# Patient Record
Sex: FEMALE | Race: OTHER | HISPANIC OR LATINO | URBAN - METROPOLITAN AREA
[De-identification: names, ages, dates, MRNs, and addresses within clinical notes are randomized per-mention and may not be internally consistent; named-entity substitution may affect disease eponyms.]

---

## 2017-02-19 ENCOUNTER — INPATIENT (INPATIENT)
Facility: HOSPITAL | Age: 57
LOS: 0 days | Discharge: HOME | DRG: 948 | End: 2017-02-20
Attending: HOSPITALIST | Admitting: HOSPITALIST
Payer: MEDICAID

## 2017-02-19 VITALS
TEMPERATURE: 99 F | RESPIRATION RATE: 20 BRPM | SYSTOLIC BLOOD PRESSURE: 194 MMHG | DIASTOLIC BLOOD PRESSURE: 125 MMHG | OXYGEN SATURATION: 100 % | HEART RATE: 105 BPM

## 2017-02-19 PROCEDURE — 99285 EMERGENCY DEPT VISIT HI MDM: CPT | Mod: 25

## 2017-02-19 PROCEDURE — 93010 ELECTROCARDIOGRAM REPORT: CPT

## 2017-02-19 NOTE — ED ADULT TRIAGE NOTE - CHIEF COMPLAINT QUOTE
L eye swelling x 1 week when waking up in the morning woke up this morning with L eye and facial swelling which improved throughout the day no diff breathing no swelling to lips tongue BP elevated deneis HA or visual changes/disturbances

## 2017-02-19 NOTE — ED ADULT NURSE NOTE - CHIEF COMPLAINT QUOTE
"L eye swelling every morning for 1 week today worsening w L side of face swelling but has since gone down" no diff breathing or swallowing no head pain but describes pressure in L eye

## 2017-02-19 NOTE — ED ADULT NURSE NOTE - OBJECTIVE STATEMENT
55 yo F presents to ED 55 yo F presents to ED c/o left side facial swelling and bilateral leg swelling. Reports trip and fall on Tuesday, bruising noted to bilateral knees. Ambulates with steady gait. 55 yo F presents to ED c/o left side facial swelling and bilateral leg swelling that began after having trip and fall on Tuesday, bruising noted to bilateral knees. Ambulates with steady gait. Denies headache, blurry vision, double vision, fever, chills, chest pain, SOB, abdominal pain, fever, chills, numbness, tingling, weakness, N/V. Pupils equal round and reactive to light. Breathing unlabored on RA. Skin warm pink and dry. Abdomen nondistended. Moves all extremities. As per son at bedside, "the swelling looks better." Pitting edema noted to lower extremities. Comfort and safety measures in place. Family at bedside.

## 2017-02-20 VITALS
DIASTOLIC BLOOD PRESSURE: 97 MMHG | SYSTOLIC BLOOD PRESSURE: 160 MMHG | OXYGEN SATURATION: 99 % | RESPIRATION RATE: 18 BRPM | HEART RATE: 85 BPM

## 2017-02-20 DIAGNOSIS — R63.8 OTHER SYMPTOMS AND SIGNS CONCERNING FOOD AND FLUID INTAKE: ICD-10-CM

## 2017-02-20 DIAGNOSIS — R22.0 LOCALIZED SWELLING, MASS AND LUMP, HEAD: ICD-10-CM

## 2017-02-20 DIAGNOSIS — I10 ESSENTIAL (PRIMARY) HYPERTENSION: ICD-10-CM

## 2017-02-20 DIAGNOSIS — Z29.9 ENCOUNTER FOR PROPHYLACTIC MEASURES, UNSPECIFIED: ICD-10-CM

## 2017-02-20 DIAGNOSIS — I50.9 HEART FAILURE, UNSPECIFIED: ICD-10-CM

## 2017-02-20 DIAGNOSIS — E11.9 TYPE 2 DIABETES MELLITUS WITHOUT COMPLICATIONS: ICD-10-CM

## 2017-02-20 DIAGNOSIS — N28.9 DISORDER OF KIDNEY AND URETER, UNSPECIFIED: ICD-10-CM

## 2017-02-20 DIAGNOSIS — R60.0 LOCALIZED EDEMA: ICD-10-CM

## 2017-02-20 LAB
ALBUMIN SERPL ELPH-MCNC: 2 G/DL — LOW (ref 3.3–5)
ALP SERPL-CCNC: 114 U/L — SIGNIFICANT CHANGE UP (ref 40–120)
ALT FLD-CCNC: 24 U/L RC — SIGNIFICANT CHANGE UP (ref 10–45)
ANION GAP SERPL CALC-SCNC: 12 MMOL/L — SIGNIFICANT CHANGE UP (ref 5–17)
APPEARANCE UR: CLEAR — SIGNIFICANT CHANGE UP
AST SERPL-CCNC: 46 U/L — HIGH (ref 10–40)
BASOPHILS # BLD AUTO: 0.1 K/UL — SIGNIFICANT CHANGE UP (ref 0–0.2)
BASOPHILS NFR BLD AUTO: 1.5 % — SIGNIFICANT CHANGE UP (ref 0–2)
BILIRUB SERPL-MCNC: 0.2 MG/DL — SIGNIFICANT CHANGE UP (ref 0.2–1.2)
BILIRUB UR-MCNC: NEGATIVE — SIGNIFICANT CHANGE UP
BUN SERPL-MCNC: 20 MG/DL — SIGNIFICANT CHANGE UP (ref 7–23)
CALCIUM SERPL-MCNC: 8.3 MG/DL — LOW (ref 8.4–10.5)
CHLORIDE SERPL-SCNC: 112 MMOL/L — HIGH (ref 96–108)
CO2 SERPL-SCNC: 21 MMOL/L — LOW (ref 22–31)
COLOR SPEC: SIGNIFICANT CHANGE UP
CREAT SERPL-MCNC: 1.17 MG/DL — SIGNIFICANT CHANGE UP (ref 0.5–1.3)
DIFF PNL FLD: ABNORMAL
EOSINOPHIL # BLD AUTO: 0.1 K/UL — SIGNIFICANT CHANGE UP (ref 0–0.5)
EOSINOPHIL NFR BLD AUTO: 1.6 % — SIGNIFICANT CHANGE UP (ref 0–6)
GLUCOSE SERPL-MCNC: 193 MG/DL — HIGH (ref 70–99)
GLUCOSE UR QL: 300
HCT VFR BLD CALC: 38.1 % — SIGNIFICANT CHANGE UP (ref 34.5–45)
HGB BLD-MCNC: 13.6 G/DL — SIGNIFICANT CHANGE UP (ref 11.5–15.5)
KETONES UR-MCNC: NEGATIVE — SIGNIFICANT CHANGE UP
LEUKOCYTE ESTERASE UR-ACNC: NEGATIVE — SIGNIFICANT CHANGE UP
LYMPHOCYTES # BLD AUTO: 2.1 K/UL — SIGNIFICANT CHANGE UP (ref 1–3.3)
LYMPHOCYTES # BLD AUTO: 33.1 % — SIGNIFICANT CHANGE UP (ref 13–44)
MCHC RBC-ENTMCNC: 32.2 PG — SIGNIFICANT CHANGE UP (ref 27–34)
MCHC RBC-ENTMCNC: 35.7 GM/DL — SIGNIFICANT CHANGE UP (ref 32–36)
MCV RBC AUTO: 90.3 FL — SIGNIFICANT CHANGE UP (ref 80–100)
MONOCYTES # BLD AUTO: 0.4 K/UL — SIGNIFICANT CHANGE UP (ref 0–0.9)
MONOCYTES NFR BLD AUTO: 7.1 % — SIGNIFICANT CHANGE UP (ref 2–14)
NEUTROPHILS # BLD AUTO: 3.6 K/UL — SIGNIFICANT CHANGE UP (ref 1.8–7.4)
NEUTROPHILS NFR BLD AUTO: 56.7 % — SIGNIFICANT CHANGE UP (ref 43–77)
NITRITE UR-MCNC: NEGATIVE — SIGNIFICANT CHANGE UP
NT-PROBNP SERPL-SCNC: 434 PG/ML — HIGH (ref 0–300)
PH UR: 7 — SIGNIFICANT CHANGE UP (ref 4.8–8)
PLATELET # BLD AUTO: 185 K/UL — SIGNIFICANT CHANGE UP (ref 150–400)
POTASSIUM SERPL-MCNC: 3.9 MMOL/L — SIGNIFICANT CHANGE UP (ref 3.5–5.3)
POTASSIUM SERPL-SCNC: 3.9 MMOL/L — SIGNIFICANT CHANGE UP (ref 3.5–5.3)
PROT SERPL-MCNC: 5.6 G/DL — LOW (ref 6–8.3)
PROT UR-MCNC: 500 MG/DL
RBC # BLD: 4.22 M/UL — SIGNIFICANT CHANGE UP (ref 3.8–5.2)
RBC # FLD: 12.3 % — SIGNIFICANT CHANGE UP (ref 10.3–14.5)
SODIUM SERPL-SCNC: 145 MMOL/L — SIGNIFICANT CHANGE UP (ref 135–145)
SP GR SPEC: 1.01 — SIGNIFICANT CHANGE UP (ref 1.01–1.02)
UROBILINOGEN FLD QL: NEGATIVE — SIGNIFICANT CHANGE UP
WBC # BLD: 6.3 K/UL — SIGNIFICANT CHANGE UP (ref 3.8–10.5)
WBC # FLD AUTO: 6.3 K/UL — SIGNIFICANT CHANGE UP (ref 3.8–10.5)

## 2017-02-20 PROCEDURE — 71020: CPT | Mod: 26

## 2017-02-20 PROCEDURE — 99223 1ST HOSP IP/OBS HIGH 75: CPT | Mod: GC

## 2017-02-20 RX ORDER — ACETAMINOPHEN 500 MG
650 TABLET ORAL EVERY 6 HOURS
Qty: 0 | Refills: 0 | Status: DISCONTINUED | OUTPATIENT
Start: 2017-02-20 | End: 2017-02-20

## 2017-02-20 RX ORDER — DEXTROSE 50 % IN WATER 50 %
25 SYRINGE (ML) INTRAVENOUS ONCE
Qty: 0 | Refills: 0 | Status: DISCONTINUED | OUTPATIENT
Start: 2017-02-20 | End: 2017-02-20

## 2017-02-20 RX ORDER — HEPARIN SODIUM 5000 [USP'U]/ML
5000 INJECTION INTRAVENOUS; SUBCUTANEOUS EVERY 8 HOURS
Qty: 0 | Refills: 0 | Status: DISCONTINUED | OUTPATIENT
Start: 2017-02-20 | End: 2017-02-20

## 2017-02-20 RX ORDER — AMLODIPINE BESYLATE 2.5 MG/1
10 TABLET ORAL ONCE
Qty: 0 | Refills: 0 | Status: COMPLETED | OUTPATIENT
Start: 2017-02-20 | End: 2017-02-20

## 2017-02-20 RX ORDER — DEXTROSE 50 % IN WATER 50 %
1 SYRINGE (ML) INTRAVENOUS ONCE
Qty: 0 | Refills: 0 | Status: DISCONTINUED | OUTPATIENT
Start: 2017-02-20 | End: 2017-02-20

## 2017-02-20 RX ORDER — DILTIAZEM HCL 120 MG
120 CAPSULE, EXT RELEASE 24 HR ORAL DAILY
Qty: 0 | Refills: 0 | Status: DISCONTINUED | OUTPATIENT
Start: 2017-02-20 | End: 2017-02-20

## 2017-02-20 RX ORDER — METFORMIN HYDROCHLORIDE 850 MG/1
1 TABLET ORAL
Qty: 30 | Refills: 0 | OUTPATIENT
Start: 2017-02-20 | End: 2017-03-22

## 2017-02-20 RX ORDER — GLUCAGON INJECTION, SOLUTION 0.5 MG/.1ML
1 INJECTION, SOLUTION SUBCUTANEOUS ONCE
Qty: 0 | Refills: 0 | Status: DISCONTINUED | OUTPATIENT
Start: 2017-02-20 | End: 2017-02-20

## 2017-02-20 RX ORDER — SODIUM CHLORIDE 9 MG/ML
1000 INJECTION, SOLUTION INTRAVENOUS
Qty: 0 | Refills: 0 | Status: DISCONTINUED | OUTPATIENT
Start: 2017-02-20 | End: 2017-02-20

## 2017-02-20 RX ORDER — LOSARTAN POTASSIUM 100 MG/1
100 TABLET, FILM COATED ORAL DAILY
Qty: 0 | Refills: 0 | Status: DISCONTINUED | OUTPATIENT
Start: 2017-02-20 | End: 2017-02-20

## 2017-02-20 RX ORDER — DEXTROSE 50 % IN WATER 50 %
12.5 SYRINGE (ML) INTRAVENOUS ONCE
Qty: 0 | Refills: 0 | Status: DISCONTINUED | OUTPATIENT
Start: 2017-02-20 | End: 2017-02-20

## 2017-02-20 RX ORDER — INSULIN LISPRO 100/ML
VIAL (ML) SUBCUTANEOUS
Qty: 0 | Refills: 0 | Status: DISCONTINUED | OUTPATIENT
Start: 2017-02-20 | End: 2017-02-20

## 2017-02-20 RX ORDER — INSULIN LISPRO 100/ML
VIAL (ML) SUBCUTANEOUS AT BEDTIME
Qty: 0 | Refills: 0 | Status: DISCONTINUED | OUTPATIENT
Start: 2017-02-20 | End: 2017-02-20

## 2017-02-20 RX ADMIN — HEPARIN SODIUM 5000 UNIT(S): 5000 INJECTION INTRAVENOUS; SUBCUTANEOUS at 13:06

## 2017-02-20 RX ADMIN — Medication 120 MILLIGRAM(S): at 13:03

## 2017-02-20 RX ADMIN — Medication 1: at 13:03

## 2017-02-20 RX ADMIN — AMLODIPINE BESYLATE 10 MILLIGRAM(S): 2.5 TABLET ORAL at 02:38

## 2017-02-20 RX ADMIN — LOSARTAN POTASSIUM 100 MILLIGRAM(S): 100 TABLET, FILM COATED ORAL at 14:22

## 2017-02-20 NOTE — H&P ADULT. - PROBLEM SELECTOR PROBLEM 3
Type 2 diabetes mellitus without complication, without long-term current use of insulin Essential hypertension

## 2017-02-20 NOTE — DISCHARGE NOTE ADULT - MEDICATION SUMMARY - MEDICATIONS TO STOP TAKING
I will STOP taking the medications listed below when I get home from the hospital:    Invokamet 50 mg-1000 mg oral tablet  -- 1 tab(s) by mouth 2 times a day (with meals)  -- Verified by [facility] [phone]    Tanzeum 30 mg subcutaneous injection  -- 30 milligram(s) subcutaneous every 7 days

## 2017-02-20 NOTE — DISCHARGE NOTE ADULT - PLAN OF CARE
To not have any more edema Your swelling may have been caused by your diabetes medications. Please discontinue your medications and only take metformin until you see your PMD. Please see your PMD within one week of discharge. It is important to go over your medications with your doctor Please continue to take your medications as prescribed. Please follow up with your PMD within a week of discharge. Please continue to take your eye drops Please only take metformin as prescribed. Your other medications were discontinued as you may have had an allergic reaction. Please follow up with your PMD for further diabetes mgmt. Medication compliance Ophthalmology Medication compliance and outpatient follow up Your swelling may have been caused by your diabetes medications. Please discontinue your medications and only take metformin until you see your PMD. A prescription for metformin was sent to your pharmacy [email or web address]. Please see your PMD within one week of discharge. It is important to go over your medications with your doctor. Please return to the ER if you have difficulty breathing, chest pain, rash, or any other progressive symptoms. Please continue to take your eye drops and follow up with your eye doctor who manages your glaucoma within one week of discharge Please only take metformin as prescribed. Your other medications were discontinued as you may have had an allergic reaction. Please follow up with your PMD for further diabetes management. Please return to the ER if you have hives, rashes, tongue swelling, difficulty breathing, or any other progressive symptoms.

## 2017-02-20 NOTE — H&P ADULT. - PMH
Essential hypertension    Glaucoma    Other hyperlipidemia    Type 2 diabetes mellitus without complication, without long-term current use of insulin

## 2017-02-20 NOTE — ED PROVIDER NOTE - OBJECTIVE STATEMENT
56 year old female DM, HTN, presenting with le edema, started suddenly after a trip and fall on tuesday when she fell onto bl knees. she walked immediately thereafter, ambulatory now. No injury elsewhere. No cardiac or renal hx. BP is "always high at doctor office" but has not been able to control with anti-hypertensives.

## 2017-02-20 NOTE — DISCHARGE NOTE ADULT - CARE PLAN
Principal Discharge DX:	Localized edema  Goal:	To not have any more edema  Instructions for follow-up, activity and diet:	Your swelling may have been caused by your diabetes medications. Please discontinue your medications and only take metformin until you see your PMD. Please see your PMD within one week of discharge. It is important to go over your medications with your doctor  Secondary Diagnosis:	Essential hypertension  Instructions for follow-up, activity and diet:	Please continue to take your medications as prescribed. Please follow up with your PMD within a week of discharge.  Secondary Diagnosis:	Glaucoma  Instructions for follow-up, activity and diet:	Please continue to take your eye drops  Secondary Diagnosis:	Type 2 diabetes mellitus without complication, without long-term current use of insulin  Instructions for follow-up, activity and diet:	Please only take metformin as prescribed. Your other medications were discontinued as you may have had an allergic reaction. Please follow up with your PMD for further diabetes mgmt. Principal Discharge DX:	Localized edema  Goal:	To not have any more edema  Instructions for follow-up, activity and diet:	Your swelling may have been caused by your diabetes medications. Please discontinue your medications and only take metformin until you see your PMD. A prescription for metformin was sent to your pharmacy [facility]. Please see your PMD within one week of discharge. It is important to go over your medications with your doctor. Please return to the ER if you have difficulty breathing, chest pain, rash, or any other progressive symptoms.  Secondary Diagnosis:	Essential hypertension  Goal:	Medication compliance  Instructions for follow-up, activity and diet:	Please continue to take your medications as prescribed. Please follow up with your PMD within a week of discharge.  Secondary Diagnosis:	Glaucoma  Goal:	Ophthalmology  Instructions for follow-up, activity and diet:	Please continue to take your eye drops and follow up with your eye doctor who manages your glaucoma within one week of discharge  Secondary Diagnosis:	Type 2 diabetes mellitus without complication, without long-term current use of insulin  Goal:	Medication compliance and outpatient follow up  Instructions for follow-up, activity and diet:	Please only take metformin as prescribed. Your other medications were discontinued as you may have had an allergic reaction. Please follow up with your PMD for further diabetes management. Please return to the ER if you have hives, rashes, tongue swelling, difficulty breathing, or any other progressive symptoms. Principal Discharge DX:	Localized edema  Goal:	To not have any more edema  Instructions for follow-up, activity and diet:	Your swelling may have been caused by your diabetes medications. Please discontinue your medications and only take metformin until you see your PMD. A prescription for metformin was sent to your pharmacy [facility]. Please see your PMD within one week of discharge. It is important to go over your medications with your doctor. Please return to the ER if you have difficulty breathing, chest pain, rash, or any other progressive symptoms.  Secondary Diagnosis:	Essential hypertension  Goal:	Medication compliance  Instructions for follow-up, activity and diet:	Please continue to take your medications as prescribed. Please follow up with your PMD within a week of discharge.  Secondary Diagnosis:	Glaucoma  Goal:	Ophthalmology  Instructions for follow-up, activity and diet:	Please continue to take your eye drops and follow up with your eye doctor who manages your glaucoma within one week of discharge  Secondary Diagnosis:	Type 2 diabetes mellitus without complication, without long-term current use of insulin  Goal:	Medication compliance and outpatient follow up  Instructions for follow-up, activity and diet:	Please only take metformin as prescribed. Your other medications were discontinued as you may have had an allergic reaction. Please follow up with your PMD for further diabetes management. Please return to the ER if you have hives, rashes, tongue swelling, difficulty breathing, or any other progressive symptoms. Principal Discharge DX:	Localized edema  Goal:	To not have any more edema  Instructions for follow-up, activity and diet:	Your swelling may have been caused by your diabetes medications. Please discontinue your medications and only take metformin until you see your PMD. A prescription for metformin was sent to your pharmacy [facility]. Please see your PMD within one week of discharge. It is important to go over your medications with your doctor. Please return to the ER if you have difficulty breathing, chest pain, rash, or any other progressive symptoms.  Secondary Diagnosis:	Essential hypertension  Goal:	Medication compliance  Instructions for follow-up, activity and diet:	Please continue to take your medications as prescribed. Please follow up with your PMD within a week of discharge.  Secondary Diagnosis:	Glaucoma  Goal:	Ophthalmology  Instructions for follow-up, activity and diet:	Please continue to take your eye drops and follow up with your eye doctor who manages your glaucoma within one week of discharge  Secondary Diagnosis:	Type 2 diabetes mellitus without complication, without long-term current use of insulin  Goal:	Medication compliance and outpatient follow up  Instructions for follow-up, activity and diet:	Please only take metformin as prescribed. Your other medications were discontinued as you may have had an allergic reaction. Please follow up with your PMD for further diabetes management. Please return to the ER if you have hives, rashes, tongue swelling, difficulty breathing, or any other progressive symptoms.

## 2017-02-20 NOTE — ED ADULT NURSE REASSESSMENT NOTE - NS ED NURSE REASSESS COMMENT FT1
Pt. denies pain/discomfort. Breathing unlabored on RA. NSR on CM. Family at bedside. Comfort and safety measures in place.

## 2017-02-20 NOTE — H&P ADULT. - PROBLEM SELECTOR PLAN 2
Patient with hx of Htn p/w /91 s/p amlodopine 10 mg x1  - restart patient's home antihypertensives see above

## 2017-02-20 NOTE — H&P ADULT. - PROBLEM SELECTOR PLAN 6
Patient with hx of DM2 and Htn both of which could cause nephropathy with UA showing protein 500   - order spot urine protein and creatine Patient with hx of DM2 and Htn both of which could cause nephropathy with UA showing protein 500   - order spot urine protein and creatine  -can consider outpatient neprhology eval  -advancing ckd may be contributing to mild LE swelling  -continue pts ARB on discharge for proteinuria. - DVT ppx: Heparin SC 5000 units q8

## 2017-02-20 NOTE — H&P ADULT. - HISTORY OF PRESENT ILLNESS
56 y.o. woman PMHx Htn, DM2, and glaucoma p/w b/l LE and left sided facial swelling x5 days. Six days ago, patient had a mechanical fall and fell on her knees. Patient reports that she tripped on the sidewalk and denies loss of consciousness and head trauma. Patient reports that she started taking a new DM2 medication, invokamet the day after the fall, and then her LE swelling began the day after starting the new medication. She denies rash, tongue swelling, difficulty breathing, and difficulty ambulating. Patient also states that she has glaucoma and uses eye drops for her conditions. She noticed two weeks ago that her left eye started irritating her, so she self stopped the eye drops. One day before presentation, patient reports that her left side of the face was swollen but has now resolved. Patient denies recent travel and son had viral syndrome symptoms.     In the ER, vitals were T 98.6, , /125, RR 20, O2 sat 100% on RA. Labs were significant for UA protein 500, small blood, RBC 5-10, and glucose 300. 56 y.o. woman PMHx Htn, DM2, and glaucoma p/w b/l LE and left sided facial swelling x5 days. Six days ago, patient had a mechanical fall and fell on her knees. Patient reports that she tripped on the sidewalk and denies loss of consciousness and head trauma. Patient reports that she started taking a new DM2 medication, invokamet the day after the fall, and then her LE swelling began the day after starting the new medication. She denies rash, tongue swelling, difficulty breathing, and difficulty ambulating. Patient also states that she has glaucoma and uses eye drops for her conditions. She noticed two weeks ago that her left eye started irritating her, so she self stopped the eye drops. One day before presentation, patient reports that her left side of the face was swollen but has now resolved. Patient denies recent travel and son had viral syndrome symptoms. Denies tooth infection and pain and pain with extraocular movement.     In the ER, vitals were T 98.6, , /125, RR 20, O2 sat 100% on RA. Labs were significant for  and UA protein 500, small blood, RBC 5-10, and glucose 300. CXR appears to show clear lungs. Patient received amlodipine 10 mg x1.     Currently, patient denies headache, slurred speech, visual changes, facial swelling, rashes, LE weakness, numbness, and tingling. 56 y.o. woman PMHx Htn, DM2, and glaucoma p/w b/l LE and left sided facial swelling x5 days. Six days ago, patient had a mechanical fall and fell on her knees. Patient reports that she tripped on the sidewalk and denies loss of consciousness and head trauma, prodrome symptoms, seizure activity, etc. Was a purely mechanical fall. Patient reports that she started taking a new DM2 medication, Tanzeum the day after the fall, and then her LE swelling began the day after starting the new medication. She denies rash, tongue swelling, difficulty breathing, and difficulty ambulating. No known allergies or prior allergic reactions. Patient also states that she has glaucoma and uses eye drops for her conditions. She noticed two weeks ago that her left eye started irritating her, so she self stopped the eye drops. One day before presentation, patient reports that her left side of the face, primarily her eyes, was swollen but has now resolved. Patient denies recent travel and son had viral syndrome symptoms. Denies tooth infection and pain and pain with extraocular movement. Denies visual changes.    In the ER, vitals were T 98.6, , /125, RR 20, O2 sat 100% on RA. Labs were significant for  and UA protein 500, small blood, RBC 5-10, and glucose 300. CXR appears to show clear lungs. Patient received amlodipine 10 mg x1.     Currently, patient denies headache, slurred speech, visual changes, facial swelling, rashes, LE weakness, numbness, and tingling.

## 2017-02-20 NOTE — H&P ADULT. - PROBLEM SELECTOR PLAN 4
- Diet: DASH/TLC, consistent carb without snacks Patient with hx of DM2  - order HbA1C  - ISS while admitted and adjust according to fingersticks

## 2017-02-20 NOTE — ED PROVIDER NOTE - ATTENDING CONTRIBUTION TO CARE
I was physically present for the E/M service provided. I agree with above history, physical, and plan which I have reviewed and edited where appropriate. I was physically present for the key portions of the service provided.    55 y/o female p/w 5 day h/o b/l LE swelling and facial swelling. Pt started on Invokamet for DM 4 days ago. No SOB or CP.  -Lungs CTA  -+2 Pitting edema to shins  -Check renal fxn and cardiac fxn with BNP  -Possible side effect of Invokamet is renal failure and angioedema (no clinically apparent mouth, tongue or face swelling)    BNP elevated will need admission for new-onset CHF for echo and cardiovascular management.

## 2017-02-20 NOTE — DISCHARGE NOTE ADULT - MEDICATION SUMMARY - MEDICATIONS TO TAKE
I will START or STAY ON the medications listed below when I get home from the hospital:    Aspirin Enteric Coated 81 mg oral delayed release tablet  -- 1 tab(s) by mouth once a day  -- Verified by [facility] [phone]  -- Indication: For CAD    Diltiazem Hydrochloride  mg/24 hours oral capsule, extended release  -- 1 cap(s) by mouth once a day  -- Verified by [facility] [phone]  -- Indication: For Htn    metFORMIN 1000 mg oral tablet  -- 1 tab(s) by mouth once a day  -- Check with your doctor before becoming pregnant.  Do not drink alcoholic beverages when taking this medication.  It is very important that you take or use this exactly as directed.  Do not skip doses or discontinue unless directed by your doctor.  Obtain medical advice before taking any non-prescription drugs as some may affect the action of this medication.  Take with food or milk.    -- Indication: For DM2    meclizine 25 mg oral tablet  -- 1 tab(s) by mouth every 4 hours, As Needed  -- Verified by [facility] [phone]  -- Indication: For dizziness    losartan-hydroCHLOROthiazide 100mg-25mg oral tablet  -- 1 tab(s) by mouth once a day  -- Verified by [facility] [phone]  -- Indication: For Htn    Betimol 0.5% ophthalmic solution  -- 1 drop(s) to each affected eye 2 times a day  -- Verified by [facility] [phone]  -- Indication: For Glaucoma    Travatan Z 0.004% ophthalmic solution  -- 1 drop(s) to each affected eye once a day (at bedtime)  -- Verified by [facility] [phone]  -- Indication: For Glaucoma

## 2017-02-20 NOTE — ED PROVIDER NOTE - CARE PLAN
Principal Discharge DX:	Acute congestive heart failure, unspecified congestive heart failure type  Secondary Diagnosis:	Hypoalbuminemia

## 2017-02-20 NOTE — H&P ADULT. - ENMT COMMENTS
moist oropharynx. extracted teeth but gums non erythematous and no abscesses visualized L. eye swelling now improved, denies eye redness/itching, pain with EOM

## 2017-02-20 NOTE — H&P ADULT. - PROBLEM SELECTOR PLAN 1
Patient p/w b/l LE edema after mechanical fall and left sided facial swelling after starting Invokamet now resolved with mildly elevated BNP. LE edema most likely in 2/2 mechanical fall.   - will discuss obtaining TTE.   - restart home HCTZ  - keep LE elevated when resting.   - investigate side effects of Invokamet Patient p/w b/l LE edema after mechanical fall and left sided facial swelling after starting Invokamet now resolved with mildly elevated BNP. LE edema most likely in 2/2 mechanical fall.   - no TTE necessary at this time. low suspicion for CHF- pt can discuss obtaining TTE with PCP   - restart home HCTZ  - keep LE elevated when resting, recommend compression stockings  - investigate side effects of Invokamet, Tanzeum. -- will likely stop with discharge and recommend follow up with pcp prior to resuming

## 2017-02-20 NOTE — H&P ADULT. - PROBLEM SELECTOR PLAN 3
Patient with hx of DM2  - order HbA1C  - ISS while admitted and adjust according to fingersticks Patient with hx of Htn p/w /91 s/p amlodopine 10 mg x1  - restart patient's home antihypertensives

## 2017-02-20 NOTE — H&P ADULT. - ASSESSMENT
56 y.o. woman PMHx Htn, DM2, and glaucoma p/w b/l LE swelling and facial swelling in the setting of mechanical fall and starting new medication Invokamet. 56 y.o. woman PMHx Htn, DM2, and glaucoma p/w b/l LE swelling and facial swelling in the setting of mechanical fall and starting new medication Invokamet found to have possible nephropathy. 56 y.o. woman PMHx Htn, DM2, and glaucoma p/w b/l LE swelling and L. eye swelling in the setting of mechanical fall and starting new medication. found to have possible DM2 nephropathy (protenuria, reduced GFR). Patients lower extremity swelling likely 2/2 injury/mechanical fall, venous stasis, and possible advancing ckd. Per patient and son has been improving.

## 2017-02-20 NOTE — H&P ADULT. - PROBLEM SELECTOR PROBLEM 4
Nutrition, metabolism, and development symptoms Type 2 diabetes mellitus without complication, without long-term current use of insulin

## 2017-02-20 NOTE — DISCHARGE NOTE ADULT - PATIENT PORTAL LINK FT
“You can access the [facility] Patient Portal, offered by [facility], by registering with the following website: [email or web address]”

## 2017-02-20 NOTE — H&P ADULT. - PROBLEM SELECTOR PLAN 7
Patient with hx of DM2 and Htn both of which could cause nephropathy with UA showing protein 500   - order spot urine protein and creatine  -can consider outpatient neprhology eval  -advancing ckd may be contributing to mild LE swelling  -continue pts ARB on discharge for proteinuria.

## 2017-02-20 NOTE — ED PROVIDER NOTE - PHYSICAL EXAMINATION
A primary and secondary survey was performed. Airway: oropharynx clear, Breathing: normal breath sounds bilaterally, pt phonating well, Circulation: Mentation normal, pulses palpated in all 4 limbs, no obvious active external hemorrhage, lungs/abd/pelvis/legs wo signs of blood accumulation. Disability: Neuro intact / pupils equal round reactive. Exposure: No external signs of trauma EXCEPT bruising to bl knees. Spine including c-spine non-tender. No neck pain and ROM wnl. C/spine cleared by nexus criteria.    Gen: Well appearing not in distress. Head: NC,AT. No sno sign/raccoon eyes. minimal edema L>R. ENT: No hemoTM, oropharynx as above, no nasal hemorrhage. Neck: as above. Chest: Lung exam- CTAB, no ttp in chest wall. Cardiac: RRR S1S2, No JVD. minimal non-pitting edema bl le. Abd: soft, non-tender. Normal in color. Pelvis: Stable. Ext: no ttp in all 4 limbs, rom at shoulder, elbow, hip and knee wnl, Skin: No Abrasions or lacerations. Neuro: GCS 15 , Moving 4 limbs, Speech fluid and clear, able to ambulate. Psych: Normal affect, judgment.

## 2017-02-20 NOTE — DISCHARGE NOTE ADULT - SECONDARY DIAGNOSIS.
Essential hypertension Glaucoma Type 2 diabetes mellitus without complication, without long-term current use of insulin

## 2017-02-20 NOTE — H&P ADULT. - ATTENDING COMMENTS
56 y.o. woman PMHx Htn, DM2, and glaucoma p/w b/l LE and left sided facial swelling (primarily localized to left eye) x5 days in the setting of starting new DM2 medication, Tanzeum, as well as mechanical fall. Per patient symptoms are now improving, and near baseline. VS in ED stable other than hypertension. Exam significant only for 1+ non pitting edema of LEs, with normal HEENT and lung exams. Labwork significant for evidence of CKD III, proteinuria, and mildly elevated pro-BNP to 400s. EKG and CXR both non-significant. Initial suspicion from ER was possible new diagnosis of CHF, however patient is asymptomatic with no clinical signs of CHF and no significant cardiac history. Suspect her LE swelling may be 2/2 venous stasis and/or trauma from mechanical fall prior to admission. Another possibility is 2/2 advancing CKD. Eye swelling now resolved, may have possibly been 2/2 allergic reaction from new medication? vs other general/seasonal allergies. Will restart antihypertensives including losartan, HCTZ. Will hold invokomet and tanzeum upon discharge and discharge pt only with metformin for time being until she sees her PCP. Recommend compression stockings and leg elevation upon discharge. Pt can discuss TTE with PCP outpatient, it is not necessary at this time given low suspicion for CHF. Can discontinue telemetry, no further cardiac workup at this time.

## 2017-02-21 RX ORDER — ALBIGLUTIDE 30 MG/.5ML
30 INJECTION, POWDER, LYOPHILIZED, FOR SOLUTION SUBCUTANEOUS
Qty: 0 | Refills: 0 | COMMUNITY

## 2017-05-01 ENCOUNTER — OUTPATIENT (OUTPATIENT)
Dept: OUTPATIENT SERVICES | Facility: HOSPITAL | Age: 57
LOS: 1 days | End: 2017-05-01
Payer: MEDICAID

## 2017-05-01 PROCEDURE — G9001: CPT

## 2017-05-28 ENCOUNTER — INPATIENT (INPATIENT)
Facility: HOSPITAL | Age: 57
LOS: 7 days | Discharge: HOME | DRG: 683 | End: 2017-06-05
Attending: HOSPITALIST | Admitting: HOSPITALIST
Payer: MEDICAID

## 2017-05-28 VITALS
HEART RATE: 93 BPM | HEIGHT: 61 IN | SYSTOLIC BLOOD PRESSURE: 154 MMHG | DIASTOLIC BLOOD PRESSURE: 89 MMHG | TEMPERATURE: 98 F | RESPIRATION RATE: 20 BRPM | OXYGEN SATURATION: 100 %

## 2017-05-28 DIAGNOSIS — R55 SYNCOPE AND COLLAPSE: ICD-10-CM

## 2017-05-28 LAB
ALBUMIN SERPL ELPH-MCNC: 1.7 G/DL — LOW (ref 3.3–5)
ALP SERPL-CCNC: 88 U/L — SIGNIFICANT CHANGE UP (ref 40–120)
ALT FLD-CCNC: 13 U/L RC — SIGNIFICANT CHANGE UP (ref 10–45)
ANION GAP SERPL CALC-SCNC: 12 MMOL/L — SIGNIFICANT CHANGE UP (ref 5–17)
AST SERPL-CCNC: 26 U/L — SIGNIFICANT CHANGE UP (ref 10–40)
BASOPHILS # BLD AUTO: 0 K/UL — SIGNIFICANT CHANGE UP (ref 0–0.2)
BASOPHILS NFR BLD AUTO: 0.5 % — SIGNIFICANT CHANGE UP (ref 0–2)
BILIRUB SERPL-MCNC: <0.1 MG/DL — LOW (ref 0.2–1.2)
BUN SERPL-MCNC: 48 MG/DL — HIGH (ref 7–23)
CALCIUM SERPL-MCNC: 8 MG/DL — LOW (ref 8.4–10.5)
CHLORIDE SERPL-SCNC: 114 MMOL/L — HIGH (ref 96–108)
CK MB CFR SERPL CALC: 2.2 NG/ML — SIGNIFICANT CHANGE UP (ref 0–3.8)
CK SERPL-CCNC: 54 U/L — SIGNIFICANT CHANGE UP (ref 25–170)
CO2 SERPL-SCNC: 15 MMOL/L — LOW (ref 22–31)
CREAT SERPL-MCNC: 3.11 MG/DL — HIGH (ref 0.5–1.3)
EOSINOPHIL # BLD AUTO: 0 K/UL — SIGNIFICANT CHANGE UP (ref 0–0.5)
EOSINOPHIL NFR BLD AUTO: 0.7 % — SIGNIFICANT CHANGE UP (ref 0–6)
GAS PNL BLDV: SIGNIFICANT CHANGE UP
GLUCOSE SERPL-MCNC: 151 MG/DL — HIGH (ref 70–99)
HCT VFR BLD CALC: 26.9 % — LOW (ref 34.5–45)
HGB BLD-MCNC: 9.1 G/DL — LOW (ref 11.5–15.5)
LYMPHOCYTES # BLD AUTO: 1.8 K/UL — SIGNIFICANT CHANGE UP (ref 1–3.3)
LYMPHOCYTES # BLD AUTO: 24.1 % — SIGNIFICANT CHANGE UP (ref 13–44)
MCHC RBC-ENTMCNC: 31.4 PG — SIGNIFICANT CHANGE UP (ref 27–34)
MCHC RBC-ENTMCNC: 34 GM/DL — SIGNIFICANT CHANGE UP (ref 32–36)
MCV RBC AUTO: 92.4 FL — SIGNIFICANT CHANGE UP (ref 80–100)
MONOCYTES # BLD AUTO: 0.3 K/UL — SIGNIFICANT CHANGE UP (ref 0–0.9)
MONOCYTES NFR BLD AUTO: 3.8 % — SIGNIFICANT CHANGE UP (ref 2–14)
NEUTROPHILS # BLD AUTO: 5.2 K/UL — SIGNIFICANT CHANGE UP (ref 1.8–7.4)
NEUTROPHILS NFR BLD AUTO: 71 % — SIGNIFICANT CHANGE UP (ref 43–77)
NT-PROBNP SERPL-SCNC: 1875 PG/ML — HIGH (ref 0–300)
PLATELET # BLD AUTO: 216 K/UL — SIGNIFICANT CHANGE UP (ref 150–400)
POTASSIUM SERPL-MCNC: 4.6 MMOL/L — SIGNIFICANT CHANGE UP (ref 3.5–5.3)
POTASSIUM SERPL-SCNC: 4.6 MMOL/L — SIGNIFICANT CHANGE UP (ref 3.5–5.3)
PROT SERPL-MCNC: 5.1 G/DL — LOW (ref 6–8.3)
RBC # BLD: 2.91 M/UL — LOW (ref 3.8–5.2)
RBC # FLD: 12.6 % — SIGNIFICANT CHANGE UP (ref 10.3–14.5)
SODIUM SERPL-SCNC: 141 MMOL/L — SIGNIFICANT CHANGE UP (ref 135–145)
TROPONIN T SERPL-MCNC: <0.01 NG/ML — SIGNIFICANT CHANGE UP (ref 0–0.06)
TROPONIN T SERPL-MCNC: <0.01 NG/ML — SIGNIFICANT CHANGE UP (ref 0–0.06)
WBC # BLD: 6.8 K/UL — SIGNIFICANT CHANGE UP (ref 3.8–10.5)
WBC # FLD AUTO: 6.8 K/UL — SIGNIFICANT CHANGE UP (ref 3.8–10.5)

## 2017-05-28 PROCEDURE — 93010 ELECTROCARDIOGRAM REPORT: CPT

## 2017-05-28 PROCEDURE — 99285 EMERGENCY DEPT VISIT HI MDM: CPT | Mod: 25

## 2017-05-28 PROCEDURE — 99223 1ST HOSP IP/OBS HIGH 75: CPT | Mod: GC

## 2017-05-28 PROCEDURE — 74176 CT ABD & PELVIS W/O CONTRAST: CPT | Mod: 26

## 2017-05-28 PROCEDURE — 71020: CPT | Mod: 26

## 2017-05-28 RX ORDER — MECLIZINE HCL 12.5 MG
1 TABLET ORAL
Qty: 0 | Refills: 0 | COMMUNITY

## 2017-05-28 RX ORDER — ASPIRIN/CALCIUM CARB/MAGNESIUM 324 MG
1 TABLET ORAL
Qty: 0 | Refills: 0 | COMMUNITY

## 2017-05-28 RX ORDER — SODIUM CHLORIDE 9 MG/ML
250 INJECTION INTRAMUSCULAR; INTRAVENOUS; SUBCUTANEOUS ONCE
Qty: 0 | Refills: 0 | Status: COMPLETED | OUTPATIENT
Start: 2017-05-28 | End: 2017-05-28

## 2017-05-28 RX ADMIN — SODIUM CHLORIDE 500 MILLILITER(S): 9 INJECTION INTRAMUSCULAR; INTRAVENOUS; SUBCUTANEOUS at 18:25

## 2017-05-28 RX ADMIN — SODIUM CHLORIDE 500 MILLILITER(S): 9 INJECTION INTRAMUSCULAR; INTRAVENOUS; SUBCUTANEOUS at 22:02

## 2017-05-28 NOTE — H&P ADULT. - HISTORY OF PRESENT ILLNESS
56 yr female w/ hx of nephrotic syndrome, lupus membranosis, HTN and DM presents with 1 month diarrhea and 2 syncopal episodes over past 3 weeks.      In the ED, vital signs: T:98.4, HR:93, BP:154/89, RR:20 and O2 sat: 100% on RA. Patient received 500cc NS. 56 yr female w/ hx of nephrotic syndrome, membranous LN, HTN and DM p/w syncopal episode and diarrhea x 1 month.     Patient and son at bedside explain that patient has had diarrhea for approximately 1 month and that for approximately the last week, she has been experiencing nausea/vomiting and cramping abdominal pain. On Saturday morning, patient reports having two episodes of non-bloody, non-bilious emesis shortly after having breakfast and states that when she went to the bathroom after that time, she felt lightheaded and felt as if everything was "blacked out" so she sat at the edge of her bathtub. Pt did not experience any headaches, chest pain, shortness of breath or palpitations at that time. She also has not experienced any  fevers/chills, bloody stool or dysuria. She does report decreased frequency of urination in the last several days as well as chronic b/l LE edema that is unchanged over the last several months.     Of note, pt was diagnosed with nephrotic syndrome/membranous lupus approximately one month ago and was recently prescribed Myfortic, of which she took the first dose today.     In the ED, vital signs: T:98.4, HR:93, BP:154/89, RR:20 and O2 sat: 100% on RA. Patient received 500cc NS.

## 2017-05-28 NOTE — ED PROVIDER NOTE - MEDICAL DECISION MAKING DETAILS
MD Kiran,Attending: pt seen and examined, agree with above HPI/ROS/PE. repeat syncope in pt with multimedical dxes and nephrotic syndrome. No CP/no trauma. Lungs/car/neuro all WNL. 3+ chronic LE pitiing edema. Discuss goals/numbers with her nephrologist and treat accordingly. cardiac workup now.

## 2017-05-28 NOTE — H&P ADULT. - PROBLEM SELECTOR PROBLEM 5
Essential hypertension Type 2 diabetes mellitus without complication, without long-term current use of insulin Anemia

## 2017-05-28 NOTE — H&P ADULT. - PROBLEM SELECTOR PLAN 5
continue nifedipine and clonidine, hold losartan and diuretics for now hold oral medications and start ISS   - monitor FS   - carb consistent diet  - hbgA1C in AM pt w/ hgb of 9.1 on admission (baseline 13.6), no overt signs of bleeding, may be 2/2 worsening renal function   - check iron studies   - monitor CBC

## 2017-05-28 NOTE — ED PROVIDER NOTE - OBJECTIVE STATEMENT
55 yo F PMHx nephrotic syndrome, lupus membranosis, HTN, DM presents with 1 month diarrhea and 2 syncopal episodes over past 3 weeks. Last syncope 2 hours ago while lying down. Follows with nephrology (unattached) who states baseline creatinine 2.5, albumin less than 1.5. States diarrhea began when pt started med for nephrotic syndrome. Pt denies CP, SOB, fever, chills, bloody stool or urine, mental status changes, focal neurologic deficit.

## 2017-05-28 NOTE — H&P ADULT. - RADIOLOGY RESULTS AND INTERPRETATION
CT a/p reviewed: Marked bowel wall thickening of the distal ileum and mild submucosal fat deposition noted in the terminal ileum, suggestive of with ileitis.   CXR reviewed: clear lungs

## 2017-05-28 NOTE — H&P ADULT. - PROBLEM SELECTOR PLAN 6
continue atorvastatin continue nifedipine and clonidine, hold losartan and diuretics for now hold oral medications and start ISS   - monitor FS   - carb consistent diet  - hbgA1C in AM

## 2017-05-28 NOTE — ED ADULT NURSE NOTE - CHIEF COMPLAINT QUOTE
unwitnessed syncopal episode after vomiting - in sitting position, +dizzy x few days, +persistent diarrhea, similar episode x 3 wks ago due to dehydration from diarrhea - seen at [facility]

## 2017-05-28 NOTE — H&P ADULT. - PROBLEM SELECTOR PLAN 1
CT a/p showing maked bowel wall thickening of the distal ileum w/ submucosal fat   deposition suggestive of ileitis   - pt s/p 500cc in ED   - hold diuretics for now   - start ciprofloxacin/flagyl   - check stool cultures symptoms likely 2/2 dehydration the setting of diarrhea  - cardiac etiology unlikely given EKG is wnl and troponins negative x 2  - continue IVF   - check orthostatics symptoms likely 2/2 dehydration the setting of diarrhea  - cardiac etiology unlikely given EKG is wnl and troponins negative x 2 (will trend x 3)  - continue IVF   - check orthostatics

## 2017-05-28 NOTE — ED ADULT NURSE NOTE - OBJECTIVE STATEMENT
56 yr old female coming in c/o diarrhea x1 month- was seen at another hospital dx with "dehydration". Pt has history of diabetes and nephrotic syndrome. Lower extremity swelling noted. Family states the patient was vomiting today and had a syncopal episode that lasted "2 minutes"; denies hitting head. Patient awake and alert, body tremors noted. Pt has pain in epigastric area when palpating. IV 20G left AC. Pt denies chest pain, sob, fevers, chills, dizziness. Family at bedside. 56 yr old female coming in c/o diarrhea x1 month- was seen at another hospital dx with "dehydration". Pt has history of diabetes and nephrotic syndrome. Lower extremity swelling noted. Family states the patient was vomiting today and had a syncopal episode that lasted "2 minutes"; denies hitting head. Patient awake and alert, body tremors noted. Pt has pain in epigastric area when palpating.  IV 20G left AC. Pt denies chest pain, sob, fevers, chills, dizziness. Family at bedside.

## 2017-05-28 NOTE — ED ADULT NURSE NOTE - PMH
Diabetes    Essential hypertension    Glaucoma    Hypercholesteremia    Hypertension    Other hyperlipidemia    Type 2 diabetes mellitus without complication, without long-term current use of insulin

## 2017-05-28 NOTE — ED PROVIDER NOTE - PHYSICAL EXAMINATION
A&Ox3, mild distress, tremulous. Skin pale. CN 2-12 grossly intact. EOMI, PERRLA. A&Ox3, mild distress, tremulous. Skin pale. CN 2-12 grossly intact. No focal neurologic deficit motor or sensory. No vascular compromise noted. EOMI, PERRLA. Heart RRR no murmur, lungs CTA b/l no wheezes, rhonchi rales. 4+ pitting edema b/l LEs. Peripheral pulses present and equal UE and LE b/l. Abdomen mildly tender to palpation. Guaiac negative.

## 2017-05-28 NOTE — H&P ADULT. - PROBLEM SELECTOR PLAN 2
pt w/ BUN/Cr of 48/3.11 on admission, baseline creatinine 2.5. Likely pre-renal in the setting of dehydration 2/2 diarrhea   - hold diuretics   - monitor BMP   - Avoid NSAIDs/nephrotoxins  - renal dosing of meds  - hold Losartan CT a/p showing maked bowel wall thickening of the distal ileum w/ submucosal fat   deposition suggestive of ileitis   - pt s/p 500cc in ED   - hold diuretics for now   - start ciprofloxacin/flagyl   - check stool cultures CT a/p showing marked bowel wall thickening of the distal ileum w/ submucosal fat   deposition suggestive of ileitis   - pt s/p 500cc in ED   - hold diuretics for now   - start ciprofloxacin/flagyl   - check stool cultures  - GI consult in AM

## 2017-05-28 NOTE — H&P ADULT. - PROBLEM SELECTOR PLAN 4
hold oral medications and start ISS   - monitor FS   - carb consistent diet  - hbgA1C in AM pt w/ baseline creatinine of 2.5, now with likely pre-renal OVI   - monitor BMP   - contact nephrologist (Dr. Huong Jacobs) in AM   - holding diuretics for now pt w/ baseline creatinine of 2.5, now with likely pre-renal OVI   - monitor BMP   - contact nephrologist (Dr. Huong Jacobs) in AM and house renal    - holding diuretics for now  - rheum consult in AM

## 2017-05-28 NOTE — H&P ADULT. - ASSESSMENT
56 yr female w/ hx of nephrotic syndrome, membranous LN, HTN and DM p/w syncopal episode and diarrhea x 1 month found to have ileitis

## 2017-05-28 NOTE — H&P ADULT. - PROBLEM SELECTOR PROBLEM 6
Hypercholesteremia Essential hypertension Type 2 diabetes mellitus without complication, without long-term current use of insulin

## 2017-05-28 NOTE — ED ADULT TRIAGE NOTE - CHIEF COMPLAINT QUOTE
unwitnessed syncopal episode after vomiting - in sitting position, +dizzy x few days, +persistent diarrhea, similar episode x 3 wks ago due to dehydration from diarrhea - seen at [facility] unwitnessed syncopal episode after vomiting - in sitting position, +dizzy x few days, +persistent diarrhea (due to taking myfortic - as per nephrologist), similar episode x 3 wks ago due to dehydration from diarrhea - seen at [facility]

## 2017-05-28 NOTE — H&P ADULT. - PROBLEM SELECTOR PLAN 3
pt w/ baseline creatinine of 2.5, now with likely pre-renal OVI   - monitor BMP   - contact nephrologist (Dr. Huong Jacobs) in AM   - holding diuretics for now pt w/ BUN/Cr of 48/3.11 on admission, baseline creatinine 2.5. Likely pre-renal in the setting of dehydration 2/2 diarrhea   - hold diuretics   - monitor BMP   - Avoid NSAIDs/nephrotoxins  - renal dosing of meds  - hold Losartan

## 2017-05-29 DIAGNOSIS — I10 ESSENTIAL (PRIMARY) HYPERTENSION: ICD-10-CM

## 2017-05-29 DIAGNOSIS — E11.9 TYPE 2 DIABETES MELLITUS WITHOUT COMPLICATIONS: ICD-10-CM

## 2017-05-29 DIAGNOSIS — R55 SYNCOPE AND COLLAPSE: ICD-10-CM

## 2017-05-29 DIAGNOSIS — D64.9 ANEMIA, UNSPECIFIED: ICD-10-CM

## 2017-05-29 DIAGNOSIS — K52.9 NONINFECTIVE GASTROENTERITIS AND COLITIS, UNSPECIFIED: ICD-10-CM

## 2017-05-29 DIAGNOSIS — E78.00 PURE HYPERCHOLESTEROLEMIA, UNSPECIFIED: ICD-10-CM

## 2017-05-29 DIAGNOSIS — Z29.9 ENCOUNTER FOR PROPHYLACTIC MEASURES, UNSPECIFIED: ICD-10-CM

## 2017-05-29 DIAGNOSIS — N04.9 NEPHROTIC SYNDROME WITH UNSPECIFIED MORPHOLOGIC CHANGES: ICD-10-CM

## 2017-05-29 DIAGNOSIS — N17.9 ACUTE KIDNEY FAILURE, UNSPECIFIED: ICD-10-CM

## 2017-05-29 LAB
BLD GP AB SCN SERPL QL: NEGATIVE — SIGNIFICANT CHANGE UP
CK MB CFR SERPL CALC: 2 NG/ML — SIGNIFICANT CHANGE UP (ref 0–3.8)
CK SERPL-CCNC: 33 U/L — SIGNIFICANT CHANGE UP (ref 25–170)
FERRITIN SERPL-MCNC: 781 NG/ML — HIGH (ref 15–150)
HBA1C BLD-MCNC: 5.7 % — HIGH (ref 4–5.6)
HCT VFR BLD CALC: 20.2 % — CRITICAL LOW (ref 34.5–45)
HCT VFR BLD CALC: 24.8 % — LOW (ref 34.5–45)
HGB BLD-MCNC: 7.3 G/DL — LOW (ref 11.5–15.5)
HGB BLD-MCNC: 8.5 G/DL — LOW (ref 11.5–15.5)
IRON SATN MFR SERPL: 77 UG/DL — SIGNIFICANT CHANGE UP (ref 30–160)
IRON SATN MFR SERPL: 86 % — HIGH (ref 14–50)
MCHC RBC-ENTMCNC: 31.8 PG — SIGNIFICANT CHANGE UP (ref 27–34)
MCHC RBC-ENTMCNC: 33.5 PG — SIGNIFICANT CHANGE UP (ref 27–34)
MCHC RBC-ENTMCNC: 34.4 GM/DL — SIGNIFICANT CHANGE UP (ref 32–36)
MCHC RBC-ENTMCNC: 36.3 GM/DL — HIGH (ref 32–36)
MCV RBC AUTO: 92.3 FL — SIGNIFICANT CHANGE UP (ref 80–100)
MCV RBC AUTO: 92.5 FL — SIGNIFICANT CHANGE UP (ref 80–100)
PLATELET # BLD AUTO: 159 K/UL — SIGNIFICANT CHANGE UP (ref 150–400)
PLATELET # BLD AUTO: 173 K/UL — SIGNIFICANT CHANGE UP (ref 150–400)
RBC # BLD: 2.19 M/UL — LOW (ref 3.8–5.2)
RBC # BLD: 2.68 M/UL — LOW (ref 3.8–5.2)
RBC # FLD: 12.5 % — SIGNIFICANT CHANGE UP (ref 10.3–14.5)
RBC # FLD: 12.8 % — SIGNIFICANT CHANGE UP (ref 10.3–14.5)
RH IG SCN BLD-IMP: POSITIVE — SIGNIFICANT CHANGE UP
TIBC SERPL-MCNC: 90 UG/DL — LOW (ref 220–430)
TROPONIN T SERPL-MCNC: <0.01 NG/ML — SIGNIFICANT CHANGE UP (ref 0–0.06)
UIBC SERPL-MCNC: 13 UG/DL — LOW (ref 110–370)
WBC # BLD: 5 K/UL — SIGNIFICANT CHANGE UP (ref 3.8–10.5)
WBC # BLD: 6 K/UL — SIGNIFICANT CHANGE UP (ref 3.8–10.5)
WBC # FLD AUTO: 5 K/UL — SIGNIFICANT CHANGE UP (ref 3.8–10.5)
WBC # FLD AUTO: 6 K/UL — SIGNIFICANT CHANGE UP (ref 3.8–10.5)

## 2017-05-29 PROCEDURE — 99233 SBSQ HOSP IP/OBS HIGH 50: CPT

## 2017-05-29 PROCEDURE — 99233 SBSQ HOSP IP/OBS HIGH 50: CPT | Mod: GC

## 2017-05-29 RX ORDER — DEXTROSE 50 % IN WATER 50 %
12.5 SYRINGE (ML) INTRAVENOUS ONCE
Qty: 0 | Refills: 0 | Status: DISCONTINUED | OUTPATIENT
Start: 2017-05-29 | End: 2017-06-05

## 2017-05-29 RX ORDER — NIFEDIPINE 30 MG
0 TABLET, EXTENDED RELEASE 24 HR ORAL
Qty: 0 | Refills: 0 | COMMUNITY

## 2017-05-29 RX ORDER — SODIUM CHLORIDE 9 MG/ML
1000 INJECTION INTRAMUSCULAR; INTRAVENOUS; SUBCUTANEOUS
Qty: 0 | Refills: 0 | Status: DISCONTINUED | OUTPATIENT
Start: 2017-05-29 | End: 2017-05-30

## 2017-05-29 RX ORDER — SODIUM CHLORIDE 9 MG/ML
1000 INJECTION, SOLUTION INTRAVENOUS
Qty: 0 | Refills: 0 | Status: DISCONTINUED | OUTPATIENT
Start: 2017-05-29 | End: 2017-06-05

## 2017-05-29 RX ORDER — DEXTROSE 50 % IN WATER 50 %
25 SYRINGE (ML) INTRAVENOUS ONCE
Qty: 0 | Refills: 0 | Status: DISCONTINUED | OUTPATIENT
Start: 2017-05-29 | End: 2017-06-05

## 2017-05-29 RX ORDER — GLUCAGON INJECTION, SOLUTION 0.5 MG/.1ML
1 INJECTION, SOLUTION SUBCUTANEOUS ONCE
Qty: 0 | Refills: 0 | Status: DISCONTINUED | OUTPATIENT
Start: 2017-05-29 | End: 2017-06-05

## 2017-05-29 RX ORDER — INSULIN LISPRO 100/ML
VIAL (ML) SUBCUTANEOUS AT BEDTIME
Qty: 0 | Refills: 0 | Status: DISCONTINUED | OUTPATIENT
Start: 2017-05-29 | End: 2017-06-05

## 2017-05-29 RX ORDER — FUROSEMIDE 40 MG
0 TABLET ORAL
Qty: 0 | Refills: 0 | COMMUNITY

## 2017-05-29 RX ORDER — ATORVASTATIN CALCIUM 80 MG/1
20 TABLET, FILM COATED ORAL AT BEDTIME
Qty: 0 | Refills: 0 | Status: DISCONTINUED | OUTPATIENT
Start: 2017-05-29 | End: 2017-06-05

## 2017-05-29 RX ORDER — CIPROFLOXACIN LACTATE 400MG/40ML
400 VIAL (ML) INTRAVENOUS EVERY 24 HOURS
Qty: 0 | Refills: 0 | Status: DISCONTINUED | OUTPATIENT
Start: 2017-05-29 | End: 2017-06-01

## 2017-05-29 RX ORDER — NIFEDIPINE 30 MG
30 TABLET, EXTENDED RELEASE 24 HR ORAL DAILY
Qty: 0 | Refills: 0 | Status: DISCONTINUED | OUTPATIENT
Start: 2017-05-29 | End: 2017-06-05

## 2017-05-29 RX ORDER — METRONIDAZOLE 500 MG
500 TABLET ORAL EVERY 8 HOURS
Qty: 0 | Refills: 0 | Status: DISCONTINUED | OUTPATIENT
Start: 2017-05-29 | End: 2017-06-01

## 2017-05-29 RX ORDER — LOSARTAN POTASSIUM 100 MG/1
0 TABLET, FILM COATED ORAL
Qty: 0 | Refills: 0 | COMMUNITY

## 2017-05-29 RX ORDER — SODIUM CHLORIDE 9 MG/ML
500 INJECTION INTRAMUSCULAR; INTRAVENOUS; SUBCUTANEOUS
Qty: 0 | Refills: 0 | Status: DISCONTINUED | OUTPATIENT
Start: 2017-05-29 | End: 2017-05-29

## 2017-05-29 RX ORDER — INSULIN LISPRO 100/ML
VIAL (ML) SUBCUTANEOUS
Qty: 0 | Refills: 0 | Status: DISCONTINUED | OUTPATIENT
Start: 2017-05-29 | End: 2017-06-05

## 2017-05-29 RX ORDER — DEXTROSE 50 % IN WATER 50 %
1 SYRINGE (ML) INTRAVENOUS ONCE
Qty: 0 | Refills: 0 | Status: DISCONTINUED | OUTPATIENT
Start: 2017-05-29 | End: 2017-06-05

## 2017-05-29 RX ORDER — HEPARIN SODIUM 5000 [USP'U]/ML
5000 INJECTION INTRAVENOUS; SUBCUTANEOUS EVERY 8 HOURS
Qty: 0 | Refills: 0 | Status: DISCONTINUED | OUTPATIENT
Start: 2017-05-29 | End: 2017-06-05

## 2017-05-29 RX ADMIN — Medication 100 MILLIGRAM(S): at 17:19

## 2017-05-29 RX ADMIN — SODIUM CHLORIDE 75 MILLILITER(S): 9 INJECTION INTRAMUSCULAR; INTRAVENOUS; SUBCUTANEOUS at 10:06

## 2017-05-29 RX ADMIN — HEPARIN SODIUM 5000 UNIT(S): 5000 INJECTION INTRAVENOUS; SUBCUTANEOUS at 21:38

## 2017-05-29 RX ADMIN — Medication 100 MILLIGRAM(S): at 10:05

## 2017-05-29 RX ADMIN — SODIUM CHLORIDE 75 MILLILITER(S): 9 INJECTION INTRAMUSCULAR; INTRAVENOUS; SUBCUTANEOUS at 04:06

## 2017-05-29 RX ADMIN — Medication 30 MILLIGRAM(S): at 05:20

## 2017-05-29 RX ADMIN — HEPARIN SODIUM 5000 UNIT(S): 5000 INJECTION INTRAVENOUS; SUBCUTANEOUS at 05:16

## 2017-05-29 RX ADMIN — SODIUM CHLORIDE 50 MILLILITER(S): 9 INJECTION INTRAMUSCULAR; INTRAVENOUS; SUBCUTANEOUS at 14:29

## 2017-05-29 RX ADMIN — Medication 100 MILLIGRAM(S): at 02:17

## 2017-05-29 RX ADMIN — HEPARIN SODIUM 5000 UNIT(S): 5000 INJECTION INTRAVENOUS; SUBCUTANEOUS at 14:31

## 2017-05-29 RX ADMIN — Medication 200 MILLIGRAM(S): at 04:06

## 2017-05-29 RX ADMIN — Medication 2: at 17:19

## 2017-05-29 RX ADMIN — ATORVASTATIN CALCIUM 20 MILLIGRAM(S): 80 TABLET, FILM COATED ORAL at 21:38

## 2017-05-29 NOTE — PROVIDER CONTACT NOTE (CRITICAL VALUE NOTIFICATION) - BACKGROUND
Patient admitted for syncope and collapse.  PMH of essential HTN, Type 2 diabetes mellitus, other hyperlipidemia, glaucoma, hypercholesteremia.

## 2017-05-29 NOTE — PROVIDER CONTACT NOTE (CRITICAL VALUE NOTIFICATION) - ASSESSMENT
Upon assessment, patient is resting in bed, NAD, with no complaints at this time.  Patient denies hemoptysis, melena, BRBPR, hematemesis at this time.  No active signs of bleeding noted.

## 2017-05-30 DIAGNOSIS — R69 ILLNESS, UNSPECIFIED: ICD-10-CM

## 2017-05-30 LAB
ANION GAP SERPL CALC-SCNC: 11 MMOL/L — SIGNIFICANT CHANGE UP (ref 5–17)
ANTI-RIBONUCLEAR PROTEIN: 0.4 AI — SIGNIFICANT CHANGE UP
BUN SERPL-MCNC: 51 MG/DL — HIGH (ref 7–23)
C3 SERPL-MCNC: 62 MG/DL — LOW (ref 80–180)
C4 SERPL-MCNC: 20 MG/DL — SIGNIFICANT CHANGE UP (ref 10–45)
CALCIUM SERPL-MCNC: 7.7 MG/DL — LOW (ref 8.4–10.5)
CHLORIDE SERPL-SCNC: 116 MMOL/L — HIGH (ref 96–108)
CHLORIDE UR-SCNC: <20 MMOL/L — SIGNIFICANT CHANGE UP
CHOLEST SERPL-MCNC: 227 MG/DL — HIGH (ref 10–199)
CO2 SERPL-SCNC: 13 MMOL/L — LOW (ref 22–31)
CREAT ?TM UR-MCNC: 168 MG/DL — SIGNIFICANT CHANGE UP
CREAT SERPL-MCNC: 2.84 MG/DL — HIGH (ref 0.5–1.3)
ENA SM AB FLD QL: 1.5 AI — HIGH
FOLATE SERPL-MCNC: 5 NG/ML — SIGNIFICANT CHANGE UP (ref 4.8–24.2)
GLUCOSE SERPL-MCNC: 105 MG/DL — HIGH (ref 70–99)
HDLC SERPL-MCNC: 47 MG/DL — SIGNIFICANT CHANGE UP (ref 40–125)
LIPID PNL WITH DIRECT LDL SERPL: 149 MG/DL — HIGH
MAGNESIUM SERPL-MCNC: 2.3 MG/DL — SIGNIFICANT CHANGE UP (ref 1.6–2.6)
MAGNESIUM UR-MCNC: 4.4 MG/DL — SIGNIFICANT CHANGE UP
PHOSPHATE 24H UR-MCNC: 72 MG/DL — SIGNIFICANT CHANGE UP
PHOSPHATE SERPL-MCNC: 5.6 MG/DL — HIGH (ref 2.5–4.5)
POTASSIUM SERPL-MCNC: 4.4 MMOL/L — SIGNIFICANT CHANGE UP (ref 3.5–5.3)
POTASSIUM SERPL-SCNC: 4.4 MMOL/L — SIGNIFICANT CHANGE UP (ref 3.5–5.3)
POTASSIUM UR-SCNC: 41 MMOL/L — SIGNIFICANT CHANGE UP
PROT ?TM UR-MCNC: <4 MG/DL — SIGNIFICANT CHANGE UP (ref 0–12)
PROT ?TM UR-MCNC: <4 MG/DL — SIGNIFICANT CHANGE UP (ref 0–12)
RHEUMATOID FACT SERPL-ACNC: <7 IU/ML — SIGNIFICANT CHANGE UP (ref 0–13.9)
SODIUM SERPL-SCNC: 140 MMOL/L — SIGNIFICANT CHANGE UP (ref 135–145)
SODIUM UR-SCNC: <20 MMOL/L — SIGNIFICANT CHANGE UP
TOTAL CHOLESTEROL/HDL RATIO MEASUREMENT: 4.8 RATIO — SIGNIFICANT CHANGE UP (ref 3.3–7.1)
TRIGL SERPL-MCNC: 153 MG/DL — HIGH (ref 10–149)
UUN UR-MCNC: 501 MG/DL — SIGNIFICANT CHANGE UP
VIT B12 SERPL-MCNC: 566 PG/ML — SIGNIFICANT CHANGE UP (ref 243–894)

## 2017-05-30 PROCEDURE — 99233 SBSQ HOSP IP/OBS HIGH 50: CPT | Mod: GC

## 2017-05-30 PROCEDURE — 99222 1ST HOSP IP/OBS MODERATE 55: CPT | Mod: GC

## 2017-05-30 RX ORDER — SODIUM CHLORIDE 9 MG/ML
1000 INJECTION, SOLUTION INTRAVENOUS
Qty: 0 | Refills: 0 | Status: DISCONTINUED | OUTPATIENT
Start: 2017-05-30 | End: 2017-05-31

## 2017-05-30 RX ORDER — ONDANSETRON 8 MG/1
4 TABLET, FILM COATED ORAL ONCE
Qty: 0 | Refills: 0 | Status: COMPLETED | OUTPATIENT
Start: 2017-05-30 | End: 2017-05-30

## 2017-05-30 RX ADMIN — HEPARIN SODIUM 5000 UNIT(S): 5000 INJECTION INTRAVENOUS; SUBCUTANEOUS at 14:28

## 2017-05-30 RX ADMIN — Medication 100 MILLIGRAM(S): at 01:19

## 2017-05-30 RX ADMIN — ATORVASTATIN CALCIUM 20 MILLIGRAM(S): 80 TABLET, FILM COATED ORAL at 23:35

## 2017-05-30 RX ADMIN — Medication 100 MILLIGRAM(S): at 17:37

## 2017-05-30 RX ADMIN — Medication 100 MILLIGRAM(S): at 12:02

## 2017-05-30 RX ADMIN — Medication 200 MILLIGRAM(S): at 05:56

## 2017-05-30 RX ADMIN — Medication 30 MILLIGRAM(S): at 05:59

## 2017-05-30 RX ADMIN — HEPARIN SODIUM 5000 UNIT(S): 5000 INJECTION INTRAVENOUS; SUBCUTANEOUS at 05:59

## 2017-05-30 RX ADMIN — ONDANSETRON 4 MILLIGRAM(S): 8 TABLET, FILM COATED ORAL at 22:29

## 2017-05-30 RX ADMIN — HEPARIN SODIUM 5000 UNIT(S): 5000 INJECTION INTRAVENOUS; SUBCUTANEOUS at 21:59

## 2017-05-30 RX ADMIN — SODIUM CHLORIDE 75 MILLILITER(S): 9 INJECTION, SOLUTION INTRAVENOUS at 10:09

## 2017-05-31 LAB
24R-OH-CALCIDIOL SERPL-MCNC: <4 NG/ML — LOW (ref 30–100)
ANION GAP SERPL CALC-SCNC: 13 MMOL/L — SIGNIFICANT CHANGE UP (ref 5–17)
APPEARANCE UR: ABNORMAL
BACTERIA # UR AUTO: ABNORMAL /HPF
BILIRUB UR-MCNC: NEGATIVE — SIGNIFICANT CHANGE UP
BUN SERPL-MCNC: 47 MG/DL — HIGH (ref 7–23)
CALCIUM SERPL-MCNC: 7.6 MG/DL — LOW (ref 8.4–10.5)
CHLORIDE SERPL-SCNC: 115 MMOL/L — HIGH (ref 96–108)
CO2 SERPL-SCNC: 11 MMOL/L — LOW (ref 22–31)
COLOR SPEC: YELLOW — SIGNIFICANT CHANGE UP
CREAT ?TM UR-MCNC: 163 MG/DL — SIGNIFICANT CHANGE UP
CREAT SERPL-MCNC: 2.9 MG/DL — HIGH (ref 0.5–1.3)
DIFF PNL FLD: ABNORMAL
EPI CELLS # UR: SIGNIFICANT CHANGE UP /HPF
GLUCOSE SERPL-MCNC: 102 MG/DL — HIGH (ref 70–99)
GLUCOSE UR QL: 500
HCT VFR BLD CALC: 23.1 % — LOW (ref 34.5–45)
HGB BLD-MCNC: 7.9 G/DL — LOW (ref 11.5–15.5)
HYALINE CASTS # UR AUTO: ABNORMAL
KETONES UR-MCNC: NEGATIVE — SIGNIFICANT CHANGE UP
LEUKOCYTE ESTERASE UR-ACNC: NEGATIVE — SIGNIFICANT CHANGE UP
MAGNESIUM SERPL-MCNC: 2.3 MG/DL — SIGNIFICANT CHANGE UP (ref 1.6–2.6)
MCHC RBC-ENTMCNC: 28.9 PG — SIGNIFICANT CHANGE UP (ref 27–34)
MCHC RBC-ENTMCNC: 34.2 GM/DL — SIGNIFICANT CHANGE UP (ref 32–36)
MCV RBC AUTO: 84.6 FL — SIGNIFICANT CHANGE UP (ref 80–100)
NITRITE UR-MCNC: NEGATIVE — SIGNIFICANT CHANGE UP
PH UR: 6.5 — SIGNIFICANT CHANGE UP (ref 5–8)
PHOSPHATE SERPL-MCNC: 5.8 MG/DL — HIGH (ref 2.5–4.5)
PLATELET # BLD AUTO: 200 K/UL — SIGNIFICANT CHANGE UP (ref 150–400)
POTASSIUM SERPL-MCNC: 4.7 MMOL/L — SIGNIFICANT CHANGE UP (ref 3.5–5.3)
POTASSIUM SERPL-SCNC: 4.7 MMOL/L — SIGNIFICANT CHANGE UP (ref 3.5–5.3)
PROT UR-MCNC: >600 MG/DL
PROT/CREAT UR-RTO: SIGNIFICANT CHANGE UP (ref 0–0.2)
RBC # BLD: 2.73 M/UL — LOW (ref 3.8–5.2)
RBC # FLD: 15 % — HIGH (ref 10.3–14.5)
RBC CASTS # UR COMP ASSIST: ABNORMAL /HPF (ref 0–2)
SODIUM SERPL-SCNC: 139 MMOL/L — SIGNIFICANT CHANGE UP (ref 135–145)
SP GR SPEC: >1.03 — HIGH (ref 1.01–1.02)
UROBILINOGEN FLD QL: NEGATIVE — SIGNIFICANT CHANGE UP
VIT D25+D1,25 OH+D1,25 PNL SERPL-MCNC: 15.7 PG/ML — LOW (ref 19.9–79.3)
WBC # BLD: 5.12 K/UL — SIGNIFICANT CHANGE UP (ref 3.8–10.5)
WBC # FLD AUTO: 5.12 K/UL — SIGNIFICANT CHANGE UP (ref 3.8–10.5)
WBC UR QL: SIGNIFICANT CHANGE UP /HPF (ref 0–5)

## 2017-05-31 PROCEDURE — 99233 SBSQ HOSP IP/OBS HIGH 50: CPT | Mod: GC

## 2017-05-31 PROCEDURE — 93306 TTE W/DOPPLER COMPLETE: CPT | Mod: 26

## 2017-05-31 PROCEDURE — 99232 SBSQ HOSP IP/OBS MODERATE 35: CPT | Mod: GC

## 2017-05-31 PROCEDURE — 76770 US EXAM ABDO BACK WALL COMP: CPT | Mod: 26

## 2017-05-31 RX ORDER — SODIUM BICARBONATE 1 MEQ/ML
650 SYRINGE (ML) INTRAVENOUS THREE TIMES A DAY
Qty: 0 | Refills: 0 | Status: DISCONTINUED | OUTPATIENT
Start: 2017-05-31 | End: 2017-06-01

## 2017-05-31 RX ORDER — ONDANSETRON 8 MG/1
4 TABLET, FILM COATED ORAL ONCE
Qty: 0 | Refills: 0 | Status: COMPLETED | OUTPATIENT
Start: 2017-05-31 | End: 2017-05-31

## 2017-05-31 RX ORDER — SODIUM CHLORIDE 9 MG/ML
1000 INJECTION, SOLUTION INTRAVENOUS
Qty: 0 | Refills: 0 | Status: DISCONTINUED | OUTPATIENT
Start: 2017-05-31 | End: 2017-05-31

## 2017-05-31 RX ORDER — SOD SULF/SODIUM/NAHCO3/KCL/PEG
1000 SOLUTION, RECONSTITUTED, ORAL ORAL EVERY 4 HOURS
Qty: 0 | Refills: 0 | Status: COMPLETED | OUTPATIENT
Start: 2017-05-31 | End: 2017-05-31

## 2017-05-31 RX ADMIN — HEPARIN SODIUM 5000 UNIT(S): 5000 INJECTION INTRAVENOUS; SUBCUTANEOUS at 11:55

## 2017-05-31 RX ADMIN — ONDANSETRON 4 MILLIGRAM(S): 8 TABLET, FILM COATED ORAL at 23:03

## 2017-05-31 RX ADMIN — Medication 100 MILLIGRAM(S): at 18:11

## 2017-05-31 RX ADMIN — HEPARIN SODIUM 5000 UNIT(S): 5000 INJECTION INTRAVENOUS; SUBCUTANEOUS at 23:34

## 2017-05-31 RX ADMIN — SODIUM CHLORIDE 100 MILLILITER(S): 9 INJECTION, SOLUTION INTRAVENOUS at 09:17

## 2017-05-31 RX ADMIN — Medication 100 MILLIGRAM(S): at 09:17

## 2017-05-31 RX ADMIN — Medication 10 MILLIGRAM(S): at 18:24

## 2017-05-31 RX ADMIN — Medication 650 MILLIGRAM(S): at 23:35

## 2017-05-31 RX ADMIN — Medication 1000 MILLILITER(S): at 22:42

## 2017-05-31 RX ADMIN — SODIUM CHLORIDE 100 MILLILITER(S): 9 INJECTION, SOLUTION INTRAVENOUS at 05:28

## 2017-05-31 RX ADMIN — HEPARIN SODIUM 5000 UNIT(S): 5000 INJECTION INTRAVENOUS; SUBCUTANEOUS at 05:28

## 2017-05-31 RX ADMIN — ATORVASTATIN CALCIUM 20 MILLIGRAM(S): 80 TABLET, FILM COATED ORAL at 23:35

## 2017-05-31 RX ADMIN — Medication 30 MILLIGRAM(S): at 09:17

## 2017-05-31 RX ADMIN — Medication 100 MILLIGRAM(S): at 01:15

## 2017-05-31 RX ADMIN — Medication 200 MILLIGRAM(S): at 05:29

## 2017-05-31 RX ADMIN — SODIUM CHLORIDE 75 MILLILITER(S): 9 INJECTION, SOLUTION INTRAVENOUS at 04:56

## 2017-05-31 RX ADMIN — Medication 1 PATCH: at 11:55

## 2017-05-31 RX ADMIN — Medication 1000 MILLILITER(S): at 18:19

## 2017-05-31 NOTE — PROVIDER CONTACT NOTE (OTHER) - ASSESSMENT
Pt A&Ox4. VSS. + orthos- MD was already made aware. Pt gets nausea when medications are given at home.

## 2017-05-31 NOTE — PROVIDER CONTACT NOTE (OTHER) - ASSESSMENT
Pt A&Ox4. Pt was not having any dizziness when the orthostatics were done. Pt is asymptomatic. Pt is normal sinus rhythm on tele- tachy when the patient sits and stands. Was told from day nurse- no more fluids b/c of kidneys.

## 2017-06-01 LAB
ANA PAT FLD IF-IMP: ABNORMAL
ANA TITR SER: ABNORMAL
ANION GAP SERPL CALC-SCNC: 13 MMOL/L — SIGNIFICANT CHANGE UP (ref 5–17)
ANION GAP SERPL CALC-SCNC: 15 MMOL/L — SIGNIFICANT CHANGE UP (ref 5–17)
BUN SERPL-MCNC: 46 MG/DL — HIGH (ref 7–23)
BUN SERPL-MCNC: 47 MG/DL — HIGH (ref 7–23)
CALCIUM SERPL-MCNC: 7.5 MG/DL — LOW (ref 8.4–10.5)
CALCIUM SERPL-MCNC: 7.5 MG/DL — LOW (ref 8.4–10.5)
CALCIUM SERPL-MCNC: 8.1 MG/DL — LOW (ref 8.4–10.5)
CHLORIDE SERPL-SCNC: 113 MMOL/L — HIGH (ref 96–108)
CHLORIDE SERPL-SCNC: 115 MMOL/L — HIGH (ref 96–108)
CO2 SERPL-SCNC: 10 MMOL/L — CRITICAL LOW (ref 22–31)
CO2 SERPL-SCNC: 14 MMOL/L — LOW (ref 22–31)
CREAT SERPL-MCNC: 3.63 MG/DL — HIGH (ref 0.5–1.3)
CREAT SERPL-MCNC: 3.67 MG/DL — HIGH (ref 0.5–1.3)
CRP SERPL-MCNC: 0.2 MG/DL — SIGNIFICANT CHANGE UP (ref 0–0.4)
CULTURE RESULTS: NO GROWTH — SIGNIFICANT CHANGE UP
CULTURE RESULTS: SIGNIFICANT CHANGE UP
DSDNA AB SER-ACNC: 39 IU/ML — HIGH
ERYTHROCYTE [SEDIMENTATION RATE] IN BLOOD: 64 MM/HR — HIGH (ref 0–20)
GLUCOSE SERPL-MCNC: 102 MG/DL — HIGH (ref 70–99)
GLUCOSE SERPL-MCNC: 115 MG/DL — HIGH (ref 70–99)
HCT VFR BLD CALC: 21.5 % — LOW (ref 34.5–45)
HGB BLD-MCNC: 7.6 G/DL — LOW (ref 11.5–15.5)
M TB TUBERC IFN-G BLD QL: 0.01 IU/ML — SIGNIFICANT CHANGE UP
M TB TUBERC IFN-G BLD QL: 0.06 IU/ML — SIGNIFICANT CHANGE UP
M TB TUBERC IFN-G BLD QL: ABNORMAL
MAGNESIUM SERPL-MCNC: 2.2 MG/DL — SIGNIFICANT CHANGE UP (ref 1.6–2.6)
MCHC RBC-ENTMCNC: 30 PG — SIGNIFICANT CHANGE UP (ref 27–34)
MCHC RBC-ENTMCNC: 35.3 GM/DL — SIGNIFICANT CHANGE UP (ref 32–36)
MCV RBC AUTO: 85 FL — SIGNIFICANT CHANGE UP (ref 80–100)
MITOGEN IGNF BCKGRD COR BLD-ACNC: 0.01 IU/ML — SIGNIFICANT CHANGE UP
PHOSPHATE SERPL-MCNC: 5.7 MG/DL — HIGH (ref 2.5–4.5)
PLATELET # BLD AUTO: 190 K/UL — SIGNIFICANT CHANGE UP (ref 150–400)
POTASSIUM SERPL-MCNC: 4.1 MMOL/L — SIGNIFICANT CHANGE UP (ref 3.5–5.3)
POTASSIUM SERPL-MCNC: 4.2 MMOL/L — SIGNIFICANT CHANGE UP (ref 3.5–5.3)
POTASSIUM SERPL-SCNC: 4.1 MMOL/L — SIGNIFICANT CHANGE UP (ref 3.5–5.3)
POTASSIUM SERPL-SCNC: 4.2 MMOL/L — SIGNIFICANT CHANGE UP (ref 3.5–5.3)
PTH-INTACT FLD-MCNC: 253 PG/ML — HIGH (ref 15–65)
RBC # BLD: 2.53 M/UL — LOW (ref 3.8–5.2)
RBC # FLD: 14.6 % — HIGH (ref 10.3–14.5)
SODIUM SERPL-SCNC: 140 MMOL/L — SIGNIFICANT CHANGE UP (ref 135–145)
SODIUM SERPL-SCNC: 140 MMOL/L — SIGNIFICANT CHANGE UP (ref 135–145)
SPECIMEN SOURCE: SIGNIFICANT CHANGE UP
WBC # BLD: 4.9 K/UL — SIGNIFICANT CHANGE UP (ref 3.8–10.5)
WBC # FLD AUTO: 4.9 K/UL — SIGNIFICANT CHANGE UP (ref 3.8–10.5)

## 2017-06-01 PROCEDURE — 99233 SBSQ HOSP IP/OBS HIGH 50: CPT | Mod: GC

## 2017-06-01 PROCEDURE — 88305 TISSUE EXAM BY PATHOLOGIST: CPT | Mod: 26

## 2017-06-01 PROCEDURE — 99233 SBSQ HOSP IP/OBS HIGH 50: CPT

## 2017-06-01 PROCEDURE — 45380 COLONOSCOPY AND BIOPSY: CPT | Mod: GC

## 2017-06-01 RX ORDER — SODIUM BICARBONATE 1 MEQ/ML
1300 SYRINGE (ML) INTRAVENOUS THREE TIMES A DAY
Qty: 0 | Refills: 0 | Status: DISCONTINUED | OUTPATIENT
Start: 2017-06-01 | End: 2017-06-01

## 2017-06-01 RX ORDER — SODIUM CHLORIDE 9 MG/ML
1000 INJECTION, SOLUTION INTRAVENOUS
Qty: 0 | Refills: 0 | Status: DISCONTINUED | OUTPATIENT
Start: 2017-06-01 | End: 2017-06-01

## 2017-06-01 RX ORDER — SODIUM CHLORIDE 9 MG/ML
1000 INJECTION, SOLUTION INTRAVENOUS
Qty: 0 | Refills: 0 | Status: DISCONTINUED | OUTPATIENT
Start: 2017-06-01 | End: 2017-06-02

## 2017-06-01 RX ORDER — SODIUM BICARBONATE 1 MEQ/ML
15 SYRINGE (ML) INTRAVENOUS
Qty: 150 | Refills: 0 | Status: DISCONTINUED | OUTPATIENT
Start: 2017-06-01 | End: 2017-06-01

## 2017-06-01 RX ORDER — CALCIUM CARBONATE 500(1250)
1 TABLET ORAL DAILY
Qty: 0 | Refills: 0 | Status: DISCONTINUED | OUTPATIENT
Start: 2017-06-01 | End: 2017-06-05

## 2017-06-01 RX ORDER — ERGOCALCIFEROL 1.25 MG/1
50000 CAPSULE ORAL
Qty: 0 | Refills: 0 | Status: DISCONTINUED | OUTPATIENT
Start: 2017-06-01 | End: 2017-06-05

## 2017-06-01 RX ADMIN — SODIUM CHLORIDE 100 MILLILITER(S): 9 INJECTION, SOLUTION INTRAVENOUS at 16:24

## 2017-06-01 RX ADMIN — Medication 100 MILLIGRAM(S): at 02:28

## 2017-06-01 RX ADMIN — Medication 650 MILLIGRAM(S): at 05:56

## 2017-06-01 RX ADMIN — Medication 100 MILLIGRAM(S): at 13:08

## 2017-06-01 RX ADMIN — SODIUM CHLORIDE 100 MILLILITER(S): 9 INJECTION, SOLUTION INTRAVENOUS at 10:40

## 2017-06-01 RX ADMIN — ERGOCALCIFEROL 50000 UNIT(S): 1.25 CAPSULE ORAL at 11:26

## 2017-06-01 RX ADMIN — Medication 1 TABLET(S): at 11:26

## 2017-06-01 RX ADMIN — Medication 30 MILLIGRAM(S): at 05:56

## 2017-06-01 RX ADMIN — Medication 100 MILLIGRAM(S): at 19:10

## 2017-06-01 RX ADMIN — Medication 1300 MILLIGRAM(S): at 13:08

## 2017-06-01 RX ADMIN — HEPARIN SODIUM 5000 UNIT(S): 5000 INJECTION INTRAVENOUS; SUBCUTANEOUS at 16:25

## 2017-06-01 RX ADMIN — Medication 200 MILLIGRAM(S): at 05:55

## 2017-06-01 RX ADMIN — HEPARIN SODIUM 5000 UNIT(S): 5000 INJECTION INTRAVENOUS; SUBCUTANEOUS at 21:54

## 2017-06-01 RX ADMIN — ATORVASTATIN CALCIUM 20 MILLIGRAM(S): 80 TABLET, FILM COATED ORAL at 21:55

## 2017-06-02 LAB
ANION GAP SERPL CALC-SCNC: 14 MMOL/L — SIGNIFICANT CHANGE UP (ref 5–17)
ANION GAP SERPL CALC-SCNC: 16 MMOL/L — SIGNIFICANT CHANGE UP (ref 5–17)
BUN SERPL-MCNC: 46 MG/DL — HIGH (ref 7–23)
BUN SERPL-MCNC: 47 MG/DL — HIGH (ref 7–23)
CALCIUM SERPL-MCNC: 7.8 MG/DL — LOW (ref 8.4–10.5)
CALCIUM SERPL-MCNC: 7.9 MG/DL — LOW (ref 8.4–10.5)
CHLORIDE SERPL-SCNC: 113 MMOL/L — HIGH (ref 96–108)
CHLORIDE SERPL-SCNC: 115 MMOL/L — HIGH (ref 96–108)
CO2 SERPL-SCNC: 12 MMOL/L — LOW (ref 22–31)
CO2 SERPL-SCNC: 13 MMOL/L — LOW (ref 22–31)
CORTIS AM PEAK SERPL-MCNC: 16.2 UG/DL — SIGNIFICANT CHANGE UP (ref 6–18.4)
CREAT ?TM UR-MCNC: 173 MG/DL — SIGNIFICANT CHANGE UP
CREAT SERPL-MCNC: 3.6 MG/DL — HIGH (ref 0.5–1.3)
CREAT SERPL-MCNC: 3.64 MG/DL — HIGH (ref 0.5–1.3)
CULTURE RESULTS: SIGNIFICANT CHANGE UP
GAS PNL BLDA: SIGNIFICANT CHANGE UP
GLUCOSE SERPL-MCNC: 108 MG/DL — HIGH (ref 70–99)
GLUCOSE SERPL-MCNC: 165 MG/DL — HIGH (ref 70–99)
HCT VFR BLD CALC: 23 % — LOW (ref 34.5–45)
HGB BLD-MCNC: 8 G/DL — LOW (ref 11.5–15.5)
MAGNESIUM SERPL-MCNC: 2.1 MG/DL — SIGNIFICANT CHANGE UP (ref 1.6–2.6)
MCHC RBC-ENTMCNC: 32 PG — SIGNIFICANT CHANGE UP (ref 27–34)
MCHC RBC-ENTMCNC: 34.7 GM/DL — SIGNIFICANT CHANGE UP (ref 32–36)
MCV RBC AUTO: 92.2 FL — SIGNIFICANT CHANGE UP (ref 80–100)
PHOSPHATE SERPL-MCNC: 5.7 MG/DL — HIGH (ref 2.5–4.5)
PLATELET # BLD AUTO: 214 K/UL — SIGNIFICANT CHANGE UP (ref 150–400)
POTASSIUM SERPL-MCNC: 3.9 MMOL/L — SIGNIFICANT CHANGE UP (ref 3.5–5.3)
POTASSIUM SERPL-MCNC: 4 MMOL/L — SIGNIFICANT CHANGE UP (ref 3.5–5.3)
POTASSIUM SERPL-SCNC: 3.9 MMOL/L — SIGNIFICANT CHANGE UP (ref 3.5–5.3)
POTASSIUM SERPL-SCNC: 4 MMOL/L — SIGNIFICANT CHANGE UP (ref 3.5–5.3)
PROT ?TM UR-MCNC: <4 MG/DL — SIGNIFICANT CHANGE UP (ref 0–12)
PROT/CREAT UR-RTO: SIGNIFICANT CHANGE UP (ref 0–0.2)
RBC # BLD: 2.49 M/UL — LOW (ref 3.8–5.2)
RBC # FLD: 13.3 % — SIGNIFICANT CHANGE UP (ref 10.3–14.5)
SODIUM SERPL-SCNC: 141 MMOL/L — SIGNIFICANT CHANGE UP (ref 135–145)
SODIUM SERPL-SCNC: 142 MMOL/L — SIGNIFICANT CHANGE UP (ref 135–145)
SPECIMEN SOURCE: SIGNIFICANT CHANGE UP
WBC # BLD: 6.4 K/UL — SIGNIFICANT CHANGE UP (ref 3.8–10.5)
WBC # FLD AUTO: 6.4 K/UL — SIGNIFICANT CHANGE UP (ref 3.8–10.5)

## 2017-06-02 PROCEDURE — 99233 SBSQ HOSP IP/OBS HIGH 50: CPT | Mod: GC

## 2017-06-02 PROCEDURE — 99223 1ST HOSP IP/OBS HIGH 75: CPT | Mod: GC

## 2017-06-02 PROCEDURE — 93975 VASCULAR STUDY: CPT | Mod: 26

## 2017-06-02 RX ORDER — ALBUMIN HUMAN 25 %
250 VIAL (ML) INTRAVENOUS EVERY 6 HOURS
Qty: 0 | Refills: 0 | Status: COMPLETED | OUTPATIENT
Start: 2017-06-02 | End: 2017-06-03

## 2017-06-02 RX ORDER — SODIUM CHLORIDE 9 MG/ML
1000 INJECTION, SOLUTION INTRAVENOUS
Qty: 0 | Refills: 0 | Status: COMPLETED | OUTPATIENT
Start: 2017-06-02 | End: 2017-06-02

## 2017-06-02 RX ADMIN — Medication 2: at 13:10

## 2017-06-02 RX ADMIN — ATORVASTATIN CALCIUM 20 MILLIGRAM(S): 80 TABLET, FILM COATED ORAL at 23:06

## 2017-06-02 RX ADMIN — HEPARIN SODIUM 5000 UNIT(S): 5000 INJECTION INTRAVENOUS; SUBCUTANEOUS at 14:18

## 2017-06-02 RX ADMIN — Medication 125 MILLILITER(S): at 23:14

## 2017-06-02 RX ADMIN — Medication 30 MILLIGRAM(S): at 05:46

## 2017-06-02 RX ADMIN — SODIUM CHLORIDE 100 MILLILITER(S): 9 INJECTION, SOLUTION INTRAVENOUS at 00:45

## 2017-06-02 RX ADMIN — Medication 1 TABLET(S): at 13:12

## 2017-06-02 RX ADMIN — HEPARIN SODIUM 5000 UNIT(S): 5000 INJECTION INTRAVENOUS; SUBCUTANEOUS at 05:46

## 2017-06-02 RX ADMIN — SODIUM CHLORIDE 100 MILLILITER(S): 9 INJECTION, SOLUTION INTRAVENOUS at 17:30

## 2017-06-02 RX ADMIN — Medication 125 MILLILITER(S): at 15:19

## 2017-06-02 RX ADMIN — HEPARIN SODIUM 5000 UNIT(S): 5000 INJECTION INTRAVENOUS; SUBCUTANEOUS at 23:06

## 2017-06-02 NOTE — PROVIDER CONTACT NOTE (OTHER) - REASON
Orthostatics positive- pt is asymptomatic. Pt is supposed to get procardia 30mg this morning.
PT is feeling nausea- does not want to take bedtime medications unless antinausea medicine
Pt was having emesis after lipitor was given, according to son he states, "My mother has had this at home before when she takes medication."
Pt's orthostatics are positive- fluids have been d/c
Should heparin subQ be held for colonoscopy and should nifedipine 20mg be given despite positive orthos last night?
Should orthostatics be done this morning
albumin IVPB given late due to no iv access; IVF 0.9 NS and sodium acetate resumed at 17:30 for the remaining 7hrs

## 2017-06-02 NOTE — PROVIDER CONTACT NOTE (OTHER) - BACKGROUND
Pt came in with sycope and collapse. Pt has a hx of DM type II, glaucoma, HLD, HTN, DM.
Pt came in with syncope and collapse. Pt has a hx of DM type II, HTN, HLD, DM, and glaucoma.
Pt came in with syncope and collapse. Pt has a hx of DM type II, HTN, glaucoma, HLD.
Pt came in with syncope and collapse. Pt has a hx of DM, hld, htn.
Pt came in with syncope and collapse. Pt has a hx of HTN, DM type II, glaucoma, HLD, HTN, and DM.
Pt came in with syncope and collapse. Pt has a hx of glaucoma, DM, HLD, HTN.
dx: syncope/collapse

## 2017-06-02 NOTE — PROVIDER CONTACT NOTE (OTHER) - ACTION/TREATMENT ORDERED:
IVF 0.9 NS and sodium acetate resumed at 17:30 for the remaining 7hrs (00:00)
MD changed the rate of the fluids to 100ml/hr. MD ordered to push the procardia to 8AM and do another orthostatics later
MD ordered to have the orthostatics not done
MD ordered to not give heparin and to give the nifedipine 30mg
MD ordered zofran for the patient.
MD ordered zofran iv push
No orders were made at this present time.

## 2017-06-02 NOTE — PROVIDER CONTACT NOTE (OTHER) - NAME OF MD/NP/PA/DO NOTIFIED:
Juhi Roman MD
Dr Kilpatrick #[phone]

## 2017-06-02 NOTE — PROVIDER CONTACT NOTE (OTHER) - SITUATION
Orthostatics positive- pt is asymptomatic. Pt is supposed to get procardia 30mg this morning.
PT is feeling nausea- does not want to take bedtime medications unless antinausea medicine
Pt was having emesis after lipitor was given, according to son he states, "My mother has had this at home before when she takes medication."
Pt's orthostatics are positive- fluids have been d/c
Should heparin subQ be held for colonoscopy and should nifedipine 20mg be given despite positive orthos last night?
Should orthostatics be done this morning
1st dose albumin IVPB given late due to no iv access and pt off the unit for test; IVF 0.9 NS and sodium acetate resumed at 17:30 for the remaining 7hrs

## 2017-06-02 NOTE — PROVIDER CONTACT NOTE (OTHER) - DATE AND TIME:
02-Jun-2017 18:53
02-Jun-2017 01:25
30-May-2017 22:10
31-May-2017 05:25
31-May-2017 05:35
31-May-2017 21:20
31-May-2017 22:50

## 2017-06-02 NOTE — PROVIDER CONTACT NOTE (OTHER) - ASSESSMENT
Pt a&Ox4. VSS except patient is having sinus tachycardia. Pt is asymptomatic. Pt is not having any c/o of dizziness.

## 2017-06-03 LAB
ANION GAP SERPL CALC-SCNC: 15 MMOL/L — SIGNIFICANT CHANGE UP (ref 5–17)
ANION GAP SERPL CALC-SCNC: 15 MMOL/L — SIGNIFICANT CHANGE UP (ref 5–17)
APPEARANCE UR: ABNORMAL
BACTERIA # UR AUTO: NEGATIVE — SIGNIFICANT CHANGE UP
BILIRUB UR-MCNC: NEGATIVE — SIGNIFICANT CHANGE UP
BUN SERPL-MCNC: 44 MG/DL — HIGH (ref 7–23)
BUN SERPL-MCNC: 46 MG/DL — HIGH (ref 7–23)
CALCIUM SERPL-MCNC: 7.8 MG/DL — LOW (ref 8.4–10.5)
CALCIUM SERPL-MCNC: 7.9 MG/DL — LOW (ref 8.4–10.5)
CHLORIDE SERPL-SCNC: 115 MMOL/L — HIGH (ref 96–108)
CHLORIDE SERPL-SCNC: 115 MMOL/L — HIGH (ref 96–108)
CO2 SERPL-SCNC: 13 MMOL/L — LOW (ref 22–31)
CO2 SERPL-SCNC: 14 MMOL/L — LOW (ref 22–31)
COLOR SPEC: ABNORMAL
CREAT SERPL-MCNC: 3.53 MG/DL — HIGH (ref 0.5–1.3)
CREAT SERPL-MCNC: 3.64 MG/DL — HIGH (ref 0.5–1.3)
DIFF PNL FLD: ABNORMAL
DSDNA AB FLD-ACNC: <0.2 AI — SIGNIFICANT CHANGE UP
ENA SS-A AB FLD IA-ACNC: 7.9 AI — HIGH
EPI CELLS # UR: 7 /HPF — HIGH (ref 0–5)
GLUCOSE SERPL-MCNC: 109 MG/DL — HIGH (ref 70–99)
GLUCOSE SERPL-MCNC: 113 MG/DL — HIGH (ref 70–99)
GLUCOSE UR QL: 250 MG/DL
HYALINE CASTS # UR AUTO: 9 /LPF — HIGH (ref 0–7)
KETONES UR-MCNC: ABNORMAL
LEUKOCYTE ESTERASE UR-ACNC: NEGATIVE — SIGNIFICANT CHANGE UP
NITRITE UR-MCNC: NEGATIVE — SIGNIFICANT CHANGE UP
PH UR: 6 — SIGNIFICANT CHANGE UP (ref 5–8)
POTASSIUM SERPL-MCNC: 3.6 MMOL/L — SIGNIFICANT CHANGE UP (ref 3.5–5.3)
POTASSIUM SERPL-MCNC: 3.9 MMOL/L — SIGNIFICANT CHANGE UP (ref 3.5–5.3)
POTASSIUM SERPL-SCNC: 3.6 MMOL/L — SIGNIFICANT CHANGE UP (ref 3.5–5.3)
POTASSIUM SERPL-SCNC: 3.9 MMOL/L — SIGNIFICANT CHANGE UP (ref 3.5–5.3)
PROT UR-MCNC: >300 MG/DL
RBC CASTS # UR COMP ASSIST: 3 /HPF — SIGNIFICANT CHANGE UP (ref 0–4)
SODIUM SERPL-SCNC: 143 MMOL/L — SIGNIFICANT CHANGE UP (ref 135–145)
SODIUM SERPL-SCNC: 144 MMOL/L — SIGNIFICANT CHANGE UP (ref 135–145)
SP GR SPEC: 1.03 — HIGH (ref 1.01–1.02)
UROBILINOGEN FLD QL: NEGATIVE MG/DL — SIGNIFICANT CHANGE UP
WBC UR QL: 35 /HPF — HIGH (ref 0–5)

## 2017-06-03 PROCEDURE — 99233 SBSQ HOSP IP/OBS HIGH 50: CPT | Mod: GC

## 2017-06-03 RX ORDER — SODIUM CHLORIDE 9 MG/ML
1000 INJECTION, SOLUTION INTRAVENOUS
Qty: 0 | Refills: 0 | Status: DISCONTINUED | OUTPATIENT
Start: 2017-06-03 | End: 2017-06-03

## 2017-06-03 RX ORDER — SODIUM BICARBONATE 1 MEQ/ML
650 SYRINGE (ML) INTRAVENOUS THREE TIMES A DAY
Qty: 0 | Refills: 0 | Status: DISCONTINUED | OUTPATIENT
Start: 2017-06-03 | End: 2017-06-04

## 2017-06-03 RX ADMIN — HEPARIN SODIUM 5000 UNIT(S): 5000 INJECTION INTRAVENOUS; SUBCUTANEOUS at 05:05

## 2017-06-03 RX ADMIN — SODIUM CHLORIDE 100 MILLILITER(S): 9 INJECTION, SOLUTION INTRAVENOUS at 03:26

## 2017-06-03 RX ADMIN — Medication 30 MILLIGRAM(S): at 05:05

## 2017-06-03 RX ADMIN — HEPARIN SODIUM 5000 UNIT(S): 5000 INJECTION INTRAVENOUS; SUBCUTANEOUS at 21:53

## 2017-06-03 RX ADMIN — Medication 125 MILLILITER(S): at 03:12

## 2017-06-03 RX ADMIN — ATORVASTATIN CALCIUM 20 MILLIGRAM(S): 80 TABLET, FILM COATED ORAL at 21:52

## 2017-06-03 RX ADMIN — Medication 650 MILLIGRAM(S): at 12:48

## 2017-06-03 RX ADMIN — HEPARIN SODIUM 5000 UNIT(S): 5000 INJECTION INTRAVENOUS; SUBCUTANEOUS at 12:48

## 2017-06-03 RX ADMIN — Medication 650 MILLIGRAM(S): at 21:52

## 2017-06-03 RX ADMIN — Medication 1 TABLET(S): at 12:48

## 2017-06-03 RX ADMIN — Medication 125 MILLILITER(S): at 08:55

## 2017-06-04 LAB
ANION GAP SERPL CALC-SCNC: 15 MMOL/L — SIGNIFICANT CHANGE UP (ref 5–17)
BUN SERPL-MCNC: 39 MG/DL — HIGH (ref 7–23)
CALCIUM SERPL-MCNC: 8 MG/DL — LOW (ref 8.4–10.5)
CHLORIDE SERPL-SCNC: 115 MMOL/L — HIGH (ref 96–108)
CO2 SERPL-SCNC: 14 MMOL/L — LOW (ref 22–31)
COLLECT DURATION TIME UR: 24 HR — SIGNIFICANT CHANGE UP
CREAT SERPL-MCNC: 3.47 MG/DL — HIGH (ref 0.5–1.3)
GLUCOSE SERPL-MCNC: 96 MG/DL — SIGNIFICANT CHANGE UP (ref 70–99)
HCT VFR BLD CALC: 21.5 % — LOW (ref 34.5–45)
HGB BLD-MCNC: 7.5 G/DL — LOW (ref 11.5–15.5)
MAGNESIUM SERPL-MCNC: 2 MG/DL — SIGNIFICANT CHANGE UP (ref 1.6–2.6)
MCHC RBC-ENTMCNC: 31.8 PG — SIGNIFICANT CHANGE UP (ref 27–34)
MCHC RBC-ENTMCNC: 35 GM/DL — SIGNIFICANT CHANGE UP (ref 32–36)
MCV RBC AUTO: 90.8 FL — SIGNIFICANT CHANGE UP (ref 80–100)
PHOSPHATE SERPL-MCNC: 4.7 MG/DL — HIGH (ref 2.5–4.5)
PLATELET # BLD AUTO: 190 K/UL — SIGNIFICANT CHANGE UP (ref 150–400)
POTASSIUM SERPL-MCNC: 3.7 MMOL/L — SIGNIFICANT CHANGE UP (ref 3.5–5.3)
POTASSIUM SERPL-SCNC: 3.7 MMOL/L — SIGNIFICANT CHANGE UP (ref 3.5–5.3)
PROT 24H UR-MRATE: HIGH MG/24 H (ref 50–100)
RBC # BLD: 2.37 M/UL — LOW (ref 3.8–5.2)
RBC # FLD: 13.4 % — SIGNIFICANT CHANGE UP (ref 10.3–14.5)
SODIUM SERPL-SCNC: 144 MMOL/L — SIGNIFICANT CHANGE UP (ref 135–145)
TOTAL VOLUME - 24 HOUR: 600 ML — SIGNIFICANT CHANGE UP
URINE CREATININE CALCULATION: 0.5 G/24 H — LOW (ref 0.8–1.8)
WBC # BLD: 5.6 K/UL — SIGNIFICANT CHANGE UP (ref 3.8–10.5)
WBC # FLD AUTO: 5.6 K/UL — SIGNIFICANT CHANGE UP (ref 3.8–10.5)

## 2017-06-04 PROCEDURE — 99233 SBSQ HOSP IP/OBS HIGH 50: CPT

## 2017-06-04 RX ORDER — SODIUM BICARBONATE 1 MEQ/ML
1300 SYRINGE (ML) INTRAVENOUS THREE TIMES A DAY
Qty: 0 | Refills: 0 | Status: DISCONTINUED | OUTPATIENT
Start: 2017-06-04 | End: 2017-06-05

## 2017-06-04 RX ADMIN — Medication 1300 MILLIGRAM(S): at 21:47

## 2017-06-04 RX ADMIN — ATORVASTATIN CALCIUM 20 MILLIGRAM(S): 80 TABLET, FILM COATED ORAL at 21:47

## 2017-06-04 RX ADMIN — Medication 1300 MILLIGRAM(S): at 16:44

## 2017-06-04 RX ADMIN — HEPARIN SODIUM 5000 UNIT(S): 5000 INJECTION INTRAVENOUS; SUBCUTANEOUS at 05:57

## 2017-06-04 RX ADMIN — Medication 1 TABLET(S): at 12:10

## 2017-06-04 RX ADMIN — Medication 30 MILLIGRAM(S): at 05:57

## 2017-06-04 RX ADMIN — Medication 650 MILLIGRAM(S): at 05:57

## 2017-06-04 RX ADMIN — HEPARIN SODIUM 5000 UNIT(S): 5000 INJECTION INTRAVENOUS; SUBCUTANEOUS at 21:47

## 2017-06-04 RX ADMIN — HEPARIN SODIUM 5000 UNIT(S): 5000 INJECTION INTRAVENOUS; SUBCUTANEOUS at 15:07

## 2017-06-04 NOTE — DISCHARGE NOTE ADULT - CARE PLAN
Principal Discharge DX:	Ileitis  Goal:	Resolution of acute event  Instructions for follow-up, activity and diet:	Your symptoms of abdomen pain and diarrhea was found to be secondary to use of Myfortic. This medication was stopped and your symptoms resolved. Please follow up with the gastroenterology team for the biopsy results of your colonoscopy  Secondary Diagnosis:	Syncope  Instructions for follow-up, activity and diet:	Your syncopal episode was likely due to dehydration caused by your diarreha as well as orthostatic hypotension. Your diaherra has resolved. Please remember to avoid moving from a laying to standing position too quickly. Always change from laying then sitting and finally standing slowly with about 1-2 minutes in-between to prevent any changes in blood pressure during these movements. Follow up your your primary care physician within 1 week of discharge.  Secondary Diagnosis:	Nephrotic syndrome  Instructions for follow-up, activity and diet:	Please follow up with your Nephrologist for further management of your kidney disease. We encourage a high protein diet.  Secondary Diagnosis:	Diabetes  Instructions for follow-up, activity and diet:	Please adhere to your diabetes medications. Follow up your primary care physician within 1 week of discharge. Principal Discharge DX:	Ileitis  Goal:	Resolution of acute event  Instructions for follow-up, activity and diet:	Your symptoms of abdomen pain and diarrhea was found to be secondary to use of Myfortic. This medication was stopped and your symptoms resolved. Please follow up with the gastroenterology team for the biopsy results of your colonoscopy  Secondary Diagnosis:	Syncope  Instructions for follow-up, activity and diet:	Your syncopal episode was likely due to dehydration caused by your diarrhea as well as orthostatic hypotension. Your diarrhea has resolved. Please remember to avoid moving from a laying to standing position too quickly. Always change from laying then sitting and finally standing slowly with about 1-2 minutes in-between to prevent any changes in blood pressure during these movements. Follow up your primary care physician within 1 week of discharge.  Secondary Diagnosis:	Nephrotic syndrome  Instructions for follow-up, activity and diet:	Please follow up with your Nephrologist for further management of your kidney disease. We encourage a high protein diet.  Secondary Diagnosis:	Diabetes  Instructions for follow-up, activity and diet:	Please adhere to your diabetes medications. Follow up your primary care physician within 1 week of discharge.  Secondary Diagnosis:	Vitamin D deficiency  Instructions for follow-up, activity and diet:	You were found to be deficient in vitamin D. This is likely secondary to your renal function. Please continue with the prescribed supplementation. Principal Discharge DX:	Ileitis  Goal:	Resolution of acute event  Instructions for follow-up, activity and diet:	Your symptoms of abdomen pain and diarrhea was due to use of Myfortic. This medication was stopped and your symptoms resolved. Please follow up with the gastroenterology team for the biopsy results of your colonoscopy  Secondary Diagnosis:	Syncope  Instructions for follow-up, activity and diet:	Your syncopal episode was likely due to dehydration caused by your diarrhea as well as orthostatic hypotension. Your diarrhea has resolved. Please remember to avoid moving from a laying to standing position too quickly. Always change from laying then sitting and finally standing slowly with about 1-2 minutes in-between to prevent any changes in blood pressure during these movements. Follow up your primary care physician within 1 week of discharge.  Secondary Diagnosis:	Nephrotic syndrome  Instructions for follow-up, activity and diet:	Please follow up with your Nephrologist for further management of your kidney disease. We encourage a high protein diet. You have an appointment for wed 6/14/2017 at 7pm  Secondary Diagnosis:	Diabetes  Instructions for follow-up, activity and diet:	Please adhere to your diabetes medications. Follow up your primary care physician within 1 week of discharge. We stopped your glipizide because your kidney function got worse. Please follow up with PCP upon dishcarge  Secondary Diagnosis:	Vitamin D deficiency  Instructions for follow-up, activity and diet:	You were found to be deficient in vitamin D. This is likely secondary to your renal function. Please continue with the prescribed supplementation.

## 2017-06-04 NOTE — DISCHARGE NOTE ADULT - CARE PROVIDER_API CALL
Huong Jacobs), Internal Medicine; Nephrology  [address]  [place] [address]  Phone: [phone]  Fax: [phone] Huong Jacobs), Internal Medicine; Nephrology  [address]  [place] [address]  Phone: [phone]  Fax: [phone]    Gastroenterology,   Phone: [phone]  Fax: (       - Huong Jacobs), Internal Medicine; Nephrology  [address]  [place] [address]  Phone: [phone]  Fax: [phone]    Gastroenterology,   Phone: [phone]  Fax: (   )    -    Rheumatology,   Phone: [phone]  Fax: (   )    - Huong Jacobs), Internal Medicine; Nephrology  [address]  [place] [address]  Phone: [phone]  Fax: [phone]    Gastroenterology,   Phone: [phone]  Fax: (   )    -    Rheumatology,   Phone: [phone]  Fax: (   )    -    Nephrology,   Nephrology  Appointment 6/14/2017 7pm  Phone: (   )    -  Fax: (   )    -

## 2017-06-04 NOTE — DISCHARGE NOTE ADULT - MEDICATION SUMMARY - MEDICATIONS TO STOP TAKING
I will STOP taking the medications listed below when I get home from the hospital:    Myfortic  --  by mouth    furosemide 20 mg oral tablet  -- 1 tab(s) by mouth 2 times a day    glipiZIDE 5 mg oral tablet  -- 1 tab(s) by mouth once a day    losartan 100 mg oral tablet  -- 1 tab(s) by mouth once a day    Zaroxolyn  -- 5 milligram(s) by mouth 2 times a day

## 2017-06-04 NOTE — DISCHARGE NOTE ADULT - PLAN OF CARE
Resolution of acute event Your symptoms of abdomen pain and diarrhea was found to be secondary to use of Myfortic. This medication was stopped and your symptoms resolved. Please follow up with the gastroenterology team for the biopsy results of your colonoscopy Your syncopal episode was likely due to dehydration caused by your diarreha as well as orthostatic hypotension. Your diaherra has resolved. Please remember to avoid moving from a laying to standing position too quickly. Always change from laying then sitting and finally standing slowly with about 1-2 minutes in-between to prevent any changes in blood pressure during these movements. Follow up your your primary care physician within 1 week of discharge. Please follow up with your Nephrologist for further management of your kidney disease. We encourage a high protein diet. Please adhere to your diabetes medications. Follow up your primary care physician within 1 week of discharge. You were found to be deficient in vitamin D. This is likely secondary to your renal function. Please continue with the prescribed supplementation. Your syncopal episode was likely due to dehydration caused by your diarrhea as well as orthostatic hypotension. Your diarrhea has resolved. Please remember to avoid moving from a laying to standing position too quickly. Always change from laying then sitting and finally standing slowly with about 1-2 minutes in-between to prevent any changes in blood pressure during these movements. Follow up your primary care physician within 1 week of discharge. Your symptoms of abdomen pain and diarrhea was due to use of Myfortic. This medication was stopped and your symptoms resolved. Please follow up with the gastroenterology team for the biopsy results of your colonoscopy Please follow up with your Nephrologist for further management of your kidney disease. We encourage a high protein diet. You have an appointment for wed 6/14/2017 at 7pm Please adhere to your diabetes medications. Follow up your primary care physician within 1 week of discharge. We stopped your glipizide because your kidney function got worse. Please follow up with PCP upon dishcarge

## 2017-06-04 NOTE — DISCHARGE NOTE ADULT - ADDITIONAL INSTRUCTIONS
Please call the gastroenterology clinic at the number provided to set-up an appointment to follow-up on your biopsy results of your colonoscopy. Please call the gastroenterology clinic at the number provided to set-up an appointment to follow-up on your biopsy results of your colonoscopy.  Also please call the Rheumatology clinic at the number provided to set-up an appointment for further recommendations on the management of your Lupus nephritis. Please call the gastroenterology clinic at the number provided to set-up an appointment to follow-up on your biopsy results of your colonoscopy.  Also please call the Rheumatology clinic at the number provided to set-up an appointment for further recommendations on the management of your Lupus nephritis.  Please follow up with renal (kidney) doctor upon discharge

## 2017-06-04 NOTE — DISCHARGE NOTE ADULT - CARE PROVIDERS DIRECT ADDRESSES
,DirectAddress_Unknown ,DirectAddress_Unknown,DirectAddress_Unknown ,DirectAddress_Unknown,DirectAddress_Unknown,DirectAddress_Unknown ,DirectAddress_Unknown,DirectAddress_Unknown,DirectAddress_Unknown,DirectAddress_Unknown

## 2017-06-04 NOTE — DISCHARGE NOTE ADULT - PATIENT PORTAL LINK FT
“You can access the [facility] Patient Portal, offered by [facility], by registering with the following website: [email or web address]”

## 2017-06-04 NOTE — DISCHARGE NOTE ADULT - HOSPITAL COURSE
56 yr female w/ hx of nephrotic syndrome, membranous LN, HTN and DM p/w syncopal episode and diarrhea x 1 month. Ct of the abdomen suggested ileitis Antibiotics were obtained and pt was started on antibiotics. Cultures were sent and returned negative. Antibiotics were stopped. Colonoscopy was performed and biopsy was taken. The ileitis was likely a side-effect of Myfortic use. This medication was discontinued and patient has resolution of symptoms within 2 days of admission. Her syncopal episode was worked up w/ cardiac enzymes, EKG, telemetry monitoring and TTE. All of which did not indicate a cardiogenic cause. Nephrology was consulted for the patient's nephrotic syndrome, work-up for lupus nephritis was done and pt was found to be weakly positive for anti-smith antibodies as well as SULLY positive. Rheumatology was consulted and did not recommend restarting any further immuno-suppressive therapy as outpatient records of kidney biopsy results yielded a poor prognosis for recovery of renal function. 56 yr female w/ hx of nephrotic syndrome, membranous LN, HTN and DM p/w syncopal episode and diarrhea x 1 month. Ct of the abdomen suggested ileitis Antibiotics were obtained and pt was started on antibiotics. Cultures were sent and returned negative. Antibiotics were stopped. Colonoscopy was performed and biopsy was taken. The ileitis was likely a side-effect of Myfortic use. This medication was discontinued and patient has resolution of symptoms within 2 days of admission. Her syncopal episode was worked up w/ cardiac enzymes, EKG, telemetry monitoring and TTE. All of which did not indicate a cardiogenic cause. Nephrology was consulted for the patient's nephrotic syndrome, work-up for lupus nephritis was done and pt was found to be weakly positive for anti-smith antibodies as well as SULLY positive. Rheumatology was consulted and did not recommend restarting any further immuno-suppressive therapy as outpatient records of kidney biopsy results yielded a poor prognosis for recovery of renal function.  Patient was also found to be vitamin D deficient and started on appropriate supplementation. 56 yr female w/ hx of nephrotic syndrome, membranous LN, HTN and DM p/w syncopal episode and diarrhea x 1 month. Ct of the abdomen suggested ileitis. Antibiotics were obtained and pt was started on antibiotics. Cultures were sent and returned negative. Antibiotics were stopped. Colonoscopy was performed and biopsy was taken. The ileitis was likely a side-effect of Myfortic use. This medication was discontinued and patient had resolution of symptoms within 2 days of admission. Her syncopal episode was worked up w/ cardiac enzymes, EKG, telemetry monitoring and TTE. All of which did not indicate a cardiogenic cause. Nephrology was consulted for the patient's nephrotic syndrome, work-up for lupus nephritis was done and pt was found to be weakly positive for anti-smith antibodies as well as SULLY positive. Rheumatology was consulted and did not recommend restarting any further immuno-suppressive therapy as outpatient records of kidney biopsy results yielded a poor prognosis for recovery of renal function.  Patient was also found to be vitamin D deficient and started on appropriate supplementation. Patient stable for discharge, and is to follow up with PCP, Renal, Rheum outpatient.

## 2017-06-04 NOTE — DISCHARGE NOTE ADULT - PROVIDER TOKENS
TOKEN:'[id number]' TOKEN:'[id number]',FREE:[LAST:[Gastroenterology],PHONE:[phone]],FAX:[(   )    -]] TOKEN:'[id number]',FREE:[LAST:[Gastroenterology],PHONE:[phone]],FAX:[(   )    -]],FREE:[LAST:[Rheumatology],PHONE:[phone]],FAX:[(   )    -]] TOKEN:'[id number]',FREE:[LAST:[Gastroenterology],PHONE:[phone]],FAX:[(   )    -]],FREE:[LAST:[Rheumatology],PHONE:[phone]],FAX:[(   )    -]],FREE:[LAST:[Nephrology],PHONE:[(   )    -],FAX:[(   )    -],ADDRESS:[Nephrology  Appointment 6/14/2017 7pm]]

## 2017-06-05 VITALS
DIASTOLIC BLOOD PRESSURE: 79 MMHG | SYSTOLIC BLOOD PRESSURE: 117 MMHG | OXYGEN SATURATION: 99 % | TEMPERATURE: 98 F | HEART RATE: 99 BPM | RESPIRATION RATE: 18 BRPM

## 2017-06-05 LAB
DRVVT SCREEN TO CONFIRM RATIO: SIGNIFICANT CHANGE UP
LA NT DPL PPP QL: 31.5 SEC — SIGNIFICANT CHANGE UP
NORMALIZED SCT PPP-RTO: 0.99 RATIO — SIGNIFICANT CHANGE UP (ref 0–1.16)
NORMALIZED SCT PPP-RTO: SIGNIFICANT CHANGE UP
SURGICAL PATHOLOGY STUDY: SIGNIFICANT CHANGE UP

## 2017-06-05 PROCEDURE — 82550 ASSAY OF CK (CPK): CPT

## 2017-06-05 PROCEDURE — 84132 ASSAY OF SERUM POTASSIUM: CPT

## 2017-06-05 PROCEDURE — 84105 ASSAY OF URINE PHOSPHORUS: CPT

## 2017-06-05 PROCEDURE — 74176 CT ABD & PELVIS W/O CONTRAST: CPT

## 2017-06-05 PROCEDURE — 83735 ASSAY OF MAGNESIUM: CPT

## 2017-06-05 PROCEDURE — 86038 ANTINUCLEAR ANTIBODIES: CPT

## 2017-06-05 PROCEDURE — 86146 BETA-2 GLYCOPROTEIN ANTIBODY: CPT

## 2017-06-05 PROCEDURE — 82533 TOTAL CORTISOL: CPT

## 2017-06-05 PROCEDURE — 84100 ASSAY OF PHOSPHORUS: CPT

## 2017-06-05 PROCEDURE — 80061 LIPID PANEL: CPT

## 2017-06-05 PROCEDURE — 87177 OVA AND PARASITES SMEARS: CPT

## 2017-06-05 PROCEDURE — 84484 ASSAY OF TROPONIN QUANT: CPT

## 2017-06-05 PROCEDURE — 86225 DNA ANTIBODY NATIVE: CPT

## 2017-06-05 PROCEDURE — 84133 ASSAY OF URINE POTASSIUM: CPT

## 2017-06-05 PROCEDURE — 82652 VIT D 1 25-DIHYDROXY: CPT

## 2017-06-05 PROCEDURE — 99239 HOSP IP/OBS DSCHRG MGMT >30: CPT

## 2017-06-05 PROCEDURE — 87046 STOOL CULTR AEROBIC BACT EA: CPT

## 2017-06-05 PROCEDURE — 84295 ASSAY OF SERUM SODIUM: CPT

## 2017-06-05 PROCEDURE — 82435 ASSAY OF BLOOD CHLORIDE: CPT

## 2017-06-05 PROCEDURE — 83036 HEMOGLOBIN GLYCOSYLATED A1C: CPT

## 2017-06-05 PROCEDURE — 80048 BASIC METABOLIC PNL TOTAL CA: CPT

## 2017-06-05 PROCEDURE — 99285 EMERGENCY DEPT VISIT HI MDM: CPT | Mod: 25

## 2017-06-05 PROCEDURE — 83970 ASSAY OF PARATHORMONE: CPT

## 2017-06-05 PROCEDURE — 84540 ASSAY OF URINE/UREA-N: CPT

## 2017-06-05 PROCEDURE — 93306 TTE W/DOPPLER COMPLETE: CPT

## 2017-06-05 PROCEDURE — 86147 CARDIOLIPIN ANTIBODY EA IG: CPT

## 2017-06-05 PROCEDURE — 82962 GLUCOSE BLOOD TEST: CPT

## 2017-06-05 PROCEDURE — 86480 TB TEST CELL IMMUN MEASURE: CPT

## 2017-06-05 PROCEDURE — 83880 ASSAY OF NATRIURETIC PEPTIDE: CPT

## 2017-06-05 PROCEDURE — 82330 ASSAY OF CALCIUM: CPT

## 2017-06-05 PROCEDURE — 86900 BLOOD TYPING SEROLOGIC ABO: CPT

## 2017-06-05 PROCEDURE — 87086 URINE CULTURE/COLONY COUNT: CPT

## 2017-06-05 PROCEDURE — 82947 ASSAY GLUCOSE BLOOD QUANT: CPT

## 2017-06-05 PROCEDURE — 82310 ASSAY OF CALCIUM: CPT

## 2017-06-05 PROCEDURE — P9045: CPT

## 2017-06-05 PROCEDURE — 84156 ASSAY OF PROTEIN URINE: CPT

## 2017-06-05 PROCEDURE — 82728 ASSAY OF FERRITIN: CPT

## 2017-06-05 PROCEDURE — 85652 RBC SED RATE AUTOMATED: CPT

## 2017-06-05 PROCEDURE — 82553 CREATINE MB FRACTION: CPT

## 2017-06-05 PROCEDURE — 85014 HEMATOCRIT: CPT

## 2017-06-05 PROCEDURE — 86140 C-REACTIVE PROTEIN: CPT

## 2017-06-05 PROCEDURE — 86850 RBC ANTIBODY SCREEN: CPT

## 2017-06-05 PROCEDURE — 84300 ASSAY OF URINE SODIUM: CPT

## 2017-06-05 PROCEDURE — 86160 COMPLEMENT ANTIGEN: CPT

## 2017-06-05 PROCEDURE — 82803 BLOOD GASES ANY COMBINATION: CPT

## 2017-06-05 PROCEDURE — 99233 SBSQ HOSP IP/OBS HIGH 50: CPT | Mod: GC

## 2017-06-05 PROCEDURE — 86431 RHEUMATOID FACTOR QUANT: CPT

## 2017-06-05 PROCEDURE — 83605 ASSAY OF LACTIC ACID: CPT

## 2017-06-05 PROCEDURE — 82746 ASSAY OF FOLIC ACID SERUM: CPT

## 2017-06-05 PROCEDURE — 87045 FECES CULTURE AEROBIC BACT: CPT

## 2017-06-05 PROCEDURE — 93975 VASCULAR STUDY: CPT

## 2017-06-05 PROCEDURE — 81001 URINALYSIS AUTO W/SCOPE: CPT

## 2017-06-05 PROCEDURE — 86235 NUCLEAR ANTIGEN ANTIBODY: CPT

## 2017-06-05 PROCEDURE — 88305 TISSUE EXAM BY PATHOLOGIST: CPT

## 2017-06-05 PROCEDURE — 71046 X-RAY EXAM CHEST 2 VIEWS: CPT

## 2017-06-05 PROCEDURE — 85027 COMPLETE CBC AUTOMATED: CPT

## 2017-06-05 PROCEDURE — 83550 IRON BINDING TEST: CPT

## 2017-06-05 PROCEDURE — 82607 VITAMIN B-12: CPT

## 2017-06-05 PROCEDURE — 82955 ASSAY OF G6PD ENZYME: CPT

## 2017-06-05 PROCEDURE — 82436 ASSAY OF URINE CHLORIDE: CPT

## 2017-06-05 PROCEDURE — 86901 BLOOD TYPING SEROLOGIC RH(D): CPT

## 2017-06-05 PROCEDURE — 80053 COMPREHEN METABOLIC PANEL: CPT

## 2017-06-05 PROCEDURE — 82570 ASSAY OF URINE CREATININE: CPT

## 2017-06-05 PROCEDURE — 76770 US EXAM ABDO BACK WALL COMP: CPT

## 2017-06-05 PROCEDURE — 93005 ELECTROCARDIOGRAM TRACING: CPT

## 2017-06-05 PROCEDURE — 82306 VITAMIN D 25 HYDROXY: CPT

## 2017-06-05 RX ORDER — SODIUM BICARBONATE 1 MEQ/ML
2 SYRINGE (ML) INTRAVENOUS
Qty: 180 | Refills: 0 | OUTPATIENT
Start: 2017-06-05 | End: 2017-07-05

## 2017-06-05 RX ORDER — FUROSEMIDE 40 MG
1 TABLET ORAL
Qty: 0 | Refills: 0 | COMMUNITY

## 2017-06-05 RX ORDER — SODIUM BICARBONATE 1 MEQ/ML
2 SYRINGE (ML) INTRAVENOUS
Qty: 0 | Refills: 0 | COMMUNITY
Start: 2017-06-05

## 2017-06-05 RX ORDER — ATORVASTATIN CALCIUM 80 MG/1
1 TABLET, FILM COATED ORAL
Qty: 0 | Refills: 0 | COMMUNITY
Start: 2017-06-05

## 2017-06-05 RX ORDER — LOSARTAN POTASSIUM 100 MG/1
1 TABLET, FILM COATED ORAL
Qty: 0 | Refills: 0 | COMMUNITY

## 2017-06-05 RX ORDER — ERGOCALCIFEROL 1.25 MG/1
1 CAPSULE ORAL
Qty: 6 | Refills: 0 | OUTPATIENT
Start: 2017-06-05 | End: 2017-07-05

## 2017-06-05 RX ORDER — CALCIUM CARBONATE 500(1250)
1 TABLET ORAL
Qty: 0 | Refills: 0 | COMMUNITY
Start: 2017-06-05

## 2017-06-05 RX ORDER — CALCIUM CARBONATE 500(1250)
1 TABLET ORAL
Qty: 30 | Refills: 0
Start: 2017-06-05 | End: 2017-07-05

## 2017-06-05 RX ORDER — MYCOPHENOLIC ACID 180 MG/1
0 TABLET, DELAYED RELEASE ORAL
Qty: 0 | Refills: 0 | COMMUNITY

## 2017-06-05 RX ORDER — NIFEDIPINE 30 MG
1 TABLET, EXTENDED RELEASE 24 HR ORAL
Qty: 0 | Refills: 0 | COMMUNITY
Start: 2017-06-05

## 2017-06-05 RX ORDER — NIFEDIPINE 30 MG
1 TABLET, EXTENDED RELEASE 24 HR ORAL
Qty: 0 | Refills: 0 | COMMUNITY

## 2017-06-05 RX ORDER — ATORVASTATIN CALCIUM 80 MG/1
1 TABLET, FILM COATED ORAL
Qty: 0 | Refills: 0 | COMMUNITY

## 2017-06-05 RX ADMIN — Medication 1 PATCH: at 12:05

## 2017-06-05 RX ADMIN — Medication 1 TABLET(S): at 12:04

## 2017-06-05 RX ADMIN — Medication 1300 MILLIGRAM(S): at 05:27

## 2017-06-05 RX ADMIN — Medication 2: at 16:39

## 2017-06-05 RX ADMIN — Medication 30 MILLIGRAM(S): at 05:23

## 2017-06-05 RX ADMIN — Medication 1300 MILLIGRAM(S): at 13:36

## 2017-06-05 RX ADMIN — HEPARIN SODIUM 5000 UNIT(S): 5000 INJECTION INTRAVENOUS; SUBCUTANEOUS at 13:32

## 2017-06-05 RX ADMIN — HEPARIN SODIUM 5000 UNIT(S): 5000 INJECTION INTRAVENOUS; SUBCUTANEOUS at 05:23

## 2017-06-06 LAB
B2 GLYCOPROT1 AB SER QL: NEGATIVE — SIGNIFICANT CHANGE UP
CARDIOLIPIN AB SER-ACNC: POSITIVE
M TB TUBERC IFN-G BLD QL: 0.01 IU/ML — SIGNIFICANT CHANGE UP
M TB TUBERC IFN-G BLD QL: 0.03 IU/ML — SIGNIFICANT CHANGE UP
M TB TUBERC IFN-G BLD QL: ABNORMAL
MITOGEN IGNF BCKGRD COR BLD-ACNC: 0.17 IU/ML — SIGNIFICANT CHANGE UP

## 2017-06-07 LAB — G6PD RBC-CCNC: 10.3 U/G HB — SIGNIFICANT CHANGE UP (ref 4.6–13.5)

## 2017-08-25 ENCOUNTER — TRANSCRIPTION ENCOUNTER (OUTPATIENT)
Age: 57
End: 2017-08-25

## 2017-08-25 PROBLEM — Z00.00 ENCOUNTER FOR PREVENTIVE HEALTH EXAMINATION: Status: ACTIVE | Noted: 2017-08-25

## 2017-09-08 ENCOUNTER — APPOINTMENT (OUTPATIENT)
Dept: VASCULAR SURGERY | Facility: CLINIC | Age: 57
End: 2017-09-08

## 2017-09-25 ENCOUNTER — APPOINTMENT (OUTPATIENT)
Dept: VASCULAR SURGERY | Facility: CLINIC | Age: 57
End: 2017-09-25
Payer: MEDICAID

## 2017-09-25 VITALS — BODY MASS INDEX: 24.55 KG/M2 | TEMPERATURE: 98.6 F | WEIGHT: 130 LBS | RESPIRATION RATE: 16 BRPM | HEIGHT: 61 IN

## 2017-09-25 VITALS — DIASTOLIC BLOOD PRESSURE: 89 MMHG | HEART RATE: 87 BPM | SYSTOLIC BLOOD PRESSURE: 142 MMHG

## 2017-09-25 DIAGNOSIS — E07.9 DISORDER OF THYROID, UNSPECIFIED: ICD-10-CM

## 2017-09-25 DIAGNOSIS — Z78.9 OTHER SPECIFIED HEALTH STATUS: ICD-10-CM

## 2017-09-25 PROCEDURE — G0365: CPT

## 2017-09-25 PROCEDURE — 99204 OFFICE O/P NEW MOD 45 MIN: CPT | Mod: 25

## 2017-09-28 ENCOUNTER — OUTPATIENT (OUTPATIENT)
Dept: OUTPATIENT SERVICES | Facility: HOSPITAL | Age: 57
LOS: 1 days | End: 2017-09-28
Payer: MEDICAID

## 2017-09-28 VITALS
WEIGHT: 132.06 LBS | HEIGHT: 61 IN | SYSTOLIC BLOOD PRESSURE: 130 MMHG | HEART RATE: 88 BPM | TEMPERATURE: 99 F | DIASTOLIC BLOOD PRESSURE: 80 MMHG | RESPIRATION RATE: 17 BRPM

## 2017-09-28 DIAGNOSIS — Z01.818 ENCOUNTER FOR OTHER PREPROCEDURAL EXAMINATION: ICD-10-CM

## 2017-09-28 DIAGNOSIS — Z98.890 OTHER SPECIFIED POSTPROCEDURAL STATES: Chronic | ICD-10-CM

## 2017-09-28 DIAGNOSIS — M32.14 GLOMERULAR DISEASE IN SYSTEMIC LUPUS ERYTHEMATOSUS: ICD-10-CM

## 2017-09-28 DIAGNOSIS — N18.6 END STAGE RENAL DISEASE: ICD-10-CM

## 2017-09-28 DIAGNOSIS — Z98.891 HISTORY OF UTERINE SCAR FROM PREVIOUS SURGERY: Chronic | ICD-10-CM

## 2017-09-28 DIAGNOSIS — Z90.49 ACQUIRED ABSENCE OF OTHER SPECIFIED PARTS OF DIGESTIVE TRACT: Chronic | ICD-10-CM

## 2017-09-28 DIAGNOSIS — I10 ESSENTIAL (PRIMARY) HYPERTENSION: ICD-10-CM

## 2017-09-28 PROCEDURE — G0463: CPT

## 2017-09-28 RX ORDER — SODIUM CHLORIDE 9 MG/ML
3 INJECTION INTRAMUSCULAR; INTRAVENOUS; SUBCUTANEOUS EVERY 8 HOURS
Qty: 0 | Refills: 0 | Status: DISCONTINUED | OUTPATIENT
Start: 2017-10-02 | End: 2017-10-02

## 2017-09-28 RX ORDER — LINAGLIPTIN 5 MG/1
0 TABLET, FILM COATED ORAL
Qty: 0 | Refills: 0 | COMMUNITY

## 2017-09-28 NOTE — H&P PST ADULT - FAMILY HISTORY
History of hypertension, father and mother     Mother  Still living? No  DM (diabetes mellitus), Age at diagnosis: Age Unknown     Sibling  Still living? Yes, Estimated age: Age Unknown  DM (diabetes mellitus), Age at diagnosis: Age Unknown  History of hypertension, Age at diagnosis: Age Unknown

## 2017-09-28 NOTE — H&P PST ADULT - RS GEN PE MLT RESP DETAILS PC
clear to auscultation bilaterally/normal/breath sounds equal/good air movement/respirations non-labored/airway patent

## 2017-09-28 NOTE — H&P PST ADULT - PMH
Diabetes    ESRD (end-stage renal disease) due to SLE    Essential hypertension    Glaucoma    Hypercholesteremia    Hypertension    Lupus (systemic lupus erythematosus)    Other hyperlipidemia    Type 2 diabetes mellitus without complication, without long-term current use of insulin

## 2017-09-28 NOTE — H&P PST ADULT - NSANTHOSAYNRD_GEN_A_CORE
No. JOSETTE screening performed.  STOP BANG Legend: 0-2 = LOW Risk; 3-4 = INTERMEDIATE Risk; 5-8 = HIGH Risk

## 2017-09-28 NOTE — H&P PST ADULT - HISTORY OF PRESENT ILLNESS
56 yr  female with history of glaucoma, lupus which resulted in ESRD.  Pt started on dialysis in  June 26 2017 via a chest wall catheter. Pt was accompanied by her son who was knowledgeable of her history. Pt also had a laparoscopic gastric bypass which resolved her DM and HTN issue which is maintained by diet. Pt is scheduled for left brachiocephalic arteriovenous creation. Pt has hemodialysis Tuesday, Thursday and Saturday.

## 2017-10-02 ENCOUNTER — OUTPATIENT (OUTPATIENT)
Dept: OUTPATIENT SERVICES | Facility: HOSPITAL | Age: 57
LOS: 1 days | Discharge: HOME | End: 2017-10-02
Payer: MEDICAID

## 2017-10-02 ENCOUNTER — TRANSCRIPTION ENCOUNTER (OUTPATIENT)
Age: 57
End: 2017-10-02

## 2017-10-02 ENCOUNTER — APPOINTMENT (OUTPATIENT)
Dept: VASCULAR SURGERY | Facility: HOSPITAL | Age: 57
End: 2017-10-02

## 2017-10-02 VITALS
DIASTOLIC BLOOD PRESSURE: 76 MMHG | TEMPERATURE: 98 F | SYSTOLIC BLOOD PRESSURE: 154 MMHG | RESPIRATION RATE: 14 BRPM | OXYGEN SATURATION: 100 % | HEART RATE: 76 BPM

## 2017-10-02 VITALS
TEMPERATURE: 98 F | WEIGHT: 132.06 LBS | HEIGHT: 61 IN | DIASTOLIC BLOOD PRESSURE: 90 MMHG | RESPIRATION RATE: 16 BRPM | SYSTOLIC BLOOD PRESSURE: 155 MMHG | OXYGEN SATURATION: 100 % | HEART RATE: 92 BPM

## 2017-10-02 DIAGNOSIS — Z98.891 HISTORY OF UTERINE SCAR FROM PREVIOUS SURGERY: Chronic | ICD-10-CM

## 2017-10-02 DIAGNOSIS — Z98.890 OTHER SPECIFIED POSTPROCEDURAL STATES: Chronic | ICD-10-CM

## 2017-10-02 DIAGNOSIS — Z90.49 ACQUIRED ABSENCE OF OTHER SPECIFIED PARTS OF DIGESTIVE TRACT: Chronic | ICD-10-CM

## 2017-10-02 DIAGNOSIS — N18.6 END STAGE RENAL DISEASE: ICD-10-CM

## 2017-10-02 LAB
ANION GAP SERPL CALC-SCNC: 11 MMOL/L — SIGNIFICANT CHANGE UP (ref 5–17)
BUN SERPL-MCNC: 48 MG/DL — HIGH (ref 7–18)
CALCIUM SERPL-MCNC: 8.5 MG/DL — SIGNIFICANT CHANGE UP (ref 8.4–10.5)
CHLORIDE SERPL-SCNC: 109 MMOL/L — HIGH (ref 96–108)
CO2 SERPL-SCNC: 27 MMOL/L — SIGNIFICANT CHANGE UP (ref 22–31)
CREAT SERPL-MCNC: 5.87 MG/DL — HIGH (ref 0.5–1.3)
GLUCOSE SERPL-MCNC: 104 MG/DL — HIGH (ref 70–99)
POTASSIUM SERPL-MCNC: 4.9 MMOL/L — SIGNIFICANT CHANGE UP (ref 3.5–5.3)
POTASSIUM SERPL-SCNC: 4.9 MMOL/L — SIGNIFICANT CHANGE UP (ref 3.5–5.3)
SODIUM SERPL-SCNC: 147 MMOL/L — HIGH (ref 135–145)

## 2017-10-02 PROCEDURE — 36818 AV FUSE UPPR ARM CEPHALIC: CPT | Mod: LT

## 2017-10-02 PROCEDURE — 80048 BASIC METABOLIC PNL TOTAL CA: CPT

## 2017-10-02 RX ORDER — HEPARIN SODIUM 5000 [USP'U]/ML
2500 INJECTION INTRAVENOUS; SUBCUTANEOUS ONCE
Qty: 0 | Refills: 0 | Status: COMPLETED | OUTPATIENT
Start: 2017-10-02 | End: 2017-10-02

## 2017-10-02 RX ORDER — OXYCODONE AND ACETAMINOPHEN 5; 325 MG/1; MG/1
1 TABLET ORAL EVERY 6 HOURS
Qty: 0 | Refills: 0 | Status: DISCONTINUED | OUTPATIENT
Start: 2017-10-02 | End: 2017-10-02

## 2017-10-02 RX ORDER — ONDANSETRON 8 MG/1
4 TABLET, FILM COATED ORAL EVERY 6 HOURS
Qty: 0 | Refills: 0 | Status: DISCONTINUED | OUTPATIENT
Start: 2017-10-02 | End: 2017-10-10

## 2017-10-02 RX ADMIN — HEPARIN SODIUM 2500 UNIT(S): 5000 INJECTION INTRAVENOUS; SUBCUTANEOUS at 10:29

## 2017-10-02 RX ADMIN — SODIUM CHLORIDE 3 MILLILITER(S): 9 INJECTION INTRAMUSCULAR; INTRAVENOUS; SUBCUTANEOUS at 06:56

## 2017-10-02 NOTE — ASU DISCHARGE PLAN (ADULT/PEDIATRIC). - MEDICATION SUMMARY - MEDICATIONS TO TAKE
I will START or STAY ON the medications listed below when I get home from the hospital:    oxyCODONE-acetaminophen 5 mg-325 mg oral tablet  -- 1 tab(s) by mouth every 6 hours, As Needed -MODERATE Pain (1 - 3) MDD:4 TABLETS  -- Indication: For End-stage renal disease    calcium carbonate 1250 mg (500 mg elemental calcium) oral tablet  -- 1 tab(s) by mouth once a day  -- Indication: For as prescribed    metOLazone 2.5 mg oral tablet  -- 1 tab(s) by mouth once a day  -- Indication: For as prescribed    furosemide 80 mg oral tablet  -- 1 tab(s) by mouth once a day  -- Indication: For as prescribed    Betimol 0.5% ophthalmic solution  -- 1 drop(s) to each affected eye 2 times a day  -- [facility] [phone]  -- Indication: For as prescribed    Travatan Z 0.004% ophthalmic solution  -- 1 drop(s) to each affected eye once a day (at bedtime)  -- Verified by [facility] [phone]  -- Indication: For as prescribed    levothyroxine 25 mcg (0.025 mg) oral tablet  -- 1 tab(s) by mouth once a day  -- Indication: For as prescribed

## 2017-10-02 NOTE — ASU DISCHARGE PLAN (ADULT/PEDIATRIC). - KEEP DRESSING DRY
Bedside and Verbal shift change report given to Chepe (oncoming nurse) by [address] [place] [address] (offgoing nurse). Report included the following information SBAR, Kardex, MAR and Recent Results. Statement Selected

## 2017-10-02 NOTE — ASU DISCHARGE PLAN (ADULT/PEDIATRIC). - NOTIFY
Fever greater than 101 Swelling that continues/Numbness, color, or temperature change to extremity/Pain not relieved by Medications/Fever greater than 101/Bleeding that does not stop

## 2017-10-02 NOTE — BRIEF OPERATIVE NOTE - PROCEDURE
<<-----Click on this checkbox to enter Procedure Creation of arteriovenous fistula  10/02/2017    Active  LAURAMACIE

## 2017-10-04 DIAGNOSIS — E03.9 HYPOTHYROIDISM, UNSPECIFIED: ICD-10-CM

## 2017-10-04 DIAGNOSIS — E11.22 TYPE 2 DIABETES MELLITUS WITH DIABETIC CHRONIC KIDNEY DISEASE: ICD-10-CM

## 2017-10-04 DIAGNOSIS — I12.0 HYPERTENSIVE CHRONIC KIDNEY DISEASE WITH STAGE 5 CHRONIC KIDNEY DISEASE OR END STAGE RENAL DISEASE: ICD-10-CM

## 2017-10-04 DIAGNOSIS — Z88.0 ALLERGY STATUS TO PENICILLIN: ICD-10-CM

## 2017-10-04 DIAGNOSIS — N18.6 END STAGE RENAL DISEASE: ICD-10-CM

## 2017-10-04 DIAGNOSIS — Z99.2 DEPENDENCE ON RENAL DIALYSIS: ICD-10-CM

## 2017-10-04 DIAGNOSIS — M32.9 SYSTEMIC LUPUS ERYTHEMATOSUS, UNSPECIFIED: ICD-10-CM

## 2017-10-17 ENCOUNTER — APPOINTMENT (OUTPATIENT)
Dept: VASCULAR SURGERY | Facility: CLINIC | Age: 57
End: 2017-10-17
Payer: MEDICAID

## 2017-10-17 VITALS
SYSTOLIC BLOOD PRESSURE: 134 MMHG | WEIGHT: 130 LBS | RESPIRATION RATE: 17 BRPM | HEIGHT: 61 IN | TEMPERATURE: 98.6 F | HEART RATE: 89 BPM | BODY MASS INDEX: 24.55 KG/M2 | DIASTOLIC BLOOD PRESSURE: 81 MMHG

## 2017-10-17 PROCEDURE — 93990 DOPPLER FLOW TESTING: CPT

## 2017-10-17 PROCEDURE — 99024 POSTOP FOLLOW-UP VISIT: CPT

## 2017-10-23 ENCOUNTER — EMERGENCY (EMERGENCY)
Facility: HOSPITAL | Age: 57
LOS: 1 days | Discharge: HOME | End: 2017-10-23
Attending: EMERGENCY MEDICINE | Admitting: EMERGENCY MEDICINE
Payer: MEDICAID

## 2017-10-23 VITALS
HEART RATE: 80 BPM | SYSTOLIC BLOOD PRESSURE: 176 MMHG | RESPIRATION RATE: 17 BRPM | OXYGEN SATURATION: 100 % | DIASTOLIC BLOOD PRESSURE: 95 MMHG

## 2017-10-23 VITALS
OXYGEN SATURATION: 99 % | HEART RATE: 91 BPM | SYSTOLIC BLOOD PRESSURE: 135 MMHG | DIASTOLIC BLOOD PRESSURE: 84 MMHG | TEMPERATURE: 99 F | RESPIRATION RATE: 16 BRPM

## 2017-10-23 DIAGNOSIS — Z98.891 HISTORY OF UTERINE SCAR FROM PREVIOUS SURGERY: Chronic | ICD-10-CM

## 2017-10-23 DIAGNOSIS — Z98.890 OTHER SPECIFIED POSTPROCEDURAL STATES: Chronic | ICD-10-CM

## 2017-10-23 DIAGNOSIS — Z90.49 ACQUIRED ABSENCE OF OTHER SPECIFIED PARTS OF DIGESTIVE TRACT: Chronic | ICD-10-CM

## 2017-10-23 PROCEDURE — 82962 GLUCOSE BLOOD TEST: CPT

## 2017-10-23 PROCEDURE — 99284 EMERGENCY DEPT VISIT MOD MDM: CPT | Mod: 25

## 2017-10-23 PROCEDURE — 93970 EXTREMITY STUDY: CPT | Mod: 26

## 2017-10-23 PROCEDURE — 93970 EXTREMITY STUDY: CPT

## 2017-10-23 PROCEDURE — 99284 EMERGENCY DEPT VISIT MOD MDM: CPT

## 2017-10-23 RX ORDER — OXYCODONE HYDROCHLORIDE 5 MG/1
1 TABLET ORAL
Qty: 12 | Refills: 0
Start: 2017-10-23 | End: 2017-10-26

## 2017-10-23 RX ORDER — OXYCODONE HYDROCHLORIDE 5 MG/1
5 TABLET ORAL ONCE
Qty: 0 | Refills: 0 | Status: DISCONTINUED | OUTPATIENT
Start: 2017-10-23 | End: 2017-10-23

## 2017-10-23 RX ADMIN — OXYCODONE HYDROCHLORIDE 5 MILLIGRAM(S): 5 TABLET ORAL at 19:17

## 2017-10-23 NOTE — ED PROVIDER NOTE - MUSCULOSKELETAL MINIMAL EXAM
right upper chest wall port-a cath clean and dry, mild tenderness to palpation posterior calf bilaterally, no distal or vascular compromise bilaterally

## 2017-10-23 NOTE — ED ADULT NURSE NOTE - OBJECTIVE STATEMENT
Female 56 years old with history of Kidney Failure came in for bilateral leg pain mostly on the calf for 2 weeks. Pt report she's been taking Gabapentin for 5 days but with little relief. Had recent car travel to [place] a week ago. Denies chest pain, sob, nausea and vomiting. Denies fever and chills. Made comfortable. Family at bedside.

## 2017-10-23 NOTE — ED ADULT NURSE NOTE - CHPI ED SYMPTOMS NEG
no nausea/no tingling/no weakness/no pain/no fever/no dizziness/no vomiting/no numbness/no decreased eating/drinking/no chills

## 2017-10-23 NOTE — ED PROVIDER NOTE - PLAN OF CARE
Your ultrasound did not show any blood clots. It is not clear what is causing your pain at this time.     You may use Tylenol 650mg every 8 hours as needed for pain. For severe pain not controlled with these medications, you may use the prescription pain medication Oxycodone, please do not drink alcohol or drive on this medication as it can make you very sleepy.        Please see your regular doctor and rheumatologist for further investigation of your pain.

## 2017-10-23 NOTE — ED ADULT NURSE REASSESSMENT NOTE - NS ED NURSE REASSESS COMMENT FT1
patient back from US. Resting in bed, denies pain or any other complaints. Pending US results and dispo. Hypertensive, patient reports pressure is usually high but does not take any prescribed medication. States pressure goes down after dialysis. MD aware. Safety and comfort maintained.

## 2017-10-23 NOTE — ED PROVIDER NOTE - OBJECTIVE STATEMENT
56 year old female on dialysis with pmhx of Hypertension, type 2 diabetes, ESRD, hyperlipidemia, glaucoma, and lupus presents with pain to bilateral LE from the knees down since 2 weeks. Pain feels like cramping.  No swelling or hx of blood clots. AV fistula placed on left arm recently. No chest pain or SOB. No fever or chills.   PMD- Khanh Todd   Nephrologist- Dr. Huong Jacobs

## 2017-10-23 NOTE — ED PROVIDER NOTE - CHPI ED SYMPTOMS NEG
no vomiting/no dizziness/no nausea/no fever/no chills/no decreased eating/drinking/no weakness/no tingling

## 2017-10-23 NOTE — ED PROVIDER NOTE - CARE PLAN
Principal Discharge DX:	Pain in both lower extremities  Instructions for follow-up, activity and diet:	Your ultrasound did not show any blood clots. It is not clear what is causing your pain at this time.     You may use Tylenol 650mg every 8 hours as needed for pain. For severe pain not controlled with these medications, you may use the prescription pain medication Oxycodone, please do not drink alcohol or drive on this medication as it can make you very sleepy.        Please see your regular doctor and rheumatologist for further investigation of your pain.

## 2017-10-23 NOTE — ED PROVIDER NOTE - MEDICAL DECISION MAKING DETAILS
Attending MD Chiang: 56F with SLE, ERSD on HD MWF, DM presenting with 2 weeks of b/l posterior calf pain. Exam with mild posterior calf ttp b/l but no ttp, ddx includes DVT, diabetic neuropathy, SLE related pain. Will obtain US to r/o DVT. No NV compromise b/l LEs

## 2017-11-16 ENCOUNTER — APPOINTMENT (OUTPATIENT)
Dept: VASCULAR SURGERY | Facility: CLINIC | Age: 57
End: 2017-11-16
Payer: MEDICAID

## 2017-11-16 PROCEDURE — 93990 DOPPLER FLOW TESTING: CPT

## 2017-11-16 PROCEDURE — 99024 POSTOP FOLLOW-UP VISIT: CPT

## 2017-11-17 ENCOUNTER — APPOINTMENT (OUTPATIENT)
Age: 57
End: 2017-11-17
Payer: MEDICAID

## 2017-11-17 PROCEDURE — 36905Z: CUSTOM | Mod: 58

## 2017-11-17 PROCEDURE — 36215Z: CUSTOM | Mod: 59

## 2017-12-08 ENCOUNTER — APPOINTMENT (OUTPATIENT)
Age: 57
End: 2017-12-08
Payer: MEDICAID

## 2017-12-08 PROCEDURE — 36902Z: CUSTOM | Mod: 58

## 2017-12-15 PROCEDURE — 83735 ASSAY OF MAGNESIUM: CPT

## 2017-12-15 PROCEDURE — 99285 EMERGENCY DEPT VISIT HI MDM: CPT | Mod: 25

## 2017-12-15 PROCEDURE — 80053 COMPREHEN METABOLIC PANEL: CPT

## 2017-12-15 PROCEDURE — 81001 URINALYSIS AUTO W/SCOPE: CPT

## 2017-12-15 PROCEDURE — 82550 ASSAY OF CK (CPK): CPT

## 2017-12-15 PROCEDURE — 83880 ASSAY OF NATRIURETIC PEPTIDE: CPT

## 2017-12-15 PROCEDURE — 71046 X-RAY EXAM CHEST 2 VIEWS: CPT

## 2017-12-15 PROCEDURE — G0378: CPT

## 2017-12-15 PROCEDURE — 82553 CREATINE MB FRACTION: CPT

## 2017-12-15 PROCEDURE — 85027 COMPLETE CBC AUTOMATED: CPT

## 2017-12-15 PROCEDURE — 93005 ELECTROCARDIOGRAM TRACING: CPT

## 2017-12-15 PROCEDURE — 84100 ASSAY OF PHOSPHORUS: CPT

## 2017-12-15 PROCEDURE — 84484 ASSAY OF TROPONIN QUANT: CPT

## 2017-12-19 ENCOUNTER — INPATIENT (INPATIENT)
Facility: HOSPITAL | Age: 57
LOS: 27 days | Discharge: HOME | DRG: 515 | End: 2018-01-16
Attending: STUDENT IN AN ORGANIZED HEALTH CARE EDUCATION/TRAINING PROGRAM | Admitting: HOSPITALIST
Payer: MEDICAID

## 2017-12-19 VITALS
SYSTOLIC BLOOD PRESSURE: 104 MMHG | RESPIRATION RATE: 16 BRPM | TEMPERATURE: 99 F | DIASTOLIC BLOOD PRESSURE: 66 MMHG | HEART RATE: 95 BPM | OXYGEN SATURATION: 100 %

## 2017-12-19 DIAGNOSIS — E03.9 HYPOTHYROIDISM, UNSPECIFIED: ICD-10-CM

## 2017-12-19 DIAGNOSIS — I10 ESSENTIAL (PRIMARY) HYPERTENSION: ICD-10-CM

## 2017-12-19 DIAGNOSIS — N18.9 CHRONIC KIDNEY DISEASE, UNSPECIFIED: ICD-10-CM

## 2017-12-19 DIAGNOSIS — E11.22 TYPE 2 DIABETES MELLITUS WITH DIABETIC CHRONIC KIDNEY DISEASE: ICD-10-CM

## 2017-12-19 DIAGNOSIS — Z90.49 ACQUIRED ABSENCE OF OTHER SPECIFIED PARTS OF DIGESTIVE TRACT: Chronic | ICD-10-CM

## 2017-12-19 DIAGNOSIS — M32.14 GLOMERULAR DISEASE IN SYSTEMIC LUPUS ERYTHEMATOSUS: ICD-10-CM

## 2017-12-19 DIAGNOSIS — Z98.891 HISTORY OF UTERINE SCAR FROM PREVIOUS SURGERY: Chronic | ICD-10-CM

## 2017-12-19 DIAGNOSIS — R29.898 OTHER SYMPTOMS AND SIGNS INVOLVING THE MUSCULOSKELETAL SYSTEM: ICD-10-CM

## 2017-12-19 DIAGNOSIS — N18.6 END STAGE RENAL DISEASE: ICD-10-CM

## 2017-12-19 DIAGNOSIS — Z98.890 OTHER SPECIFIED POSTPROCEDURAL STATES: Chronic | ICD-10-CM

## 2017-12-19 LAB
ALBUMIN SERPL ELPH-MCNC: 3 G/DL — LOW (ref 3.3–5)
ALP SERPL-CCNC: 70 U/L — SIGNIFICANT CHANGE UP (ref 40–120)
ALT FLD-CCNC: 12 U/L RC — SIGNIFICANT CHANGE UP (ref 10–45)
ANION GAP SERPL CALC-SCNC: 9 MMOL/L — SIGNIFICANT CHANGE UP (ref 5–17)
ANISOCYTOSIS BLD QL: SLIGHT — SIGNIFICANT CHANGE UP
AST SERPL-CCNC: 35 U/L — SIGNIFICANT CHANGE UP (ref 10–40)
BASOPHILS # BLD AUTO: 0 K/UL — SIGNIFICANT CHANGE UP (ref 0–0.2)
BASOPHILS NFR BLD AUTO: 0.4 % — SIGNIFICANT CHANGE UP (ref 0–2)
BILIRUB SERPL-MCNC: 0.5 MG/DL — SIGNIFICANT CHANGE UP (ref 0.2–1.2)
BUN SERPL-MCNC: 23 MG/DL — SIGNIFICANT CHANGE UP (ref 7–23)
CALCIUM SERPL-MCNC: 8.7 MG/DL — SIGNIFICANT CHANGE UP (ref 8.4–10.5)
CHLORIDE SERPL-SCNC: 100 MMOL/L — SIGNIFICANT CHANGE UP (ref 96–108)
CO2 SERPL-SCNC: 32 MMOL/L — HIGH (ref 22–31)
CREAT SERPL-MCNC: 3.78 MG/DL — HIGH (ref 0.5–1.3)
CRP SERPL-MCNC: 0.2 MG/DL — SIGNIFICANT CHANGE UP (ref 0–0.4)
DACRYOCYTES BLD QL SMEAR: SLIGHT — SIGNIFICANT CHANGE UP
EOSINOPHIL # BLD AUTO: 0 K/UL — SIGNIFICANT CHANGE UP (ref 0–0.5)
EOSINOPHIL NFR BLD AUTO: 2 % — SIGNIFICANT CHANGE UP (ref 0–6)
ERYTHROCYTE [SEDIMENTATION RATE] IN BLOOD: 50 MM/HR — HIGH (ref 0–20)
GLUCOSE BLDC GLUCOMTR-MCNC: 127 MG/DL — HIGH (ref 70–99)
GLUCOSE BLDC GLUCOMTR-MCNC: 80 MG/DL — SIGNIFICANT CHANGE UP (ref 70–99)
GLUCOSE BLDC GLUCOMTR-MCNC: 84 MG/DL — SIGNIFICANT CHANGE UP (ref 70–99)
GLUCOSE BLDC GLUCOMTR-MCNC: 85 MG/DL — SIGNIFICANT CHANGE UP (ref 70–99)
GLUCOSE SERPL-MCNC: 90 MG/DL — SIGNIFICANT CHANGE UP (ref 70–99)
HCT VFR BLD CALC: 33.6 % — LOW (ref 34.5–45)
HGB BLD-MCNC: 10.9 G/DL — LOW (ref 11.5–15.5)
LYMPHOCYTES # BLD AUTO: 1.5 K/UL — SIGNIFICANT CHANGE UP (ref 1–3.3)
LYMPHOCYTES # BLD AUTO: 48 % — HIGH (ref 13–44)
MACROCYTES BLD QL: SLIGHT — SIGNIFICANT CHANGE UP
MCHC RBC-ENTMCNC: 31.1 PG — SIGNIFICANT CHANGE UP (ref 27–34)
MCHC RBC-ENTMCNC: 32.6 GM/DL — SIGNIFICANT CHANGE UP (ref 32–36)
MCV RBC AUTO: 95.3 FL — SIGNIFICANT CHANGE UP (ref 80–100)
MONOCYTES # BLD AUTO: 0.3 K/UL — SIGNIFICANT CHANGE UP (ref 0–0.9)
MONOCYTES NFR BLD AUTO: 7 % — SIGNIFICANT CHANGE UP (ref 2–14)
NEUTROPHILS # BLD AUTO: 1.4 K/UL — LOW (ref 1.8–7.4)
NEUTROPHILS NFR BLD AUTO: 43 % — SIGNIFICANT CHANGE UP (ref 43–77)
OVALOCYTES BLD QL SMEAR: SIGNIFICANT CHANGE UP
PLAT MORPH BLD: NORMAL — SIGNIFICANT CHANGE UP
PLATELET # BLD AUTO: 68 K/UL — LOW (ref 150–400)
POIKILOCYTOSIS BLD QL AUTO: SLIGHT — SIGNIFICANT CHANGE UP
POTASSIUM SERPL-MCNC: 4.4 MMOL/L — SIGNIFICANT CHANGE UP (ref 3.5–5.3)
POTASSIUM SERPL-SCNC: 4.4 MMOL/L — SIGNIFICANT CHANGE UP (ref 3.5–5.3)
PROT SERPL-MCNC: 7.1 G/DL — SIGNIFICANT CHANGE UP (ref 6–8.3)
RBC # BLD: 3.53 M/UL — LOW (ref 3.8–5.2)
RBC # FLD: 13.8 % — SIGNIFICANT CHANGE UP (ref 10.3–14.5)
RBC BLD AUTO: ABNORMAL
SODIUM SERPL-SCNC: 141 MMOL/L — SIGNIFICANT CHANGE UP (ref 135–145)
WBC # BLD: 3.2 K/UL — LOW (ref 3.8–10.5)
WBC # FLD AUTO: 3.2 K/UL — LOW (ref 3.8–10.5)

## 2017-12-19 PROCEDURE — 71010: CPT | Mod: 26

## 2017-12-19 PROCEDURE — 93010 ELECTROCARDIOGRAM REPORT: CPT

## 2017-12-19 PROCEDURE — 99285 EMERGENCY DEPT VISIT HI MDM: CPT | Mod: 25

## 2017-12-19 PROCEDURE — 99223 1ST HOSP IP/OBS HIGH 75: CPT

## 2017-12-19 PROCEDURE — 99222 1ST HOSP IP/OBS MODERATE 55: CPT | Mod: GC

## 2017-12-19 RX ORDER — FUROSEMIDE 40 MG
80 TABLET ORAL DAILY
Qty: 0 | Refills: 0 | Status: DISCONTINUED | OUTPATIENT
Start: 2017-12-19 | End: 2017-12-20

## 2017-12-19 RX ORDER — ACETAMINOPHEN 500 MG
650 TABLET ORAL EVERY 6 HOURS
Qty: 0 | Refills: 0 | Status: DISCONTINUED | OUTPATIENT
Start: 2017-12-19 | End: 2017-12-28

## 2017-12-19 RX ORDER — HEPARIN SODIUM 5000 [USP'U]/ML
5000 INJECTION INTRAVENOUS; SUBCUTANEOUS EVERY 12 HOURS
Qty: 0 | Refills: 0 | Status: DISCONTINUED | OUTPATIENT
Start: 2017-12-19 | End: 2017-12-19

## 2017-12-19 RX ORDER — TIMOLOL 0.5 %
1 DROPS OPHTHALMIC (EYE) DAILY
Qty: 0 | Refills: 0 | Status: DISCONTINUED | OUTPATIENT
Start: 2017-12-19 | End: 2017-12-28

## 2017-12-19 RX ORDER — GLUCAGON INJECTION, SOLUTION 0.5 MG/.1ML
1 INJECTION, SOLUTION SUBCUTANEOUS ONCE
Qty: 0 | Refills: 0 | Status: DISCONTINUED | OUTPATIENT
Start: 2017-12-19 | End: 2017-12-28

## 2017-12-19 RX ORDER — DEXTROSE 50 % IN WATER 50 %
25 SYRINGE (ML) INTRAVENOUS ONCE
Qty: 0 | Refills: 0 | Status: DISCONTINUED | OUTPATIENT
Start: 2017-12-19 | End: 2017-12-28

## 2017-12-19 RX ORDER — DEXTROSE 50 % IN WATER 50 %
12.5 SYRINGE (ML) INTRAVENOUS ONCE
Qty: 0 | Refills: 0 | Status: DISCONTINUED | OUTPATIENT
Start: 2017-12-19 | End: 2017-12-28

## 2017-12-19 RX ORDER — SENNA PLUS 8.6 MG/1
2 TABLET ORAL AT BEDTIME
Qty: 0 | Refills: 0 | Status: DISCONTINUED | OUTPATIENT
Start: 2017-12-19 | End: 2017-12-28

## 2017-12-19 RX ORDER — CALCIUM ACETATE 667 MG
667 TABLET ORAL
Qty: 0 | Refills: 0 | Status: DISCONTINUED | OUTPATIENT
Start: 2017-12-19 | End: 2017-12-28

## 2017-12-19 RX ORDER — GABAPENTIN 400 MG/1
200 CAPSULE ORAL DAILY
Qty: 0 | Refills: 0 | Status: DISCONTINUED | OUTPATIENT
Start: 2017-12-19 | End: 2017-12-20

## 2017-12-19 RX ORDER — LEVOTHYROXINE SODIUM 125 MCG
25 TABLET ORAL DAILY
Qty: 0 | Refills: 0 | Status: DISCONTINUED | OUTPATIENT
Start: 2017-12-19 | End: 2017-12-28

## 2017-12-19 RX ORDER — SODIUM CHLORIDE 9 MG/ML
1000 INJECTION, SOLUTION INTRAVENOUS
Qty: 0 | Refills: 0 | Status: DISCONTINUED | OUTPATIENT
Start: 2017-12-19 | End: 2017-12-28

## 2017-12-19 RX ORDER — INSULIN LISPRO 100/ML
VIAL (ML) SUBCUTANEOUS AT BEDTIME
Qty: 0 | Refills: 0 | Status: DISCONTINUED | OUTPATIENT
Start: 2017-12-19 | End: 2017-12-28

## 2017-12-19 RX ORDER — OXYCODONE HYDROCHLORIDE 5 MG/1
5 TABLET ORAL EVERY 6 HOURS
Qty: 0 | Refills: 0 | Status: DISCONTINUED | OUTPATIENT
Start: 2017-12-19 | End: 2017-12-26

## 2017-12-19 RX ORDER — DEXTROSE 50 % IN WATER 50 %
1 SYRINGE (ML) INTRAVENOUS ONCE
Qty: 0 | Refills: 0 | Status: DISCONTINUED | OUTPATIENT
Start: 2017-12-19 | End: 2017-12-28

## 2017-12-19 RX ORDER — LATANOPROST 0.05 MG/ML
1 SOLUTION/ DROPS OPHTHALMIC; TOPICAL AT BEDTIME
Qty: 0 | Refills: 0 | Status: DISCONTINUED | OUTPATIENT
Start: 2017-12-19 | End: 2017-12-28

## 2017-12-19 RX ORDER — INSULIN LISPRO 100/ML
VIAL (ML) SUBCUTANEOUS
Qty: 0 | Refills: 0 | Status: DISCONTINUED | OUTPATIENT
Start: 2017-12-19 | End: 2017-12-28

## 2017-12-19 RX ORDER — ACETAMINOPHEN 500 MG
1000 TABLET ORAL ONCE
Qty: 0 | Refills: 0 | Status: COMPLETED | OUTPATIENT
Start: 2017-12-19 | End: 2017-12-19

## 2017-12-19 RX ORDER — DOCUSATE SODIUM 100 MG
100 CAPSULE ORAL
Qty: 0 | Refills: 0 | Status: DISCONTINUED | OUTPATIENT
Start: 2017-12-19 | End: 2017-12-28

## 2017-12-19 RX ORDER — CALCIUM CARBONATE 500(1250)
1 TABLET ORAL DAILY
Qty: 0 | Refills: 0 | Status: DISCONTINUED | OUTPATIENT
Start: 2017-12-19 | End: 2017-12-28

## 2017-12-19 RX ORDER — GABAPENTIN 400 MG/1
100 CAPSULE ORAL
Qty: 0 | Refills: 0 | Status: DISCONTINUED | OUTPATIENT
Start: 2017-12-19 | End: 2017-12-19

## 2017-12-19 RX ADMIN — Medication 1 DROP(S): at 15:30

## 2017-12-19 RX ADMIN — LATANOPROST 1 DROP(S): 0.05 SOLUTION/ DROPS OPHTHALMIC; TOPICAL at 22:00

## 2017-12-19 RX ADMIN — Medication 20 MILLIGRAM(S): at 15:31

## 2017-12-19 RX ADMIN — Medication 80 MILLIGRAM(S): at 17:29

## 2017-12-19 RX ADMIN — GABAPENTIN 200 MILLIGRAM(S): 400 CAPSULE ORAL at 15:32

## 2017-12-19 RX ADMIN — OXYCODONE HYDROCHLORIDE 5 MILLIGRAM(S): 5 TABLET ORAL at 17:06

## 2017-12-19 RX ADMIN — Medication 667 MILLIGRAM(S): at 17:30

## 2017-12-19 RX ADMIN — Medication 667 MILLIGRAM(S): at 14:50

## 2017-12-19 RX ADMIN — Medication 1 TABLET(S): at 14:51

## 2017-12-19 RX ADMIN — OXYCODONE HYDROCHLORIDE 5 MILLIGRAM(S): 5 TABLET ORAL at 17:36

## 2017-12-19 RX ADMIN — SENNA PLUS 2 TABLET(S): 8.6 TABLET ORAL at 21:30

## 2017-12-19 RX ADMIN — Medication 25 MICROGRAM(S): at 14:51

## 2017-12-19 RX ADMIN — Medication 400 MILLIGRAM(S): at 03:36

## 2017-12-19 RX ADMIN — Medication 1000 MILLIGRAM(S): at 04:44

## 2017-12-19 RX ADMIN — Medication 5 MILLIGRAM(S): at 21:30

## 2017-12-19 RX ADMIN — Medication 100 MILLIGRAM(S): at 17:29

## 2017-12-19 NOTE — ED ADULT NURSE NOTE - OBJECTIVE STATEMENT
Patient a + o x 4, [place]i speaking, with english speaking family member and prefers that person to translate.  Patient states that she has bilateral lower leg pain 8/10.  Patient's family states that she had been unable to ambulate for the "past couple of weeks" and uses a wheelchair at home.  There is no swelling noted to the bilateral legs.  Patient has sensation in the bilateral legs.  Patient oriented to area, made comfortable, safety maintained.

## 2017-12-19 NOTE — H&P ADULT - PROBLEM SELECTOR PLAN 3
Plaquenil on hold in setting of LE weakness as outpt.   will monitor. Med recently started 3 weeks ago as outpt.  Prior no meds

## 2017-12-19 NOTE — ED PROVIDER NOTE - PHYSICAL EXAMINATION
Gen: No acute distress, alert, cooperative  Head: Normocephalic, Atraumatic  HEENT: PERRL, oral mucosa moist, normal conjunctiva  Lung: CTAB, no respiratory distress, no crackles or wheezes  CV: rrr, no murmur  Abd: soft, NTND  MSK: No LE edema, normal ROM all extremities. No pain with ROM.   Neuro: Patient unable to stand on her own. Cranial nerves II-XII intact. Sensation gone to pain and touch from ankles to toes both dorsal and ventral side RICKI.  Skin: No rash  Psych: normal affect, follows commands Gen: No acute distress, alert, cooperative  Head: Normocephalic, Atraumatic  HEENT: PERRL, oral mucosa moist, normal conjunctiva  Lung: CTAB, no respiratory distress, no crackles or wheezes  CV: rrr, no murmur  Abd: soft, NTND  MSK: No LE edema, normal ROM all extremities. No pain with ROM.   Neuro: Patient unable to stand on her own. Cranial nerves II-XII intact. Sensation gone to pain and touch from ankles to toes both dorsal and ventral side RICKI. Normal strength upper extremity. Slight tremor when holding hands outwards. 5/5 Rt and Lt hip flexion, 5/5 Left leg flexion and extension. 4/5 right leg extension, 3/5 right leg flexion, 3/5 RICKI doral and plantar flexion.   Skin: No rash  Psych: normal affect, follows commands

## 2017-12-19 NOTE — H&P ADULT - HISTORY OF PRESENT ILLNESS
56 yo f with h/o lupus diagnosed in April, lupus nephritis, ESRD on HD via permacath (M, W, F) s/p AVF (not matured), DM2, HTN, HLD, hypothyroid presented with 3 weeks h/o bilateral LE pain and numbness. Patient was seen in ED (OCt) with pain diagnosed with neuropathy (neg DVT) and d/c'ed home on oxycodone. Subsequently developed b/l LE numbness and inability to ambulate for past 3 weeks. Now wheelchair dependent. Pain is tingling intermittent . Hydroxychlorquine stopped few days ago by nephrology/rheum for possible medication side effect and told to go to hospital for further eval. Of note, hydroxychlorquine recently start around same time as numbness. Patient awaiting renal transplant. Afebrile. Denies any fever or chills, loss of bowel or urine.

## 2017-12-19 NOTE — H&P ADULT - ASSESSMENT
56 yo f with h/o lupus diagnosed in April, lupus nephritis, ESRD on HD via permacath (M, W, F) s/p AVF (not matured), DM2, HTN, HLD, hypothyroid presented with 3 weeks h/o bilateral LE pain and numbness

## 2017-12-19 NOTE — ED ADULT NURSE REASSESSMENT NOTE - NS ED NURSE REASSESS COMMENT FT1
FS 80. Breakfast tray provided to pt, tolerating PO intake well, no dysphagia noted. Son at bedside. Plan of care discussed w/ pt and son. NAD noted. Bedside report given to Juana LOCKE in ED Holding.

## 2017-12-19 NOTE — CONSULT NOTE ADULT - ASSESSMENT
58YO F w/ Lupus, ESRD, DM, HTN p/w with progressive lower extremity weakness and sensory loss. Symptoms have progressed over 1-2 months. Includes pain, weakness, inability to ambulate, and numbness in LE in distal LE B/L. Neurological exam reveals profound weakness distal>proximal, flexors>extensors, with absent reflexes in LE. Ddx includes peripheral neuropathy, 58YO F w/ Lupus, ESRD, DM, HTN p/w with progressive lower extremity weakness and sensory loss. Symptoms have progressed over 1-2 months. Includes pain, weakness, inability to ambulate, and numbness in LE in distal LE B/L. Neurological exam reveals profound weakness distal>proximal, flexors>extensors, with absent reflexes in LE. Atrophy B/L LE. Etiology likely peripheral neuropathy 2/2 diabetes.       [] MRI L Spine r/o lumbosacral plexopathy   [] consider starting neuropathic pain meds - Gabapentin 300 BID to start   [] neuro team will continue to follow

## 2017-12-19 NOTE — H&P ADULT - FAMILY HISTORY
DM (diabetes mellitus), mother and father     History of hypertension, father and mother     Sibling  Still living? Yes, Estimated age: Age Unknown  DM (diabetes mellitus), Age at diagnosis: Age Unknown  History of hypertension, Age at diagnosis: Age Unknown

## 2017-12-19 NOTE — ED PROVIDER NOTE - ATTENDING CONTRIBUTION TO CARE
Attending MD Chiang:  I personally have seen and examined this patient.  Resident note reviewed and agree on plan of care and except where noted.  See HPI, PE, and MDM for details.

## 2017-12-19 NOTE — CONSULT NOTE ADULT - SUBJECTIVE AND OBJECTIVE BOX
NEUROLOGY CONSULT     Patient is a 57y old  Female who presents with a chief complaint of LE weakness.    HPI:  58yo  F w/ PMH of lupus, esrd ( 2/2 lupus nephritis), DM, HTN, HLD presenting with at least 1 month LE weakness, pain, and inability to walk. Pain is shock like, improved with tylenol and oxycodone. Has been unable to walk due to weakness for 1-2 weeks and is now wheelchair bound due to this. Was in the ED previously and DVT was r/o and pt was d/c. Sent to PT by her PMD and has been going for 1 week now. Also endorses numbness to both top and bottom of feet for a month. Of note, she was started on hydroxychlorquine in early Nov.  Denies any bowel/bladder incontinence, no saddle anesthesia. Makes urine 2x/d, but very little 2/2 renal disease.      PAST MEDICAL & SURGICAL HISTORY:  Lupus (systemic lupus erythematosus)  ESRD (end-stage renal disease) due to SLE  Glaucoma  Other hyperlipidemia  Type 2 diabetes mellitus without complication, without long-term current use of insulin  Essential hypertension  Hypercholesteremia  Hypertension  Diabetes  S/P appendectomy  H/O gastric bypass: 2016  S/P  section: times two      Allergies    penicillin (Rash)    Intolerances        MEDICATIONS  (STANDING):    MEDICATIONS  (PRN):      SOCIAL HISTORY: Denies tobacco/EtOH/drug use     FAMILY HISTORY:  History of hypertension (Sibling): sibling  DM (diabetes mellitus) (Sibling): siblings  History of hypertension: father and mother  DM (diabetes mellitus): mother and father      REVIEW OF SYSTEMS:  CONSTITUTIONAL:  No weight loss, fever, chills, weakness or fatigue.  HEENT:  Eyes:  No visual loss, blurred vision, double vision or yellow sclerae. Ears, Nose, Throat:  No hearing loss, sneezing, congestion, runny nose or sore throat.  SKIN:  No rash or itching.  CARDIOVASCULAR:  No chest pain, chest pressure or chest discomfort. No palpitations or edema.  RESPIRATORY:  No shortness of breath, cough or sputum.  GASTROINTESTINAL:  No anorexia, nausea, vomiting or diarrhea. No abdominal pain or blood.  GENITOURINARY:  denies incontinence/retention   NEUROLOGICAL: see hpi  MUSCULOSKELETAL:  No muscle, back pain, joint pain or stiffness.  HEMATOLOGIC:  No anemia, bleeding or bruising.  LYMPHATICS:  No enlarged nodes. No history of splenectomy.  PSYCHIATRIC:  No history of depression or anxiety.  ENDOCRINOLOGIC:  No reports of sweating, cold or heat intolerance. No polyuria or polydipsia.      Vital Signs Last 24 Hrs  T(C): 36.7 (19 Dec 2017 03:56), Max: 37.2 (19 Dec 2017 01:13)  T(F): 98.1 (19 Dec 2017 03:56), Max: 98.9 (19 Dec 2017 01:13)  HR: 83 (19 Dec 2017 03:56) (83 - 95)  BP: 161/82 (19 Dec 2017 03:56) (104/66 - 161/82)  BP(mean): --  RR: 18 (19 Dec 2017 03:56) (16 - 18)  SpO2: 99% (19 Dec 2017 03:56) (99% - 100%)      PHYSICAL EXAM:   General appearance: No acute distress                 Mental Status: AAOx3, fluent speech, follows simple commands, able to name/repeats  Cranial Nerves: EOMI, PERRL, VFF, V1-V3 intact, face symmetric,  tongue midline  Motor: Normal tone. Tremor B/L hands. Strength 5/5 UE throughout B/L.   RLE: HF 3/5, KF 1/5, KE 4+/5, PF 0/5, DF 1/5   LLE: HF 3/5, KF 1/5, KE 4+/5, PF 1/5, DF 2/5  Sensation: Decreased sensation to PP +/-vibratory B/L LE distal to mid shin. Feels increased sensation to Temp in LE B/L.  Coordination: FTN intact b/l, no dysmetria.  Reflexes: 2+ bilateral biceps, brachioradialis, triceps. Areflexic in LE including patellar, ankle, mute toes B/L.            LABS:                          10.9   3.2   )-----------( 68       ( 19 Dec 2017 03:23 )             33.6     12-    141  |  100  |  23  ----------------------------<  90  4.4   |  32<H>  |  3.78<H>    Ca    8.7      19 Dec 2017 03:23    TPro  7.1  /  Alb  3.0<L>  /  TBili  0.5  /  DBili  x   /  AST  35  /  ALT  12  /  AlkPhos  70  12-19      IMAGING: NEUROLOGY CONSULT     Patient is a 57y old  Female who presents with a chief complaint of LE weakness.      HPI:  56yo  F w/ PMH of lupus, esrd ( 2/2 lupus nephritis), DM, HTN, HLD presenting with at least 1 month LE weakness, pain, and inability to walk. Pain is shock like, improved with tylenol and oxycodone. Has been unable to walk due to weakness for 1-2 weeks and is now wheelchair bound due to this. Was in the ED previously and DVT was r/o and pt was d/c. Sent to PT by her PMD and has been going for 1 week now. Also endorses numbness to both top and bottom of feet for a month. Of note, she was started on hydroxychlorquine in early Nov.  Denies any bowel/bladder incontinence, no saddle anesthesia. Makes urine 2x/d, but very little 2/2 renal disease.      PAST MEDICAL & SURGICAL HISTORY:  Lupus (systemic lupus erythematosus)  ESRD (end-stage renal disease) due to SLE  Glaucoma  Other hyperlipidemia  Type 2 diabetes mellitus without complication, without long-term current use of insulin  Essential hypertension  Hypercholesteremia  Hypertension  Diabetes  S/P appendectomy  H/O gastric bypass: 2016  S/P  section: times two      Allergies    penicillin (Rash)    Intolerances        MEDICATIONS  (STANDING):    MEDICATIONS  (PRN):      SOCIAL HISTORY: Denies tobacco/EtOH/drug use     FAMILY HISTORY:  History of hypertension (Sibling): sibling  DM (diabetes mellitus) (Sibling): siblings  History of hypertension: father and mother  DM (diabetes mellitus): mother and father      REVIEW OF SYSTEMS:  CONSTITUTIONAL:  No weight loss, fever, chills, weakness or fatigue.  HEENT:  Eyes:  No visual loss, blurred vision, double vision or yellow sclerae. Ears, Nose, Throat:  No hearing loss, sneezing, congestion, runny nose or sore throat.  SKIN:  No rash or itching.  CARDIOVASCULAR:  No chest pain, chest pressure or chest discomfort. No palpitations or edema.  RESPIRATORY:  No shortness of breath, cough or sputum.  GASTROINTESTINAL:  No anorexia, nausea, vomiting or diarrhea. No abdominal pain or blood.  GENITOURINARY:  denies incontinence/retention   NEUROLOGICAL: see hpi  MUSCULOSKELETAL:  No muscle, back pain, joint pain or stiffness.  HEMATOLOGIC:  No anemia, bleeding or bruising.  LYMPHATICS:  No enlarged nodes. No history of splenectomy.  PSYCHIATRIC:  No history of depression or anxiety.  ENDOCRINOLOGIC:  No reports of sweating, cold or heat intolerance. No polyuria or polydipsia.      Vital Signs Last 24 Hrs  T(C): 36.7 (19 Dec 2017 03:56), Max: 37.2 (19 Dec 2017 01:13)  T(F): 98.1 (19 Dec 2017 03:56), Max: 98.9 (19 Dec 2017 01:13)  HR: 83 (19 Dec 2017 03:56) (83 - 95)  BP: 161/82 (19 Dec 2017 03:56) (104/66 - 161/82)  BP(mean): --  RR: 18 (19 Dec 2017 03:56) (16 - 18)  SpO2: 99% (19 Dec 2017 03:56) (99% - 100%)      PHYSICAL EXAM:   General appearance: No acute distress                 Mental Status: AAOx3, fluent speech, follows simple commands, able to name/repeats  Cranial Nerves: EOMI, PERRL, VFF, V1-V3 intact, face symmetric,  tongue midline  Motor: Normal tone. Tremor B/L hands. Strength 5/5 UE throughout B/L.   RLE: HF 3/5, KF 1/5, KE 4+/5, PF 0/5, DF 1/5   LLE: HF 3/5, KF 1/5, KE 4+/5, PF 1/5, DF 2/5  Sensation: Decreased sensation to PP +/-vibratory B/L LE distal to mid shin. Feels increased sensation to Temp in LE B/L.  Coordination: FTN intact b/l, no dysmetria.  Reflexes: 2+ bilateral biceps, brachioradialis, triceps. Areflexic in LE including patellar, ankle, mute toes B/L.            LABS:                          10.9   3.2   )-----------( 68       ( 19 Dec 2017 03:23 )             33.6     12-    141  |  100  |  23  ----------------------------<  90  4.4   |  32<H>  |  3.78<H>    Ca    8.7      19 Dec 2017 03:23    TPro  7.1  /  Alb  3.0<L>  /  TBili  0.5  /  DBili  x   /  AST  35  /  ALT  12  /  AlkPhos  70  12-19      IMAGING:

## 2017-12-19 NOTE — CONSULT NOTE ADULT - PROBLEM SELECTOR RECOMMENDATION 2
Hb at goal. No need for MARILYNN. Hb at goal. Will obtain outpatient records and give MARILYNN with HD if patient was on MARILYNN.

## 2017-12-19 NOTE — H&P ADULT - PROBLEM SELECTOR PLAN 1
Seen by Neuro suspected polyneuropathy due to DM.   MRI lumbar spine order as per neuro rec. will follow up attending final rec.   Dec home dose gabapentin to 200mg daily (CrCl:14) on HD  PT eval, pain control.  Plaquenil held as outpt due to possible etiology for LE weakness.

## 2017-12-19 NOTE — H&P ADULT - NSHPLABSRESULTS_GEN_ALL_CORE
10.9   3.2   )-----------( 68       ( 19 Dec 2017 03:23 )             33.6   12-19    141  |  100  |  23  ----------------------------<  90  4.4   |  32<H>  |  3.78<H>    Ca    8.7      19 Dec 2017 03:23    TPro  7.1  /  Alb  3.0<L>  /  TBili  0.5  /  DBili  x   /  AST  35  /  ALT  12  /  AlkPhos  70  12-19

## 2017-12-19 NOTE — ED PROVIDER NOTE - NS ED ROS FT
ROS: no eye pain, no vision changes. no CP. no SOB/cough. no n/v/d/c, no abd pain. no rash. no bleeding. no urinary complaints. no extremity swelling. no HA. no neck/back pain.

## 2017-12-19 NOTE — CONSULT NOTE ADULT - PROBLEM SELECTOR RECOMMENDATION 3
pt is on ca acetate and ca carbonate both.   Recommend to gets phos levels.  discontinue calcium carbonate and continue phos lo with meals. Continue ca acetate and ca carbonate.  monitor phos and ca++ levels.

## 2017-12-19 NOTE — ED PROVIDER NOTE - OBJECTIVE STATEMENT
58yo pmh of lupus, esrd(lupus caused), DM, HTN, HLD presenting with 3 weeks of LE weakness, pain, and inability to walk. On dialysis, getting setup with fistula. She says she has had this pain for 1 month, feels like a shocking pain, improved with tylenol and oxycodone. Has been unable to walk due to weakness for 3 weeks and is now wheelchair bound due to this. Was in the ED previously and diagnosed with polyneuropathy with negative DVT scan. Has been sent to PT by her PMD and has been going for 1 week now. Also endorses numbness to both top and bottom of feet for a month. Of note, she was started on hydroxychlorquine in early Nov and she was told this might be causing her leg pain. She was told to stop taking the medication by her nephrologist yesterday. Denies bowel or bladder incontinence.

## 2017-12-19 NOTE — CONSULT NOTE ADULT - PROBLEM SELECTOR RECOMMENDATION 9
on MWF schedule. Gets HD via Rt IJ permcath, last HD yesterday. Resume HD schedule and plan to dialyze tomorrow. Consent obtained and placed in charts. on MWF schedule. Gets HD via Rt IJ permcath, last HD yesterday. Resume HD schedule and plan to dialyze tomorrow. Consent obtained and placed in charts.  Pt. with recently placed LUE AVF and was supposed to follow up with vasculsr surgeon Dr. Deluca this week, recommend follow up while inpatient.

## 2017-12-19 NOTE — CONSULT NOTE ADULT - SUBJECTIVE AND OBJECTIVE BOX
[facility] DIVISION OF KIDNEY DISEASES AND HYPERTENSION -- INITIAL CONSULT NOTE  --------------------------------------------------------------------------------  HPI:    57 year old woman with PMH of lupus nephritis, ESRD on HD via permacath (M, W, F) s/p AVF (not matured), DM type 2, HTN, HLD, hypothyroid presented with 3 weeks h/o bilateral LE pain and numbness. Patient was seen in ED (OCt) with pain diagnosed with neuropathy (neg DVT) and d/c'ed home on oxycodone. Subsequently developed b/l LE numbness and inability to ambulate for past 3 weeks. Pt is now wheel chair bound. Her symptoms were attributed to Plaquenil and medication was  stopped few days ago by nephrology/rheum  and she was directed to go to hospital for further eval.    Pt was started on HD in July and gets HD in [facility]. Her nephrologist is Dr. Jason. She had an LUE AVF creation by Dr. Deluca one month ago but fistula has not been used awaiting maturation. She denies any acute complaint upon my evaluation.    PAST HISTORY  --------------------------------------------------------------------------------  PAST MEDICAL & SURGICAL HISTORY:  Lupus (systemic lupus erythematosus)  ESRD (end-stage renal disease) due to SLE  Glaucoma  Other hyperlipidemia  Type 2 diabetes mellitus without complication, without long-term current use of insulin  Essential hypertension  Hypercholesteremia  Hypertension  Diabetes  S/P appendectomy  H/O gastric bypass: 2016  S/P  section: times two    FAMILY HISTORY:  History of hypertension (Sibling): sibling  DM (diabetes mellitus) (Sibling): siblings  History of hypertension: father and mother  DM (diabetes mellitus): mother and father    PAST SOCIAL HISTORY:    ALLERGIES & MEDICATIONS  --------------------------------------------------------------------------------  Allergies    penicillin (Rash)    Intolerances      Standing Inpatient Medications  bisacodyl 5 milliGRAM(s) Oral at bedtime  calcium acetate 667 milliGRAM(s) Oral three times a day with meals  calcium carbonate 1250 mG (OsCal) 1 Tablet(s) Oral daily  dextrose 5%. 1000 milliLiter(s) IV Continuous <Continuous>  dextrose 50% Injectable 12.5 Gram(s) IV Push once  dextrose 50% Injectable 25 Gram(s) IV Push once  dextrose 50% Injectable 25 Gram(s) IV Push once  docusate sodium 100 milliGRAM(s) Oral two times a day  furosemide    Tablet 80 milliGRAM(s) Oral daily  gabapentin 200 milliGRAM(s) Oral daily  insulin lispro (HumaLOG) corrective regimen sliding scale   SubCutaneous three times a day before meals  insulin lispro (HumaLOG) corrective regimen sliding scale   SubCutaneous at bedtime  latanoprost 0.005% Ophthalmic Solution 1 Drop(s) Both EYES at bedtime  levothyroxine 25 MICROGram(s) Oral daily  metolazone 2.5 milliGRAM(s) Oral daily  PARoxetine 20 milliGRAM(s) Oral daily  senna 2 Tablet(s) Oral at bedtime  timolol 0.5% Solution 1 Drop(s) Both EYES daily    PRN Inpatient Medications  acetaminophen   Tablet 650 milliGRAM(s) Oral every 6 hours PRN  acetaminophen   Tablet. 650 milliGRAM(s) Oral every 6 hours PRN  dextrose Gel 1 Dose(s) Oral once PRN  glucagon  Injectable 1 milliGRAM(s) IntraMuscular once PRN  oxyCODONE    IR 5 milliGRAM(s) Oral every 6 hours PRN      REVIEW OF SYSTEMS  --------------------------------------------------------------------------------  Gen: No weight changes, fatigue, fevers/chills, weakness  Skin: No rashes  Head/Eyes/Ears/Mouth: No headache; Normal hearing; Normal vision w/o blurriness; No sinus pain/discomfort, sore throat  Respiratory: No dyspnea, cough, wheezing, hemoptysis  CV: No chest pain, PND, orthopnea  GI: No abdominal pain, diarrhea, constipation, nausea, vomiting, melena, hematochezia  : No increased frequency, dysuria, hematuria, nocturia  MSK: No joint pain/swelling; no back pain; no edema  Neuro:, weakness, seizures, numbness, tingling+   Heme No easy bruising or bleeding  Endo: No heat/cold intolerance  Psych: No significant nervousness, anxiety, stress, depression    All other systems were reviewed and are negative, except as noted.    VITALS/PHYSICAL EXAM  --------------------------------------------------------------------------------  T(C): 36.7 (17 @ 12:39), Max: 37.2 (17 @ 01:13)  HR: 84 (17 @ 12:39) (83 - 95)  BP: 153/91 (17 @ 12:39) (104/66 - 161/82)  RR: 18 (17 @ 12:39) (16 - 18)  SpO2: 99% (17 @ 12:39) (99% - 100%)  Wt(kg): --        Physical Exam:  	Gen: NAD  	Pulm: CTA B/L  	CV: RRR, S1S2; no rub  	Abd: +BS, soft, nontender/nondistended  	: No suprapubic tenderness  	UE: Warm; no edema; b/l hand tremors.                LE_ no edema  	Skin: Warm, without rashes  	Vascular access: RIJ tunneled cather. site clean, dry, intact. No warmth, erythema or tenderness.     LABS/STUDIES  --------------------------------------------------------------------------------              10.9   3.2   >-----------<  68       [17 03:23]              33.6     141  |  100  |  23  ----------------------------<  90      [17 03:23]  4.4   |  32  |  3.78        Ca     8.7     [17 03:23]    TPro  7.1  /  Alb  3.0  /  TBili  0.5  /  DBili  x   /  AST  35  /  ALT  12  /  AlkPhos  70  [17 03:23]          Creatinine Trend:  SCr 3.78 [:23]    Urinalysis - [17 04:15]      Color Bridgette / Appearance Slightly Turbid / SG 1.033 / pH 6.0      Gluc 250 / Ketone Trace  / Bili Negative / Urobili Negative       Blood Moderate / Protein >300 / Leuk Est Negative / Nitrite Negative      RBC 3 / WBC 35 / Hyaline 9 / Gran  / Sq Epi  / Non Sq Epi 7 / Bacteria Negative      Iron 77, TIBC 90, %sat 86      [17 09:39]  Ferritin 781.0      [17 09:39]  PTH -- (Ca 7.5)      [17 08:12]   253  Vitamin D (25OH) <4.0      [17 08:52]  HbA1c 5.7      [17 12:15]  Lipid: chol 227, , HDL 47,       [17 07:28]      SULLY: titer 1:1280, pattern Homogeneous      [17 08:37]  dsDNA 39      [17 08:37]  C3 Complement 62      [17 07:28]  C4 Complement 20      [17 07:28]  Rheumatoid Factor <7.0      [05-30-17 @ 07:28]  Syphilis Screen (Treponema Pallidum Ab) Negative      [03-05-15 @ 09:57] [facility] DIVISION OF KIDNEY DISEASES AND HYPERTENSION -- INITIAL CONSULT NOTE  --------------------------------------------------------------------------------  HPI:    57 year old woman with PMH of lupus nephritis, ESRD on HD via permacath (M, W, F) s/p AVF (not matured), DM type 2, HTN, HLD, hypothyroid presented with 3 weeks h/o bilateral LE pain and numbness. Patient was seen in ED (OCt) with pain diagnosed with neuropathy (neg DVT) and d/c'ed home on oxycodone. Subsequently developed b/l LE numbness and inability to ambulate for past 3 weeks. Pt is now wheel chair bound. Her symptoms were attributed to Plaquenil and medication was  stopped few days ago by nephrology/rheum  and she was directed to go to hospital for further eval.    Pt was started on HD in July and gets HD in [facility]. Her nephrologist is Dr. Jason. She had an LUE AVF creation by Dr. Deluca one month ago but fistula has not been used awaiting maturation. She denies any acute complaint upon my evaluation.    PAST HISTORY  --------------------------------------------------------------------------------  PAST MEDICAL & SURGICAL HISTORY:  Lupus (systemic lupus erythematosus)  ESRD (end-stage renal disease) due to SLE  Glaucoma  Other hyperlipidemia  Type 2 diabetes mellitus without complication, without long-term current use of insulin  Essential hypertension  Hypercholesteremia  Hypertension  Diabetes  S/P appendectomy  H/O gastric bypass: 2016  S/P  section: times two    FAMILY HISTORY:  History of hypertension (Sibling): sibling  DM (diabetes mellitus) (Sibling): siblings  History of hypertension: father and mother  DM (diabetes mellitus): mother and father    PAST SOCIAL HISTORY:    ALLERGIES & MEDICATIONS  --------------------------------------------------------------------------------  Allergies    penicillin (Rash)    Intolerances      Standing Inpatient Medications  bisacodyl 5 milliGRAM(s) Oral at bedtime  calcium acetate 667 milliGRAM(s) Oral three times a day with meals  calcium carbonate 1250 mG (OsCal) 1 Tablet(s) Oral daily  dextrose 5%. 1000 milliLiter(s) IV Continuous <Continuous>  dextrose 50% Injectable 12.5 Gram(s) IV Push once  dextrose 50% Injectable 25 Gram(s) IV Push once  dextrose 50% Injectable 25 Gram(s) IV Push once  docusate sodium 100 milliGRAM(s) Oral two times a day  furosemide    Tablet 80 milliGRAM(s) Oral daily  gabapentin 200 milliGRAM(s) Oral daily  insulin lispro (HumaLOG) corrective regimen sliding scale   SubCutaneous three times a day before meals  insulin lispro (HumaLOG) corrective regimen sliding scale   SubCutaneous at bedtime  latanoprost 0.005% Ophthalmic Solution 1 Drop(s) Both EYES at bedtime  levothyroxine 25 MICROGram(s) Oral daily  metolazone 2.5 milliGRAM(s) Oral daily  PARoxetine 20 milliGRAM(s) Oral daily  senna 2 Tablet(s) Oral at bedtime  timolol 0.5% Solution 1 Drop(s) Both EYES daily    PRN Inpatient Medications  acetaminophen   Tablet 650 milliGRAM(s) Oral every 6 hours PRN  acetaminophen   Tablet. 650 milliGRAM(s) Oral every 6 hours PRN  dextrose Gel 1 Dose(s) Oral once PRN  glucagon  Injectable 1 milliGRAM(s) IntraMuscular once PRN  oxyCODONE    IR 5 milliGRAM(s) Oral every 6 hours PRN      REVIEW OF SYSTEMS  --------------------------------------------------------------------------------  Gen: No weight changes, fatigue, fevers/chills, weakness  Skin: No rashes  Head/Eyes/Ears/Mouth: No headache; Normal hearing; Normal vision w/o blurriness; No sinus pain/discomfort, sore throat  Respiratory: No dyspnea, cough, wheezing, hemoptysis  CV: No chest pain, PND, orthopnea  GI: No abdominal pain, diarrhea, constipation, nausea, vomiting, melena, hematochezia  : No increased frequency, dysuria, hematuria, nocturia  MSK: No joint pain/swelling; no back pain; no edema  Neuro:, weakness, seizures, numbness, tingling+   Heme No easy bruising or bleeding  Endo: No heat/cold intolerance  Psych: No significant nervousness, anxiety, stress, depression    All other systems were reviewed and are negative, except as noted.    VITALS/PHYSICAL EXAM  --------------------------------------------------------------------------------  T(C): 36.7 (17 @ 12:39), Max: 37.2 (17 @ 01:13)  HR: 84 (17 @ 12:39) (83 - 95)  BP: 153/91 (17 @ 12:39) (104/66 - 161/82)  RR: 18 (17 @ 12:39) (16 - 18)  SpO2: 99% (17 @ 12:39) (99% - 100%)  Wt(kg): --        Physical Exam:  	Gen: NAD  	Pulm: CTA B/L  	CV: RRR, S1S2; no rub  	Abd: +BS, soft, nontender/nondistended  	: No suprapubic tenderness  	UE: Warm; no edema; b/l hand tremors.                LE_ no edema  	Skin: Warm, without rashes  	Vascular access: RIJ tunneled cather. site clean, dry, intact. No warmth, erythema or tenderness.                                        LUE AVF also present, with palpable thrill.     LABS/STUDIES  --------------------------------------------------------------------------------              10.9   3.2   >-----------<  68       [17 03:23]              33.6     141  |  100  |  23  ----------------------------<  90      [17 03:23]  4.4   |  32  |  3.78        Ca     8.7     [17 03:23]    TPro  7.1  /  Alb  3.0  /  TBili  0.5  /  DBili  x   /  AST  35  /  ALT  12  /  AlkPhos  70  [17 03:23]          Creatinine Trend:  SCr 3.78 [:23]    Urinalysis - [17 04:15]      Color Bridgette / Appearance Slightly Turbid / SG 1.033 / pH 6.0      Gluc 250 / Ketone Trace  / Bili Negative / Urobili Negative       Blood Moderate / Protein >300 / Leuk Est Negative / Nitrite Negative      RBC 3 / WBC 35 / Hyaline 9 / Gran  / Sq Epi  / Non Sq Epi 7 / Bacteria Negative      Iron 77, TIBC 90, %sat 86      [17 09:39]  Ferritin 781.0      [17 09:39]  PTH -- (Ca 7.5)      [17 08:12]   253  Vitamin D (25OH) <4.0      [17 08:52]  HbA1c 5.7      [17 12:15]  Lipid: chol 227, , HDL 47,       [17 07:28]      SULLY: titer 1:1280, pattern Homogeneous      [17 08:37]  dsDNA 39      [17 08:37]  C3 Complement 62      [17 07:28]  C4 Complement 20      [05-30-17 @ 07:28]  Rheumatoid Factor <7.0      [17 @ 07:28]  Syphilis Screen (Treponema Pallidum Ab) Negative      [03-05-15 @ 09:57]

## 2017-12-19 NOTE — ED PROVIDER NOTE - PROGRESS NOTE DETAILS
Leonard: Patient's pain improved. vitals stable. Discussed with hospitalist. Admitting for weakness and inability to ambulate along with her RICKI LE pain.

## 2017-12-19 NOTE — CONSULT NOTE ADULT - PROBLEM SELECTOR RECOMMENDATION 4
Pt's home meds are lasix and metolazone and reports she only urinates twice a day. Monitor UOP. Deo  monitor BP trend and discontinue one of the diuretics. continue home meds. BP stable. continue home meds. Monitor BP.

## 2017-12-19 NOTE — ED PROVIDER NOTE - MEDICAL DECISION MAKING DETAILS
Attending MD Chiang: 57F with lupus, ESRD on HD presenting with several months of acute on chronic b/l LE pain and weakness. Exam with multilevel weakness R>L, ddx includes polyneuropathy, transverse myelitis, less likely guillain barre given chronicity of symptoms. Overall doubt cord compression or cauda equina given chronicity of symptoms. Will admit for neuro and rheum consultation given that patient cannot ambulate at this time. 8yo pmh of lupus, esrd(lupus caused), DM, HTN, HLD presenting with 3 weeks of acute on chronic LE weakness, pain, and inability to walk. Exam with weakness and right side more than left of LE. Differential includes polyneuropathy, transverse myelitis. Unlikely MS, guillain barre, cauda equina, cord compression based on timeline and presentation. Consult neuro, basic labs, admit for inability to walk and acute on chronic weakness and pain control.     Attending MD Chiang: 57F with lupus, ESRD on HD presenting with several months of acute on chronic b/l LE pain and weakness. Exam with multilevel weakness R>L, ddx includes polyneuropathy, transverse myelitis, less likely guillain barre given chronicity of symptoms. Overall doubt cord compression or cauda equina given chronicity of symptoms. Will admit for neuro and rheum consultation given that patient cannot ambulate at this time.

## 2017-12-19 NOTE — ED ADULT NURSE REASSESSMENT NOTE - NS ED NURSE REASSESS COMMENT FT1
Report received from CORNELIA Taylor. Patient resting comfortably in bed, in no apparent distress. VSS. Patient reports 9/10 pain in bilateral lower extremities. Son at bedside.

## 2017-12-20 DIAGNOSIS — N25.0 RENAL OSTEODYSTROPHY: ICD-10-CM

## 2017-12-20 LAB
ANION GAP SERPL CALC-SCNC: 13 MMOL/L — SIGNIFICANT CHANGE UP (ref 5–17)
BASOPHILS # BLD AUTO: 0 K/UL — SIGNIFICANT CHANGE UP (ref 0–0.2)
BASOPHILS NFR BLD AUTO: 0 % — SIGNIFICANT CHANGE UP (ref 0–2)
BUN SERPL-MCNC: 34 MG/DL — HIGH (ref 7–23)
CALCIUM SERPL-MCNC: 8.9 MG/DL — SIGNIFICANT CHANGE UP (ref 8.4–10.5)
CHLORIDE SERPL-SCNC: 100 MMOL/L — SIGNIFICANT CHANGE UP (ref 96–108)
CK SERPL-CCNC: 42 U/L — SIGNIFICANT CHANGE UP (ref 25–170)
CO2 SERPL-SCNC: 27 MMOL/L — SIGNIFICANT CHANGE UP (ref 22–31)
CREAT SERPL-MCNC: 5.35 MG/DL — HIGH (ref 0.5–1.3)
EOSINOPHIL # BLD AUTO: 0.08 K/UL — SIGNIFICANT CHANGE UP (ref 0–0.5)
EOSINOPHIL NFR BLD AUTO: 2 % — SIGNIFICANT CHANGE UP (ref 0–6)
GLUCOSE BLDC GLUCOMTR-MCNC: 113 MG/DL — HIGH (ref 70–99)
GLUCOSE BLDC GLUCOMTR-MCNC: 224 MG/DL — HIGH (ref 70–99)
GLUCOSE BLDC GLUCOMTR-MCNC: 72 MG/DL — SIGNIFICANT CHANGE UP (ref 70–99)
GLUCOSE BLDC GLUCOMTR-MCNC: 81 MG/DL — SIGNIFICANT CHANGE UP (ref 70–99)
GLUCOSE SERPL-MCNC: 85 MG/DL — SIGNIFICANT CHANGE UP (ref 70–99)
HBA1C BLD-MCNC: 4.7 % — SIGNIFICANT CHANGE UP (ref 4–5.6)
HCT VFR BLD CALC: 31.9 % — LOW (ref 34.5–45)
HGB BLD-MCNC: 10.3 G/DL — LOW (ref 11.5–15.5)
LYMPHOCYTES # BLD AUTO: 1.2 K/UL — SIGNIFICANT CHANGE UP (ref 1–3.3)
LYMPHOCYTES # BLD AUTO: 31 % — SIGNIFICANT CHANGE UP (ref 13–44)
MANUAL SMEAR VERIFICATION: SIGNIFICANT CHANGE UP
MCHC RBC-ENTMCNC: 29.3 PG — SIGNIFICANT CHANGE UP (ref 27–34)
MCHC RBC-ENTMCNC: 32.3 GM/DL — SIGNIFICANT CHANGE UP (ref 32–36)
MCV RBC AUTO: 90.9 FL — SIGNIFICANT CHANGE UP (ref 80–100)
MONOCYTES # BLD AUTO: 0.54 K/UL — SIGNIFICANT CHANGE UP (ref 0–0.9)
MONOCYTES NFR BLD AUTO: 14 % — SIGNIFICANT CHANGE UP (ref 2–14)
NEUTROPHILS # BLD AUTO: 2.01 K/UL — SIGNIFICANT CHANGE UP (ref 1.8–7.4)
NEUTROPHILS NFR BLD AUTO: 52 % — SIGNIFICANT CHANGE UP (ref 43–77)
PLAT MORPH BLD: NORMAL — SIGNIFICANT CHANGE UP
PLATELET # BLD AUTO: 67 K/UL — LOW (ref 150–400)
POTASSIUM SERPL-MCNC: 4.4 MMOL/L — SIGNIFICANT CHANGE UP (ref 3.5–5.3)
POTASSIUM SERPL-SCNC: 4.4 MMOL/L — SIGNIFICANT CHANGE UP (ref 3.5–5.3)
RBC # BLD: 3.51 M/UL — LOW (ref 3.8–5.2)
RBC # FLD: 15.2 % — HIGH (ref 10.3–14.5)
RBC BLD AUTO: SIGNIFICANT CHANGE UP
SODIUM SERPL-SCNC: 140 MMOL/L — SIGNIFICANT CHANGE UP (ref 135–145)
VARIANT LYMPHS # BLD: 1 % — SIGNIFICANT CHANGE UP (ref 0–6)
WBC # BLD: 3.86 K/UL — SIGNIFICANT CHANGE UP (ref 3.8–10.5)
WBC # FLD AUTO: 3.86 K/UL — SIGNIFICANT CHANGE UP (ref 3.8–10.5)

## 2017-12-20 PROCEDURE — 72141 MRI NECK SPINE W/O DYE: CPT | Mod: 26

## 2017-12-20 PROCEDURE — 72148 MRI LUMBAR SPINE W/O DYE: CPT | Mod: 26

## 2017-12-20 PROCEDURE — 99233 SBSQ HOSP IP/OBS HIGH 50: CPT

## 2017-12-20 PROCEDURE — 99233 SBSQ HOSP IP/OBS HIGH 50: CPT | Mod: GC

## 2017-12-20 RX ORDER — FUROSEMIDE 40 MG
80 TABLET ORAL
Qty: 0 | Refills: 0 | Status: DISCONTINUED | OUTPATIENT
Start: 2017-12-20 | End: 2017-12-28

## 2017-12-20 RX ORDER — ONDANSETRON 8 MG/1
4 TABLET, FILM COATED ORAL ONCE
Qty: 0 | Refills: 0 | Status: COMPLETED | OUTPATIENT
Start: 2017-12-20 | End: 2017-12-20

## 2017-12-20 RX ORDER — DOXERCALCIFEROL 2.5 UG/1
2 CAPSULE ORAL
Qty: 0 | Refills: 0 | Status: DISCONTINUED | OUTPATIENT
Start: 2017-12-20 | End: 2017-12-28

## 2017-12-20 RX ORDER — GABAPENTIN 400 MG/1
100 CAPSULE ORAL THREE TIMES A DAY
Qty: 0 | Refills: 0 | Status: DISCONTINUED | OUTPATIENT
Start: 2017-12-20 | End: 2017-12-20

## 2017-12-20 RX ORDER — GABAPENTIN 400 MG/1
100 CAPSULE ORAL DAILY
Qty: 0 | Refills: 0 | Status: DISCONTINUED | OUTPATIENT
Start: 2017-12-20 | End: 2017-12-20

## 2017-12-20 RX ADMIN — OXYCODONE HYDROCHLORIDE 5 MILLIGRAM(S): 5 TABLET ORAL at 14:21

## 2017-12-20 RX ADMIN — Medication 1 TABLET(S): at 12:15

## 2017-12-20 RX ADMIN — DOXERCALCIFEROL 2 MICROGRAM(S): 2.5 CAPSULE ORAL at 17:07

## 2017-12-20 RX ADMIN — Medication 20 MILLIGRAM(S): at 12:16

## 2017-12-20 RX ADMIN — OXYCODONE HYDROCHLORIDE 5 MILLIGRAM(S): 5 TABLET ORAL at 14:51

## 2017-12-20 RX ADMIN — Medication 1 DROP(S): at 17:35

## 2017-12-20 RX ADMIN — OXYCODONE HYDROCHLORIDE 5 MILLIGRAM(S): 5 TABLET ORAL at 01:46

## 2017-12-20 RX ADMIN — OXYCODONE HYDROCHLORIDE 5 MILLIGRAM(S): 5 TABLET ORAL at 23:16

## 2017-12-20 RX ADMIN — Medication 25 MICROGRAM(S): at 06:13

## 2017-12-20 RX ADMIN — Medication 100 MILLIGRAM(S): at 06:13

## 2017-12-20 RX ADMIN — Medication 5 MILLIGRAM(S): at 21:36

## 2017-12-20 RX ADMIN — GABAPENTIN 200 MILLIGRAM(S): 400 CAPSULE ORAL at 12:16

## 2017-12-20 RX ADMIN — SENNA PLUS 2 TABLET(S): 8.6 TABLET ORAL at 21:36

## 2017-12-20 RX ADMIN — Medication 667 MILLIGRAM(S): at 12:18

## 2017-12-20 RX ADMIN — Medication 667 MILLIGRAM(S): at 08:25

## 2017-12-20 RX ADMIN — LATANOPROST 1 DROP(S): 0.05 SOLUTION/ DROPS OPHTHALMIC; TOPICAL at 22:07

## 2017-12-20 RX ADMIN — Medication 667 MILLIGRAM(S): at 17:36

## 2017-12-20 RX ADMIN — Medication 100 MILLIGRAM(S): at 17:35

## 2017-12-20 RX ADMIN — ONDANSETRON 4 MILLIGRAM(S): 8 TABLET, FILM COATED ORAL at 06:52

## 2017-12-20 RX ADMIN — OXYCODONE HYDROCHLORIDE 5 MILLIGRAM(S): 5 TABLET ORAL at 02:45

## 2017-12-20 RX ADMIN — Medication 80 MILLIGRAM(S): at 06:13

## 2017-12-20 NOTE — PHYSICAL THERAPY INITIAL EVALUATION ADULT - PERTINENT HX OF CURRENT PROBLEM, REHAB EVAL
56 yo f with h/o lupus diagnosed in April, lupus nephritis, ESRD on HD via permacath (M, W, F) s/p AVF (not matured), DM2, HTN, HLD, hypothyroid presented with 3 weeks h/o bilateral LE pain and numbness.  MRI neg  for cord compression

## 2017-12-20 NOTE — PROGRESS NOTE ADULT - PROBLEM SELECTOR PLAN 2
Hb at goal. Will obtain outpatient records and give MARILYNN with HD if patient was on MARILYNN. Hb at goal. Not on any MARILYNN as outpatient. Monitor CBC.

## 2017-12-20 NOTE — PROGRESS NOTE ADULT - ASSESSMENT
56YO F w/ Lupus, ESRD, DM, HTN p/w with progressive lower extremity weakness and sensory loss. Symptoms have progressed over 1-2 months. Includes pain, weakness, inability to ambulate, and numbness in LE in distal LE B/L. Neurological exam reveals LE weakness with areflexia    -MRi L/S spine pending  -can not r/o hydroxychloroquine contributing given that symptoms began after initiation  -will consider EMG  -CK, amonia level,  -Gabapentin 100 TID

## 2017-12-20 NOTE — PROGRESS NOTE ADULT - PROBLEM SELECTOR PLAN 2
HD MWF. Will arrange with house renal for HD  Cont with lasix 80mg daily. HD MWF. Will arrange with house renal for HD  Cont with lasix 80mg daily.  Renal following.

## 2017-12-20 NOTE — PROGRESS NOTE ADULT - PROBLEM SELECTOR PLAN 1
n MWF schedule. Gets HD via Rt IJ permcath, last HD yesterday. Resume HD schedule and plan to dialyze today. Consent obtained and placed in charts.  Pt. with recently placed LUE AVF and was supposed to follow up with vascular surgeon Dr. Deluca this week, recommend follow up while inpatient. On MWF schedule. Gets HD via Rt IJ permcath, last HD was on Monday as outpatient.   Resume maintenance HD schedule and plan to dialyze today.   Pt. with recently placed LUE AVF and was supposed to follow up with vascular surgeon Dr. Deluca this week, recommend follow up while inpatient.

## 2017-12-20 NOTE — PROGRESS NOTE ADULT - SUBJECTIVE AND OBJECTIVE BOX
Patient is a 57y old  Female who presents with a chief complaint of LE pain and inability to walk (19 Dec 2017 10:27)      SUBJECTIVE / OVERNIGHT EVENTS: Patient complaining of lower extremity weakness, inability to walk. No chest pain/ shortness of breath.  ROS otherwise negative.     T(C): 37.2 (12-20-17 @ 13:28), Max: 37.2 (12-20-17 @ 13:28)  HR: 87 (12-20-17 @ 13:28) (86 - 87)  BP: 165/76 (12-20-17 @ 13:28) (144/79 - 165/76)  RR: 18 (12-20-17 @ 13:28) (18 - 18)  SpO2: 100% (12-20-17 @ 13:28) (98% - 100%)    MEDICATIONS  (STANDING):  bisacodyl 5 milliGRAM(s) Oral at bedtime  calcium acetate 667 milliGRAM(s) Oral three times a day with meals  calcium carbonate 1250 mG (OsCal) 1 Tablet(s) Oral daily  dextrose 5%. 1000 milliLiter(s) (50 mL/Hr) IV Continuous <Continuous>  dextrose 50% Injectable 12.5 Gram(s) IV Push once  dextrose 50% Injectable 25 Gram(s) IV Push once  dextrose 50% Injectable 25 Gram(s) IV Push once  docusate sodium 100 milliGRAM(s) Oral two times a day  doxercalciferol Injectable 2 MICROGram(s) IV Push <User Schedule>  furosemide    Tablet 80 milliGRAM(s) Oral <User Schedule>  gabapentin 200 milliGRAM(s) Oral daily  insulin lispro (HumaLOG) corrective regimen sliding scale   SubCutaneous three times a day before meals  insulin lispro (HumaLOG) corrective regimen sliding scale   SubCutaneous at bedtime  latanoprost 0.005% Ophthalmic Solution 1 Drop(s) Both EYES at bedtime  levothyroxine 25 MICROGram(s) Oral daily  metolazone 2.5 milliGRAM(s) Oral daily  PARoxetine 20 milliGRAM(s) Oral daily  senna 2 Tablet(s) Oral at bedtime  timolol 0.5% Solution 1 Drop(s) Both EYES daily    MEDICATIONS  (PRN):  acetaminophen   Tablet 650 milliGRAM(s) Oral every 6 hours PRN For Temp greater than 38 C (100.4 F)  acetaminophen   Tablet. 650 milliGRAM(s) Oral every 6 hours PRN Mild Pain (1 - 3)  dextrose Gel 1 Dose(s) Oral once PRN Blood Glucose LESS THAN 70 milliGRAM(s)/deciliter  glucagon  Injectable 1 milliGRAM(s) IntraMuscular once PRN Glucose LESS THAN 70 milligrams/deciliter  oxyCODONE    IR 5 milliGRAM(s) Oral every 6 hours PRN Moderate Pain (4 - 6)        PHYSICAL EXAM:  GENERAL: NAD, well-developed  HEAD:  Atraumatic, Normocephalic  EYES: EOMI, conjunctiva and sclera clear  NECK: Supple, No JVD  CHEST/LUNG: Clear to auscultation bilaterally; No wheeze  HEART: Regular rate and rhythm; No murmurs, rubs, or gallops  ABDOMEN: Soft, Nontender, Nondistended; Bowel sounds present  EXTREMITIES:  2+ Peripheral Pulses, No clubbing, cyanosis, or edema  PSYCH: AAOx3  NEUROLOGY: non-focal  SKIN: No rashes or lesions    LABS:                        10.3   3.86  )-----------( 67       ( 20 Dec 2017 07:23 )             31.9     12-20    140  |  100  |  34<H>  ----------------------------<  85  4.4   |  27  |  5.35<H>    Ca    8.9      20 Dec 2017 07:34    TPro  7.1  /  Alb  3.0<L>  /  TBili  0.5  /  DBili  x   /  AST  35  /  ALT  12  /  AlkPhos  70  12-19              RADIOLOGY & ADDITIONAL TESTS:    Imaging Personally Reviewed:    Consultant(s) Notes Reviewed:      Care Discussed with Consultants/Other Providers: Patient is a 57y old  Female who presents with a chief complaint of LE pain and inability to walk (19 Dec 2017 10:27)      SUBJECTIVE / OVERNIGHT EVENTS: Patient complaining of lower extremity weakness, inability to walk. No chest pain/ shortness of breath.  ROS otherwise negative.     T(C): 37.2 (12-20-17 @ 13:28), Max: 37.2 (12-20-17 @ 13:28)  HR: 87 (12-20-17 @ 13:28) (86 - 87)  BP: 165/76 (12-20-17 @ 13:28) (144/79 - 165/76)  RR: 18 (12-20-17 @ 13:28) (18 - 18)  SpO2: 100% (12-20-17 @ 13:28) (98% - 100%)    MEDICATIONS  (STANDING):  bisacodyl 5 milliGRAM(s) Oral at bedtime  calcium acetate 667 milliGRAM(s) Oral three times a day with meals  calcium carbonate 1250 mG (OsCal) 1 Tablet(s) Oral daily  dextrose 5%. 1000 milliLiter(s) (50 mL/Hr) IV Continuous <Continuous>  dextrose 50% Injectable 12.5 Gram(s) IV Push once  dextrose 50% Injectable 25 Gram(s) IV Push once  dextrose 50% Injectable 25 Gram(s) IV Push once  docusate sodium 100 milliGRAM(s) Oral two times a day  doxercalciferol Injectable 2 MICROGram(s) IV Push <User Schedule>  furosemide    Tablet 80 milliGRAM(s) Oral <User Schedule>  gabapentin 200 milliGRAM(s) Oral daily  insulin lispro (HumaLOG) corrective regimen sliding scale   SubCutaneous three times a day before meals  insulin lispro (HumaLOG) corrective regimen sliding scale   SubCutaneous at bedtime  latanoprost 0.005% Ophthalmic Solution 1 Drop(s) Both EYES at bedtime  levothyroxine 25 MICROGram(s) Oral daily  metolazone 2.5 milliGRAM(s) Oral daily  PARoxetine 20 milliGRAM(s) Oral daily  senna 2 Tablet(s) Oral at bedtime  timolol 0.5% Solution 1 Drop(s) Both EYES daily    MEDICATIONS  (PRN):  acetaminophen   Tablet 650 milliGRAM(s) Oral every 6 hours PRN For Temp greater than 38 C (100.4 F)  acetaminophen   Tablet. 650 milliGRAM(s) Oral every 6 hours PRN Mild Pain (1 - 3)  dextrose Gel 1 Dose(s) Oral once PRN Blood Glucose LESS THAN 70 milliGRAM(s)/deciliter  glucagon  Injectable 1 milliGRAM(s) IntraMuscular once PRN Glucose LESS THAN 70 milligrams/deciliter  oxyCODONE    IR 5 milliGRAM(s) Oral every 6 hours PRN Moderate Pain (4 - 6)        PHYSICAL EXAM:  GENERAL: NAD, well-developed  HEAD:  Atraumatic, Normocephalic  EYES: EOMI, conjunctiva and sclera clear  NECK: Supple, No JVD  CHEST/LUNG: Clear to auscultation bilaterally; No wheeze  HEART: Regular rate and rhythm; No murmurs, rubs, or gallops  ABDOMEN: Soft, Nontender, Nondistended; Bowel sounds present  EXTREMITIES:  2+ Peripheral Pulses, No clubbing, cyanosis, or edema  PSYCH: AAOx3  NEUROLOGY: non-focal  SKIN: No rashes or lesions    LABS:                        10.3   3.86  )-----------( 67       ( 20 Dec 2017 07:23 )             31.9     12-20    140  |  100  |  34<H>  ----------------------------<  85  4.4   |  27  |  5.35<H>    Ca    8.9      20 Dec 2017 07:34    TPro  7.1  /  Alb  3.0<L>  /  TBili  0.5  /  DBili  x   /  AST  35  /  ALT  12  /  AlkPhos  70  12-19              RADIOLOGY & ADDITIONAL TESTS:    Imaging Personally Reviewed: MRI LS spine no cord compression    MRI C spine no cord compression     Consultant(s) Notes Reviewed:      Care Discussed with Consultants/Other Providers:

## 2017-12-20 NOTE — PROGRESS NOTE ADULT - PROBLEM SELECTOR PLAN 4
BP stable. continue home meds. Monitor BP. Continue ca acetate and ca carbonate.  monitor phos and ca++ levels.

## 2017-12-20 NOTE — PROGRESS NOTE ADULT - SUBJECTIVE AND OBJECTIVE BOX
HPI:  56yo  F w/ PMH of lupus, esrd ( 2/2 lupus nephritis), DM, HTN, HLD presenting with at least 1 month LE weakness, pain, and inability to walk. Pain is shock like, improved with tylenol and oxycodone. Has been unable to walk due to weakness for 1-2 weeks and is now wheelchair bound due to this. Was in the ED previously and DVT was r/o and pt was d/c. Sent to PT by her PMD and has been going for 1 week now. Also endorses numbness to both top and bottom of feet for a month. Of note, she was started on hydroxychlorquine in early Nov.  Denies any bowel/bladder incontinence, no saddle anesthesia. Makes urine 2x/d, but very little 2/2 renal disease.    S: Patient seen and examined, states she is about the same    MEDICATIONS  (STANDING):  bisacodyl 5 milliGRAM(s) Oral at bedtime  calcium acetate 667 milliGRAM(s) Oral three times a day with meals  calcium carbonate 1250 mG (OsCal) 1 Tablet(s) Oral daily  dextrose 5%. 1000 milliLiter(s) (50 mL/Hr) IV Continuous <Continuous>  dextrose 50% Injectable 12.5 Gram(s) IV Push once  dextrose 50% Injectable 25 Gram(s) IV Push once  dextrose 50% Injectable 25 Gram(s) IV Push once  docusate sodium 100 milliGRAM(s) Oral two times a day  furosemide    Tablet 80 milliGRAM(s) Oral daily  gabapentin 200 milliGRAM(s) Oral daily  insulin lispro (HumaLOG) corrective regimen sliding scale   SubCutaneous three times a day before meals  insulin lispro (HumaLOG) corrective regimen sliding scale   SubCutaneous at bedtime  latanoprost 0.005% Ophthalmic Solution 1 Drop(s) Both EYES at bedtime  levothyroxine 25 MICROGram(s) Oral daily  metolazone 2.5 milliGRAM(s) Oral daily  PARoxetine 20 milliGRAM(s) Oral daily  senna 2 Tablet(s) Oral at bedtime  timolol 0.5% Solution 1 Drop(s) Both EYES daily    MEDICATIONS  (PRN):  acetaminophen   Tablet 650 milliGRAM(s) Oral every 6 hours PRN For Temp greater than 38 C (100.4 F)  acetaminophen   Tablet. 650 milliGRAM(s) Oral every 6 hours PRN Mild Pain (1 - 3)  dextrose Gel 1 Dose(s) Oral once PRN Blood Glucose LESS THAN 70 milliGRAM(s)/deciliter  glucagon  Injectable 1 milliGRAM(s) IntraMuscular once PRN Glucose LESS THAN 70 milligrams/deciliter  oxyCODONE    IR 5 milliGRAM(s) Oral every 6 hours PRN Moderate Pain (4 - 6)                          10.3   3.86  )-----------( 67       ( 20 Dec 2017 07:23 )             31.9     12-20    140  |  100  |  34<H>  ----------------------------<  85  4.4   |  27  |  5.35<H>    Ca    8.9      20 Dec 2017 07:34    TPro  7.1  /  Alb  3.0<L>  /  TBili  0.5  /  DBili  x   /  AST  35  /  ALT  12  /  AlkPhos  70  12-19    CAPILLARY BLOOD GLUCOSE      POCT Blood Glucose.: 72 mg/dL (20 Dec 2017 08:10)  POCT Blood Glucose.: 127 mg/dL (19 Dec 2017 21:18)  POCT Blood Glucose.: 84 mg/dL (19 Dec 2017 17:51)  POCT Blood Glucose.: 85 mg/dL (19 Dec 2017 12:37)        I&O's Summary    19 Dec 2017 07:01  -  20 Dec 2017 07:00  --------------------------------------------------------  IN: 480 mL / OUT: 0 mL / NET: 480 mL      Vital Signs Last 24 Hrs  T(C): 36.6 (20 Dec 2017 06:07), Max: 37.2 (19 Dec 2017 10:34)  T(F): 97.9 (20 Dec 2017 06:07), Max: 99 (19 Dec 2017 10:34)  HR: 86 (20 Dec 2017 06:07) (84 - 91)  BP: 144/79 (20 Dec 2017 06:07) (117/71 - 167/92)  BP(mean): 106 (19 Dec 2017 10:27) (106 - 106)  RR: 18 (20 Dec 2017 06:07) (18 - 18)  SpO2: 98% (20 Dec 2017 06:07) (98% - 99%)    PHYSICAL EXAM:   General appearance: No acute distress                 Mental Status: AAOx3, fluent speech, follows simple commands, able to name/repeats  Cranial Nerves: EOMI, PERRL, VFF, V1-V3 intact, face symmetric,  tongue midline  Motor: Normal tone. Tremor B/L hands. Strength 5/5 UE throughout B/L. tremor upon holding LEs  RLE: HF 3/5, KF 1/5, KE 4+/5, PF 0/5, DF 1/5   LLE: HF 3/5, KF 1/5, KE 4+/5, PF 1/5, DF 2/5  Sensation: Decreased sensation to PP +/-vibratory B/L LE distal to mid shin. Feels increased sensation to Temp in LE B/L.  Coordination: FTN intact b/l, no dysmetria.  Reflexes: 2+ bilateral biceps, brachioradialis, triceps. Areflexic in LE including patellar, ankle, mute toes B/L.

## 2017-12-20 NOTE — PROGRESS NOTE ADULT - SUBJECTIVE AND OBJECTIVE BOX
[facility] DIVISION OF KIDNEY DISEASES AND HYPERTENSION -- HEMODIALYSIS NOTE  --------------------------------------------------------------------------------  Chief Complaint: ESRD/Ongoing hemodialysis requirement    24 hour events/subjective:   no overnight events.  scheduled for HD today.        PAST HISTORY  --------------------------------------------------------------------------------  No significant changes to PMH, PSH, FHx, SHx, unless otherwise noted    ALLERGIES & MEDICATIONS  --------------------------------------------------------------------------------  Allergies    penicillin (Rash)    Intolerances      Standing Inpatient Medications  bisacodyl 5 milliGRAM(s) Oral at bedtime  calcium acetate 667 milliGRAM(s) Oral three times a day with meals  calcium carbonate 1250 mG (OsCal) 1 Tablet(s) Oral daily  dextrose 5%. 1000 milliLiter(s) IV Continuous <Continuous>  dextrose 50% Injectable 12.5 Gram(s) IV Push once  dextrose 50% Injectable 25 Gram(s) IV Push once  dextrose 50% Injectable 25 Gram(s) IV Push once  docusate sodium 100 milliGRAM(s) Oral two times a day  furosemide    Tablet 80 milliGRAM(s) Oral daily  gabapentin 200 milliGRAM(s) Oral daily  insulin lispro (HumaLOG) corrective regimen sliding scale   SubCutaneous three times a day before meals  insulin lispro (HumaLOG) corrective regimen sliding scale   SubCutaneous at bedtime  latanoprost 0.005% Ophthalmic Solution 1 Drop(s) Both EYES at bedtime  levothyroxine 25 MICROGram(s) Oral daily  metolazone 2.5 milliGRAM(s) Oral daily  PARoxetine 20 milliGRAM(s) Oral daily  senna 2 Tablet(s) Oral at bedtime  timolol 0.5% Solution 1 Drop(s) Both EYES daily    PRN Inpatient Medications  acetaminophen   Tablet 650 milliGRAM(s) Oral every 6 hours PRN  acetaminophen   Tablet. 650 milliGRAM(s) Oral every 6 hours PRN  dextrose Gel 1 Dose(s) Oral once PRN  glucagon  Injectable 1 milliGRAM(s) IntraMuscular once PRN  oxyCODONE    IR 5 milliGRAM(s) Oral every 6 hours PRN      REVIEW OF SYSTEMS  --------------------------------------------------------------------------------  Gen: , weakness  Skin: No rashes  Head/Eyes/Ears/Mouth: No headache; Normal hearing; Normal vision w/o blurriness; No sinus pain/discomfort, sore throat  Respiratory: No dyspnea, cough, wheezing, hemoptysis  CV: No chest pain, PND, orthopnea  GI: No abdominal pain, diarrhea, constipation, nausea, vomiting, melena, hematochezia  : No increased frequency, dysuria, hematuria, nocturia  MSK: No joint pain/swelling; no back pain; no edema  Neuro; numbnes in b/l LE+  Heme: No easy bruising or bleeding  Endo: No heat/cold intolerance  Psych: No significant nervousness, anxiety, stress, depression    All other systems were reviewed and are negative, except as noted.    VITALS/PHYSICAL EXAM  --------------------------------------------------------------------------------  T(C): 36.6 (12-20-17 @ 06:07), Max: 37.2 (12-19-17 @ 10:34)  HR: 86 (12-20-17 @ 06:07) (84 - 91)  BP: 144/79 (12-20-17 @ 06:07) (117/71 - 167/92)  RR: 18 (12-20-17 @ 06:07) (18 - 18)  SpO2: 98% (12-20-17 @ 06:07) (98% - 99%)  Wt(kg): --  Height (cm): 154.94 (12-19-17 @ 22:24)  Weight (kg): 53.3 (12-19-17 @ 22:24)  BMI (kg/m2): 22.2 (12-19-17 @ 22:24)  BSA (m2): 1.51 (12-19-17 @ 22:24)      12-19-17 @ 07:01  -  12-20-17 @ 07:00  --------------------------------------------------------  IN: 480 mL / OUT: 0 mL / NET: 480 mL      Physical Exam:  	Gen: NAD, well-appearing  	Pulm: CTA B/L  	CV: RRR, S1S2; no rub  	Back: No spinal or CVA tenderness; no sacral edema  	Abd: +BS, soft, nontender/nondistended  	: No suprapubic tenderness  	UE: Warm,  no edema; b/l hand tremors. LUE AVF thrill+  	LE: Warm,  no edema  	Neuro: No focal deficits, intact gait  	Psych: Normal affect and mood  	Skin: Warm, without rashes  	Vascular access: RIJ tunneled catheter, site clean/dry/intact.    LABS/STUDIES  --------------------------------------------------------------------------------              10.3   3.86  >-----------<  67       [12-20-17 @ 07:23]              31.9     140  |  100  |  34  ----------------------------<  85      [12-20-17 @ 07:34]  4.4   |  27  |  5.35        Ca     8.9     [12-20-17 @ 07:34]    TPro  7.1  /  Alb  3.0  /  TBili  0.5  /  DBili  x   /  AST  35  /  ALT  12  /  AlkPhos  70  [12-19-17 @ 03:23]          Iron 77, TIBC 90, %sat 86      [05-29-17 @ 09:39]  Ferritin 781.0      [05-29-17 @ 09:39]  PTH -- (Ca 7.5)      [06-01-17 @ 08:12]   253  Vitamin D (25OH) <4.0      [05-31-17 @ 08:52]  HbA1c 5.7      [05-29-17 @ 12:15]  Lipid: chol 227, , HDL 47,       [05-30-17 @ 07:28] [facility] DIVISION OF KIDNEY DISEASES AND HYPERTENSION -- HEMODIALYSIS NOTE  --------------------------------------------------------------------------------  Chief Complaint: ESRD/Ongoing hemodialysis requirement    24 hour events/subjective:   no overnight events.  scheduled for HD today.        PAST HISTORY  --------------------------------------------------------------------------------  No significant changes to PMH, PSH, FHx, SHx, unless otherwise noted    ALLERGIES & MEDICATIONS  --------------------------------------------------------------------------------  Allergies    penicillin (Rash)    Intolerances      Standing Inpatient Medications  bisacodyl 5 milliGRAM(s) Oral at bedtime  calcium acetate 667 milliGRAM(s) Oral three times a day with meals  calcium carbonate 1250 mG (OsCal) 1 Tablet(s) Oral daily  dextrose 5%. 1000 milliLiter(s) IV Continuous <Continuous>  dextrose 50% Injectable 12.5 Gram(s) IV Push once  dextrose 50% Injectable 25 Gram(s) IV Push once  dextrose 50% Injectable 25 Gram(s) IV Push once  docusate sodium 100 milliGRAM(s) Oral two times a day  furosemide    Tablet 80 milliGRAM(s) Oral daily  gabapentin 200 milliGRAM(s) Oral daily  insulin lispro (HumaLOG) corrective regimen sliding scale   SubCutaneous three times a day before meals  insulin lispro (HumaLOG) corrective regimen sliding scale   SubCutaneous at bedtime  latanoprost 0.005% Ophthalmic Solution 1 Drop(s) Both EYES at bedtime  levothyroxine 25 MICROGram(s) Oral daily  metolazone 2.5 milliGRAM(s) Oral daily  PARoxetine 20 milliGRAM(s) Oral daily  senna 2 Tablet(s) Oral at bedtime  timolol 0.5% Solution 1 Drop(s) Both EYES daily    PRN Inpatient Medications  acetaminophen   Tablet 650 milliGRAM(s) Oral every 6 hours PRN  acetaminophen   Tablet. 650 milliGRAM(s) Oral every 6 hours PRN  dextrose Gel 1 Dose(s) Oral once PRN  glucagon  Injectable 1 milliGRAM(s) IntraMuscular once PRN  oxyCODONE    IR 5 milliGRAM(s) Oral every 6 hours PRN      REVIEW OF SYSTEMS  --------------------------------------------------------------------------------  Gen: , weakness  Skin: No rashes  Head/Eyes/Ears/Mouth: No headache; Normal hearing; Normal vision w/o blurriness; No sinus pain/discomfort, sore throat  Respiratory: No dyspnea, cough, wheezing, hemoptysis  CV: No chest pain, PND, orthopnea  GI: No abdominal pain, diarrhea, constipation, nausea, vomiting, melena, hematochezia  : No increased frequency, dysuria, hematuria, nocturia  MSK: No joint pain/swelling; no back pain; no edema  Neuro; numbnes in b/l LE+  Heme: No easy bruising or bleeding  Endo: No heat/cold intolerance  Psych: No significant nervousness, anxiety, stress, depression    All other systems were reviewed and are negative, except as noted.    VITALS/PHYSICAL EXAM  --------------------------------------------------------------------------------  T(C): 36.6 (12-20-17 @ 06:07), Max: 37.2 (12-19-17 @ 10:34)  HR: 86 (12-20-17 @ 06:07) (84 - 91)  BP: 144/79 (12-20-17 @ 06:07) (117/71 - 167/92)  RR: 18 (12-20-17 @ 06:07) (18 - 18)  SpO2: 98% (12-20-17 @ 06:07) (98% - 99%)  Wt(kg): --  Height (cm): 154.94 (12-19-17 @ 22:24)  Weight (kg): 53.3 (12-19-17 @ 22:24)  BMI (kg/m2): 22.2 (12-19-17 @ 22:24)  BSA (m2): 1.51 (12-19-17 @ 22:24)      12-19-17 @ 07:01  -  12-20-17 @ 07:00  --------------------------------------------------------  IN: 480 mL / OUT: 0 mL / NET: 480 mL      Physical Exam:  	Gen: NAD, well-appearing  	Pulm: CTA B/L  	CV: RRR, S1S2; no rub  	Back: No spinal or CVA tenderness; no sacral edema  	Abd: +BS, soft, nontender/nondistended  	: No suprapubic tenderness  	UE: Warm,  no edema; b/l hand tremors. LUE AVF thrill+  	LE: Warm,  no edema  	Neuro: No focal deficits, intact gait  	Psych: Normal affect and mood  	Skin: Warm, without rashes  	Vascular access: RIJ tunneled dialysis catheter, site clean/dry/intact    LABS/STUDIES  --------------------------------------------------------------------------------              10.3   3.86  >-----------<  67       [12-20-17 @ 07:23]              31.9     140  |  100  |  34  ----------------------------<  85      [12-20-17 @ 07:34]  4.4   |  27  |  5.35        Ca     8.9     [12-20-17 @ 07:34]    TPro  7.1  /  Alb  3.0  /  TBili  0.5  /  DBili  x   /  AST  35  /  ALT  12  /  AlkPhos  70  [12-19-17 @ 03:23]          Iron 77, TIBC 90, %sat 86      [05-29-17 @ 09:39]  Ferritin 781.0      [05-29-17 @ 09:39]  PTH -- (Ca 7.5)      [06-01-17 @ 08:12]   253  Vitamin D (25OH) <4.0      [05-31-17 @ 08:52]  HbA1c 5.7      [05-29-17 @ 12:15]  Lipid: chol 227, , HDL 47,       [05-30-17 @ 07:28]

## 2017-12-20 NOTE — PROGRESS NOTE ADULT - PROBLEM SELECTOR PLAN 1
Seen by Neuro suspected polyneuropathy due to DM.   MRI lumbar spine order as per neuro rec. will follow up attending final rec.   Dec home dose gabapentin to 200mg daily (CrCl:14) on HD  PT eval, pain control.  Plaquenil held as outpt due to possible etiology for LE weakness. Seen by Neuro suspected polyneuropathy due to DM.   MRI lumbar, C spine show no cord compression.    Continue with Gabapentin.   PT eval, pain control.  Plaquenil held as outpt due to possible etiology for LE weakness.  Will try to contact Rheum- patients Rheumatologist.

## 2017-12-20 NOTE — PHYSICAL THERAPY INITIAL EVALUATION ADULT - ADDITIONAL COMMENTS
Pt resides in an apartment with children.  Elevator access.  Pt owns RW and W/C.  Pt's son states she was able to ambulated short distances with RW PTA.

## 2017-12-20 NOTE — PROVIDER CONTACT NOTE (OTHER) - ASSESSMENT
VSS, no c/o pain, pt feeling nauseous after swallowing medications, pt vomited, no pills noted in vomit

## 2017-12-20 NOTE — PROGRESS NOTE ADULT - PROBLEM SELECTOR PLAN 3
Continue ca acetate and ca carbonate.  monitor phos and ca++ levels. BP stable. continue home meds. Monitor BP.  Switch lasix to non-dialysis days only.

## 2017-12-21 LAB
AMMONIA BLD-MCNC: 81 UMOL/L — HIGH (ref 11–55)
ANION GAP SERPL CALC-SCNC: 13 MMOL/L — SIGNIFICANT CHANGE UP (ref 5–17)
BUN SERPL-MCNC: 21 MG/DL — SIGNIFICANT CHANGE UP (ref 7–23)
CALCIUM SERPL-MCNC: 8.8 MG/DL — SIGNIFICANT CHANGE UP (ref 8.4–10.5)
CHLORIDE SERPL-SCNC: 102 MMOL/L — SIGNIFICANT CHANGE UP (ref 96–108)
CK SERPL-CCNC: 47 U/L — SIGNIFICANT CHANGE UP (ref 25–170)
CO2 SERPL-SCNC: 27 MMOL/L — SIGNIFICANT CHANGE UP (ref 22–31)
CREAT SERPL-MCNC: 3.56 MG/DL — HIGH (ref 0.5–1.3)
GLUCOSE BLDC GLUCOMTR-MCNC: 151 MG/DL — HIGH (ref 70–99)
GLUCOSE BLDC GLUCOMTR-MCNC: 74 MG/DL — SIGNIFICANT CHANGE UP (ref 70–99)
GLUCOSE BLDC GLUCOMTR-MCNC: 74 MG/DL — SIGNIFICANT CHANGE UP (ref 70–99)
GLUCOSE BLDC GLUCOMTR-MCNC: 99 MG/DL — SIGNIFICANT CHANGE UP (ref 70–99)
GLUCOSE SERPL-MCNC: 107 MG/DL — HIGH (ref 70–99)
HCT VFR BLD CALC: 31.2 % — LOW (ref 34.5–45)
HGB BLD-MCNC: 10.1 G/DL — LOW (ref 11.5–15.5)
MCHC RBC-ENTMCNC: 29.4 PG — SIGNIFICANT CHANGE UP (ref 27–34)
MCHC RBC-ENTMCNC: 32.4 GM/DL — SIGNIFICANT CHANGE UP (ref 32–36)
MCV RBC AUTO: 90.7 FL — SIGNIFICANT CHANGE UP (ref 80–100)
PLATELET # BLD AUTO: 70 K/UL — LOW (ref 150–400)
POTASSIUM SERPL-MCNC: 3.7 MMOL/L — SIGNIFICANT CHANGE UP (ref 3.5–5.3)
POTASSIUM SERPL-SCNC: 3.7 MMOL/L — SIGNIFICANT CHANGE UP (ref 3.5–5.3)
RBC # BLD: 3.44 M/UL — LOW (ref 3.8–5.2)
RBC # FLD: 15.2 % — HIGH (ref 10.3–14.5)
SODIUM SERPL-SCNC: 142 MMOL/L — SIGNIFICANT CHANGE UP (ref 135–145)
WBC # BLD: 3.6 K/UL — LOW (ref 3.8–10.5)
WBC # FLD AUTO: 3.6 K/UL — LOW (ref 3.8–10.5)

## 2017-12-21 PROCEDURE — 99233 SBSQ HOSP IP/OBS HIGH 50: CPT

## 2017-12-21 RX ADMIN — Medication: at 11:59

## 2017-12-21 RX ADMIN — Medication 667 MILLIGRAM(S): at 17:33

## 2017-12-21 RX ADMIN — LATANOPROST 1 DROP(S): 0.05 SOLUTION/ DROPS OPHTHALMIC; TOPICAL at 21:46

## 2017-12-21 RX ADMIN — Medication 20 MILLIGRAM(S): at 12:45

## 2017-12-21 RX ADMIN — Medication 1 TABLET(S): at 12:44

## 2017-12-21 RX ADMIN — Medication 667 MILLIGRAM(S): at 08:27

## 2017-12-21 RX ADMIN — OXYCODONE HYDROCHLORIDE 5 MILLIGRAM(S): 5 TABLET ORAL at 23:11

## 2017-12-21 RX ADMIN — Medication 1 DROP(S): at 12:46

## 2017-12-21 RX ADMIN — Medication 75 MILLIGRAM(S): at 17:33

## 2017-12-21 RX ADMIN — Medication 80 MILLIGRAM(S): at 05:39

## 2017-12-21 RX ADMIN — Medication 100 MILLIGRAM(S): at 17:33

## 2017-12-21 RX ADMIN — Medication 100 MILLIGRAM(S): at 05:39

## 2017-12-21 RX ADMIN — Medication 667 MILLIGRAM(S): at 12:44

## 2017-12-21 RX ADMIN — OXYCODONE HYDROCHLORIDE 5 MILLIGRAM(S): 5 TABLET ORAL at 00:15

## 2017-12-21 RX ADMIN — OXYCODONE HYDROCHLORIDE 5 MILLIGRAM(S): 5 TABLET ORAL at 23:41

## 2017-12-21 RX ADMIN — Medication 25 MICROGRAM(S): at 05:39

## 2017-12-21 NOTE — PROGRESS NOTE ADULT - SUBJECTIVE AND OBJECTIVE BOX
Patient is a 57y old  Female who presents with a chief complaint of LE pain and inability to walk (19 Dec 2017 10:27)      SUBJECTIVE / OVERNIGHT EVENTS: Patient complaining of lower extremity weakness, inability to walk. No chest pain/ shortness of breath.  ROS otherwise negative. No events over night.     T(C): 36.9 (12-21-17 @ 05:26), Max: 36.9 (12-21-17 @ 05:26)  HR: 89 (12-21-17 @ 05:26) (89 - 89)  BP: 110/67 (12-21-17 @ 05:26) (110/67 - 110/67)  RR: 18 (12-21-17 @ 05:26) (18 - 18)  SpO2: 99% (12-21-17 @ 05:26) (99% - 99%)    MEDICATIONS  (STANDING):  bisacodyl 5 milliGRAM(s) Oral at bedtime  calcium acetate 667 milliGRAM(s) Oral three times a day with meals  calcium carbonate 1250 mG (OsCal) 1 Tablet(s) Oral daily  dextrose 5%. 1000 milliLiter(s) (50 mL/Hr) IV Continuous <Continuous>  dextrose 50% Injectable 12.5 Gram(s) IV Push once  dextrose 50% Injectable 25 Gram(s) IV Push once  dextrose 50% Injectable 25 Gram(s) IV Push once  docusate sodium 100 milliGRAM(s) Oral two times a day  doxercalciferol Injectable 2 MICROGram(s) IV Push <User Schedule>  furosemide    Tablet 80 milliGRAM(s) Oral <User Schedule>  insulin lispro (HumaLOG) corrective regimen sliding scale   SubCutaneous three times a day before meals  insulin lispro (HumaLOG) corrective regimen sliding scale   SubCutaneous at bedtime  latanoprost 0.005% Ophthalmic Solution 1 Drop(s) Both EYES at bedtime  levothyroxine 25 MICROGram(s) Oral daily  metolazone 2.5 milliGRAM(s) Oral daily  PARoxetine 20 milliGRAM(s) Oral daily  pregabalin 75 milliGRAM(s) Oral daily  senna 2 Tablet(s) Oral at bedtime  timolol 0.5% Solution 1 Drop(s) Both EYES daily    MEDICATIONS  (PRN):  acetaminophen   Tablet 650 milliGRAM(s) Oral every 6 hours PRN For Temp greater than 38 C (100.4 F)  acetaminophen   Tablet. 650 milliGRAM(s) Oral every 6 hours PRN Mild Pain (1 - 3)  dextrose Gel 1 Dose(s) Oral once PRN Blood Glucose LESS THAN 70 milliGRAM(s)/deciliter  glucagon  Injectable 1 milliGRAM(s) IntraMuscular once PRN Glucose LESS THAN 70 milligrams/deciliter  oxyCODONE    IR 5 milliGRAM(s) Oral every 6 hours PRN Moderate Pain (4 - 6)    PHYSICAL EXAM:  GENERAL: NAD, well-developed  HEAD:  Atraumatic, Normocephalic  EYES: EOMI, conjunctiva and sclera clear  NECK: Supple, No JVD  CHEST/LUNG: Clear to auscultation bilaterally; No wheeze  HEART: Regular rate and rhythm; No murmurs, rubs, or gallops  ABDOMEN: Soft, Nontender, Nondistended; Bowel sounds present  EXTREMITIES:  2+ Peripheral Pulses, No clubbing, cyanosis, or edema  PSYCH: AAOx3  NEUROLOGY: non-focal  SKIN: No rashes or lesions                          10.1   3.60  )-----------( 70       ( 21 Dec 2017 07:41 )             31.2       CARDIAC MARKERS ( 21 Dec 2017 07:22 )  x     / x     / 47 U/L / x     / x      CARDIAC MARKERS ( 20 Dec 2017 12:53 )  x     / x     / 42 U/L / x     / x              142|102|21<107  3.7|27|3.56  8.8,--,--  12-21 @ 07:22          RADIOLOGY & ADDITIONAL TESTS:    Imaging Personally Reviewed: MRI LS spine no cord compression    MRI C spine no cord compression     Consultant(s) Notes Reviewed:      Care Discussed with Consultants/Other Providers:

## 2017-12-21 NOTE — PROGRESS NOTE ADULT - PROBLEM SELECTOR PLAN 1
Seen by Neuro suspected polyneuropathy vs Amyotrophy due to DM/ from Plaquenil.    MRI lumbar, C spine show no cord compression.    Start patient on Lyrica renal dosed.   PT eval, pain control.  Plaquenil held as outpt due to possible etiology for LE weakness.  Will try to contact Rheum- patients Rheumatologist.

## 2017-12-21 NOTE — PROGRESS NOTE ADULT - SUBJECTIVE AND OBJECTIVE BOX
S: The patient is visited on the bedside, no acute change, no acute complains.    O:   Vital Signs Last 24 Hrs  T(C): 36.9 (21 Dec 2017 05:26), Max: 37.1 (20 Dec 2017 16:45)  T(F): 98.5 (21 Dec 2017 05:26), Max: 98.8 (20 Dec 2017 20:36)  HR: 89 (21 Dec 2017 05:26) (0 - 98)  BP: 110/67 (21 Dec 2017 05:26) (110/67 - 165/89)  BP(mean): --  RR: 18 (21 Dec 2017 05:26) (18 - 18)  SpO2: 99% (21 Dec 2017 05:26) (98% - 100%)                        10.3   3.86  )-----------( 67       ( 20 Dec 2017 07:23 )             31.9     12-20    140  |  100  |  34<H>  ----------------------------<  85  4.4   |  27  |  5.35<H>    Ca    8.9      20 Dec 2017 07:34    TPro  7.1  /  Alb  3.0<L>  /  TBili  0.5  /  DBili  x   /  AST  35  /  ALT  12  /  AlkPhos  70  12-19      PHYSICAL EXAM:   General appearance: No acute distress                 Mental Status: AAOx3, fluent speech, follows simple commands, able to name/repeats  Cranial Nerves: EOMI, PERRL, VFF, V1-V3 intact, face symmetric,  tongue midline  Motor: Normal tone. Tremor B/L hands. Strength 5/5 UE throughout B/L. tremor upon holding LEs  RLE: HF 3/5, KF 1/5, KE 4+/5, PF 0/5, DF 1/5   LLE: HF 3/5, KF 1/5, KE 4+/5, PF 1/5, DF 2/5  Sensation: Decreased sensation to PP +/-vibratory B/L LE distal to mid shin. Feels increased sensation to Temp in LE B/L.  Coordination: FTN intact b/l, no dysmetria.  Reflexes: 2+ bilateral biceps, brachioradialis, triceps. Areflexic in LE including patellar, ankle, mute toes B/L.

## 2017-12-21 NOTE — PROGRESS NOTE ADULT - ASSESSMENT
58YO F w/ Lupus, ESRD, DM, HTN p/w with progressive lower extremity weakness and sensory loss. Symptoms have progressed over 1-2 months. Includes pain, weakness, inability to ambulate, and numbness in LE in distal LE B/L. Neurological exam reveals LE weakness with areflexia. MRI L-C spine did not show any canal narrowing, no deformity, no signal change. EMG to be done this afternoon     -can not r/o hydroxychloroquine contributing given that symptoms began after initiation  - EMG  -CK, amonia level,  -Gabapentin 100 TID 56YO F w/ Lupus, ESRD, DM, HTN p/w with progressive lower extremity weakness and sensory loss. Symptoms have progressed over 1-2 months. Includes pain, weakness, inability to ambulate, and numbness in LE in distal LE B/L. Neurological exam reveals LE weakness with areflexia. MRI L-C spine did not show any canal narrowing, no deformity, no signal change. 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  - CK, amonia level,  - Gabapentin 100 TID

## 2017-12-22 ENCOUNTER — APPOINTMENT (OUTPATIENT)
Age: 57
End: 2017-12-22

## 2017-12-22 LAB
ANION GAP SERPL CALC-SCNC: 14 MMOL/L — SIGNIFICANT CHANGE UP (ref 5–17)
BUN SERPL-MCNC: 35 MG/DL — HIGH (ref 7–23)
CALCIUM SERPL-MCNC: 8.8 MG/DL — SIGNIFICANT CHANGE UP (ref 8.4–10.5)
CHLORIDE SERPL-SCNC: 102 MMOL/L — SIGNIFICANT CHANGE UP (ref 96–108)
CO2 SERPL-SCNC: 25 MMOL/L — SIGNIFICANT CHANGE UP (ref 22–31)
CREAT SERPL-MCNC: 5.51 MG/DL — HIGH (ref 0.5–1.3)
GLUCOSE BLDC GLUCOMTR-MCNC: 138 MG/DL — HIGH (ref 70–99)
GLUCOSE BLDC GLUCOMTR-MCNC: 75 MG/DL — SIGNIFICANT CHANGE UP (ref 70–99)
GLUCOSE BLDC GLUCOMTR-MCNC: 80 MG/DL — SIGNIFICANT CHANGE UP (ref 70–99)
GLUCOSE BLDC GLUCOMTR-MCNC: 80 MG/DL — SIGNIFICANT CHANGE UP (ref 70–99)
GLUCOSE SERPL-MCNC: 81 MG/DL — SIGNIFICANT CHANGE UP (ref 70–99)
HCT VFR BLD CALC: 30.7 % — LOW (ref 34.5–45)
HGB BLD-MCNC: 10 G/DL — LOW (ref 11.5–15.5)
MCHC RBC-ENTMCNC: 29.1 PG — SIGNIFICANT CHANGE UP (ref 27–34)
MCHC RBC-ENTMCNC: 32.6 GM/DL — SIGNIFICANT CHANGE UP (ref 32–36)
MCV RBC AUTO: 89.2 FL — SIGNIFICANT CHANGE UP (ref 80–100)
PLATELET # BLD AUTO: 71 K/UL — LOW (ref 150–400)
POTASSIUM SERPL-MCNC: 4.2 MMOL/L — SIGNIFICANT CHANGE UP (ref 3.5–5.3)
POTASSIUM SERPL-SCNC: 4.2 MMOL/L — SIGNIFICANT CHANGE UP (ref 3.5–5.3)
RBC # BLD: 3.44 M/UL — LOW (ref 3.8–5.2)
RBC # FLD: 14.9 % — HIGH (ref 10.3–14.5)
SODIUM SERPL-SCNC: 141 MMOL/L — SIGNIFICANT CHANGE UP (ref 135–145)
WBC # BLD: 3.73 K/UL — LOW (ref 3.8–10.5)
WBC # FLD AUTO: 3.73 K/UL — LOW (ref 3.8–10.5)

## 2017-12-22 PROCEDURE — 90935 HEMODIALYSIS ONE EVALUATION: CPT | Mod: GC

## 2017-12-22 PROCEDURE — 99223 1ST HOSP IP/OBS HIGH 75: CPT | Mod: GC

## 2017-12-22 PROCEDURE — 99233 SBSQ HOSP IP/OBS HIGH 50: CPT | Mod: GC

## 2017-12-22 RX ADMIN — Medication 75 MILLIGRAM(S): at 12:26

## 2017-12-22 RX ADMIN — OXYCODONE HYDROCHLORIDE 5 MILLIGRAM(S): 5 TABLET ORAL at 18:04

## 2017-12-22 RX ADMIN — Medication 667 MILLIGRAM(S): at 17:58

## 2017-12-22 RX ADMIN — Medication 100 MILLIGRAM(S): at 17:58

## 2017-12-22 RX ADMIN — Medication 1 TABLET(S): at 12:27

## 2017-12-22 RX ADMIN — Medication 667 MILLIGRAM(S): at 12:27

## 2017-12-22 RX ADMIN — DOXERCALCIFEROL 2 MICROGRAM(S): 2.5 CAPSULE ORAL at 15:49

## 2017-12-22 RX ADMIN — OXYCODONE HYDROCHLORIDE 5 MILLIGRAM(S): 5 TABLET ORAL at 18:35

## 2017-12-22 RX ADMIN — Medication 667 MILLIGRAM(S): at 08:33

## 2017-12-22 RX ADMIN — Medication 1 DROP(S): at 12:26

## 2017-12-22 RX ADMIN — Medication 20 MILLIGRAM(S): at 12:26

## 2017-12-22 RX ADMIN — LATANOPROST 1 DROP(S): 0.05 SOLUTION/ DROPS OPHTHALMIC; TOPICAL at 22:58

## 2017-12-22 RX ADMIN — Medication 25 MICROGRAM(S): at 05:28

## 2017-12-22 NOTE — PROGRESS NOTE ADULT - SUBJECTIVE AND OBJECTIVE BOX
Patient is a 57y old  Female who presents with a chief complaint of LE pain and inability to walk (19 Dec 2017 10:27)      SUBJECTIVE / OVERNIGHT EVENTS: Patient complaining of lower extremity weakness, inability to walk. No chest pain/ shortness of breath.  ROS otherwise negative. No events over night.     T(C): 37.1 (12-22-17 @ 13:05), Max: 37.2 (12-22-17 @ 05:03)  HR: 78 (12-22-17 @ 13:05) (78 - 81)  BP: 162/90 (12-22-17 @ 13:05) (133/77 - 162/90)  RR: 18 (12-22-17 @ 13:05) (18 - 18)  SpO2: 99% (12-22-17 @ 13:05) (99% - 99%)    MEDICATIONS  (STANDING):  bisacodyl 5 milliGRAM(s) Oral at bedtime  calcium acetate 667 milliGRAM(s) Oral three times a day with meals  calcium carbonate 1250 mG (OsCal) 1 Tablet(s) Oral daily  dextrose 5%. 1000 milliLiter(s) (50 mL/Hr) IV Continuous <Continuous>  dextrose 50% Injectable 12.5 Gram(s) IV Push once  dextrose 50% Injectable 25 Gram(s) IV Push once  dextrose 50% Injectable 25 Gram(s) IV Push once  docusate sodium 100 milliGRAM(s) Oral two times a day  doxercalciferol Injectable 2 MICROGram(s) IV Push <User Schedule>  furosemide    Tablet 80 milliGRAM(s) Oral <User Schedule>  insulin lispro (HumaLOG) corrective regimen sliding scale   SubCutaneous three times a day before meals  insulin lispro (HumaLOG) corrective regimen sliding scale   SubCutaneous at bedtime  latanoprost 0.005% Ophthalmic Solution 1 Drop(s) Both EYES at bedtime  levothyroxine 25 MICROGram(s) Oral daily  metolazone 2.5 milliGRAM(s) Oral daily  PARoxetine 20 milliGRAM(s) Oral daily  pregabalin 75 milliGRAM(s) Oral daily  senna 2 Tablet(s) Oral at bedtime  timolol 0.5% Solution 1 Drop(s) Both EYES daily    MEDICATIONS  (PRN):  acetaminophen   Tablet 650 milliGRAM(s) Oral every 6 hours PRN For Temp greater than 38 C (100.4 F)  acetaminophen   Tablet. 650 milliGRAM(s) Oral every 6 hours PRN Mild Pain (1 - 3)  dextrose Gel 1 Dose(s) Oral once PRN Blood Glucose LESS THAN 70 milliGRAM(s)/deciliter  glucagon  Injectable 1 milliGRAM(s) IntraMuscular once PRN Glucose LESS THAN 70 milligrams/deciliter  oxyCODONE    IR 5 milliGRAM(s) Oral every 6 hours PRN Moderate Pain (4 - 6)    PHYSICAL EXAM:  GENERAL: NAD, well-developed  HEAD:  Atraumatic, Normocephalic  EYES: EOMI, conjunctiva and sclera clear  NECK: Supple, No JVD  CHEST/LUNG: Clear to auscultation bilaterally; No wheeze  HEART: Regular rate and rhythm; No murmurs, rubs, or gallops  ABDOMEN: Soft, Nontender, Nondistended; Bowel sounds present  EXTREMITIES:  2+ Peripheral Pulses, No clubbing, cyanosis, or edema  PSYCH: AAOx3  NEUROLOGY: AA0x 3  Power 3/5 lower extremities   UE 5/5, hypoactive reflexes   SKIN: No rashes or lesions                                                10.0   3.73  )-----------( 71       ( 22 Dec 2017 07:13 )             30.7       CARDIAC MARKERS ( 21 Dec 2017 07:22 )  x     / x     / 47 U/L / x     / x              141|102|35<81  4.2|25|5.51  8.8,--,--  12-22 @ 07:13      RADIOLOGY & ADDITIONAL TESTS:    Imaging Personally Reviewed: MRI LS spine no cord compression    MRI C spine no cord compression     Consultant(s) Notes Reviewed:  Neuro     Care Discussed with Consultants/Other Providers:

## 2017-12-22 NOTE — PROGRESS NOTE ADULT - ASSESSMENT
58 yo f with h/o lupus diagnosed in April, lupus nephritis, ESRD on HD via permacath (M, W, F) s/p AVF (not matured), DM2, HTN, HLD, hypothyroid presented with 3 weeks h/o bilateral LE pain and numbness

## 2017-12-22 NOTE — PROGRESS NOTE ADULT - ASSESSMENT
58YO F w/ Lupus, ESRD, DM, HTN p/w with progressive lower extremity weakness and sensory loss. Symptoms have progressed over 1-2 months. Includes pain, weakness, inability to ambulate, and numbness in LE in distal LE B/L. Neurological exam reveals LE weakness with areflexia. MRI L-C spine did not show any canal narrowing, no deformity, no signal change. 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  - CK, ammonia level,  - C/w Gabapentin 100 TID  - Would get anti-mag, anti-fady 56YO F w/ Lupus, ESRD, DM, HTN p/w with progressive lower extremity weakness and sensory loss. Symptoms have progressed over 1-2 months. Includes pain, weakness, inability to ambulate, and numbness in LE in distal LE B/L. Neurological exam reveals LE weakness with areflexia. MRI L-C spine did not show any canal narrowing, no deformity, no signal change. 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  - CK, ammonia level,  - C/w Gabapentin 100 TID  - Would get anti-mag, anti-fady, gm1,spep and heavy metals.

## 2017-12-22 NOTE — PROGRESS NOTE ADULT - PROBLEM SELECTOR PLAN 1
On MWF schedule. Gets HD via Rt IJ permcath, last HD was on Monday as outpatient.   Resume maintenance HD schedule and plan to dialyze today.   Pt. with recently placed LUE AVF and was supposed to follow up with vascular surgeon Dr. Deluca this week, recommend follow up while inpatient. On MWF schedule. Gets HD via Rt IJ permcath, last HD was on Monday as outpatient.Plan for maintenance HD today.   Pt. with recently placed LUE AVF and was supposed to follow up with vascular surgeon Dr. Deluca this week, recommend follow up while inpatient.

## 2017-12-22 NOTE — PROGRESS NOTE ADULT - SUBJECTIVE AND OBJECTIVE BOX
Neurology Progress Note     S: The patient is visited on the bedside, no acute change, no acute complains.    O:   Vital Signs Last 24 Hrs  T(C): 37.2 (22 Dec 2017 05:03), Max: 37.2 (21 Dec 2017 21:14)  T(F): 99 (22 Dec 2017 05:03), Max: 99 (21 Dec 2017 21:14)  HR: 81 (22 Dec 2017 05:03) (81 - 83)  BP: 133/77 (22 Dec 2017 05:03) (133/77 - 138/79)  BP(mean): --  RR: 18 (22 Dec 2017 05:03) (18 - 18)  SpO2: 99% (22 Dec 2017 05:03) (99% - 99%)                          10.0   3.73  )-----------( 71       ( 22 Dec 2017 07:13 )             30.7   12-22    141  |  102  |  35<H>  ----------------------------<  81  4.2   |  25  |  5.51<H>    Ca    8.8      22 Dec 2017 07:13    CAPILLARY BLOOD GLUCOSE  POCT Blood Glucose.: 80 mg/dL (22 Dec 2017 11:51)  POCT Blood Glucose.: 80 mg/dL (22 Dec 2017 08:18)  POCT Blood Glucose.: 99 mg/dL (21 Dec 2017 21:55)  POCT Blood Glucose.: 74 mg/dL (21 Dec 2017 16:35)    PHYSICAL EXAM:   General appearance: No acute distress                 Mental Status: AAOx3, fluent speech, follows simple commands, able to name/repeats  Cranial Nerves: EOMI, PERRL, VFF, V1-V3 intact, face symmetric,  tongue midline  Motor: Normal tone. Tremor B/L hands. Strength 5/5 UE throughout B/L. tremor upon holding LEs  RLE: HF 3/5, KF 1/5, KE 4+/5, PF 0/5, DF 1/5   LLE: HF 3/5, KF 1/5, KE 4+/5, PF 1/5, DF 2/5  Sensation: Decreased sensation to PP +/-vibratory B/L LE distal to mid shin. Feels increased sensation to Temp in LE B/L.  Coordination: FTN intact b/l, no dysmetria.  Reflexes: 2+ bilateral biceps, brachioradialis, triceps. Areflexic in LE including patellar, ankle, mute toes B/L. Neurology Progress Note     S: The patient is visited on the bedside, no acute change, no acute complains.    O:   Vital Signs Last 24 Hrs  T(C): 37.1 (22 Dec 2017 13:05), Max: 37.2 (21 Dec 2017 21:14)  T(F): 98.7 (22 Dec 2017 13:05), Max: 99 (21 Dec 2017 21:14)  HR: 78 (22 Dec 2017 13:05) (78 - 83)  BP: 162/90 (22 Dec 2017 13:05) (133/77 - 162/90)  BP(mean): --  RR: 18 (22 Dec 2017 13:05) (18 - 18)  SpO2: 99% (22 Dec 2017 13:05) (99% - 99%)                        10.0   3.73  )-----------( 71       ( 22 Dec 2017 07:13 )             30.7   12-22    141  |  102  |  35<H>  ----------------------------<  81  4.2   |  25  |  5.51<H>    Ca    8.8      22 Dec 2017 07:13    CAPILLARY BLOOD GLUCOSE  POCT Blood Glucose.: 80 mg/dL (22 Dec 2017 11:51)  POCT Blood Glucose.: 80 mg/dL (22 Dec 2017 08:18)  POCT Blood Glucose.: 99 mg/dL (21 Dec 2017 21:55)  POCT Blood Glucose.: 74 mg/dL (21 Dec 2017 16:35)    PHYSICAL EXAM:   General appearance: No acute distress                 Mental Status: AAOx3, fluent speech, follows simple commands, able to name/repeats  Cranial Nerves: EOMI, PERRL, VFF, V1-V3 intact, face symmetric,  tongue midline  Motor: Normal tone. Tremor B/L hands. Strength 5/5 UE throughout B/L. tremor upon holding LEs  RLE: HF 3/5, KF 1/5, KE 4+/5, PF 0/5, DF 1/5   LLE: HF 3/5, KF 1/5, KE 4+/5, PF 1/5, DF 2/5  Sensation: Decreased sensation to PP +/-vibratory B/L LE distal to mid shin. Feels increased sensation to Temp in LE B/L.  Coordination: FTN intact b/l, no dysmetria.  Reflexes: 2+ bilateral biceps, brachioradialis, triceps. Areflexic in LE including patellar, ankle, mute toes B/L.

## 2017-12-22 NOTE — PROGRESS NOTE ADULT - SUBJECTIVE AND OBJECTIVE BOX
[facility] DIVISION OF KIDNEY DISEASES AND HYPERTENSION -- HEMODIALYSIS NOTE  --------------------------------------------------------------------------------  Chief Complaint: ESRD/Ongoing hemodialysis requirement    24 hour events/subjective:        PAST HISTORY  --------------------------------------------------------------------------------  No significant changes to PMH, PSH, FHx, SHx, unless otherwise noted    ALLERGIES & MEDICATIONS  --------------------------------------------------------------------------------  Allergies    penicillin (Rash)    Intolerances      Standing Inpatient Medications  bisacodyl 5 milliGRAM(s) Oral at bedtime  calcium acetate 667 milliGRAM(s) Oral three times a day with meals  calcium carbonate 1250 mG (OsCal) 1 Tablet(s) Oral daily  dextrose 5%. 1000 milliLiter(s) IV Continuous <Continuous>  dextrose 50% Injectable 12.5 Gram(s) IV Push once  dextrose 50% Injectable 25 Gram(s) IV Push once  dextrose 50% Injectable 25 Gram(s) IV Push once  docusate sodium 100 milliGRAM(s) Oral two times a day  doxercalciferol Injectable 2 MICROGram(s) IV Push <User Schedule>  furosemide    Tablet 80 milliGRAM(s) Oral <User Schedule>  insulin lispro (HumaLOG) corrective regimen sliding scale   SubCutaneous three times a day before meals  insulin lispro (HumaLOG) corrective regimen sliding scale   SubCutaneous at bedtime  latanoprost 0.005% Ophthalmic Solution 1 Drop(s) Both EYES at bedtime  levothyroxine 25 MICROGram(s) Oral daily  metolazone 2.5 milliGRAM(s) Oral daily  PARoxetine 20 milliGRAM(s) Oral daily  pregabalin 75 milliGRAM(s) Oral daily  senna 2 Tablet(s) Oral at bedtime  timolol 0.5% Solution 1 Drop(s) Both EYES daily    PRN Inpatient Medications  acetaminophen   Tablet 650 milliGRAM(s) Oral every 6 hours PRN  acetaminophen   Tablet. 650 milliGRAM(s) Oral every 6 hours PRN  dextrose Gel 1 Dose(s) Oral once PRN  glucagon  Injectable 1 milliGRAM(s) IntraMuscular once PRN  oxyCODONE    IR 5 milliGRAM(s) Oral every 6 hours PRN      REVIEW OF SYSTEMS  --------------------------------------------------------------------------------  Gen: No weight changes, fatigue, fevers/chills, weakness  Skin: No rashes  Head/Eyes/Ears/Mouth: No headache; Normal hearing; Normal vision w/o blurriness; No sinus pain/discomfort, sore throat  Respiratory: No dyspnea, cough, wheezing, hemoptysis  CV: No chest pain, PND, orthopnea  GI: No abdominal pain, diarrhea, constipation, nausea, vomiting, melena, hematochezia  : No increased frequency, dysuria, hematuria, nocturia  MSK: No joint pain/swelling; no back pain; no edema  Neuro: No dizziness/lightheadedness, weakness, seizures, numbness, tingling  Heme: No easy bruising or bleeding  Endo: No heat/cold intolerance  Psych: No significant nervousness, anxiety, stress, depression    All other systems were reviewed and are negative, except as noted.    VITALS/PHYSICAL EXAM  --------------------------------------------------------------------------------  T(C): 37.2 (12-22-17 @ 05:03), Max: 37.2 (12-21-17 @ 21:14)  HR: 81 (12-22-17 @ 05:03) (81 - 83)  BP: 133/77 (12-22-17 @ 05:03) (133/77 - 138/79)  RR: 18 (12-22-17 @ 05:03) (18 - 18)  SpO2: 99% (12-22-17 @ 05:03) (99% - 99%)  Wt(kg): --        12-21-17 @ 07:01  -  12-22-17 @ 07:00  --------------------------------------------------------  IN: 360 mL / OUT: 450 mL / NET: -90 mL      Physical Exam:  	Gen: NAD, well-appearing  	HEENT: PERRL, supple neck, clear oropharynx  	Pulm: CTA B/L  	CV: RRR, S1S2; no rub  	Back: No spinal or CVA tenderness; no sacral edema  	Abd: +BS, soft, nontender/nondistended  	: No suprapubic tenderness  	UE: Warm, FROM, no clubbing, intact strength; no edema; no asterixis  	LE: Warm, FROM, no clubbing, intact strength; no edema  	Neuro: No focal deficits, intact gait  	Psych: Normal affect and mood  	Skin: Warm, without rashes  	Vascular access:    LABS/STUDIES  --------------------------------------------------------------------------------              10.0   3.73  >-----------<  71       [12-22-17 @ 07:13]              30.7     141  |  102  |  35  ----------------------------<  81      [12-22-17 @ 07:13]  4.2   |  25  |  5.51        Ca     8.8     [12-22-17 @ 07:13]          CK 47      [12-21-17 @ 07:22]    Iron 77, TIBC 90, %sat 86      [05-29-17 @ 09:39]  Ferritin 781.0      [05-29-17 @ 09:39]  PTH -- (Ca 7.5)      [06-01-17 @ 08:12]   253  Vitamin D (25OH) <4.0      [05-31-17 @ 08:52]  HbA1c 4.7      [12-20-17 @ 07:23]  Lipid: chol 227, , HDL 47,       [05-30-17 @ 07:28] [facility] DIVISION OF KIDNEY DISEASES AND HYPERTENSION -- HEMODIALYSIS NOTE  --------------------------------------------------------------------------------  Chief Complaint: ESRD/Ongoing hemodialysis requirement    24 hour events/subjective:  No significant events.  no acute complaint.    PAST HISTORY  --------------------------------------------------------------------------------  No significant changes to PMH, PSH, FHx, SHx, unless otherwise noted    ALLERGIES & MEDICATIONS  --------------------------------------------------------------------------------  Allergies    penicillin (Rash)    Intolerances      Standing Inpatient Medications  bisacodyl 5 milliGRAM(s) Oral at bedtime  calcium acetate 667 milliGRAM(s) Oral three times a day with meals  calcium carbonate 1250 mG (OsCal) 1 Tablet(s) Oral daily  dextrose 5%. 1000 milliLiter(s) IV Continuous <Continuous>  dextrose 50% Injectable 12.5 Gram(s) IV Push once  dextrose 50% Injectable 25 Gram(s) IV Push once  dextrose 50% Injectable 25 Gram(s) IV Push once  docusate sodium 100 milliGRAM(s) Oral two times a day  doxercalciferol Injectable 2 MICROGram(s) IV Push <User Schedule>  furosemide    Tablet 80 milliGRAM(s) Oral <User Schedule>  insulin lispro (HumaLOG) corrective regimen sliding scale   SubCutaneous three times a day before meals  insulin lispro (HumaLOG) corrective regimen sliding scale   SubCutaneous at bedtime  latanoprost 0.005% Ophthalmic Solution 1 Drop(s) Both EYES at bedtime  levothyroxine 25 MICROGram(s) Oral daily  metolazone 2.5 milliGRAM(s) Oral daily  PARoxetine 20 milliGRAM(s) Oral daily  pregabalin 75 milliGRAM(s) Oral daily  senna 2 Tablet(s) Oral at bedtime  timolol 0.5% Solution 1 Drop(s) Both EYES daily    PRN Inpatient Medications  acetaminophen   Tablet 650 milliGRAM(s) Oral every 6 hours PRN  acetaminophen   Tablet. 650 milliGRAM(s) Oral every 6 hours PRN  dextrose Gel 1 Dose(s) Oral once PRN  glucagon  Injectable 1 milliGRAM(s) IntraMuscular once PRN  oxyCODONE    IR 5 milliGRAM(s) Oral every 6 hours PRN      REVIEW OF SYSTEMS  --------------------------------------------------------------------------------  Gen: No weight changes, fatigue, fevers/chills, weakness  Skin: No rashes  Head/Eyes/Ears/Mouth: No headache; Normal hearing; Normal vision w/o blurriness; No sinus pain/discomfort, sore throat  Respiratory: No dyspnea, cough, wheezing, hemoptysis  CV: No chest pain, PND, orthopnea  GI: No abdominal pain, diarrhea, constipation, nausea, vomiting, melena, hematochezia  : No increased frequency, dysuria, hematuria, nocturia  MSK: No joint pain/swelling; no back pain; no edema  Neuro: , weakness, numbness of b/l LE.  Heme: No easy bruising or bleeding  Endo: No heat/cold intolerance  Psych: No significant nervousness, anxiety, stress, depression    All other systems were reviewed and are negative, except as noted.    VITALS/PHYSICAL EXAM  --------------------------------------------------------------------------------  T(C): 37.2 (12-22-17 @ 05:03), Max: 37.2 (12-21-17 @ 21:14)  HR: 81 (12-22-17 @ 05:03) (81 - 83)  BP: 133/77 (12-22-17 @ 05:03) (133/77 - 138/79)  RR: 18 (12-22-17 @ 05:03) (18 - 18)  SpO2: 99% (12-22-17 @ 05:03) (99% - 99%)  Wt(kg): --        12-21-17 @ 07:01  -  12-22-17 @ 07:00  --------------------------------------------------------  IN: 360 mL / OUT: 450 mL / NET: -90 mL      Physical Exam:  	Gen: NAD, well-appearing  	Pulm: CTA B/L  	CV: RRR, S1S2; no rub  	Back: No spinal or CVA tenderness; no sacral edema  	Abd: +BS, soft, nontender/nondistended  	: No suprapubic tenderness  	UE: Warm,  no edema; b/l hand tremors. LUE AVF thrill+  	LE: Warm,  no edema  	Neuro: No focal deficits, intact gait  	Psych: Normal affect and mood  	Skin: Warm, without rashes  	Vascular access: RIJ tunneled dialysis catheter, site clean/dry/intact    LABS/STUDIES  --------------------------------------------------------------------------------              10.0   3.73  >-----------<  71       [12-22-17 @ 07:13]              30.7     141  |  102  |  35  ----------------------------<  81      [12-22-17 @ 07:13]  4.2   |  25  |  5.51        Ca     8.8     [12-22-17 @ 07:13]          CK 47      [12-21-17 @ 07:22]    Iron 77, TIBC 90, %sat 86      [05-29-17 @ 09:39]  Ferritin 781.0      [05-29-17 @ 09:39]  PTH -- (Ca 7.5)      [06-01-17 @ 08:12]   253  Vitamin D (25OH) <4.0      [05-31-17 @ 08:52]  HbA1c 4.7      [12-20-17 @ 07:23]  Lipid: chol 227, , HDL 47,       [05-30-17 @ 07:28]

## 2017-12-22 NOTE — PROGRESS NOTE ADULT - PROBLEM SELECTOR PLAN 1
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 Spoke to neuro at length today, patient needs nerve biopsy.   MRI lumbar, C spine show no cord compression.    Continue with Lyrica- renally dosed.   PT eval- recommend JESSICA placement.   Left message with Rheum- [phone].   Will get Rheum consult here.   Continue to hold Plaquenil. 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 Spoke to neuro at length today, patient needs nerve biopsy.   MRI lumbar, C spine show no cord compression.    Continue with Lyrica- renally dosed.   PT eval- recommend JESSICA placement.   Left message with Rheum- [phone].   Will get Rheum consult here.   Continue to hold Plaquenil. Called Neurosurgery to get nerve biopsy, follow up.   Follow up anti YESICA, anti MAG antibodies.

## 2017-12-22 NOTE — CONSULT NOTE ADULT - ASSESSMENT
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 SLE and other autoimmune conditions can cause similar picture. Consider autoimmune neuropathic pathology vs alternate diagnoses such as GBS although pt does not have ascending neuropathy. Can also consider infectious causes vs endocrinological (such as T2DM or thyroid abnormalities)    - Will repeat SLE serologies along with ANCA, Sjogrens, Urinalysis  - c/w holding Plaquenil for now  - Consider LP  - Consider sural nerve biopsy. Once performed, will consider empiric steroids    Will follow 57yoF hx of SLE (dx 4/2017), Class V lupus nephritis/ESRD now on HD, renal osteodystrophy, HTN, T2DM presenting with lower extremity weakness x 3 weeks.     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 SLE and other autoimmune conditions can cause similar picture. Consider autoimmune neuropathic pathology vs alternate diagnoses such as GBS although pt does not have ascending neuropathy. Can also consider infectious causes vs endocrinological (such as T2DM or thyroid abnormalities)    - Will repeat SLE serologies along with ANCA, Sjogrens, Urinalysis  - c/w holding Plaquenil for now  - Consider LP  - Consider sural nerve biopsy. Once performed, will consider empiric steroids    2) Thrombocytopenia/leukopenia/anemia- while pancytopenia can be seen in SLE, would also keep differential broad.  In 6/2017, pt with normal platelets in ~200s and normal WBC count in around 5 range. New cytopenias may be concerning for SLE flare  - f/u SLE serologies  - f/u SPEP UPEP      Will follow 57yoF hx of SLE (dx 4/2017), Class V lupus nephritis/ESRD now on HD, renal osteodystrophy, HTN, T2DM presenting with lower extremity weakness x 3 weeks.     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 SLE and other autoimmune conditions can cause similar picture. Consider autoimmune neuropathic pathology vs alternate diagnoses such as GBS although pt does not have ascending neuropathy. Can also consider infectious causes vs endocrinological (such as T2DM or thyroid abnormalities)  - Will repeat SLE serologies along with ANCA, Sjogrens, Urinalysis  - c/w holding Plaquenil for now  - Consider LP  - Consider sural nerve biopsy. Once performed, will consider empiric steroids  - f/u neuro/neurosx recommendations    2) SLE- pt with hx of +SULLY, +dsDNA +SSA +Smith.  Also w/ Class V lupus nephritis progressed to ESRD on HD  Current manifestations of possible SLE flare include thrombocytopenia, leukopenia, anemia (although improved from before) and possible neuropathy  - Will check SLE serologies  - Will hold on on steroids for now but will reeval when serologies return    3) Thrombocytopenia/leukopenia/anemia- while pancytopenia can be seen in SLE, would also keep differential broad.  In 6/2017, pt with normal platelets in ~200s and normal WBC count in around 5 range. New cytopenias may be concerning for SLE flare  - f/u SPEP UPEP  - peripheral smear  - iron studies in AM    Will follow

## 2017-12-22 NOTE — PROGRESS NOTE ADULT - PROBLEM SELECTOR PLAN 2
HD MWF. s/p HD yesterday.   Cont with lasix 80mg daily, Zaroxolyn.    Renal following. HD MWF. s/p HD yesterday.   Cont with lasix 80mg daily, Zaroxolyn.    Renal following, patient getting dialysis via Rt IJ perma cath, recently placed Lt AVF.   Vascular surgery consult called. Follow up recs.

## 2017-12-22 NOTE — CONSULT NOTE ADULT - SUBJECTIVE AND OBJECTIVE BOX
WILLARD BOB  [id number]    HISTORY OF PRESENT ILLNESS:  Pt is a 57yoF hx of SLE (dx 2017), Class V lupus nephritis/ESRD now on HD, renal osteodystrophy, HTN, T2DM presenting with lower extremity weakness x 3 weeks.     Pt follows with rheumatologist Dr. Eve Gaming at [place]. States she was started on Plaquenil 3 weeks ago. When weakness started, she called her nephrologist Dr. Jacobs who spoke with pt's rheum and advised to stop Plaquenil.  Reports that weakness became progressively worse, starting initially in her knee region and moving down.  Reports a vague rash on her L arm and chest about 3 weeks ago that improved w/ topical creams prescribed by PCP.     Pt has been evaluated by neurology and had EMG performed  showing severe sensory motor axonal peripheral neuropathy. Rheum called for further eval.     Spoke to pt's son Good [phone] who reports that pt has not been complaining of any other symptoms at home. No fevers/chills/recent travel. Pt's niece was sick within last couple weeks.     PAST MEDICAL & SURGICAL HISTORY:  Lupus (systemic lupus erythematosus)  ESRD (end-stage renal disease) due to SLE  Glaucoma  Other hyperlipidemia  Type 2 diabetes mellitus without complication, without long-term current use of insulin  Essential hypertension  Hypercholesteremia  Hypertension  Diabetes  S/P appendectomy  H/O gastric bypass: 2016  S/P  section: times two      Review of Systems:  Gen:  No fevers/chills, weight loss  HEENT: No recent blurry vision  CVS: No chest pain/palpitations  Resp: No SOB/wheezing  GI: No N/V/C/D/abdominal pain  MSK: No joint pains  Skin: +resolving rash from 3 weeks ago  Neuro: lower extremity weakness    MEDICATIONS  (STANDING):  bisacodyl 5 milliGRAM(s) Oral at bedtime  calcium acetate 667 milliGRAM(s) Oral three times a day with meals  calcium carbonate 1250 mG (OsCal) 1 Tablet(s) Oral daily  dextrose 5%. 1000 milliLiter(s) (50 mL/Hr) IV Continuous <Continuous>  dextrose 50% Injectable 12.5 Gram(s) IV Push once  dextrose 50% Injectable 25 Gram(s) IV Push once  dextrose 50% Injectable 25 Gram(s) IV Push once  docusate sodium 100 milliGRAM(s) Oral two times a day  doxercalciferol Injectable 2 MICROGram(s) IV Push <User Schedule>  furosemide    Tablet 80 milliGRAM(s) Oral <User Schedule>  insulin lispro (HumaLOG) corrective regimen sliding scale   SubCutaneous three times a day before meals  insulin lispro (HumaLOG) corrective regimen sliding scale   SubCutaneous at bedtime  latanoprost 0.005% Ophthalmic Solution 1 Drop(s) Both EYES at bedtime  levothyroxine 25 MICROGram(s) Oral daily  metolazone 2.5 milliGRAM(s) Oral daily  PARoxetine 20 milliGRAM(s) Oral daily  pregabalin 75 milliGRAM(s) Oral daily  senna 2 Tablet(s) Oral at bedtime  timolol 0.5% Solution 1 Drop(s) Both EYES daily    MEDICATIONS  (PRN):  acetaminophen   Tablet 650 milliGRAM(s) Oral every 6 hours PRN For Temp greater than 38 C (100.4 F)  acetaminophen   Tablet. 650 milliGRAM(s) Oral every 6 hours PRN Mild Pain (1 - 3)  dextrose Gel 1 Dose(s) Oral once PRN Blood Glucose LESS THAN 70 milliGRAM(s)/deciliter  glucagon  Injectable 1 milliGRAM(s) IntraMuscular once PRN Glucose LESS THAN 70 milligrams/deciliter  oxyCODONE    IR 5 milliGRAM(s) Oral every 6 hours PRN Moderate Pain (4 - 6)      Allergies    penicillin (Rash)    Intolerances        PERTINENT MEDICATION HISTORY:    SOCIAL HISTORY: pt lives with her son, no toxic habits  OCCUPATION: unemployed  TRAVEL HISTORY: cruise to [facility] in 2017, did not become sick afterwards    FAMILY HISTORY:  History of hypertension (Sibling): sibling  DM (diabetes mellitus) (Sibling): siblings  History of hypertension: father and mother  DM (diabetes mellitus): mother and father      Vital Signs Last 24 Hrs  T(C): 36.6 (22 Dec 2017 16:06), Max: 37.2 (21 Dec 2017 21:14)  T(F): 97.8 (22 Dec 2017 16:06), Max: 99 (21 Dec 2017 21:14)  HR: 75 (22 Dec 2017 16:06) (75 - 83)  BP: 146/75 (22 Dec 2017 16:06) (133/77 - 162/90)  BP(mean): --  RR: 18 (22 Dec 2017 16:06) (18 - 18)  SpO2: 97% (22 Dec 2017 16:06) (97% - 99%)    Physical Exam:  General: No apparent distress  HEENT: EOMI, MMM  CVS: +S1/S2, RRR, no murmurs/rubs/gallops  Resp: CTA b/l. No crackles/wheezing. Pt's HD port in R chest  GI: Soft, NT/ND +BS  MSK:  Shoulders: wnl  Elbows: wnl  Wrists: wnl  MCPs: wnl  PIPs: wnl  DIPs: wnl   Hips: wnl  Knees: wnl   Ankle: wnl  Neuro: AAOx3. UE exam 5/5 strength. Pt is 3-4/5 strength in hip flexors/extensors. Pt with 5/5 strength on flexion/extension of lower leg with knees bent. Unable to dorsiflex her ankles.   Skin: pt with PIH on upper chest and LUE, currently no tender or erythematous    LABS:                        10.0   3.73  )-----------( 71       ( 22 Dec 2017 07:13 )             30.7     12-    141  |  102  |  35<H>  ----------------------------<  81  4.2   |  25  |  5.51<H>    Ca    8.8      22 Dec 2017 07:13      RADIOLOGY & ADDITIONAL STUDIES:  < from: MR Cervical Spine No Cont (17 @ 11:23) >  IMPRESSION: Abnormal marrow signal may be related to renal osteodystrophy   or anemia. No cord compression.    < end of copied text >  < from: MR Lumbar Spine No Cont (17 @ 11:23) >      < end of copied text >

## 2017-12-23 LAB
ANION GAP SERPL CALC-SCNC: 12 MMOL/L — SIGNIFICANT CHANGE UP (ref 5–17)
ANTI-RIBONUCLEAR PROTEIN: 0.8 AI — SIGNIFICANT CHANGE UP
B BURGDOR C6 AB SER-ACNC: NEGATIVE — SIGNIFICANT CHANGE UP
B BURGDOR IGG+IGM SER-ACNC: 0.05 INDEX — SIGNIFICANT CHANGE UP (ref 0.01–0.89)
BUN SERPL-MCNC: 22 MG/DL — SIGNIFICANT CHANGE UP (ref 7–23)
C3 SERPL-MCNC: 36 MG/DL — LOW (ref 80–180)
C4 SERPL-MCNC: 11 MG/DL — SIGNIFICANT CHANGE UP (ref 10–45)
CALCIUM SERPL-MCNC: 9.4 MG/DL — SIGNIFICANT CHANGE UP (ref 8.4–10.5)
CHLORIDE SERPL-SCNC: 102 MMOL/L — SIGNIFICANT CHANGE UP (ref 96–108)
CO2 SERPL-SCNC: 27 MMOL/L — SIGNIFICANT CHANGE UP (ref 22–31)
CREAT SERPL-MCNC: 3.99 MG/DL — HIGH (ref 0.5–1.3)
DSDNA AB FLD-ACNC: <0.2 AI — SIGNIFICANT CHANGE UP
ENA SM AB FLD QL: 6.4 AI — HIGH
ENA SS-A AB FLD IA-ACNC: >8 AI — HIGH
GLUCOSE BLDC GLUCOMTR-MCNC: 105 MG/DL — HIGH (ref 70–99)
GLUCOSE BLDC GLUCOMTR-MCNC: 109 MG/DL — HIGH (ref 70–99)
GLUCOSE BLDC GLUCOMTR-MCNC: 130 MG/DL — HIGH (ref 70–99)
GLUCOSE BLDC GLUCOMTR-MCNC: 98 MG/DL — SIGNIFICANT CHANGE UP (ref 70–99)
GLUCOSE SERPL-MCNC: 80 MG/DL — SIGNIFICANT CHANGE UP (ref 70–99)
HAPTOGLOB SERPL-MCNC: 126 MG/DL — SIGNIFICANT CHANGE UP (ref 34–200)
HBV CORE AB SER-ACNC: SIGNIFICANT CHANGE UP
HBV SURFACE AB SER-ACNC: REACTIVE
HBV SURFACE AG SER-ACNC: SIGNIFICANT CHANGE UP
HCT VFR BLD CALC: 32 % — LOW (ref 34.5–45)
HCV AB S/CO SERPL IA: 0.18 S/CO — SIGNIFICANT CHANGE UP
HCV AB SERPL-IMP: SIGNIFICANT CHANGE UP
HGB BLD-MCNC: 10.3 G/DL — LOW (ref 11.5–15.5)
LDH SERPL L TO P-CCNC: 273 U/L — HIGH (ref 50–242)
MCHC RBC-ENTMCNC: 28.7 PG — SIGNIFICANT CHANGE UP (ref 27–34)
MCHC RBC-ENTMCNC: 32.2 GM/DL — SIGNIFICANT CHANGE UP (ref 32–36)
MCV RBC AUTO: 89.1 FL — SIGNIFICANT CHANGE UP (ref 80–100)
PLATELET # BLD AUTO: 83 K/UL — LOW (ref 150–400)
POTASSIUM SERPL-MCNC: 3.8 MMOL/L — SIGNIFICANT CHANGE UP (ref 3.5–5.3)
POTASSIUM SERPL-SCNC: 3.8 MMOL/L — SIGNIFICANT CHANGE UP (ref 3.5–5.3)
PROT SERPL-MCNC: 6.9 G/DL — SIGNIFICANT CHANGE UP (ref 6–8.3)
PROT SERPL-MCNC: 6.9 G/DL — SIGNIFICANT CHANGE UP (ref 6–8.3)
RBC # BLD: 3.59 M/UL — LOW (ref 3.8–5.2)
RBC # BLD: 3.59 M/UL — LOW (ref 3.8–5.2)
RBC # FLD: 15 % — HIGH (ref 10.3–14.5)
RETICS #: 7.1 K/UL — LOW (ref 25–125)
RETICS/RBC NFR: 0.2 % — LOW (ref 0.5–2.5)
SODIUM SERPL-SCNC: 141 MMOL/L — SIGNIFICANT CHANGE UP (ref 135–145)
TSH SERPL-MCNC: 8.03 UIU/ML — HIGH (ref 0.27–4.2)
VIT B12 SERPL-MCNC: 526 PG/ML — SIGNIFICANT CHANGE UP (ref 232–1245)
WBC # BLD: 3.29 K/UL — LOW (ref 3.8–10.5)
WBC # FLD AUTO: 3.29 K/UL — LOW (ref 3.8–10.5)

## 2017-12-23 PROCEDURE — 99233 SBSQ HOSP IP/OBS HIGH 50: CPT

## 2017-12-23 RX ADMIN — OXYCODONE HYDROCHLORIDE 5 MILLIGRAM(S): 5 TABLET ORAL at 02:51

## 2017-12-23 RX ADMIN — Medication 667 MILLIGRAM(S): at 08:30

## 2017-12-23 RX ADMIN — Medication 75 MILLIGRAM(S): at 12:27

## 2017-12-23 RX ADMIN — SENNA PLUS 2 TABLET(S): 8.6 TABLET ORAL at 22:17

## 2017-12-23 RX ADMIN — Medication 1 TABLET(S): at 12:19

## 2017-12-23 RX ADMIN — OXYCODONE HYDROCHLORIDE 5 MILLIGRAM(S): 5 TABLET ORAL at 22:49

## 2017-12-23 RX ADMIN — OXYCODONE HYDROCHLORIDE 5 MILLIGRAM(S): 5 TABLET ORAL at 10:24

## 2017-12-23 RX ADMIN — Medication 25 MICROGRAM(S): at 05:50

## 2017-12-23 RX ADMIN — OXYCODONE HYDROCHLORIDE 5 MILLIGRAM(S): 5 TABLET ORAL at 03:30

## 2017-12-23 RX ADMIN — Medication 80 MILLIGRAM(S): at 05:51

## 2017-12-23 RX ADMIN — OXYCODONE HYDROCHLORIDE 5 MILLIGRAM(S): 5 TABLET ORAL at 22:17

## 2017-12-23 RX ADMIN — LATANOPROST 1 DROP(S): 0.05 SOLUTION/ DROPS OPHTHALMIC; TOPICAL at 22:19

## 2017-12-23 RX ADMIN — Medication 1 DROP(S): at 12:20

## 2017-12-23 RX ADMIN — Medication 100 MILLIGRAM(S): at 05:51

## 2017-12-23 RX ADMIN — Medication 667 MILLIGRAM(S): at 17:06

## 2017-12-23 RX ADMIN — Medication 667 MILLIGRAM(S): at 12:19

## 2017-12-23 RX ADMIN — Medication 20 MILLIGRAM(S): at 12:20

## 2017-12-23 RX ADMIN — Medication 5 MILLIGRAM(S): at 22:17

## 2017-12-23 RX ADMIN — OXYCODONE HYDROCHLORIDE 5 MILLIGRAM(S): 5 TABLET ORAL at 11:24

## 2017-12-23 RX ADMIN — Medication 100 MILLIGRAM(S): at 17:06

## 2017-12-23 NOTE — PROGRESS NOTE ADULT - PROBLEM SELECTOR PLAN 1
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 Spoke to neuro, patient needs nerve biopsy.   MRI lumbar, C spine show no cord compression.    Continue with Lyrica- renally dosed.   PT eval- recommend JESSICA placement.   Left message with Rheum- [phone].   Continue to hold Plaquenil.   Follow up anti YESICA, anti MAG antibodies. 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 Spoke to neuro, patient needs nerve biopsy.   MRI lumbar, C spine show no cord compression.    Continue with Lyrica- renally dosed.   PT eval- recommend JESSICA placement.   Left message with Rheum- [phone].   Continue to hold Plaquenil.   Follow up anti YESICA, anti MAG antibodies.

## 2017-12-23 NOTE — PROGRESS NOTE ADULT - PROBLEM SELECTOR PLAN 4
Not on any home meds at this time.   Well controlled as per family s/p gastric bypass  On insulin sliding scale

## 2017-12-23 NOTE — PROGRESS NOTE ADULT - SUBJECTIVE AND OBJECTIVE BOX
Patient is a 57y old  Female who presents with a chief complaint of LE pain and inability to walk (19 Dec 2017 10:27)      SUBJECTIVE / OVERNIGHT EVENTS: Pt c/o pain in her legs, no other new complaints.     MEDICATIONS  (STANDING):  bisacodyl 5 milliGRAM(s) Oral at bedtime  calcium acetate 667 milliGRAM(s) Oral three times a day with meals  calcium carbonate 1250 mG (OsCal) 1 Tablet(s) Oral daily  dextrose 5%. 1000 milliLiter(s) (50 mL/Hr) IV Continuous <Continuous>  dextrose 50% Injectable 12.5 Gram(s) IV Push once  dextrose 50% Injectable 25 Gram(s) IV Push once  dextrose 50% Injectable 25 Gram(s) IV Push once  docusate sodium 100 milliGRAM(s) Oral two times a day  doxercalciferol Injectable 2 MICROGram(s) IV Push <User Schedule>  furosemide    Tablet 80 milliGRAM(s) Oral <User Schedule>  insulin lispro (HumaLOG) corrective regimen sliding scale   SubCutaneous three times a day before meals  insulin lispro (HumaLOG) corrective regimen sliding scale   SubCutaneous at bedtime  latanoprost 0.005% Ophthalmic Solution 1 Drop(s) Both EYES at bedtime  levothyroxine 25 MICROGram(s) Oral daily  metolazone 2.5 milliGRAM(s) Oral daily  PARoxetine 20 milliGRAM(s) Oral daily  pregabalin 75 milliGRAM(s) Oral daily  senna 2 Tablet(s) Oral at bedtime  timolol 0.5% Solution 1 Drop(s) Both EYES daily    MEDICATIONS  (PRN):  acetaminophen   Tablet 650 milliGRAM(s) Oral every 6 hours PRN For Temp greater than 38 C (100.4 F)  acetaminophen   Tablet. 650 milliGRAM(s) Oral every 6 hours PRN Mild Pain (1 - 3)  dextrose Gel 1 Dose(s) Oral once PRN Blood Glucose LESS THAN 70 milliGRAM(s)/deciliter  glucagon  Injectable 1 milliGRAM(s) IntraMuscular once PRN Glucose LESS THAN 70 milligrams/deciliter  oxyCODONE    IR 5 milliGRAM(s) Oral every 6 hours PRN Moderate Pain (4 - 6)      Vital Signs Last 24 Hrs  T(C): 36.9 (23 Dec 2017 05:47), Max: 37.4 (22 Dec 2017 21:22)  T(F): 98.4 (23 Dec 2017 05:47), Max: 99.3 (22 Dec 2017 21:22)  HR: 77 (23 Dec 2017 05:47) (75 - 82)  BP: 117/71 (23 Dec 2017 05:47) (117/71 - 162/90)  BP(mean): --  RR: 18 (23 Dec 2017 05:47) (18 - 18)  SpO2: 100% (23 Dec 2017 05:47) (97% - 100%)  CAPILLARY BLOOD GLUCOSE  138 (22 Dec 2017 21:22)      POCT Blood Glucose.: 105 mg/dL (23 Dec 2017 12:09)  POCT Blood Glucose.: 130 mg/dL (23 Dec 2017 08:00)  POCT Blood Glucose.: 138 mg/dL (22 Dec 2017 22:17)  POCT Blood Glucose.: 75 mg/dL (22 Dec 2017 16:34)    I&O's Summary    22 Dec 2017 07:01  -  23 Dec 2017 07:00  --------------------------------------------------------  IN: 0 mL / OUT: 1500 mL / NET: -1500 mL        PHYSICAL EXAM:  GENERAL: NAD, well-developed  HEAD:  Atraumatic, Normocephalic  EYES: EOMI, PERRLA, conjunctiva and sclera clear  NECK: Supple, No JVD  CHEST/LUNG: Clear to auscultation bilaterally; No wheeze  HEART: Regular rate and rhythm; No murmurs, rubs, or gallops  ABDOMEN: Soft, Nontender, Nondistended; Bowel sounds present  EXTREMITIES:  2+ Peripheral Pulses, No clubbing, cyanosis, or edema  PSYCH: AAOx3  NEUROLOGY: CN's intact, strength 5/5 in UE's and 4/5 in LE's, decreased sensation in LE's.   SKIN: No rashes or lesions    LABS:                        10.3   3.29  )-----------( 83       ( 23 Dec 2017 08:00 )             32.0     12-23    141  |  102  |  22  ----------------------------<  80  3.8   |  27  |  3.99<H>    Ca    9.4      23 Dec 2017 08:00    TPro  6.9  /  Alb  x   /  TBili  x   /  DBili  x   /  AST  x   /  ALT  x   /  AlkPhos  x   12-23              RADIOLOGY & ADDITIONAL TESTS:    Imaging Personally Reviewed:    Consultant(s) Notes Reviewed:      Care Discussed with Consultants/Other Providers: Spoke with Dr Reagan from neuro, f/up anti YESICA, anti MAG antibodies

## 2017-12-23 NOTE — PROGRESS NOTE ADULT - PROBLEM SELECTOR PLAN 2
HD MWF  Cont with lasix 80mg daily, Zaroxolyn. Monitoring lytes.   Renal following, patient getting dialysis via Rt IJ perma cath, recently placed Lt AVF.   Vascular surgery consult called. Follow up recs.

## 2017-12-24 LAB
ANA PAT FLD IF-IMP: ABNORMAL
ANA TITR SER: ABNORMAL
ANION GAP SERPL CALC-SCNC: 14 MMOL/L — SIGNIFICANT CHANGE UP (ref 5–17)
AUTO DIFF PNL BLD: ABNORMAL
BUN SERPL-MCNC: 37 MG/DL — HIGH (ref 7–23)
C-ANCA SER-ACNC: NEGATIVE — SIGNIFICANT CHANGE UP
CALCIUM SERPL-MCNC: 9.3 MG/DL — SIGNIFICANT CHANGE UP (ref 8.4–10.5)
CHLORIDE SERPL-SCNC: 102 MMOL/L — SIGNIFICANT CHANGE UP (ref 96–108)
CO2 SERPL-SCNC: 25 MMOL/L — SIGNIFICANT CHANGE UP (ref 22–31)
CREAT SERPL-MCNC: 5.45 MG/DL — HIGH (ref 0.5–1.3)
GLUCOSE BLDC GLUCOMTR-MCNC: 108 MG/DL — HIGH (ref 70–99)
GLUCOSE BLDC GLUCOMTR-MCNC: 157 MG/DL — HIGH (ref 70–99)
GLUCOSE BLDC GLUCOMTR-MCNC: 73 MG/DL — SIGNIFICANT CHANGE UP (ref 70–99)
GLUCOSE BLDC GLUCOMTR-MCNC: 93 MG/DL — SIGNIFICANT CHANGE UP (ref 70–99)
GLUCOSE BLDC GLUCOMTR-MCNC: 95 MG/DL — SIGNIFICANT CHANGE UP (ref 70–99)
GLUCOSE SERPL-MCNC: 81 MG/DL — SIGNIFICANT CHANGE UP (ref 70–99)
HCT VFR BLD CALC: 31.1 % — LOW (ref 34.5–45)
HGB BLD-MCNC: 10.2 G/DL — LOW (ref 11.5–15.5)
MCHC RBC-ENTMCNC: 29.5 PG — SIGNIFICANT CHANGE UP (ref 27–34)
MCHC RBC-ENTMCNC: 32.8 GM/DL — SIGNIFICANT CHANGE UP (ref 32–36)
MCV RBC AUTO: 89.9 FL — SIGNIFICANT CHANGE UP (ref 80–100)
P-ANCA SER-ACNC: NEGATIVE — SIGNIFICANT CHANGE UP
PLATELET # BLD AUTO: 73 K/UL — LOW (ref 150–400)
POTASSIUM SERPL-MCNC: 4.3 MMOL/L — SIGNIFICANT CHANGE UP (ref 3.5–5.3)
POTASSIUM SERPL-SCNC: 4.3 MMOL/L — SIGNIFICANT CHANGE UP (ref 3.5–5.3)
RBC # BLD: 3.46 M/UL — LOW (ref 3.8–5.2)
RBC # FLD: 15 % — HIGH (ref 10.3–14.5)
SODIUM SERPL-SCNC: 141 MMOL/L — SIGNIFICANT CHANGE UP (ref 135–145)
WBC # BLD: 3.73 K/UL — LOW (ref 3.8–10.5)
WBC # FLD AUTO: 3.73 K/UL — LOW (ref 3.8–10.5)

## 2017-12-24 PROCEDURE — 90935 HEMODIALYSIS ONE EVALUATION: CPT | Mod: GC

## 2017-12-24 RX ORDER — ONDANSETRON 8 MG/1
4 TABLET, FILM COATED ORAL ONCE
Qty: 0 | Refills: 0 | Status: DISCONTINUED | OUTPATIENT
Start: 2017-12-24 | End: 2017-12-28

## 2017-12-24 RX ADMIN — Medication 100 MILLIGRAM(S): at 05:38

## 2017-12-24 RX ADMIN — Medication 20 MILLIGRAM(S): at 12:28

## 2017-12-24 RX ADMIN — OXYCODONE HYDROCHLORIDE 5 MILLIGRAM(S): 5 TABLET ORAL at 12:32

## 2017-12-24 RX ADMIN — OXYCODONE HYDROCHLORIDE 5 MILLIGRAM(S): 5 TABLET ORAL at 21:31

## 2017-12-24 RX ADMIN — Medication 80 MILLIGRAM(S): at 05:38

## 2017-12-24 RX ADMIN — Medication 1: at 17:14

## 2017-12-24 RX ADMIN — Medication 1 DROP(S): at 12:28

## 2017-12-24 RX ADMIN — Medication 25 MICROGRAM(S): at 05:38

## 2017-12-24 RX ADMIN — Medication 1 TABLET(S): at 12:28

## 2017-12-24 RX ADMIN — OXYCODONE HYDROCHLORIDE 5 MILLIGRAM(S): 5 TABLET ORAL at 13:02

## 2017-12-24 RX ADMIN — Medication 667 MILLIGRAM(S): at 17:14

## 2017-12-24 RX ADMIN — Medication 667 MILLIGRAM(S): at 12:28

## 2017-12-24 RX ADMIN — LATANOPROST 1 DROP(S): 0.05 SOLUTION/ DROPS OPHTHALMIC; TOPICAL at 21:32

## 2017-12-24 RX ADMIN — Medication 75 MILLIGRAM(S): at 12:28

## 2017-12-24 RX ADMIN — Medication 667 MILLIGRAM(S): at 08:34

## 2017-12-24 RX ADMIN — OXYCODONE HYDROCHLORIDE 5 MILLIGRAM(S): 5 TABLET ORAL at 22:00

## 2017-12-24 NOTE — PROGRESS NOTE ADULT - PROBLEM SELECTOR PLAN 1
On MWF schedule. Gets HD via Rt IJ permcath .Plan for maintenance HD today.   Pt. with recently placed LUE AVF and was supposed to follow up with vascular surgeon Dr. Deluca this week, recommend follow up while inpatient.  Seen during HD today, tolerating well. Access working well.

## 2017-12-24 NOTE — CONSULT NOTE ADULT - ASSESSMENT
57F h/o SLE p/w weakness x 4 since plaquenil was started in early November 2017. 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

## 2017-12-24 NOTE — CHART NOTE - NSCHARTNOTEFT_GEN_A_CORE
Neurosurgery note    please provide medical clearance for possible OR on 12/26/2017 for sural nerve biopsy.    thank you

## 2017-12-24 NOTE — PROGRESS NOTE ADULT - ATTENDING COMMENTS
I have seen the patient and reviewed dialysis prescription and flow sheet. Dialysis access is functioning well. Patient is tolerating dialysis well with no acute symptoms or distress. Dialysis prescription has been adjusted for optimized control of volume status, uremia and electrolytes. Management of additional metabolic abnormalities/anemia will continue to be addressed on follow up.

## 2017-12-24 NOTE — CONSULT NOTE ADULT - SUBJECTIVE AND OBJECTIVE BOX
HPI: 57yof w/ SLE, DM, and ESRD on dialysis MWF 2/2 SLE p/w worsening weakness since early November when plaquenil was started. She was admitted to the medicine service on 12/19 at which time the plaquenil was discontinued. The Patient's weakness has been stable since admission.     PMHx: as above  PSHx: as above  Medications: acetaminophen   Tablet PRN  acetaminophen   Tablet. PRN  bisacodyl  calcium acetate  calcium carbonate 1250 mG (OsCal)  dextrose 5%.  dextrose 50% Injectable  dextrose 50% Injectable  dextrose 50% Injectable  dextrose Gel PRN  docusate sodium  doxercalciferol Injectable  furosemide    Tablet  glucagon  Injectable PRN  insulin lispro (HumaLOG) corrective regimen sliding scale  insulin lispro (HumaLOG) corrective regimen sliding scale  latanoprost 0.005% Ophthalmic Solution  levothyroxine  metolazone  ondansetron Injectable  oxyCODONE    IR PRN  PARoxetine  pregabalin  senna  timolol 0.5% Solution    Allergies: penicillin (Rash)    Social history: lives with family (, son, and daughter), used to be a   Family History: none    VS: T(C): 37 (12-24-17 @ 14:30)  HR: 78 (12-24-17 @ 14:30)  BP: 156/84 (12-24-17 @ 14:30)  RR: 18 (12-24-17 @ 14:30)  SpO2: 99% (12-24-17 @ 14:30)  Wt(kg): --                          10.2   3.73  )-----------( 73       ( 24 Dec 2017 08:24 )             31.1     12-24    141  |  102  |  37<H>  ----------------------------<  81  4.3   |  25  |  5.45<H>    Ca    9.3      24 Dec 2017 08:32    TPro  6.9  /  Alb  x   /  TBili  x   /  DBili  x   /  AST  x   /  ALT  x   /  AlkPhos  x   12-23        Exam:  AAOx3  PERRL, EOMI, face symmetric, no tongue deviation  In all extremities, 4+/5 in proximal, and 4-/5 in distals  mild sensory decrease in all extremities  Reflexes 2+ throughout  No dysmetria

## 2017-12-24 NOTE — PROGRESS NOTE ADULT - PROBLEM SELECTOR PLAN 1
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 Spoke to neuro, patient needs nerve biopsy. Spoke to Neurosurgery to schedule patient for Sural nerve biopsy. Follow up Neurosurgery recs.   Rheum consult appreciated, ?? needs LP. Will try to schedule with Neuroradiology.   MRI lumbar, C spine show no cord compression.    Continue with Lyrica- renally dosed.   PT eval- recommend JESSICA placement.     Continue to hold Plaquenil.   Follow up anti EYSICA, anti MAG antibodies.

## 2017-12-24 NOTE — PROGRESS NOTE ADULT - SUBJECTIVE AND OBJECTIVE BOX
[facility] DIVISION OF KIDNEY DISEASES AND HYPERTENSION -- HEMODIALYSIS NOTE  --------------------------------------------------------------------------------  Chief Complaint: ESRD/Ongoing hemodialysis requirement    24 hour events/subjective:        PAST HISTORY  --------------------------------------------------------------------------------  No significant changes to PMH, PSH, FHx, SHx, unless otherwise noted    ALLERGIES & MEDICATIONS  --------------------------------------------------------------------------------  Allergies    penicillin (Rash)    Intolerances      Standing Inpatient Medications  bisacodyl 5 milliGRAM(s) Oral at bedtime  calcium acetate 667 milliGRAM(s) Oral three times a day with meals  calcium carbonate 1250 mG (OsCal) 1 Tablet(s) Oral daily  dextrose 5%. 1000 milliLiter(s) IV Continuous <Continuous>  dextrose 50% Injectable 12.5 Gram(s) IV Push once  dextrose 50% Injectable 25 Gram(s) IV Push once  dextrose 50% Injectable 25 Gram(s) IV Push once  docusate sodium 100 milliGRAM(s) Oral two times a day  doxercalciferol Injectable 2 MICROGram(s) IV Push <User Schedule>  furosemide    Tablet 80 milliGRAM(s) Oral <User Schedule>  insulin lispro (HumaLOG) corrective regimen sliding scale   SubCutaneous three times a day before meals  insulin lispro (HumaLOG) corrective regimen sliding scale   SubCutaneous at bedtime  latanoprost 0.005% Ophthalmic Solution 1 Drop(s) Both EYES at bedtime  levothyroxine 25 MICROGram(s) Oral daily  metolazone 2.5 milliGRAM(s) Oral daily  ondansetron Injectable 4 milliGRAM(s) IV Push once  PARoxetine 20 milliGRAM(s) Oral daily  pregabalin 75 milliGRAM(s) Oral daily  senna 2 Tablet(s) Oral at bedtime  timolol 0.5% Solution 1 Drop(s) Both EYES daily    PRN Inpatient Medications  acetaminophen   Tablet 650 milliGRAM(s) Oral every 6 hours PRN  acetaminophen   Tablet. 650 milliGRAM(s) Oral every 6 hours PRN  dextrose Gel 1 Dose(s) Oral once PRN  glucagon  Injectable 1 milliGRAM(s) IntraMuscular once PRN  oxyCODONE    IR 5 milliGRAM(s) Oral every 6 hours PRN      REVIEW OF SYSTEMS  --------------------------------------------------------------------------------  Gen: No weight changes, fatigue, fevers/chills, weakness  Skin: No rashes  Head/Eyes/Ears/Mouth: No headache; Normal hearing; Normal vision w/o blurriness; No sinus pain/discomfort, sore throat  Respiratory: No dyspnea, cough, wheezing, hemoptysis  CV: No chest pain, PND, orthopnea  GI: No abdominal pain, diarrhea, constipation, nausea, vomiting, melena, hematochezia  : No increased frequency, dysuria, hematuria, nocturia  MSK: No joint pain/swelling; no back pain; no edema  Neuro: No dizziness/lightheadedness, weakness, seizures, numbness, tingling  Heme: No easy bruising or bleeding  Endo: No heat/cold intolerance  Psych: No significant nervousness, anxiety, stress, depression    All other systems were reviewed and are negative, except as noted.    VITALS/PHYSICAL EXAM  --------------------------------------------------------------------------------  T(C): 37.3 (12-24-17 @ 06:28), Max: 37.3 (12-24-17 @ 06:28)  HR: 74 (12-24-17 @ 06:28) (73 - 75)  BP: 162/91 (12-24-17 @ 06:28) (108/67 - 162/91)  RR: 18 (12-24-17 @ 06:28) (18 - 18)  SpO2: 100% (12-24-17 @ 06:28) (97% - 100%)  Wt(kg): --        12-23-17 @ 07:01  -  12-24-17 @ 07:00  --------------------------------------------------------  IN: 1200 mL / OUT: 0 mL / NET: 1200 mL      Physical Exam:  	Gen: NAD,   	Pulm: CTA B/L  	CV: RRR, S1S2; no rub  	Abd: +BS, soft, nontender/nondistended  	: No suprapubic tenderness  	UE: Warm, ; no edema; no asterixis. LUCALVIN AVF, thrill+  	LE: Warm,; no edema  	Skin: Warm, without rashes  	Vascular access: RIJ tunneled catheter, site clean, dry, intact.    LABS/STUDIES  --------------------------------------------------------------------------------              10.3   3.29  >-----------<  83       [12-23-17 @ 08:00]              32.0     141  |  102  |  22  ----------------------------<  80      [12-23-17 @ 08:00]  3.8   |  27  |  3.99        Ca     9.4     [12-23-17 @ 08:00]    TPro  6.9  /  Alb  x   /  TBili  x   /  DBili  x   /  AST  x   /  ALT  x   /  AlkPhos  x   [12-23-17 @ 08:00]              [12-23-17 @ 08:00]    Iron 77, TIBC 90, %sat 86      [05-29-17 @ 09:39]  Ferritin 781.0      [05-29-17 @ 09:39]  PTH -- (Ca 7.5)      [06-01-17 @ 08:12]   253  Vitamin D (25OH) <4.0      [05-31-17 @ 08:52]  HbA1c 4.7      [12-20-17 @ 07:23]  TSH 8.03      [12-23-17 @ 08:00]  Lipid: chol 227, , HDL 47,       [05-30-17 @ 07:28]    HBsAb Reactive      [12-23-17 @ 08:00]  HBsAg Nonreact      [12-23-17 @ 08:00]  HBcAb Nonreact      [12-23-17 @ 08:00]  HCV 0.18, Nonreact      [12-23-17 @ 08:00]

## 2017-12-24 NOTE — PROGRESS NOTE ADULT - SUBJECTIVE AND OBJECTIVE BOX
Patient is a 57y old  Female who presents with a chief complaint of LE pain and inability to walk (19 Dec 2017 10:27)      SUBJECTIVE / OVERNIGHT EVENTS: Pt c/o weakness, pain in her legs, getting better.   No other new complaints. No overnight events.     T(C): 37.3 (12-24-17 @ 06:28), Max: 37.3 (12-24-17 @ 06:28)  HR: 74 (12-24-17 @ 06:28) (74 - 75)  BP: 162/91 (12-24-17 @ 06:28) (145/77 - 162/91)  RR: 18 (12-24-17 @ 06:28) (18 - 18)  SpO2: 100% (12-24-17 @ 06:28) (97% - 100%)    MEDICATIONS  (STANDING):  bisacodyl 5 milliGRAM(s) Oral at bedtime  calcium acetate 667 milliGRAM(s) Oral three times a day with meals  calcium carbonate 1250 mG (OsCal) 1 Tablet(s) Oral daily  dextrose 5%. 1000 milliLiter(s) (50 mL/Hr) IV Continuous <Continuous>  dextrose 50% Injectable 12.5 Gram(s) IV Push once  dextrose 50% Injectable 25 Gram(s) IV Push once  dextrose 50% Injectable 25 Gram(s) IV Push once  docusate sodium 100 milliGRAM(s) Oral two times a day  doxercalciferol Injectable 2 MICROGram(s) IV Push <User Schedule>  furosemide    Tablet 80 milliGRAM(s) Oral <User Schedule>  insulin lispro (HumaLOG) corrective regimen sliding scale   SubCutaneous three times a day before meals  insulin lispro (HumaLOG) corrective regimen sliding scale   SubCutaneous at bedtime  latanoprost 0.005% Ophthalmic Solution 1 Drop(s) Both EYES at bedtime  levothyroxine 25 MICROGram(s) Oral daily  metolazone 2.5 milliGRAM(s) Oral daily  ondansetron Injectable 4 milliGRAM(s) IV Push once  PARoxetine 20 milliGRAM(s) Oral daily  pregabalin 75 milliGRAM(s) Oral daily  senna 2 Tablet(s) Oral at bedtime  timolol 0.5% Solution 1 Drop(s) Both EYES daily    MEDICATIONS  (PRN):  acetaminophen   Tablet 650 milliGRAM(s) Oral every 6 hours PRN For Temp greater than 38 C (100.4 F)  acetaminophen   Tablet. 650 milliGRAM(s) Oral every 6 hours PRN Mild Pain (1 - 3)  dextrose Gel 1 Dose(s) Oral once PRN Blood Glucose LESS THAN 70 milliGRAM(s)/deciliter  glucagon  Injectable 1 milliGRAM(s) IntraMuscular once PRN Glucose LESS THAN 70 milligrams/deciliter  oxyCODONE    IR 5 milliGRAM(s) Oral every 6 hours PRN Moderate Pain (4 - 6)      PHYSICAL EXAM:  GENERAL: NAD, well-developed  HEAD:  Atraumatic, Normocephalic  EYES: EOMI, conjunctiva and sclera clear  NECK: Supple, No JVD  CHEST/LUNG: Clear to auscultation bilaterally; No wheeze  HEART: Regular rate and rhythm; No murmurs, rubs, or gallops  ABDOMEN: Soft, Nontender, Nondistended; Bowel sounds present  EXTREMITIES:  2+ Peripheral Pulses, No clubbing, cyanosis, or edema  PSYCH: AAOx3  NEUROLOGY: CN's intact, strength 5/5 in UE's and 4/5 in LE's, decreased sensation in LE's.   SKIN: No rashes or lesions                          10.2   3.73  )-----------( 73       ( 24 Dec 2017 08:24 )             31.1           LIVER FUNCTIONS - ( 23 Dec 2017 08:00 )  Alb: x     / Pro: 6.9 g/dL / ALK PHOS: x     / ALT: x     / AST: x     / GGT: x             141|102|37<81  4.3|25|5.45  9.3,--,--  12-24 @ 08:32              RADIOLOGY & ADDITIONAL TESTS:    Imaging Personally Reviewed:    Consultant(s) Notes Reviewed:      Care Discussed with Consultants/Other Providers: Spoke with Dr Reagan from neuro, f/up anti YESICA, anti MAG antibodies

## 2017-12-24 NOTE — CONSULT NOTE ADULT - PROBLEM SELECTOR RECOMMENDATION 9
possible biopsy on Tuesday 12/26 vs Thursday 12/28. Will need to coordinate with neuropathology. Will follow

## 2017-12-25 LAB
ANION GAP SERPL CALC-SCNC: 11 MMOL/L — SIGNIFICANT CHANGE UP (ref 5–17)
APTT BLD: 32 SEC — SIGNIFICANT CHANGE UP (ref 27.5–37.4)
BLD GP AB SCN SERPL QL: NEGATIVE — SIGNIFICANT CHANGE UP
BUN SERPL-MCNC: 34 MG/DL — HIGH (ref 7–23)
CALCIUM SERPL-MCNC: 9 MG/DL — SIGNIFICANT CHANGE UP (ref 8.4–10.5)
CHLORIDE SERPL-SCNC: 100 MMOL/L — SIGNIFICANT CHANGE UP (ref 96–108)
CO2 SERPL-SCNC: 29 MMOL/L — SIGNIFICANT CHANGE UP (ref 22–31)
CREAT SERPL-MCNC: 4.27 MG/DL — HIGH (ref 0.5–1.3)
GLUCOSE BLDC GLUCOMTR-MCNC: 118 MG/DL — HIGH (ref 70–99)
GLUCOSE BLDC GLUCOMTR-MCNC: 192 MG/DL — HIGH (ref 70–99)
GLUCOSE BLDC GLUCOMTR-MCNC: 218 MG/DL — HIGH (ref 70–99)
GLUCOSE BLDC GLUCOMTR-MCNC: 85 MG/DL — SIGNIFICANT CHANGE UP (ref 70–99)
GLUCOSE BLDC GLUCOMTR-MCNC: 99 MG/DL — SIGNIFICANT CHANGE UP (ref 70–99)
GLUCOSE SERPL-MCNC: 157 MG/DL — HIGH (ref 70–99)
HCT VFR BLD CALC: 31.2 % — LOW (ref 34.5–45)
HGB BLD-MCNC: 10.6 G/DL — LOW (ref 11.5–15.5)
INR BLD: 0.99 RATIO — SIGNIFICANT CHANGE UP (ref 0.88–1.16)
MCHC RBC-ENTMCNC: 31.9 PG — SIGNIFICANT CHANGE UP (ref 27–34)
MCHC RBC-ENTMCNC: 33.9 GM/DL — SIGNIFICANT CHANGE UP (ref 32–36)
MCV RBC AUTO: 94 FL — SIGNIFICANT CHANGE UP (ref 80–100)
PLATELET # BLD AUTO: 69 K/UL — LOW (ref 150–400)
POTASSIUM SERPL-MCNC: 3.9 MMOL/L — SIGNIFICANT CHANGE UP (ref 3.5–5.3)
POTASSIUM SERPL-SCNC: 3.9 MMOL/L — SIGNIFICANT CHANGE UP (ref 3.5–5.3)
PROTHROM AB SERPL-ACNC: 10.7 SEC — SIGNIFICANT CHANGE UP (ref 9.8–12.7)
RBC # BLD: 3.32 M/UL — LOW (ref 3.8–5.2)
RBC # FLD: 13.4 % — SIGNIFICANT CHANGE UP (ref 10.3–14.5)
RH IG SCN BLD-IMP: POSITIVE — SIGNIFICANT CHANGE UP
SODIUM SERPL-SCNC: 140 MMOL/L — SIGNIFICANT CHANGE UP (ref 135–145)
WBC # BLD: 4.2 K/UL — SIGNIFICANT CHANGE UP (ref 3.8–10.5)
WBC # FLD AUTO: 4.2 K/UL — SIGNIFICANT CHANGE UP (ref 3.8–10.5)

## 2017-12-25 PROCEDURE — 99233 SBSQ HOSP IP/OBS HIGH 50: CPT

## 2017-12-25 RX ADMIN — Medication 1 TABLET(S): at 12:11

## 2017-12-25 RX ADMIN — OXYCODONE HYDROCHLORIDE 5 MILLIGRAM(S): 5 TABLET ORAL at 05:45

## 2017-12-25 RX ADMIN — OXYCODONE HYDROCHLORIDE 5 MILLIGRAM(S): 5 TABLET ORAL at 05:14

## 2017-12-25 RX ADMIN — Medication 75 MILLIGRAM(S): at 12:10

## 2017-12-25 RX ADMIN — SENNA PLUS 2 TABLET(S): 8.6 TABLET ORAL at 21:52

## 2017-12-25 RX ADMIN — Medication 5 MILLIGRAM(S): at 21:52

## 2017-12-25 RX ADMIN — Medication 667 MILLIGRAM(S): at 17:07

## 2017-12-25 RX ADMIN — LATANOPROST 1 DROP(S): 0.05 SOLUTION/ DROPS OPHTHALMIC; TOPICAL at 21:52

## 2017-12-25 RX ADMIN — Medication 20 MILLIGRAM(S): at 12:11

## 2017-12-25 RX ADMIN — Medication 1 DROP(S): at 12:11

## 2017-12-25 RX ADMIN — Medication 667 MILLIGRAM(S): at 12:10

## 2017-12-25 RX ADMIN — Medication 667 MILLIGRAM(S): at 08:22

## 2017-12-25 RX ADMIN — OXYCODONE HYDROCHLORIDE 5 MILLIGRAM(S): 5 TABLET ORAL at 21:52

## 2017-12-25 RX ADMIN — Medication 25 MICROGRAM(S): at 05:14

## 2017-12-25 RX ADMIN — OXYCODONE HYDROCHLORIDE 5 MILLIGRAM(S): 5 TABLET ORAL at 22:37

## 2017-12-25 NOTE — PROGRESS NOTE ADULT - SUBJECTIVE AND OBJECTIVE BOX
Patient is a 57y old  Female who presents with a chief complaint of LE pain and inability to walk (19 Dec 2017 10:27)      SUBJECTIVE / OVERNIGHT EVENTS:    MEDICATIONS  (STANDING):  bisacodyl 5 milliGRAM(s) Oral at bedtime  calcium acetate 667 milliGRAM(s) Oral three times a day with meals  calcium carbonate 1250 mG (OsCal) 1 Tablet(s) Oral daily  dextrose 5%. 1000 milliLiter(s) (50 mL/Hr) IV Continuous <Continuous>  dextrose 50% Injectable 12.5 Gram(s) IV Push once  dextrose 50% Injectable 25 Gram(s) IV Push once  dextrose 50% Injectable 25 Gram(s) IV Push once  docusate sodium 100 milliGRAM(s) Oral two times a day  doxercalciferol Injectable 2 MICROGram(s) IV Push <User Schedule>  furosemide    Tablet 80 milliGRAM(s) Oral <User Schedule>  insulin lispro (HumaLOG) corrective regimen sliding scale   SubCutaneous three times a day before meals  insulin lispro (HumaLOG) corrective regimen sliding scale   SubCutaneous at bedtime  latanoprost 0.005% Ophthalmic Solution 1 Drop(s) Both EYES at bedtime  levothyroxine 25 MICROGram(s) Oral daily  metolazone 2.5 milliGRAM(s) Oral daily  ondansetron Injectable 4 milliGRAM(s) IV Push once  PARoxetine 20 milliGRAM(s) Oral daily  pregabalin 75 milliGRAM(s) Oral daily  senna 2 Tablet(s) Oral at bedtime  timolol 0.5% Solution 1 Drop(s) Both EYES daily    MEDICATIONS  (PRN):  acetaminophen   Tablet 650 milliGRAM(s) Oral every 6 hours PRN For Temp greater than 38 C (100.4 F)  acetaminophen   Tablet. 650 milliGRAM(s) Oral every 6 hours PRN Mild Pain (1 - 3)  dextrose Gel 1 Dose(s) Oral once PRN Blood Glucose LESS THAN 70 milliGRAM(s)/deciliter  glucagon  Injectable 1 milliGRAM(s) IntraMuscular once PRN Glucose LESS THAN 70 milligrams/deciliter  oxyCODONE    IR 5 milliGRAM(s) Oral every 6 hours PRN Moderate Pain (4 - 6)      Vital Signs Last 24 Hrs  T(C): 36.9 (25 Dec 2017 05:12), Max: 37.6 (24 Dec 2017 20:50)  T(F): 98.5 (25 Dec 2017 05:12), Max: 99.7 (24 Dec 2017 20:50)  HR: 80 (25 Dec 2017 05:12) (78 - 84)  BP: 134/78 (25 Dec 2017 05:12) (134/78 - 156/84)  BP(mean): --  RR: 18 (25 Dec 2017 05:12) (18 - 18)  SpO2: 100% (25 Dec 2017 05:12) (99% - 100%)  CAPILLARY BLOOD GLUCOSE      POCT Blood Glucose.: 118 mg/dL (25 Dec 2017 12:35)  POCT Blood Glucose.: 85 mg/dL (25 Dec 2017 08:09)  POCT Blood Glucose.: 93 mg/dL (24 Dec 2017 21:39)  POCT Blood Glucose.: 157 mg/dL (24 Dec 2017 16:47)    I&O's Summary    24 Dec 2017 07:01  -  25 Dec 2017 07:00  --------------------------------------------------------  IN: 1180 mL / OUT: 600 mL / NET: 580 mL        PHYSICAL EXAM:  GENERAL: NAD, well-developed  HEAD:  Atraumatic, Normocephalic  EYES: EOMI, PERRLA, conjunctiva and sclera clear  NECK: Supple, No JVD  CHEST/LUNG: Clear to auscultation bilaterally; No wheeze  HEART: Regular rate and rhythm; No murmurs, rubs, or gallops  ABDOMEN: Soft, Nontender, Nondistended; Bowel sounds present  EXTREMITIES:  2+ Peripheral Pulses, No clubbing, cyanosis, or edema  PSYCH: AAOx3  NEUROLOGY: CN's intact, strength 5/5 in UE's and 4/5 in LE's, decreased sensation in LE's.   SKIN: No rashes or lesions    LABS:                        10.6   4.2   )-----------( 69       ( 25 Dec 2017 11:20 )             31.2     12-25    140  |  100  |  34<H>  ----------------------------<  157<H>  3.9   |  29  |  4.27<H>    Ca    9.0      25 Dec 2017 11:20      PT/INR - ( 25 Dec 2017 11:20 )   PT: 10.7 sec;   INR: 0.99 ratio         PTT - ( 25 Dec 2017 11:20 )  PTT:32.0 sec          RADIOLOGY & ADDITIONAL TESTS:    Imaging Personally Reviewed:    Consultant(s) Notes Reviewed:      Care Discussed with Consultants/Other Providers:

## 2017-12-26 LAB
% ALBUMIN: 45.9 % — SIGNIFICANT CHANGE UP
% ALPHA 1: 4.1 % — SIGNIFICANT CHANGE UP
% ALPHA 2: 11.1 % — SIGNIFICANT CHANGE UP
% BETA: 11.9 % — SIGNIFICANT CHANGE UP
% GAMMA: 27 % — SIGNIFICANT CHANGE UP
% M SPIKE: SIGNIFICANT CHANGE UP %
ACE SERPL-CCNC: 63 U/L — SIGNIFICANT CHANGE UP (ref 14–82)
ALBUMIN SERPL ELPH-MCNC: 3.2 G/DL — LOW (ref 3.6–5.5)
ALBUMIN/GLOB SERPL ELPH: 0.9 RATIO — SIGNIFICANT CHANGE UP
ALPHA1 GLOB SERPL ELPH-MCNC: 0.3 G/DL — SIGNIFICANT CHANGE UP (ref 0.1–0.4)
ALPHA2 GLOB SERPL ELPH-MCNC: 0.8 G/DL — SIGNIFICANT CHANGE UP (ref 0.5–1)
ANION GAP SERPL CALC-SCNC: 13 MMOL/L — SIGNIFICANT CHANGE UP (ref 5–17)
B-GLOBULIN SERPL ELPH-MCNC: 0.8 G/DL — SIGNIFICANT CHANGE UP (ref 0.5–1)
BASOPHILS # BLD AUTO: 0.02 K/UL — SIGNIFICANT CHANGE UP (ref 0–0.2)
BASOPHILS NFR BLD AUTO: 0.5 % — SIGNIFICANT CHANGE UP (ref 0–2)
BUN SERPL-MCNC: 47 MG/DL — HIGH (ref 7–23)
CALCIUM SERPL-MCNC: 9.5 MG/DL — SIGNIFICANT CHANGE UP (ref 8.4–10.5)
CHLORIDE SERPL-SCNC: 98 MMOL/L — SIGNIFICANT CHANGE UP (ref 96–108)
CO2 SERPL-SCNC: 26 MMOL/L — SIGNIFICANT CHANGE UP (ref 22–31)
CREAT SERPL-MCNC: 5.66 MG/DL — HIGH (ref 0.5–1.3)
DRVVT SCREEN TO CONFIRM RATIO: SIGNIFICANT CHANGE UP
DSDNA AB SER-ACNC: 70 IU/ML — HIGH
EOSINOPHIL # BLD AUTO: 0.06 K/UL — SIGNIFICANT CHANGE UP (ref 0–0.5)
EOSINOPHIL NFR BLD AUTO: 1.4 — SIGNIFICANT CHANGE UP
GAMMA GLOBULIN: 1.9 G/DL — HIGH (ref 0.6–1.6)
GLUCOSE BLDC GLUCOMTR-MCNC: 105 MG/DL — HIGH (ref 70–99)
GLUCOSE BLDC GLUCOMTR-MCNC: 129 MG/DL — HIGH (ref 70–99)
GLUCOSE BLDC GLUCOMTR-MCNC: 151 MG/DL — HIGH (ref 70–99)
GLUCOSE BLDC GLUCOMTR-MCNC: 91 MG/DL — SIGNIFICANT CHANGE UP (ref 70–99)
GLUCOSE SERPL-MCNC: 105 MG/DL — HIGH (ref 70–99)
HCT VFR BLD CALC: 31.1 % — LOW (ref 34.5–45)
HGB BLD-MCNC: 10.1 G/DL — LOW (ref 11.5–15.5)
IMM GRANULOCYTES NFR BLD AUTO: 0.2 % — SIGNIFICANT CHANGE UP (ref 0–1.5)
INTERPRETATION SERPL IFE-IMP: SIGNIFICANT CHANGE UP
LA NT DPL PPP QL: 34.6 SEC — SIGNIFICANT CHANGE UP
LYMPHOCYTES # BLD AUTO: 1.5 K/UL — SIGNIFICANT CHANGE UP (ref 1–3.3)
LYMPHOCYTES # BLD AUTO: 35.8 % — SIGNIFICANT CHANGE UP (ref 13–44)
M-SPIKE: SIGNIFICANT CHANGE UP G/DL (ref 0–0)
MCHC RBC-ENTMCNC: 28.9 PG — SIGNIFICANT CHANGE UP (ref 27–34)
MCHC RBC-ENTMCNC: 32.5 GM/DL — SIGNIFICANT CHANGE UP (ref 32–36)
MCV RBC AUTO: 89.1 FL — SIGNIFICANT CHANGE UP (ref 80–100)
MONOCYTES # BLD AUTO: 0.45 K/UL — SIGNIFICANT CHANGE UP (ref 0–0.9)
MONOCYTES NFR BLD AUTO: 10.7 % — SIGNIFICANT CHANGE UP (ref 2–14)
NEUTROPHILS # BLD AUTO: 2.15 K/UL — SIGNIFICANT CHANGE UP (ref 1.8–7.4)
NEUTROPHILS NFR BLD AUTO: 51.4 % — SIGNIFICANT CHANGE UP (ref 43–77)
NORMALIZED SCT PPP-RTO: 1.2 RATIO — HIGH (ref 0–1.16)
NORMALIZED SCT PPP-RTO: ABNORMAL
PLATELET # BLD AUTO: 82 K/UL — LOW (ref 150–400)
POTASSIUM SERPL-MCNC: 4.2 MMOL/L — SIGNIFICANT CHANGE UP (ref 3.5–5.3)
POTASSIUM SERPL-SCNC: 4.2 MMOL/L — SIGNIFICANT CHANGE UP (ref 3.5–5.3)
PROT PATTERN SERPL ELPH-IMP: SIGNIFICANT CHANGE UP
RBC # BLD: 3.49 M/UL — LOW (ref 3.8–5.2)
RBC # FLD: 14.8 % — HIGH (ref 10.3–14.5)
SODIUM SERPL-SCNC: 137 MMOL/L — SIGNIFICANT CHANGE UP (ref 135–145)
WBC # BLD: 4.19 K/UL — SIGNIFICANT CHANGE UP (ref 3.8–10.5)
WBC # FLD AUTO: 4.19 K/UL — SIGNIFICANT CHANGE UP (ref 3.8–10.5)

## 2017-12-26 PROCEDURE — 99233 SBSQ HOSP IP/OBS HIGH 50: CPT

## 2017-12-26 PROCEDURE — 99231 SBSQ HOSP IP/OBS SF/LOW 25: CPT

## 2017-12-26 PROCEDURE — 99233 SBSQ HOSP IP/OBS HIGH 50: CPT | Mod: GC

## 2017-12-26 RX ADMIN — OXYCODONE HYDROCHLORIDE 5 MILLIGRAM(S): 5 TABLET ORAL at 22:35

## 2017-12-26 RX ADMIN — Medication 100 MILLIGRAM(S): at 05:28

## 2017-12-26 RX ADMIN — LATANOPROST 1 DROP(S): 0.05 SOLUTION/ DROPS OPHTHALMIC; TOPICAL at 22:36

## 2017-12-26 RX ADMIN — SENNA PLUS 2 TABLET(S): 8.6 TABLET ORAL at 22:36

## 2017-12-26 RX ADMIN — Medication 25 MICROGRAM(S): at 05:28

## 2017-12-26 RX ADMIN — OXYCODONE HYDROCHLORIDE 5 MILLIGRAM(S): 5 TABLET ORAL at 23:05

## 2017-12-26 RX ADMIN — Medication 1 DROP(S): at 13:26

## 2017-12-26 RX ADMIN — Medication 667 MILLIGRAM(S): at 13:25

## 2017-12-26 RX ADMIN — Medication 80 MILLIGRAM(S): at 05:28

## 2017-12-26 RX ADMIN — Medication 1 TABLET(S): at 13:25

## 2017-12-26 RX ADMIN — Medication 20 MILLIGRAM(S): at 13:25

## 2017-12-26 RX ADMIN — Medication 5 MILLIGRAM(S): at 22:36

## 2017-12-26 NOTE — CHART NOTE - NSCHARTNOTEFT_GEN_A_CORE
Medicine NP : brief episode of unresponsiveness.     Notified by the RN , while placing the patient on the commode from the bed , patient had a brief episode of unresponsiveness for <1min . patient was right back to her baseline mental status .   vitals checked :   T(C): 37 (26 Dec 2017 17:05), Max: 37.4 (25 Dec 2017 20:59)  T(F): 98.6 (26 Dec 2017 17:05), Max: 99.4 (26 Dec 2017 13:17)  HR: 78 (26 Dec 2017 17:05) (78 - 84)  BP: 185/95 (26 Dec 2017 17:05) (100/63 - 185/95)  RR: 18 (26 Dec 2017 17:05) (18 - 18)  SpO2: 98% (26 Dec 2017 17:05) (98% - 100%)  Notified house neuro ,who is following the patient : No further investigation recommended.  Least likely seizure.   No need for further investigation .  Monitor Orthostatic BP ,most likely vaso vegal event as patient is deconditioned from  prolonged stay in bed .  c/w current medication regimen.   Notified the same to Dr. Villalobos.

## 2017-12-26 NOTE — PROGRESS NOTE ADULT - SUBJECTIVE AND OBJECTIVE BOX
WILLARD BOB  [id number]    INTERVAL HPI/OVERNIGHT EVENTS:  No acute events overnight. Pt's javy Funk at bedside    MEDICATIONS  (STANDING):  bisacodyl 5 milliGRAM(s) Oral at bedtime  calcium acetate 667 milliGRAM(s) Oral three times a day with meals  calcium carbonate 1250 mG (OsCal) 1 Tablet(s) Oral daily  dextrose 5%. 1000 milliLiter(s) (50 mL/Hr) IV Continuous <Continuous>  dextrose 50% Injectable 12.5 Gram(s) IV Push once  dextrose 50% Injectable 25 Gram(s) IV Push once  dextrose 50% Injectable 25 Gram(s) IV Push once  docusate sodium 100 milliGRAM(s) Oral two times a day  doxercalciferol Injectable 2 MICROGram(s) IV Push <User Schedule>  furosemide    Tablet 80 milliGRAM(s) Oral <User Schedule>  insulin lispro (HumaLOG) corrective regimen sliding scale   SubCutaneous three times a day before meals  insulin lispro (HumaLOG) corrective regimen sliding scale   SubCutaneous at bedtime  latanoprost 0.005% Ophthalmic Solution 1 Drop(s) Both EYES at bedtime  levothyroxine 25 MICROGram(s) Oral daily  metolazone 2.5 milliGRAM(s) Oral daily  ondansetron Injectable 4 milliGRAM(s) IV Push once  PARoxetine 20 milliGRAM(s) Oral daily  pregabalin 75 milliGRAM(s) Oral daily  senna 2 Tablet(s) Oral at bedtime  timolol 0.5% Solution 1 Drop(s) Both EYES daily    MEDICATIONS  (PRN):  acetaminophen   Tablet 650 milliGRAM(s) Oral every 6 hours PRN For Temp greater than 38 C (100.4 F)  acetaminophen   Tablet. 650 milliGRAM(s) Oral every 6 hours PRN Mild Pain (1 - 3)  dextrose Gel 1 Dose(s) Oral once PRN Blood Glucose LESS THAN 70 milliGRAM(s)/deciliter  glucagon  Injectable 1 milliGRAM(s) IntraMuscular once PRN Glucose LESS THAN 70 milligrams/deciliter  oxyCODONE    IR 5 milliGRAM(s) Oral every 6 hours PRN Moderate Pain (4 - 6)      Allergies    penicillin (Rash)    Intolerances        Review of Systems:   General: No fevers/chills, no fatigue  HEENT: No blurry vision  CVS: No CP/palpitations  Resp: No SOB/wheezing  GI: No N/V/C/D/abdominal pain  MSK: +difficulty walking, lower extremity weakness and some pain  Skin: No new rashes  Neuro: No headaches      Vital Signs Last 24 Hrs  T(C): 37.4 (26 Dec 2017 13:17), Max: 37.4 (25 Dec 2017 20:59)  T(F): 99.4 (26 Dec 2017 13:17), Max: 99.4 (26 Dec 2017 13:17)  HR: 80 (26 Dec 2017 13:17) (79 - 84)  BP: 136/76 (26 Dec 2017 13:17) (100/63 - 159/80)  BP(mean): --  RR: 18 (26 Dec 2017 13:17) (18 - 18)  SpO2: 99% (26 Dec 2017 13:17) (99% - 100%)    Physical Exam:  General: NAD  HEENT: EOMI, MMM  Cardio: +S1/S2, RRR  Resp: CTA b/l  GI: +BS, soft, NT/ND  MSK: No notable joint abnormalities  Neuro: AAOx3. Pt has absent patellar and ankle reflexes. Unable to dorsiflex b/l. 3/5 strength in LE  Psych: wnl    LABS:                        10.1   4.19  )-----------( 82       ( 26 Dec 2017 07:31 )             31.1     12-26    137  |  98  |  47<H>  ----------------------------<  105<H>  4.2   |  26  |  5.66<H>    Ca    9.5      26 Dec 2017 09:15      PT/INR - ( 25 Dec 2017 11:20 )   PT: 10.7 sec;   INR: 0.99 ratio         PTT - ( 25 Dec 2017 11:20 )  PTT:32.0 sec        RADIOLOGY & ADDITIONAL TESTS:

## 2017-12-26 NOTE — PROGRESS NOTE ADULT - ASSESSMENT
57F h/o SLE p/w diffuse weakness since plaquenil was started in early November 2017. 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    Patient Made NPO for possible OR for nerve biopsy today  Will need to coordinate with neuropathology for sample processing

## 2017-12-26 NOTE — PROGRESS NOTE ADULT - SUBJECTIVE AND OBJECTIVE BOX
Patient is a 57y old  Female who presents with a chief complaint of LE pain and inability to walk (19 Dec 2017 10:27)      SUBJECTIVE / OVERNIGHT EVENTS: no events over night. ROS otherwise negative.     T(C): 36.8 (12-26-17 @ 05:05), Max: 36.8 (12-26-17 @ 05:05)  HR: 79 (12-26-17 @ 05:05) (79 - 79)  BP: 159/80 (12-26-17 @ 05:05) (159/80 - 159/80)  RR: 18 (12-26-17 @ 05:05) (18 - 18)  SpO2: 99% (12-26-17 @ 05:05) (99% - 99%)    MEDICATIONS  (STANDING):  bisacodyl 5 milliGRAM(s) Oral at bedtime  calcium acetate 667 milliGRAM(s) Oral three times a day with meals  calcium carbonate 1250 mG (OsCal) 1 Tablet(s) Oral daily  dextrose 5%. 1000 milliLiter(s) (50 mL/Hr) IV Continuous <Continuous>  dextrose 50% Injectable 12.5 Gram(s) IV Push once  dextrose 50% Injectable 25 Gram(s) IV Push once  dextrose 50% Injectable 25 Gram(s) IV Push once  docusate sodium 100 milliGRAM(s) Oral two times a day  doxercalciferol Injectable 2 MICROGram(s) IV Push <User Schedule>  furosemide    Tablet 80 milliGRAM(s) Oral <User Schedule>  insulin lispro (HumaLOG) corrective regimen sliding scale   SubCutaneous three times a day before meals  insulin lispro (HumaLOG) corrective regimen sliding scale   SubCutaneous at bedtime  latanoprost 0.005% Ophthalmic Solution 1 Drop(s) Both EYES at bedtime  levothyroxine 25 MICROGram(s) Oral daily  metolazone 2.5 milliGRAM(s) Oral daily  ondansetron Injectable 4 milliGRAM(s) IV Push once  PARoxetine 20 milliGRAM(s) Oral daily  pregabalin 75 milliGRAM(s) Oral daily  senna 2 Tablet(s) Oral at bedtime  timolol 0.5% Solution 1 Drop(s) Both EYES daily    MEDICATIONS  (PRN):  acetaminophen   Tablet 650 milliGRAM(s) Oral every 6 hours PRN For Temp greater than 38 C (100.4 F)  acetaminophen   Tablet. 650 milliGRAM(s) Oral every 6 hours PRN Mild Pain (1 - 3)  dextrose Gel 1 Dose(s) Oral once PRN Blood Glucose LESS THAN 70 milliGRAM(s)/deciliter  glucagon  Injectable 1 milliGRAM(s) IntraMuscular once PRN Glucose LESS THAN 70 milligrams/deciliter  oxyCODONE    IR 5 milliGRAM(s) Oral every 6 hours PRN Moderate Pain (4 - 6)      PHYSICAL EXAM:  GENERAL: NAD, well-developed  HEAD:  Atraumatic, Normocephalic  EYES: EOMI, conjunctiva and sclera clear  NECK: Supple, No JVD  CHEST/LUNG: Clear to auscultation bilaterally; No wheeze  HEART: Regular rate and rhythm; No murmurs, rubs, or gallops  ABDOMEN: Soft, Nontender, Nondistended; Bowel sounds present  EXTREMITIES:  2+ Peripheral Pulses, No clubbing, cyanosis, or edema  PSYCH: AAOx3  NEUROLOGY: CN's intact, strength 5/5 in UE's and 4/5 in LE's, decreased sensation in LE's.   SKIN: No rashes or lesions                          10.1   4.19  )-----------( 82       ( 26 Dec 2017 07:31 )             31.1             PT/INR - ( 25 Dec 2017 11:20 )   PT: 10.7 sec;   INR: 0.99 ratio         PTT - ( 25 Dec 2017 11:20 )  PTT:32.0 sec  137|98|47<105  4.2|26|5.66  9.5,--,--  12-26 @ 09:15        RADIOLOGY & ADDITIONAL TESTS:    Imaging Personally Reviewed:    Consultant(s) Notes Reviewed:      Care Discussed with Consultants/Other Providers:

## 2017-12-26 NOTE — PROGRESS NOTE ADULT - PROBLEM SELECTOR PLAN 1
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   MRI lumbar, C spine show no cord compression.    Continue with Lyrica- renally dosed.   PT eval- recommend JESSICA placement.   Continue to hold Plaquenil.   Follow up anti YESICA, anti MAG antibodies.  Plan for nerve biopsy, patient is intermediate risk for intermediate risk procedure, may proceed with biopsy as planned

## 2017-12-26 NOTE — PROGRESS NOTE ADULT - SUBJECTIVE AND OBJECTIVE BOX
Patient seen and examined at bedside.    T(C): 36.8 (12-26-17 @ 05:05), Max: 37.4 (12-25-17 @ 20:59)  HR: 79 (12-26-17 @ 05:05) (79 - 84)  BP: 159/80 (12-26-17 @ 05:05) (100/63 - 159/80)  RR: 18 (12-26-17 @ 05:05) (18 - 18)  SpO2: 99% (12-26-17 @ 05:05) (99% - 100%)  Wt(kg): --    Exam:    AOx3, Following Commands    CN: PERRL, EOMI, no facial droop    Motor: 4/5 throughout    Sensation intact to light touch    Reflexes: no clonus                           10.6   4.2   )-----------( 69       ( 25 Dec 2017 11:20 )               31.2   12-25    140  |  100  |  34<H>  ----------------------------<  157<H>  3.9   |  29  |  4.27<H>    Ca    9.0      25 Dec 2017 11:20

## 2017-12-26 NOTE — PROGRESS NOTE ADULT - ASSESSMENT
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  Unsure if Plaquenil induced as this would be highly rare. SLE and other autoimmune conditions can cause similar picture. Consider autoimmune neuropathic pathology vs alternate diagnoses such as GBS although pt does not have ascending neuropathy. Can also consider infectious causes vs endocrinological (such as T2DM or thyroid abnormalities)  - Plan for nerve bx by neurosx tomorrow.  - f/u neuro/neurosx recommendations    2) SLE- pt with hx of +SULLY, +dsDNA +SSA +Smith.  Also w/ Class V lupus nephritis progressed to ESRD on HD  Current manifestations of possible SLE flare include thrombocytopenia, leukopenia, anemia (although improved from before) and possible neuropathy  Pt with elevated dsDNA 70, +SULLY 1:1280, +SSA >8 , +Smith 8.4    3) Thrombocytopenia/leukopenia/anemia- while pancytopenia can be seen in SLE, would also keep differential broad.  In 6/2017, pt with normal platelets in ~200s and normal WBC count in around 5 range. New cytopenias may be concerning for SLE flare    Will follow

## 2017-12-27 DIAGNOSIS — I95.1 ORTHOSTATIC HYPOTENSION: ICD-10-CM

## 2017-12-27 LAB
ANION GAP SERPL CALC-SCNC: 15 MMOL/L — SIGNIFICANT CHANGE UP (ref 5–17)
BASOPHILS # BLD AUTO: 0.02 K/UL — SIGNIFICANT CHANGE UP (ref 0–0.2)
BASOPHILS NFR BLD AUTO: 0.4 % — SIGNIFICANT CHANGE UP (ref 0–2)
BUN SERPL-MCNC: 63 MG/DL — HIGH (ref 7–23)
CALCIUM SERPL-MCNC: 9 MG/DL — SIGNIFICANT CHANGE UP (ref 8.4–10.5)
CHLORIDE SERPL-SCNC: 101 MMOL/L — SIGNIFICANT CHANGE UP (ref 96–108)
CO2 SERPL-SCNC: 26 MMOL/L — SIGNIFICANT CHANGE UP (ref 22–31)
CREAT SERPL-MCNC: 6.85 MG/DL — HIGH (ref 0.5–1.3)
EOSINOPHIL # BLD AUTO: 0.06 K/UL — SIGNIFICANT CHANGE UP (ref 0–0.5)
EOSINOPHIL NFR BLD AUTO: 1.3 % — SIGNIFICANT CHANGE UP (ref 0–6)
GLUCOSE BLDC GLUCOMTR-MCNC: 103 MG/DL — HIGH (ref 70–99)
GLUCOSE BLDC GLUCOMTR-MCNC: 86 MG/DL — SIGNIFICANT CHANGE UP (ref 70–99)
GLUCOSE BLDC GLUCOMTR-MCNC: 90 MG/DL — SIGNIFICANT CHANGE UP (ref 70–99)
GLUCOSE BLDC GLUCOMTR-MCNC: 95 MG/DL — SIGNIFICANT CHANGE UP (ref 70–99)
GLUCOSE SERPL-MCNC: 85 MG/DL — SIGNIFICANT CHANGE UP (ref 70–99)
HCT VFR BLD CALC: 31 % — LOW (ref 34.5–45)
HGB BLD-MCNC: 10.2 G/DL — LOW (ref 11.5–15.5)
IMM GRANULOCYTES NFR BLD AUTO: 0.2 % — SIGNIFICANT CHANGE UP (ref 0–1.5)
LYMPHOCYTES # BLD AUTO: 1.62 K/UL — SIGNIFICANT CHANGE UP (ref 1–3.3)
LYMPHOCYTES # BLD AUTO: 36.4 % — SIGNIFICANT CHANGE UP (ref 13–44)
MCHC RBC-ENTMCNC: 29.2 PG — SIGNIFICANT CHANGE UP (ref 27–34)
MCHC RBC-ENTMCNC: 32.9 GM/DL — SIGNIFICANT CHANGE UP (ref 32–36)
MCV RBC AUTO: 88.8 FL — SIGNIFICANT CHANGE UP (ref 80–100)
MONOCYTES # BLD AUTO: 0.39 K/UL — SIGNIFICANT CHANGE UP (ref 0–0.9)
MONOCYTES NFR BLD AUTO: 8.8 % — SIGNIFICANT CHANGE UP (ref 2–14)
NEUTROPHILS # BLD AUTO: 2.35 K/UL — SIGNIFICANT CHANGE UP (ref 1.8–7.4)
NEUTROPHILS NFR BLD AUTO: 52.9 % — SIGNIFICANT CHANGE UP (ref 43–77)
PLATELET # BLD AUTO: 73 K/UL — LOW (ref 150–400)
POTASSIUM SERPL-MCNC: 4.3 MMOL/L — SIGNIFICANT CHANGE UP (ref 3.5–5.3)
POTASSIUM SERPL-SCNC: 4.3 MMOL/L — SIGNIFICANT CHANGE UP (ref 3.5–5.3)
RBC # BLD: 3.49 M/UL — LOW (ref 3.8–5.2)
RBC # FLD: 14.7 % — HIGH (ref 10.3–14.5)
SODIUM SERPL-SCNC: 142 MMOL/L — SIGNIFICANT CHANGE UP (ref 135–145)
WBC # BLD: 4.45 K/UL — SIGNIFICANT CHANGE UP (ref 3.8–10.5)
WBC # FLD AUTO: 4.45 K/UL — SIGNIFICANT CHANGE UP (ref 3.8–10.5)

## 2017-12-27 PROCEDURE — 99233 SBSQ HOSP IP/OBS HIGH 50: CPT | Mod: GC

## 2017-12-27 PROCEDURE — 93010 ELECTROCARDIOGRAM REPORT: CPT

## 2017-12-27 RX ORDER — OXYCODONE HYDROCHLORIDE 5 MG/1
5 TABLET ORAL EVERY 6 HOURS
Qty: 0 | Refills: 0 | Status: DISCONTINUED | OUTPATIENT
Start: 2017-12-27 | End: 2017-12-28

## 2017-12-27 RX ADMIN — DOXERCALCIFEROL 2 MICROGRAM(S): 2.5 CAPSULE ORAL at 15:55

## 2017-12-27 RX ADMIN — Medication 667 MILLIGRAM(S): at 09:12

## 2017-12-27 RX ADMIN — Medication 20 MILLIGRAM(S): at 17:37

## 2017-12-27 RX ADMIN — SENNA PLUS 2 TABLET(S): 8.6 TABLET ORAL at 21:33

## 2017-12-27 RX ADMIN — OXYCODONE HYDROCHLORIDE 5 MILLIGRAM(S): 5 TABLET ORAL at 21:33

## 2017-12-27 RX ADMIN — Medication 100 MILLIGRAM(S): at 05:58

## 2017-12-27 RX ADMIN — Medication 5 MILLIGRAM(S): at 21:33

## 2017-12-27 RX ADMIN — Medication 1 DROP(S): at 17:38

## 2017-12-27 RX ADMIN — LATANOPROST 1 DROP(S): 0.05 SOLUTION/ DROPS OPHTHALMIC; TOPICAL at 21:34

## 2017-12-27 RX ADMIN — Medication 75 MILLIGRAM(S): at 17:37

## 2017-12-27 RX ADMIN — Medication 667 MILLIGRAM(S): at 17:37

## 2017-12-27 RX ADMIN — Medication 25 MICROGRAM(S): at 05:59

## 2017-12-27 RX ADMIN — Medication 667 MILLIGRAM(S): at 12:39

## 2017-12-27 RX ADMIN — Medication 1 TABLET(S): at 22:45

## 2017-12-27 RX ADMIN — Medication 1 TABLET(S): at 12:39

## 2017-12-27 NOTE — DIETITIAN INITIAL EVALUATION ADULT. - FACTORS AFF FOOD INTAKE
Pt s/p gastric bypass, therefore consumes much smaller quantities of food per day due to early satiety. Pt s/p gastric bypass, therefore consumes much smaller quantities of food per day due to early satiety. Pt reported to be weak last few months, effecting po intake. Pt S/P gastric bypass, therefore, consumes smaller quantities of food per day due to early satiety. Pt reported to be weak last few weeks, affecting po intake.

## 2017-12-27 NOTE — PROGRESS NOTE ADULT - PROBLEM SELECTOR PROBLEM 5
Hypothyroidism, unspecified type Type 2 diabetes mellitus with chronic kidney disease on chronic dialysis, unspecified long term insulin use status

## 2017-12-27 NOTE — PROGRESS NOTE ADULT - PROBLEM SELECTOR PLAN 3
Plaquenil on hold in setting of LE weakness as outpt.   will monitor. Med recently started 3 weeks ago as outpt.  Prior no meds in the setting of Lupus nephritis on dialysis, autonomic dysfunction cannot be ruled out.  Check Cortisol levels. Minimize fluid removal in Dialysis.

## 2017-12-27 NOTE — DIETITIAN INITIAL EVALUATION ADULT. - ENERGY NEEDS
ht: 61 inches wt: 117.5 (dosing- 12/19) IBW: 105 pounds BMI: 22.1 kg/m2 111%IBW  other pertinent information: 56 Y/O female with PMH lupus nephritis, ESRD on HD s/p AVF, DM2, HTN, HLD, and hypothyroid, admitted with 3 week h/o bilateral LE pain and numbness. Pt previously diagnosed with neuropathy in October 2017. Plan for sural nerve biopsy 12/28. ht: 61 inches wt: 117.5 (dosing- 12/19) IBW: 105 pounds BMI: 22.1 kg/m2 111%IBW  other pertinent information: 58 Y/O female with PMH lupus nephritis, ESRD on HD S/P AVF, DM type 2, HTN, HLD, and hypothyroid, admitted with 3 week h/o bilateral LE pain and numbness. Pt previously diagnosed with neuropathy in October 2017. Plan for sural nerve biopsy 12/28.

## 2017-12-27 NOTE — DIETITIAN INITIAL EVALUATION ADULT. - ADHERENCE
fair/Pt's son reports no food restrictions at home. Per chart, pt controls type 2 DM through diet.  No reported renal diet restriction at home. Pt's son reports pt checks finger sticks each morning with usual blood glucose numbers in the 80's or 90's. NKFA reported. fair/Pt's son reports pt has been restricting potassium and phosphorus at home. Pt's son reports pt does not restrict sugar and other concentrated carbohydrate food sources for DM purposes, but restricts s/p gastric bypass 1 year ago. Pt's son reports pt checks finger sticks each morning with usual blood glucose numbers in the 80's or 90's. NKFA reported. fair/Pt's son reports pt has been restricting potassium and phosphorus at home. Pt's son reports pt does not restrict sugar and other concentrated carbohydrate food sources for DM purposes, but restricts CHO secondary to gastric bypass 1 year ago. Pt's son reports pt checks finger sticks each morning with usual blood glucose results of 80's or 90's. NKFA reported.

## 2017-12-27 NOTE — DIETITIAN INITIAL EVALUATION ADULT. - NUTRITION INTERVENTION
Nutrition Education/Collaboration and Referral of Nutrition Care/Meals and Snack Vitamin/Nutrition Education/Collaboration and Referral of Nutrition Care/Meals and Snack

## 2017-12-27 NOTE — DIETITIAN INITIAL EVALUATION ADULT. - NS AS NUTRI INTERV MEALS SNACK
Change diet to renal diet no protein restriction, no concentrated potassium and phosphorus, carbohydrate consistent with no snack, DASH/TLC. Fat - modified diet/Mineral - modified diet/Change diet to renal diet no protein restriction, no concentrated potassium and phosphorus with fluid restriction, carbohydrate consistent with no snack, DASH/TLC./General/healthful diet/Carbohydrate - modified diet Fat - modified diet/Mineral - modified diet/Carbohydrate - modified diet/General/healthful diet/Recommend renal, consistent CHO (snack) as medically feasible. Discussed with NP.

## 2017-12-27 NOTE — DIETITIAN INITIAL EVALUATION ADULT. - ORAL INTAKE PTA
Son reports pt consumes 3 small meals a day s/p gastric bypass 1 year ago. Usual intake: breakfast: eggs and toast, lunch: turkey sandwich, dinner: chicken- all meals are prepared by pt's sister. Pt's son reported that pt's family tries to give pt at least 1 Ensure a day (as pt refuses Glucerna), but is often "difficult" to do./fair Son reports pt consumes 3 small meals a day s/p gastric bypass 1 year ago. Usual intake: breakfast: eggs and toast, lunch: turkey sandwich, dinner: chicken- all meals are prepared by pt's sister. Pt's son reported that pt's family tries to give pt at least 1 Ensure a day (as pt refuses Glucerna), but is often "difficult" to do. Pt's son reports pt has been weak and unable to eat much within last few months./poor poor/Son reports pt consumes 3 small meals a day S/P gastric bypass 1 year ago. Usual intake: breakfast: eggs and toast, lunch: turkey sandwich, dinner: chicken- all meals are prepared by pt's sister. Pt's son reported that pt's family tries to give pt at least 1 Ensure a day (as pt refuses Glucerna), but is often "difficult" to do. Pt's son reports pt has been weak and unable to eat much within last few weeks.

## 2017-12-27 NOTE — DIETITIAN INITIAL EVALUATION ADULT. - DIET TYPE
consistent carbohydrate (no snacks)/NPO after midnight/DASH/TLC (sodium and cholesterol restricted diet) DASH/TLC (sodium and cholesterol restricted diet)/consistent carbohydrate (no snacks)

## 2017-12-27 NOTE — PROGRESS NOTE ADULT - SUBJECTIVE AND OBJECTIVE BOX
Patient seen and examined at bedside.    T(C): 36.8 (12-27-17 @ 05:18), Max: 37.4 (12-26-17 @ 13:17)  HR: 77 (12-27-17 @ 05:18) (77 - 80)  BP: 135/76 (12-27-17 @ 05:18) (135/76 - 185/95)  RR: 18 (12-27-17 @ 05:18) (18 - 18)  SpO2: 98% (12-27-17 @ 05:18) (98% - 99%)  Wt(kg): --    PHYSICAL EXAM:    Constitutional: No Acute Distress     Neurological: AOx3, Following Commands    Motor exam:          Upper extremity                         Delt     Bicep     Tricep    HG                                                 R         5/5        5/5        5/5       5/5                                               L          5/5        5/5        5/5       5/5          Lower extremity                        HF         KF        KE       DF         PF                                                  R        5/5        5/5        5/5       4+/5         4+/5                                               L         4+/5      4+/5     4/5       4-/5          4-/5                                                 Sensation: [x] intact to light touch  [] decreased:     No clonus                          10.1   4.19  )-----------( 82       ( 26 Dec 2017 07:31 )             31.1     12-26    137  |  98  |  47<H>  ----------------------------<  105<H>  4.2   |  26  |  5.66<H>    Ca    9.5      26 Dec 2017 09:15

## 2017-12-27 NOTE — DIETITIAN INITIAL EVALUATION ADULT. - PERTINENT MEDS FT
dextrose 5% at 50 ml/hour, zofran, lyrica, hectorol, lasix, dulcolax, calcium acetate, calcium carbonate, colace, humalog sliding scale, synthroid, metolazone, senna, timolol 0.5% solution. zofran, hectorol, lasix, dulcolax, calcium acetate, calcium carbonate, colace, humalog sliding scale, synthroid, senna

## 2017-12-27 NOTE — DIETITIAN INITIAL EVALUATION ADULT. - OTHER INFO
Pt seen for LOS on 6MON. Pt's son reports good po intake in house, consuming >50% of meals. Pt currently NPO after midnight for possible procedure. Pt's son reports pt has been consuming more in house than PTA. Pt's son reports UBW of 180 pounds, and has knowledge of significant wt. loss since initiation of HD in July 2017. Pt's son reports recent stable wt. of 115 pounds more recently. Current daily wt. (12/24) noted 108.9 pounds, dosing wt. (12/19) noted 117.5 ?wt. accuracy given fluctuations during admission. Severe malnutrition observed. Pt's son reports vomiting after breakfast this morning, with no nausea prior to or after episode. No other GI intolerances reported +BM 12/26, per chart. No chewing/swallowing issues reported. Home vitamins/supplements: Ensure Enlive 1x daily (if pt feeling up to it), vitamin D and vitamin E. Pt refusing oral nutrition supplement at this time. Pt seen for LOS on 6MON. Pt's son reports good po intake in house, consuming >50% of meals. Pt's son reports pt has been consuming more in house than PTA, as pt reported to have been weak for the last few months, effecting intake. Pt's son reports UBW of 180 pounds, and has knowledge of significant wt. loss since initiation of HD in July 2017. Pt's son reports recent stable wt. of 115 pounds more recently. Current daily wt. (12/24) noted 108.9 pounds, dosing wt. (12/19) noted 117.5 ?wt. accuracy given fluctuations during admission. Severe malnutrition observed. Nutrition Focused Physical Exam performed with pt consent with RD present with observations of severe muscle loss in anterior thigh region, and moderate fat loss in the orbital and triceps region. Pt's son reports vomiting after breakfast this morning, with no nausea prior to or after episode. No other GI intolerances reported +BM 12/26, per chart. No chewing/swallowing issues reported. Home vitamins/supplements: Ensure Enlive 1x daily (if pt feeling up to it), vitamin D and vitamin E. Pt refusing oral nutrition supplement at this time. Pt seen for LOS on 6MON. Pt's son reports good po intake in house, consuming >50% of meals. Pt's son reports pt has been consuming more in house than PTA, as pt reported to have been weak for the last few weeks, effecting intake. Pt's son reports UBW of 180 pounds- note wt loss above. Pt's son reports stable wt. of 115 pounds more recently. Dosing wt. (12/19) noted 117.5 pounds. Current daily wt. (12/24) noted 108.9 pounds- wt loss likely due to fluid shifts S/P HD. No edema noted. Severe malnutrition observed. Nutrition Focused Physical Exam performed with pt consent with RD present. Findings include severe muscle loss in anterior thigh region, and moderate fat loss in the orbital and triceps region. Pt's son reports vomiting after breakfast this morning, with no nausea prior to or after episode. No other GI intolerances reported +BM 12/26, per chart. No chewing/swallowing issues reported. Home vitamins/supplements: Ensure Enlive 1x daily (if pt feeling up to it), vitamin D and vitamin E. Pt refusing oral nutrition supplement at this time.

## 2017-12-27 NOTE — DIETITIAN INITIAL EVALUATION ADULT. - SIGNS/SYMPTOMS
33% wt. loss in 5 months 33% wt. loss in 5 months, severe muscle loss in anterior thigh region 33% wt. loss in 5 months, severe muscle loss and moderate fat loss

## 2017-12-27 NOTE — PROGRESS NOTE ADULT - ASSESSMENT
57F h/o SLE p/w diffuse weakness since plaquenil was started in early November 2017. 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    Plan for sural nerve biopsy tomorrow in order to coordinate with dialysis and neuropathology  Dialysis to be done today (preferably in afternoon)  Platelet goal >80K for operative procedure   Discussed with patient and family at bedside

## 2017-12-27 NOTE — DIETITIAN INITIAL EVALUATION ADULT. - NS AS NUTRI INTERV ED CONTENT
Purpose of the nutrition education/Recommended modifications/Renal diet education provided. RD to remain available as needed and per follow-up protocol. Renal and carbohydrate consistent diet education provided. RD to remain available as needed and per follow-up protocol./Purpose of the nutrition education/Recommended modifications Recommended modifications/Renal and consistent carbohydrate diet education provided. RD to remain available as needed and per follow-up protocol./Purpose of the nutrition education

## 2017-12-27 NOTE — DIETITIAN INITIAL EVALUATION ADULT. - NS FNS WEIGHT USED FOR CALC
dosing/117.5 pounds; defer fluid to team due to pt with ESRD on HD. dosing/Calories Actual body weight of 109 pounds; Protein IBW of 105 pounds; defer fluid to team due to pt with ESRD on HD. Calories: Actual body wt. 109 pounds. Protein: IBW: 105 pounds. Defer fluids to team per HD.

## 2017-12-27 NOTE — CHART NOTE - NSCHARTNOTEFT_GEN_A_CORE
Upon Nutritional Assessment by the Registered Dietitian your patient was determined to meet criteria / has evidence of the following diagnosis/diagnoses:          [ ]  Mild Protein Calorie Malnutrition        [ ]  Moderate Protein Calorie Malnutrition        [x ] Severe Protein Calorie Malnutrition        [ ] Unspecified Protein Calorie Malnutrition        [ ] Underweight / BMI <19        [ ] Morbid Obesity / BMI > 40      Findings as based on:  [ x] Comprehensive nutrition assessment   [ x] Nutrition Focused Physical Exam: severe muscle loss and moderate fat loss   [x ] Other: 33% wt. loss       Nutrition Plan/Recommendations:    1) Renal, consistent CHO (with snack) diet   2) Provided renal and diabetes nutrition education   3) Recommending Nephrovite         PROVIDER Section:     By signing this assessment you are acknowledging and agree with the diagnosis/diagnoses assigned by the Registered Dietitian    Comments:

## 2017-12-27 NOTE — DIETITIAN INITIAL EVALUATION ADULT. - SOURCE
family/significant other/other (specify)/Pt's son at bedside (pt refused  phone and preferred son to translate); Medical chart review; No previous RD note.

## 2017-12-27 NOTE — PROGRESS NOTE ADULT - PROBLEM SELECTOR PLAN 1
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   MRI lumbar, C spine show no cord compression.    Continue with Lyrica- renally dosed.   PT eval- recommend JESSICA placement.   Continue to hold Plaquenil.   Follow up anti YESICA, anti MAG antibodies.  Plan for nerve biopsy, patient is intermediate risk for intermediate risk procedure, may proceed with biopsy as planned Polyneuropathy r/o autoimmune etiology in the setting of SLE. Positive serology for Lupus, needs LP, Neuro to comment. Role of steroids- will discuss with Rheum.   MRI lumbar, C spine show no cord compression.    Continue with Lyrica- renally dosed.   PT eval- recommend JESSICA placement.   Continue to hold Plaquenil.   Follow up anti YESICA, anti MAG antibodies.  Plan for nerve biopsy, patient is intermediate risk for intermediate risk procedure, may proceed with biopsy as planned  Awaiting sural nerve biopsy. Keep patient NPO after MN.

## 2017-12-27 NOTE — DIETITIAN INITIAL EVALUATION ADULT. - ETIOLOGY
initiation of HD, decreased appetite inadequate protein-energy intake in the setting of increased nutrient needs (HD)

## 2017-12-27 NOTE — PROVIDER CONTACT NOTE (OTHER) - ASSESSMENT
148/81 decreased to 116/73 sitting. heart rate stable 79-84. afebrile. patient denies and dizziness or blurred vision in bed.

## 2017-12-27 NOTE — DIETITIAN INITIAL EVALUATION ADULT. - NS AS NUTRI INTERV COLLABORAT
Collaboration with other providers/Malnutrition alert sent to medical team Collaboration with other providers/Malnutrition coded. Discussed with NP.

## 2017-12-27 NOTE — PROGRESS NOTE ADULT - SUBJECTIVE AND OBJECTIVE BOX
Patient is a 57y old  Female who presents with a chief complaint of LE pain and inability to walk (19 Dec 2017 10:27)      SUBJECTIVE / OVERNIGHT EVENTS: no events over night. ROS otherwise negative.     T(C): 36.5 (12-27-17 @ 13:13), Max: 36.8 (12-27-17 @ 05:18)  HR: 81 (12-27-17 @ 13:13) (77 - 81)  BP: 145/84 (12-27-17 @ 13:13) (135/76 - 145/84)  RR: 17 (12-27-17 @ 13:13) (17 - 18)  SpO2: 99% (12-27-17 @ 13:13) (98% - 99%)    MEDICATIONS  (STANDING):  bisacodyl 5 milliGRAM(s) Oral at bedtime  calcium acetate 667 milliGRAM(s) Oral three times a day with meals  calcium carbonate 1250 mG (OsCal) 1 Tablet(s) Oral daily  dextrose 5%. 1000 milliLiter(s) (50 mL/Hr) IV Continuous <Continuous>  dextrose 50% Injectable 12.5 Gram(s) IV Push once  dextrose 50% Injectable 25 Gram(s) IV Push once  dextrose 50% Injectable 25 Gram(s) IV Push once  docusate sodium 100 milliGRAM(s) Oral two times a day  doxercalciferol Injectable 2 MICROGram(s) IV Push <User Schedule>  furosemide    Tablet 80 milliGRAM(s) Oral <User Schedule>  insulin lispro (HumaLOG) corrective regimen sliding scale   SubCutaneous three times a day before meals  insulin lispro (HumaLOG) corrective regimen sliding scale   SubCutaneous at bedtime  latanoprost 0.005% Ophthalmic Solution 1 Drop(s) Both EYES at bedtime  levothyroxine 25 MICROGram(s) Oral daily  metolazone 2.5 milliGRAM(s) Oral daily  Nephro-madiha 1 Tablet(s) Oral daily  ondansetron Injectable 4 milliGRAM(s) IV Push once  PARoxetine 20 milliGRAM(s) Oral daily  pregabalin 75 milliGRAM(s) Oral daily  senna 2 Tablet(s) Oral at bedtime  timolol 0.5% Solution 1 Drop(s) Both EYES daily    MEDICATIONS  (PRN):  acetaminophen   Tablet 650 milliGRAM(s) Oral every 6 hours PRN For Temp greater than 38 C (100.4 F)  acetaminophen   Tablet. 650 milliGRAM(s) Oral every 6 hours PRN Mild Pain (1 - 3)  dextrose Gel 1 Dose(s) Oral once PRN Blood Glucose LESS THAN 70 milliGRAM(s)/deciliter  glucagon  Injectable 1 milliGRAM(s) IntraMuscular once PRN Glucose LESS THAN 70 milligrams/deciliter      PHYSICAL EXAM:  GENERAL: NAD, well-developed  HEAD:  Atraumatic, Normocephalic  EYES: EOMI, conjunctiva and sclera clear  NECK: Supple, No JVD  CHEST/LUNG: Clear to auscultation bilaterally; No wheeze  HEART: Regular rate and rhythm; No murmurs, rubs, or gallops  ABDOMEN: Soft, Nontender, Nondistended; Bowel sounds present  EXTREMITIES:  2+ Peripheral Pulses, No clubbing, cyanosis, or edema  PSYCH: AAOx3  NEUROLOGY: CN's intact, strength 3/5 in UE's and 4/5 in LE's, decreased sensation in LE's.   SKIN: No rashes or lesions                          10.1   4.19  )-----------( 82       ( 26 Dec 2017 07:31 )             31.1             PT/INR - ( 25 Dec 2017 11:20 )   PT: 10.7 sec;   INR: 0.99 ratio         PTT - ( 25 Dec 2017 11:20 )  PTT:32.0 sec  137|98|47<105  4.2|26|5.66  9.5,--,--  12-26 @ 09:15        RADIOLOGY & ADDITIONAL TESTS:    Imaging Personally Reviewed:    Consultant(s) Notes Reviewed:      Care Discussed with Consultants/Other Providers: Patient is a 57y old  Female who presents with a chief complaint of LE pain and inability to walk (19 Dec 2017 10:27)      SUBJECTIVE / OVERNIGHT EVENTS: Patient feels much weaker, had an episode of vasovagal episode while having bowel movement yesterday.   No events over night. Patient denies chest pain/ dizziness/ palpitations.   ROS otherwise negative.     T(C): 36.5 (12-27-17 @ 13:13), Max: 36.8 (12-27-17 @ 05:18)  HR: 81 (12-27-17 @ 13:13) (77 - 81)  BP: 145/84 (12-27-17 @ 13:13) (135/76 - 145/84)  RR: 17 (12-27-17 @ 13:13) (17 - 18)  SpO2: 99% (12-27-17 @ 13:13) (98% - 99%)    MEDICATIONS  (STANDING):  bisacodyl 5 milliGRAM(s) Oral at bedtime  calcium acetate 667 milliGRAM(s) Oral three times a day with meals  calcium carbonate 1250 mG (OsCal) 1 Tablet(s) Oral daily  dextrose 5%. 1000 milliLiter(s) (50 mL/Hr) IV Continuous <Continuous>  dextrose 50% Injectable 12.5 Gram(s) IV Push once  dextrose 50% Injectable 25 Gram(s) IV Push once  dextrose 50% Injectable 25 Gram(s) IV Push once  docusate sodium 100 milliGRAM(s) Oral two times a day  doxercalciferol Injectable 2 MICROGram(s) IV Push <User Schedule>  furosemide    Tablet 80 milliGRAM(s) Oral <User Schedule>  insulin lispro (HumaLOG) corrective regimen sliding scale   SubCutaneous three times a day before meals  insulin lispro (HumaLOG) corrective regimen sliding scale   SubCutaneous at bedtime  latanoprost 0.005% Ophthalmic Solution 1 Drop(s) Both EYES at bedtime  levothyroxine 25 MICROGram(s) Oral daily  metolazone 2.5 milliGRAM(s) Oral daily  Nephro-madiha 1 Tablet(s) Oral daily  ondansetron Injectable 4 milliGRAM(s) IV Push once  PARoxetine 20 milliGRAM(s) Oral daily  pregabalin 75 milliGRAM(s) Oral daily  senna 2 Tablet(s) Oral at bedtime  timolol 0.5% Solution 1 Drop(s) Both EYES daily    MEDICATIONS  (PRN):  acetaminophen   Tablet 650 milliGRAM(s) Oral every 6 hours PRN For Temp greater than 38 C (100.4 F)  acetaminophen   Tablet. 650 milliGRAM(s) Oral every 6 hours PRN Mild Pain (1 - 3)  dextrose Gel 1 Dose(s) Oral once PRN Blood Glucose LESS THAN 70 milliGRAM(s)/deciliter  glucagon  Injectable 1 milliGRAM(s) IntraMuscular once PRN Glucose LESS THAN 70 milligrams/deciliter    PHYSICAL EXAM:  GENERAL: NAD, well-developed  HEAD:  Atraumatic, Normocephalic  EYES: EOMI, conjunctiva and sclera clear  NECK: Supple, No JVD  CHEST/LUNG: Clear to auscultation bilaterally; No wheeze  HEART: Regular rate and rhythm; No murmurs, rubs, or gallops  ABDOMEN: Soft, Nontender, Nondistended; Bowel sounds present  EXTREMITIES:  2+ Peripheral Pulses, No clubbing, cyanosis, or edema  PSYCH: AAOx3  NEUROLOGY: CN's intact, strength 3/5 in UE's and 4/5 in LE's, decreased sensation in LE's.   SKIN: No rashes or lesions                             10.2   4.45  )-----------( 73       ( 27 Dec 2017 08:56 )             31.0               142|101|63<85  4.3|26|6.85  9.0,--,--  12-27 @ 08:59      RADIOLOGY & ADDITIONAL TESTS:    Imaging Personally Reviewed:    Consultant(s) Notes Reviewed:  Rheum, Neuro     Care Discussed with Consultants/Other Providers:

## 2017-12-27 NOTE — PROGRESS NOTE ADULT - SUBJECTIVE AND OBJECTIVE BOX
[facility] DIVISION OF KIDNEY DISEASES AND HYPERTENSION -- HEMODIALYSIS NOTE  --------------------------------------------------------------------------------  Chief Complaint: ESRD/Ongoing hemodialysis requirement    24 hour events/subjective:  scheduled for nerve biopsy.  no acute complaint.      PAST HISTORY  --------------------------------------------------------------------------------  No significant changes to PMH, PSH, FHx, SHx, unless otherwise noted    ALLERGIES & MEDICATIONS  --------------------------------------------------------------------------------  Allergies    penicillin (Rash)    Intolerances      Standing Inpatient Medications  bisacodyl 5 milliGRAM(s) Oral at bedtime  calcium acetate 667 milliGRAM(s) Oral three times a day with meals  calcium carbonate 1250 mG (OsCal) 1 Tablet(s) Oral daily  dextrose 5%. 1000 milliLiter(s) IV Continuous <Continuous>  dextrose 50% Injectable 12.5 Gram(s) IV Push once  dextrose 50% Injectable 25 Gram(s) IV Push once  dextrose 50% Injectable 25 Gram(s) IV Push once  docusate sodium 100 milliGRAM(s) Oral two times a day  doxercalciferol Injectable 2 MICROGram(s) IV Push <User Schedule>  furosemide    Tablet 80 milliGRAM(s) Oral <User Schedule>  insulin lispro (HumaLOG) corrective regimen sliding scale   SubCutaneous three times a day before meals  insulin lispro (HumaLOG) corrective regimen sliding scale   SubCutaneous at bedtime  latanoprost 0.005% Ophthalmic Solution 1 Drop(s) Both EYES at bedtime  levothyroxine 25 MICROGram(s) Oral daily  metolazone 2.5 milliGRAM(s) Oral daily  ondansetron Injectable 4 milliGRAM(s) IV Push once  PARoxetine 20 milliGRAM(s) Oral daily  pregabalin 75 milliGRAM(s) Oral daily  senna 2 Tablet(s) Oral at bedtime  timolol 0.5% Solution 1 Drop(s) Both EYES daily    PRN Inpatient Medications  acetaminophen   Tablet 650 milliGRAM(s) Oral every 6 hours PRN  acetaminophen   Tablet. 650 milliGRAM(s) Oral every 6 hours PRN  dextrose Gel 1 Dose(s) Oral once PRN  glucagon  Injectable 1 milliGRAM(s) IntraMuscular once PRN      REVIEW OF SYSTEMS  --------------------------------------------------------------------------------  Gen: No weight changes, fatigue, fevers/chills, weakness  Skin: No rashes  Head/Eyes/Ears/Mouth: No headache; Normal hearing; Normal vision w/o blurriness; No sinus pain/discomfort, sore throat  Respiratory: No dyspnea, cough, wheezing, hemoptysis  CV: No chest pain, PND, orthopnea  GI: No abdominal pain, diarrhea, constipation, nausea, vomiting, melena, hematochezia  : No increased frequency, dysuria, hematuria, nocturia  MSK: No joint pain/swelling; no back pain; no edema  Neuro: LE weakness,numbness  Heme: No easy bruising or bleeding  Endo: No heat/cold intolerance  Psych: No significant nervousness, anxiety, stress, depression    All other systems were reviewed and are negative, except as noted.    VITALS/PHYSICAL EXAM  --------------------------------------------------------------------------------  T(C): 36.8 (12-27-17 @ 05:18), Max: 37.4 (12-26-17 @ 13:17)  HR: 77 (12-27-17 @ 05:18) (77 - 80)  BP: 135/76 (12-27-17 @ 05:18) (135/76 - 185/95)  RR: 18 (12-27-17 @ 05:18) (18 - 18)  SpO2: 98% (12-27-17 @ 05:18) (98% - 99%)  Wt(kg): --        12-26-17 @ 07:01  -  12-27-17 @ 07:00  --------------------------------------------------------  IN: 480 mL / OUT: 0 mL / NET: 480 mL      Physical Exam:  	Gen: NAD  	Pulm: CTA B/L  	CV: RRR, S1S2; no rub  	Abd: +BS, soft, nontender/nondistended  	: No suprapubic tenderness  	UE: Warm,  no edema; LUE AVF+  	LE: Warm,  no edema  	Skin: Warm, without rashes  	Vascular access: RIJ tunneled dialysis catheter, site clean/dry/intact      LABS/STUDIES  --------------------------------------------------------------------------------              10.2   4.45  >-----------<  73       [12-27-17 @ 08:56]              31.0     142  |  101  |  63  ----------------------------<  85      [12-27-17 @ 08:59]  4.3   |  26  |  6.85        Ca     9.0     [12-27-17 @ 08:59]            Iron 77, TIBC 90, %sat 86      [05-29-17 @ 09:39]  Ferritin 781.0      [05-29-17 @ 09:39]  PTH -- (Ca 7.5)      [06-01-17 @ 08:12]   253  Vitamin D (25OH) <4.0      [05-31-17 @ 08:52]  HbA1c 4.7      [12-20-17 @ 07:23]  TSH 8.03      [12-23-17 @ 08:00]  Lipid: chol 227, , HDL 47,       [05-30-17 @ 07:28]    HBsAb Reactive      [12-23-17 @ 08:00]  HBsAg Nonreact      [12-23-17 @ 08:00]  HBcAb Nonreact      [12-23-17 @ 08:00]  HCV 0.18, Nonreact      [12-23-17 @ 08:00]

## 2017-12-27 NOTE — PROGRESS NOTE ADULT - PROBLEM SELECTOR PLAN 2
HD MWF  Cont with lasix 80mg daily, Zaroxolyn. Monitoring lytes.   Renal following, patient getting dialysis via Rt IJ perma cath, recently placed Lt AVF.   Vascular surgery consult called. Follow up recs. HD MWF  Cont with lasix 80mg on dialysis days, Zaroxolyn. Monitoring lytes.   Renal following, patient getting dialysis via Rt IJ perma cath, recently placed Lt AVF.   Vascular surgery consulted. No intervention at this point.   Spoke to renal will minimize fluid removal given patient orthostatic.

## 2017-12-27 NOTE — DIETITIAN INITIAL EVALUATION ADULT. - PERTINENT LABORATORY DATA
(12/27): BUN 63, creatinine: 6.85, glucose: 85, finger sticks: 86 (12/26):  (12/27): Fingerstick 86, BUN 63, creatinine: 6.85, glucose: 85; (12/26) Fingersticks ; (12/25) Fingersticks

## 2017-12-27 NOTE — PROGRESS NOTE ADULT - PROBLEM SELECTOR PLAN 5
Cont with synthroid Not on any home meds at this time.   Well controlled as per family s/p gastric bypass  On insulin sliding scale

## 2017-12-27 NOTE — PROGRESS NOTE ADULT - PROBLEM SELECTOR PROBLEM 4
Type 2 diabetes mellitus with chronic kidney disease on chronic dialysis, unspecified long term insulin use status Systemic lupus erythematosus with glomerular disease, unspecified SLE type

## 2017-12-27 NOTE — PROGRESS NOTE ADULT - PROBLEM SELECTOR PLAN 4
Not on any home meds at this time.   Well controlled as per family s/p gastric bypass  On insulin sliding scale Plaquenil on hold in setting of LE weakness as outpt.   will monitor. Med recently started 3 weeks ago as outpt.  Prior no meds  Rheum following.

## 2017-12-28 ENCOUNTER — TRANSCRIPTION ENCOUNTER (OUTPATIENT)
Age: 57
End: 2017-12-28

## 2017-12-28 ENCOUNTER — RESULT REVIEW (OUTPATIENT)
Age: 57
End: 2017-12-28

## 2017-12-28 ENCOUNTER — APPOINTMENT (OUTPATIENT)
Dept: NEUROSURGERY | Facility: HOSPITAL | Age: 57
End: 2017-12-28

## 2017-12-28 DIAGNOSIS — I95.2 HYPOTENSION DUE TO DRUGS: ICD-10-CM

## 2017-12-28 DIAGNOSIS — N93.9 ABNORMAL UTERINE AND VAGINAL BLEEDING, UNSPECIFIED: ICD-10-CM

## 2017-12-28 LAB
ANION GAP SERPL CALC-SCNC: 14 MMOL/L — SIGNIFICANT CHANGE UP (ref 5–17)
BLD GP AB SCN SERPL QL: NEGATIVE — SIGNIFICANT CHANGE UP
BUN SERPL-MCNC: 28 MG/DL — HIGH (ref 7–23)
CALCIUM SERPL-MCNC: 9.2 MG/DL — SIGNIFICANT CHANGE UP (ref 8.4–10.5)
CHLORIDE SERPL-SCNC: 95 MMOL/L — LOW (ref 96–108)
CK MB CFR SERPL CALC: 4.3 NG/ML — HIGH (ref 0–3.8)
CK MB CFR SERPL CALC: 4.7 NG/ML — HIGH (ref 0–3.8)
CK SERPL-CCNC: 56 U/L — SIGNIFICANT CHANGE UP (ref 25–170)
CK SERPL-CCNC: 75 U/L — SIGNIFICANT CHANGE UP (ref 25–170)
CO2 SERPL-SCNC: 26 MMOL/L — SIGNIFICANT CHANGE UP (ref 22–31)
CREAT SERPL-MCNC: 4.11 MG/DL — HIGH (ref 0.5–1.3)
GAD65 AB SER-MCNC: 0 NMOL/L — SIGNIFICANT CHANGE UP
GLUCOSE BLDC GLUCOMTR-MCNC: 103 MG/DL — HIGH (ref 70–99)
GLUCOSE BLDC GLUCOMTR-MCNC: 152 MG/DL — HIGH (ref 70–99)
GLUCOSE BLDC GLUCOMTR-MCNC: 77 MG/DL — SIGNIFICANT CHANGE UP (ref 70–99)
GLUCOSE BLDC GLUCOMTR-MCNC: 80 MG/DL — SIGNIFICANT CHANGE UP (ref 70–99)
GLUCOSE SERPL-MCNC: 84 MG/DL — SIGNIFICANT CHANGE UP (ref 70–99)
HCT VFR BLD CALC: 29.3 % — LOW (ref 34.5–45)
HCT VFR BLD CALC: 33.5 % — LOW (ref 34.5–45)
HGB BLD-MCNC: 11.2 G/DL — LOW (ref 11.5–15.5)
HGB BLD-MCNC: 9.8 G/DL — LOW (ref 11.5–15.5)
MAG AB SER-ACNC: NEGATIVE — SIGNIFICANT CHANGE UP
MCHC RBC-ENTMCNC: 30.9 PG — SIGNIFICANT CHANGE UP (ref 27–34)
MCHC RBC-ENTMCNC: 31.2 PG — SIGNIFICANT CHANGE UP (ref 27–34)
MCHC RBC-ENTMCNC: 33.5 GM/DL — SIGNIFICANT CHANGE UP (ref 32–36)
MCHC RBC-ENTMCNC: 33.6 GM/DL — SIGNIFICANT CHANGE UP (ref 32–36)
MCV RBC AUTO: 92.3 FL — SIGNIFICANT CHANGE UP (ref 80–100)
MCV RBC AUTO: 92.9 FL — SIGNIFICANT CHANGE UP (ref 80–100)
PLATELET # BLD AUTO: 103 K/UL — LOW (ref 150–400)
PLATELET # BLD AUTO: 122 K/UL — LOW (ref 150–400)
POTASSIUM SERPL-MCNC: 3.8 MMOL/L — SIGNIFICANT CHANGE UP (ref 3.5–5.3)
POTASSIUM SERPL-SCNC: 3.8 MMOL/L — SIGNIFICANT CHANGE UP (ref 3.5–5.3)
RBC # BLD: 3.18 M/UL — LOW (ref 3.8–5.2)
RBC # BLD: 3.6 M/UL — LOW (ref 3.8–5.2)
RBC # FLD: 13.2 % — SIGNIFICANT CHANGE UP (ref 10.3–14.5)
RBC # FLD: 13.4 % — SIGNIFICANT CHANGE UP (ref 10.3–14.5)
RH IG SCN BLD-IMP: POSITIVE — SIGNIFICANT CHANGE UP
SODIUM SERPL-SCNC: 135 MMOL/L — SIGNIFICANT CHANGE UP (ref 135–145)
TROPONIN T SERPL-MCNC: 0.14 NG/ML — HIGH (ref 0–0.06)
TROPONIN T SERPL-MCNC: 0.14 NG/ML — HIGH (ref 0–0.06)
WBC # BLD: 4.4 K/UL — SIGNIFICANT CHANGE UP (ref 3.8–10.5)
WBC # BLD: 4.8 K/UL — SIGNIFICANT CHANGE UP (ref 3.8–10.5)
WBC # FLD AUTO: 4.4 K/UL — SIGNIFICANT CHANGE UP (ref 3.8–10.5)
WBC # FLD AUTO: 4.8 K/UL — SIGNIFICANT CHANGE UP (ref 3.8–10.5)

## 2017-12-28 PROCEDURE — 88341 IMHCHEM/IMCYTCHM EA ADD ANTB: CPT | Mod: 26

## 2017-12-28 PROCEDURE — 88305 TISSUE EXAM BY PATHOLOGIST: CPT | Mod: 26

## 2017-12-28 PROCEDURE — 93010 ELECTROCARDIOGRAM REPORT: CPT

## 2017-12-28 PROCEDURE — 88342 IMHCHEM/IMCYTCHM 1ST ANTB: CPT | Mod: 26

## 2017-12-28 PROCEDURE — 99233 SBSQ HOSP IP/OBS HIGH 50: CPT

## 2017-12-28 PROCEDURE — 70450 CT HEAD/BRAIN W/O DYE: CPT | Mod: 26

## 2017-12-28 PROCEDURE — 88313 SPECIAL STAINS GROUP 2: CPT | Mod: 26

## 2017-12-28 PROCEDURE — 64795 BIOPSY OF NERVE: CPT | Mod: LT

## 2017-12-28 RX ORDER — DOXERCALCIFEROL 2.5 UG/1
2 CAPSULE ORAL
Qty: 0 | Refills: 0 | Status: DISCONTINUED | OUTPATIENT
Start: 2017-12-28 | End: 2018-01-05

## 2017-12-28 RX ORDER — TIMOLOL 0.5 %
1 DROPS OPHTHALMIC (EYE) DAILY
Qty: 0 | Refills: 0 | Status: DISCONTINUED | OUTPATIENT
Start: 2017-12-28 | End: 2018-01-16

## 2017-12-28 RX ORDER — DEXTROSE 50 % IN WATER 50 %
12.5 SYRINGE (ML) INTRAVENOUS ONCE
Qty: 0 | Refills: 0 | Status: DISCONTINUED | OUTPATIENT
Start: 2017-12-28 | End: 2018-01-16

## 2017-12-28 RX ORDER — CALCIUM CARBONATE 500(1250)
1 TABLET ORAL DAILY
Qty: 0 | Refills: 0 | Status: DISCONTINUED | OUTPATIENT
Start: 2017-12-28 | End: 2018-01-16

## 2017-12-28 RX ORDER — FUROSEMIDE 40 MG
80 TABLET ORAL
Qty: 0 | Refills: 0 | Status: DISCONTINUED | OUTPATIENT
Start: 2017-12-28 | End: 2017-12-28

## 2017-12-28 RX ORDER — DEXTROSE 50 % IN WATER 50 %
1 SYRINGE (ML) INTRAVENOUS ONCE
Qty: 0 | Refills: 0 | Status: DISCONTINUED | OUTPATIENT
Start: 2017-12-28 | End: 2018-01-16

## 2017-12-28 RX ORDER — OXYCODONE HYDROCHLORIDE 5 MG/1
5 TABLET ORAL EVERY 6 HOURS
Qty: 0 | Refills: 0 | Status: DISCONTINUED | OUTPATIENT
Start: 2017-12-28 | End: 2017-12-30

## 2017-12-28 RX ORDER — DOCUSATE SODIUM 100 MG
100 CAPSULE ORAL
Qty: 0 | Refills: 0 | Status: DISCONTINUED | OUTPATIENT
Start: 2017-12-28 | End: 2018-01-16

## 2017-12-28 RX ORDER — CEFAZOLIN SODIUM 1 G
1000 VIAL (EA) INJECTION ONCE
Qty: 0 | Refills: 0 | Status: COMPLETED | OUTPATIENT
Start: 2017-12-28 | End: 2017-12-28

## 2017-12-28 RX ORDER — IMMUNE GLOBULIN,GAMMA(IGG) 5 %
21 VIAL (ML) INTRAVENOUS DAILY
Qty: 0 | Refills: 0 | Status: DISCONTINUED | OUTPATIENT
Start: 2017-12-28 | End: 2018-01-02

## 2017-12-28 RX ORDER — LATANOPROST 0.05 MG/ML
1 SOLUTION/ DROPS OPHTHALMIC; TOPICAL AT BEDTIME
Qty: 0 | Refills: 0 | Status: DISCONTINUED | OUTPATIENT
Start: 2017-12-28 | End: 2018-01-16

## 2017-12-28 RX ORDER — CALCIUM ACETATE 667 MG
667 TABLET ORAL
Qty: 0 | Refills: 0 | Status: DISCONTINUED | OUTPATIENT
Start: 2017-12-28 | End: 2018-01-05

## 2017-12-28 RX ORDER — DEXTROSE 50 % IN WATER 50 %
25 SYRINGE (ML) INTRAVENOUS ONCE
Qty: 0 | Refills: 0 | Status: DISCONTINUED | OUTPATIENT
Start: 2017-12-28 | End: 2018-01-16

## 2017-12-28 RX ORDER — LEVOTHYROXINE SODIUM 125 MCG
25 TABLET ORAL DAILY
Qty: 0 | Refills: 0 | Status: DISCONTINUED | OUTPATIENT
Start: 2017-12-28 | End: 2018-01-16

## 2017-12-28 RX ORDER — GLUCAGON INJECTION, SOLUTION 0.5 MG/.1ML
1 INJECTION, SOLUTION SUBCUTANEOUS ONCE
Qty: 0 | Refills: 0 | Status: DISCONTINUED | OUTPATIENT
Start: 2017-12-28 | End: 2018-01-16

## 2017-12-28 RX ORDER — ONDANSETRON 8 MG/1
4 TABLET, FILM COATED ORAL ONCE
Qty: 0 | Refills: 0 | Status: DISCONTINUED | OUTPATIENT
Start: 2017-12-28 | End: 2018-01-16

## 2017-12-28 RX ORDER — INSULIN LISPRO 100/ML
VIAL (ML) SUBCUTANEOUS AT BEDTIME
Qty: 0 | Refills: 0 | Status: DISCONTINUED | OUTPATIENT
Start: 2017-12-28 | End: 2018-01-16

## 2017-12-28 RX ORDER — INSULIN LISPRO 100/ML
VIAL (ML) SUBCUTANEOUS
Qty: 0 | Refills: 0 | Status: DISCONTINUED | OUTPATIENT
Start: 2017-12-28 | End: 2018-01-16

## 2017-12-28 RX ORDER — SENNA PLUS 8.6 MG/1
2 TABLET ORAL AT BEDTIME
Qty: 0 | Refills: 0 | Status: DISCONTINUED | OUTPATIENT
Start: 2017-12-28 | End: 2018-01-16

## 2017-12-28 RX ORDER — SODIUM CHLORIDE 9 MG/ML
250 INJECTION INTRAMUSCULAR; INTRAVENOUS; SUBCUTANEOUS ONCE
Qty: 0 | Refills: 0 | Status: COMPLETED | OUTPATIENT
Start: 2017-12-28 | End: 2017-12-28

## 2017-12-28 RX ORDER — SODIUM CHLORIDE 9 MG/ML
1000 INJECTION, SOLUTION INTRAVENOUS
Qty: 0 | Refills: 0 | Status: DISCONTINUED | OUTPATIENT
Start: 2017-12-28 | End: 2018-01-16

## 2017-12-28 RX ORDER — ACETAMINOPHEN 500 MG
650 TABLET ORAL EVERY 6 HOURS
Qty: 0 | Refills: 0 | Status: DISCONTINUED | OUTPATIENT
Start: 2017-12-28 | End: 2018-01-16

## 2017-12-28 RX ADMIN — SODIUM CHLORIDE 500 MILLILITER(S): 9 INJECTION INTRAMUSCULAR; INTRAVENOUS; SUBCUTANEOUS at 15:26

## 2017-12-28 RX ADMIN — Medication 667 MILLIGRAM(S): at 13:14

## 2017-12-28 RX ADMIN — Medication 1 TABLET(S): at 13:14

## 2017-12-28 RX ADMIN — OXYCODONE HYDROCHLORIDE 5 MILLIGRAM(S): 5 TABLET ORAL at 22:36

## 2017-12-28 RX ADMIN — Medication 5 MILLIGRAM(S): at 21:59

## 2017-12-28 RX ADMIN — Medication 75 MILLIGRAM(S): at 13:22

## 2017-12-28 RX ADMIN — SENNA PLUS 2 TABLET(S): 8.6 TABLET ORAL at 22:00

## 2017-12-28 RX ADMIN — LATANOPROST 1 DROP(S): 0.05 SOLUTION/ DROPS OPHTHALMIC; TOPICAL at 22:00

## 2017-12-28 RX ADMIN — Medication 100 MILLIGRAM(S): at 17:26

## 2017-12-28 RX ADMIN — OXYCODONE HYDROCHLORIDE 5 MILLIGRAM(S): 5 TABLET ORAL at 22:06

## 2017-12-28 RX ADMIN — Medication 25 MICROGRAM(S): at 05:42

## 2017-12-28 RX ADMIN — Medication 20 MILLIGRAM(S): at 13:15

## 2017-12-28 RX ADMIN — Medication 100 MILLIGRAM(S): at 20:38

## 2017-12-28 RX ADMIN — Medication 667 MILLIGRAM(S): at 17:26

## 2017-12-28 RX ADMIN — Medication 1 DROP(S): at 13:16

## 2017-12-28 RX ADMIN — Medication 35 GRAM(S): at 17:15

## 2017-12-28 NOTE — CONSULT NOTE ADULT - ASSESSMENT
56yo postmenopausal x7yrs with SLE, ESRD, multiple medical comorbidities, with vaginal bleeding.    Etiology includes structural vs hormonal vs malignancy.   Pt is hemodynamically stable with hypotension, no tachycardia, normal O2 sats on room air. As vaginal bleeding appears stable over 3 wks per report, urgent intervention unlikely indicated at this time.   Pt may require endometrial biopsy to assess for possible malignancy

## 2017-12-28 NOTE — CONSULT NOTE ADULT - SUBJECTIVE AND OBJECTIVE BOX
R2 GYN Consult Note    (from admission H&P)  56yo postmenopausal x7yrs with SLE diagnosed 2017, ESRD on HD via permacath (M, W, F) s/p AVF (not matured), DM2, HTN, HLD, hypothyroidism admitted for 3 wks of h/o bilateral LE pain and numbness.  Patient was seen in ED (Oct) with pain diagnosed with neuropathy (neg DVT) and d/c'ed home on oxycodone. Subsequently developed b/l LE numbness and inability to ambulate for past 3 weeks. Now wheelchair dependent. Pain is tingling intermittent . Hydroxychlorquine stopped few days ago by nephrology/rheum for possible medication side effect and told to go to hospital for further eval. Of note, hydroxychlorquine recently start around same time as numbness. Patient awaiting renal transplant. Afebrile. Denies any fever or chills, loss of bowel or urine.  Patient is POD#0 s/p left sural nerve biopsy. GYN consulted for vaginal bleeding.  Pt reports vaginal bleeding x 3wks soaking 5 pads per day. Prior to this, she denies any vaginal bleeding since menopause. She has not been sexually active for 8 years. She denies chest pain, SOB, lightheadedness or dizziness. No vaginal pain, itching, or burning.   Denies family history of breast, ovarian, endometrial, colon, or pancreatic cancer.    GYN = Suzanne Howell  Last annual visit 2017, pt reports normal Pap smear  Mammo up to date per pt  Colonoscopy up to date per pt        OBHx:  x2  GynHx: no known fibroids, cysts, abnl Paps or STIs    PAST MEDICAL & SURGICAL HISTORY:  Lupus (systemic lupus erythematosus)  ESRD (end-stage renal disease) due to SLE  Glaucoma  Other hyperlipidemia  Type 2 diabetes mellitus without complication, without long-term current use of insulin  Essential hypertension  Hypercholesteremia  Hypertension  Diabetes  S/P appendectomy  H/O gastric bypass: 2016  S/P  section: times two    Allergies    penicillin (Rash)    Intolerances       Vital Signs Last 24 Hrs  T(C): 36.8 (28 Dec 2017 18:30), Max: 37 (28 Dec 2017 18:04)  T(F): 98.3 (28 Dec 2017 18:30), Max: 98.6 (28 Dec 2017 18:04)  HR: 84 (28 Dec 2017 18:30) (73 - 90)  BP: 98/60 (28 Dec 2017 18:30) (63/41 - 170/93)  BP(mean): 94 (28 Dec 2017 12:00) (47 - 97)  RR: 18 (28 Dec 2017 18:30) (14 - 18)  SpO2: 98% (28 Dec 2017 18:30) (95% - 100%)    PE:  Gen: Comfortable, NAD  CV: RRR  Pulm: CTAB  Abd: normoactive bowel sounds, soft, nontender  Ext: No edema or calf tenderness bilaterally. LLE with dressing in place  Spec Exam: small anterior cervix, ~10cc dark blood removed from vault, no active bleeding visualized  Bimanual: limited due to pt discomfort. small uterus nontender, no adnexal tenderness bilaterally    LABS:                        9.8    4.8   )-----------( 103      ( 28 Dec 2017 16:21 )             29.3                         11.2   4.4   )-----------( 122      ( 28 Dec 2017 03:33 )             33.5                         10.2   4.45  )-----------( 73       ( 27 Dec 2017 08:56 )             31.0     12-28    135  |  95<L>  |  28<H>  ----------------------------<  84  3.8   |  26  |  4.11<H>    Ca    9.2      28 Dec 2017 03:33

## 2017-12-28 NOTE — BRIEF OPERATIVE NOTE - PROCEDURE
<<-----Click on this checkbox to enter Procedure Sural nerve biopsy  12/28/2017  Left  Active  [id number]

## 2017-12-28 NOTE — PROGRESS NOTE ADULT - SUBJECTIVE AND OBJECTIVE BOX
Patient is a 57y old  Female who presents with a chief complaint of LE pain and inability to walk (19 Dec 2017 10:27)      SUBJECTIVE / OVERNIGHT EVENTS: Patient feels much weaker, had an episode of vasovagal episode while having bowel movement last night, had CT head which was negative.    Patient denies chest pain/ dizziness/ palpitations.   ROS otherwise negative.     T(C): 36.6 (12-28-17 @ 14:55), Max: 36.8 (12-28-17 @ 12:00)  HR: 77 (12-28-17 @ 14:55) (73 - 82)  BP: 86/52 (12-28-17 @ 15:04) (63/41 - 129/78)  RR: 18 (12-28-17 @ 15:04) (14 - 18)  SpO2: 100% (12-28-17 @ 15:04) (98% - 100%)    MEDICATIONS  (STANDING):  bisacodyl 5 milliGRAM(s) Oral at bedtime  calcium acetate 667 milliGRAM(s) Oral three times a day with meals  calcium carbonate 1250 mG (OsCal) 1 Tablet(s) Oral daily  ceFAZolin   IVPB 1000 milliGRAM(s) IV Intermittent once  dextrose 5%. 1000 milliLiter(s) (50 mL/Hr) IV Continuous <Continuous>  dextrose 50% Injectable 12.5 Gram(s) IV Push once  dextrose 50% Injectable 25 Gram(s) IV Push once  dextrose 50% Injectable 25 Gram(s) IV Push once  docusate sodium 100 milliGRAM(s) Oral two times a day  doxercalciferol Injectable 2 MICROGram(s) IV Push <User Schedule>  immune globulin gamma IVPB 21 Gram(s) IV Intermittent daily  insulin lispro (HumaLOG) corrective regimen sliding scale   SubCutaneous three times a day before meals  insulin lispro (HumaLOG) corrective regimen sliding scale   SubCutaneous at bedtime  latanoprost 0.005% Ophthalmic Solution 1 Drop(s) Both EYES at bedtime  levothyroxine 25 MICROGram(s) Oral daily  metolazone 2.5 milliGRAM(s) Oral daily  Nephro-madiha 1 Tablet(s) Oral daily  ondansetron Injectable 4 milliGRAM(s) IV Push once  PARoxetine 20 milliGRAM(s) Oral daily  pregabalin 75 milliGRAM(s) Oral daily  senna 2 Tablet(s) Oral at bedtime  timolol 0.5% Solution 1 Drop(s) Both EYES daily    MEDICATIONS  (PRN):  acetaminophen   Tablet 650 milliGRAM(s) Oral every 6 hours PRN For Temp greater than 38 C (100.4 F)  acetaminophen   Tablet. 650 milliGRAM(s) Oral every 6 hours PRN Mild Pain (1 - 3)  dextrose Gel 1 Dose(s) Oral once PRN Blood Glucose LESS THAN 70 milliGRAM(s)/deciliter  glucagon  Injectable 1 milliGRAM(s) IntraMuscular once PRN Glucose LESS THAN 70 milligrams/deciliter  oxyCODONE    IR 5 milliGRAM(s) Oral every 6 hours PRN Moderate Pain (4 - 6)    PHYSICAL EXAM:  GENERAL: NAD, well-developed  HEAD:  Atraumatic, Normocephalic  EYES: EOMI, conjunctiva and sclera clear  NECK: Supple, No JVD  CHEST/LUNG: Clear to auscultation bilaterally; No wheeze  HEART: Regular rate and rhythm; No murmurs, rubs, or gallops  ABDOMEN: Soft, Nontender, Nondistended; Bowel sounds present  EXTREMITIES:  2+ Peripheral Pulses, No clubbing, cyanosis, or edema  PSYCH: AAOx3  NEUROLOGY: CN's intact, strength 3/5 in UE's and 4/5 in LE's, decreased sensation in LE's.   SKIN: No rashes or lesions                          11.2   4.4   )-----------( 122      ( 28 Dec 2017 03:33 )             33.5       CARDIAC MARKERS ( 28 Dec 2017 03:33 )  x     / 0.14 ng/mL / 56 U/L / x     / 4.7 ng/mL          135|95|28<84  3.8|26|4.11  9.2,--,--  12-28 @ 03:33      RADIOLOGY & ADDITIONAL TESTS:    Imaging Personally Reviewed:    Consultant(s) Notes Reviewed:  Rheum, Neuro     Care Discussed with Consultants/Other Providers: Patient is a 57y old  Female who presents with a chief complaint of LE pain and inability to walk (19 Dec 2017 10:27)      SUBJECTIVE / OVERNIGHT EVENTS: Patient feels much weaker, had an episode of vasovagal episode while having bowel movement last night, had CT head which was negative.    Patient complaining of vaginal bleed on and off x 3 weeks, no abdominal pain/ nausea/vomiting.   Patient denies chest pain/ dizziness/ palpitations.   ROS otherwise negative.     T(C): 36.6 (12-28-17 @ 14:55), Max: 36.8 (12-28-17 @ 12:00)  HR: 77 (12-28-17 @ 14:55) (73 - 82)  BP: 86/52 (12-28-17 @ 15:04) (63/41 - 129/78)  RR: 18 (12-28-17 @ 15:04) (14 - 18)  SpO2: 100% (12-28-17 @ 15:04) (98% - 100%)    MEDICATIONS  (STANDING):  bisacodyl 5 milliGRAM(s) Oral at bedtime  calcium acetate 667 milliGRAM(s) Oral three times a day with meals  calcium carbonate 1250 mG (OsCal) 1 Tablet(s) Oral daily  ceFAZolin   IVPB 1000 milliGRAM(s) IV Intermittent once  dextrose 5%. 1000 milliLiter(s) (50 mL/Hr) IV Continuous <Continuous>  dextrose 50% Injectable 12.5 Gram(s) IV Push once  dextrose 50% Injectable 25 Gram(s) IV Push once  dextrose 50% Injectable 25 Gram(s) IV Push once  docusate sodium 100 milliGRAM(s) Oral two times a day  doxercalciferol Injectable 2 MICROGram(s) IV Push <User Schedule>  immune globulin gamma IVPB 21 Gram(s) IV Intermittent daily  insulin lispro (HumaLOG) corrective regimen sliding scale   SubCutaneous three times a day before meals  insulin lispro (HumaLOG) corrective regimen sliding scale   SubCutaneous at bedtime  latanoprost 0.005% Ophthalmic Solution 1 Drop(s) Both EYES at bedtime  levothyroxine 25 MICROGram(s) Oral daily  metolazone 2.5 milliGRAM(s) Oral daily  Nephro-madiha 1 Tablet(s) Oral daily  ondansetron Injectable 4 milliGRAM(s) IV Push once  PARoxetine 20 milliGRAM(s) Oral daily  pregabalin 75 milliGRAM(s) Oral daily  senna 2 Tablet(s) Oral at bedtime  timolol 0.5% Solution 1 Drop(s) Both EYES daily    MEDICATIONS  (PRN):  acetaminophen   Tablet 650 milliGRAM(s) Oral every 6 hours PRN For Temp greater than 38 C (100.4 F)  acetaminophen   Tablet. 650 milliGRAM(s) Oral every 6 hours PRN Mild Pain (1 - 3)  dextrose Gel 1 Dose(s) Oral once PRN Blood Glucose LESS THAN 70 milliGRAM(s)/deciliter  glucagon  Injectable 1 milliGRAM(s) IntraMuscular once PRN Glucose LESS THAN 70 milligrams/deciliter  oxyCODONE    IR 5 milliGRAM(s) Oral every 6 hours PRN Moderate Pain (4 - 6)    PHYSICAL EXAM:  GENERAL: NAD, well-developed  HEAD:  Atraumatic, Normocephalic  EYES: EOMI, conjunctiva and sclera clear  NECK: Supple, No JVD  CHEST/LUNG: Clear to auscultation bilaterally; No wheeze  HEART: Regular rate and rhythm; No murmurs, rubs, or gallops  ABDOMEN: Soft, Nontender, Nondistended; Bowel sounds present  EXTREMITIES:  2+ Peripheral Pulses, No clubbing, cyanosis, or edema  PSYCH: AAOx3  NEUROLOGY: CN's intact, strength 3/5 in UE's and 4/5 in LE's, decreased sensation in LE's.   SKIN: No rashes or lesions                          11.2   4.4   )-----------( 122      ( 28 Dec 2017 03:33 )             33.5       CARDIAC MARKERS ( 28 Dec 2017 03:33 )  x     / 0.14 ng/mL / 56 U/L / x     / 4.7 ng/mL          135|95|28<84  3.8|26|4.11  9.2,--,--  12-28 @ 03:33      RADIOLOGY & ADDITIONAL TESTS:    Imaging Personally Reviewed:    Consultant(s) Notes Reviewed:  Rheum, Neuro     Care Discussed with Consultants/Other Providers:

## 2017-12-28 NOTE — CHART NOTE - NSCHARTNOTEFT_GEN_A_CORE
Reported by RN on pt with BP 86/52. pt seen at bedside. A & O X3, follows command, NAD. Denies dizziness, SOB, double or blurred vision, chest pain, abdominal pain. Reports diarrhea X1 yesterday. Noticed pt having vaginal bleeding. As per family, pt had pap smear on Nov, 30( GYN Dr. Suzanne Huynh ), having vaginal bleed since Dec 10th after no menstruation for 7 years. She was changing pads 4-5 times daily.    Vital Signs Last 24 Hrs  T(C): 36.8 (28 Dec 2017 18:30), Max: 37 (28 Dec 2017 18:04)  T(F): 98.3 (28 Dec 2017 18:30), Max: 98.6 (28 Dec 2017 18:04)  HR: 84 (28 Dec 2017 18:30) (73 - 90)  BP: 98/60 (28 Dec 2017 18:30) (63/41 - 170/93)  BP(mean): 94 (28 Dec 2017 12:00) (47 - 97)  RR: 18 (28 Dec 2017 18:30) (14 - 18)  SpO2: 98% (28 Dec 2017 18:30) (95% - 100%)                          9.8    4.8   )-----------( 103      ( 28 Dec 2017 16:21 )             29.3   12-28    135  |  95<L>  |  28<H>  ----------------------------<  84  3.8   |  26  |  4.11<H>    Ca    9.2      28 Dec 2017 03:33    CARDIAC MARKERS ( 28 Dec 2017 17:02 )  x     / 0.14 ng/mL / 75 U/L / x     / 4.3 ng/mL  CARDIAC MARKERS ( 28 Dec 2017 03:33 )  x     / 0.14 ng/mL / 56 U/L / x     / 4.7 ng/mL        PHYSICAL EXAM:  GENERAL: NAD  HEAD:  Atraumatic, Normocephalic  EYES: EOMI, PERRLA  CHEST/LUNG: Clear to auscultation bilaterally  HEART: Regular rate and rhythm  ABDOMEN: Soft, Nontender, Bowel sounds present  EXTREMITIES:  2+ Peripheral Pulses  NEUROLOGY: non-focal    A/P  58 yo f with h/o lupus diagnosed in April, lupus nephritis, ESRD on HD via permacath (M, W, F) s/p AVF (not matured), DM2, HTN, HLD, hypothyroid presented with 3 weeks h/o bilateral LE pain and numbness s/p nerve biopsy today  Now seen for hypotension and vaginal bleed  # Hypotension-  ml bolus- BP improved to 109/67                         Off Lasix/ Tele/ CE                         Repeat CBC ( H & H9.8 & 29.3 )                         Monitor vital signs q 4 hours   #Vaginal bleed- GYN called                          Repeat CBC at [facility]                          Transvaginal US    Perla Cramer NP-C  # [id number]

## 2017-12-28 NOTE — PROGRESS NOTE ADULT - PROBLEM SELECTOR PLAN 5
in the setting of Lupus nephritis on dialysis, autonomic dysfunction cannot be ruled out.  Minimize fluid removal in Dialysis. Keep patient on tele monitor for now.   Check MRI brain.   Positive Troponin in the setting of ESRD.

## 2017-12-28 NOTE — PROGRESS NOTE ADULT - PROBLEM SELECTOR PLAN 4
HD MWF  Hold off Lasix.   Renal following, patient getting dialysis via Rt IJ perma cath, recently placed Lt AVF.   Vascular surgery consulted. No intervention at this point.   Spoke to renal will minimize fluid removal given patient orthostatic.

## 2017-12-28 NOTE — CONSULT NOTE ADULT - PROBLEM SELECTOR RECOMMENDATION 9
#Vaginal bleeding  - f/u TVUS  - f/u CBC trend  - monitor VB with strict pad counts    D/w Dr. Jamil Tang, PGY2

## 2017-12-28 NOTE — PROGRESS NOTE ADULT - ASSESSMENT
58 yo female with SLE and BLE weakness now sp left sural nerve biopsy.    -dressing removal POD 2  -avoid getting wound wet for at least 1 week  -fu final pathology (sent to [place])

## 2017-12-28 NOTE — PROGRESS NOTE ADULT - SUBJECTIVE AND OBJECTIVE BOX
History of Present Illness:   56 yo f with h/o lupus diagnosed in April, lupus nephritis, ESRD on HD via permacath (M, W, F) s/p AVF (not matured), DM2, HTN, HLD, hypothyroid presented with 3 weeks h/o bilateral LE pain and numbness. Patient was seen in ED with BLE pain diagnosed with neuropathy (neg DVT) and d/c'ed home on oxycodone. Subsequently developed b/l LE numbness and inability to ambulate for past 3 weeks. Now wheelchair dependent. Pain is tingling intermittent . Hydroxychlorquine stopped few days ago by nephrology/rheum for possible medication side effect and told to go to hospital for further eval. Of note, hydroxychlorquine recently start around same time as numbness. Patient awaiting renal transplant. Afebrile. Denies any fever or chills, loss of bowel or urine.    Neurosurgery was consulted for sural nerve biopsy SLE sensory motor neuropathy vs plaquenil. Patient is now sp left sural nerve biopsy.    Patient seen and examined at bedside.     T(C): 36.8 (12-28-17 @ 17:11), Max: 36.9 (12-27-17 @ 20:36)  HR: 78 (12-28-17 @ 17:11) (73 - 90)  BP: 102/67 (12-28-17 @ 17:11) (63/41 - 170/93)  RR: 18 (12-28-17 @ 17:11) (14 - 18)  SpO2: 100% (12-28-17 @ 17:11) (95% - 100%)  Wt(kg): --    Exam:          Upper extremity                         Delt     Bicep     Tricep    HG                                                 R         5/5        5/5        5/5       5/5                                               L          5/5        5/5        5/5       5/5          Lower extremity                        HF         KF        KE       DF         PF                                                  R        5/5        5/5        5/5       4+/5         4+/5                                               L         4+/5      4+/5     4/5       4-/5          4-/5                                                 Sensation: [x] intact to light touch  [] decreased:     No clonus

## 2017-12-28 NOTE — PROGRESS NOTE ADULT - PROBLEM SELECTOR PLAN 3
Patient hypotensive multifactorial - Volume depletion from Lasix, fluid shift during dialysis vs bleeding from vagina. CBC stable. Check repeat CBC.   Fluid challenge - 250 cc now, recheck blood pressure.

## 2017-12-28 NOTE — PROGRESS NOTE ADULT - PROBLEM SELECTOR PLAN 2
Patient having bleeding per vagina on and off, had PAP smear last month.   Check Transvaginal ultrasound.   Gyn consult called. Follow up recs.

## 2017-12-28 NOTE — PROGRESS NOTE ADULT - PROBLEM SELECTOR PLAN 6
Plaquenil on hold in setting of LE weakness as outpt.   will monitor. Med recently started 3 weeks ago as outpt.  Prior no meds  Rheum following.

## 2017-12-28 NOTE — PROGRESS NOTE ADULT - PROBLEM SELECTOR PLAN 1
Polyneuropathy r/o autoimmune etiology in the setting of SLE. Positive serology for Lupus, needs LP, neuro follow up appreciated. Spoke to Rheum ok to start Immunoglobulin.   MRI lumbar, C spine show no cord compression.    Continue with Lyrica- renally dosed.   PT eval- recommend JESSICA placement.   Continue to hold Plaquenil.   s/p Sural nerve biopsy today. Follow up results.

## 2017-12-28 NOTE — CHART NOTE - NSCHARTNOTEFT_GEN_A_CORE
Patient is a 57y old  Female who presents with a chief complaint of LE pain and inability to walk (19 Dec 2017 10:27). Notified by RN that patient had an episode of loss of consciousness < 1 minute on the commode and patient became alert after being transferred back to the bed. Patient seen and evaluated at the bedside. Patient awake and alert in the bed upon arrival to the room.  service utilized with  [id number]. Patient states that she does not recall the episode. Does remember asking to be transferred to the commode and then being back in the bed, but not while on the commode. As per the bedside RN, patient's legs and body became limp while sitting on the commode to have a bowel movement. Of note, patient with similar episode of syncope while on the commode 2 days ago. Patient denies weakness, lightheadedness, headache, change in hearing or vision, dizziness, nausea, vomiting, shortness of breath, chest pain, palpitations, and incontinence. While utilizing the  phone, noted that the patient's bilateral hands had trembling. Patient states that this trembling has been going on for greater than 30 days and will come and go.       Vital Signs Last 24 Hrs  T(C): 36.9 (28 Dec 2017 03:35), Max: 36.9 (27 Dec 2017 20:36)  T(F): 98.5 (28 Dec 2017 03:35), Max: 98.5 (27 Dec 2017 22:10)  HR: 79 (28 Dec 2017 03:35) (77 - 90)  BP: 170/93 (28 Dec 2017 03:35) (121/79 - 170/93)  BP(mean): --  RR: 18 (28 Dec 2017 03:35) (17 - 18)  SpO2: 95% (28 Dec 2017 03:35) (95% - 100%)                        11.2   4.4   )-----------( 122      ( 28 Dec 2017 03:33 )             33.5     12-28    135  |  95<L>  |  28<H>  ----------------------------<  84  3.8   |  26  |  4.11<H>    Ca    9.2      28 Dec 2017 03:33    CARDIAC MARKERS ( 28 Dec 2017 03:33 )  x     / 0.14 ng/mL / 56 U/L / x     / 4.7 ng/mL    < from: MR Cervical Spine No Cont (12.20.17 @ 11:23) >    Abnormal marrow signal may be related to renal osteodystrophy   or anemia. No cord compression.      < end of copied text >    < from: MR Lumbar Spine No Cont (12.20.17 @ 11:23) >    Abnormal marrow signal may related to renal osteodystrophy.   No acute fractures or dislocations. No cord or cauda equina compression.      < end of copied text >      Physical Exam:   General: WN/WD NAD  Neurology: A&Ox3, PERRLA, EOM intact yet sluggish, facial symmetry, trembling of bilateral hands, no sensation bilateral ankles to feet, strength upper extremity 5/5, strength lower extremity L > R, ANGEL x 4   Head:  Normocephalic, atraumatic  Respiratory: CTAB, no crackles or wheezing   CV: RRR, S1S2, no murmur  Abdominal: Soft, NT, ND no palpable mass  MSK: No edema, + peripheral pulses, FROM all 4 extremity    A/P  HPI:  58 yo f with h/o lupus diagnosed in April, lupus nephritis, ESRD on HD via permacath (M, W, F) s/p AVF (not matured), DM2, HTN, HLD, hypothyroid presented with 3 weeks h/o bilateral LE pain and numbness. Patient was seen in ED (OCt) with pain diagnosed with neuropathy (neg DVT) and d/c'ed home on oxycodone. Subsequently developed b/l LE numbness and inability to ambulate for past 3 weeks. Now wheelchair dependent. Pain is tingling intermittent . Hydroxychlorquine stopped few days ago by nephrology/rheum for possible medication side effect and told to go to hospital for further eval. Of note, hydroxychlorquine recently start around same time as numbness. Patient awaiting renal transplant. Afebrile. Denies any fever or chills, loss of bowel or urine. (19 Dec 2017 10:27). Now with a syncopal episode while on the commode, likely vasovagal.     Syncope- ?vasovagal  - Continue to monitor vital signs  - Orthostatics Q 12 laying 129/78-- seated 88/53: orthostatic positive   - CBC, BMP, cardiac enzymes: results are above. Cardiac enzymes with elevation in setting of renal failure and no complaint of chest pain   - STAT EKG- sinus rhythm, no acute changes when compared to EKG from 12/19/17   - Urgent CT Head non contrast R/O lesions or neurological pathology   - Discussed case with [facility], Dr. Street, whom is agreeable with the above plan  - Call placed to neurosurgery for alert to physical exam finding. Patient slotted for OR with neurosurgery at 7:30 AM for sural nerve biopsy.   - Will endorse to primary daytime NP.   - Attending to follow up in the morning.       Erinn Bowen PA-C   [phone] Patient is a 57y old  Female who presents with a chief complaint of LE pain and inability to walk (19 Dec 2017 10:27). Notified by RN that patient had an episode of loss of consciousness < 1 minute on the commode and had spontaneous resolution of consciousness after being transferred back to the bed. Patient seen and evaluated at the bedside. Patient awake and alert in the bed upon arrival to the room.  service utilized with  [id number]. Patient states that she does not recall the episode. Does remember asking to be transferred to the commode and then being back in the bed, but not while on the commode. As per the bedside RN, patient's legs and body became limp while sitting on the commode to have a bowel movement. Of note, patient with similar episode of syncope while on the commode 2 days ago. Patient denies weakness, lightheadedness, headache, change in hearing or vision, dizziness, nausea, vomiting, shortness of breath, chest pain, palpitations, and incontinence. While utilizing the  phone, noted that the patient's bilateral hands had trembling. Patient states that this trembling has been going on for greater than 30 days and will come and go.       Vital Signs Last 24 Hrs  T(C): 36.9 (28 Dec 2017 03:35), Max: 36.9 (27 Dec 2017 20:36)  T(F): 98.5 (28 Dec 2017 03:35), Max: 98.5 (27 Dec 2017 22:10)  HR: 79 (28 Dec 2017 03:35) (77 - 90)  BP: 170/93 (28 Dec 2017 03:35) (121/79 - 170/93)  BP(mean): --  RR: 18 (28 Dec 2017 03:35) (17 - 18)  SpO2: 95% (28 Dec 2017 03:35) (95% - 100%)                        11.2   4.4   )-----------( 122      ( 28 Dec 2017 03:33 )             33.5     12-28    135  |  95<L>  |  28<H>  ----------------------------<  84  3.8   |  26  |  4.11<H>    Ca    9.2      28 Dec 2017 03:33    CARDIAC MARKERS ( 28 Dec 2017 03:33 )  x     / 0.14 ng/mL / 56 U/L / x     / 4.7 ng/mL    < from: MR Cervical Spine No Cont (12.20.17 @ 11:23) >    Abnormal marrow signal may be related to renal osteodystrophy   or anemia. No cord compression.      < end of copied text >    < from: MR Lumbar Spine No Cont (12.20.17 @ 11:23) >    Abnormal marrow signal may related to renal osteodystrophy.   No acute fractures or dislocations. No cord or cauda equina compression.      < end of copied text >      Physical Exam:   General: WN/WD NAD  Neurology: A&Ox3, PERRLA, EOM intact yet sluggish, facial symmetry, trembling of bilateral hands, no sensation bilateral ankles to feet, strength upper extremity 5/5, strength lower extremity L > R, ANGEL x 4   Head:  Normocephalic, atraumatic  Respiratory: CTAB, no crackles or wheezing   CV: RRR, S1S2, no murmur  Abdominal: Soft, NT, ND no palpable mass  MSK: No edema, + peripheral pulses, FROM all 4 extremity    A/P  HPI:  58 yo f with h/o lupus diagnosed in April, lupus nephritis, ESRD on HD via permacath (M, W, F) s/p AVF (not matured), DM2, HTN, HLD, hypothyroid presented with 3 weeks h/o bilateral LE pain and numbness. Patient was seen in ED (OCt) with pain diagnosed with neuropathy (neg DVT) and d/c'ed home on oxycodone. Subsequently developed b/l LE numbness and inability to ambulate for past 3 weeks. Now wheelchair dependent. Pain is tingling intermittent . Hydroxychlorquine stopped few days ago by nephrology/rheum for possible medication side effect and told to go to hospital for further eval. Of note, hydroxychlorquine recently start around same time as numbness. Patient awaiting renal transplant. Afebrile. Denies any fever or chills, loss of bowel or urine. (19 Dec 2017 10:27). Now with a syncopal episode while on the commode, likely vasovagal.     Syncope- ?vasovagal  - Continue to monitor vital signs  - Orthostatics Q 12 laying 129/78-- seated 88/53: orthostatic positive   - CBC, BMP, cardiac enzymes: results are above. Cardiac enzymes with elevation in setting of renal failure and no complaint of chest pain   - STAT EKG- sinus rhythm, no acute changes when compared to EKG from 12/19/17   - Urgent CT Head non contrast R/O lesions or neurological pathology   - Discussed case with [facility], Dr. Street, whom is agreeable with the above plan  - Call placed to neurosurgery for alert to physical exam finding. Patient slotted for OR with neurosurgery at 7:30 AM for sural nerve biopsy.   - Will endorse to primary daytime NP.   - Attending to follow up in the morning.       Erinn Bowen PA-C   [phone] Patient is a 57y old  Female who presents with a chief complaint of LE pain and inability to walk (19 Dec 2017 10:27). Notified by RN that patient had an episode of loss of consciousness < 1 minute on the commode and had spontaneous resolution of consciousness after being transferred back to the bed. Patient seen and evaluated at the bedside. Patient awake and alert in the bed upon arrival to the room.  service utilized with  [id number]. Patient states that she does not recall the episode. Does remember asking to be transferred to the commode and then being back in the bed, but not while on the commode. As per the bedside RN, patient's legs and body became limp while sitting on the commode to have a bowel movement. Of note, patient with similar episode of syncope while on the commode 2 days ago. Patient denies weakness, lightheadedness, headache, change in hearing or vision, dizziness, nausea, vomiting, shortness of breath, chest pain, palpitations, and incontinence. While utilizing the  phone, noted that the patient's bilateral hands had trembling. Patient states that this trembling has been going on for greater than 30 days and will come and go.       Vital Signs Last 24 Hrs  T(C): 36.9 (28 Dec 2017 03:35), Max: 36.9 (27 Dec 2017 20:36)  T(F): 98.5 (28 Dec 2017 03:35), Max: 98.5 (27 Dec 2017 22:10)  HR: 79 (28 Dec 2017 03:35) (77 - 90)  BP: 170/93 (28 Dec 2017 03:35) (121/79 - 170/93)  BP(mean): --  RR: 18 (28 Dec 2017 03:35) (17 - 18)  SpO2: 95% (28 Dec 2017 03:35) (95% - 100%)                        11.2   4.4   )-----------( 122      ( 28 Dec 2017 03:33 )             33.5     12-28    135  |  95<L>  |  28<H>  ----------------------------<  84  3.8   |  26  |  4.11<H>    Ca    9.2      28 Dec 2017 03:33    CARDIAC MARKERS ( 28 Dec 2017 03:33 )  x     / 0.14 ng/mL / 56 U/L / x     / 4.7 ng/mL    < from: MR Cervical Spine No Cont (12.20.17 @ 11:23) >    Abnormal marrow signal may be related to renal osteodystrophy   or anemia. No cord compression.      < end of copied text >    < from: MR Lumbar Spine No Cont (12.20.17 @ 11:23) >    Abnormal marrow signal may related to renal osteodystrophy.   No acute fractures or dislocations. No cord or cauda equina compression.      < end of copied text >      Physical Exam:   General: WN/WD NAD  Neurology: A&Ox3, PERRLA, EOM intact yet sluggish, facial symmetry, trembling of bilateral hands, no sensation bilateral ankles to feet, strength upper extremity 5/5, strength lower extremity L > R, ANGEL x 4   Head:  Normocephalic, atraumatic  Respiratory: CTAB, no crackles or wheezing   CV: RRR, S1S2, no murmur  Abdominal: Soft, NT, ND no palpable mass  MSK: No edema, + peripheral pulses, FROM all 4 extremity    A/P  HPI:  58 yo f with h/o lupus diagnosed in April, lupus nephritis, ESRD on HD via permacath (M, W, F) s/p AVF (not matured), DM2, HTN, HLD, hypothyroid presented with 3 weeks h/o bilateral LE pain and numbness. Patient was seen in ED (OCt) with pain diagnosed with neuropathy (neg DVT) and d/c'ed home on oxycodone. Subsequently developed b/l LE numbness and inability to ambulate for past 3 weeks. Now wheelchair dependent. Pain is tingling intermittent . Hydroxychlorquine stopped few days ago by nephrology/rheum for possible medication side effect and told to go to hospital for further eval. Of note, hydroxychlorquine recently start around same time as numbness. Patient awaiting renal transplant. Afebrile. Denies any fever or chills, loss of bowel or urine. (19 Dec 2017 10:27). Now with a syncopal episode while on the commode, likely vasovagal.     Syncope- ?vasovagal  - Continue to monitor vital signs  - Bedrest and bed alarm for safety   - Orthostatics Q 12 laying 129/78-- seated 88/53: orthostatic positive   - CBC, BMP, cardiac enzymes: results are above. Cardiac enzymes with elevation in setting of renal failure and no complaint of chest pain   - STAT EKG- sinus rhythm, no acute changes when compared to EKG from 12/19/17   - Urgent CT Head non contrast R/O lesions or neurological pathology   - Discussed case with [facility], Dr. Street, whom is agreeable with the above plan  - Call placed to neurosurgery for alert to physical exam finding. Patient slotted for OR with neurosurgery at 7:30 AM for sural nerve biopsy.   - Will endorse to primary daytime NP.   - Attending to follow up in the morning.       Erinn Bowen PA-C   [phone] Patient is a 57y old  Female who presents with a chief complaint of LE pain and inability to walk (19 Dec 2017 10:27). Notified by RN that patient had an episode of loss of consciousness < 1 minute on the commode and had spontaneous resolution of consciousness after being transferred back to the bed. Patient seen and evaluated at the bedside. Patient awake and alert in the bed upon arrival to the room.  service utilized with  [id number]. Patient states that she does not recall the episode. Does remember asking to be transferred to the commode and then being back in the bed, but not while on the commode. As per the bedside RN, patient's legs and body became limp while sitting on the commode to have a bowel movement. Of note, patient with similar episode of syncope while on the commode 2 days ago. Patient denies weakness, lightheadedness, headache, change in hearing or vision, dizziness, nausea, vomiting, shortness of breath, chest pain, palpitations, and incontinence. While utilizing the  phone, noted that the patient's bilateral hands had trembling. Patient states that this trembling has been going on for greater than 30 days and will come and go.       Vital Signs Last 24 Hrs  T(C): 36.9 (28 Dec 2017 03:35), Max: 36.9 (27 Dec 2017 20:36)  T(F): 98.5 (28 Dec 2017 03:35), Max: 98.5 (27 Dec 2017 22:10)  HR: 79 (28 Dec 2017 03:35) (77 - 90)  BP: 170/93 (28 Dec 2017 03:35) (121/79 - 170/93)  BP(mean): --  RR: 18 (28 Dec 2017 03:35) (17 - 18)  SpO2: 95% (28 Dec 2017 03:35) (95% - 100%)                        11.2   4.4   )-----------( 122      ( 28 Dec 2017 03:33 )             33.5     12-28    135  |  95<L>  |  28<H>  ----------------------------<  84  3.8   |  26  |  4.11<H>    Ca    9.2      28 Dec 2017 03:33    CARDIAC MARKERS ( 28 Dec 2017 03:33 )  x     / 0.14 ng/mL / 56 U/L / x     / 4.7 ng/mL    < from: MR Cervical Spine No Cont (12.20.17 @ 11:23) >    Abnormal marrow signal may be related to renal osteodystrophy   or anemia. No cord compression.      < end of copied text >    < from: MR Lumbar Spine No Cont (12.20.17 @ 11:23) >    Abnormal marrow signal may related to renal osteodystrophy.   No acute fractures or dislocations. No cord or cauda equina compression.      < end of copied text >      Physical Exam:   General: WN/WD NAD  Neurology: A&Ox3, PERRLA, EOM intact yet sluggish, facial symmetry, trembling of bilateral hands, no sensation bilateral ankles to feet, strength upper extremity 5/5, strength lower extremity L > R, ANGEL x 4   Head:  Normocephalic, atraumatic  Respiratory: CTAB, no crackles or wheezing   CV: RRR, S1S2, no murmur  Abdominal: Soft, NT, ND no palpable mass  MSK: No edema, + peripheral pulses, FROM all 4 extremity    A/P  HPI:  58 yo f with h/o lupus diagnosed in April, lupus nephritis, ESRD on HD via permacath (M, W, F) s/p AVF (not matured), DM2, HTN, HLD, hypothyroid presented with 3 weeks h/o bilateral LE pain and numbness. Patient was seen in ED (OCt) with pain diagnosed with neuropathy (neg DVT) and d/c'ed home on oxycodone. Subsequently developed b/l LE numbness and inability to ambulate for past 3 weeks. Now wheelchair dependent. Pain is tingling intermittent . Hydroxychlorquine stopped few days ago by nephrology/rheum for possible medication side effect and told to go to hospital for further eval. Of note, hydroxychlorquine recently start around same time as numbness. Patient awaiting renal transplant. Afebrile. Denies any fever or chills, loss of bowel or urine. (19 Dec 2017 10:27). Now with a syncopal episode while on the commode, likely vasovagal.     Syncope- ?vasovagal  - Continue to monitor vital signs  - Bedrest and bed alarm for safety   - Orthostatics Q 12 laying 129/78-- seated 88/53: orthostatic positive   - CBC, BMP, cardiac enzymes: results are above. Cardiac enzymes with elevation in setting of renal failure and no complaint of chest pain   - STAT EKG- sinus rhythm, no acute changes when compared to EKG from 12/19/17   - Urgent CT Head non contrast R/O lesions or neurological pathology   - Discussed case with [facility], Dr. Street, whom is agreeable with the above plan.  - Facilitated for patient to have CT head prior to OR. As per Dr. Street, patient is cleared to go to the OR with neurosurgery.  - Call placed to neurosurgery for alert to physical exam finding. Patient slotted for OR with neurosurgery at 7:30 AM for sural nerve biopsy.   - Will endorse to primary daytime NP.   - Attending to follow up in the morning.       Erinn Bowen PA-C   [phone] Patient is a 57y old  Female who presents with a chief complaint of LE pain and inability to walk (19 Dec 2017 10:27). Notified by RN that patient had an episode of loss of consciousness < 1 minute on the commode and had spontaneous resolution of consciousness after being transferred back to the bed. Patient seen and evaluated at the bedside. Patient awake and alert in the bed upon arrival to the room.  service utilized with  [id number]. Patient states that she does not recall the episode. Does remember asking to be transferred to the commode and then being back in the bed, but not while on the commode. As per the bedside RN, patient's legs and body became limp while sitting on the commode to have a bowel movement. Of note, patient with similar episode of syncope while on the commode 2 days ago. Patient denies weakness, lightheadedness, headache, change in hearing or vision, dizziness, nausea, vomiting, shortness of breath, chest pain, palpitations, and incontinence. While utilizing the  phone, noted that the patient's bilateral hands had trembling. Patient states that this trembling has been going on for greater than 30 days and will come and go.       Vital Signs Last 24 Hrs  T(C): 36.9 (28 Dec 2017 03:35), Max: 36.9 (27 Dec 2017 20:36)  T(F): 98.5 (28 Dec 2017 03:35), Max: 98.5 (27 Dec 2017 22:10)  HR: 79 (28 Dec 2017 03:35) (77 - 90)  BP: 170/93 (28 Dec 2017 03:35) (121/79 - 170/93)  BP(mean): --  RR: 18 (28 Dec 2017 03:35) (17 - 18)  SpO2: 95% (28 Dec 2017 03:35) (95% - 100%)  Finger Stick: 80                         11.2   4.4   )-----------( 122      ( 28 Dec 2017 03:33 )             33.5     12-28    135  |  95<L>  |  28<H>  ----------------------------<  84  3.8   |  26  |  4.11<H>    Ca    9.2      28 Dec 2017 03:33    CARDIAC MARKERS ( 28 Dec 2017 03:33 )  x     / 0.14 ng/mL / 56 U/L / x     / 4.7 ng/mL    < from: MR Cervical Spine No Cont (12.20.17 @ 11:23) >    Abnormal marrow signal may be related to renal osteodystrophy   or anemia. No cord compression.      < end of copied text >    < from: MR Lumbar Spine No Cont (12.20.17 @ 11:23) >    Abnormal marrow signal may related to renal osteodystrophy.   No acute fractures or dislocations. No cord or cauda equina compression.      < end of copied text >      Physical Exam:   General: WN/WD NAD  Neurology: A&Ox3, PERRLA, EOM intact yet sluggish, facial symmetry, trembling of bilateral hands, no sensation bilateral ankles to feet, strength upper extremity 5/5, strength lower extremity L > R, ANGEL x 4   Head:  Normocephalic, atraumatic  Respiratory: CTAB, no crackles or wheezing   CV: RRR, S1S2, no murmur  Abdominal: Soft, NT, ND no palpable mass  MSK: No edema, + peripheral pulses, FROM all 4 extremity    A/P  HPI:  58 yo f with h/o lupus diagnosed in April, lupus nephritis, ESRD on HD via permacath (M, W, F) s/p AVF (not matured), DM2, HTN, HLD, hypothyroid presented with 3 weeks h/o bilateral LE pain and numbness. Patient was seen in ED (OCt) with pain diagnosed with neuropathy (neg DVT) and d/c'ed home on oxycodone. Subsequently developed b/l LE numbness and inability to ambulate for past 3 weeks. Now wheelchair dependent. Pain is tingling intermittent . Hydroxychlorquine stopped few days ago by nephrology/rheum for possible medication side effect and told to go to hospital for further eval. Of note, hydroxychlorquine recently start around same time as numbness. Patient awaiting renal transplant. Afebrile. Denies any fever or chills, loss of bowel or urine. (19 Dec 2017 10:27). Now with a syncopal episode while on the commode, likely vasovagal.     Syncope- ?vasovagal  - Continue to monitor vital signs  - Bedrest and bed alarm for safety   - Orthostatics Q 12 laying 129/78-- seated 88/53: orthostatic positive   - CBC, BMP, cardiac enzymes: results are above. Cardiac enzymes with elevation in setting of renal failure and no complaint of chest pain   - STAT EKG- sinus rhythm, no acute changes when compared to EKG from 12/19/17   - Urgent CT Head non contrast R/O lesions or neurological pathology   - Discussed case with [facility], Dr. Street, whom is agreeable with the above plan.  - Facilitated for patient to have CT head prior to OR. As per Dr. Street, patient is cleared to go to the OR with neurosurgery.  - Call placed to neurosurgery for alert to physical exam finding. Patient slotted for OR with neurosurgery at 7:30 AM for sural nerve biopsy.   - Will endorse to primary daytime NP.   - Attending to follow up in the morning.       Erinn Bowen PA-C   [phone]

## 2017-12-28 NOTE — CHART NOTE - NSCHARTNOTEFT_GEN_A_CORE
Attempted to see patient however she was in procedure.  I have been informed by the medical team that the patient has developed upper extremity weakness.  While we continue to work the patient up it is imperative that we attempt to slow the process from progression.  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    IVIg Administered in 5 separate doses Q24 hours:  0.4 gm/kg/day total 5 days of treatment.

## 2017-12-29 LAB
ANION GAP SERPL CALC-SCNC: 12 MMOL/L — SIGNIFICANT CHANGE UP (ref 5–17)
APPEARANCE CSF: CLEAR — SIGNIFICANT CHANGE UP
APPEARANCE CSF: CLEAR — SIGNIFICANT CHANGE UP
APPEARANCE SPUN FLD: COLORLESS — SIGNIFICANT CHANGE UP
APPEARANCE SPUN FLD: COLORLESS — SIGNIFICANT CHANGE UP
APTT BLD: 32.2 SEC — SIGNIFICANT CHANGE UP (ref 27.5–37.4)
ARSENIC BLD-MCNC: 6 UG/L — SIGNIFICANT CHANGE UP (ref 2–23)
BUN SERPL-MCNC: 46 MG/DL — HIGH (ref 7–23)
CADMIUM SERPL-MCNC: 10.2 UG/L — HIGH (ref 0–1.2)
CALCIUM SERPL-MCNC: 9 MG/DL — SIGNIFICANT CHANGE UP (ref 8.4–10.5)
CHLORIDE SERPL-SCNC: 95 MMOL/L — LOW (ref 96–108)
CO2 SERPL-SCNC: 26 MMOL/L — SIGNIFICANT CHANGE UP (ref 22–31)
COLOR CSF: SIGNIFICANT CHANGE UP
COLOR CSF: SIGNIFICANT CHANGE UP
CREAT SERPL-MCNC: 5.84 MG/DL — HIGH (ref 0.5–1.3)
CRYPTOC AG CSF-ACNC: NEGATIVE — SIGNIFICANT CHANGE UP
FIBRINOGEN PPP-MCNC: 549 MG/DL — HIGH (ref 310–510)
GLUCOSE BLDC GLUCOMTR-MCNC: 153 MG/DL — HIGH (ref 70–99)
GLUCOSE BLDC GLUCOMTR-MCNC: 174 MG/DL — HIGH (ref 70–99)
GLUCOSE BLDC GLUCOMTR-MCNC: 94 MG/DL — SIGNIFICANT CHANGE UP (ref 70–99)
GLUCOSE BLDC GLUCOMTR-MCNC: 95 MG/DL — SIGNIFICANT CHANGE UP (ref 70–99)
GLUCOSE CSF-MCNC: 48 MG/DL — SIGNIFICANT CHANGE UP (ref 40–70)
GLUCOSE SERPL-MCNC: 89 MG/DL — SIGNIFICANT CHANGE UP (ref 70–99)
GRAM STN FLD: SIGNIFICANT CHANGE UP
HCT VFR BLD CALC: 27.4 % — LOW (ref 34.5–45)
HCT VFR BLD CALC: 29.9 % — LOW (ref 34.5–45)
HGB BLD-MCNC: 10.3 G/DL — LOW (ref 11.5–15.5)
HGB BLD-MCNC: 9.3 G/DL — LOW (ref 11.5–15.5)
INR BLD: 0.99 RATIO — SIGNIFICANT CHANGE UP (ref 0.88–1.16)
LDH CSF L TO P-CCNC: 22 U/L — SIGNIFICANT CHANGE UP
LDH FLD-CCNC: 22 U/L — SIGNIFICANT CHANGE UP
LEAD BLD-MCNC: 24 UG/DL — HIGH (ref 0–19)
MCHC RBC-ENTMCNC: 31.4 PG — SIGNIFICANT CHANGE UP (ref 27–34)
MCHC RBC-ENTMCNC: 31.7 PG — SIGNIFICANT CHANGE UP (ref 27–34)
MCHC RBC-ENTMCNC: 34.1 GM/DL — SIGNIFICANT CHANGE UP (ref 32–36)
MCHC RBC-ENTMCNC: 34.3 GM/DL — SIGNIFICANT CHANGE UP (ref 32–36)
MCV RBC AUTO: 92.1 FL — SIGNIFICANT CHANGE UP (ref 80–100)
MCV RBC AUTO: 92.4 FL — SIGNIFICANT CHANGE UP (ref 80–100)
MERCURY SERPL-MCNC: SIGNIFICANT CHANGE UP UG/L (ref 0–14.9)
NEUTROPHILS # CSF: SIGNIFICANT CHANGE UP (ref 0–6)
NEUTROPHILS # CSF: SIGNIFICANT CHANGE UP (ref 0–6)
NIGHT BLUE STAIN TISS: SIGNIFICANT CHANGE UP
NRBC NFR CSF: <1 — SIGNIFICANT CHANGE UP (ref 0–5)
NRBC NFR CSF: <1 — SIGNIFICANT CHANGE UP (ref 0–5)
PLATELET # BLD AUTO: 92 K/UL — LOW (ref 150–400)
PLATELET # BLD AUTO: 92 K/UL — LOW (ref 150–400)
POTASSIUM SERPL-MCNC: 4 MMOL/L — SIGNIFICANT CHANGE UP (ref 3.5–5.3)
POTASSIUM SERPL-SCNC: 4 MMOL/L — SIGNIFICANT CHANGE UP (ref 3.5–5.3)
PROT CSF-MCNC: 118 MG/DL — HIGH (ref 15–45)
PROTHROM AB SERPL-ACNC: 10.8 SEC — SIGNIFICANT CHANGE UP (ref 9.8–12.7)
RBC # BLD: 2.97 M/UL — LOW (ref 3.8–5.2)
RBC # BLD: 3.24 M/UL — LOW (ref 3.8–5.2)
RBC # CSF: 0 /UL — SIGNIFICANT CHANGE UP (ref 0–0)
RBC # CSF: 11 /UL — HIGH (ref 0–0)
RBC # FLD: 13.2 % — SIGNIFICANT CHANGE UP (ref 10.3–14.5)
RBC # FLD: 13.4 % — SIGNIFICANT CHANGE UP (ref 10.3–14.5)
SODIUM SERPL-SCNC: 133 MMOL/L — LOW (ref 135–145)
SPECIMEN SOURCE: SIGNIFICANT CHANGE UP
SPECIMEN SOURCE: SIGNIFICANT CHANGE UP
TUBE TYPE: SIGNIFICANT CHANGE UP
TUBE TYPE: SIGNIFICANT CHANGE UP
WBC # BLD: 3.7 K/UL — LOW (ref 3.8–10.5)
WBC # BLD: 3.9 K/UL — SIGNIFICANT CHANGE UP (ref 3.8–10.5)
WBC # FLD AUTO: 3.7 K/UL — LOW (ref 3.8–10.5)
WBC # FLD AUTO: 3.9 K/UL — SIGNIFICANT CHANGE UP (ref 3.8–10.5)

## 2017-12-29 PROCEDURE — 76830 TRANSVAGINAL US NON-OB: CPT | Mod: 26

## 2017-12-29 PROCEDURE — 99233 SBSQ HOSP IP/OBS HIGH 50: CPT

## 2017-12-29 PROCEDURE — 99232 SBSQ HOSP IP/OBS MODERATE 35: CPT | Mod: GC

## 2017-12-29 PROCEDURE — 76856 US EXAM PELVIC COMPLETE: CPT | Mod: 26

## 2017-12-29 RX ADMIN — Medication 667 MILLIGRAM(S): at 13:36

## 2017-12-29 RX ADMIN — OXYCODONE HYDROCHLORIDE 5 MILLIGRAM(S): 5 TABLET ORAL at 22:30

## 2017-12-29 RX ADMIN — OXYCODONE HYDROCHLORIDE 5 MILLIGRAM(S): 5 TABLET ORAL at 09:00

## 2017-12-29 RX ADMIN — OXYCODONE HYDROCHLORIDE 5 MILLIGRAM(S): 5 TABLET ORAL at 15:54

## 2017-12-29 RX ADMIN — Medication 100 MILLIGRAM(S): at 17:10

## 2017-12-29 RX ADMIN — Medication 25 MICROGRAM(S): at 05:08

## 2017-12-29 RX ADMIN — OXYCODONE HYDROCHLORIDE 5 MILLIGRAM(S): 5 TABLET ORAL at 16:30

## 2017-12-29 RX ADMIN — Medication 75 MILLIGRAM(S): at 13:34

## 2017-12-29 RX ADMIN — Medication 100 MILLIGRAM(S): at 05:08

## 2017-12-29 RX ADMIN — SENNA PLUS 2 TABLET(S): 8.6 TABLET ORAL at 21:59

## 2017-12-29 RX ADMIN — Medication 5 MILLIGRAM(S): at 21:59

## 2017-12-29 RX ADMIN — Medication 1: at 17:10

## 2017-12-29 RX ADMIN — Medication 667 MILLIGRAM(S): at 17:10

## 2017-12-29 RX ADMIN — Medication 1 DROP(S): at 13:36

## 2017-12-29 RX ADMIN — Medication 1 TABLET(S): at 13:35

## 2017-12-29 RX ADMIN — DOXERCALCIFEROL 2 MICROGRAM(S): 2.5 CAPSULE ORAL at 11:19

## 2017-12-29 RX ADMIN — Medication 35 GRAM(S): at 15:07

## 2017-12-29 RX ADMIN — OXYCODONE HYDROCHLORIDE 5 MILLIGRAM(S): 5 TABLET ORAL at 08:20

## 2017-12-29 RX ADMIN — Medication 20 MILLIGRAM(S): at 13:36

## 2017-12-29 RX ADMIN — LATANOPROST 1 DROP(S): 0.05 SOLUTION/ DROPS OPHTHALMIC; TOPICAL at 22:00

## 2017-12-29 RX ADMIN — Medication 667 MILLIGRAM(S): at 08:20

## 2017-12-29 RX ADMIN — OXYCODONE HYDROCHLORIDE 5 MILLIGRAM(S): 5 TABLET ORAL at 21:59

## 2017-12-29 NOTE — PROGRESS NOTE ADULT - PROBLEM SELECTOR PLAN 2
Patient having bleeding per vagina on and off, had PAP smear last month.   Reviewed Transvaginal ultrasound, show cervical polyp.    Gyn consult appreciated, recommend out patient follow up.

## 2017-12-29 NOTE — PROGRESS NOTE ADULT - SUBJECTIVE AND OBJECTIVE BOX
Patient seen and examined at bedside.    T(C): 36.8 (12-27-17 @ 05:18), Max: 37.4 (12-26-17 @ 13:17)  HR: 77 (12-27-17 @ 05:18) (77 - 80)  BP: 135/76 (12-27-17 @ 05:18) (135/76 - 185/95)  RR: 18 (12-27-17 @ 05:18) (18 - 18)  SpO2: 98% (12-27-17 @ 05:18) (98% - 99%)  Wt(kg): --    PHYSICAL EXAM:    Constitutional: No Acute Distress     Neurological: AOx3, Following Commands    Motor exam:            Lower extremity                        HF         KF        KE       DF         PF                                                  R        5/5        5/5        5/5       4+/5       4+/5                                               L         4/5        4/5       4/5       not tested due to pain                                                 Sensation: [] intact to light touch  [x] decreased: lateral aspect of left foot    Incision: Ankle wrapped, dressing clean and dry                          10.3   3.7   )-----------( 92       ( 29 Dec 2017 05:02 )             29.9

## 2017-12-29 NOTE — PROGRESS NOTE ADULT - PROBLEM SELECTOR PLAN 4
HD MWF  Hold off Lasix.   Renal following, patient getting dialysis via Rt IJ perma cath, recently placed Lt AVF.   Vascular surgery consulted. No intervention at this point.

## 2017-12-29 NOTE — PROGRESS NOTE ADULT - ATTENDING COMMENTS
Pt. received HD today, tolerated well without any intra-dialytic complications or access issues, catheter working well with HD. Monitor BMP. Plan for next maintenance HD on Sunday

## 2017-12-29 NOTE — PROGRESS NOTE ADULT - ASSESSMENT
58 yo female with SLE and BLE weakness now s/p left sural nerve biopsy    -Informed patient of expected loss of sensation  -Pain control  -Removed dressing on POD2  -F/u pathology

## 2017-12-29 NOTE — CHART NOTE - NSCHARTNOTEFT_GEN_A_CORE
Follow Up Consultation Note  GYN Attending    Pelvic ultrasound showing thin endometrial echo at 4mm with small endometrial polyp. Pt with two pads since last night. Overall bleeding is minimal and h/h has remained stable. Discussed findings with her private GYN Dr. Suzanne Blackwell in [place]. Agrees that no need for acute intervention at this time. Patient to follow up with him outpatient for further endometrial evaluation.     Discussed findings with patient and need for endometrial evaluation. Discussed postmenopausal bleeding etiologies and differential diagnosis and the need to rule out malignancy. Patient voiced understanding.     JHONATAN Lake

## 2017-12-29 NOTE — PROGRESS NOTE ADULT - PROBLEM SELECTOR PLAN 1
On MWF schedule. Gets HD via Rt IJ permcath .seen during HD today, tolerating well. Access working well. Pt. with recently placed LUE AVF and was supposed to follow up with vascular surgeon Dr. Deluca, vascular follow up while inpatient.

## 2017-12-29 NOTE — PROGRESS NOTE ADULT - PROBLEM SELECTOR PLAN 1
Polyneuropathy r/o autoimmune etiology in the setting of SLE. Positive serology for Lupus, needs LP, neuro follow up appreciated. Continue with Immunoglobulin infusion for now.   MRI lumbar, C spine show no cord compression.    Continue with Lyrica- renally dosed.   PT eval- recommend JESSICA placement.   Continue to hold Plaquenil.   s/p Sural nerve biopsy today. Follow up results.

## 2017-12-29 NOTE — PROGRESS NOTE ADULT - SUBJECTIVE AND OBJECTIVE BOX
Patient is a 57y old  Female who presents with a chief complaint of LE pain and inability to walk (19 Dec 2017 10:27)      SUBJECTIVE / OVERNIGHT EVENTS: Patient feels much weaker, had an episode of vasovagal episode while having bowel movement last night, had CT head which was negative.    Patient complaining of vaginal bleed on and off x 3 weeks, no abdominal pain/ nausea/vomiting.   Patient denies chest pain/ dizziness/ palpitations.   ROS otherwise negative.     T(C): 36.7 (12-29-17 @ 14:59), Max: 36.9 (12-29-17 @ 04:48)  HR: 76 (12-29-17 @ 14:59) (73 - 88)  BP: 136/68 (12-29-17 @ 14:59) (133/76 - 161/87)  RR: 18 (12-29-17 @ 14:59) (18 - 20)  SpO2: 100% (12-29-17 @ 14:59) (97% - 100%)    MEDICATIONS  (STANDING):  bisacodyl 5 milliGRAM(s) Oral at bedtime  calcium acetate 667 milliGRAM(s) Oral three times a day with meals  calcium carbonate 1250 mG (OsCal) 1 Tablet(s) Oral daily  dextrose 5%. 1000 milliLiter(s) (50 mL/Hr) IV Continuous <Continuous>  dextrose 50% Injectable 12.5 Gram(s) IV Push once  dextrose 50% Injectable 25 Gram(s) IV Push once  dextrose 50% Injectable 25 Gram(s) IV Push once  docusate sodium 100 milliGRAM(s) Oral two times a day  doxercalciferol Injectable 2 MICROGram(s) IV Push <User Schedule>  immune globulin gamma IVPB 21 Gram(s) IV Intermittent daily  insulin lispro (HumaLOG) corrective regimen sliding scale   SubCutaneous three times a day before meals  insulin lispro (HumaLOG) corrective regimen sliding scale   SubCutaneous at bedtime  latanoprost 0.005% Ophthalmic Solution 1 Drop(s) Both EYES at bedtime  levothyroxine 25 MICROGram(s) Oral daily  metolazone 2.5 milliGRAM(s) Oral daily  Nephro-madiha 1 Tablet(s) Oral daily  ondansetron Injectable 4 milliGRAM(s) IV Push once  PARoxetine 20 milliGRAM(s) Oral daily  pregabalin 75 milliGRAM(s) Oral daily  senna 2 Tablet(s) Oral at bedtime  timolol 0.5% Solution 1 Drop(s) Both EYES daily    MEDICATIONS  (PRN):  acetaminophen   Tablet 650 milliGRAM(s) Oral every 6 hours PRN For Temp greater than 38 C (100.4 F)  acetaminophen   Tablet. 650 milliGRAM(s) Oral every 6 hours PRN Mild Pain (1 - 3)  dextrose Gel 1 Dose(s) Oral once PRN Blood Glucose LESS THAN 70 milliGRAM(s)/deciliter  glucagon  Injectable 1 milliGRAM(s) IntraMuscular once PRN Glucose LESS THAN 70 milligrams/deciliter  oxyCODONE    IR 5 milliGRAM(s) Oral every 6 hours PRN Moderate Pain (4 - 6)      PHYSICAL EXAM:  GENERAL: NAD, well-developed  HEAD:  Atraumatic, Normocephalic  EYES: EOMI, conjunctiva and sclera clear  NECK: Supple, No JVD  CHEST/LUNG: Clear to auscultation bilaterally; No wheeze  HEART: Regular rate and rhythm; No murmurs, rubs, or gallops  ABDOMEN: Soft, Nontender, Nondistended; Bowel sounds present  EXTREMITIES:  2+ Peripheral Pulses, No clubbing, cyanosis, or edema  PSYCH: AAOx3  NEUROLOGY: CN's intact, strength 3/5 in UE's and 4/5 in LE's, decreased sensation in LE's.   SKIN: No rashes or lesions                            10.3   3.7   )-----------( 92       ( 29 Dec 2017 05:02 )             29.9       CARDIAC MARKERS ( 28 Dec 2017 17:02 )  x     / 0.14 ng/mL / 75 U/L / x     / 4.3 ng/mL  CARDIAC MARKERS ( 28 Dec 2017 03:33 )  x     / 0.14 ng/mL / 56 U/L / x     / 4.7 ng/mL        PT/INR - ( 29 Dec 2017 00:12 )   PT: 10.8 sec;   INR: 0.99 ratio         PTT - ( 29 Dec 2017 00:12 )  PTT:32.2 sec  133|95|46<89  4.0|26|5.84  9.0,--,--  12-29 @ 05:02      RADIOLOGY & ADDITIONAL TESTS:    Imaging Personally Reviewed:    Consultant(s) Notes Reviewed:  Rheum, Neuro     Care Discussed with Consultants/Other Providers:

## 2017-12-29 NOTE — PROGRESS NOTE ADULT - SUBJECTIVE AND OBJECTIVE BOX
[facility] DIVISION OF KIDNEY DISEASES AND HYPERTENSION -- HEMODIALYSIS NOTE  --------------------------------------------------------------------------------  Chief Complaint: ESRD/Ongoing hemodialysis requirement    24 hour events/subjective:  had vaginal bleeding. evaluated by Gyn.        PAST HISTORY  --------------------------------------------------------------------------------  No significant changes to PMH, PSH, FHx, SHx, unless otherwise noted    ALLERGIES & MEDICATIONS  --------------------------------------------------------------------------------  Allergies    penicillin (Rash)    Intolerances      Standing Inpatient Medications  bisacodyl 5 milliGRAM(s) Oral at bedtime  calcium acetate 667 milliGRAM(s) Oral three times a day with meals  calcium carbonate 1250 mG (OsCal) 1 Tablet(s) Oral daily  dextrose 5%. 1000 milliLiter(s) IV Continuous <Continuous>  dextrose 50% Injectable 12.5 Gram(s) IV Push once  dextrose 50% Injectable 25 Gram(s) IV Push once  dextrose 50% Injectable 25 Gram(s) IV Push once  docusate sodium 100 milliGRAM(s) Oral two times a day  doxercalciferol Injectable 2 MICROGram(s) IV Push <User Schedule>  immune globulin gamma IVPB 21 Gram(s) IV Intermittent daily  insulin lispro (HumaLOG) corrective regimen sliding scale   SubCutaneous three times a day before meals  insulin lispro (HumaLOG) corrective regimen sliding scale   SubCutaneous at bedtime  latanoprost 0.005% Ophthalmic Solution 1 Drop(s) Both EYES at bedtime  levothyroxine 25 MICROGram(s) Oral daily  metolazone 2.5 milliGRAM(s) Oral daily  Nephro-madiha 1 Tablet(s) Oral daily  ondansetron Injectable 4 milliGRAM(s) IV Push once  PARoxetine 20 milliGRAM(s) Oral daily  pregabalin 75 milliGRAM(s) Oral daily  senna 2 Tablet(s) Oral at bedtime  timolol 0.5% Solution 1 Drop(s) Both EYES daily    PRN Inpatient Medications  acetaminophen   Tablet 650 milliGRAM(s) Oral every 6 hours PRN  acetaminophen   Tablet. 650 milliGRAM(s) Oral every 6 hours PRN  dextrose Gel 1 Dose(s) Oral once PRN  glucagon  Injectable 1 milliGRAM(s) IntraMuscular once PRN  oxyCODONE    IR 5 milliGRAM(s) Oral every 6 hours PRN      REVIEW OF SYSTEMS  --------------------------------------------------------------------------------  Gen: No weight changes, fatigue, fevers/chills, weakness  Skin: No rashes  Head/Eyes/Ears/Mouth: No headache; Normal hearing; Normal vision w/o blurriness; No sinus pain/discomfort, sore throat  Respiratory: No dyspnea, cough, wheezing, hemoptysis  CV: No chest pain, PND, orthopnea  GI: No abdominal pain, diarrhea, constipation, nausea, vomiting, melena, hematochezia  : No increased frequency, dysuria, hematuria, nocturia  MSK: No joint pain/swelling; no back pain; no edema  Neuro: No dizziness/lightheadedness, weakness, seizures, numbness, tingling  Heme: No easy bruising or bleeding  Endo: No heat/cold intolerance  Psych: No significant nervousness, anxiety, stress, depression    All other systems were reviewed and are negative, except as noted.    VITALS/PHYSICAL EXAM  --------------------------------------------------------------------------------  T(C): 36.9 (12-29-17 @ 08:15), Max: 37.3 (12-28-17 @ 21:30)  HR: 80 (12-29-17 @ 08:15) (73 - 90)  BP: 133/77 (12-29-17 @ 08:15) (82/54 - 156/83)  RR: 18 (12-29-17 @ 08:15) (14 - 18)  SpO2: 100% (12-29-17 @ 08:15) (98% - 100%)  Wt(kg): --  Height (cm): 154.94 (12-28-17 @ 05:43)  Weight (kg): 53.3 (12-28-17 @ 05:43)  BMI (kg/m2): 22.2 (12-28-17 @ 05:43)  BSA (m2): 1.51 (12-28-17 @ 05:43)      12-28-17 @ 07:01  -  12-29-17 @ 07:00  --------------------------------------------------------  IN: 770 mL / OUT: 0 mL / NET: 770 mL      Physical Exam:  	Gen: NAD  	Pulm: CTA B/L  	CV: RRR, S1S2; no rub  	Abd: +BS, soft, nontender/nondistended  	: No suprapubic tenderness  	UE: Warm,  no edema; LUE AVF+  	LE: Warm,  no edema  	Skin: Warm, without rashes  	Vascular access: RIJ tunneled dialysis catheter, site clean/dry/intact    LABS/STUDIES  --------------------------------------------------------------------------------              10.3   3.7   >-----------<  92       [12-29-17 @ 05:02]              29.9     133  |  95  |  46  ----------------------------<  89      [12-29-17 @ 05:02]  4.0   |  26  |  5.84        Ca     9.0     [12-29-17 @ 05:02]      PT/INR: PT 10.8 , INR 0.99       [12-29-17 @ 00:12]  PTT: 32.2       [12-29-17 @ 00:12]    Troponin 0.14      [12-28-17 @ 17:02]  CK 75      [12-28-17 @ 17:02]    Iron 77, TIBC 90, %sat 86      [05-29-17 @ 09:39]  Ferritin 781.0      [05-29-17 @ 09:39]  PTH -- (Ca 7.5)      [06-01-17 @ 08:12]   253  Vitamin D (25OH) <4.0      [05-31-17 @ 08:52]  HbA1c 4.7      [12-20-17 @ 07:23]  TSH 8.03      [12-23-17 @ 08:00]  Lipid: chol 227, , HDL 47,       [05-30-17 @ 07:28]    HBsAb Reactive      [12-23-17 @ 08:00]  HBsAg Nonreact      [12-23-17 @ 08:00]  HBcAb Nonreact      [12-23-17 @ 08:00]  HCV 0.18, Nonreact      [12-23-17 @ 08:00]

## 2017-12-29 NOTE — PROGRESS NOTE ADULT - PROBLEM SELECTOR PLAN 3
Patient hypotensive multifactorial - Volume depletion from Lasix, fluid shift during dialysis vs bleeding from vagina. CBC stable.   Blood Pressure stable.

## 2017-12-30 LAB
ALBUMIN CSF-MCNC: 71.1 MG/DL — HIGH (ref 14–25)
ALBUMIN SERPL ELPH-MCNC: 3080 MG/DL — LOW (ref 3500–5200)
ANION GAP SERPL CALC-SCNC: 14 MMOL/L — SIGNIFICANT CHANGE UP (ref 5–17)
BUN SERPL-MCNC: 33 MG/DL — HIGH (ref 7–23)
CALCIUM SERPL-MCNC: 8.7 MG/DL — SIGNIFICANT CHANGE UP (ref 8.4–10.5)
CHLORIDE SERPL-SCNC: 94 MMOL/L — LOW (ref 96–108)
CO2 SERPL-SCNC: 24 MMOL/L — SIGNIFICANT CHANGE UP (ref 22–31)
CORTIS AM PEAK SERPL-MCNC: 18.5 UG/DL — HIGH (ref 6–18.4)
CREAT SERPL-MCNC: 4.64 MG/DL — HIGH (ref 0.5–1.3)
GLUCOSE BLDC GLUCOMTR-MCNC: 102 MG/DL — HIGH (ref 70–99)
GLUCOSE BLDC GLUCOMTR-MCNC: 108 MG/DL — HIGH (ref 70–99)
GLUCOSE BLDC GLUCOMTR-MCNC: 154 MG/DL — HIGH (ref 70–99)
GLUCOSE SERPL-MCNC: 132 MG/DL — HIGH (ref 70–99)
HCT VFR BLD CALC: 26.9 % — LOW (ref 34.5–45)
HGB BLD-MCNC: 9.1 G/DL — LOW (ref 11.5–15.5)
IGG CSF-MCNC: 31.6 MG/DL — HIGH
IGG FLD-MCNC: 2710 MG/DL — HIGH (ref 694–1618)
IGG SYNTH RATE SER+CSF CALC-MRATE: 12.1 MG/DAY — HIGH
IGG/ALB CLEAR SER+CSF-RTO: 0.5 — SIGNIFICANT CHANGE UP
IGG/ALB CSF: 0.44 RATIO — HIGH
IGG/ALB SER: 0.88 RATIO — SIGNIFICANT CHANGE UP
MCHC RBC-ENTMCNC: 29.3 PG — SIGNIFICANT CHANGE UP (ref 27–34)
MCHC RBC-ENTMCNC: 33.8 GM/DL — SIGNIFICANT CHANGE UP (ref 32–36)
MCV RBC AUTO: 86.5 FL — SIGNIFICANT CHANGE UP (ref 80–100)
PLATELET # BLD AUTO: 86 K/UL — LOW (ref 150–400)
POTASSIUM SERPL-MCNC: 3.8 MMOL/L — SIGNIFICANT CHANGE UP (ref 3.5–5.3)
POTASSIUM SERPL-SCNC: 3.8 MMOL/L — SIGNIFICANT CHANGE UP (ref 3.5–5.3)
RBC # BLD: 3.11 M/UL — LOW (ref 3.8–5.2)
RBC # FLD: 14.4 % — SIGNIFICANT CHANGE UP (ref 10.3–14.5)
SODIUM SERPL-SCNC: 132 MMOL/L — LOW (ref 135–145)
WBC # BLD: 3.65 K/UL — LOW (ref 3.8–10.5)
WBC # FLD AUTO: 3.65 K/UL — LOW (ref 3.8–10.5)

## 2017-12-30 PROCEDURE — 99233 SBSQ HOSP IP/OBS HIGH 50: CPT

## 2017-12-30 RX ORDER — OXYCODONE HYDROCHLORIDE 5 MG/1
5 TABLET ORAL EVERY 6 HOURS
Qty: 0 | Refills: 0 | Status: DISCONTINUED | OUTPATIENT
Start: 2017-12-30 | End: 2018-01-06

## 2017-12-30 RX ADMIN — Medication 100 MILLIGRAM(S): at 18:24

## 2017-12-30 RX ADMIN — OXYCODONE HYDROCHLORIDE 5 MILLIGRAM(S): 5 TABLET ORAL at 23:40

## 2017-12-30 RX ADMIN — OXYCODONE HYDROCHLORIDE 5 MILLIGRAM(S): 5 TABLET ORAL at 13:20

## 2017-12-30 RX ADMIN — Medication 1 TABLET(S): at 12:52

## 2017-12-30 RX ADMIN — LATANOPROST 1 DROP(S): 0.05 SOLUTION/ DROPS OPHTHALMIC; TOPICAL at 21:53

## 2017-12-30 RX ADMIN — OXYCODONE HYDROCHLORIDE 5 MILLIGRAM(S): 5 TABLET ORAL at 22:43

## 2017-12-30 RX ADMIN — Medication 667 MILLIGRAM(S): at 12:51

## 2017-12-30 RX ADMIN — Medication 25 MICROGRAM(S): at 06:01

## 2017-12-30 RX ADMIN — Medication 1 DROP(S): at 12:57

## 2017-12-30 RX ADMIN — Medication 1 TABLET(S): at 12:51

## 2017-12-30 RX ADMIN — Medication 75 MILLIGRAM(S): at 12:57

## 2017-12-30 RX ADMIN — OXYCODONE HYDROCHLORIDE 5 MILLIGRAM(S): 5 TABLET ORAL at 12:50

## 2017-12-30 RX ADMIN — Medication 667 MILLIGRAM(S): at 18:26

## 2017-12-30 RX ADMIN — Medication 20 MILLIGRAM(S): at 12:52

## 2017-12-30 RX ADMIN — Medication 100 MILLIGRAM(S): at 06:01

## 2017-12-30 RX ADMIN — Medication 35 GRAM(S): at 14:13

## 2017-12-30 RX ADMIN — Medication 667 MILLIGRAM(S): at 12:41

## 2017-12-30 NOTE — PROGRESS NOTE ADULT - SUBJECTIVE AND OBJECTIVE BOX
Patient is a 57y old  Female who presents with a chief complaint of LE pain and inability to walk (19 Dec 2017 10:27)      SUBJECTIVE / OVERNIGHT EVENTS: Patient feels better, wants to ambulate, complaining of Lt foot pain- 6/10. No other complaints.  ROS otherwise negative.     T(C): 37.3 (12-30-17 @ 18:49), Max: 37.4 (12-30-17 @ 13:42)  HR: 82 (12-30-17 @ 18:49) (76 - 82)  BP: 153/78 (12-30-17 @ 18:49) (102/60 - 153/78)  RR: 18 (12-30-17 @ 18:49) (18 - 18)  SpO2: 98% (12-30-17 @ 18:49) (96% - 99%)    MEDICATIONS  (STANDING):  bisacodyl 5 milliGRAM(s) Oral at bedtime  calcium acetate 667 milliGRAM(s) Oral three times a day with meals  calcium carbonate 1250 mG (OsCal) 1 Tablet(s) Oral daily  dextrose 5%. 1000 milliLiter(s) (50 mL/Hr) IV Continuous <Continuous>  dextrose 50% Injectable 12.5 Gram(s) IV Push once  dextrose 50% Injectable 25 Gram(s) IV Push once  dextrose 50% Injectable 25 Gram(s) IV Push once  docusate sodium 100 milliGRAM(s) Oral two times a day  doxercalciferol Injectable 2 MICROGram(s) IV Push <User Schedule>  immune globulin gamma IVPB 21 Gram(s) IV Intermittent daily  insulin lispro (HumaLOG) corrective regimen sliding scale   SubCutaneous three times a day before meals  insulin lispro (HumaLOG) corrective regimen sliding scale   SubCutaneous at bedtime  latanoprost 0.005% Ophthalmic Solution 1 Drop(s) Both EYES at bedtime  levothyroxine 25 MICROGram(s) Oral daily  Nephro-madiha 1 Tablet(s) Oral daily  ondansetron Injectable 4 milliGRAM(s) IV Push once  PARoxetine 20 milliGRAM(s) Oral daily  pregabalin 75 milliGRAM(s) Oral daily  senna 2 Tablet(s) Oral at bedtime  timolol 0.5% Solution 1 Drop(s) Both EYES daily    MEDICATIONS  (PRN):  acetaminophen   Tablet 650 milliGRAM(s) Oral every 6 hours PRN For Temp greater than 38 C (100.4 F)  acetaminophen   Tablet. 650 milliGRAM(s) Oral every 6 hours PRN Mild Pain (1 - 3)  dextrose Gel 1 Dose(s) Oral once PRN Blood Glucose LESS THAN 70 milliGRAM(s)/deciliter  glucagon  Injectable 1 milliGRAM(s) IntraMuscular once PRN Glucose LESS THAN 70 milligrams/deciliter  oxyCODONE    IR 5 milliGRAM(s) Oral every 6 hours PRN Moderate Pain (4 - 6)        PHYSICAL EXAM:  GENERAL: NAD, well-developed  HEAD:  Atraumatic, Normocephalic  EYES: EOMI, conjunctiva and sclera clear  NECK: Supple, No JVD  CHEST/LUNG: Clear to auscultation bilaterally; No wheeze  HEART: Regular rate and rhythm; No murmurs, rubs, or gallops  ABDOMEN: Soft, Nontender, Nondistended; Bowel sounds present  EXTREMITIES:  2+ Peripheral Pulses, No clubbing, cyanosis, or edema  PSYCH: AAOx3  NEUROLOGY: CN's intact, strength 3/5 in UE's and 4/5 in LE's, decreased sensation in LE's.   SKIN: No rashes or lesions                             9.1    3.65  )-----------( 86       ( 30 Dec 2017 12:09 )             26.9           LIVER FUNCTIONS - ( 29 Dec 2017 16:31 )  Alb: 3080 mg/dL / Pro: x     / ALK PHOS: x     / ALT: x     / AST: x     / GGT: x           PT/INR - ( 29 Dec 2017 00:12 )   PT: 10.8 sec;   INR: 0.99 ratio         PTT - ( 29 Dec 2017 00:12 )  PTT:32.2 sec  132|94|33<132  3.8|24|4.64  8.7,--,--  12-30 @ 12:10      RADIOLOGY & ADDITIONAL TESTS:    Imaging Personally Reviewed:    Consultant(s) Notes Reviewed:  Rheum, Neuro     Care Discussed with Consultants/Other Providers:

## 2017-12-30 NOTE — PROGRESS NOTE ADULT - PROBLEM SELECTOR PLAN 1
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 Positive serology for Lupus, reviewed LP, neuro follow up appreciated. Continue with Immunoglobulin infusion for now.   MRI lumbar, C spine show no cord compression.    Continue with Lyrica- renally dosed.   PT eval- recommend JESSICA placement.   Continue to hold Plaquenil.   s/p Sural nerve biopsy today. Follow up results.

## 2017-12-30 NOTE — PROGRESS NOTE ADULT - PROBLEM SELECTOR PLAN 3
Patient hypotensive multifactorial - Volume depletion from Lasix, fluid shift during dialysis vs bleeding from vagina. CBC stable.   Blood Pressure stable.  Hold off Zaraoxylyn.

## 2017-12-31 DIAGNOSIS — M79.604 PAIN IN RIGHT LEG: ICD-10-CM

## 2017-12-31 LAB
ANION GAP SERPL CALC-SCNC: 18 MMOL/L — HIGH (ref 5–17)
BUN SERPL-MCNC: 48 MG/DL — HIGH (ref 7–23)
CALCIUM SERPL-MCNC: 9.1 MG/DL — SIGNIFICANT CHANGE UP (ref 8.4–10.5)
CHLORIDE SERPL-SCNC: 95 MMOL/L — LOW (ref 96–108)
CO2 SERPL-SCNC: 22 MMOL/L — SIGNIFICANT CHANGE UP (ref 22–31)
CREAT SERPL-MCNC: 6.06 MG/DL — HIGH (ref 0.5–1.3)
GLUCOSE BLDC GLUCOMTR-MCNC: 110 MG/DL — HIGH (ref 70–99)
GLUCOSE BLDC GLUCOMTR-MCNC: 117 MG/DL — HIGH (ref 70–99)
GLUCOSE BLDC GLUCOMTR-MCNC: 81 MG/DL — SIGNIFICANT CHANGE UP (ref 70–99)
GLUCOSE BLDC GLUCOMTR-MCNC: 90 MG/DL — SIGNIFICANT CHANGE UP (ref 70–99)
GLUCOSE SERPL-MCNC: 104 MG/DL — HIGH (ref 70–99)
HCT VFR BLD CALC: 27.9 % — LOW (ref 34.5–45)
HGB BLD-MCNC: 9.4 G/DL — LOW (ref 11.5–15.5)
MCHC RBC-ENTMCNC: 28.8 PG — SIGNIFICANT CHANGE UP (ref 27–34)
MCHC RBC-ENTMCNC: 33.7 GM/DL — SIGNIFICANT CHANGE UP (ref 32–36)
MCV RBC AUTO: 85.6 FL — SIGNIFICANT CHANGE UP (ref 80–100)
PLATELET # BLD AUTO: 74 K/UL — LOW (ref 150–400)
POTASSIUM SERPL-MCNC: 4.3 MMOL/L — SIGNIFICANT CHANGE UP (ref 3.5–5.3)
POTASSIUM SERPL-SCNC: 4.3 MMOL/L — SIGNIFICANT CHANGE UP (ref 3.5–5.3)
RBC # BLD: 3.26 M/UL — LOW (ref 3.8–5.2)
RBC # FLD: 14.1 % — SIGNIFICANT CHANGE UP (ref 10.3–14.5)
SODIUM SERPL-SCNC: 135 MMOL/L — SIGNIFICANT CHANGE UP (ref 135–145)
WBC # BLD: 3.36 K/UL — LOW (ref 3.8–10.5)
WBC # FLD AUTO: 3.36 K/UL — LOW (ref 3.8–10.5)

## 2017-12-31 PROCEDURE — 99232 SBSQ HOSP IP/OBS MODERATE 35: CPT

## 2017-12-31 PROCEDURE — 99233 SBSQ HOSP IP/OBS HIGH 50: CPT

## 2017-12-31 PROCEDURE — 71010: CPT | Mod: 26

## 2017-12-31 PROCEDURE — 70551 MRI BRAIN STEM W/O DYE: CPT | Mod: 26

## 2017-12-31 RX ORDER — FUROSEMIDE 40 MG
80 TABLET ORAL
Qty: 0 | Refills: 0 | Status: DISCONTINUED | OUTPATIENT
Start: 2017-12-31 | End: 2018-01-16

## 2017-12-31 RX ADMIN — OXYCODONE HYDROCHLORIDE 5 MILLIGRAM(S): 5 TABLET ORAL at 14:04

## 2017-12-31 RX ADMIN — OXYCODONE HYDROCHLORIDE 5 MILLIGRAM(S): 5 TABLET ORAL at 05:10

## 2017-12-31 RX ADMIN — Medication 5 MILLIGRAM(S): at 22:49

## 2017-12-31 RX ADMIN — OXYCODONE HYDROCHLORIDE 5 MILLIGRAM(S): 5 TABLET ORAL at 22:47

## 2017-12-31 RX ADMIN — Medication 650 MILLIGRAM(S): at 10:35

## 2017-12-31 RX ADMIN — OXYCODONE HYDROCHLORIDE 5 MILLIGRAM(S): 5 TABLET ORAL at 23:00

## 2017-12-31 RX ADMIN — Medication 100 MILLIGRAM(S): at 17:47

## 2017-12-31 RX ADMIN — Medication 75 MILLIGRAM(S): at 11:27

## 2017-12-31 RX ADMIN — Medication 667 MILLIGRAM(S): at 12:35

## 2017-12-31 RX ADMIN — Medication 25 MICROGRAM(S): at 06:04

## 2017-12-31 RX ADMIN — LATANOPROST 1 DROP(S): 0.05 SOLUTION/ DROPS OPHTHALMIC; TOPICAL at 22:51

## 2017-12-31 RX ADMIN — Medication 35 GRAM(S): at 12:45

## 2017-12-31 RX ADMIN — Medication 1 TABLET(S): at 11:26

## 2017-12-31 RX ADMIN — Medication 1 TABLET(S): at 11:27

## 2017-12-31 RX ADMIN — OXYCODONE HYDROCHLORIDE 5 MILLIGRAM(S): 5 TABLET ORAL at 04:11

## 2017-12-31 RX ADMIN — Medication 667 MILLIGRAM(S): at 17:47

## 2017-12-31 RX ADMIN — Medication 650 MILLIGRAM(S): at 09:54

## 2017-12-31 RX ADMIN — SENNA PLUS 2 TABLET(S): 8.6 TABLET ORAL at 22:49

## 2017-12-31 RX ADMIN — Medication 20 MILLIGRAM(S): at 11:27

## 2017-12-31 RX ADMIN — Medication 1 DROP(S): at 11:27

## 2017-12-31 RX ADMIN — Medication 667 MILLIGRAM(S): at 08:35

## 2017-12-31 RX ADMIN — Medication 80 MILLIGRAM(S): at 17:47

## 2017-12-31 NOTE — CONSULT NOTE ADULT - ASSESSMENT
Patient is a 57 F who is s/p LUE fistula thrombolysis, now with the loss of her permacath and a palpable pulse at the fistula site.  - f/u LUE deplex  -continued management per Nephrology and Medicine    ESTRELLITA Augustine MD PGYII

## 2017-12-31 NOTE — PROGRESS NOTE ADULT - PROBLEM SELECTOR PLAN 4
Patient hypotensive multifactorial - Volume depletion from Lasix, fluid shift during dialysis vs bleeding from vagina. CBC stable.   Blood Pressure stable.  Consider Midodrine.

## 2017-12-31 NOTE — CONSULT NOTE ADULT - SUBJECTIVE AND OBJECTIVE BOX
VASCULAR SURGERY CONSULT NOTE     CC: 57y old Female admitted with a chief complaint of LE pain and inability to walk (19 Dec 2017 10:27)  , now    HPI: This patient is a 57y old Female presenting with     PMHx:                       PSHx:           Medications (home): Medications (inpatient): bisacodyl 5 milliGRAM(s) Oral at bedtime  calcium acetate 667 milliGRAM(s) Oral three times a day with meals  calcium carbonate 1250 mG (OsCal) 1 Tablet(s) Oral daily  dextrose 5%. 1000 milliLiter(s) IV Continuous <Continuous>  dextrose 50% Injectable 12.5 Gram(s) IV Push once  dextrose 50% Injectable 25 Gram(s) IV Push once  dextrose 50% Injectable 25 Gram(s) IV Push once  docusate sodium 100 milliGRAM(s) Oral two times a day  doxercalciferol Injectable 2 MICROGram(s) IV Push <User Schedule>  immune globulin gamma IVPB 21 Gram(s) IV Intermittent daily  insulin lispro (HumaLOG) corrective regimen sliding scale   SubCutaneous three times a day before meals  insulin lispro (HumaLOG) corrective regimen sliding scale   SubCutaneous at bedtime  latanoprost 0.005% Ophthalmic Solution 1 Drop(s) Both EYES at bedtime  levothyroxine 25 MICROGram(s) Oral daily  Nephro-madiha 1 Tablet(s) Oral daily  ondansetron Injectable 4 milliGRAM(s) IV Push once  PARoxetine 20 milliGRAM(s) Oral daily  pregabalin 75 milliGRAM(s) Oral daily  senna 2 Tablet(s) Oral at bedtime  timolol 0.5% Solution 1 Drop(s) Both EYES daily    Medications (PRN):acetaminophen   Tablet 650 milliGRAM(s) Oral every 6 hours PRN  acetaminophen   Tablet. 650 milliGRAM(s) Oral every 6 hours PRN  dextrose Gel 1 Dose(s) Oral once PRN  glucagon  Injectable 1 milliGRAM(s) IntraMuscular once PRN  oxyCODONE    IR 5 milliGRAM(s) Oral every 6 hours PRN    Allergies    penicillin (Rash)    Intolerances      Social Hx: Other symptom or sign involving musculoskeletal system  No h/o HF  H/o or current diagnosis of HF- ACEI/ARB contraindication unknown  History of hypertension (Sibling)  DM (diabetes mellitus) (Sibling)  History of hypertension  DM (diabetes mellitus)  No pertinent family history in first degree relatives  Handoff  MEWS Score  Lupus (systemic lupus erythematosus)  ESRD (end-stage renal disease) due to SLE  Glaucoma  Other hyperlipidemia  Type 2 diabetes mellitus without complication, without long-term current use of insulin  Essential hypertension  No pertinent past medical history  Hypercholesteremia  Hypertension  Diabetes  Neuropathy  Neuropathy  Lower extremity weakness  Hypotension due to drugs  Vaginal bleeding  Orthostatic hypotension  Renal osteodystrophy  HTN (hypertension)  Chronic kidney disease-mineral and bone disorder  Anemia due to chronic kidney disease  ESRD (end stage renal disease) on dialysis  Essential hypertension  Hypothyroidism, unspecified type  Type 2 diabetes mellitus with chronic kidney disease on chronic dialysis, unspecified long term insulin use status  Systemic lupus erythematosus with glomerular disease, unspecified SLE type  ESRD (end-stage renal disease) due to SLE  Weakness of both lower extremities  Lumbar puncture  Sural nerve biopsy  S/P appendectomy  H/O gastric bypass  S/P  section  No significant past surgical history  LLE PAIN SP DIALYSIS  56      Physical Exam  T(C): 36.8 (17 @ 05:50), Max: 37.4 (17 @ 13:42)  HR: 74 (17 @ 05:50) (74 - 82)  BP: 105/66 (17 @ 05:50) (102/60 - 170/84)  RR: 18 (17 @ 05:50) (18 - 18)  SpO2: 97% (17 @ 05:50) (96% - 100%)  Wt(kg): --  Tmax: T(C): , Max: 37.4 (17 @ 13:42)  Wt(kg): --    17  -  17  --------------------------------------------------------  IN:    Oral Fluid: 360 mL  Total IN: 360 mL    OUT:  Total OUT: 0 mL    Total NET: 360 mL        General: well developed, well nourished, NAD  Neuro: alert and oriented, no focal deficits, moves all extremities spontaneously  HEENT: NCAT, EOMI, anicteric, mucosa moist  Respiratory: airway patent, respirations unlabored  CVS: regular rate and rhythm  Abdomen: soft, nontender, nondistended  Extremities: no edema, sensation and movement grossly intact  Skin: warm, dry, appropriate color  Pulse exam:    Labs:                        9.1    3.65  )-----------( 86       ( 30 Dec 2017 12:09 )             26.9       12-30    132<L>  |  94<L>  |  33<H>  ----------------------------<  132<H>  3.8   |  24  |  4.64<H>    Ca    8.7      30 Dec 2017 12:10    TPro  x   /  Alb  3080<L>  /  TBili  x   /  DBili  x   /  AST  x   /  ALT  x   /  AlkPhos  x   12            Imaging and other studies: VASCULAR SURGERY CONSULT NOTE     CC: 57y old Female admitted with a chief complaint of LE pain and inability to walk (19 Dec 2017 10:27)    HPI:  56 yo f with h/o lupus diagnosed in April, lupus nephritis, ESRD on HD via permacath (M, W, F) s/p AVF (not matured), DM2, HTN, HLD, hypothyroid presented with 3 weeks h/o bilateral LE pain and numbness. Patient was seen in ED (OCt) with pain diagnosed with neuropathy (neg DVT) and d/c'ed home on oxycodone. Subsequently developed b/l LE numbness and inability to ambulate for past 3 weeks. Now wheelchair dependent. Pain is tingling intermittent . Hydroxychlorquine stopped few days ago by nephrology/rheum for possible medication side effect and told to go to hospital for further eval. Of note, hydroxychlorquine recently start around same time as numbness. Patient awaiting renal transplant. Afebrile. Denies any fever or chills, loss of bowel or urine (19 Dec 2017 10:27).    Patient had a L brachial a to cephalic v fistula creation on 10/2 by Dr. 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  The patient has no current complaints of pain in the LUE or decrease in motor function.        Medications (home): Medications (inpatient): bisacodyl 5 milliGRAM(s) Oral at bedtime  calcium acetate 667 milliGRAM(s) Oral three times a day with meals  calcium carbonate 1250 mG (OsCal) 1 Tablet(s) Oral daily  dextrose 5%. 1000 milliLiter(s) IV Continuous <Continuous>  dextrose 50% Injectable 12.5 Gram(s) IV Push once  dextrose 50% Injectable 25 Gram(s) IV Push once  dextrose 50% Injectable 25 Gram(s) IV Push once  docusate sodium 100 milliGRAM(s) Oral two times a day  doxercalciferol Injectable 2 MICROGram(s) IV Push <User Schedule>  immune globulin gamma IVPB 21 Gram(s) IV Intermittent daily  insulin lispro (HumaLOG) corrective regimen sliding scale   SubCutaneous three times a day before meals  insulin lispro (HumaLOG) corrective regimen sliding scale   SubCutaneous at bedtime  latanoprost 0.005% Ophthalmic Solution 1 Drop(s) Both EYES at bedtime  levothyroxine 25 MICROGram(s) Oral daily  Nephro-madiha 1 Tablet(s) Oral daily  ondansetron Injectable 4 milliGRAM(s) IV Push once  PARoxetine 20 milliGRAM(s) Oral daily  pregabalin 75 milliGRAM(s) Oral daily  senna 2 Tablet(s) Oral at bedtime  timolol 0.5% Solution 1 Drop(s) Both EYES daily    Medications (PRN):acetaminophen   Tablet 650 milliGRAM(s) Oral every 6 hours PRN  acetaminophen   Tablet. 650 milliGRAM(s) Oral every 6 hours PRN  dextrose Gel 1 Dose(s) Oral once PRN  glucagon  Injectable 1 milliGRAM(s) IntraMuscular once PRN  oxyCODONE    IR 5 milliGRAM(s) Oral every 6 hours PRN    Allergies    penicillin (Rash)    Intolerances      Social Hx: Other symptom or sign involving musculoskeletal system  No h/o HF  H/o or current diagnosis of HF- ACEI/ARB contraindication unknown  History of hypertension (Sibling)  DM (diabetes mellitus) (Sibling)  History of hypertension  DM (diabetes mellitus)  No pertinent family history in first degree relatives  Handoff  MEWS Score  Lupus (systemic lupus erythematosus)  ESRD (end-stage renal disease) due to SLE  Glaucoma  Other hyperlipidemia  Type 2 diabetes mellitus without complication, without long-term current use of insulin  Essential hypertension  No pertinent past medical history  Hypercholesteremia  Hypertension  Diabetes  Neuropathy  Neuropathy  Lower extremity weakness  Hypotension due to drugs  Vaginal bleeding  Orthostatic hypotension  Renal osteodystrophy  HTN (hypertension)  Chronic kidney disease-mineral and bone disorder  Anemia due to chronic kidney disease  ESRD (end stage renal disease) on dialysis  Essential hypertension  Hypothyroidism, unspecified type  Type 2 diabetes mellitus with chronic kidney disease on chronic dialysis, unspecified long term insulin use status  Systemic lupus erythematosus with glomerular disease, unspecified SLE type  ESRD (end-stage renal disease) due to SLE  Weakness of both lower extremities  Lumbar puncture  Sural nerve biopsy  S/P appendectomy  H/O gastric bypass  S/P  section  No significant past surgical history  LLE PAIN SP DIALYSIS  56      Physical Exam  T(C): 36.8 (17 @ 05:50), Max: 37.4 (17 @ 13:42)  HR: 74 (17 @ 05:50) (74 - 82)  BP: 105/66 (17 @ 05:50) (102/60 - 170/84)  RR: 18 (17 @ 05:50) (18 - 18)  SpO2: 97% (17 @ 05:50) (96% - 100%)  Tmax: T(C): , Max: 37.4 (17 @ 13:42)      17  -  17  --------------------------------------------------------  IN:    Oral Fluid: 360 mL  Total IN: 360 mL    OUT:  Total OUT: 0 mL    Total NET: 360 mL        General: well developed, well nourished, NAD  Neuro: alert and oriented, no focal deficits, moves all extremities spontaneously  HEENT: NCAT, EOMI, anicteric, mucosa moist  Respiratory: airway patent, respirations unlabored  CVS: regular rate and rhythm  Abdomen: soft, nontender, nondistended  Extremities: no edema, sensation and movement grossly intact, fistula with palpable pulse  Skin: warm, dry, appropriate color  Pulse exam:    Labs:                        9.1    3.65  )-----------( 86       ( 30 Dec 2017 12:09 )             26.9       12-30    132<L>  |  94<L>  |  33<H>  ----------------------------<  132<H>  3.8   |  24  |  4.64<H>    Ca    8.7      30 Dec 2017 12:10    TPro  x   /  Alb  3080<L>  /  TBili  x   /  DBili  x   /  AST  x   /  ALT  x   /  AlkPhos  x         Imaging and other studies:

## 2017-12-31 NOTE — PROGRESS NOTE ADULT - PROBLEM SELECTOR PLAN 5
Patient cannot dialysis today due to dislodgement of perma cath, resume Lasix for now.    Monitor renal function, fluid status.

## 2017-12-31 NOTE — PROGRESS NOTE ADULT - ASSESSMENT
57 year old woman with ESRD on HD presenting with limb weakness, now s/p sural nerve biopsy receiving IVIg for possible GBS.

## 2017-12-31 NOTE — PROGRESS NOTE ADULT - SUBJECTIVE AND OBJECTIVE BOX
Patient is a 57y old  Female who presents with a chief complaint of LE pain and inability to walk (19 Dec 2017 10:27)      SUBJECTIVE / OVERNIGHT EVENTS: Patient woke up today noted to have her perma cath out. no new complaints.   No hx fall/ injury/ fevers. Patient noted to have dislodgement of permacath today.   No other complaints.  ROS otherwise negative.     T(C): 36.6 (12-31-17 @ 14:00), Max: 37 (12-31-17 @ 13:15)  HR: 73 (12-31-17 @ 14:00) (73 - 81)  BP: 119/70 (12-31-17 @ 14:00) (105/66 - 127/74)  RR: 18 (12-31-17 @ 14:00) (18 - 18)  SpO2: 99% (12-31-17 @ 14:00) (97% - 100%)    MEDICATIONS  (STANDING):  bisacodyl 5 milliGRAM(s) Oral at bedtime  calcium acetate 667 milliGRAM(s) Oral three times a day with meals  calcium carbonate 1250 mG (OsCal) 1 Tablet(s) Oral daily  dextrose 5%. 1000 milliLiter(s) (50 mL/Hr) IV Continuous <Continuous>  dextrose 50% Injectable 12.5 Gram(s) IV Push once  dextrose 50% Injectable 25 Gram(s) IV Push once  dextrose 50% Injectable 25 Gram(s) IV Push once  docusate sodium 100 milliGRAM(s) Oral two times a day  doxercalciferol Injectable 2 MICROGram(s) IV Push <User Schedule>  furosemide    Tablet 80 milliGRAM(s) Oral <User Schedule>  immune globulin gamma IVPB 21 Gram(s) IV Intermittent daily  insulin lispro (HumaLOG) corrective regimen sliding scale   SubCutaneous three times a day before meals  insulin lispro (HumaLOG) corrective regimen sliding scale   SubCutaneous at bedtime  latanoprost 0.005% Ophthalmic Solution 1 Drop(s) Both EYES at bedtime  levothyroxine 25 MICROGram(s) Oral daily  metolazone 2.5 milliGRAM(s) Oral daily  Nephro-madiha 1 Tablet(s) Oral daily  ondansetron Injectable 4 milliGRAM(s) IV Push once  PARoxetine 20 milliGRAM(s) Oral daily  pregabalin 75 milliGRAM(s) Oral daily  senna 2 Tablet(s) Oral at bedtime  timolol 0.5% Solution 1 Drop(s) Both EYES daily    MEDICATIONS  (PRN):  acetaminophen   Tablet 650 milliGRAM(s) Oral every 6 hours PRN For Temp greater than 38 C (100.4 F)  acetaminophen   Tablet. 650 milliGRAM(s) Oral every 6 hours PRN Mild Pain (1 - 3)  dextrose Gel 1 Dose(s) Oral once PRN Blood Glucose LESS THAN 70 milliGRAM(s)/deciliter  glucagon  Injectable 1 milliGRAM(s) IntraMuscular once PRN Glucose LESS THAN 70 milligrams/deciliter  oxyCODONE    IR 5 milliGRAM(s) Oral every 6 hours PRN Moderate Pain (4 - 6)            PHYSICAL EXAM:  GENERAL: NAD, well-developed  HEAD:  Atraumatic, Normocephalic  EYES: EOMI, conjunctiva and sclera clear  NECK: Supple, No JVD  CHEST/LUNG: Clear to auscultation bilaterally; No wheeze  HEART: Regular rate and rhythm; No murmurs, rubs, or gallops  ABDOMEN: Soft, Nontender, Nondistended; Bowel sounds present  EXTREMITIES:  2+ Peripheral Pulses, No clubbing, cyanosis, or edema  PSYCH: AAOx3  NEUROLOGY: CN's intact, strength 3/5 in UE's and 4/5 in LE's, decreased sensation in LE's.   Rt Lt lower extremities tender to palpate   SKIN: No rashes or lesions                             9.1    3.65  )-----------( 86       ( 30 Dec 2017 12:09 )             26.9           LIVER FUNCTIONS - ( 29 Dec 2017 16:31 )  Alb: 3080 mg/dL / Pro: x     / ALK PHOS: x     / ALT: x     / AST: x     / GGT: x           PT/INR - ( 29 Dec 2017 00:12 )   PT: 10.8 sec;   INR: 0.99 ratio         PTT - ( 29 Dec 2017 00:12 )  PTT:32.2 sec  132|94|33<132  3.8|24|4.64  8.7,--,--  12-30 @ 12:10      RADIOLOGY & ADDITIONAL TESTS:    Imaging Personally Reviewed:    Consultant(s) Notes Reviewed:  Rheum, Neuro     Care Discussed with Consultants/Other Providers:

## 2017-12-31 NOTE — PROGRESS NOTE ADULT - PROBLEM SELECTOR PLAN 1
Pt with planned dialysis today but permacath fell out.  Pt. with recently placed LUE AVF and was supposed to follow up with vascular surgeon Dr. Deluca, - will have vascular assess fistula for use.  If cannot be used then hold dialysis until permacath can be placed Tuesday.  Pt electrolytes stable except mild hyponatremia and euvolemic.  Will fluid restrict.

## 2017-12-31 NOTE — PROGRESS NOTE ADULT - SUBJECTIVE AND OBJECTIVE BOX
[facility] DIVISION OF KIDNEY DISEASES AND HYPERTENSION -- FOLLOW UP NOTE  --------------------------------------------------------------------------------  Chief Complaint:  Weakness, ESRD    24 hour events/subjective:  Pt woke up today with permacath out.  Still with weak painful legs.  Not eating or drinking much.       PAST HISTORY  --------------------------------------------------------------------------------  PAST MEDICAL & SURGICAL HISTORY:  Lupus (systemic lupus erythematosus)  ESRD (end-stage renal disease) due to SLE  Glaucoma  Other hyperlipidemia  Type 2 diabetes mellitus without complication, without long-term current use of insulin  Essential hypertension  Hypercholesteremia  Hypertension  Diabetes  S/P appendectomy  H/O gastric bypass: 2016  S/P  section: times two    FAMILY HISTORY:  History of hypertension (Sibling): sibling  DM (diabetes mellitus) (Sibling): siblings  History of hypertension: father and mother  DM (diabetes mellitus): mother and father    No significant changes to PMH, PSH, FHx, SHx, unless otherwise noted    ALLERGIES & MEDICATIONS  --------------------------------------------------------------------------------  Allergies    penicillin (Rash)    Intolerances      Standing Inpatient Medications  bisacodyl 5 milliGRAM(s) Oral at bedtime  calcium acetate 667 milliGRAM(s) Oral three times a day with meals  calcium carbonate 1250 mG (OsCal) 1 Tablet(s) Oral daily  dextrose 5%. 1000 milliLiter(s) IV Continuous <Continuous>  dextrose 50% Injectable 12.5 Gram(s) IV Push once  dextrose 50% Injectable 25 Gram(s) IV Push once  dextrose 50% Injectable 25 Gram(s) IV Push once  docusate sodium 100 milliGRAM(s) Oral two times a day  doxercalciferol Injectable 2 MICROGram(s) IV Push <User Schedule>  immune globulin gamma IVPB 21 Gram(s) IV Intermittent daily  insulin lispro (HumaLOG) corrective regimen sliding scale   SubCutaneous three times a day before meals  insulin lispro (HumaLOG) corrective regimen sliding scale   SubCutaneous at bedtime  latanoprost 0.005% Ophthalmic Solution 1 Drop(s) Both EYES at bedtime  levothyroxine 25 MICROGram(s) Oral daily  Nephro-madiha 1 Tablet(s) Oral daily  ondansetron Injectable 4 milliGRAM(s) IV Push once  PARoxetine 20 milliGRAM(s) Oral daily  pregabalin 75 milliGRAM(s) Oral daily  senna 2 Tablet(s) Oral at bedtime  timolol 0.5% Solution 1 Drop(s) Both EYES daily    PRN Inpatient Medications  acetaminophen   Tablet 650 milliGRAM(s) Oral every 6 hours PRN  acetaminophen   Tablet. 650 milliGRAM(s) Oral every 6 hours PRN  dextrose Gel 1 Dose(s) Oral once PRN  glucagon  Injectable 1 milliGRAM(s) IntraMuscular once PRN  oxyCODONE    IR 5 milliGRAM(s) Oral every 6 hours PRN      REVIEW OF SYSTEMS  --------------------------------------------------------------------------------  Gen: No weight changes, fatigue, fevers/chills, weakness  Skin: No rashes  Head/Eyes/Ears/Mouth: No headache; Normal hearing; Normal vision w/o blurriness; No sinus pain/discomfort, sore throat  Respiratory: No dyspnea, cough, wheezing, hemoptysis  CV: No chest pain, PND, orthopnea  GI: No abdominal pain, diarrhea, constipation, nausea, vomiting, melena, hematochezia  : No increased frequency, dysuria, hematuria, nocturia  MSK: No joint pain/swelling; no back pain; no edema  Neuro: leg weakness and pain  Heme: No easy bruising or bleeding  Endo: No heat/cold intolerance  Psych: No significant nervousness, anxiety, stress, depression    All other systems were reviewed and are negative, except as noted.    VITALS/PHYSICAL EXAM  --------------------------------------------------------------------------------  T(C): 36.8 (17 @ 05:50), Max: 37.4 (17 @ 13:42)  HR: 74 (17 @ 05:50) (74 - 82)  BP: 105/66 (17 @ 05:50) (102/60 - 170/84)  RR: 18 (17 @ 05:50) (18 - 18)  SpO2: 97% (17 @ 05:50) (96% - 100%)  Wt(kg): --        17 @ 07:01  -  17 @ 07:00  --------------------------------------------------------  IN: 360 mL / OUT: 0 mL / NET: 360 mL      Physical Exam:  	Gen: NAD, well-appearing  	HEENT: PERRL, supple neck, clear oropharynx  	Pulm: CTA B/L  	CV: RRR, S1S2; no rub  	Back: No spinal or CVA tenderness; no sacral edema  	Abd: +BS, soft, nontender/nondistended  	: No suprapubic tenderness  	LE:  no edema, + weakness  	Psych: Normal affect and mood  	Skin: Warm, without rashes  	Vascular access: Left brachiocephalic AVF with thrill and bruit    LABS/STUDIES  --------------------------------------------------------------------------------              9.1    3.65  >-----------<  86       [17 12:09]              26.9     132  |  94  |  33  ----------------------------<  132      [17 12:10]  3.8   |  24  |  4.64        Ca     8.7     [17 12:10]    TPro  x   /  Alb  3080  /  TBili  x   /  DBili  x   /  AST  x   /  ALT  x   /  AlkPhos  x   [17 @ 16:31]          Creatinine Trend:  SCr 4.64 [ 12:10]  SCr 5.84 [ 05:02]  SCr 4.11 [ 03:33]  SCr 6.85 [ 08:59]  SCr 5.66 [ 09:15]        Iron 77, TIBC 90, %sat 86      [17 09:39]  Ferritin 781.0      [17 09:39]  PTH -- (Ca 7.5)      [17 08:12]   253  Vitamin D (25OH) <4.0      [17 08:52]  HbA1c 4.7      [17 07:23]  TSH 8.03      [17 08:00]  Lipid: chol 227, , HDL 47,       [17 07:28]    HBsAb Reactive      [17 08:00]  HBsAg Nonreact      [17 08:00]  HBcAb Nonreact      [17 08:00]  HCV 0.18, Nonreact      [17 08:00]    SULLY: titer 1:1280, pattern Homogeneous      [17 08:00]  dsDNA 70      [17 08:00]  C3 Complement 36      [17 08:00]  C4 Complement 11      [17 08:00]  ANCA: cANCA Negative, pANCA Negative, atypical ANCA Indeterminate Method interference due to SULLY fluorescence      [17 08:00]  Immunofixation Serum:   Weak  IgG Kappa Band Identified    Reference Range: None Detected      [17 08:00]  SPEP Interpretation: Weak Gamma-Migrating Paraprotein Identified      [17 08:00]

## 2017-12-31 NOTE — CHART NOTE - NSCHARTNOTEFT_GEN_A_CORE
12/31/17 :- 06:10:- Medicine NP notes:- Called by RN informing that she found Permacath out of pts chest. Pt seen at bedside. Pts nephew at bedside. Found patient eating and not in any acute distress. Pts nephew informs that they are not aware how the catheter came out. Patient asymptomatic. No pain, redness or edema noted at the site. Denies any chest pain ,SOB, palpatations, dizziness, diaphoresis, nausea or vomiting.   Vital Signs Last 24 Hrs  T(C): 36.8 (31 Dec 2017 05:50), Max: 37.4 (30 Dec 2017 13:42)  T(F): 98.2 (31 Dec 2017 05:50), Max: 99.4 (30 Dec 2017 13:42)  HR: 74 (31 Dec 2017 05:50) (74 - 82)  BP: 105/66 (31 Dec 2017 05:50) (102/60 - 170/84)  BP(mean): --  RR: 18 (31 Dec 2017 05:50) (18 - 18)  SpO2: 97% (31 Dec 2017 05:50) (96% - 100%)      Labs:                          9.1    3.65  )-----------( 86       ( 30 Dec 2017 12:09 )             26.9     12-30    132<L>  |  94<L>  |  33<H>  ----------------------------<  132<H>  3.8   |  24  |  4.64<H>    Ca    8.7      30 Dec 2017 12:10    TPro  x   /  Alb  3080<L>  /  TBili  x   /  DBili  x   /  AST  x   /  ALT  x   /  AlkPhos  x   12-29      Physical Exam:  General :- Alert, Oriented x3  Respiratory: Lung sounds equal bilaterally on ascultation  CV: RRR, S1S2,   Abdominal: Soft, NT, +ve BS  MSK: , + peripheral pulses,   Skin:- No tenderness, redness, edema or pain noted around the site. Site dry, without any bleeding    Assessment & Plan:  58 yo f with h/o lupus diagnosed in April, lupus nephritis, ESRD on HD via permacath (M, W, F) s/p AVF (not matured), DM2, HTN, HLD, hypothyroid presented with 3 weeks h/o bilateral LE pain and numbness and now called by RN informing Permacath found out of patients chestwall  >Monitor vital signs q1hr x 4 hrs  >Monitor for s/s of respiratory distress, hypoxia or chest pain  >Chest Xray portable stat ordered  >Notified Hospitalist, Dr Reich    Will f/u with Primary Team in [facility]  [phone]

## 2018-01-01 DIAGNOSIS — E78.00 PURE HYPERCHOLESTEROLEMIA, UNSPECIFIED: ICD-10-CM

## 2018-01-01 DIAGNOSIS — I10 ESSENTIAL (PRIMARY) HYPERTENSION: ICD-10-CM

## 2018-01-01 DIAGNOSIS — Z29.9 ENCOUNTER FOR PROPHYLACTIC MEASURES, UNSPECIFIED: ICD-10-CM

## 2018-01-01 DIAGNOSIS — E11.9 TYPE 2 DIABETES MELLITUS WITHOUT COMPLICATIONS: ICD-10-CM

## 2018-01-01 DIAGNOSIS — I47.2 VENTRICULAR TACHYCARDIA: ICD-10-CM

## 2018-01-01 DIAGNOSIS — G62.9 POLYNEUROPATHY, UNSPECIFIED: ICD-10-CM

## 2018-01-01 LAB
ANION GAP SERPL CALC-SCNC: 11 MMOL/L — SIGNIFICANT CHANGE UP (ref 5–17)
ANION GAP SERPL CALC-SCNC: 14 MMOL/L — SIGNIFICANT CHANGE UP (ref 5–17)
BUN SERPL-MCNC: 57 MG/DL — HIGH (ref 7–23)
BUN SERPL-MCNC: 65 MG/DL — HIGH (ref 7–23)
CALCIUM SERPL-MCNC: 8.6 MG/DL — SIGNIFICANT CHANGE UP (ref 8.4–10.5)
CALCIUM SERPL-MCNC: 9.3 MG/DL — SIGNIFICANT CHANGE UP (ref 8.4–10.5)
CHLORIDE SERPL-SCNC: 92 MMOL/L — LOW (ref 96–108)
CHLORIDE SERPL-SCNC: 92 MMOL/L — LOW (ref 96–108)
CK MB CFR SERPL CALC: 1.4 NG/ML — SIGNIFICANT CHANGE UP (ref 0–3.8)
CK SERPL-CCNC: 32 U/L — SIGNIFICANT CHANGE UP (ref 25–170)
CO2 SERPL-SCNC: 23 MMOL/L — SIGNIFICANT CHANGE UP (ref 22–31)
CO2 SERPL-SCNC: 24 MMOL/L — SIGNIFICANT CHANGE UP (ref 22–31)
CREAT SERPL-MCNC: 7.07 MG/DL — HIGH (ref 0.5–1.3)
CREAT SERPL-MCNC: 7.54 MG/DL — HIGH (ref 0.5–1.3)
CULTURE RESULTS: NO GROWTH — SIGNIFICANT CHANGE UP
GLUCOSE BLDC GLUCOMTR-MCNC: 103 MG/DL — HIGH (ref 70–99)
GLUCOSE BLDC GLUCOMTR-MCNC: 122 MG/DL — HIGH (ref 70–99)
GLUCOSE BLDC GLUCOMTR-MCNC: 81 MG/DL — SIGNIFICANT CHANGE UP (ref 70–99)
GLUCOSE BLDC GLUCOMTR-MCNC: 93 MG/DL — SIGNIFICANT CHANGE UP (ref 70–99)
GLUCOSE SERPL-MCNC: 110 MG/DL — HIGH (ref 70–99)
GLUCOSE SERPL-MCNC: 72 MG/DL — SIGNIFICANT CHANGE UP (ref 70–99)
HCT VFR BLD CALC: 27.2 % — LOW (ref 34.5–45)
HGB BLD-MCNC: 9.2 G/DL — LOW (ref 11.5–15.5)
MAGNESIUM SERPL-MCNC: 2.3 MG/DL — SIGNIFICANT CHANGE UP (ref 1.6–2.6)
MAGNESIUM SERPL-MCNC: 2.4 MG/DL — SIGNIFICANT CHANGE UP (ref 1.6–2.6)
MCHC RBC-ENTMCNC: 28.8 PG — SIGNIFICANT CHANGE UP (ref 27–34)
MCHC RBC-ENTMCNC: 33.8 GM/DL — SIGNIFICANT CHANGE UP (ref 32–36)
MCV RBC AUTO: 85 FL — SIGNIFICANT CHANGE UP (ref 80–100)
PHOSPHATE SERPL-MCNC: 2.3 MG/DL — LOW (ref 2.5–4.5)
PHOSPHATE SERPL-MCNC: 2.4 MG/DL — LOW (ref 2.5–4.5)
PLATELET # BLD AUTO: 84 K/UL — LOW (ref 150–400)
POTASSIUM SERPL-MCNC: 4.6 MMOL/L — SIGNIFICANT CHANGE UP (ref 3.5–5.3)
POTASSIUM SERPL-MCNC: 4.9 MMOL/L — SIGNIFICANT CHANGE UP (ref 3.5–5.3)
POTASSIUM SERPL-SCNC: 4.6 MMOL/L — SIGNIFICANT CHANGE UP (ref 3.5–5.3)
POTASSIUM SERPL-SCNC: 4.9 MMOL/L — SIGNIFICANT CHANGE UP (ref 3.5–5.3)
RBC # BLD: 3.2 M/UL — LOW (ref 3.8–5.2)
RBC # FLD: 14.1 % — SIGNIFICANT CHANGE UP (ref 10.3–14.5)
SODIUM SERPL-SCNC: 127 MMOL/L — LOW (ref 135–145)
SODIUM SERPL-SCNC: 129 MMOL/L — LOW (ref 135–145)
SPECIMEN SOURCE: SIGNIFICANT CHANGE UP
TROPONIN T SERPL-MCNC: 0.09 NG/ML — HIGH (ref 0–0.06)
WBC # BLD: 3.51 K/UL — LOW (ref 3.8–10.5)
WBC # FLD AUTO: 3.51 K/UL — LOW (ref 3.8–10.5)

## 2018-01-01 PROCEDURE — 93010 ELECTROCARDIOGRAM REPORT: CPT

## 2018-01-01 PROCEDURE — 99223 1ST HOSP IP/OBS HIGH 75: CPT

## 2018-01-01 PROCEDURE — 99233 SBSQ HOSP IP/OBS HIGH 50: CPT

## 2018-01-01 RX ORDER — ASPIRIN/CALCIUM CARB/MAGNESIUM 324 MG
81 TABLET ORAL DAILY
Qty: 0 | Refills: 0 | Status: DISCONTINUED | OUTPATIENT
Start: 2018-01-01 | End: 2018-01-05

## 2018-01-01 RX ORDER — METOPROLOL TARTRATE 50 MG
25 TABLET ORAL DAILY
Qty: 0 | Refills: 0 | Status: DISCONTINUED | OUTPATIENT
Start: 2018-01-01 | End: 2018-01-16

## 2018-01-01 RX ORDER — ATORVASTATIN CALCIUM 80 MG/1
40 TABLET, FILM COATED ORAL AT BEDTIME
Qty: 0 | Refills: 0 | Status: DISCONTINUED | OUTPATIENT
Start: 2018-01-01 | End: 2018-01-16

## 2018-01-01 RX ADMIN — Medication 25 MICROGRAM(S): at 06:51

## 2018-01-01 RX ADMIN — DOXERCALCIFEROL 2 MICROGRAM(S): 2.5 CAPSULE ORAL at 09:07

## 2018-01-01 RX ADMIN — Medication 667 MILLIGRAM(S): at 09:07

## 2018-01-01 RX ADMIN — Medication 35 GRAM(S): at 12:50

## 2018-01-01 RX ADMIN — Medication 1 TABLET(S): at 11:48

## 2018-01-01 RX ADMIN — LATANOPROST 1 DROP(S): 0.05 SOLUTION/ DROPS OPHTHALMIC; TOPICAL at 22:22

## 2018-01-01 RX ADMIN — Medication 667 MILLIGRAM(S): at 12:50

## 2018-01-01 RX ADMIN — Medication 75 MILLIGRAM(S): at 11:48

## 2018-01-01 RX ADMIN — SENNA PLUS 2 TABLET(S): 8.6 TABLET ORAL at 22:21

## 2018-01-01 RX ADMIN — Medication 1 DROP(S): at 11:48

## 2018-01-01 RX ADMIN — Medication 100 MILLIGRAM(S): at 17:10

## 2018-01-01 RX ADMIN — Medication 667 MILLIGRAM(S): at 17:10

## 2018-01-01 RX ADMIN — OXYCODONE HYDROCHLORIDE 5 MILLIGRAM(S): 5 TABLET ORAL at 12:17

## 2018-01-01 RX ADMIN — Medication 20 MILLIGRAM(S): at 11:48

## 2018-01-01 RX ADMIN — OXYCODONE HYDROCHLORIDE 5 MILLIGRAM(S): 5 TABLET ORAL at 11:47

## 2018-01-01 RX ADMIN — Medication 5 MILLIGRAM(S): at 22:22

## 2018-01-01 RX ADMIN — Medication 100 MILLIGRAM(S): at 06:51

## 2018-01-01 NOTE — CONSULT NOTE ADULT - ASSESSMENT
58 yo f with h/o lupus diagnosed in April, lupus nephritis, ESRD on HD via permacath (M, W, F) s/p AVF (not matured), DM2, HTN, HLD, hypothyroid presented with 3 weeks h/o bilateral LE pain and numbness found to have 13 seconds of nsvt on telemetry.

## 2018-01-01 NOTE — PROGRESS NOTE ADULT - PROBLEM SELECTOR PLAN 6
in the setting of Lupus nephritis on dialysis, autonomic dysfunction cannot be ruled out.  Consider Midodrine if patient persistently orthostatic. .

## 2018-01-01 NOTE — CONSULT NOTE ADULT - PROBLEM SELECTOR RECOMMENDATION 9
13 seconds of NSVT on telemetry. Patient is asymptomatic and was asymptomatic throughout the episode. TTE done in May 2017 shows normal LV function. Given her risk factors and elevated troponin level, patient should have CAD ruled out and schedule cardiac cath for Wednesday. Tomorrow patient is scheduled for HD access.   Start ASA 81mg po daily, metoprolol xl 25mg po daily, lipitor 40mg po daily.  Maintain K>4.0, Mg>2.0  EPS consult with Dr. Chad Wesley.  Transfer to telemetry.

## 2018-01-01 NOTE — PROGRESS NOTE ADULT - PROBLEM SELECTOR PLAN 5
Patient needs access for dialysis, follow up with IR for possible Shiley placement.   Renal following.    Monitor renal function, fluid status.

## 2018-01-01 NOTE — PROGRESS NOTE ADULT - SUBJECTIVE AND OBJECTIVE BOX
58 yo female with SLE and BLE weakness now s/p left sural nerve biopsy    T(C): 36.8 (12-27-17 @ 05:18), Max: 37.4 (12-26-17 @ 13:17)  HR: 77 (12-27-17 @ 05:18) (77 - 80)  BP: 135/76 (12-27-17 @ 05:18) (135/76 - 185/95)  RR: 18 (12-27-17 @ 05:18) (18 - 18)  SpO2: 98% (12-27-17 @ 05:18) (98% - 99%)  Wt(kg): --    PHYSICAL EXAM:    Constitutional: No Acute Distress     Neurological: AOx3, Following Commands    Motor exam:            Lower extremity                        HF         KF        KE       DF         PF                                                  R        5/5        5/5        5/5       4+/5       4+/5                                               L         4/5        4/5       4/5       not tested due to pain                                                 Sensation: [] intact to light touch  [x] decreased: lateral aspect of left foot    Incision: Ankle wrapped, dressing clean and dry

## 2018-01-01 NOTE — PROGRESS NOTE ADULT - PROBLEM SELECTOR PLAN 1
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 Positive serology for Lupus,  neuro following. s/p Immunoglobulin infusion.    MRI lumbar, C spine show no cord compression.    Continue with Lyrica- renally dosed.   PT eval- recommend JESSICA placement.   Continue to hold Plaquenil.   s/p Sural nerve biopsy. Follow up results.

## 2018-01-01 NOTE — CHART NOTE - NSCHARTNOTEFT_GEN_A_CORE
At 1947, called by RN patient had 35 beats of WCT on tele monitor at 1903. At the time of episode patient was sleeping and asymptomatic.  Patient was seen and examined at bedside. Patient is primarily Croatian speaking and requested her son to translate. Patient denied lightheadedness, headache, diaphoresis, visual changes, SOB, CP, palpitations, n/v/abdominal pain.      Vital Signs Last 24 Hrs  T(C): 37.1 (01 Jan 2018 21:32), Max: 37.1 (01 Jan 2018 21:32)  T(F): 98.8 (01 Jan 2018 21:32), Max: 98.8 (01 Jan 2018 21:32)  HR: 74 (01 Jan 2018 21:32) (72 - 78)  BP: 148/81 (01 Jan 2018 21:32) (123/70 - 150/81)  BP(mean): --  RR: 18 (01 Jan 2018 21:32) (18 - 18)  SpO2: 98% (01 Jan 2018 21:32) (98% - 100%)      Labs:                          9.2    3.51  )-----------( 84       ( 01 Jan 2018 08:38 )             27.2     01-01    127<L>  |  92<L>  |  65<H>  ----------------------------<  110<H>  4.9   |  24  |  7.54<H>    Ca    8.6      01 Jan 2018 20:34  Phos  2.4     01-01  Mg     2.4     01-01      Physical Exam:  General: WN/WD NAD  Neurology: A&Ox3, nonfocal, ANGEL x 4  Head:  Normocephalic, atraumatic  Respiratory: CTA B/L  CV: RRR, S1S2  Abdominal: Soft, NT, ND  MSK: No edema, + peripheral pulses, FROM all 4 extremity    Assessment & Plan:  58 yo f with h/o lupus diagnosed in April, lupus nephritis, ESRD on HD via permacath (M, W, F) s/p AVF (not matured), DM2, HTN, HLD, hypothyroid presented with 3 weeks h/o bilateral LE pain and numbness found to have 35 beats- 13 seconds of NSVT on telemetry.  1. NSVT, asymptomatic  - EKG unchanged from prior study  - troponin 0.09 (from 0.14 prior), potassium 4.9  - Discussed with Dr. Reich, [facility], covering for Sharmila Villalobos. House Cardiology consult called, Dr. Colon. Per Cardiologist, plan for cardiac cath on Wednesday once has HD access planned for tuesday. EP consult called  - transferred to telemetry floor, [facility]  - start asa, BB, statin  - trend electrolytes  - monitor VS closely  - F/u with Attending  - Signed out to [facility] VI Sahu, NP  Medicine  #[id number]

## 2018-01-01 NOTE — CONSULT NOTE ADULT - SUBJECTIVE AND OBJECTIVE BOX
CARDIOLOGY CONSULTATION NOTE                                                                                             Consult requested by:  Dr. Villalobos    Reason for Consultation: NSVT    History obtained by: Patient and medical record     obtained: No    Chief complaint:    Patient is a 57y old  Female who presents with a chief complaint of LE pain and inability to walk (19 Dec 2017 10:27)      HPI:  58 yo f with h/o lupus diagnosed in April, lupus nephritis, ESRD on HD via permacath (M, W, F) s/p AVF (not matured), DM2, HTN, HLD, hypothyroid presented with 3 weeks h/o bilateral LE pain and numbness. Patient was seen in ED (OCt) with pain diagnosed with neuropathy (neg DVT) and d/c'ed home on oxycodone. Subsequently developed b/l LE numbness and inability to ambulate for past 3 weeks. Now wheelchair dependent. Pain is tingling intermittent . Patient awaiting renal transplant. Afebrile. Denies any fever or chills, loss of bowel or urine.   Cardiology called for consult after NSVT seen on telemetry monitor. Patient is resting comfortably. No c/o chest pain, shortness of breath, palpitation, dizziness, LOC. No PND, poor exercise tolerance secondary to lower extremity neuropathy. History obtained from son and patient.         REVIEW OF SYMPTOMS: Cardiovascular:  See HPI. No chest pain,  No dyspnea,  No syncope,  No palpitations, No dizziness, No Orthopnea, No Paroxsymal nocturnal dyspnea;  Respiratory:  No Dyspnea, No cough,     Genitourinary:  No dysuria, no hematuria  Gastrointestinal:  No nausea, no vomiting. No diarrhea.  No abdominal pain. No dark color stool, no melena   Neurological: No headache, no dizziness, no slurred speech  Psychiatric: No agitation, no anxiety.   ALL OTHER REVIEW OF SYSTEMS ARE NEGATIVE.    ALLERGIES: Allergies  penicillin (Rash)              CURRENT MEDICATIONS:  furosemide    Tablet 80 milliGRAM(s) Oral <User Schedule>  metolazone 2.5 milliGRAM(s) Oral daily     ondansetron Injectable  PARoxetine  pregabalin  bisacodyl  docusate sodium  senna  calcium acetate  calcium carbonate 1250 mG (OsCal)  dextrose 5%.  dextrose 50% Injectable  dextrose 50% Injectable  dextrose 50% Injectable  doxercalciferol Injectable  immune globulin gamma IVPB  insulin lispro (HumaLOG) corrective regimen sliding scale  insulin lispro (HumaLOG) corrective regimen sliding scale  latanoprost 0.005% Ophthalmic Solution  levothyroxine  Nephro-madiha  timolol 0.5% Solution      HOME MEDICATIONS:    PAST MEDICAL HISTORY  Lupus (systemic lupus erythematosus)  ESRD (end-stage renal disease) due to SLE  Glaucoma  Other hyperlipidemia  Type 2 diabetes mellitus without complication, without long-term current use of insulin  Essential hypertension  No pertinent past medical history  Hypercholesteremia  Hypertension  Diabetes      PAST SURGICAL HISTORY  S/P appendectomy  H/O gastric bypass  S/P  section  No significant past surgical history      FAMILY HISTORY:  History of hypertension (Sibling): sibling  DM (diabetes mellitus) (Sibling): siblings  History of hypertension: father and mother  DM (diabetes mellitus): mother and father      SOCIAL HISTORY:  Denies smoking/alcohol/drugs      Vital Signs Last 24 Hrs  T(C): 37.1 (2018 21:32), Max: 37.1 (2018 21:32)  T(F): 98.8 (2018 21:32), Max: 98.8 (2018 21:32)  HR: 74 (2018 21:32) (72 - 78)  BP: 148/81 (2018 21:32) (114/69 - 150/81)  BP(mean): --  RR: 18 (2018 21:32) (18 - 18)  SpO2: 98% (2018 21:32) (97% - 100%)      PHYSICAL EXAM:  Constitutional: Comfortable . No acute distress.   HEENT: Atraumatic and normcephalic , neck is supple . no JVD. No carotid bruit. PEERL   CNS: A&Ox3. No focal deficits. EOMI.   Lymph Nodes: Cervical : Not palpable.  Respiratory: CTAB  Cardiovascular: S1S2 RRR. No murmur/rubs or gallop.  Gastrointestinal: Soft non-tender and non distended . +Bowel sounds.   Extremities: No edema.   Psychiatric: Calm . no agitation.  Skin: No skin rash/ulcers visualized to face, hands or feet.    Intake and output:    @ 07:01  -  - @ 07:00  --------------------------------------------------------  IN: 550 mL / OUT: 0 mL / NET: 550 mL     @ 07:01  -  - @ 21:49  --------------------------------------------------------  IN: 120 mL / OUT: 0 mL / NET: 120 mL        LABS:                        9.2    3.51  )-----------( 84       ( 2018 08:38 )             27.2         127<L>  |  92<L>  |  65<H>  ----------------------------<  110<H>  4.9   |  24  |  7.54<H>    Ca    8.6      2018 20:34  Phos  2.4       Mg     2.4           CARDIAC MARKERS ( 2018 20:34 )  x     / 0.09 ng/mL / 32 U/L / x     / 1.4 ng/mL    ;p-BNP=        INTERPRETATION OF TELEMETRY: Reviewed by me. NSVT 13 seconds, fusion beat noted  ECG: Reviewed by me. sinus rhythm, no st-t wave changes suggestive of ischemia    RADIOLOGY & ADDITIONAL STUDIES:    X-ray:  reviewed by me.   no vascular congestion    ECHO:  < from: Transthoracic Echocardiogram (17 @ 16:49) >  Conclusions:  1. Normal left ventricular internal dimensions and wall  thicknesses. There is assymetrical basalhypertrophy up to  1.5cm.  2. Hyperdynamic left ventricular systolic function. No  segmental wall motion abnormalities.  3. Mild diastolic dysfunction (Stage I).  4. Normal right ventricular size and function.     < end of copied text >

## 2018-01-01 NOTE — PROGRESS NOTE ADULT - SUBJECTIVE AND OBJECTIVE BOX
Patient is a 57y old  Female who presents with a chief complaint of LE pain and inability to walk (19 Dec 2017 10:27)      SUBJECTIVE / OVERNIGHT EVENTS: Patient with no new complaints.   No hx fall/ injury/ fevers. Patient noted to have dislodgement of permacath, to have dialysis tomorrow through [facility].     No other complaints.  ROS otherwise negative.     T(C): 36.8 (01-01-18 @ 17:00), Max: 37 (01-01-18 @ 14:00)  HR: 75 (01-01-18 @ 17:00) (72 - 77)  BP: 138/80 (01-01-18 @ 17:00) (123/70 - 150/81)  RR: 18 (01-01-18 @ 17:00) (18 - 18)  SpO2: 100% (01-01-18 @ 17:00) (98% - 100%)    MEDICATIONS  (STANDING):  bisacodyl 5 milliGRAM(s) Oral at bedtime  calcium acetate 667 milliGRAM(s) Oral three times a day with meals  calcium carbonate 1250 mG (OsCal) 1 Tablet(s) Oral daily  dextrose 5%. 1000 milliLiter(s) (50 mL/Hr) IV Continuous <Continuous>  dextrose 50% Injectable 12.5 Gram(s) IV Push once  dextrose 50% Injectable 25 Gram(s) IV Push once  dextrose 50% Injectable 25 Gram(s) IV Push once  docusate sodium 100 milliGRAM(s) Oral two times a day  doxercalciferol Injectable 2 MICROGram(s) IV Push <User Schedule>  furosemide    Tablet 80 milliGRAM(s) Oral <User Schedule>  immune globulin gamma IVPB 21 Gram(s) IV Intermittent daily  insulin lispro (HumaLOG) corrective regimen sliding scale   SubCutaneous three times a day before meals  insulin lispro (HumaLOG) corrective regimen sliding scale   SubCutaneous at bedtime  latanoprost 0.005% Ophthalmic Solution 1 Drop(s) Both EYES at bedtime  levothyroxine 25 MICROGram(s) Oral daily  metolazone 2.5 milliGRAM(s) Oral daily  Nephro-madiha 1 Tablet(s) Oral daily  ondansetron Injectable 4 milliGRAM(s) IV Push once  PARoxetine 20 milliGRAM(s) Oral daily  pregabalin 75 milliGRAM(s) Oral daily  senna 2 Tablet(s) Oral at bedtime  timolol 0.5% Solution 1 Drop(s) Both EYES daily    MEDICATIONS  (PRN):  acetaminophen   Tablet 650 milliGRAM(s) Oral every 6 hours PRN For Temp greater than 38 C (100.4 F)  acetaminophen   Tablet. 650 milliGRAM(s) Oral every 6 hours PRN Mild Pain (1 - 3)  dextrose Gel 1 Dose(s) Oral once PRN Blood Glucose LESS THAN 70 milliGRAM(s)/deciliter  glucagon  Injectable 1 milliGRAM(s) IntraMuscular once PRN Glucose LESS THAN 70 milligrams/deciliter  oxyCODONE    IR 5 milliGRAM(s) Oral every 6 hours PRN Moderate Pain (4 - 6)          PHYSICAL EXAM:  GENERAL: NAD, well-developed  HEAD:  Atraumatic, Normocephalic  EYES: EOMI, conjunctiva and sclera clear  NECK: Supple, No JVD  CHEST/LUNG: Clear to auscultation bilaterally; No wheeze  HEART: Regular rate and rhythm; No murmurs, rubs, or gallops  ABDOMEN: Soft, Nontender, Nondistended; Bowel sounds present  EXTREMITIES:  2+ Peripheral Pulses, No clubbing, cyanosis, or edema  PSYCH: AAOx3  NEUROLOGY: CN's intact, strength 3/5 in UE's and 4/5 in LE's, decreased sensation in LE's.   Rt Lt lower extremities tender to palpate   SKIN: No rashes or lesions                           9.2    3.51  )-----------( 84       ( 01 Jan 2018 08:38 )             27.2               129|92|57<72  4.6|23|7.07  9.3,2.3,2.3  01-01 @ 08:27    RADIOLOGY & ADDITIONAL TESTS:    Imaging Personally Reviewed:    Consultant(s) Notes Reviewed:  Rheum, Neuro     Care Discussed with Consultants/Other Providers:

## 2018-01-01 NOTE — PROGRESS NOTE ADULT - ASSESSMENT
56 yo female with SLE and BLE weakness now s/p left sural nerve biopsy    -Informed patient of expected loss of sensation  -Pain control  -F/u pathology

## 2018-01-02 DIAGNOSIS — E87.1 HYPO-OSMOLALITY AND HYPONATREMIA: ICD-10-CM

## 2018-01-02 LAB
ALBUMIN SERPL ELPH-MCNC: 2.3 G/DL — LOW (ref 3.3–5)
ALP SERPL-CCNC: 70 U/L — SIGNIFICANT CHANGE UP (ref 40–120)
ALT FLD-CCNC: <5 U/L RC — LOW (ref 10–45)
AMMONIA BLD-MCNC: 45 UMOL/L — SIGNIFICANT CHANGE UP (ref 11–55)
ANION GAP SERPL CALC-SCNC: 10 MMOL/L — SIGNIFICANT CHANGE UP (ref 5–17)
ANION GAP SERPL CALC-SCNC: 12 MMOL/L — SIGNIFICANT CHANGE UP (ref 5–17)
ANION GAP SERPL CALC-SCNC: 15 MMOL/L — SIGNIFICANT CHANGE UP (ref 5–17)
AST SERPL-CCNC: 19 U/L — SIGNIFICANT CHANGE UP (ref 10–40)
BASOPHILS # BLD AUTO: 0 K/UL — SIGNIFICANT CHANGE UP (ref 0–0.2)
BASOPHILS NFR BLD AUTO: 0.7 % — SIGNIFICANT CHANGE UP (ref 0–2)
BILIRUB SERPL-MCNC: 0.5 MG/DL — SIGNIFICANT CHANGE UP (ref 0.2–1.2)
BUN SERPL-MCNC: 70 MG/DL — HIGH (ref 7–23)
BUN SERPL-MCNC: 73 MG/DL — HIGH (ref 7–23)
BUN SERPL-MCNC: 74 MG/DL — HIGH (ref 7–23)
CALCIUM SERPL-MCNC: 8.5 MG/DL — SIGNIFICANT CHANGE UP (ref 8.4–10.5)
CALCIUM SERPL-MCNC: 8.8 MG/DL — SIGNIFICANT CHANGE UP (ref 8.4–10.5)
CALCIUM SERPL-MCNC: 8.8 MG/DL — SIGNIFICANT CHANGE UP (ref 8.4–10.5)
CHLORIDE SERPL-SCNC: 92 MMOL/L — LOW (ref 96–108)
CHLORIDE SERPL-SCNC: 92 MMOL/L — LOW (ref 96–108)
CHLORIDE SERPL-SCNC: 93 MMOL/L — LOW (ref 96–108)
CO2 SERPL-SCNC: 19 MMOL/L — LOW (ref 22–31)
CO2 SERPL-SCNC: 23 MMOL/L — SIGNIFICANT CHANGE UP (ref 22–31)
CO2 SERPL-SCNC: 24 MMOL/L — SIGNIFICANT CHANGE UP (ref 22–31)
CREAT SERPL-MCNC: 7.95 MG/DL — HIGH (ref 0.5–1.3)
CREAT SERPL-MCNC: 8.03 MG/DL — HIGH (ref 0.5–1.3)
CREAT SERPL-MCNC: 8.21 MG/DL — HIGH (ref 0.5–1.3)
EOSINOPHIL # BLD AUTO: 0 K/UL — SIGNIFICANT CHANGE UP (ref 0–0.5)
EOSINOPHIL NFR BLD AUTO: 0.4 % — SIGNIFICANT CHANGE UP (ref 0–6)
GLUCOSE BLDC GLUCOMTR-MCNC: 105 MG/DL — HIGH (ref 70–99)
GLUCOSE BLDC GLUCOMTR-MCNC: 82 MG/DL — SIGNIFICANT CHANGE UP (ref 70–99)
GLUCOSE BLDC GLUCOMTR-MCNC: 99 MG/DL — SIGNIFICANT CHANGE UP (ref 70–99)
GLUCOSE SERPL-MCNC: 68 MG/DL — LOW (ref 70–99)
GLUCOSE SERPL-MCNC: 84 MG/DL — SIGNIFICANT CHANGE UP (ref 70–99)
GLUCOSE SERPL-MCNC: 94 MG/DL — SIGNIFICANT CHANGE UP (ref 70–99)
HCT VFR BLD CALC: 28.4 % — LOW (ref 34.5–45)
HGB BLD-MCNC: 9.7 G/DL — LOW (ref 11.5–15.5)
LYMPHOCYTES # BLD AUTO: 1.2 K/UL — SIGNIFICANT CHANGE UP (ref 1–3.3)
LYMPHOCYTES # BLD AUTO: 34.6 % — SIGNIFICANT CHANGE UP (ref 13–44)
MAGNESIUM SERPL-MCNC: 2.4 MG/DL — SIGNIFICANT CHANGE UP (ref 1.6–2.6)
MCHC RBC-ENTMCNC: 31.6 PG — SIGNIFICANT CHANGE UP (ref 27–34)
MCHC RBC-ENTMCNC: 34.2 GM/DL — SIGNIFICANT CHANGE UP (ref 32–36)
MCV RBC AUTO: 92.6 FL — SIGNIFICANT CHANGE UP (ref 80–100)
MONOCYTES # BLD AUTO: 0.5 K/UL — SIGNIFICANT CHANGE UP (ref 0–0.9)
MONOCYTES NFR BLD AUTO: 13.8 % — SIGNIFICANT CHANGE UP (ref 2–14)
NEUTROPHILS # BLD AUTO: 1.7 K/UL — LOW (ref 1.8–7.4)
NEUTROPHILS NFR BLD AUTO: 50.5 % — SIGNIFICANT CHANGE UP (ref 43–77)
PHOSPHATE SERPL-MCNC: 2.8 MG/DL — SIGNIFICANT CHANGE UP (ref 2.5–4.5)
PLAT MORPH BLD: NORMAL — SIGNIFICANT CHANGE UP
PLATELET # BLD AUTO: 75 K/UL — LOW (ref 150–400)
POTASSIUM SERPL-MCNC: 4.6 MMOL/L — SIGNIFICANT CHANGE UP (ref 3.5–5.3)
POTASSIUM SERPL-MCNC: 4.9 MMOL/L — SIGNIFICANT CHANGE UP (ref 3.5–5.3)
POTASSIUM SERPL-MCNC: 5.7 MMOL/L — HIGH (ref 3.5–5.3)
POTASSIUM SERPL-MCNC: 5.7 MMOL/L — HIGH (ref 3.5–5.3)
POTASSIUM SERPL-SCNC: 4.6 MMOL/L — SIGNIFICANT CHANGE UP (ref 3.5–5.3)
POTASSIUM SERPL-SCNC: 4.9 MMOL/L — SIGNIFICANT CHANGE UP (ref 3.5–5.3)
POTASSIUM SERPL-SCNC: 5.7 MMOL/L — HIGH (ref 3.5–5.3)
POTASSIUM SERPL-SCNC: 5.7 MMOL/L — HIGH (ref 3.5–5.3)
PROT SERPL-MCNC: 8 G/DL — SIGNIFICANT CHANGE UP (ref 6–8.3)
RBC # BLD: 3.07 M/UL — LOW (ref 3.8–5.2)
RBC # FLD: 13.3 % — SIGNIFICANT CHANGE UP (ref 10.3–14.5)
RBC BLD AUTO: SIGNIFICANT CHANGE UP
SODIUM SERPL-SCNC: 126 MMOL/L — LOW (ref 135–145)
SODIUM SERPL-SCNC: 126 MMOL/L — LOW (ref 135–145)
SODIUM SERPL-SCNC: 128 MMOL/L — LOW (ref 135–145)
VDRL CSF-TITR: NEGATIVE — SIGNIFICANT CHANGE UP
WBC # BLD: 3.4 K/UL — LOW (ref 3.8–10.5)
WBC # FLD AUTO: 3.4 K/UL — LOW (ref 3.8–10.5)
WNV IGG CSF IA-ACNC: POSITIVE
WNV IGM CSF IA-ACNC: NEGATIVE — SIGNIFICANT CHANGE UP

## 2018-01-02 PROCEDURE — 93010 ELECTROCARDIOGRAM REPORT: CPT

## 2018-01-02 PROCEDURE — 99233 SBSQ HOSP IP/OBS HIGH 50: CPT

## 2018-01-02 PROCEDURE — 93990 DOPPLER FLOW TESTING: CPT | Mod: 26

## 2018-01-02 PROCEDURE — 99233 SBSQ HOSP IP/OBS HIGH 50: CPT | Mod: GC

## 2018-01-02 RX ADMIN — Medication 100 MILLIGRAM(S): at 05:44

## 2018-01-02 RX ADMIN — OXYCODONE HYDROCHLORIDE 5 MILLIGRAM(S): 5 TABLET ORAL at 00:33

## 2018-01-02 RX ADMIN — Medication 20 MILLIGRAM(S): at 12:15

## 2018-01-02 RX ADMIN — Medication 81 MILLIGRAM(S): at 00:33

## 2018-01-02 RX ADMIN — Medication 25 MILLIGRAM(S): at 21:33

## 2018-01-02 RX ADMIN — Medication 75 MILLIGRAM(S): at 12:14

## 2018-01-02 RX ADMIN — Medication 1 TABLET(S): at 17:21

## 2018-01-02 RX ADMIN — ATORVASTATIN CALCIUM 40 MILLIGRAM(S): 80 TABLET, FILM COATED ORAL at 21:32

## 2018-01-02 RX ADMIN — Medication 1 DROP(S): at 17:20

## 2018-01-02 RX ADMIN — Medication 1 TABLET(S): at 12:16

## 2018-01-02 RX ADMIN — Medication 667 MILLIGRAM(S): at 17:21

## 2018-01-02 RX ADMIN — SENNA PLUS 2 TABLET(S): 8.6 TABLET ORAL at 21:33

## 2018-01-02 RX ADMIN — Medication 25 MILLIGRAM(S): at 00:33

## 2018-01-02 RX ADMIN — LATANOPROST 1 DROP(S): 0.05 SOLUTION/ DROPS OPHTHALMIC; TOPICAL at 21:33

## 2018-01-02 RX ADMIN — Medication 5 MILLIGRAM(S): at 21:33

## 2018-01-02 RX ADMIN — Medication 80 MILLIGRAM(S): at 05:44

## 2018-01-02 RX ADMIN — Medication 100 MILLIGRAM(S): at 17:21

## 2018-01-02 RX ADMIN — OXYCODONE HYDROCHLORIDE 5 MILLIGRAM(S): 5 TABLET ORAL at 01:00

## 2018-01-02 RX ADMIN — Medication 25 MICROGRAM(S): at 05:44

## 2018-01-02 RX ADMIN — ATORVASTATIN CALCIUM 40 MILLIGRAM(S): 80 TABLET, FILM COATED ORAL at 00:33

## 2018-01-02 RX ADMIN — Medication 81 MILLIGRAM(S): at 12:15

## 2018-01-02 NOTE — PROGRESS NOTE ADULT - ASSESSMENT
Patient is a 57 F who is s/p LUE fistula thrombolysis, now with the loss of her permacath and a palpable pulse at the fistula site.    -I spoke with the vascular lab this AM, patient to go for ultrasound this AM to evaluate fistula  -if fistula appears patent and ready, then it may be attempted to be used for HD today. If not, patient will need a permacath. Please make NPO and alert IR.   -will discuss with primary team.

## 2018-01-02 NOTE — CONSULT NOTE ADULT - PROBLEM SELECTOR RECOMMENDATION 9
Pt is scheduled for Cardiac cath tomorrow Pt with 13 sec of NSVT on 1/1/18, no further episodes noted on telemetry.  Tele: SR heart rate 60-70's.  Continue with telemetry monitoring  Continue with beta blocker, Toprol 25mg QD  May 2017 ECHO revealed EF 75%  Pt needs ischemic work-up and is scheduled for Cardiac cath tomorrow.  Maintain K+ 4.0 and Mag 2.0 and supplement as needed Pt with 13 sec of NSVT on 1/1/18, no further episodes noted on telemetry.  Tele: SR heart rate 60-70's.  Continue with telemetry monitoring  Continue with beta blocker, Toprol 25mg QD  May 2017, ECHO revealed EF 75%  Pt needs ischemic work-up and is scheduled for Cardiac cath tomorrow.  Maintain K+ 4.0 and Mag 2.0 and supplement as needed Pt with 13 sec of NSVT on 1/1/18, no further episodes noted on telemetry.  Tele: SR heart rate 60-70's.  Continue with telemetry monitoring  Continue with beta blocker, Toprol 25mg QD uptitrate as BP can tolerate  May 2017, ECHO revealed EF 75%  Pt needs ischemic work-up and is scheduled for Cardiac cath tomorrow.  Maintain K+ 4.0 and Mag 2.0 and supplement as needed

## 2018-01-02 NOTE — PROGRESS NOTE ADULT - SUBJECTIVE AND OBJECTIVE BOX
Vascular Surgery Progress Note     S: No events overnight     MEDICATIONS  (STANDING):  aspirin enteric coated 81 milliGRAM(s) Oral daily  atorvastatin 40 milliGRAM(s) Oral at bedtime  bisacodyl 5 milliGRAM(s) Oral at bedtime  calcium acetate 667 milliGRAM(s) Oral three times a day with meals  calcium carbonate 1250 mG (OsCal) 1 Tablet(s) Oral daily  dextrose 5%. 1000 milliLiter(s) (50 mL/Hr) IV Continuous <Continuous>  dextrose 50% Injectable 12.5 Gram(s) IV Push once  dextrose 50% Injectable 25 Gram(s) IV Push once  dextrose 50% Injectable 25 Gram(s) IV Push once  docusate sodium 100 milliGRAM(s) Oral two times a day  doxercalciferol Injectable 2 MICROGram(s) IV Push <User Schedule>  furosemide    Tablet 80 milliGRAM(s) Oral <User Schedule>  immune globulin gamma IVPB 21 Gram(s) IV Intermittent daily  insulin lispro (HumaLOG) corrective regimen sliding scale   SubCutaneous three times a day before meals  insulin lispro (HumaLOG) corrective regimen sliding scale   SubCutaneous at bedtime  latanoprost 0.005% Ophthalmic Solution 1 Drop(s) Both EYES at bedtime  levothyroxine 25 MICROGram(s) Oral daily  metolazone 2.5 milliGRAM(s) Oral daily  metoprolol succinate ER 25 milliGRAM(s) Oral daily  Nephro-madiha 1 Tablet(s) Oral daily  ondansetron Injectable 4 milliGRAM(s) IV Push once  PARoxetine 20 milliGRAM(s) Oral daily  pregabalin 75 milliGRAM(s) Oral daily  senna 2 Tablet(s) Oral at bedtime  timolol 0.5% Solution 1 Drop(s) Both EYES daily    MEDICATIONS  (PRN):  acetaminophen   Tablet 650 milliGRAM(s) Oral every 6 hours PRN For Temp greater than 38 C (100.4 F)  acetaminophen   Tablet. 650 milliGRAM(s) Oral every 6 hours PRN Mild Pain (1 - 3)  dextrose Gel 1 Dose(s) Oral once PRN Blood Glucose LESS THAN 70 milliGRAM(s)/deciliter  glucagon  Injectable 1 milliGRAM(s) IntraMuscular once PRN Glucose LESS THAN 70 milligrams/deciliter  oxyCODONE    IR 5 milliGRAM(s) Oral every 6 hours PRN Moderate Pain (4 - 6)      Physical Exam:    Vital Signs Last 24 Hrs  T(C): 37.1 (02 Jan 2018 05:14), Max: 37.1 (01 Jan 2018 21:32)  T(F): 98.8 (02 Jan 2018 05:14), Max: 98.8 (01 Jan 2018 21:32)  HR: 72 (02 Jan 2018 05:14) (72 - 80)  BP: 141/70 (02 Jan 2018 05:14) (123/70 - 150/81)  BP(mean): --  RR: 17 (02 Jan 2018 05:14) (16 - 18)  SpO2: 99% (02 Jan 2018 05:14) (97% - 100%)    01-01-18 @ 07:01  -  01-02-18 @ 07:00  --------------------------------------------------------  IN: 320 mL / OUT: 0 mL / NET: 320 mL        Gen: NAD, alert and oriented  Extremities: no edema, sensation and movement grossly intact, fistula with palpable pulse    LABS:                        9.7    3.4   )-----------( 75       ( 02 Jan 2018 07:16 )             28.4     01-02    128<L>  |  93<L>  |  70<H>  ----------------------------<  68<L>  4.6   |  23  |  7.95<H>    Ca    8.8      02 Jan 2018 07:16  Phos  2.4     01-01  Mg     2.4     01-01

## 2018-01-02 NOTE — CONSULT NOTE ADULT - ASSESSMENT
Patient is a 57 year old female with past medical history of HTN, HLD, DMT2, hypothyroidism, SLE(diagnosed in April), lupus nephritis, ESRD on HD s/p AVF (not matured)  presented to ED for bilateral lower extremity numbness. EP consult was called for episode of 13 seconds of NSVT and noted to have + troponin 0.09 Patient is a 57 year old female with past medical history of HTN, HLD, DMT2, hypothyroidism, SLE(diagnosed in April), lupus nephritis, ESRD on HD s/p AVF (not matured)  presented to ED for bilateral lower extremity numbness. EP consult was called for episode of 13 seconds of NSVT and noted to have + troponin 0.09.

## 2018-01-02 NOTE — PROGRESS NOTE ADULT - PROBLEM SELECTOR PLAN 1
Patient transferred to tele for monitoring, given NSVT and not on dialysis.  Cards consulted, patient needs cardiac cath given risk factors. Currently in sinus.   Will try to get patient perma cath and dialysis, postpone cardiac cath 01/04- as patient asymptomatic.

## 2018-01-02 NOTE — PROGRESS NOTE ADULT - PROBLEM SELECTOR PLAN 5
Patient hypotensive multifactorial - Volume depletion from Lasix, fluid shift during dialysis vs bleeding from vagina. CBC stable.   Blood Pressure stable.  May consider Midodrine after cardiac evaluation.

## 2018-01-02 NOTE — PROGRESS NOTE ADULT - PROBLEM SELECTOR PLAN 1
PT with ESRD on HD. Last HD was on 12/29. Pt needs HD tunneled catheter by IR today. Plan for HD today

## 2018-01-02 NOTE — CHART NOTE - NSCHARTNOTEFT_GEN_A_CORE
Notified by RN in regards to 8 beats of wide complex tachycardia. As per RN patient asleep at the time of the episode. Patient seen and evaluated at the bedside. Son at bedside. Patient Mohawk speaking and wanting son to translate. Patient asymptomatic. Denies chest pain, palpitations, shortness of breath, headache, dizziness, and lightheadedness.     Vital Signs Last 24 Hrs  T(C): 37.3 (02 Jan 2018 20:40), Max: 37.3 (02 Jan 2018 20:40)  T(F): 99.2 (02 Jan 2018 20:40), Max: 99.2 (02 Jan 2018 20:40)  HR: 69 (02 Jan 2018 20:40) (69 - 80)  BP: 156/82 (02 Jan 2018 20:40) (140/74 - 164/89)  BP(mean): --  RR: 18 (02 Jan 2018 20:40) (16 - 18)  SpO2: 100% (02 Jan 2018 20:40) (97% - 100%)                          9.7    3.4   )-----------( 75       ( 02 Jan 2018 07:16 )             28.4     Basic Metabolic Panel - STAT (01.02.18 @ 20:52)    Sodium, Serum: 126 mmol/L    Potassium, Serum: 5.7: Mild hemolysis.  Results may be falsely elevated. mmol/L    Chloride, Serum: 92 mmol/L    Carbon Dioxide, Serum: 19 mmol/L    Anion Gap, Serum: 15 mmol/L    Blood Urea Nitrogen, Serum: 73 mg/dL    Creatinine, Serum: 8.03 mg/dL    Glucose, Serum: 94 mg/dL    Calcium, Total Serum: 8.5 mg/dL    eGFR if Non : 5:     eGFR if African American: 6 mL/min/1.73M2  Magnesium, Serum (01.02.18 @ 20:52)    Magnesium, Serum: 2.4: Mild hemolysis.  Results may be falsely elevated. mg/dL  Phosphorus Level, Serum (01.02.18 @ 20:52)    Phosphorus Level, Serum: 2.8 mg/dL    Physical Exam  General: NAD, laying in bed, A&O x 3  Cardiac: S1S2, RRR  Pulmonary: CTAB, no crackles or wheezing  Abdomen: soft, non-tender, non-distended, bowel sounds present    Assessment/Plan:   HPI:  56 yo f with h/o lupus diagnosed in April, lupus nephritis, ESRD on HD via permacath (M, W, F) s/p AVF (not matured), DM2, HTN, HLD, hypothyroid presented with 3 weeks h/o bilateral LE pain and numbness. Patient was seen in ED (OCt) with pain diagnosed with neuropathy (neg DVT) and d/c'ed home on oxycodone. Subsequently developed b/l LE numbness and inability to ambulate for past 3 weeks. Now wheelchair dependent. Pain is tingling intermittent . Hydroxychlorquine stopped few days ago by nephrology/rheum for possible medication side effect and told to go to hospital for further eval. Of note, hydroxychlorquine recently start around same time as numbness. Patient awaiting renal transplant. Afebrile. Denies any fever or chills, loss of bowel or urine. (19 Dec 2017 10:27). Patient with asymptomatic wide complex tachycardia, 8 beats.     Wide Complex Tachycardia  - continue with telemetry monitoring  - STAT BMP, Mg, Phos-- Results as above : K 5.7, repeat potassium, serum ordered  - EKG- NSR rate 71, no ST wave changes Notified by RN in regards to 8 beats of wide complex tachycardia. As per RN patient asleep at the time of the episode. Patient seen and evaluated at the bedside. Son at bedside. Patient Occitan speaking and wanting son to translate. Patient asymptomatic. Denies chest pain, palpitations, shortness of breath, headache, dizziness, and lightheadedness.     Vital Signs Last 24 Hrs  T(C): 37.3 (02 Jan 2018 20:40), Max: 37.3 (02 Jan 2018 20:40)  T(F): 99.2 (02 Jan 2018 20:40), Max: 99.2 (02 Jan 2018 20:40)  HR: 69 (02 Jan 2018 20:40) (69 - 80)  BP: 156/82 (02 Jan 2018 20:40) (140/74 - 164/89)  BP(mean): --  RR: 18 (02 Jan 2018 20:40) (16 - 18)  SpO2: 100% (02 Jan 2018 20:40) (97% - 100%)                          9.7    3.4   )-----------( 75       ( 02 Jan 2018 07:16 )             28.4     Basic Metabolic Panel - STAT (01.02.18 @ 20:52)    Sodium, Serum: 126 mmol/L    Potassium, Serum: 5.7: Mild hemolysis.  Results may be falsely elevated. mmol/L    Chloride, Serum: 92 mmol/L    Carbon Dioxide, Serum: 19 mmol/L    Anion Gap, Serum: 15 mmol/L    Blood Urea Nitrogen, Serum: 73 mg/dL    Creatinine, Serum: 8.03 mg/dL    Glucose, Serum: 94 mg/dL    Calcium, Total Serum: 8.5 mg/dL    eGFR if Non : 5:     eGFR if African American: 6 mL/min/1.73M2  Magnesium, Serum (01.02.18 @ 20:52)    Magnesium, Serum: 2.4: Mild hemolysis.  Results may be falsely elevated. mg/dL  Phosphorus Level, Serum (01.02.18 @ 20:52)    Phosphorus Level, Serum: 2.8 mg/dL    Physical Exam  General: NAD, laying in bed, A&O x 3  Cardiac: S1S2, RRR  Pulmonary: CTAB, no crackles or wheezing  Abdomen: soft, non-tender, non-distended, bowel sounds present    Assessment/Plan:   HPI:  56 yo f with h/o lupus diagnosed in April, lupus nephritis, ESRD on HD via permacath (M, W, F) s/p AVF (not matured), DM2, HTN, HLD, hypothyroid presented with 3 weeks h/o bilateral LE pain and numbness. Patient was seen in ED (OCt) with pain diagnosed with neuropathy (neg DVT) and d/c'ed home on oxycodone. Subsequently developed b/l LE numbness and inability to ambulate for past 3 weeks. Now wheelchair dependent. Pain is tingling intermittent . Hydroxychlorquine stopped few days ago by nephrology/rheum for possible medication side effect and told to go to hospital for further eval. Of note, hydroxychlorquine recently start around same time as numbness. Patient awaiting renal transplant. Afebrile. Denies any fever or chills, loss of bowel or urine. (19 Dec 2017 10:27). Patient with asymptomatic wide complex tachycardia, 8 beats.     Wide Complex Tachycardia  - continue with telemetry monitoring  - STAT BMP, Mg, Phos-- Results as above : K 5.7, repeat potassium, serum ordered  - EKG- NSR rate 71, no ST wave changes, p and t wave inversions in V1 with no congruent lead changes  - Will continue to monitor overnight  -Attending to follow up in the morning     Erinn Bowen PA-C   [id number] Notified by RN in regards to 8 beats of wide complex tachycardia. As per RN patient asleep at the time of the episode. Patient seen and evaluated at the bedside. Son at bedside. Patient Macedonian speaking and wanting son to translate. Patient asymptomatic. Denies chest pain, palpitations, shortness of breath, headache, dizziness, and lightheadedness.     Vital Signs Last 24 Hrs  T(C): 37.3 (02 Jan 2018 20:40), Max: 37.3 (02 Jan 2018 20:40)  T(F): 99.2 (02 Jan 2018 20:40), Max: 99.2 (02 Jan 2018 20:40)  HR: 69 (02 Jan 2018 20:40) (69 - 80)  BP: 156/82 (02 Jan 2018 20:40) (140/74 - 164/89)  BP(mean): --  RR: 18 (02 Jan 2018 20:40) (16 - 18)  SpO2: 100% (02 Jan 2018 20:40) (97% - 100%)                          9.7    3.4   )-----------( 75       ( 02 Jan 2018 07:16 )             28.4     Basic Metabolic Panel - STAT (01.02.18 @ 20:52)    Sodium, Serum: 126 mmol/L    Potassium, Serum: 5.7: Mild hemolysis.  Results may be falsely elevated. mmol/L    Chloride, Serum: 92 mmol/L    Carbon Dioxide, Serum: 19 mmol/L    Anion Gap, Serum: 15 mmol/L    Blood Urea Nitrogen, Serum: 73 mg/dL    Creatinine, Serum: 8.03 mg/dL    Glucose, Serum: 94 mg/dL    Calcium, Total Serum: 8.5 mg/dL    eGFR if Non : 5:     eGFR if African American: 6 mL/min/1.73M2  Magnesium, Serum (01.02.18 @ 20:52)    Magnesium, Serum: 2.4: Mild hemolysis.  Results may be falsely elevated. mg/dL  Phosphorus Level, Serum (01.02.18 @ 20:52)    Phosphorus Level, Serum: 2.8 mg/dL    Physical Exam  General: NAD, laying in bed, A&O x 3  Cardiac: S1S2, RRR  Pulmonary: CTAB, no crackles or wheezing  Abdomen: soft, non-tender, non-distended, bowel sounds present    Assessment/Plan:   HPI:  58 yo f with h/o lupus diagnosed in April, lupus nephritis, ESRD on HD via permacath (M, W, F) s/p AVF (not matured), DM2, HTN, HLD, hypothyroid presented with 3 weeks h/o bilateral LE pain and numbness. Patient was seen in ED (OCt) with pain diagnosed with neuropathy (neg DVT) and d/c'ed home on oxycodone. Subsequently developed b/l LE numbness and inability to ambulate for past 3 weeks. Now wheelchair dependent. Pain is tingling intermittent . Hydroxychlorquine stopped few days ago by nephrology/rheum for possible medication side effect and told to go to hospital for further eval. Of note, hydroxychlorquine recently start around same time as numbness. Patient awaiting renal transplant. Afebrile. Denies any fever or chills, loss of bowel or urine. (19 Dec 2017 10:27). Patient with asymptomatic wide complex tachycardia, 8 beats.     Wide Complex Tachycardia  - continue with telemetry monitoring  - STAT BMP, Mg, Phos-- Results as above : K 5.7, repeat potassium, serum ordered  - EKG- NSR rate 71, no ST wave changes, p and t wave inversions in V1 with no congruent lead changes  - Continue with beta blocker, Toprol XL  - Will continue to monitor overnight  -Attending to follow up in the morning   - Discussed case with Dr. Erinn Bowen PA-C   [id number] Notified by RN in regards to 8 beats of wide complex tachycardia. As per RN patient asleep at the time of the episode. Patient seen and evaluated at the bedside. Son at bedside. Patient Urdu speaking and wanting son to translate. Patient asymptomatic. Denies chest pain, palpitations, shortness of breath, headache, dizziness, and lightheadedness.     Vital Signs Last 24 Hrs  T(C): 37.3 (02 Jan 2018 20:40), Max: 37.3 (02 Jan 2018 20:40)  T(F): 99.2 (02 Jan 2018 20:40), Max: 99.2 (02 Jan 2018 20:40)  HR: 69 (02 Jan 2018 20:40) (69 - 80)  BP: 156/82 (02 Jan 2018 20:40) (140/74 - 164/89)  BP(mean): --  RR: 18 (02 Jan 2018 20:40) (16 - 18)  SpO2: 100% (02 Jan 2018 20:40) (97% - 100%)                          9.7    3.4   )-----------( 75       ( 02 Jan 2018 07:16 )             28.4     Basic Metabolic Panel - STAT (01.02.18 @ 20:52)    Sodium, Serum: 126 mmol/L    Potassium, Serum: 5.7: Mild hemolysis.  Results may be falsely elevated. mmol/L    Chloride, Serum: 92 mmol/L    Carbon Dioxide, Serum: 19 mmol/L    Anion Gap, Serum: 15 mmol/L    Blood Urea Nitrogen, Serum: 73 mg/dL    Creatinine, Serum: 8.03 mg/dL    Glucose, Serum: 94 mg/dL    Calcium, Total Serum: 8.5 mg/dL    eGFR if Non : 5:     eGFR if African American: 6 mL/min/1.73M2  Magnesium, Serum (01.02.18 @ 20:52)    Magnesium, Serum: 2.4: Mild hemolysis.  Results may be falsely elevated. mg/dL  Phosphorus Level, Serum (01.02.18 @ 20:52)    Phosphorus Level, Serum: 2.8 mg/dL    Physical Exam  General: NAD, laying in bed, A&O x 3  Cardiac: S1S2, RRR  Pulmonary: CTAB, no crackles or wheezing  Abdomen: soft, non-tender, non-distended, bowel sounds present    Assessment/Plan:   HPI:  56 yo f with h/o lupus diagnosed in April, lupus nephritis, ESRD on HD via permacath (M, W, F) s/p AVF (not matured), DM2, HTN, HLD, hypothyroid presented with 3 weeks h/o bilateral LE pain and numbness. Patient was seen in ED (OCt) with pain diagnosed with neuropathy (neg DVT) and d/c'ed home on oxycodone. Subsequently developed b/l LE numbness and inability to ambulate for past 3 weeks. Now wheelchair dependent. Pain is tingling intermittent . Hydroxychlorquine stopped few days ago by nephrology/rheum for possible medication side effect and told to go to hospital for further eval. Of note, hydroxychlorquine recently start around same time as numbness. Patient awaiting renal transplant. Afebrile. Denies any fever or chills, loss of bowel or urine. (19 Dec 2017 10:27). Patient with asymptomatic wide complex tachycardia, 8 beats.     Wide Complex Tachycardia  - continue with telemetry monitoring  - STAT BMP, Mg, Phos-- Results as above : K 5.7, repeat potassium serum ordered  - EKG- NSR rate 71, no ST wave changes, new t wave inversions in V1 with no congruent lead changes  - Continue with beta blocker, Toprol XL  - Will continue to monitor overnight  -Attending to follow up in the morning   - Discussed case with Dr. Talavera- recommended continuation of beta blocker. Recommendation is to hold off on Kayexalate at this time as patient is ESRD, EKG shows no signs of hyperkalemia, patient is asymptomatic and sample is mildly hemolyzed x 2. Continue to monitor BMP    Erinn Bowen PA-C   [id number]

## 2018-01-02 NOTE — CONSULT NOTE ADULT - SUBJECTIVE AND OBJECTIVE BOX
HPI:    Patient is a 57 year old female with past medical history of HTN, HLD, DMT2, hypothyroidism, SLE(diagnosed in April), lupus nephritis, ESRD on HD s/p AVF (not matured)  presented to ED for bilateral lower extremity numbness. EP consult was called for episode of 13 seconds of NSVT while on 6 Jeremiah  18 @ 1903. Pt remained asymptomatic through that episode and transferred to 3D telemetry for further monitoring. Currently denies any dizziness, lightheadedness, blurred vision, chest pain, palpitations SOB, nausea, vomiting or abdominal pain. Patient is Qatari speaking, family at the bedside for interpretation.             PAST MEDICAL & SURGICAL HISTORY:  Lupus (systemic lupus erythematosus)  ESRD (end-stage renal disease) due to SLE  Glaucoma  Other hyperlipidemia  Type 2 diabetes mellitus without complication, without long-term current use of insulin  Essential hypertension  Hypercholesteremia  Hypertension  Diabetes  S/P appendectomy  H/O gastric bypass: 2016  S/P  section: times two      ROS:  Cardiovascular: denies chest pain/palpitations/leg edema  Respiratory and Thorax: denies SOB/cough/wheezing  Gastrointestinal: denies abdominal pain/diarrhea/constipation/bloody stool  Genitourinary: denies urinary frequency/urgency/ dysuria  Musculoskeletal: denies joint pain or swelling, denies restricted motion  Skin: denies rashes/sores  	  	    MEDICATIONS  (STANDING):  aspirin enteric coated 81 milliGRAM(s) Oral daily  atorvastatin 40 milliGRAM(s) Oral at bedtime  bisacodyl 5 milliGRAM(s) Oral at bedtime  calcium acetate 667 milliGRAM(s) Oral three times a day with meals  calcium carbonate 1250 mG (OsCal) 1 Tablet(s) Oral daily  dextrose 5%. 1000 milliLiter(s) (50 mL/Hr) IV Continuous <Continuous>  dextrose 50% Injectable 12.5 Gram(s) IV Push once  dextrose 50% Injectable 25 Gram(s) IV Push once  dextrose 50% Injectable 25 Gram(s) IV Push once  docusate sodium 100 milliGRAM(s) Oral two times a day  doxercalciferol Injectable 2 MICROGram(s) IV Push <User Schedule>  furosemide    Tablet 80 milliGRAM(s) Oral <User Schedule>  insulin lispro (HumaLOG) corrective regimen sliding scale   SubCutaneous three times a day before meals  insulin lispro (HumaLOG) corrective regimen sliding scale   SubCutaneous at bedtime  latanoprost 0.005% Ophthalmic Solution 1 Drop(s) Both EYES at bedtime  levothyroxine 25 MICROGram(s) Oral daily  metolazone 2.5 milliGRAM(s) Oral daily  metoprolol succinate ER 25 milliGRAM(s) Oral daily  Nephro-madiha 1 Tablet(s) Oral daily  ondansetron Injectable 4 milliGRAM(s) IV Push once  PARoxetine 20 milliGRAM(s) Oral daily  pregabalin 75 milliGRAM(s) Oral daily  senna 2 Tablet(s) Oral at bedtime  timolol 0.5% Solution 1 Drop(s) Both EYES daily    MEDICATIONS  (PRN):  acetaminophen   Tablet 650 milliGRAM(s) Oral every 6 hours PRN For Temp greater than 38 C (100.4 F)  acetaminophen   Tablet. 650 milliGRAM(s) Oral every 6 hours PRN Mild Pain (1 - 3)  dextrose Gel 1 Dose(s) Oral once PRN Blood Glucose LESS THAN 70 milliGRAM(s)/deciliter  glucagon  Injectable 1 milliGRAM(s) IntraMuscular once PRN Glucose LESS THAN 70 milligrams/deciliter  oxyCODONE    IR 5 milliGRAM(s) Oral every 6 hours PRN Moderate Pain (4 - 6)      Allergies    penicillin (Rash)    Intolerances        SOCIAL HISTORY:    FAMILY HISTORY:  History of hypertension (Sibling): sibling  DM (diabetes mellitus) (Sibling): siblings  History of hypertension: father and mother  DM (diabetes mellitus): mother and father      Vital Signs Last 24 Hrs  T(C): 37.1 (2018 05:14), Max: 37.1 (2018 21:32)  T(F): 98.8 (2018 05:14), Max: 98.8 (2018 21:32)  HR: 72 (2018 05:14) (72 - 80)  BP: 141/70 (2018 05:14) (123/70 - 150/81)  BP(mean): --  RR: 17 (2018 05:14) (16 - 18)  SpO2: 99% (2018 05:14) (97% - 100%)    Physical Exam:  Neurological: Alert & Oriented x 3  Respiratory: CTA B/L, No wheezing/crackles/rhonchi  Cardiovascular: (+) S1 & S2, RRR, No m/r/g  Gastrointestinal: soft, NT, nondistended, (+) BS  Extremities: No pedal edema, No clubbing, No cyanosis  Skin:  normal skin color and pigmentation, no skin lesions      LABS:                        9.7    3.4   )-----------( 75       ( 2018 07:16 )             28.4     01-02    128<L>  |  93<L>  |  70<H>  ----------------------------<  68<L>  4.6   |  23  |  7.95<H>    Ca    8.8      2018 07:16  Phos  2.4     01-01  Mg     2.4     -      < from: Transthoracic Echocardiogram (17 @ 16:49) >  Dimensions:    Normal Values:  LA:     2.8    2.0 - 4.0 cm  Ao:     3.0    2.0 - 3.8 cm  SEPTUM: 0.7    0.6 - 1.2 cm  PWT:    0.7    0.6 -1.1 cm  LVIDd:  4.7    3.0 - 5.6 cm  LVIDs:  2.8    1.8 - 4.0 cm  Derived variables:  LVMI: 59 g/m2  RWT: 0.29  Fractional short: 40 %  EF (Visual Estimate): 75 %  Doppler Peak Velocity (m/sec): AoV=1.4  ------------------------------------------------------------------------  Observations:  Mitral Valve: Normal mitral valve. Mild mitral  regurgitation.  Aortic Valve/Aorta: Calcified trileaflet aortic valve with  normal opening. No aortic valve regurgitation seen.  Normal aortic root (Ao: 3.0 cm at the sinuses of Valsalva).  Left Atrium: Normal left atrium.  LA volume index = 14  cc/m2.  Left Ventricle: Hyperdynamic left ventricular systolic  function. No segmental wall motion abnormalities. Normal  left ventricular internal dimensions and wall thicknesses.  There is assymetrical basal hypertrophy up to 1.5cm.  Mild  diastolic dysfunction (Stage I).  Right Heart: Normal right atrium. Normal right ventricular  size and function. Normal tricuspid valve. Mild tricuspid  regurgitation. Pulmonic valve not well visualized.  Pericardium/Pleura: Normal pericardium with no pericardial  effusion.  Hemodynamic: Estimated right atrial pressure is 8 mm Hg.  Estimated right ventricular systolic pressure equals 18 mm  Hg, assuming right atrial pressure equals 8 mm Hg,  consistent with normal pulmonary pressures.  ------------------------------------------------------------------------  Conclusions:  1. Normal left ventricular internal dimensions and wall  thicknesses. There is assymetrical basalhypertrophy up to  1.5cm.  2. Hyperdynamic left ventricular systolic function. No  segmental wall motion abnormalities.  3. Mild diastolic dysfunction (Stage I).  4. Normal right ventricular size and function.  *** No previous Echo exam.    < end of copied text >          TELE:  EKG:

## 2018-01-02 NOTE — PROGRESS NOTE ADULT - PROBLEM SELECTOR PLAN 4
Patient having bleeding per vagina on and off, had PAP smear last month.   Reviewed Transvaginal ultrasound, show cervical polyp.    Gyn consult appreciated, recommend out patient follow up.  Monitor CBC as patient on Aspirin.

## 2018-01-02 NOTE — PROGRESS NOTE ADULT - ATTENDING COMMENTS
ESRD on HD. Unable to HD because of tunnel cath dislodgement  Vascular seen pt   Will need ultrasound of AVF however it is still immature and HD nurse unable to cannulate  Will need IR to place a new tunnel cath  Then will need HD thereafter and get back on schedule    Amy Carrillo MD  [phone]

## 2018-01-02 NOTE — PROGRESS NOTE ADULT - SUBJECTIVE AND OBJECTIVE BOX
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 Pt seen and examined at bedside. Pt denies sob, chest pain.     PAST HISTORY  --------------------------------------------------------------------------------  No significant changes to PMH, PSH, FHx, SHx, unless otherwise noted    ALLERGIES & MEDICATIONS  --------------------------------------------------------------------------------  Allergies    penicillin (Rash)    Intolerances      Standing Inpatient Medications  aspirin enteric coated 81 milliGRAM(s) Oral daily  atorvastatin 40 milliGRAM(s) Oral at bedtime  bisacodyl 5 milliGRAM(s) Oral at bedtime  calcium acetate 667 milliGRAM(s) Oral three times a day with meals  calcium carbonate 1250 mG (OsCal) 1 Tablet(s) Oral daily  dextrose 5%. 1000 milliLiter(s) IV Continuous <Continuous>  dextrose 50% Injectable 12.5 Gram(s) IV Push once  dextrose 50% Injectable 25 Gram(s) IV Push once  dextrose 50% Injectable 25 Gram(s) IV Push once  docusate sodium 100 milliGRAM(s) Oral two times a day  doxercalciferol Injectable 2 MICROGram(s) IV Push <User Schedule>  furosemide    Tablet 80 milliGRAM(s) Oral <User Schedule>  immune globulin gamma IVPB 21 Gram(s) IV Intermittent daily  insulin lispro (HumaLOG) corrective regimen sliding scale   SubCutaneous three times a day before meals  insulin lispro (HumaLOG) corrective regimen sliding scale   SubCutaneous at bedtime  latanoprost 0.005% Ophthalmic Solution 1 Drop(s) Both EYES at bedtime  levothyroxine 25 MICROGram(s) Oral daily  metolazone 2.5 milliGRAM(s) Oral daily  metoprolol succinate ER 25 milliGRAM(s) Oral daily  Nephro-madiha 1 Tablet(s) Oral daily  ondansetron Injectable 4 milliGRAM(s) IV Push once  PARoxetine 20 milliGRAM(s) Oral daily  pregabalin 75 milliGRAM(s) Oral daily  senna 2 Tablet(s) Oral at bedtime  timolol 0.5% Solution 1 Drop(s) Both EYES daily    PRN Inpatient Medications  acetaminophen   Tablet 650 milliGRAM(s) Oral every 6 hours PRN  acetaminophen   Tablet. 650 milliGRAM(s) Oral every 6 hours PRN  dextrose Gel 1 Dose(s) Oral once PRN  glucagon  Injectable 1 milliGRAM(s) IntraMuscular once PRN  oxyCODONE    IR 5 milliGRAM(s) Oral every 6 hours PRN      REVIEW OF SYSTEMS  --------------------------------------------------------------------------------  General: no fever  CVS: no chest pain  RESP: no sob, no cough  ABD: no abdominal pain  : no dysuria,  MSK: no edema     VITALS/PHYSICAL EXAM  --------------------------------------------------------------------------------  T(C): 37.1 (01-02-18 @ 05:14), Max: 37.1 (01-01-18 @ 21:32)  HR: 72 (01-02-18 @ 05:14) (72 - 80)  BP: 141/70 (01-02-18 @ 05:14) (123/70 - 150/81)  RR: 17 (01-02-18 @ 05:14) (16 - 18)  SpO2: 99% (01-02-18 @ 05:14) (97% - 100%)  Wt(kg): --        01-01-18 @ 07:01  -  01-02-18 @ 07:00  --------------------------------------------------------  IN: 320 mL / OUT: 0 mL / NET: 320 mL      Physical Exam:  	Gen: NAD, well-appearing  	HEENT: PERRL, supple neck, clear oropharynx  	Pulm: CTA B/L  	CV: RRR, S1S2; no rub  	Abd: +BS, soft, nontender/nondistended  	LE:  no edema, + weakness  	Psych: Normal affect and mood  	Skin: Warm, without rashes  	Vascular access: Left brachiocephalic AVF with thrill and bruit      LABS/STUDIES  --------------------------------------------------------------------------------              9.7    3.4   >-----------<  75       [01-02-18 @ 07:16]              28.4     128  |  93  |  70  ----------------------------<  68      [01-02-18 @ 07:16]  4.6   |  23  |  7.95        Ca     8.8     [01-02-18 @ 07:16]      Mg     2.4     [01-01-18 @ 20:34]      Phos  2.4     [01-01-18 @ 20:34]          Troponin 0.09      [01-01-18 @ 20:34]  CK 32      [01-01-18 @ 20:34]    Creatinine Trend:  SCr 7.95 [01-02 @ 07:16]  SCr 7.54 [01-01 @ 20:34]  SCr 7.07 [01-01 @ 08:27]  SCr 6.06 [12-31 @ 13:43]  SCr 4.64 [12-30 @ 12:10]        Iron 77, TIBC 90, %sat 86      [05-29-17 @ 09:39]  Ferritin 781.0      [05-29-17 @ 09:39]  PTH -- (Ca 7.5)      [06-01-17 @ 08:12]   253  Vitamin D (25OH) <4.0      [05-31-17 @ 08:52]  HbA1c 4.7      [12-20-17 @ 07:23]  TSH 8.03      [12-23-17 @ 08:00]  Lipid: chol 227, , HDL 47,       [05-30-17 @ 07:28]    HBsAb Reactive      [12-23-17 @ 08:00]  HBsAg Nonreact      [12-23-17 @ 08:00]  HBcAb Nonreact      [12-23-17 @ 08:00]  HCV 0.18, Nonreact      [12-23-17 @ 08:00]    SULLY: titer 1:1280, pattern Homogeneous      [12-23-17 @ 08:00]  dsDNA 70      [12-23-17 @ 08:00]  C3 Complement 36      [12-23-17 @ 08:00]  C4 Complement 11      [12-23-17 @ 08:00]  ANCA: cANCA Negative, pANCA Negative, atypical ANCA Indeterminate Method interference due to SULLY fluorescence      [12-23-17 @ 08:00]  Immunofixation Serum:   Weak  IgG Kappa Band Identified    Reference Range: None Detected      [12-23-17 @ 08:00]  SPEP Interpretation: Weak Gamma-Migrating Paraprotein Identified      [12-23-17 @ 08:00]

## 2018-01-02 NOTE — PROGRESS NOTE ADULT - SUBJECTIVE AND OBJECTIVE BOX
S: The patient is visited on the bedside, she does not have any acute complains.    O:  Vital Signs Last 24 Hrs  T(C): 37.1 (02 Jan 2018 05:14), Max: 37.1 (01 Jan 2018 21:32)  T(F): 98.8 (02 Jan 2018 05:14), Max: 98.8 (01 Jan 2018 21:32)  HR: 72 (02 Jan 2018 05:14) (72 - 80)  BP: 141/70 (02 Jan 2018 05:14) (123/70 - 150/81)  BP(mean): --  RR: 17 (02 Jan 2018 05:14) (16 - 18)  SpO2: 99% (02 Jan 2018 05:14) (97% - 100%)                          9.7    3.4   )-----------( 75       ( 02 Jan 2018 07:16 )             28.4   01-02    128<L>  |  93<L>  |  70<H>  ----------------------------<  68<L>  4.6   |  23  |  7.95<H>    Ca    8.8      02 Jan 2018 07:16  Phos  2.4     01-01  Mg     2.4     01-01    Exam:  A&O x 3  Surgical pupils, EOMI, speech unremarkable, no aphasia or dysarthria, no nystagmus  4/5 strength in all extremities, Improved compare to previous exam  decreased tone and bulk,  Deep tendon reflexes:   1+ in UE, diminished in BL LE  Toes, mute S: The patient is visited on the bedside, she does not have any acute complains.    O:  Vital Signs Last 24 Hrs  T(C): 37.1 (02 Jan 2018 05:14), Max: 37.1 (01 Jan 2018 21:32)  T(F): 98.8 (02 Jan 2018 05:14), Max: 98.8 (01 Jan 2018 21:32)  HR: 72 (02 Jan 2018 05:14) (72 - 80)  BP: 141/70 (02 Jan 2018 05:14) (123/70 - 150/81)  BP(mean): --  RR: 17 (02 Jan 2018 05:14) (16 - 18)  SpO2: 99% (02 Jan 2018 05:14) (97% - 100%)                          9.7    3.4   )-----------( 75       ( 02 Jan 2018 07:16 )             28.4   01-02    128<L>  |  93<L>  |  70<H>  ----------------------------<  68<L>  4.6   |  23  |  7.95<H>    Ca    8.8      02 Jan 2018 07:16  Phos  2.4     01-01  Mg     2.4     01-01    Exam:  A&O x 3  Surgical pupils, EOMI, speech unremarkable, no aphasia or dysarthria, no nystagmus  4/5 strength in LE extremities and 4+/5 in UE, Improved compare to previous exam  decreased tone and bulk,  Deep tendon reflexes:   1+ in UE, diminished in BL LE  Toes, mute

## 2018-01-02 NOTE — PROGRESS NOTE ADULT - PROBLEM SELECTOR PLAN 6
Patient needs access for dialysis, patient for Shiley placement am tomorrow.    Renal following.    Monitor renal function, fluid status.

## 2018-01-02 NOTE — PROGRESS NOTE ADULT - SUBJECTIVE AND OBJECTIVE BOX
SUBJ: no further chest pain, no SOB, troponin trended down    MEDICATIONS  (STANDING):  aspirin enteric coated 81 milliGRAM(s) Oral daily  atorvastatin 40 milliGRAM(s) Oral at bedtime  bisacodyl 5 milliGRAM(s) Oral at bedtime  calcium acetate 667 milliGRAM(s) Oral three times a day with meals  calcium carbonate 1250 mG (OsCal) 1 Tablet(s) Oral daily  dextrose 5%. 1000 milliLiter(s) (50 mL/Hr) IV Continuous <Continuous>  dextrose 50% Injectable 12.5 Gram(s) IV Push once  dextrose 50% Injectable 25 Gram(s) IV Push once  dextrose 50% Injectable 25 Gram(s) IV Push once  docusate sodium 100 milliGRAM(s) Oral two times a day  doxercalciferol Injectable 2 MICROGram(s) IV Push <User Schedule>  furosemide    Tablet 80 milliGRAM(s) Oral <User Schedule>  insulin lispro (HumaLOG) corrective regimen sliding scale   SubCutaneous three times a day before meals  insulin lispro (HumaLOG) corrective regimen sliding scale   SubCutaneous at bedtime  latanoprost 0.005% Ophthalmic Solution 1 Drop(s) Both EYES at bedtime  levothyroxine 25 MICROGram(s) Oral daily  metolazone 2.5 milliGRAM(s) Oral daily  metoprolol succinate ER 25 milliGRAM(s) Oral daily  Nephro-madiha 1 Tablet(s) Oral daily  ondansetron Injectable 4 milliGRAM(s) IV Push once  PARoxetine 20 milliGRAM(s) Oral daily  pregabalin 75 milliGRAM(s) Oral daily  senna 2 Tablet(s) Oral at bedtime  timolol 0.5% Solution 1 Drop(s) Both EYES daily    MEDICATIONS  (PRN):  acetaminophen   Tablet 650 milliGRAM(s) Oral every 6 hours PRN For Temp greater than 38 C (100.4 F)  acetaminophen   Tablet. 650 milliGRAM(s) Oral every 6 hours PRN Mild Pain (1 - 3)  dextrose Gel 1 Dose(s) Oral once PRN Blood Glucose LESS THAN 70 milliGRAM(s)/deciliter  glucagon  Injectable 1 milliGRAM(s) IntraMuscular once PRN Glucose LESS THAN 70 milligrams/deciliter  oxyCODONE    IR 5 milliGRAM(s) Oral every 6 hours PRN Moderate Pain (4 - 6)      Vital Signs Last 24 Hrs  T(C): 37.1 (02 Jan 2018 05:14), Max: 37.1 (01 Jan 2018 21:32)  T(F): 98.8 (02 Jan 2018 05:14), Max: 98.8 (01 Jan 2018 21:32)  HR: 72 (02 Jan 2018 05:14) (72 - 80)  BP: 141/70 (02 Jan 2018 05:14) (123/70 - 150/81)  BP(mean): --  RR: 17 (02 Jan 2018 05:14) (16 - 18)  SpO2: 99% (02 Jan 2018 05:14) (97% - 100%)    REVIEW OF SYSTEMS:  CONSTITUTIONAL: No fever, weight loss, or fatigue  RESPIRATORY: No cough, wheezing, chills or hemoptysis; No shortness of breath  CARDIOVASCULAR: No chest pain, palpitations, dizziness, or leg swelling  GASTROINTESTINAL: No abdominal or epigastric pain. No nausea, vomiting, or hematemesis; No diarrhea or constipation. No melena or hematochezia.  NEUROLOGICAL: No headaches, memory loss, loss of strength, numbness, or tremors  ENDOCRINE: No heat or cold intolerance;   MUSCULOSKELETAL: No joint pain or swelling; No muscle, back, or extremity pain  PSYCHIATRIC: No depression, anxiety, mood swings, or difficulty sleeping  HEME/LYMPH: No easy bruising, or bleeding gums      PHYSICAL EXAM:  · CONSTITUTIONAL: Well-developed, well nourished      · NECK: No bruits; no JVD  ·RESPIRATORY:   airway patent; breath sounds equal; good air movement; respirations non-labored; clear to auscultation bilaterally; no chest wall tenderness; no intercostal retractions; no rales,rhonchi or wheeze  · CARDIOVASCULAR: regular rate and rhythm  no rub  no murmur  normal PMI  . GASTROINTESTINAL:  no distention; no masses palpable; bowel sounds normal  · EXTREMITIES: No cyanosis, clubbing or edema  · VASCULAR:  Equal and normal pulses (radial, femoral)  ·NEUROLOGICAL:  Alert and oriented x 3;   · SKIN: No lesions; no rash  . LYMPH NODES: No lymphadedenopathy  · MUSCULOSKELETAL:  No calf tenderness  no joint swelling	    TELEMETRY:SR 60s, no further ectopy      TTE: images reviewed    LABS:   CBC Full  -  ( 02 Jan 2018 07:16 )  WBC Count : 3.4 K/uL  Hemoglobin : 9.7 g/dL  Hematocrit : 28.4 %  Platelet Count - Automated : 75 K/uL  Mean Cell Volume : 92.6 fl  Mean Cell Hemoglobin : 31.6 pg  Mean Cell Hemoglobin Concentration : 34.2 gm/dL  Auto Neutrophil # : 1.7 K/uL  Auto Lymphocyte # : 1.2 K/uL  Auto Monocyte # : 0.5 K/uL  Auto Eosinophil # : 0.0 K/uL  Auto Basophil # : 0.0 K/uL  Auto Neutrophil % : 50.5 %  Auto Lymphocyte % : 34.6 %  Auto Monocyte % : 13.8 %  Auto Eosinophil % : 0.4 %  Auto Basophil % : 0.7 %    01-02    128<L>  |  93<L>  |  70<H>  ----------------------------<  68<L>  4.6   |  23  |  7.95<H>    Ca    8.8      02 Jan 2018 07:16  Phos  2.4     01-01  Mg     2.4     01-01      CARDIAC MARKERS ( 01 Jan 2018 20:34 )  x     / 0.09 ng/mL / 32 U/L / x     / 1.4 ng/mL      IMPRESSION AND PLAN:  56 yo f with h/o lupus diagnosed in April, lupus nephritis, ESRD on HD via permacath (M, W, F) s/p AVF (not matured), DM2, HTN, HLD, hypothyroid presented with 3 weeks h/o bilateral LE pain and numbness found to have 13 seconds of nsvt on telemetry. Hertroponin has trended down,, no further ectopy on tele, EP consult appreciated.  Plan for cardiac cath tomorrow.  Patient is for permacath today.   May proceed with permacath prior to cardiac catheterization. Will continue medical management and to monitor on tele      : NSVT (nonsustained ventricular tachycardia).   contiue to monitor on tele   TTE done in May 2017 shows normal LV function. Given her risk factors and elevated troponin level, patient should have CAD ruled out and schedule cardiac cath, scheduled  for Wednesday. NPO after midnight tonight  Continue plan for for HD access today.  continue ASA 81mg po daily, metoprolol xl 25mg po daily, lipitor 40mg po daily.  Maintain K>4.0, Mg>2.0   Check HbA1C  check fasting lipid profile.       : Hypertension.   : Continue metoprolol.       Cardiology will continue to follow    Rey Mcgovern MD, PhD  Cardiology Attending  [phone]

## 2018-01-02 NOTE — PROGRESS NOTE ADULT - ASSESSMENT
56YO F w/ Lupus, ESRD, DM, HTN p/w with progressive lower extremity weakness and sensory loss. Symptoms have progressed over 1-2 months. Includes pain, weakness, inability to ambulate, and numbness in LE in distal LE B/L. Neurological exam reveals LE weakness with areflexia. MRI L-C spine did not show any canal narrowing, no deformity, no signal change. 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 Sural N biopsy was performed, results pending. LP was performed, cytology WNL, cryptococcus -, cultures -, ds DNA ab +., Anti SS-A +, Anti SS-B -, Anti Jalloh +, Elevated cadmium.  Impression: peripheral axonal loss, unknown etiology, autoimmune processes and heavy metal toxicity are high on the list  Plan:  [] F/U sural N biopsy results  [] Toxicology consult for elevated Cadmium 56YO F w/ Lupus, ESRD, DM, HTN p/w with progressive lower extremity weakness and sensory loss. Symptoms have progressed over 1-2 months. Includes pain, weakness, inability to ambulate, and numbness in LE in distal LE B/L. Neurological exam reveals LE weakness with areflexia. MRI L-C spine did not show any canal narrowing, no deformity, no signal change. 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 Sural N biopsy was performed, results pending. LP was performed, cytology WNL, cryptococcus -, cultures -, ds DNA ab +., Anti SS-A +, Anti SS-B -, Anti Jalloh +, Elevated cadmium. Patient shows some improvement with IVIG.  Impression: peripheral axonal loss, unknown etiology, autoimmune processes and heavy metal toxicity are high on the list. S/P IVIG  Plan:  [] F/U sural N biopsy results  [] Toxicology consult for elevated Cadmium

## 2018-01-02 NOTE — PROGRESS NOTE ADULT - PROBLEM SELECTOR PLAN 2
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 Positive serology for Lupus,  neuro following. s/p Immunoglobulin infusion.  Tox screen positive for Cadmium, will get Toxicology consult.   MRI lumbar, C spine show no cord compression.    Continue with Lyrica- renally dosed.   PT eval- recommend JESSICA placement.   Continue to hold Plaquenil.   s/p Sural nerve biopsy. Follow up results.   Follow up arterial dopplers.   Discussed plan at length with Neuro.

## 2018-01-03 DIAGNOSIS — G57.93 UNSPECIFIED MONONEUROPATHY OF BILATERAL LOWER LIMBS: ICD-10-CM

## 2018-01-03 LAB
ANION GAP SERPL CALC-SCNC: 14 MMOL/L — SIGNIFICANT CHANGE UP (ref 5–17)
B BURGDOR DNA SPEC QL NAA+PROBE: NEGATIVE — SIGNIFICANT CHANGE UP
BUN SERPL-MCNC: 79 MG/DL — HIGH (ref 7–23)
CALCIUM SERPL-MCNC: 8.8 MG/DL — SIGNIFICANT CHANGE UP (ref 8.4–10.5)
CHLORIDE SERPL-SCNC: 92 MMOL/L — LOW (ref 96–108)
CO2 SERPL-SCNC: 21 MMOL/L — LOW (ref 22–31)
CREAT SERPL-MCNC: 8.71 MG/DL — HIGH (ref 0.5–1.3)
GLUCOSE BLDC GLUCOMTR-MCNC: 118 MG/DL — HIGH (ref 70–99)
GLUCOSE BLDC GLUCOMTR-MCNC: 133 MG/DL — HIGH (ref 70–99)
GLUCOSE BLDC GLUCOMTR-MCNC: 68 MG/DL — LOW (ref 70–99)
GLUCOSE BLDC GLUCOMTR-MCNC: 73 MG/DL — SIGNIFICANT CHANGE UP (ref 70–99)
GLUCOSE BLDC GLUCOMTR-MCNC: 73 MG/DL — SIGNIFICANT CHANGE UP (ref 70–99)
GLUCOSE BLDC GLUCOMTR-MCNC: 74 MG/DL — SIGNIFICANT CHANGE UP (ref 70–99)
GLUCOSE BLDC GLUCOMTR-MCNC: 74 MG/DL — SIGNIFICANT CHANGE UP (ref 70–99)
GLUCOSE BLDC GLUCOMTR-MCNC: 75 MG/DL — SIGNIFICANT CHANGE UP (ref 70–99)
GLUCOSE BLDC GLUCOMTR-MCNC: 83 MG/DL — SIGNIFICANT CHANGE UP (ref 70–99)
GLUCOSE BLDC GLUCOMTR-MCNC: 99 MG/DL — SIGNIFICANT CHANGE UP (ref 70–99)
GLUCOSE SERPL-MCNC: 77 MG/DL — SIGNIFICANT CHANGE UP (ref 70–99)
HCT VFR BLD CALC: 27.3 % — LOW (ref 34.5–45)
HGB BLD-MCNC: 9.5 G/DL — LOW (ref 11.5–15.5)
MCHC RBC-ENTMCNC: 32.8 PG — SIGNIFICANT CHANGE UP (ref 27–34)
MCHC RBC-ENTMCNC: 34.8 GM/DL — SIGNIFICANT CHANGE UP (ref 32–36)
MCV RBC AUTO: 94.2 FL — SIGNIFICANT CHANGE UP (ref 80–100)
PLATELET # BLD AUTO: 76 K/UL — LOW (ref 150–400)
POTASSIUM SERPL-MCNC: 5.1 MMOL/L — SIGNIFICANT CHANGE UP (ref 3.5–5.3)
POTASSIUM SERPL-SCNC: 5.1 MMOL/L — SIGNIFICANT CHANGE UP (ref 3.5–5.3)
RBC # BLD: 2.9 M/UL — LOW (ref 3.8–5.2)
RBC # FLD: 13.2 % — SIGNIFICANT CHANGE UP (ref 10.3–14.5)
SODIUM SERPL-SCNC: 127 MMOL/L — LOW (ref 135–145)
WBC # BLD: 4.1 K/UL — SIGNIFICANT CHANGE UP (ref 3.8–10.5)
WBC # FLD AUTO: 4.1 K/UL — SIGNIFICANT CHANGE UP (ref 3.8–10.5)

## 2018-01-03 PROCEDURE — 99232 SBSQ HOSP IP/OBS MODERATE 35: CPT | Mod: GC

## 2018-01-03 PROCEDURE — 77001 FLUOROGUIDE FOR VEIN DEVICE: CPT | Mod: 26

## 2018-01-03 PROCEDURE — 99233 SBSQ HOSP IP/OBS HIGH 50: CPT

## 2018-01-03 PROCEDURE — 36580 REPLACE CVAD CATH: CPT

## 2018-01-03 RX ORDER — DEXTROSE 50 % IN WATER 50 %
12.5 SYRINGE (ML) INTRAVENOUS ONCE
Qty: 0 | Refills: 0 | Status: COMPLETED | OUTPATIENT
Start: 2018-01-03 | End: 2018-01-03

## 2018-01-03 RX ORDER — DEXTROSE 50 % IN WATER 50 %
12.5 SYRINGE (ML) INTRAVENOUS ONCE
Qty: 0 | Refills: 0 | Status: DISCONTINUED | OUTPATIENT
Start: 2018-01-03 | End: 2018-01-16

## 2018-01-03 RX ADMIN — Medication 20 MILLIGRAM(S): at 18:17

## 2018-01-03 RX ADMIN — Medication 12.5 GRAM(S): at 14:31

## 2018-01-03 RX ADMIN — DOXERCALCIFEROL 2 MICROGRAM(S): 2.5 CAPSULE ORAL at 23:11

## 2018-01-03 RX ADMIN — Medication 25 MICROGRAM(S): at 05:48

## 2018-01-03 RX ADMIN — Medication 1 DROP(S): at 11:24

## 2018-01-03 RX ADMIN — Medication 100 MILLIGRAM(S): at 18:17

## 2018-01-03 RX ADMIN — Medication 100 MILLIGRAM(S): at 05:49

## 2018-01-03 NOTE — PROGRESS NOTE ADULT - PROBLEM SELECTOR PLAN 1
continue tele (overnight sinus 60-80, 8 beats WCT)   appreciate cardiology c/s, will need cath after HD access confirmed

## 2018-01-03 NOTE — PROGRESS NOTE ADULT - PROBLEM SELECTOR PLAN 2
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 Positive serology for Lupus,  neuro following. s/p Immunoglobulin infusion.  Appreciate Toxicology consult for cadmium level-per them no evidence of heavy metal exposure by imaging and no other symptoms, does not require chelation.   MRI lumbar, C spine show no cord compression.    Continue with Lyrica- renally dosed.   PT eval- recommend JESSICA placement.   Continue to hold Plaquenil.   s/p Sural nerve biopsy. Follow up results.   Follow up arterial dopplers.   Follow up neuro

## 2018-01-03 NOTE — PROGRESS NOTE ADULT - ASSESSMENT
Patient is a 57 F who is s/p LUE fistula thrombolysis, now with the loss of her permacath and a palpable pulse at the fistula site.    -patient for IR permacath today and then dialysis   -will likely need eventual fistulogram, will discuss with Dr. Deluca when he would like to proceed Patient is a 57 F who is s/p LUE fistula thrombolysis, now with the loss of her permacath and a palpable pulse at the fistula site.    - patient for IR permacath today and then dialysis via Permacath   - Do not use AVF for HD at this time.    - will need outpatient followup to assess Maturity of AVF, will not be mature in near future.  May also need fistulogram, also as outpatient.   - Discussed with attending, Dr. Sandrine Reich MD PGY3  Vascular Surgery, #[id number]

## 2018-01-03 NOTE — PROGRESS NOTE ADULT - PROBLEM SELECTOR PLAN 3
likely from fluid overload, monitor levels. Continue with Lasix and Zaroxylyn per renal  for HD after access placed

## 2018-01-03 NOTE — PROGRESS NOTE ADULT - ASSESSMENT
Patient is a 57 year old female with past medical history of HTN, HLD, DMT2, hypothyroidism, SLE(diagnosed in April), lupus nephritis, ESRD on HD s/p AVF (not matured)  presented to ED for bilateral lower extremity numbness. EP consult was called for episode of 13 seconds of NSVT and noted to have + troponin 0.09.

## 2018-01-03 NOTE — CONSULT NOTE ADULT - PROBLEM SELECTOR RECOMMENDATION 9
The relevance of the abnormal cadmium level in this patient is unclear, and unlikely to be the cause of her symptoms. Potentially related to underlying kidney disease. She has no known exposure to cadmium through environmental or occupational routes, supplement use, and has no history of joint replacement. Moreover, she has no additional signs of cadmium toxicity aside from her LE neuropathy, eg pulmonary toxicity, hepatotoxicity, parkinsonism. Her slightly abnormal lead level is also unexplained, but unlikely to be causative of her symptoms at this low level. MR imaging inclusive of the abdomen (refer to L spine MR) during this admission is sufficient to exclude heavy metal ingestion.  - Repeat lead, cadmium to trend  - Continue work up for alternate cause of neuropathy  - No indication for chelation therapy at this time for cadmium or lead  Care discussed with primary team. Discussed with attending Janell. Please page with any further questions or concerns.

## 2018-01-03 NOTE — PROGRESS NOTE ADULT - SUBJECTIVE AND OBJECTIVE BOX
Visit Summary: Patient seen and evaluated at bedside.    Overnight Events: none    Exam:  T(C): 37 (01-03-18 @ 04:06), Max: 37.3 (01-02-18 @ 20:40)  HR: 96 (01-03-18 @ 05:45) (69 - 96)  BP: 149/81 (01-03-18 @ 05:45) (126/74 - 164/89)  RR: 18 (01-03-18 @ 04:06) (16 - 18)  SpO2: 98% (01-03-18 @ 04:06) (98% - 100%)  Wt(kg): --    Incision CDI    Constitutional: No Acute Distress     Neurological: AOx3, Following Commands    Motor exam:            Lower extremity                        HF         KF        KE       DF         PF                                                  R        5/5        5/5        5/5       4+/5       4+/5                                               L         4/5        4/5       4/5       not tested due to pain                                                 Sensation: [] intact to light touch  [x] decreased: lateral aspect of left foot                        9.5    4.1   )-----------( 76       ( 03 Jan 2018 06:35 )             27.3     01-03    127<L>  |  92<L>  |  79<H>  ----------------------------<  77  5.1   |  21<L>  |  8.71<H>    Ca    8.8      03 Jan 2018 06:35  Phos  2.8     01-02  Mg     2.4     01-02    TPro  8.0  /  Alb  2.3<L>  /  TBili  0.5  /  DBili  x   /  AST  19  /  ALT  <5<L>  /  AlkPhos  70  01-02

## 2018-01-03 NOTE — CONSULT NOTE ADULT - SUBJECTIVE AND OBJECTIVE BOX
MEDICAL TOXICOLOGY CONSULT    HPI:  57 year old woman extensive PMH (below) including lupus nephritis on HD admitted for 3 weeks of b/l LE pain, numbness and weakness. Per son she has been worsening over past month - 4 weeks ago was using walker, has been in wheelchair now for 3 weeks. Hydroxychloroquine started about a month ago but held over concerns that her neuropathy may be side effect of same. Pt currently unable to provide history, has been altered due to missing HD. No known cadmium or other heavy metal exposure. No supplement use, no known occupational exposure (15 years ago worked at [employer]), no joint replacements.    ONSET / TIME of exposure(s): unknown    QUANTITY of exposure(s): unknonwn    ROUTE of exposure:  unknown    CONTEXT of exposure: unknown    ASSOCIATED symptoms: ?neuropathy    PAST MEDICAL & SURGICAL HISTORY:  Lupus (systemic lupus erythematosus)  ESRD (end-stage renal disease) due to SLE  Glaucoma  Other hyperlipidemia  Type 2 diabetes mellitus without complication, without long-term current use of insulin  Essential hypertension  Hypercholesteremia  Hypertension  Diabetes  S/P appendectomy  H/O gastric bypass: 2016  S/P  section: times two        MEDICATION HISTORY:  acetaminophen   Tablet 650 milliGRAM(s) Oral every 6 hours PRN  acetaminophen   Tablet. 650 milliGRAM(s) Oral every 6 hours PRN  aspirin enteric coated 81 milliGRAM(s) Oral daily  atorvastatin 40 milliGRAM(s) Oral at bedtime  bisacodyl 5 milliGRAM(s) Oral at bedtime  calcium acetate 667 milliGRAM(s) Oral three times a day with meals  calcium carbonate 1250 mG (OsCal) 1 Tablet(s) Oral daily  dextrose 5%. 1000 milliLiter(s) IV Continuous <Continuous>  dextrose 50% Injectable 12.5 Gram(s) IV Push once  dextrose 50% Injectable 25 Gram(s) IV Push once  dextrose 50% Injectable 25 Gram(s) IV Push once  dextrose Gel 1 Dose(s) Oral once PRN  docusate sodium 100 milliGRAM(s) Oral two times a day  doxercalciferol Injectable 2 MICROGram(s) IV Push <User Schedule>  furosemide    Tablet 80 milliGRAM(s) Oral <User Schedule>  glucagon  Injectable 1 milliGRAM(s) IntraMuscular once PRN  insulin lispro (HumaLOG) corrective regimen sliding scale   SubCutaneous three times a day before meals  insulin lispro (HumaLOG) corrective regimen sliding scale   SubCutaneous at bedtime  latanoprost 0.005% Ophthalmic Solution 1 Drop(s) Both EYES at bedtime  levothyroxine 25 MICROGram(s) Oral daily  metolazone 2.5 milliGRAM(s) Oral daily  metoprolol succinate ER 25 milliGRAM(s) Oral daily  Nephro-madiha 1 Tablet(s) Oral daily  ondansetron Injectable 4 milliGRAM(s) IV Push once  oxyCODONE    IR 5 milliGRAM(s) Oral every 6 hours PRN  PARoxetine 20 milliGRAM(s) Oral daily  pregabalin 75 milliGRAM(s) Oral daily  senna 2 Tablet(s) Oral at bedtime  timolol 0.5% Solution 1 Drop(s) Both EYES daily      RECREATIONAL / ETHANOL / SUPPLEMENT USE: unable to assess    SOCIAL Hx:  retired lives with son    FAMILY HISTORY:  History of hypertension (Sibling): sibling  DM (diabetes mellitus) (Sibling): siblings  History of hypertension: father and mother  DM (diabetes mellitus): mother and father      REVIEW OF SYSTEMS:  unable to perform due to illness    PHYSICAL EXAM  Vital Signs Last 24 Hrs  T(C): 37 (2018 04:06), Max: 37.3 (2018 20:40)  T(F): 98.6 (2018 04:06), Max: 99.2 (2018 20:40)  HR: 96 (2018 05:45) (69 - 96)  BP: 149/81 (2018 05:45) (126/74 - 164/89)  BP(mean): --  RR: 18 (2018 04:06) (16 - 18)  SpO2: 98% (2018 04:06) (98% - 100%)  General:  thin, chronically ill appearing, no acute distress  Head:  normocephalic & atraumatic  Eyes:  extra-occular movement is intact  Pupils:  3 mm, symmetric, reactive to light  Ear, nose, throat: moist oral mucosa  Neck:  supple  Respiratory:  nml effort, clear to auscultation bilaterally, no rales/ronchi/wheezing  Cardiac:  RRR, +S1/S2 no m/r/g  Abdomen:  Soft, nondistended, nontender, +bowel sounds  :  deferred  Skin: warm/dry, no evident joint replacement surgical scars  Neurologic: Inattentive, confused, following some commands but largely non-verbal. Moves all extremities but not able to cooperate with detailed neuro exam at this time. Hyporeflexic patellar reflexes. No rigidity/clonus/tremor.   Psychiatric:  unable to assess    SIGNIFICANT LABORATORY STUDIES:                        9.5    4.1   )-----------( 76       ( 2018 06:35 )             27.3       01-03    127<L>  |  92<L>  |  79<H>  ----------------------------<  77  5.1   |  21<L>  |  8.71<H>    Ca    8.8      2018 06:35  Phos  2.8     01-02  Mg     2.4     01-02    TPro  8.0  /  Alb  2.3<L>  /  TBili  0.5  /  DBili  x   /  AST  19  /  ALT  <5<L>  /  AlkPhos  70  01-02    Serum cadmium 10.2  Serum lead 24  Serum mercury undetectable  Serum arsenic 6    ECG:  nsr 80 bpm QRS 74     RADIOLOGIC STUDIES - CXR clear

## 2018-01-03 NOTE — PROGRESS NOTE ADULT - ASSESSMENT
56 yo f with h/o lupus diagnosed in April, lupus nephritis, ESRD on HD via permacath (M, W, F) s/p AVF (not matured), DM2, HTN, HLD, hypothyroid presented with 3 weeks h/o bilateral LE pain and numbness presumed polyneuropathy course c/b lost HD access for permacath today and NSVT awaiting cath.

## 2018-01-03 NOTE — PROGRESS NOTE ADULT - PROBLEM SELECTOR PLAN 5
Patient needs access for dialysis, for permacath today by IR  Renal following.    Monitor renal function, fluid status.

## 2018-01-03 NOTE — PROGRESS NOTE ADULT - ATTENDING COMMENTS
ESRD on HD. Unable to HD because of tunnel cath dislodgement  Awaiting IR to place a new tunnel cath today  She needs HD today, tomorrow, and Friday to get back on schedule    Amy Carrillo MD  [phone]

## 2018-01-03 NOTE — PROGRESS NOTE ADULT - SUBJECTIVE AND OBJECTIVE BOX
Interventional Radiology  Pre-Procedure Note        HPI:  This is a 57 year old female with h/o lupus diagnosed in April, lupus nephritis, ESRD on HD via permacath (M, W, F) s/p AVF (still maturing), DM2, HTN, HLD, hypothyroid admitted to [facility] with 3 weeks h/o BLE pain and numbness. Patient was seen in ED (10/17) with pain diagnosed with neuropathy (neg DVT) and d/c'd home on oxycodone. Subsequently developed BLE numbness and inability to ambulate for past 3 weeks. Now wheelchair dependent.   Pt presents to IR for tunneled HD catheter placement with anesthesia, per nephrology request for continued HD until AVF matures. Pt last dialyzed .  Pt somnolent, oriented x 1 therefore informed consent obtained from pt's 20 year old son Alex RUVALCABA.     NPO since  6 pm.    PAST MEDICAL & SURGICAL HISTORY:  Lupus (systemic lupus erythematosus)  ESRD (end-stage renal disease) due to SLE  Glaucoma  Other hyperlipidemia  Type 2 diabetes mellitus without complication, without long-term current use of insulin  Essential hypertension  Hypercholesteremia  Hypertension  Diabetes  S/P appendectomy  H/O gastric bypass: 2016  S/P  section: times two      FAMILY HISTORY:  History of hypertension (Sibling): sibling  DM (diabetes mellitus) (Sibling): siblings  History of hypertension: father and mother  DM (diabetes mellitus): mother and father      Allergies: penicillin (Rash)      Current Medications: acetaminophen   Tablet 650 milliGRAM(s) Oral every 6 hours PRN  acetaminophen   Tablet. 650 milliGRAM(s) Oral every 6 hours PRN  aspirin enteric coated 81 milliGRAM(s) Oral daily  atorvastatin 40 milliGRAM(s) Oral at bedtime  bisacodyl 5 milliGRAM(s) Oral at bedtime  calcium acetate 667 milliGRAM(s) Oral three times a day with meals  calcium carbonate 1250 mG (OsCal) 1 Tablet(s) Oral daily  dextrose 5%. 1000 milliLiter(s) IV Continuous <Continuous>  dextrose 50% Injectable 12.5 Gram(s) IV Push once  dextrose 50% Injectable 25 Gram(s) IV Push once  dextrose 50% Injectable 25 Gram(s) IV Push once  dextrose Gel 1 Dose(s) Oral once PRN  docusate sodium 100 milliGRAM(s) Oral two times a day  doxercalciferol Injectable 2 MICROGram(s) IV Push <User Schedule>  furosemide    Tablet 80 milliGRAM(s) Oral <User Schedule>  glucagon  Injectable 1 milliGRAM(s) IntraMuscular once PRN  insulin lispro (HumaLOG) corrective regimen sliding scale   SubCutaneous three times a day before meals  insulin lispro (HumaLOG) corrective regimen sliding scale   SubCutaneous at bedtime  latanoprost 0.005% Ophthalmic Solution 1 Drop(s) Both EYES at bedtime  levothyroxine 25 MICROGram(s) Oral daily  metolazone 2.5 milliGRAM(s) Oral daily  metoprolol succinate ER 25 milliGRAM(s) Oral daily  Nephro-madiha 1 Tablet(s) Oral daily  ondansetron Injectable 4 milliGRAM(s) IV Push once  oxyCODONE    IR 5 milliGRAM(s) Oral every 6 hours PRN  PARoxetine 20 milliGRAM(s) Oral daily  pregabalin 75 milliGRAM(s) Oral daily  senna 2 Tablet(s) Oral at bedtime  timolol 0.5% Solution 1 Drop(s) Both EYES daily      Labs:                         9.5    4.1   )-----------( 76       ( 2018 06:35 )             27.3       01-03    127<L>  |  92<L>  |  79<H>  ----------------------------<  77  5.1   |  21<L>  |  8.71<H>    Ca    8.8      2018 06:35  Phos  2.8     01-02  Mg     2.4     -    TPro  8.0  /  Alb  2.3<L>  /  TBili  0.5  /  DBili  x   /  AST  19  /  ALT  <5<L>  /  AlkPhos  70  -      Assessment/Plan:   This is a 57 year old  female  presents with ESRD requiring continued HD until LUE AVF matures.   Patient presents to IR for tunneled HD catheter placement with anesthesia.      Allyson RM BC  ext [id number]  # [id number]

## 2018-01-03 NOTE — PROGRESS NOTE ADULT - ASSESSMENT
56 yo female with SLE and BLE weakness now s/p left sural nerve biopsy. Incision CDI. Incision healing well.     -Informed patient of expected loss of sensation  -F/u pathology

## 2018-01-03 NOTE — PROCEDURE NOTE - NSPROCDETAILS_GEN_ALL_CORE
location identified, draped/prepped, sterile technique used, needle inserted/introduced
lumen(s) aspirated and flushed/guidewire recovered/sterile dressing applied/sterile technique, catheter placed

## 2018-01-03 NOTE — CHART NOTE - NSCHARTNOTEFT_GEN_A_CORE
WILLARD BOB    Notified by RN patient with critical fingerstick at 1230 pm of 68 mg /dl.  Pt is asymptomatic.       Interventions taken   1/2 amp of D50 given.   Will continue to monitor blood sugars closely.           Светлана Roche ANP-BC  [employer] #[id number]

## 2018-01-03 NOTE — PROGRESS NOTE ADULT - PROBLEM SELECTOR PLAN 1
TELE: SR, heart rate 60- 80's with 8 beats WCT overnight.  Continue with telemetry monitoring  Continue with beta blocker, Toprol 25mg QD  may uptitrate as BP can tolerate  May 2017, ECHO revealed EF 75%  Pt needs ischemic work-up and is scheduled for Cardiac cath on 1/4 however needs HD access first.   Maintain K+ 4.0 and Mag 2.0 and supplement as needed.

## 2018-01-03 NOTE — PROGRESS NOTE ADULT - PROBLEM SELECTOR PLAN 1
Pt with ESRD on HD. Last HD was on 12/29. Pt planned for HD tunneled catheter by IR today. Plan for HD today after catheter placement.

## 2018-01-03 NOTE — PROGRESS NOTE ADULT - SUBJECTIVE AND OBJECTIVE BOX
Patient is a 57y old  Female who presents with a chief complaint of LE pain and inability to walk (19 Dec 2017 10:27)      SUBJECTIVE / OVERNIGHT EVENTS:  son at bedside translating by patient request  no pain/sob/cp    MEDICATIONS  (STANDING):  aspirin enteric coated 81 milliGRAM(s) Oral daily  atorvastatin 40 milliGRAM(s) Oral at bedtime  bisacodyl 5 milliGRAM(s) Oral at bedtime  calcium acetate 667 milliGRAM(s) Oral three times a day with meals  calcium carbonate 1250 mG (OsCal) 1 Tablet(s) Oral daily  dextrose 5%. 1000 milliLiter(s) (50 mL/Hr) IV Continuous <Continuous>  dextrose 50% Injectable 12.5 Gram(s) IV Push once  dextrose 50% Injectable 25 Gram(s) IV Push once  dextrose 50% Injectable 25 Gram(s) IV Push once  docusate sodium 100 milliGRAM(s) Oral two times a day  doxercalciferol Injectable 2 MICROGram(s) IV Push <User Schedule>  furosemide    Tablet 80 milliGRAM(s) Oral <User Schedule>  insulin lispro (HumaLOG) corrective regimen sliding scale   SubCutaneous three times a day before meals  insulin lispro (HumaLOG) corrective regimen sliding scale   SubCutaneous at bedtime  latanoprost 0.005% Ophthalmic Solution 1 Drop(s) Both EYES at bedtime  levothyroxine 25 MICROGram(s) Oral daily  metolazone 2.5 milliGRAM(s) Oral daily  metoprolol succinate ER 25 milliGRAM(s) Oral daily  Nephro-madiha 1 Tablet(s) Oral daily  ondansetron Injectable 4 milliGRAM(s) IV Push once  PARoxetine 20 milliGRAM(s) Oral daily  pregabalin 75 milliGRAM(s) Oral daily  senna 2 Tablet(s) Oral at bedtime  timolol 0.5% Solution 1 Drop(s) Both EYES daily    MEDICATIONS  (PRN):  acetaminophen   Tablet 650 milliGRAM(s) Oral every 6 hours PRN For Temp greater than 38 C (100.4 F)  acetaminophen   Tablet. 650 milliGRAM(s) Oral every 6 hours PRN Mild Pain (1 - 3)  dextrose Gel 1 Dose(s) Oral once PRN Blood Glucose LESS THAN 70 milliGRAM(s)/deciliter  glucagon  Injectable 1 milliGRAM(s) IntraMuscular once PRN Glucose LESS THAN 70 milligrams/deciliter  oxyCODONE    IR 5 milliGRAM(s) Oral every 6 hours PRN Moderate Pain (4 - 6)      Vital Signs Last 24 Hrs  T(C): 36.7 (03 Jan 2018 12:02), Max: 37.3 (02 Jan 2018 20:40)  T(F): 98 (03 Jan 2018 12:02), Max: 99.2 (02 Jan 2018 20:40)  HR: 66 (03 Jan 2018 12:02) (66 - 96)  BP: 162/82 (03 Jan 2018 12:02) (126/74 - 164/89)  BP(mean): --  RR: 18 (03 Jan 2018 12:02) (16 - 18)  SpO2: 99% (03 Jan 2018 12:02) (98% - 100%)  CAPILLARY BLOOD GLUCOSE      POCT Blood Glucose.: 133 mg/dL (03 Jan 2018 13:27)  POCT Blood Glucose.: 74 mg/dL (03 Jan 2018 13:02)  POCT Blood Glucose.: 68 mg/dL (03 Jan 2018 12:37)  POCT Blood Glucose.: 83 mg/dL (03 Jan 2018 08:54)  POCT Blood Glucose.: 99 mg/dL (02 Jan 2018 21:45)  POCT Blood Glucose.: 105 mg/dL (02 Jan 2018 18:02)    I&O's Summary    02 Jan 2018 07:01  -  03 Jan 2018 07:00  --------------------------------------------------------  IN: 120 mL / OUT: 0 mL / NET: 120 mL    03 Jan 2018 07:01  -  03 Jan 2018 13:38  --------------------------------------------------------  IN: 0 mL / OUT: 0 mL / NET: 0 mL        PHYSICAL EXAM:  GENERAL: NAD, well-developed  HEAD:  Atraumatic, Normocephalic  EYES: EOMI, conjunctiva and sclera clear  NECK: Supple, No JVD  CHEST/LUNG: Clear to auscultation bilaterally; No wheeze  HEART: Regular rate and rhythm; No murmurs, rubs, or gallops  ABDOMEN: Soft, Nontender, Nondistended; Bowel sounds present  EXTREMITIES:  2+ Peripheral Pulses, No clubbing, cyanosis, or edema  Lt AVF present   PSYCH: AAOx3  NEUROLOGY: CN's intact, strength 3/5 in UE's and 4/5 in LE's  SKIN: No rashes or lesions    LABS:                        9.5    4.1   )-----------( 76       ( 03 Jan 2018 06:35 )             27.3     01-03    127<L>  |  92<L>  |  79<H>  ----------------------------<  77  5.1   |  21<L>  |  8.71<H>    Ca    8.8      03 Jan 2018 06:35  Phos  2.8     01-02  Mg     2.4     01-02    TPro  8.0  /  Alb  2.3<L>  /  TBili  0.5  /  DBili  x   /  AST  19  /  ALT  <5<L>  /  AlkPhos  70  01-02      CARDIAC MARKERS ( 01 Jan 2018 20:34 )  x     / 0.09 ng/mL / 32 U/L / x     / 1.4 ng/mL          RADIOLOGY & ADDITIONAL TESTS:    Imaging Personally Reviewed:  < from: [facility] Duplex Hemodialysis Access, Left (01.02.18 @ 13:31) >  Impression: There is a flow-limiting stenosis of the dialysis access   fistula at the surgical anastomosis of the left brachial artery to the   egress cephalic vein in the distal upper arm.    < end of copied text >      Consultant(s) Notes Reviewed:  renal, cards    Care Discussed with Consultants/Other Providers:

## 2018-01-03 NOTE — CONSULT NOTE ADULT - ASSESSMENT
57 year old woman presenting with lower extremity neuropathy found to have elevated serum cadmium level.

## 2018-01-03 NOTE — PROGRESS NOTE ADULT - SUBJECTIVE AND OBJECTIVE BOX
Vascular Surgery Progress Note     S: Patient seen and examined. No complaints, fistula with palpable thrill    Appreciate results of duplex with flow limiting stenosis at the brachial artery to cephalic vein anastamosis    MEDICATIONS  (STANDING):  aspirin enteric coated 81 milliGRAM(s) Oral daily  atorvastatin 40 milliGRAM(s) Oral at bedtime  bisacodyl 5 milliGRAM(s) Oral at bedtime  calcium acetate 667 milliGRAM(s) Oral three times a day with meals  calcium carbonate 1250 mG (OsCal) 1 Tablet(s) Oral daily  dextrose 5%. 1000 milliLiter(s) (50 mL/Hr) IV Continuous <Continuous>  dextrose 50% Injectable 12.5 Gram(s) IV Push once  dextrose 50% Injectable 25 Gram(s) IV Push once  dextrose 50% Injectable 25 Gram(s) IV Push once  docusate sodium 100 milliGRAM(s) Oral two times a day  doxercalciferol Injectable 2 MICROGram(s) IV Push <User Schedule>  furosemide    Tablet 80 milliGRAM(s) Oral <User Schedule>  insulin lispro (HumaLOG) corrective regimen sliding scale   SubCutaneous three times a day before meals  insulin lispro (HumaLOG) corrective regimen sliding scale   SubCutaneous at bedtime  latanoprost 0.005% Ophthalmic Solution 1 Drop(s) Both EYES at bedtime  levothyroxine 25 MICROGram(s) Oral daily  metolazone 2.5 milliGRAM(s) Oral daily  metoprolol succinate ER 25 milliGRAM(s) Oral daily  Nephro-madiha 1 Tablet(s) Oral daily  ondansetron Injectable 4 milliGRAM(s) IV Push once  PARoxetine 20 milliGRAM(s) Oral daily  pregabalin 75 milliGRAM(s) Oral daily  senna 2 Tablet(s) Oral at bedtime  timolol 0.5% Solution 1 Drop(s) Both EYES daily    MEDICATIONS  (PRN):  acetaminophen   Tablet 650 milliGRAM(s) Oral every 6 hours PRN For Temp greater than 38 C (100.4 F)  acetaminophen   Tablet. 650 milliGRAM(s) Oral every 6 hours PRN Mild Pain (1 - 3)  dextrose Gel 1 Dose(s) Oral once PRN Blood Glucose LESS THAN 70 milliGRAM(s)/deciliter  glucagon  Injectable 1 milliGRAM(s) IntraMuscular once PRN Glucose LESS THAN 70 milligrams/deciliter  oxyCODONE    IR 5 milliGRAM(s) Oral every 6 hours PRN Moderate Pain (4 - 6)      Physical Exam:    Vital Signs Last 24 Hrs  T(C): 37 (03 Jan 2018 04:06), Max: 37.3 (02 Jan 2018 20:40)  T(F): 98.6 (03 Jan 2018 04:06), Max: 99.2 (02 Jan 2018 20:40)  HR: 96 (03 Jan 2018 05:45) (69 - 96)  BP: 149/81 (03 Jan 2018 05:45) (126/74 - 164/89)  BP(mean): --  RR: 18 (03 Jan 2018 04:06) (16 - 18)  SpO2: 98% (03 Jan 2018 04:06) (98% - 100%)    01-02-18 @ 07:01  -  01-03-18 @ 07:00  --------------------------------------------------------  IN: 120 mL / OUT: 0 mL / NET: 120 mL        Gen: NAD  fistula with palpable thrill, + radial pulses    LABS:                        9.5    4.1   )-----------( 76       ( 03 Jan 2018 06:35 )             27.3     01-03    127<L>  |  92<L>  |  79<H>  ----------------------------<  77  5.1   |  21<L>  |  8.71<H>    Ca    8.8      03 Jan 2018 06:35  Phos  2.8     01-02  Mg     2.4     01-02    TPro  8.0  /  Alb  2.3<L>  /  TBili  0.5  /  DBili  x   /  AST  19  /  ALT  <5<L>  /  AlkPhos  70  01-02

## 2018-01-03 NOTE — CHART NOTE - NSCHARTNOTEFT_GEN_A_CORE
Source: Patient [ ]    Family [x ]     other [ x]chart  as per Hospitalist note:  · Assessment	  58 yo f with h/o lupus diagnosed in April, lupus nephritis, ESRD on HD via permacath (M, W, F) s/p AVF (not matured), DM2, HTN, HLD, hypothyroid presented with 3 weeks h/o bilateral LE pain and numbness     Problem/Plan - 1:  ·  Problem: NSVT (nonsustained ventricular tachycardia).  Plan: Patient transferred to [facility] for monitoring, given NSVT and not on dialysis.  Cards consulted, patient needs cardiac cath given risk factors. Currently in sinus.   Will try to get patient perma cath and dialysis, postpone cardiac cath 01/04- as patient asymptomatic.      Problem/Plan - 2:  ·  Problem: Weakness of both lower extremities.  Plan: Polyneuropathy likely autoimmune etiology in the setting of SLE. Positive serology for Lupus,  neuro following. s/p Immunoglobulin infusion.  Tox screen positive for Cadmium, will get Toxicology consult.   MRI lumbar, C spine show no cord compression.    Continue with Lyrica- renally dosed.   PT eval- recommend JESSICA placement.   Continue to hold Plaquenil.   s/p Sural nerve biopsy. Follow up results.   Follow up arterial dopplers.   Discussed plan at length with Neuro.      Problem/Plan - 3:  ·  Problem: Hyponatremia.  Plan: likely from fluid overload, monitor levels. Continue with Lasix and Zaroxylyn.      Problem/Plan - 4:  ·  Problem: Vaginal bleeding.  Plan: Patient having bleeding per vagina on and off, had PAP smear last month.   Reviewed Transvaginal ultrasound, show cervical polyp.    Gyn consult appreciated, recommend out patient follow up.  Monitor CBC as patient on Aspirin.      Problem/Plan - 5:  ·  Problem: Hypotension due to drugs.  Plan: Patient hypotensive multifactorial - Volume depletion from Lasix, fluid shift during dialysis vs bleeding from vagina. CBC stable.   Blood Pressure stable.  May consider Midodrine after cardiac evaluation.      Problem/Plan - 6:  Problem: ESRD (end-stage renal disease) due to SLE. Plan: Patient needs access for dialysis, patient for Shiley placement am tomorrow.    Renal following.    Monitor renal function, fluid status.     Problem/Plan - 7:  ·  Problem: Orthostatic hypotension.  Plan: in the setting of Lupus nephritis on dialysis, autonomic dysfunction cannot be ruled out.  Consider Midodrine if patient persistently orthostatic. .      Problem/Plan - 8:  ·  Problem: Systemic lupus erythematosus with glomerular disease, unspecified SLE type.  Plan: Plaquenil on hold in setting of LE weakness as outpt.   will monitor. Med recently started 3 weeks ago as outpt.  Prior no meds  Rheum following.      Problem/Plan - 9:  ·  Problem: Type 2 diabetes mellitus with chronic kidney disease on chronic dialysis, unspecified long term insulin use status.  Plan: Not on any home meds at this time.   Well controlled as per family s/p gastric bypass  On insulin sliding scale.      Problem/Plan - 10:  Problem: Hypothyroidism, unspecified type. Plan; Cont with synthroid.        Diet : NPO AFTER MIDNIGHT for cath placement today      Patient reports [ ] nausea  [ ] vomiting [ ] diarrhea [ ] constipation  [ ]chewing problems [ ] swallowing issues  [X ] other: sleeping at time of visit. son at bedside, pt has not tolerated Ensure or Glucerna in the past. son verbalized that pt is eating well when he wakes her up. otherwise she sleeps a lot. daughter or son is usually at bedside. agrees to let staff know if he wishes to have Nepro sent to pt. son refused need for diet ed reinforcement related to diabetes/renal.     PO intake:  < 50% [ ] 50-75% [ ]   % [x ]  other :     Source for PO intake [ ] Patient [x ] family [ ] chart [ ] staff [ ] other     Enteral /Parenteral Nutrition:       Current Weight: 123.8pounds/56.2kg  % Weight Change: 4% gain since previous assess on 12/27    Pertinent Medications: MEDICATIONS  (STANDING):  aspirin enteric coated 81 milliGRAM(s) Oral daily  atorvastatin 40 milliGRAM(s) Oral at bedtime  bisacodyl 5 milliGRAM(s) Oral at bedtime  calcium acetate 667 milliGRAM(s) Oral three times a day with meals  calcium carbonate 1250 mG (OsCal) 1 Tablet(s) Oral daily  dextrose 5%. 1000 milliLiter(s) (50 mL/Hr) IV Continuous <Continuous>  dextrose 50% Injectable 12.5 Gram(s) IV Push once  dextrose 50% Injectable 25 Gram(s) IV Push once  dextrose 50% Injectable 25 Gram(s) IV Push once  docusate sodium 100 milliGRAM(s) Oral two times a day  doxercalciferol Injectable 2 MICROGram(s) IV Push <User Schedule>  furosemide    Tablet 80 milliGRAM(s) Oral <User Schedule>  insulin lispro (HumaLOG) corrective regimen sliding scale   SubCutaneous three times a day before meals  insulin lispro (HumaLOG) corrective regimen sliding scale   SubCutaneous at bedtime  latanoprost 0.005% Ophthalmic Solution 1 Drop(s) Both EYES at bedtime  levothyroxine 25 MICROGram(s) Oral daily  metolazone 2.5 milliGRAM(s) Oral daily  metoprolol succinate ER 25 milliGRAM(s) Oral daily  Nephro-madiha 1 Tablet(s) Oral daily  ondansetron Injectable 4 milliGRAM(s) IV Push once  PARoxetine 20 milliGRAM(s) Oral daily  pregabalin 75 milliGRAM(s) Oral daily  senna 2 Tablet(s) Oral at bedtime  timolol 0.5% Solution 1 Drop(s) Both EYES daily    MEDICATIONS  (PRN):  acetaminophen   Tablet 650 milliGRAM(s) Oral every 6 hours PRN For Temp greater than 38 C (100.4 F)  acetaminophen   Tablet. 650 milliGRAM(s) Oral every 6 hours PRN Mild Pain (1 - 3)  dextrose Gel 1 Dose(s) Oral once PRN Blood Glucose LESS THAN 70 milliGRAM(s)/deciliter  glucagon  Injectable 1 milliGRAM(s) IntraMuscular once PRN Glucose LESS THAN 70 milligrams/deciliter  oxyCODONE    IR 5 milliGRAM(s) Oral every 6 hours PRN Moderate Pain (4 - 6)    Pertinent Labs:  01-03 Na127 mmol/L<L> Glu 77 mg/dL K+ 5.1 mmol/L Cr  8.71 mg/dL<H> BUN 79 mg/dL<H>       CAPILLARY BLOOD GLUCOSE      POCT Blood Glucose.: 83 mg/dL (03 Jan 2018 08:54)  POCT Blood Glucose.: 99 mg/dL (02 Jan 2018 21:45)  POCT Blood Glucose.: 105 mg/dL (02 Jan 2018 18:02)    Hemoglobin A1C, Whole Blood: 4.7 % (12-20 @ 07:23)          Skin: +1 left/right ankle, surgical incision    Estimated Needs:   [x ] no change since previous assessment  [ ] recalculated:       Previous Nutrition Diagnosis:     [ ] Inadequate Energy Intake [ ]Inadequate Oral Intake [ ] Excessive Energy Intake     [ ] Underweight [ ] Increased Nutrient Needs [ ] Overweight/Obesity     [ ] Altered GI Function [ ] Unintended Weight Loss [ ] Food & Nutrition Related Knowledge Deficit [x ] Malnutrition-severe         Nutrition Diagnosis is [x ] ongoing  [ ] resolved [ ] not applicable          New Nutrition Diagnosis: [x ] not applicable    [ ] Inadequate Protein Energy Intake [ ]Inadequate Oral Intake [ ] Excessive Energy Intake     [ ] Underweight [ ] Increased Nutrient Needs [ ] Overweight/Obesity     [ ] Altered GI Function [ ] Unintended Weight Loss [ ] Food & Nutrition Related Knowledge Deficit[ ] Limited Adherence to nutrition related recommendations [ ] Malnutrition  [ ] other: Free text       Related to:      As evidenced by:      Interventions:     Recommend    [ ] Change Diet To:    [ ] Nutrition Supplement    [ ] Nutrition Support    [ ] Other:        Monitoring and Evaluation:     [x ] PO intake [ x] Tolerance to diet prescription [x ] weights [ x] follow up per protocol    [ ] other:

## 2018-01-03 NOTE — PROGRESS NOTE ADULT - SUBJECTIVE AND OBJECTIVE BOX
INTERVAL HPI/OVERNIGHT EVENTS: Resting comfortably in bed, no complaints voiced. SR, heart rate 60- 80's with 8 beats WCT overnight.     MEDICATIONS  (STANDING):  aspirin enteric coated 81 milliGRAM(s) Oral daily  atorvastatin 40 milliGRAM(s) Oral at bedtime  bisacodyl 5 milliGRAM(s) Oral at bedtime  calcium acetate 667 milliGRAM(s) Oral three times a day with meals  calcium carbonate 1250 mG (OsCal) 1 Tablet(s) Oral daily  dextrose 5%. 1000 milliLiter(s) (50 mL/Hr) IV Continuous <Continuous>  dextrose 50% Injectable 12.5 Gram(s) IV Push once  dextrose 50% Injectable 25 Gram(s) IV Push once  dextrose 50% Injectable 25 Gram(s) IV Push once  docusate sodium 100 milliGRAM(s) Oral two times a day  doxercalciferol Injectable 2 MICROGram(s) IV Push <User Schedule>  furosemide    Tablet 80 milliGRAM(s) Oral <User Schedule>  insulin lispro (HumaLOG) corrective regimen sliding scale   SubCutaneous three times a day before meals  insulin lispro (HumaLOG) corrective regimen sliding scale   SubCutaneous at bedtime  latanoprost 0.005% Ophthalmic Solution 1 Drop(s) Both EYES at bedtime  levothyroxine 25 MICROGram(s) Oral daily  metolazone 2.5 milliGRAM(s) Oral daily  metoprolol succinate ER 25 milliGRAM(s) Oral daily  Nephro-madiha 1 Tablet(s) Oral daily  ondansetron Injectable 4 milliGRAM(s) IV Push once  PARoxetine 20 milliGRAM(s) Oral daily  pregabalin 75 milliGRAM(s) Oral daily  senna 2 Tablet(s) Oral at bedtime  timolol 0.5% Solution 1 Drop(s) Both EYES daily    MEDICATIONS  (PRN):  acetaminophen   Tablet 650 milliGRAM(s) Oral every 6 hours PRN For Temp greater than 38 C (100.4 F)  acetaminophen   Tablet. 650 milliGRAM(s) Oral every 6 hours PRN Mild Pain (1 - 3)  dextrose Gel 1 Dose(s) Oral once PRN Blood Glucose LESS THAN 70 milliGRAM(s)/deciliter  glucagon  Injectable 1 milliGRAM(s) IntraMuscular once PRN Glucose LESS THAN 70 milligrams/deciliter  oxyCODONE    IR 5 milliGRAM(s) Oral every 6 hours PRN Moderate Pain (4 - 6)      Allergies    penicillin (Rash)    Intolerances      ROS:  General: Pt denies fever/chills  Cardiovascular: denies chest pain/palpitations/leg edema  Respiratory and Thorax: denies SOB/cough/wheezing  Gastrointestinal: denies abdominal pain/diarrhea/constipation/bloody stool  Musculoskeletal: denies joint pain or swelling, denies restricted motion  Skin: denies rashes/sores      Vital Signs Last 24 Hrs  T(C): 37 (03 Jan 2018 04:06), Max: 37.3 (02 Jan 2018 20:40)  T(F): 98.6 (03 Jan 2018 04:06), Max: 99.2 (02 Jan 2018 20:40)  HR: 96 (03 Jan 2018 05:45) (69 - 96)  BP: 149/81 (03 Jan 2018 05:45) (126/74 - 164/89)  BP(mean): --  RR: 18 (03 Jan 2018 04:06) (16 - 18)  SpO2: 98% (03 Jan 2018 04:06) (98% - 100%)    Physical Exam:  Neurological: Alert & Oriented x 3  Respiratory: CTA B/L, No wheezing/crackles/rhonchi  Cardiovascular: (+) S1 & S2, RRR, No m/r/g  Gastrointestinal: soft, NT, nondistended, (+) BS  Extremities: No pedal edema, No clubbing, No cyanosis  Skin:  normal skin color and pigmentation, no skin lesions    LABS:                        9.5    4.1   )-----------( 76       ( 03 Jan 2018 06:35 )             27.3     01-03    127<L>  |  92<L>  |  79<H>  ----------------------------<  77  5.1   |  21<L>  |  8.71<H>    Ca    8.8      03 Jan 2018 06:35  Phos  2.8     01-02  Mg     2.4     01-02    TPro  8.0  /  Alb  2.3<L>  /  TBili  0.5  /  DBili  x   /  AST  19  /  ALT  <5<L>  /  AlkPhos  70  01-02          RADIOLOGY & ADDITIONAL TESTS:    TELE: SR, heart rate 60- 80's with 8 beats WCT overnight.

## 2018-01-03 NOTE — PROGRESS NOTE ADULT - SUBJECTIVE AND OBJECTIVE BOX
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 Pt seen and examined at bedside. Pt with dislodged tunneled catheter. Planned for tunneled catheter today by IR. Pt denies sob, chest pain.     PAST HISTORY  --------------------------------------------------------------------------------  No significant changes to PMH, PSH, FHx, SHx, unless otherwise noted    ALLERGIES & MEDICATIONS  --------------------------------------------------------------------------------  Allergies    penicillin (Rash)    Intolerances      Standing Inpatient Medications  aspirin enteric coated 81 milliGRAM(s) Oral daily  atorvastatin 40 milliGRAM(s) Oral at bedtime  bisacodyl 5 milliGRAM(s) Oral at bedtime  calcium acetate 667 milliGRAM(s) Oral three times a day with meals  calcium carbonate 1250 mG (OsCal) 1 Tablet(s) Oral daily  dextrose 5%. 1000 milliLiter(s) IV Continuous <Continuous>  dextrose 50% Injectable 12.5 Gram(s) IV Push once  dextrose 50% Injectable 25 Gram(s) IV Push once  dextrose 50% Injectable 25 Gram(s) IV Push once  docusate sodium 100 milliGRAM(s) Oral two times a day  doxercalciferol Injectable 2 MICROGram(s) IV Push <User Schedule>  furosemide    Tablet 80 milliGRAM(s) Oral <User Schedule>  insulin lispro (HumaLOG) corrective regimen sliding scale   SubCutaneous three times a day before meals  insulin lispro (HumaLOG) corrective regimen sliding scale   SubCutaneous at bedtime  latanoprost 0.005% Ophthalmic Solution 1 Drop(s) Both EYES at bedtime  levothyroxine 25 MICROGram(s) Oral daily  metolazone 2.5 milliGRAM(s) Oral daily  metoprolol succinate ER 25 milliGRAM(s) Oral daily  Nephro-madiha 1 Tablet(s) Oral daily  ondansetron Injectable 4 milliGRAM(s) IV Push once  PARoxetine 20 milliGRAM(s) Oral daily  pregabalin 75 milliGRAM(s) Oral daily  senna 2 Tablet(s) Oral at bedtime  timolol 0.5% Solution 1 Drop(s) Both EYES daily    PRN Inpatient Medications  acetaminophen   Tablet 650 milliGRAM(s) Oral every 6 hours PRN  acetaminophen   Tablet. 650 milliGRAM(s) Oral every 6 hours PRN  dextrose Gel 1 Dose(s) Oral once PRN  glucagon  Injectable 1 milliGRAM(s) IntraMuscular once PRN  oxyCODONE    IR 5 milliGRAM(s) Oral every 6 hours PRN      REVIEW OF SYSTEMS  --------------------------------------------------------------------------------  General: no fever  CVS: no chest pain  RESP: no sob, no cough  ABD: no abdominal pain  : no dysuria,  MSK: no edema     VITALS/PHYSICAL EXAM  --------------------------------------------------------------------------------  T(C): 37 (01-03-18 @ 04:06), Max: 37.3 (01-02-18 @ 20:40)  HR: 96 (01-03-18 @ 05:45) (69 - 96)  BP: 149/81 (01-03-18 @ 05:45) (126/74 - 164/89)  RR: 18 (01-03-18 @ 04:06) (16 - 18)  SpO2: 98% (01-03-18 @ 04:06) (98% - 100%)  Wt(kg): --        01-02-18 @ 07:01  -  01-03-18 @ 07:00  --------------------------------------------------------  IN: 120 mL / OUT: 0 mL / NET: 120 mL      Physical Exam:  	Gen: NAD, well-appearing  	HEENT: PERRL, supple neck, clear oropharynx  	Pulm: CTA B/L  	CV: RRR, S1S2; no rub  	Abd: +BS, soft, nontender/nondistended  	LE:  no edema, + weakness  	Psych: Normal affect and mood  	Skin: Warm, without rashes  	Vascular access: Left brachiocephalic AVF with thrill and bruit      LABS/STUDIES  --------------------------------------------------------------------------------              9.5    4.1   >-----------<  76       [01-03-18 @ 06:35]              27.3     127  |  92  |  79  ----------------------------<  77      [01-03-18 @ 06:35]  5.1   |  21  |  8.71        Ca     8.8     [01-03-18 @ 06:35]      Mg     2.4     [01-02-18 @ 20:52]      Phos  2.8     [01-02-18 @ 20:52]    TPro  8.0  /  Alb  2.3  /  TBili  0.5  /  DBili  x   /  AST  19  /  ALT  <5  /  AlkPhos  70  [01-02-18 @ 15:22]        Troponin 0.09      [01-01-18 @ 20:34]  CK 32      [01-01-18 @ 20:34]    Creatinine Trend:  SCr 8.71 [01-03 @ 06:35]  SCr 8.03 [01-02 @ 20:52]  SCr 8.21 [01-02 @ 15:22]  SCr 7.95 [01-02 @ 07:16]  SCr 7.54 [01-01 @ 20:34]        Iron 77, TIBC 90, %sat 86      [05-29-17 @ 09:39]  Ferritin 781.0      [05-29-17 @ 09:39]  PTH -- (Ca 7.5)      [06-01-17 @ 08:12]   253  Vitamin D (25OH) <4.0      [05-31-17 @ 08:52]  HbA1c 4.7      [12-20-17 @ 07:23]  TSH 8.03      [12-23-17 @ 08:00]  Lipid: chol 227, , HDL 47,       [05-30-17 @ 07:28]    HBsAb Reactive      [12-23-17 @ 08:00]  HBsAg Nonreact      [12-23-17 @ 08:00]  HBcAb Nonreact      [12-23-17 @ 08:00]  HCV 0.18, Nonreact      [12-23-17 @ 08:00]    SULLY: titer 1:1280, pattern Homogeneous      [12-23-17 @ 08:00]  dsDNA 70      [12-23-17 @ 08:00]  C3 Complement 36      [12-23-17 @ 08:00]  C4 Complement 11      [12-23-17 @ 08:00]  ANCA: cANCA Negative, pANCA Negative, atypical ANCA Indeterminate Method interference due to SULLY fluorescence      [12-23-17 @ 08:00]  Immunofixation Serum:   Weak  IgG Kappa Band Identified    Reference Range: None Detected      [12-23-17 @ 08:00]  SPEP Interpretation: Weak Gamma-Migrating Paraprotein Identified      [12-23-17 @ 08:00]

## 2018-01-03 NOTE — CONSULT NOTE ADULT - PROBLEM SELECTOR PROBLEM 1
NSVT (nonsustained ventricular tachycardia)
ESRD (end stage renal disease) on dialysis
NSVT (nonsustained ventricular tachycardia)
Neuropathy involving both lower extremities
Vaginal bleeding
Weakness of both lower extremities

## 2018-01-04 DIAGNOSIS — Z00.00 ENCOUNTER FOR GENERAL ADULT MEDICAL EXAMINATION WITHOUT ABNORMAL FINDINGS: ICD-10-CM

## 2018-01-04 LAB
ANION GAP SERPL CALC-SCNC: 10 MMOL/L — SIGNIFICANT CHANGE UP (ref 5–17)
BUN SERPL-MCNC: 27 MG/DL — HIGH (ref 7–23)
CALCIUM SERPL-MCNC: 8.1 MG/DL — LOW (ref 8.4–10.5)
CALCIUM SERPL-MCNC: 8.4 MG/DL — SIGNIFICANT CHANGE UP (ref 8.4–10.5)
CHLORIDE SERPL-SCNC: 95 MMOL/L — LOW (ref 96–108)
CO2 SERPL-SCNC: 25 MMOL/L — SIGNIFICANT CHANGE UP (ref 22–31)
CREAT SERPL-MCNC: 4.21 MG/DL — HIGH (ref 0.5–1.3)
GLUCOSE BLDC GLUCOMTR-MCNC: 100 MG/DL — HIGH (ref 70–99)
GLUCOSE BLDC GLUCOMTR-MCNC: 116 MG/DL — HIGH (ref 70–99)
GLUCOSE BLDC GLUCOMTR-MCNC: 116 MG/DL — HIGH (ref 70–99)
GLUCOSE BLDC GLUCOMTR-MCNC: 85 MG/DL — SIGNIFICANT CHANGE UP (ref 70–99)
GLUCOSE BLDC GLUCOMTR-MCNC: 91 MG/DL — SIGNIFICANT CHANGE UP (ref 70–99)
GLUCOSE SERPL-MCNC: 84 MG/DL — SIGNIFICANT CHANGE UP (ref 70–99)
HCT VFR BLD CALC: 24.7 % — LOW (ref 34.5–45)
HGB BLD-MCNC: 8.5 G/DL — LOW (ref 11.5–15.5)
MAGNESIUM SERPL-MCNC: 2 MG/DL — SIGNIFICANT CHANGE UP (ref 1.6–2.6)
MCHC RBC-ENTMCNC: 31.6 PG — SIGNIFICANT CHANGE UP (ref 27–34)
MCHC RBC-ENTMCNC: 34.5 GM/DL — SIGNIFICANT CHANGE UP (ref 32–36)
MCV RBC AUTO: 91.7 FL — SIGNIFICANT CHANGE UP (ref 80–100)
PHOSPHATE SERPL-MCNC: 2.3 MG/DL — LOW (ref 2.5–4.5)
PLATELET # BLD AUTO: 80 K/UL — LOW (ref 150–400)
POTASSIUM SERPL-MCNC: 3.9 MMOL/L — SIGNIFICANT CHANGE UP (ref 3.5–5.3)
POTASSIUM SERPL-SCNC: 3.9 MMOL/L — SIGNIFICANT CHANGE UP (ref 3.5–5.3)
PTH-INTACT FLD-MCNC: 65 PG/ML — SIGNIFICANT CHANGE UP (ref 15–65)
RBC # BLD: 2.69 M/UL — LOW (ref 3.8–5.2)
RBC # FLD: 12.9 % — SIGNIFICANT CHANGE UP (ref 10.3–14.5)
SODIUM SERPL-SCNC: 130 MMOL/L — LOW (ref 135–145)
WBC # BLD: 3.6 K/UL — LOW (ref 3.8–10.5)
WBC # FLD AUTO: 3.6 K/UL — LOW (ref 3.8–10.5)

## 2018-01-04 PROCEDURE — 90935 HEMODIALYSIS ONE EVALUATION: CPT | Mod: GC

## 2018-01-04 PROCEDURE — 99233 SBSQ HOSP IP/OBS HIGH 50: CPT

## 2018-01-04 RX ORDER — ERYTHROPOIETIN 10000 [IU]/ML
3000 INJECTION, SOLUTION INTRAVENOUS; SUBCUTANEOUS ONCE
Qty: 0 | Refills: 0 | Status: COMPLETED | OUTPATIENT
Start: 2018-01-04 | End: 2018-01-04

## 2018-01-04 RX ADMIN — Medication 650 MILLIGRAM(S): at 17:00

## 2018-01-04 RX ADMIN — ATORVASTATIN CALCIUM 40 MILLIGRAM(S): 80 TABLET, FILM COATED ORAL at 21:27

## 2018-01-04 RX ADMIN — Medication 25 MILLIGRAM(S): at 21:27

## 2018-01-04 RX ADMIN — Medication 100 MILLIGRAM(S): at 05:56

## 2018-01-04 RX ADMIN — LATANOPROST 1 DROP(S): 0.05 SOLUTION/ DROPS OPHTHALMIC; TOPICAL at 21:27

## 2018-01-04 RX ADMIN — Medication 75 MILLIGRAM(S): at 13:26

## 2018-01-04 RX ADMIN — ERYTHROPOIETIN 3000 UNIT(S): 10000 INJECTION, SOLUTION INTRAVENOUS; SUBCUTANEOUS at 11:41

## 2018-01-04 RX ADMIN — ATORVASTATIN CALCIUM 40 MILLIGRAM(S): 80 TABLET, FILM COATED ORAL at 01:25

## 2018-01-04 RX ADMIN — Medication 20 MILLIGRAM(S): at 13:26

## 2018-01-04 RX ADMIN — Medication 80 MILLIGRAM(S): at 05:56

## 2018-01-04 RX ADMIN — Medication 25 MICROGRAM(S): at 05:56

## 2018-01-04 RX ADMIN — Medication 1 DROP(S): at 13:32

## 2018-01-04 RX ADMIN — Medication 667 MILLIGRAM(S): at 18:34

## 2018-01-04 RX ADMIN — OXYCODONE HYDROCHLORIDE 5 MILLIGRAM(S): 5 TABLET ORAL at 11:27

## 2018-01-04 RX ADMIN — Medication 25 MILLIGRAM(S): at 01:26

## 2018-01-04 RX ADMIN — Medication 667 MILLIGRAM(S): at 08:00

## 2018-01-04 RX ADMIN — LATANOPROST 1 DROP(S): 0.05 SOLUTION/ DROPS OPHTHALMIC; TOPICAL at 01:25

## 2018-01-04 RX ADMIN — Medication 1 TABLET(S): at 13:26

## 2018-01-04 RX ADMIN — OXYCODONE HYDROCHLORIDE 5 MILLIGRAM(S): 5 TABLET ORAL at 18:38

## 2018-01-04 RX ADMIN — OXYCODONE HYDROCHLORIDE 5 MILLIGRAM(S): 5 TABLET ORAL at 12:00

## 2018-01-04 RX ADMIN — Medication 667 MILLIGRAM(S): at 13:26

## 2018-01-04 RX ADMIN — OXYCODONE HYDROCHLORIDE 5 MILLIGRAM(S): 5 TABLET ORAL at 19:38

## 2018-01-04 RX ADMIN — Medication 81 MILLIGRAM(S): at 13:26

## 2018-01-04 RX ADMIN — Medication 5 MILLIGRAM(S): at 01:25

## 2018-01-04 RX ADMIN — Medication 650 MILLIGRAM(S): at 16:29

## 2018-01-04 NOTE — PROGRESS NOTE ADULT - ATTENDING COMMENTS
ESRD on HD. Seen on dialysis  Hd a treatment yesterday s/p tunnel cath placement  Epo for anemia  HD today and back on schedule tomorrow if still here  Can stop diuretics for now that she is back on HD  Hold phoslo and tums because of hypophosphatemia  D/c planning with her outpt HD    Amy Carrillo MD  [phone] ESRD on HD. Seen on dialysis  Hd a treatment yesterday s/p tunnel cath placement  Epo for anemia  HD today and back on schedule tomorrow if still here  Can stop metolazone and maintain furosemide on off days  Hold phoslo and tums because of hypophosphatemia  D/c planning per primary team    Amy Carrillo MD  [phone]

## 2018-01-04 NOTE — PROGRESS NOTE ADULT - ASSESSMENT
Patient is a 57 year old female with past medical history of HTN, HLD, DMT2, hypothyroidism, SLE(diagnosed in April), lupus nephritis, ESRD on HD s/p AVF (not matured)  presented to ED for bilateral lower extremity numbness. EP consult was called for episode of 8- 13 seconds of NSVT and noted to have + troponin 0.09.

## 2018-01-04 NOTE — PROGRESS NOTE ADULT - PROBLEM SELECTOR PLAN 1
TELE: SR, heart rate 60- 80's, no events overnight  Continue with telemetry monitoring  Continue with beta blocker, Toprol 25mg QD  may uptitrate as BP can tolerate  May 2017, ECHO revealed EF 75%  Cardiac cath re- scheduled for 1/5 due to inclement weather  Maintain K+ 4.0 and Mag 2.0 and supplement as needed.

## 2018-01-04 NOTE — PROGRESS NOTE ADULT - PROBLEM SELECTOR PLAN 2
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 Positive serology for Lupus,  neuro following. s/p Immunoglobulin infusion.  Appreciate Toxicology consult for cadmium level-per them no evidence of heavy metal exposure by imaging and no other symptoms, does not require chelation.   MRI lumbar, C spine show no cord compression.    Continue with Lyrica- renally dosed.   PT eval- recommend JESSICA placement.   Continue to hold Plaquenil.   s/p Sural nerve biopsy. Follow up results.   Discussed with neuro Dr Packer.  Does not suspect GBS and no further IVIG at this time.  Awaiting sural n. biospy results for further treatment plans.

## 2018-01-04 NOTE — PROGRESS NOTE ADULT - ASSESSMENT
58 yo f with h/o lupus diagnosed in April, lupus nephritis, ESRD on HD via permacath (M, W, F) s/p AVF (not matured), DM2, HTN, HLD, hypothyroid presented with 3 weeks h/o bilateral LE pain and numbness presumed polyneuropathy course c/b lost HD access s/p permacath by IR 1/3/18 and NSVT awaiting cath.

## 2018-01-04 NOTE — PROGRESS NOTE ADULT - PROBLEM SELECTOR PLAN 6
Plaquenil on hold in setting of LE weakness as outpt.   Rheum following: awaiting sural biopsy results

## 2018-01-04 NOTE — PROGRESS NOTE ADULT - SUBJECTIVE AND OBJECTIVE BOX
INTERVAL HPI/OVERNIGHT EVENTS: Pt resting comforably iwth family at the bedside. No events overnight, tele: SR with heart rate 60- 80's    MEDICATIONS  (STANDING):  aspirin enteric coated 81 milliGRAM(s) Oral daily  atorvastatin 40 milliGRAM(s) Oral at bedtime  bisacodyl 5 milliGRAM(s) Oral at bedtime  calcium acetate 667 milliGRAM(s) Oral three times a day with meals  calcium carbonate 1250 mG (OsCal) 1 Tablet(s) Oral daily  dextrose 5%. 1000 milliLiter(s) (50 mL/Hr) IV Continuous <Continuous>  dextrose 50% Injectable 12.5 Gram(s) IV Push once  dextrose 50% Injectable 25 Gram(s) IV Push once  dextrose 50% Injectable 25 Gram(s) IV Push once  dextrose 50% Injectable 12.5 Gram(s) IV Push once  docusate sodium 100 milliGRAM(s) Oral two times a day  doxercalciferol Injectable 2 MICROGram(s) IV Push <User Schedule>  furosemide    Tablet 80 milliGRAM(s) Oral <User Schedule>  insulin lispro (HumaLOG) corrective regimen sliding scale   SubCutaneous three times a day before meals  insulin lispro (HumaLOG) corrective regimen sliding scale   SubCutaneous at bedtime  latanoprost 0.005% Ophthalmic Solution 1 Drop(s) Both EYES at bedtime  levothyroxine 25 MICROGram(s) Oral daily  metoprolol succinate ER 25 milliGRAM(s) Oral daily  Nephro-madiha 1 Tablet(s) Oral daily  ondansetron Injectable 4 milliGRAM(s) IV Push once  PARoxetine 20 milliGRAM(s) Oral daily  senna 2 Tablet(s) Oral at bedtime  timolol 0.5% Solution 1 Drop(s) Both EYES daily    MEDICATIONS  (PRN):  acetaminophen   Tablet 650 milliGRAM(s) Oral every 6 hours PRN For Temp greater than 38 C (100.4 F)  acetaminophen   Tablet. 650 milliGRAM(s) Oral every 6 hours PRN Mild Pain (1 - 3)  dextrose Gel 1 Dose(s) Oral once PRN Blood Glucose LESS THAN 70 milliGRAM(s)/deciliter  glucagon  Injectable 1 milliGRAM(s) IntraMuscular once PRN Glucose LESS THAN 70 milligrams/deciliter  oxyCODONE    IR 5 milliGRAM(s) Oral every 6 hours PRN Moderate Pain (4 - 6)      Allergies    penicillin (Rash)    Intolerances      ROS:  General: Pt denies fever/chills  Cardiovascular: denies chest pain/palpitations/leg edema  Respiratory and Thorax: denies SOB/cough/wheezing  Gastrointestinal: denies abdominal pain/diarrhea/constipation/bloody stool  Musculoskeletal: denies joint pain or swelling, denies restricted motion  Skin: denies rashes/sores      Vital Signs Last 24 Hrs  T(C): 36.7 (04 Jan 2018 13:05), Max: 37.3 (04 Jan 2018 04:12)  T(F): 98.1 (04 Jan 2018 13:05), Max: 99.2 (04 Jan 2018 04:12)  HR: 79 (04 Jan 2018 13:05) (68 - 80)  BP: 152/70 (04 Jan 2018 13:05) (120/59 - 173/88)  BP(mean): --  RR: 18 (04 Jan 2018 13:05) (16 - 19)  SpO2: 100% (04 Jan 2018 13:05) (99% - 100%)    Physical Exam:  Neurological: Alert & Oriented x 3  Respiratory: CTA B/L, No wheezing/crackles/rhonchi  Cardiovascular: (+) S1 & S2, RRR, No m/r/g  Gastrointestinal: soft, NT, nondistended, (+) BS  Extremities: No pedal edema, No clubbing, No cyanosis  Skin:  normal skin color and pigmentation, no skin lesions            LABS:                        8.5    3.6   )-----------( 80       ( 04 Jan 2018 10:40 )             24.7     01-04    130<L>  |  95<L>  |  27<H>  ----------------------------<  84  3.9   |  25  |  4.21<H>    Ca    8.4      04 Jan 2018 06:37  Phos  2.3     01-04  Mg     2.0     01-04            RADIOLOGY & ADDITIONAL TESTS:    TELE:

## 2018-01-04 NOTE — PROGRESS NOTE ADULT - PROBLEM SELECTOR PLAN 1
continue tele (overnight sinus 60-80)   f/u with cardiology re: cath timing.  Cancelled today due to blizzard

## 2018-01-04 NOTE — PROGRESS NOTE ADULT - SUBJECTIVE AND OBJECTIVE BOX
Patient is a 57y old  Female who presents with a chief complaint of LE pain and inability to walk (19 Dec 2017 10:27)      SUBJECTIVE / OVERNIGHT EVENTS:  Reports pain is 6/10, relieved by medications  No cp/sob    MEDICATIONS  (STANDING):  aspirin enteric coated 81 milliGRAM(s) Oral daily  atorvastatin 40 milliGRAM(s) Oral at bedtime  bisacodyl 5 milliGRAM(s) Oral at bedtime  calcium acetate 667 milliGRAM(s) Oral three times a day with meals  calcium carbonate 1250 mG (OsCal) 1 Tablet(s) Oral daily  dextrose 5%. 1000 milliLiter(s) (50 mL/Hr) IV Continuous <Continuous>  dextrose 50% Injectable 12.5 Gram(s) IV Push once  dextrose 50% Injectable 25 Gram(s) IV Push once  dextrose 50% Injectable 25 Gram(s) IV Push once  dextrose 50% Injectable 12.5 Gram(s) IV Push once  docusate sodium 100 milliGRAM(s) Oral two times a day  doxercalciferol Injectable 2 MICROGram(s) IV Push <User Schedule>  furosemide    Tablet 80 milliGRAM(s) Oral <User Schedule>  insulin lispro (HumaLOG) corrective regimen sliding scale   SubCutaneous three times a day before meals  insulin lispro (HumaLOG) corrective regimen sliding scale   SubCutaneous at bedtime  latanoprost 0.005% Ophthalmic Solution 1 Drop(s) Both EYES at bedtime  levothyroxine 25 MICROGram(s) Oral daily  metolazone 2.5 milliGRAM(s) Oral daily  metoprolol succinate ER 25 milliGRAM(s) Oral daily  Nephro-madiha 1 Tablet(s) Oral daily  ondansetron Injectable 4 milliGRAM(s) IV Push once  PARoxetine 20 milliGRAM(s) Oral daily  pregabalin 75 milliGRAM(s) Oral daily  senna 2 Tablet(s) Oral at bedtime  timolol 0.5% Solution 1 Drop(s) Both EYES daily    MEDICATIONS  (PRN):  acetaminophen   Tablet 650 milliGRAM(s) Oral every 6 hours PRN For Temp greater than 38 C (100.4 F)  acetaminophen   Tablet. 650 milliGRAM(s) Oral every 6 hours PRN Mild Pain (1 - 3)  dextrose Gel 1 Dose(s) Oral once PRN Blood Glucose LESS THAN 70 milliGRAM(s)/deciliter  glucagon  Injectable 1 milliGRAM(s) IntraMuscular once PRN Glucose LESS THAN 70 milligrams/deciliter  oxyCODONE    IR 5 milliGRAM(s) Oral every 6 hours PRN Moderate Pain (4 - 6)      Vital Signs Last 24 Hrs  T(C): 36.7 (04 Jan 2018 13:05), Max: 37.3 (04 Jan 2018 04:12)  T(F): 98.1 (04 Jan 2018 13:05), Max: 99.2 (04 Jan 2018 04:12)  HR: 79 (04 Jan 2018 13:05) (68 - 80)  BP: 152/70 (04 Jan 2018 13:05) (120/59 - 173/88)  BP(mean): --  RR: 18 (04 Jan 2018 13:05) (16 - 19)  SpO2: 100% (04 Jan 2018 13:05) (99% - 100%)  CAPILLARY BLOOD GLUCOSE      POCT Blood Glucose.: 85 mg/dL (04 Jan 2018 08:47)  POCT Blood Glucose.: 91 mg/dL (04 Jan 2018 01:29)  POCT Blood Glucose.: 73 mg/dL (03 Jan 2018 23:14)  POCT Blood Glucose.: 118 mg/dL (03 Jan 2018 19:08)  POCT Blood Glucose.: 99 mg/dL (03 Jan 2018 18:38)  POCT Blood Glucose.: 75 mg/dL (03 Jan 2018 17:54)  POCT Blood Glucose.: 74 mg/dL (03 Jan 2018 16:57)  POCT Blood Glucose.: 73 mg/dL (03 Jan 2018 15:14)  POCT Blood Glucose.: 133 mg/dL (03 Jan 2018 13:27)    I&O's Summary    03 Jan 2018 07:01  -  04 Jan 2018 07:00  --------------------------------------------------------  IN: 740 mL / OUT: 1000 mL / NET: -260 mL    04 Jan 2018 07:01  -  04 Jan 2018 13:20  --------------------------------------------------------  IN: 0 mL / OUT: 500 mL / NET: -500 mL        PHYSICAL EXAM:  GENERAL: NAD, well-developed  HEAD:  Atraumatic, Normocephalic  EYES: EOMI, conjunctiva and sclera clear  NECK: Supple, No JVD  CHEST/LUNG: Clear to auscultation bilaterally; No wheeze  HEART: Regular rate and rhythm; No murmurs, rubs, or gallops  ABDOMEN: Soft, Nontender, Nondistended; Bowel sounds present  EXTREMITIES:  2+ Peripheral Pulses, No clubbing, cyanosis, or edema  Lt AVF present   PSYCH: AAOx3  NEUROLOGY: CN's intact, strength 3/5 in UE's and 4/5 in LE's  SKIN: No rashes or lesions    LABS:                        8.5    3.6   )-----------( 80       ( 04 Jan 2018 10:40 )             24.7     01-04    130<L>  |  95<L>  |  27<H>  ----------------------------<  84  3.9   |  25  |  4.21<H>    Ca    8.4      04 Jan 2018 06:37  Phos  2.3     01-04  Mg     2.0     01-04    TPro  8.0  /  Alb  2.3<L>  /  TBili  0.5  /  DBili  x   /  AST  19  /  ALT  <5<L>  /  AlkPhos  70  01-02              RADIOLOGY & ADDITIONAL TESTS:    Imaging Personally Reviewed:    Consultant(s) Notes Reviewed:      Care Discussed with Consultants/Other Providers: Dr Laird (neurology)

## 2018-01-04 NOTE — PROGRESS NOTE ADULT - PROBLEM SELECTOR PLAN 3
likely from fluid overload, monitor levels. Continue with Lasix and Zaroxylyn per renal  s/p HD yesterday and today

## 2018-01-04 NOTE — PROGRESS NOTE ADULT - SUBJECTIVE AND OBJECTIVE BOX
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 Pt seen and examined at bedside. Pt with dislodged tunneled catheter. Pt s/p tunneled catheter on 1/3/2018  by IR. PT was dialyzed yesterday tolerated well. Pt denies sob, chest pain.    PAST HISTORY  --------------------------------------------------------------------------------  No significant changes to PMH, PSH, FHx, SHx, unless otherwise noted    ALLERGIES & MEDICATIONS  --------------------------------------------------------------------------------  Allergies    penicillin (Rash)    Intolerances      Standing Inpatient Medications  aspirin enteric coated 81 milliGRAM(s) Oral daily  atorvastatin 40 milliGRAM(s) Oral at bedtime  bisacodyl 5 milliGRAM(s) Oral at bedtime  calcium acetate 667 milliGRAM(s) Oral three times a day with meals  calcium carbonate 1250 mG (OsCal) 1 Tablet(s) Oral daily  dextrose 5%. 1000 milliLiter(s) IV Continuous <Continuous>  dextrose 50% Injectable 12.5 Gram(s) IV Push once  dextrose 50% Injectable 25 Gram(s) IV Push once  dextrose 50% Injectable 25 Gram(s) IV Push once  dextrose 50% Injectable 12.5 Gram(s) IV Push once  docusate sodium 100 milliGRAM(s) Oral two times a day  doxercalciferol Injectable 2 MICROGram(s) IV Push <User Schedule>  epoetin adele Injectable 3000 Unit(s) IV Push once  furosemide    Tablet 80 milliGRAM(s) Oral <User Schedule>  insulin lispro (HumaLOG) corrective regimen sliding scale   SubCutaneous three times a day before meals  insulin lispro (HumaLOG) corrective regimen sliding scale   SubCutaneous at bedtime  latanoprost 0.005% Ophthalmic Solution 1 Drop(s) Both EYES at bedtime  levothyroxine 25 MICROGram(s) Oral daily  metolazone 2.5 milliGRAM(s) Oral daily  metoprolol succinate ER 25 milliGRAM(s) Oral daily  Nephro-madiha 1 Tablet(s) Oral daily  ondansetron Injectable 4 milliGRAM(s) IV Push once  PARoxetine 20 milliGRAM(s) Oral daily  pregabalin 75 milliGRAM(s) Oral daily  senna 2 Tablet(s) Oral at bedtime  timolol 0.5% Solution 1 Drop(s) Both EYES daily    PRN Inpatient Medications  acetaminophen   Tablet 650 milliGRAM(s) Oral every 6 hours PRN  acetaminophen   Tablet. 650 milliGRAM(s) Oral every 6 hours PRN  dextrose Gel 1 Dose(s) Oral once PRN  glucagon  Injectable 1 milliGRAM(s) IntraMuscular once PRN  oxyCODONE    IR 5 milliGRAM(s) Oral every 6 hours PRN      REVIEW OF SYSTEMS  --------------------------------------------------------------------------------  General: no fever  CVS: no chest pain  RESP: no sob, no cough  ABD: no abdominal pain  : no dysuria,  MSK: no edema     VITALS/PHYSICAL EXAM  --------------------------------------------------------------------------------  T(C): 37.3 (01-04-18 @ 04:12), Max: 37.3 (01-04-18 @ 04:12)  HR: 77 (01-04-18 @ 05:54) (66 - 80)  BP: 173/88 (01-04-18 @ 05:54) (120/59 - 173/88)  RR: 19 (01-04-18 @ 04:12) (16 - 19)  SpO2: 99% (01-04-18 @ 04:12) (99% - 100%)  Wt(kg): --        01-03-18 @ 07:01  -  01-04-18 @ 07:00  --------------------------------------------------------  IN: 740 mL / OUT: 1000 mL / NET: -260 mL      Physical Exam:  	  Gen: NAD, well-appearing  	HEENT: PERRL, supple neck, clear oropharynx  	Pulm: CTA B/L  	CV: RRR, S1S2; no rub  	Abd: +BS, soft, nontender/nondistended  	LE:  no edema, + weakness  	Psych: Normal affect and mood  	Skin: Warm, without rashes  	Vascular access: Left brachiocephalic AVF with thrill and bruit, Right IJ tunneled HD catheter       LABS/STUDIES  --------------------------------------------------------------------------------              9.5    4.1   >-----------<  76       [01-03-18 @ 06:35]              27.3     130  |  95  |  27  ----------------------------<  84      [01-04-18 @ 06:37]  3.9   |  25  |  4.21        Ca     8.4     [01-04-18 @ 06:37]      Mg     2.0     [01-04-18 @ 06:37]      Phos  2.3     [01-04-18 @ 06:37]    TPro  8.0  /  Alb  2.3  /  TBili  0.5  /  DBili  x   /  AST  19  /  ALT  <5  /  AlkPhos  70  [01-02-18 @ 15:22]          Creatinine Trend:  SCr 4.21 [01-04 @ 06:37]  SCr 8.71 [01-03 @ 06:35]  SCr 8.03 [01-02 @ 20:52]  SCr 8.21 [01-02 @ 15:22]  SCr 7.95 [01-02 @ 07:16]        Iron 77, TIBC 90, %sat 86      [05-29-17 @ 09:39]  Ferritin 781.0      [05-29-17 @ 09:39]  PTH -- (Ca 7.5)      [06-01-17 @ 08:12]   253  Vitamin D (25OH) <4.0      [05-31-17 @ 08:52]  HbA1c 4.7      [12-20-17 @ 07:23]  TSH 8.03      [12-23-17 @ 08:00]  Lipid: chol 227, , HDL 47,       [05-30-17 @ 07:28]    HBsAb Reactive      [12-23-17 @ 08:00]  HBsAg Nonreact      [12-23-17 @ 08:00]  HBcAb Nonreact      [12-23-17 @ 08:00]  HCV 0.18, Nonreact      [12-23-17 @ 08:00]    SULLY: titer 1:1280, pattern Homogeneous      [12-23-17 @ 08:00]  dsDNA 70      [12-23-17 @ 08:00]  C3 Complement 36      [12-23-17 @ 08:00]  C4 Complement 11      [12-23-17 @ 08:00]  ANCA: cANCA Negative, pANCA Negative, atypical ANCA Indeterminate Method interference due to SULLY fluorescence      [12-23-17 @ 08:00]  Immunofixation Serum:   Weak  IgG Kappa Band Identified    Reference Range: None Detected      [12-23-17 @ 08:00]  SPEP Interpretation: Weak Gamma-Migrating Paraprotein Identified      [12-23-17 @ 08:00]

## 2018-01-05 ENCOUNTER — TRANSCRIPTION ENCOUNTER (OUTPATIENT)
Age: 58
End: 2018-01-05

## 2018-01-05 LAB
ANION GAP SERPL CALC-SCNC: 8 MMOL/L — SIGNIFICANT CHANGE UP (ref 5–17)
APPEARANCE UR: ABNORMAL
BACTERIA # UR AUTO: ABNORMAL /HPF
BASOPHILS # BLD AUTO: 0 K/UL — SIGNIFICANT CHANGE UP (ref 0–0.2)
BASOPHILS NFR BLD AUTO: 0.3 % — SIGNIFICANT CHANGE UP (ref 0–2)
BILIRUB UR-MCNC: NEGATIVE — SIGNIFICANT CHANGE UP
BUN SERPL-MCNC: 16 MG/DL — SIGNIFICANT CHANGE UP (ref 7–23)
CALCIUM SERPL-MCNC: 8.5 MG/DL — SIGNIFICANT CHANGE UP (ref 8.4–10.5)
CHLORIDE SERPL-SCNC: 99 MMOL/L — SIGNIFICANT CHANGE UP (ref 96–108)
CO2 SERPL-SCNC: 29 MMOL/L — SIGNIFICANT CHANGE UP (ref 22–31)
COLOR SPEC: ABNORMAL
CREAT SERPL-MCNC: 3.37 MG/DL — HIGH (ref 0.5–1.3)
DIFF PNL FLD: ABNORMAL
EOSINOPHIL # BLD AUTO: 0 K/UL — SIGNIFICANT CHANGE UP (ref 0–0.5)
EOSINOPHIL NFR BLD AUTO: 1.2 % — SIGNIFICANT CHANGE UP (ref 0–6)
GLUCOSE BLDC GLUCOMTR-MCNC: 117 MG/DL — HIGH (ref 70–99)
GLUCOSE BLDC GLUCOMTR-MCNC: 76 MG/DL — SIGNIFICANT CHANGE UP (ref 70–99)
GLUCOSE BLDC GLUCOMTR-MCNC: 79 MG/DL — SIGNIFICANT CHANGE UP (ref 70–99)
GLUCOSE BLDC GLUCOMTR-MCNC: 88 MG/DL — SIGNIFICANT CHANGE UP (ref 70–99)
GLUCOSE BLDC GLUCOMTR-MCNC: 89 MG/DL — SIGNIFICANT CHANGE UP (ref 70–99)
GLUCOSE SERPL-MCNC: 78 MG/DL — SIGNIFICANT CHANGE UP (ref 70–99)
GLUCOSE UR QL: NEGATIVE — SIGNIFICANT CHANGE UP
HCT VFR BLD CALC: 26.2 % — LOW (ref 34.5–45)
HGB BLD-MCNC: 8.9 G/DL — LOW (ref 11.5–15.5)
KETONES UR-MCNC: NEGATIVE — SIGNIFICANT CHANGE UP
LEUKOCYTE ESTERASE UR-ACNC: ABNORMAL
LYMPHOCYTES # BLD AUTO: 1.3 K/UL — SIGNIFICANT CHANGE UP (ref 1–3.3)
LYMPHOCYTES # BLD AUTO: 36.7 % — SIGNIFICANT CHANGE UP (ref 13–44)
MAGNESIUM SERPL-MCNC: 2.1 MG/DL — SIGNIFICANT CHANGE UP (ref 1.6–2.6)
MCHC RBC-ENTMCNC: 31.6 PG — SIGNIFICANT CHANGE UP (ref 27–34)
MCHC RBC-ENTMCNC: 34 GM/DL — SIGNIFICANT CHANGE UP (ref 32–36)
MCV RBC AUTO: 92.9 FL — SIGNIFICANT CHANGE UP (ref 80–100)
MONOCYTES # BLD AUTO: 0.7 K/UL — SIGNIFICANT CHANGE UP (ref 0–0.9)
MONOCYTES NFR BLD AUTO: 19.3 % — HIGH (ref 2–14)
NEUTROPHILS # BLD AUTO: 1.5 K/UL — LOW (ref 1.8–7.4)
NEUTROPHILS NFR BLD AUTO: 42.6 % — LOW (ref 43–77)
NITRITE UR-MCNC: NEGATIVE — SIGNIFICANT CHANGE UP
PH UR: 7 — SIGNIFICANT CHANGE UP (ref 5–8)
PHOSPHATE SERPL-MCNC: 1.7 MG/DL — LOW (ref 2.5–4.5)
PLAT MORPH BLD: NORMAL — SIGNIFICANT CHANGE UP
PLATELET # BLD AUTO: 76 K/UL — LOW (ref 150–400)
POTASSIUM SERPL-MCNC: 3.7 MMOL/L — SIGNIFICANT CHANGE UP (ref 3.5–5.3)
POTASSIUM SERPL-SCNC: 3.7 MMOL/L — SIGNIFICANT CHANGE UP (ref 3.5–5.3)
PROT UR-MCNC: 150 MG/DL
RBC # BLD: 2.82 M/UL — LOW (ref 3.8–5.2)
RBC # FLD: 13.2 % — SIGNIFICANT CHANGE UP (ref 10.3–14.5)
RBC BLD AUTO: SIGNIFICANT CHANGE UP
RBC CASTS # UR COMP ASSIST: >50 /HPF (ref 0–2)
SODIUM SERPL-SCNC: 136 MMOL/L — SIGNIFICANT CHANGE UP (ref 135–145)
SP GR SPEC: 1.02 — SIGNIFICANT CHANGE UP (ref 1.01–1.02)
SURGICAL PATHOLOGY STUDY: SIGNIFICANT CHANGE UP
UROBILINOGEN FLD QL: NEGATIVE — SIGNIFICANT CHANGE UP
WBC # BLD: 3.4 K/UL — LOW (ref 3.8–10.5)
WBC # FLD AUTO: 3.4 K/UL — LOW (ref 3.8–10.5)
WBC UR QL: >50 /HPF (ref 0–5)

## 2018-01-05 PROCEDURE — 93454 CORONARY ARTERY ANGIO S&I: CPT | Mod: 26,59

## 2018-01-05 PROCEDURE — 99222 1ST HOSP IP/OBS MODERATE 55: CPT | Mod: GC

## 2018-01-05 PROCEDURE — 99232 SBSQ HOSP IP/OBS MODERATE 35: CPT | Mod: 25,GC

## 2018-01-05 PROCEDURE — 93010 ELECTROCARDIOGRAM REPORT: CPT | Mod: 59

## 2018-01-05 PROCEDURE — 99233 SBSQ HOSP IP/OBS HIGH 50: CPT | Mod: 25

## 2018-01-05 PROCEDURE — 99232 SBSQ HOSP IP/OBS MODERATE 35: CPT | Mod: GC

## 2018-01-05 PROCEDURE — 99233 SBSQ HOSP IP/OBS HIGH 50: CPT

## 2018-01-05 PROCEDURE — 92928 PRQ TCAT PLMT NTRAC ST 1 LES: CPT | Mod: RC

## 2018-01-05 RX ORDER — ERTAPENEM SODIUM 1 G/1
INJECTION, POWDER, LYOPHILIZED, FOR SOLUTION INTRAMUSCULAR; INTRAVENOUS
Qty: 0 | Refills: 0 | Status: DISCONTINUED | OUTPATIENT
Start: 2018-01-05 | End: 2018-01-09

## 2018-01-05 RX ORDER — ASPIRIN/CALCIUM CARB/MAGNESIUM 324 MG
81 TABLET ORAL DAILY
Qty: 0 | Refills: 0 | Status: DISCONTINUED | OUTPATIENT
Start: 2018-01-06 | End: 2018-01-16

## 2018-01-05 RX ORDER — CLOPIDOGREL BISULFATE 75 MG/1
75 TABLET, FILM COATED ORAL DAILY
Qty: 0 | Refills: 0 | Status: DISCONTINUED | OUTPATIENT
Start: 2018-01-06 | End: 2018-01-16

## 2018-01-05 RX ORDER — ERTAPENEM SODIUM 1 G/1
500 INJECTION, POWDER, LYOPHILIZED, FOR SOLUTION INTRAMUSCULAR; INTRAVENOUS ONCE
Qty: 0 | Refills: 0 | Status: COMPLETED | OUTPATIENT
Start: 2018-01-05 | End: 2018-01-05

## 2018-01-05 RX ORDER — ERTAPENEM SODIUM 1 G/1
500 INJECTION, POWDER, LYOPHILIZED, FOR SOLUTION INTRAMUSCULAR; INTRAVENOUS EVERY 24 HOURS
Qty: 0 | Refills: 0 | Status: DISCONTINUED | OUTPATIENT
Start: 2018-01-06 | End: 2018-01-09

## 2018-01-05 RX ADMIN — OXYCODONE HYDROCHLORIDE 5 MILLIGRAM(S): 5 TABLET ORAL at 15:20

## 2018-01-05 RX ADMIN — OXYCODONE HYDROCHLORIDE 5 MILLIGRAM(S): 5 TABLET ORAL at 22:31

## 2018-01-05 RX ADMIN — Medication 1 DROP(S): at 14:55

## 2018-01-05 RX ADMIN — OXYCODONE HYDROCHLORIDE 5 MILLIGRAM(S): 5 TABLET ORAL at 15:50

## 2018-01-05 RX ADMIN — Medication 1 TABLET(S): at 14:55

## 2018-01-05 RX ADMIN — Medication 20 MILLIGRAM(S): at 14:55

## 2018-01-05 RX ADMIN — SENNA PLUS 2 TABLET(S): 8.6 TABLET ORAL at 21:31

## 2018-01-05 RX ADMIN — OXYCODONE HYDROCHLORIDE 5 MILLIGRAM(S): 5 TABLET ORAL at 02:39

## 2018-01-05 RX ADMIN — LATANOPROST 1 DROP(S): 0.05 SOLUTION/ DROPS OPHTHALMIC; TOPICAL at 21:31

## 2018-01-05 RX ADMIN — Medication 5 MILLIGRAM(S): at 21:31

## 2018-01-05 RX ADMIN — Medication 25 MICROGRAM(S): at 06:25

## 2018-01-05 RX ADMIN — Medication 100 MILLIGRAM(S): at 18:34

## 2018-01-05 RX ADMIN — Medication 650 MILLIGRAM(S): at 19:27

## 2018-01-05 RX ADMIN — OXYCODONE HYDROCHLORIDE 5 MILLIGRAM(S): 5 TABLET ORAL at 21:31

## 2018-01-05 RX ADMIN — OXYCODONE HYDROCHLORIDE 5 MILLIGRAM(S): 5 TABLET ORAL at 03:39

## 2018-01-05 RX ADMIN — Medication 650 MILLIGRAM(S): at 20:27

## 2018-01-05 RX ADMIN — ERTAPENEM SODIUM 100 MILLIGRAM(S): 1 INJECTION, POWDER, LYOPHILIZED, FOR SOLUTION INTRAMUSCULAR; INTRAVENOUS at 19:26

## 2018-01-05 RX ADMIN — ATORVASTATIN CALCIUM 40 MILLIGRAM(S): 80 TABLET, FILM COATED ORAL at 21:31

## 2018-01-05 RX ADMIN — Medication 25 MILLIGRAM(S): at 21:31

## 2018-01-05 NOTE — DISCHARGE NOTE ADULT - ADDITIONAL INSTRUCTIONS
Follow up with Rheumatologist, Dr. Eve Coughlin in 1 week after discharge from Rehab.  Follow up with Nephrologist, Dr. Huong Jacobs in 1 week.

## 2018-01-05 NOTE — CONSULT NOTE ADULT - ASSESSMENT
56 yo PMH SLE (diagnosed in April) with lupus nephritis, ESRD on HD via permacath (M, W, F) s/p AVF (not matured), DM2 who presented to [facility] on 12/19/17 with 3 weeks of progressive bilateral LE pain and numbness. Underwent workup for the LE weakness including LP and MRI. Ultimately felt to be autoimmune in nature with tentative plan to initiate IV steroids. On day of consultation on 1/5/17 patient went to the cath lab for coronary stent placement. While in the cath lab patient underwent vance placement with thick tan to yellow appearing drainage into the vance collection bag. Appearance of the urine does raise concern for possible fistula and perforation. Would recommend imaging such as cystogram (per Urology) and if unable to proceed with this would recommend CT Abdomen/Pelvis with IV contrast (coordinate with HD). Would start treatment with Invanz to cover for GI maira which may contribute.  --Recommend imaging such as CT cystogram (as per Urology) and if unable to proceed with this would recommend CT Abdomen/Pelvis with IV contrast (coordinate with HD).   --Recommend Ertapenem 500 mg IV Q24H  --Recommend 2 sets of blood cultures  --Recommend urine culture 56 yo PMH SLE (diagnosed in April) with lupus nephritis, ESRD on HD via permacath (M, W, F) s/p AVF (not matured), DM2 who presented to [facility] on 12/19/17 with 3 weeks of progressive bilateral LE pain and numbness. Underwent workup for the LE weakness including LP and MRI. Ultimately felt to be autoimmune in nature with tentative plan to initiate IV steroids. On day of consultation on 1/5/17 patient went to the cath lab for coronary stent placement. While in the cath lab patient underwent vance placement with thick tan to yellow appearing drainage into the vance collection bag. Appearance of the urine does raise concern for possible fistula and perforation. Would recommend imaging such as cystogram (per Urology) and if unable to proceed with this would recommend CT Abdomen/Pelvis with IV contrast (coordinate with HD). Would start treatment with Invanz to cover for GI maira which may contribute.  --Recommend imaging such as CT cystogram (as per Urology) and if unable to proceed with this would recommend CT Abdomen/Pelvis with IV contrast (coordinate with HD).   --Recommend Ertapenem 500 mg IV Q24H  --Recommend 2 sets of blood cultures  --Recommend urine culture  --Recommend holding off of steroids until imaging results return

## 2018-01-05 NOTE — DISCHARGE NOTE ADULT - MEDICATION SUMMARY - MEDICATIONS TO CHANGE
I will SWITCH the dose or number of times a day I take the medications listed below when I get home from the hospital:    hydroxychloroquine 200 mg oral tablet  -- 1 tab(s) by mouth 2 times a day

## 2018-01-05 NOTE — DISCHARGE NOTE ADULT - PLAN OF CARE
Stable. Follow up with PMD when discharged from Rehab.   Continue present medication regimen. Stable Follow up with PMD in 1 week.   Continue present medication regimen. Resolved. Continue present medication regimen. s/p Rituximab.   Follow up with Rheumatologist in 1 week after discharge from Rehab.

## 2018-01-05 NOTE — PROGRESS NOTE ADULT - ASSESSMENT
58 yo f with h/o lupus diagnosed in April, lupus nephritis, ESRD on HD via permacath (M, W, F) s/p AVF (not matured), DM2, HTN, HLD, hypothyroid presented with 3 weeks h/o bilateral LE pain and numbness presumed polyneuropathy course c/b lost HD access s/p permacath by IR 1/3/18 and NSVT awaiting cath today.

## 2018-01-05 NOTE — PROGRESS NOTE ADULT - PROBLEM SELECTOR PLAN 2
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 Positive serology for Lupus,  neuro following. s/p Immunoglobulin infusion.  Appreciate Toxicology consult for cadmium level-per them no evidence of heavy metal exposure by imaging and no other symptoms, does not require chelation.   MRI lumbar, C spine show no cord compression.    Continue with Lyrica- renally dosed.   PT eval- recommend JESSICA placement.   Continue to hold Plaquenil.   s/p Sural nerve biopsy. Follow up results (still pending this morning).   Per neuro no further IVIG at this time.  Awaiting sural n. biospy results for further treatment plans.

## 2018-01-05 NOTE — PROGRESS NOTE ADULT - ASSESSMENT
57 year old woman with ESRD on HD presenting with limb weakness, now s/p sural nerve biopsy received IVIg for possible GBS.

## 2018-01-05 NOTE — CONSULT NOTE ADULT - SUBJECTIVE AND OBJECTIVE BOX
HPI:  58 yo f with h/o lupus diagnosed in April, lupus nephritis, ESRD on HD via permacath (M, W, F) s/p AVF (not matured), DM2, HTN, HLD, hypothyroid presented to [facility] on 17 with 3 weeks of progressive bilateral LE pain and numbness. Patient was seen in ED (2017) with pain diagnosed with neuropathy (neg DVT) and d/c'ed home on oxycodone. Subsequently developed b/l LE numbness and inability to ambulate for past 3 weeks. Now wheelchair dependent. Pain is tingling intermittent. Hydroxychlorquine stopped few days ago by nephrology/rheum for possible medication side effect and told to go to hospital for further eval. Of note, hydroxychlorquine recently start around same time as numbness.     On admission the patient was afebrile, normotensive and not tachycardic. Underwent workup for the LE weakness including LP and MRI. Ultimately felt to be autoimmune in nature with tentative plan to initiate IV steroids. On day of consultation on 17 patient went to the cath lab for coronary stent placement. While in the cath lab patient underwent vance placement with thick tan to yellow appearing drainage into the vance collection bag. The patient did not have a vance catheter prior during this admission. She notes that she makes minimal amount of urine at baseline. She denied dysuria, urinary frequency or hesitancy. She denied flank pain. No chills or fevers. No abdominal pain. No N/V/D. Given concern for possible GI to urinary/renal fistula and in the context of upcoming corticosteroids treatment ID consult was requested.       PAST MEDICAL & SURGICAL HISTORY:  Lupus (systemic lupus erythematosus)  ESRD (end-stage renal disease) due to SLE  Glaucoma  Other hyperlipidemia  Type 2 diabetes mellitus without complication, without long-term current use of insulin  Essential hypertension  Hypercholesteremia  Hypertension  Diabetes  S/P appendectomy  H/O gastric bypass: 2016  S/P  section: times two      Allergies  penicillin (Rash)        ANTIMICROBIALS:      OTHER MEDS: MEDICATIONS  (STANDING):  acetaminophen   Tablet 650 every 6 hours PRN  acetaminophen   Tablet. 650 every 6 hours PRN  atorvastatin 40 at bedtime  bisacodyl 5 at bedtime  dextrose 50% Injectable 12.5 once  dextrose 50% Injectable 25 once  dextrose 50% Injectable 25 once  dextrose 50% Injectable 12.5 once  dextrose Gel 1 once PRN  docusate sodium 100 two times a day  furosemide    Tablet 80 <User Schedule>  glucagon  Injectable 1 once PRN  insulin lispro (HumaLOG) corrective regimen sliding scale  three times a day before meals  insulin lispro (HumaLOG) corrective regimen sliding scale  at bedtime  levothyroxine 25 daily  metoprolol succinate ER 25 daily  ondansetron Injectable 4 once  oxyCODONE    IR 5 every 6 hours PRN  PARoxetine 20 daily  senna 2 at bedtime      SOCIAL HISTORY: No smoking or etoh. [place]oved [place] from [facility] > 30 years ago.     FAMILY HISTORY:  History of hypertension (Sibling): sibling  DM (diabetes mellitus) (Sibling): siblings  History of hypertension: father and mother  DM (diabetes mellitus): mother and father      REVIEW OF SYSTEMS  [  ] ROS unobtainable because:    [ x ] All other systems negative except as noted below:	    Constitutional:  [ ] fever [ ] weight loss  Skin:  [ ] rash [ ] phlebitis	  Eyes: [ ] icterus [ ] inflammation	  ENMT: [ ] discharge [ ] thrush [ ] ulcers [ ] exudates  Respiratory: [ ] dyspnea [ ] hemoptysis [ ] cough [ ] sputum	  Cardiovascular:  [ ] chest pain [ ] palpitations [ ] edema	  Gastrointestinal:  [ ] nausea [ ] vomiting [ ] diarrhea [ ] constipation [ ] pain	  Genitourinary:  [ ] dysuria [ ] frequency [ ] hematuria [ ] discharge [ ] flank pain  Musculoskeletal:  [ ] myalgias [ ] arthralgias [ ] arthritis	  Neurological:  [ ] headache [ ] seizures +LE weakness and numbness. +Malaise.   Psychiatric:  [ ] anxiety [ ] depression	  Hematology/Lymphatics:  [ ] lymphadenopathy  Endocrine:  [ ] adrenal [ ] thyroid  Allergic/Immunologic:	 [ ] transplant [ ] seasonal    Vital Signs Last 24 Hrs  T(F): 98.7 (18 @ 06:42), Max: 99.4 (-17 @ 13:42)    Vital Signs Last 24 Hrs  HR: 72 (18 @ 15:53) (69 - 88)  BP: 125/74 (18 @ 15:53) (111/70 - 158/74)  RR: 18 (18 @ 15:53)  SpO2: 98% (18 @ 15:53) (98% - 99%)  Wt(kg): --    PHYSICAL EXAMINATION:  General: Alert and Awake, NAD  HEENT: PERRL, EOMI, No subconjunctival hemorrhages, Oropharynx Clear, MMM  Neck: Supple, No JAMES  Cardiac: RRR, No M/R/G  Chest: +Right chest permacath (No surrounding erythema, drainage or tenderness to palpation)  Resp: CTAB, No Wh/Rh/Ra  Abdomen: NBS, NT/ND, No HSM, No rigidity or guarding  MSK: +LUE fistula (No surrounding erythema, drainage or tenderness to palpation). No LE edema. No stigmata of IE. No evidence of phlebitis. No evidence of synovitis.  : + Vance draining thick yellow to tan colored fluid.   Skin: No rashes or lesions. Skin is warm and dry to the touch.   Neuro: Alert and Awake. CN 2-12 Grossly intact. Moves upper extremities spontaneously.  Psych: Calm, Pleasant, Cooperative                          8.9    3.4   )-----------( 76       ( 2018 06:46 )             26.2           136  |  99  |  16  ----------------------------<  78  3.7   |  29  |  3.37<H>    Ca    8.5      2018 06:46  Phos  1.7       Mg     2.1     -        Urinalysis Basic - ( 2018 13:29 )    Color: Brown / Appearance: Turbid / S.022 / pH: x  Gluc: x / Ketone: Negative  / Bili: Negative / Urobili: Negative   Blood: x / Protein: 150 mg/dL / Nitrite: Negative   Leuk Esterase: Large / RBC: >50 /HPF / WBC >50 /HPF   Sq Epi: x / Non Sq Epi: x / Bacteria: Many /HPF      MICROBIOLOGY:    .CSF CSF  17   No growth  --    No polymorphonuclear leukocytes per low power field  No organisms seen per oil power field  by cytocentrifuge    RADIOLOGY:  EXAM:  DUPLEX HEMODIALYSIS ACCESS LT                        PROCEDURE DATE:  2018    There is a flow-limiting stenosis of the dialysis access   fistula at the surgical anastomosis of the left brachial artery to the   egress cephalic vein in the distal upper arm.    EXAM:  XR CHEST PORTABLE URGENT 1V                        PROCEDURE DATE:  2017    Interval removal of a right-sided central venous catheter. No pneumothorax.     EXAM:  MR BRAIN                        PROCEDURE DATE:  2017    No gross evidence for acute infarct or acute hemorrhage.     EXAM:  MR SPINE CERVICAL                        PROCEDURE DATE:  2017    Abnormal marrow signal may be related to renal osteodystrophy or anemia. No cord compression.    EXAM:  MR SPINE LUMBAR                        PROCEDURE DATE:  2017  Abnormal marrow signal may related to renal osteodystrophy.   No acute fractures or dislocations. No cord or cauda equina compression.

## 2018-01-05 NOTE — DISCHARGE NOTE ADULT - CARE PLAN
Principal Discharge DX:	Systemic lupus erythematosus with glomerular disease, unspecified SLE type  Goal:	Stable.  Assessment and plan of treatment:	Follow up with PMD when discharged from Rehab.   Continue present medication regimen.  Secondary Diagnosis:	Anemia, chronic disease  Goal:	Stable  Assessment and plan of treatment:	Follow up with PMD in 1 week.   Continue present medication regimen.  Secondary Diagnosis:	NSVT (nonsustained ventricular tachycardia)  Goal:	Resolved.  Assessment and plan of treatment:	Continue present medication regimen.  Secondary Diagnosis:	Vasculitis  Goal:	Stable.  Assessment and plan of treatment:	s/p Rituximab.   Follow up with Rheumatologist in 1 week after discharge from Rehab.

## 2018-01-05 NOTE — PROGRESS NOTE ADULT - SUBJECTIVE AND OBJECTIVE BOX
[facility] DIVISION OF KIDNEY DISEASES AND HYPERTENSION -- [phone]   FOLLOW UP NOTE  --------------------------------------------------------------------------------  HPI:  57 year old woman with ESRD on HD presenting with limb weakness, now s/p sural nerve biopsy received  IVIg for possible GBS. Pt then with dislodged tunneled catheter. Pt s/p new  tunneled catheter on 1/3/2018  by IR. Pt was last dialyzed 1/4/2018,  tolerated well. Pt s/p cardiac cath on 1/5/2018 with ANABELA. PT seen and examined at bedside.    PAST HISTORY  --------------------------------------------------------------------------------  No significant changes to PMH, PSH, FHx, SHx, unless otherwise noted    ALLERGIES & MEDICATIONS  --------------------------------------------------------------------------------  Allergies    penicillin (Rash)    Intolerances      Standing Inpatient Medications  atorvastatin 40 milliGRAM(s) Oral at bedtime  bisacodyl 5 milliGRAM(s) Oral at bedtime  calcium carbonate 1250 mG (OsCal) 1 Tablet(s) Oral daily  dextrose 5%. 1000 milliLiter(s) IV Continuous <Continuous>  dextrose 50% Injectable 12.5 Gram(s) IV Push once  dextrose 50% Injectable 25 Gram(s) IV Push once  dextrose 50% Injectable 25 Gram(s) IV Push once  dextrose 50% Injectable 12.5 Gram(s) IV Push once  docusate sodium 100 milliGRAM(s) Oral two times a day  furosemide    Tablet 80 milliGRAM(s) Oral <User Schedule>  insulin lispro (HumaLOG) corrective regimen sliding scale   SubCutaneous three times a day before meals  insulin lispro (HumaLOG) corrective regimen sliding scale   SubCutaneous at bedtime  latanoprost 0.005% Ophthalmic Solution 1 Drop(s) Both EYES at bedtime  levothyroxine 25 MICROGram(s) Oral daily  metoprolol succinate ER 25 milliGRAM(s) Oral daily  Nephro-madiha 1 Tablet(s) Oral daily  ondansetron Injectable 4 milliGRAM(s) IV Push once  PARoxetine 20 milliGRAM(s) Oral daily  senna 2 Tablet(s) Oral at bedtime  timolol 0.5% Solution 1 Drop(s) Both EYES daily    PRN Inpatient Medications  acetaminophen   Tablet 650 milliGRAM(s) Oral every 6 hours PRN  acetaminophen   Tablet. 650 milliGRAM(s) Oral every 6 hours PRN  dextrose Gel 1 Dose(s) Oral once PRN  glucagon  Injectable 1 milliGRAM(s) IntraMuscular once PRN  oxyCODONE    IR 5 milliGRAM(s) Oral every 6 hours PRN      REVIEW OF SYSTEMS  --------------------------------------------------------------------------------  General: no fever  CVS: no chest pain  RESP: no sob, no cough  ABD: no abdominal pain  MSK: no edema     VITALS/PHYSICAL EXAM  --------------------------------------------------------------------------------  T(C): 37.1 (01-05-18 @ 06:42), Max: 37.1 (01-05-18 @ 06:42)  HR: 70 (01-05-18 @ 06:42) (70 - 88)  BP: 132/73 (01-05-18 @ 06:42) (111/70 - 132/73)  RR: 18 (01-05-18 @ 06:42) (18 - 18)  SpO2: 98% (01-05-18 @ 06:42) (98% - 99%)  Wt(kg): --        01-04-18 @ 07:01  -  01-05-18 @ 07:00  --------------------------------------------------------  IN: 180 mL / OUT: 500 mL / NET: -320 mL      Physical Exam:  	  Gen: NAD,   	HEENT: PERRL, supple neck, clear oropharynx  	Pulm: CTA B/L  	CV: RRR, S1S2; no rub  	Abd: +BS, soft, nontender/nondistended  	LE:  no edema, + weakness  	Psych: Normal affect and mood  	Skin: Warm, without rashes  	Vascular access: Left brachiocephalic AVF with thrill and bruit, Right IJ tunneled HD catheter              : vance catheter present with cloudy urine       LABS/STUDIES  --------------------------------------------------------------------------------              8.9    3.4   >-----------<  76       [01-05-18 @ 06:46]              26.2     136  |  99  |  16  ----------------------------<  78      [01-05-18 @ 06:46]  3.7   |  29  |  3.37        Ca     8.5     [01-05-18 @ 06:46]      Mg     2.1     [01-05-18 @ 06:46]      Phos  1.7     [01-05-18 @ 06:46]            Creatinine Trend:  SCr 3.37 [01-05 @ 06:46]  SCr 4.21 [01-04 @ 06:37]  SCr 8.71 [01-03 @ 06:35]  SCr 8.03 [01-02 @ 20:52]  SCr 8.21 [01-02 @ 15:22]    Urinalysis - [01-05-18 @ 13:29]      Color Brown / Appearance Turbid / SG 1.022 / pH 7.0      Gluc Negative / Ketone Negative  / Bili Negative / Urobili Negative       Blood Small / Protein 150 / Leuk Est Large / Nitrite Negative      RBC >50 / WBC >50 / Hyaline  / Gran  / Sq Epi  / Non Sq Epi  / Bacteria Many      Iron 77, TIBC 90, %sat 86      [05-29-17 @ 09:39]  Ferritin 781.0      [05-29-17 @ 09:39]  PTH -- (Ca 8.1)      [01-04-18 @ 12:57]   65  PTH -- (Ca 7.5)      [06-01-17 @ 08:12]   253  Vitamin D (25OH) <4.0      [05-31-17 @ 08:52]  HbA1c 4.7      [12-20-17 @ 07:23]  TSH 8.03      [12-23-17 @ 08:00]  Lipid: chol 227, , HDL 47,       [05-30-17 @ 07:28]    HBsAb Reactive      [12-23-17 @ 08:00]  HBsAg Nonreact      [12-23-17 @ 08:00]  HBcAb Nonreact      [12-23-17 @ 08:00]  HCV 0.18, Nonreact      [12-23-17 @ 08:00]    SULLY: titer 1:1280, pattern Homogeneous      [12-23-17 @ 08:00]  dsDNA 70      [12-23-17 @ 08:00]  C3 Complement 36      [12-23-17 @ 08:00]  C4 Complement 11      [12-23-17 @ 08:00]  ANCA: cANCA Negative, pANCA Negative, atypical ANCA Indeterminate Method interference due to SULLY fluorescence      [12-23-17 @ 08:00]  Immunofixation Serum:   Weak  IgG Kappa Band Identified    Reference Range: None Detected      [12-23-17 @ 08:00]  SPEP Interpretation: Weak Gamma-Migrating Paraprotein Identified      [12-23-17 @ 08:00]

## 2018-01-05 NOTE — DISCHARGE NOTE ADULT - HOSPITAL COURSE
58 yo f with h/o lupus diagnosed in April, lupus nephritis, ESRD on HD via permacath (M, W, F) s/p AVF (not matured), DM2, HTN, HLD, hypothyroid presented with 3 weeks h/o bilateral LE pain and numbness presumed polyneuropathy course c/b lost HD access s/p permacath by IR 1/3/18 and NSVT s/p cath for ischemic work up with BMS placed 1/5/18.    More specifically by problem:  For her weakness of both lower extremities, she was seen by neurology, rheumatology, and neurosurgery.   Neurological exam revealed LE weakness with areflexia. MRI L-C spine did not show any canal narrowing, no deformity, no signal change. 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 LP was performed, cytology WNL, cryptococcus -, cultures -, ds DNA ab +., Anti SS-A +, Anti SS-B -, Anti Jalloh +, Elevated cadmium. Patient shows some improvement with IVIG.  Overall it was felt to be polyneuropathy likely autoimmune etiology in the setting of SLE.   Plaquenil was stopped as it would be a very rare cause.  She was seen by toxicology for the cadmium levels but overall they felt there was no evidence of heavy metal exposure by imaging and no other symptoms, and did not require chelation. Neurosurgery performed Sural nerve biopsy to eval for autoimmune cause and results showed ****************    Course was complicated by NSVT (nonsustained ventricular tachycardia) and she was seen by cardiology and EPS.  She underwent ischemic work up 1/5/18 with cath requiring BMS placement.     Course was also complicated by Vaginal bleeding and she was seen by gyn who did Pelvic ultrasound showing thin endometrial echo at 4mm with small endometrial polyp. Pt with two pads since last night. Overall bleeding is minimal and h/h has remained stable. Per gyn's discussion with her private GYN Dr. Suzanne Blackwell in [place], they Agreed no need for acute intervention at this time. Patient to follow up with him outpatient for further endometrial evaluation.     For her ESRD (end-stage renal disease) due to SLE, she required permacath placement by IR 1/3/18 as her fistula had not matured enough and she lost access.     For her Systemic lupus erythematosus with glomerular disease, Plaquenil was held in setting of LE weakness as outpt. She has hx of +SULLY, +dsDNA +SSA +Smith.  Also w/ Class V lupus nephritis progressed to ESRD on HD.  Current manifestations of possible SLE flare include thrombocytopenia, leukopenia, anemia (although improved from before) and possible neuropathy.  Pt with elevated dsDNA 70, +SULLY 1:1280, +SSA >8 , +Jalloh 8.4    For her Type 2 diabetes mellitus with chronic kidney disease on chronic dialysis, she is Not on any home meds at this time as it has been Well controlled as per family s/p gastric bypass.    She was continued on synthroid for her Hypothyroidism. 56 yo f with h/o lupus diagnosed in April, lupus nephritis, ESRD on HD via permacath (M, W, F) s/p AVF (not matured), DM2, HTN, HLD, hypothyroid presented with 3 weeks h/o bilateral LE pain and numbness presumed polyneuropathy course c/b lost HD access s/p permacath by IR 1/3/18 and NSVT s/p cath for ischemic work up with BMS placed 1/5/18.    More specifically by problem:  For her weakness of both lower extremities, she was seen by neurology, rheumatology, and neurosurgery.   Neurological exam revealed LE weakness with areflexia. MRI L-C spine did not show any canal narrowing, no deformity, no signal change. 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 LP was performed, cytology WNL, cryptococcus -, cultures -, ds DNA ab +., Anti SS-A +, Anti SS-B -, Anti Jalloh +, Elevated cadmium. Patient shows some improvement with IVIG.  Overall it was felt to be polyneuropathy likely autoimmune etiology in the setting of SLE.   Plaquenil was stopped as it would be a very rare cause.  She was seen by toxicology for the cadmium levels but overall they felt there was no evidence of heavy metal exposure by imaging and no other symptoms, and did not require chelation. Neurosurgery performed Sural nerve biopsy to eval for autoimmune cause and results showed SLE-related vasculitis. She received one dose of Rituxan as per rheumatology and tolerated infusion without complication. Plan for the next dose of Rituxan in 2 weeks.     Course was complicated by NSVT (nonsustained ventricular tachycardia) and she was seen by cardiology and EPS.  She underwent ischemic work up 1/5/18 with cath requiring BMS placement.     Course was also complicated by Vaginal bleeding and she was seen by gyn who did Pelvic ultrasound showing thin endometrial echo at 4mm with small endometrial polyp. Pt with two pads since last night. Overall bleeding is minimal and h/h has remained stable. Per gyn's discussion with her private GYN Dr. Suzanne Blackwell in [place], they Agreed no need for acute intervention at this time. Patient to follow up with him outpatient for further endometrial evaluation.     For her ESRD (end-stage renal disease) due to SLE, she required permacath placement by IR 1/3/18 as her fistula had not matured enough and she lost access.     For her Systemic lupus erythematosus with glomerular disease, Plaquenil was held in setting of LE weakness as outpt. She has hx of +SULLY, +dsDNA +SSA +Smith.  Also w/ Class V lupus nephritis progressed to ESRD on HD.  Current manifestations of possible SLE flare include thrombocytopenia, leukopenia, anemia (although improved from before) and possible neuropathy.  Pt with elevated dsDNA 70, +SULLY 1:1280, +SSA >8 , +Jalloh 8.4. She was continued on HD sessions without complications. She was advised to continue prednisone 50mg daily and continue to hold plaquenil.    Pt is medically stable to be discharged to [facility] with close follow up with rheumatology and nephrology.

## 2018-01-05 NOTE — CHART NOTE - NSCHARTNOTEFT_GEN_A_CORE
Removal of Femoral Sheath    Pulses in the (right) lower extremity are (palpable). The patient was placed in the supine position. The insertion site was identified and the sutures were removed per protocol.  The __6FA__ [place] femoral sheath was then removed by VI Mckenzie. Direct pressure was applied for  _20_____ minutes.     Monitoring of the (right) groin and both lower extremities including neuro-vascular checks and vital signs every 15 minutes x 4, then every 30 minutes x 2, then every 1 hour was ordered.    Complications: None.     Comments: vance catheter placed before sheath pull because patient  was unable to void while laying flat. Urinary drainage is thick, cloudy, yellowish -greenish color. Floor NP Светлана Tinsley was notified accordingly.

## 2018-01-05 NOTE — DISCHARGE NOTE ADULT - MEDICATION SUMMARY - MEDICATIONS TO TAKE
I will START or STAY ON the medications listed below when I get home from the hospital:    predniSONE 50 mg oral tablet  -- 1 tab(s) by mouth once a day  -- Indication: For Systemic lupus erythematosus with glomerular disease, unspecified SLE type    acetaminophen 325 mg oral tablet  -- 2 tab(s) by mouth every 6 hours, As needed, For Temp greater than 38 C (100.4 F)  -- Indication: For Fever    acetaminophen 325 mg oral tablet  -- 2 tab(s) by mouth every 6 hours, As needed, Mild Pain (1 - 3)  -- Indication: For Pain     aspirin 81 mg oral delayed release tablet  -- 1 tab(s) by mouth once a day  -- Indication: For CAD (coronary artery disease)    oxyCODONE 5 mg oral capsule  -- 1 cap(s) by mouth every 6 hours, As Needed for severe pain MDD:4  -- Caution federal law prohibits the transfer of this drug to any person other  than the person for whom it was prescribed.  It is very important that you take or use this exactly as directed.  Do not skip doses or discontinue unless directed by your doctor.  May cause drowsiness.  Alcohol may intensify this effect.  Use care when operating dangerous machinery.  This prescription cannot be refilled.  Using more of this medication than prescribed may cause serious breathing problems.    -- Indication: For Pain     calcium carbonate 1250 mg (500 mg elemental calcium) oral tablet  -- 1 tab(s) by mouth once a day  -- Indication: For Chronic kidney disease-mineral and bone disorder    gabapentin 100 mg oral capsule  -- 200 milligram(s) by mouth 2 times a day  -- Indication: For Neuropathy    PARoxetine 20 mg oral tablet  -- 1 tab(s) by mouth once a day  -- Indication: For Depression     atorvastatin 40 mg oral tablet  -- 1 tab(s) by mouth once a day (at bedtime)  -- Indication: For CAD (coronary artery disease)    clopidogrel 75 mg oral tablet  -- 1 tab(s) by mouth once a day  -- Indication: For CAD (coronary artery disease)    metoprolol succinate 25 mg oral tablet, extended release  -- 1 tab(s) by mouth once a day  -- Indication: For CAD (coronary artery disease)    bisacodyl 5 mg oral delayed release tablet  -- 1 tab(s) by mouth once a day  -- Indication: For Laxative    Betimol 0.5% ophthalmic solution  -- 1 drop(s) to each affected eye 2 times a day  -- Verified by [facility] [phone]  -- Indication: For Retinopathy    Travatan Z 0.004% ophthalmic solution  -- 1 drop(s) to each affected eye once a day (at bedtime)  -- Verified by [facility] [phone]  -- Indication: For Glaucoma    calcium acetate 667 mg oral tablet  -- 1 tab(s) by mouth once a day  -- Indication: For ESRD (end stage renal disease) on dialysis    levothyroxine 25 mcg (0.025 mg) oral tablet  -- 1 tab(s) by mouth once a day  -- Indication: For Hypothyroidism, unspecified type    Nephro-Bravo oral tablet  -- 1 tab(s) by mouth once a day  -- Indication: For Supplement

## 2018-01-05 NOTE — CONSULT NOTE ADULT - CONSULT REQUESTED BY NAME
.
Dr. Villalobos
Dr. Villalobos
Dr. Villalobos [facility]
Kennedi Hernandez
Wilfredo
Ya MERCER
medicine
ED
Primary team

## 2018-01-05 NOTE — DISCHARGE NOTE ADULT - CONDITIONS AT DISCHARGE
Patient PIVL removed. no  evidence of infiltration noted at discharge. Patient skin intact, vital signs stable, Patient with no complaints of SOB, or chest pain at discharge .Pt discharged to rehab as per MD orders in stable condition.

## 2018-01-05 NOTE — PROGRESS NOTE ADULT - SUBJECTIVE AND OBJECTIVE BOX
Patient is a 57y old  Female who presents with a chief complaint of LE pain and inability to walk (19 Dec 2017 10:27)      SUBJECTIVE / OVERNIGHT EVENTS:  Son at bedside translating.  No acute events overnight or on tele.  No n/v/cp.  Currently no pain and no leg pain.      MEDICATIONS  (STANDING):  aspirin enteric coated 81 milliGRAM(s) Oral daily  atorvastatin 40 milliGRAM(s) Oral at bedtime  bisacodyl 5 milliGRAM(s) Oral at bedtime  calcium acetate 667 milliGRAM(s) Oral three times a day with meals  calcium carbonate 1250 mG (OsCal) 1 Tablet(s) Oral daily  dextrose 5%. 1000 milliLiter(s) (50 mL/Hr) IV Continuous <Continuous>  dextrose 50% Injectable 12.5 Gram(s) IV Push once  dextrose 50% Injectable 25 Gram(s) IV Push once  dextrose 50% Injectable 25 Gram(s) IV Push once  dextrose 50% Injectable 12.5 Gram(s) IV Push once  docusate sodium 100 milliGRAM(s) Oral two times a day  doxercalciferol Injectable 2 MICROGram(s) IV Push <User Schedule>  furosemide    Tablet 80 milliGRAM(s) Oral <User Schedule>  insulin lispro (HumaLOG) corrective regimen sliding scale   SubCutaneous three times a day before meals  insulin lispro (HumaLOG) corrective regimen sliding scale   SubCutaneous at bedtime  latanoprost 0.005% Ophthalmic Solution 1 Drop(s) Both EYES at bedtime  levothyroxine 25 MICROGram(s) Oral daily  metoprolol succinate ER 25 milliGRAM(s) Oral daily  Nephro-madiha 1 Tablet(s) Oral daily  ondansetron Injectable 4 milliGRAM(s) IV Push once  PARoxetine 20 milliGRAM(s) Oral daily  senna 2 Tablet(s) Oral at bedtime  timolol 0.5% Solution 1 Drop(s) Both EYES daily    MEDICATIONS  (PRN):  acetaminophen   Tablet 650 milliGRAM(s) Oral every 6 hours PRN For Temp greater than 38 C (100.4 F)  acetaminophen   Tablet. 650 milliGRAM(s) Oral every 6 hours PRN Mild Pain (1 - 3)  dextrose Gel 1 Dose(s) Oral once PRN Blood Glucose LESS THAN 70 milliGRAM(s)/deciliter  glucagon  Injectable 1 milliGRAM(s) IntraMuscular once PRN Glucose LESS THAN 70 milligrams/deciliter  oxyCODONE    IR 5 milliGRAM(s) Oral every 6 hours PRN Moderate Pain (4 - 6)      Vital Signs Last 24 Hrs  T(C): 37.1 (05 Jan 2018 06:42), Max: 37.1 (05 Jan 2018 06:42)  T(F): 98.7 (05 Jan 2018 06:42), Max: 98.7 (05 Jan 2018 06:42)  HR: 70 (05 Jan 2018 06:42) (70 - 88)  BP: 132/73 (05 Jan 2018 06:42) (111/70 - 165/82)  BP(mean): --  RR: 18 (05 Jan 2018 06:42) (18 - 18)  SpO2: 98% (05 Jan 2018 06:42) (98% - 100%)  CAPILLARY BLOOD GLUCOSE      POCT Blood Glucose.: 88 mg/dL (05 Jan 2018 08:10)  POCT Blood Glucose.: 116 mg/dL (04 Jan 2018 21:31)  POCT Blood Glucose.: 116 mg/dL (04 Jan 2018 18:20)  POCT Blood Glucose.: 100 mg/dL (04 Jan 2018 13:24)    I&O's Summary    04 Jan 2018 07:01  -  05 Jan 2018 07:00  --------------------------------------------------------  IN: 180 mL / OUT: 500 mL / NET: -320 mL        PHYSICAL EXAM:  GENERAL: NAD, well-developed  HEAD:  Atraumatic, Normocephalic  EYES: EOMI, conjunctiva and sclera clear  NECK: Supple, No JVD  CHEST/LUNG: Clear to auscultation bilaterally; No wheeze  HEART: Regular rate and rhythm; No murmurs, rubs, or gallops  ABDOMEN: Soft, Nontender, Nondistended; Bowel sounds present  EXTREMITIES:  2+ Peripheral Pulses, No clubbing, cyanosis, or edema  Lt AVF present   PSYCH: AAOx3  NEUROLOGY: CN's intact, strength 3/5 in UE's and 4/5 in LE's  SKIN: No rashes or lesions    LABS:                        8.9    3.4   )-----------( 76       ( 05 Jan 2018 06:46 )             26.2     01-05    136  |  99  |  16  ----------------------------<  78  3.7   |  29  |  3.37<H>    Ca    8.5      05 Jan 2018 06:46  Phos  1.7     01-05  Mg     2.1     01-05                RADIOLOGY & ADDITIONAL TESTS:    Imaging Personally Reviewed:    Consultant(s) Notes Reviewed:  cardiology    Care Discussed with Consultants/Other Providers:

## 2018-01-05 NOTE — PROGRESS NOTE ADULT - PROBLEM SELECTOR PLAN 1
continue with telemetry monitoring  s/p right and left heart cath with drug eluding stent to mid RCA   Continue with beta blocker, Toprol 25mg QD  may up titrate as BP  tolerates  May 2017, ECHO revealed EF 75%  K+>4, MG++>2    [id number]

## 2018-01-05 NOTE — DISCHARGE NOTE ADULT - CARE PROVIDER_API CALL
Huong Jacobs), Internal Medicine; Nephrology  [address]  [address]  [place] [address]  Phone: [phone]  Fax: [phone]    Dr. Eve Gaming, Rheumatology  Phone: [phone]  Fax: (       -

## 2018-01-05 NOTE — DISCHARGE NOTE ADULT - PROVIDER TOKENS
TOKEN:'[id number]',FREE:[LAST:[Dr. Eve Gaming],FIRST:[Rheumatology],PHONE:[phone]],FAX:[(   )    -]]

## 2018-01-05 NOTE — PROGRESS NOTE ADULT - SUBJECTIVE AND OBJECTIVE BOX
WILLARD BOB  [id number]    INTERVAL HPI/OVERNIGHT EVENTS:  Pt s/p cardiac cath this AM, still lethargic from sedation. Son and daughter at bedside.  State that they feel her strength has been better in her lower extremities  No fevers/chills    MEDICATIONS  (STANDING):  atorvastatin 40 milliGRAM(s) Oral at bedtime  bisacodyl 5 milliGRAM(s) Oral at bedtime  calcium carbonate 1250 mG (OsCal) 1 Tablet(s) Oral daily  dextrose 5%. 1000 milliLiter(s) (50 mL/Hr) IV Continuous <Continuous>  dextrose 50% Injectable 12.5 Gram(s) IV Push once  dextrose 50% Injectable 25 Gram(s) IV Push once  dextrose 50% Injectable 25 Gram(s) IV Push once  dextrose 50% Injectable 12.5 Gram(s) IV Push once  docusate sodium 100 milliGRAM(s) Oral two times a day  furosemide    Tablet 80 milliGRAM(s) Oral <User Schedule>  insulin lispro (HumaLOG) corrective regimen sliding scale   SubCutaneous three times a day before meals  insulin lispro (HumaLOG) corrective regimen sliding scale   SubCutaneous at bedtime  latanoprost 0.005% Ophthalmic Solution 1 Drop(s) Both EYES at bedtime  levothyroxine 25 MICROGram(s) Oral daily  metoprolol succinate ER 25 milliGRAM(s) Oral daily  Nephro-madiha 1 Tablet(s) Oral daily  ondansetron Injectable 4 milliGRAM(s) IV Push once  PARoxetine 20 milliGRAM(s) Oral daily  senna 2 Tablet(s) Oral at bedtime  timolol 0.5% Solution 1 Drop(s) Both EYES daily    MEDICATIONS  (PRN):  acetaminophen   Tablet 650 milliGRAM(s) Oral every 6 hours PRN For Temp greater than 38 C (100.4 F)  acetaminophen   Tablet. 650 milliGRAM(s) Oral every 6 hours PRN Mild Pain (1 - 3)  dextrose Gel 1 Dose(s) Oral once PRN Blood Glucose LESS THAN 70 milliGRAM(s)/deciliter  glucagon  Injectable 1 milliGRAM(s) IntraMuscular once PRN Glucose LESS THAN 70 milligrams/deciliter  oxyCODONE    IR 5 milliGRAM(s) Oral every 6 hours PRN Moderate Pain (4 - 6)      Allergies    penicillin (Rash)    Intolerances    Review of Systems: Pt still lethargic from sedation, follows commands but not answering all questions    Vital Signs Last 24 Hrs  T(C): 37.1 (2018 06:42), Max: 37.1 (2018 06:42)  T(F): 98.7 (2018 06:42), Max: 98.7 (2018 06:42)  HR: 75 (2018 15:24) (69 - 88)  BP: 152/77 (2018 15:24) (111/70 - 158/74)  BP(mean): --  RR: 18 (2018 15:24) (18 - 18)  SpO2: 98% (2018 15:24) (98% - 99%)    Physical Exam:  General: NAD  HEENT: EOMI, MMM  Cardio: +S1/S2, RRR  Resp: CTA b/l  GI: +BS, soft, NT/ND  MSK: No notable joint abnormalities  Neuro: lethargic from sedation but follows commands. Unable to do a full MSK exam because pt is still on bedrest s/p cath  Psych: wnl    LABS:                        8.9    3.4   )-----------( 76       ( 2018 06:46 )             26.2     01-05    136  |  99  |  16  ----------------------------<  78  3.7   |  29  |  3.37<H>    Ca    8.5      2018 06:46  Phos  1.7     01-05  Mg     2.1     01-05        Urinalysis Basic - ( 2018 13:29 )    Color: Brown / Appearance: Turbid / S.022 / pH: x  Gluc: x / Ketone: Negative  / Bili: Negative / Urobili: Negative   Blood: x / Protein: 150 mg/dL / Nitrite: Negative   Leuk Esterase: Large / RBC: >50 /HPF / WBC >50 /HPF   Sq Epi: x / Non Sq Epi: x / Bacteria: Many /HPF          RADIOLOGY & ADDITIONAL TESTS:  Surgical Pathology Report (17 @ 08:23)    Surgical Pathology Report:   ACCESSION No:  [id number]    WILLARD BOB                     2        Surgical Final Report          Final Diagnosis  The case was examined and reported in its entirety by Dr. Pavel Mendez at [facility], [place], [place]  (accession #  [id number]). The report has been entered into [employer] only for  purposes of documentation.  Dr. Bowman has not examined this  material.    Diagnosis  A. 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 Son and daughter at bedside.  State that they feel her strength has been better in her lower extremities  No fevers/chills  notes that she doesn't urinate often but did have vance places post-cath today.     MEDICATIONS  (STANDING):  atorvastatin 40 milliGRAM(s) Oral at bedtime  bisacodyl 5 milliGRAM(s) Oral at bedtime  calcium carbonate 1250 mG (OsCal) 1 Tablet(s) Oral daily  dextrose 5%. 1000 milliLiter(s) (50 mL/Hr) IV Continuous <Continuous>  dextrose 50% Injectable 12.5 Gram(s) IV Push once  dextrose 50% Injectable 25 Gram(s) IV Push once  dextrose 50% Injectable 25 Gram(s) IV Push once  dextrose 50% Injectable 12.5 Gram(s) IV Push once  docusate sodium 100 milliGRAM(s) Oral two times a day  furosemide    Tablet 80 milliGRAM(s) Oral <User Schedule>  insulin lispro (HumaLOG) corrective regimen sliding scale   SubCutaneous three times a day before meals  insulin lispro (HumaLOG) corrective regimen sliding scale   SubCutaneous at bedtime  latanoprost 0.005% Ophthalmic Solution 1 Drop(s) Both EYES at bedtime  levothyroxine 25 MICROGram(s) Oral daily  metoprolol succinate ER 25 milliGRAM(s) Oral daily  Nephro-madiha 1 Tablet(s) Oral daily  ondansetron Injectable 4 milliGRAM(s) IV Push once  PARoxetine 20 milliGRAM(s) Oral daily  senna 2 Tablet(s) Oral at bedtime  timolol 0.5% Solution 1 Drop(s) Both EYES daily    MEDICATIONS  (PRN):  acetaminophen   Tablet 650 milliGRAM(s) Oral every 6 hours PRN For Temp greater than 38 C (100.4 F)  acetaminophen   Tablet. 650 milliGRAM(s) Oral every 6 hours PRN Mild Pain (1 - 3)  dextrose Gel 1 Dose(s) Oral once PRN Blood Glucose LESS THAN 70 milliGRAM(s)/deciliter  glucagon  Injectable 1 milliGRAM(s) IntraMuscular once PRN Glucose LESS THAN 70 milligrams/deciliter  oxyCODONE    IR 5 milliGRAM(s) Oral every 6 hours PRN Moderate Pain (4 - 6)      Allergies    penicillin (Rash)    Intolerances    Review of Systems: Pt still lethargic from sedation, follows commands but not answering all questions    Vital Signs Last 24 Hrs  T(C): 37.1 (2018 06:42), Max: 37.1 (2018 06:42)  T(F): 98.7 (2018 06:42), Max: 98.7 (2018 06:42)  HR: 75 (2018 15:24) (69 - 88)  BP: 152/77 (2018 15:24) (111/70 - 158/74)  BP(mean): --  RR: 18 (2018 15:24) (18 - 18)  SpO2: 98% (2018 15:24) (98% - 99%)    Physical Exam:  General: NAD  HEENT: EOMI, MMM  Cardio: +S1/S2, RRR  Resp: CTA b/l  GI: +BS, soft, NT/ND  MSK: No notable joint abnormalities  Neuro: lethargic from sedation but follows commands. Unable to do a full MSK exam because pt is still on bedrest s/p cath  Psych: wnl    LABS:                        8.9    3.4   )-----------( 76       ( 2018 06:46 )             26.2     01-05    136  |  99  |  16  ----------------------------<  78  3.7   |  29  |  3.37<H>    Ca    8.5      2018 06:46  Phos  1.7     01-05  Mg     2.1     01-05        Urinalysis Basic - ( 2018 13:29 )    Color: Brown / Appearance: Turbid / S.022 / pH: x  Gluc: x / Ketone: Negative  / Bili: Negative / Urobili: Negative   Blood: x / Protein: 150 mg/dL / Nitrite: Negative   Leuk Esterase: Large / RBC: >50 /HPF / WBC >50 /HPF   Sq Epi: x / Non Sq Epi: x / Bacteria: Many /HPF          RADIOLOGY & ADDITIONAL TESTS:  Surgical Pathology Report (17 @ 08:23)    Surgical Pathology Report:   ACCESSION No:  10 [id number]    WILLARD BOB                     2        Surgical Final Report          Final Diagnosis  The case was examined and reported in its entirety by Dr. Pavel Mendez at [facility], [place], [place], [place]  (accession #  [id number]). The report has been entered into [employer] only for  purposes of documentation.  Dr. Bowman has not examined this  material.    Diagnosis  A. 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

## 2018-01-05 NOTE — CONSULT NOTE ADULT - CONSULT REQUESTED DATE/TIME
02-Jan-2018 12:35
01-Jan-2018 21:48
03-Jan-2018 11:22
05-Jan-2018 16:35
19-Dec-2017 15:14
22-Dec-2017 19:07
24-Dec-2017 14:34
28-Dec-2017 17:40
31-Dec-2017 11:42
19-Dec-2017 03:30

## 2018-01-05 NOTE — PROGRESS NOTE ADULT - PROBLEM SELECTOR PLAN 1
Pt with ESRD on HD. Pt s/p tunneled HD catheter 1/3/2018. Pt dialyzed on 1/4/2018.  Plan for HD tomorrow.

## 2018-01-05 NOTE — DISCHARGE NOTE ADULT - PATIENT PORTAL LINK FT
“You can access the Follow[facility] Patient Portal, offered by [facility], by registering with the following website: [email or web address]”

## 2018-01-05 NOTE — CONSULT NOTE ADULT - ATTENDING COMMENTS
Patient seen and examined  above verifies  Lupus  ESRD  ?Neuritis  s/p cath  Noted to have ?intestinal contenets in vance  ?Fistula  ?Other occult abdominal/pelvis process  Abdominal exam benign  Hemodynamically stable  Would rec:  1) blood ,urine cx  2) Abdominal/pelvic imaging-Ct v cystogram  3) Empiric Invanz-PCN allergy but rash and tolerated Cefazolin 12/28  4) Would recommend hold steroids pending above workup    Will tailor plan for ID issues  per course,results.Will defer to primary team on management of other issues.  Case d/w Dr Hernandez,Med NP  \  Infectious Diseases Service will cover over weekend.  Please call [phone] if issues
Patient was seen and examined.  As per the patient and family there has been decompensation in ambulation over the past 8-12 weeks which become severe approximately 3 weeks ago.  She has neuralgiaform pain in bilateral lower extremities distal to knees.  On exam there is diffuse weakness in almost all muscle groups with sparing of knee extensors.  There is loss of DTR's in the lower extremities as well.  Differential diagnosis includes but is not limited to lumbar stenosis versus peripheral neuropathy.  The patient's presentation is not suggestive of AIDP given the time frame of symptom onset.  I would venture that hydroxychloroquine could contributing given that symptoms began after initiation.  Alternate causes for neuropathy include diabetes, lupus (vascular).    -MRI L-SPine  -Consider EMG/NCS LE  -Gabapentin 100mg TID
case d/w gyn resident. pelvic sono ordered. To be seen by gyn team today.
case discussed with fellow as documented above, no indication for chelation at this time and elevated heavy metals unlikely to explain her symptoms and exam. repeat serum levels of cadmium and lead, no need for abdominal xray since pt had abdominal imaging during this admission. page with questions [phone]
Thank you. My team will follow. Case discussed with NP.     Pauly Colon M.D, F.A.C.C  [facility]   [phone]

## 2018-01-05 NOTE — PROGRESS NOTE ADULT - ASSESSMENT
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   - Would rec starting Pulse steroids Solumedrol 1000mg qd x 3 days. However, pt had Johnson placed while in cath this AM and urine appearance is concerning for infection- UA shows many bacteria and >50WBC w/ large Leuk Esterase.   - Would first check Urine Cx and ensure pt is on Abx for at least 24 hours prior to initiation of pulse steroids    2) SLE- pt with hx of +SULLY, +dsDNA +SSA +Smith.  Also w/ Class V lupus nephritis progressed to ESRD on HD  Current manifestations of possible SLE flare include thrombocytopenia, leukopenia, anemia (although improved from before) and possible neuropathy  Pt with elevated dsDNA 70, +SULLY 1:1280, +SSA >8 , +Jalloh 8.4  - Will check Quant Gold in AM    3) Thrombocytopenia/leukopenia/anemia- may all be related to underlying SLE vasculitis and SLE flare.   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 Sural Nerve bx results today are concerning for vasculitis which is likely SLE related.   - Would rec starting Pulse steroids Solumedrol 1000mg qd x 3 days. However, pt had Johnson placed while in cath this AM and urine appearance is concerning for infection- UA shows many bacteria and >50WBC w/ large Leuk Esterase.   - Would first check Urine Cx and ensure pt is on Abx for at least 24 hours prior to initiation of pulse steroids  - recheck C3 C4    2) SLE- pt with hx of +SULLY, +dsDNA +SSA +Smith.  Also w/ Class V lupus nephritis progressed to ESRD on HD  Current manifestations of possible SLE flare include thrombocytopenia, leukopenia, anemia (although improved from before) and possible neuropathy  Pt with elevated dsDNA 70, +SULLY 1:1280, +SSA >8 , +Jalloh 8.4  - Will check Quant Gold in AM  - check C3 C4 and dsdna  - check LDH and haptogolobin.     3) Thrombocytopenia/leukopenia/anemia- may all be related to underlying SLE vasculitis and SLE flare.   - Pulse steroids to be initiated once clarify UA abnormality - ? cystitis.  Would check bladder US and check kidney ultrasound.     Will follow with you.
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
## 2018-01-05 NOTE — PROGRESS NOTE ADULT - SUBJECTIVE AND OBJECTIVE BOX
HPI: s/p Right and left heart  cath  stent mid  RCA     MEDICATIONS  (STANDING):  atorvastatin 40 milliGRAM(s) Oral at bedtime  bisacodyl 5 milliGRAM(s) Oral at bedtime  calcium carbonate 1250 mG (OsCal) 1 Tablet(s) Oral daily  dextrose 5%. 1000 milliLiter(s) (50 mL/Hr) IV Continuous <Continuous>  dextrose 50% Injectable 12.5 Gram(s) IV Push once  dextrose 50% Injectable 25 Gram(s) IV Push once  dextrose 50% Injectable 25 Gram(s) IV Push once  dextrose 50% Injectable 12.5 Gram(s) IV Push once  docusate sodium 100 milliGRAM(s) Oral two times a day  furosemide    Tablet 80 milliGRAM(s) Oral <User Schedule>  insulin lispro (HumaLOG) corrective regimen sliding scale   SubCutaneous three times a day before meals  insulin lispro (HumaLOG) corrective regimen sliding scale   SubCutaneous at bedtime  latanoprost 0.005% Ophthalmic Solution 1 Drop(s) Both EYES at bedtime  levothyroxine 25 MICROGram(s) Oral daily  methylPREDNISolone sodium succinate IVPB 1000 milliGRAM(s) IV Intermittent every 24 hours  metoprolol succinate ER 25 milliGRAM(s) Oral daily  Nephro-madiha 1 Tablet(s) Oral daily  ondansetron Injectable 4 milliGRAM(s) IV Push once  PARoxetine 20 milliGRAM(s) Oral daily  senna 2 Tablet(s) Oral at bedtime  timolol 0.5% Solution 1 Drop(s) Both EYES daily    MEDICATIONS  (PRN):  acetaminophen   Tablet 650 milliGRAM(s) Oral every 6 hours PRN For Temp greater than 38 C (100.4 F)  acetaminophen   Tablet. 650 milliGRAM(s) Oral every 6 hours PRN Mild Pain (1 - 3)  dextrose Gel 1 Dose(s) Oral once PRN Blood Glucose LESS THAN 70 milliGRAM(s)/deciliter  glucagon  Injectable 1 milliGRAM(s) IntraMuscular once PRN Glucose LESS THAN 70 milligrams/deciliter  oxyCODONE    IR 5 milliGRAM(s) Oral every 6 hours PRN Moderate Pain (4 - 6)    Allergies penicillin (Rash)    REVIEW OF SYSTEM:    Constitutional: denies fever, chills, fatigue  Neuro: denies headache, numbness, weakness, dizziness  Resp: denies cough, wheezing, shortness of breath  CVS: denies chest pain, palpitations, leg swelling  GI: denies abdominal pain, nausea, vomiting, diarrhea   : denies dysuria, frequency, incontinence  Skin: denies itching, burning, rashes, or lesions   Msk: denies joint pain or swelling    Vital Signs Last 24 Hrs  T(C): 37.1 (2018 06:42), Max: 37.1 (2018 06:42)  T(F): 98.7 (2018 06:42), Max: 98.7 (2018 06:42)  HR: 76 (2018 14:53) (69 - 88)  BP: 128/74 (2018 14:53) (111/70 - 158/74)  RR: 18 (2018 14:53) (18 - 18)  SpO2: 98% (2018 14:53) (98% - 99%)    Physical Exam:  General : well developed, well nourished,  and no acute distress  Neuro : sleeping easily arousable son at bedside   HEENT : neck supple  Lungs: Clear to Ascultation, no wheezing , rales or rhonchi   Cardiovascular : + 1 +2, RRR, no murmurs, no rubs  GI : abdomen soft, NT, ND, + BS   : no suprapubic tenderness  Extremities : No edema, + 2 DP and +2 PT, feet warm   Skin : no cyanosis    TELE: SR 60-70's  EKG:    LABS:                        8.9    3.4   )-----------( 76       ( 2018 06:46 )             26.2     01-05    136  |  99  |  16  ----------------------------<  78  3.7   |  29  |  3.37<H>    Ca    8.5      2018 06:46  Phos  1.7     01-05  Mg     2.1     01-05    Urinalysis Basic - ( 2018 13:29 )    Color: Brown / Appearance: Turbid / S.022 / pH: x  Gluc: x / Ketone: Negative  / Bili: Negative / Urobili: Negative   Blood: x / Protein: 150 mg/dL / Nitrite: Negative   Leuk Esterase: Large / RBC: >50 /HPF / WBC >50 /HPF   Sq Epi: x / Non Sq Epi: x / Bacteria: Many /HPF     RADIOLOGY & ADDITIONAL TESTS:  < from: Cardiac Cath Lab - Adult (18 @ 09:23) >  [facility]  Department of Cardiology  [address] [place] [id number] [phone]  Cath Lab Report -- Comprehensive Report  Patient: WILLARD BOB  Study date: 2018  Account number: [id number]  MR number:[id number]  : 1960  Gender: Female  Race: O  Case Physician(s):  GLENIS Blancas M.D.  Fellow:  Kristin Guillaume D.O.  Referring Physician:  Alex Weaver M.D.  INDICATIONS: Unstable angina - CCS4.  HISTORY: There was no priorcardiac history. The patient has hypertension,  medication-treated dyslipidemia, and immunosuppression.  PROCEDURE:  --  Left coronary angiography.  --  Right coronary angiography.  --  Sheath Exchange for Intervention.  --  Intervention on mid RCA: drug-eluting stent.  TECHNIQUE: The risks and alternatives of the procedures and conscious  sedation were explained to the patient and informed consent was obtained.  Cardiac catheterization performed electively.  Local anesthetic given. Right femoralartery access. Left coronary artery  angiography. The vessel was injected utilizing a catheter. Right coronary  artery angiography. The vessel was injected utilizing a catheter.  RADIATION EXPOSURE: 1.3 min. A successful drug-eluting stent was performed  on the 90 % lesion in the mid RCA. Following intervention there was a 1 %  residual stenosis. According to the ACC/AHA classification system, this  lesion was a type C lesion. There was JAMES 3 flow before the procedure and  JAMES 3 flow after the procedure. Vessel setup was performed. A 6FR JR4  LAUNCHER guiding catheter was used to intubate the vessel. Vessel setup  was performed. A BMW UNIVERSAL 190CM wire was used to cross the lesion.  Balloon angioplasty was performed, using a 2.5 X 15 EUPHORA balloon, with  1 inflations and a maximum inflation pressure of 16 ryan. A INTEGRITY 3.50  X 22 drug-eluting stent was placed across the lesion and deployed at a  maximum inflation pressure of 16 ryan. Sheath Exchange for Intervention.  CONTRAST GIVEN: Omnipaque 28 ml. Omnipaque 39 ml.  MEDICATIONS GIVEN: Aspirin, 325 mg, PO. Clopidogrel (Plavix), 600 mg, PO.  CORONARY VESSELS: The coronary circulation is right dominant.  LM:   --  LM: Angiography showed minor luminal irregularities with no flow  limiting lesions.  LAD:   --  Mid LAD: There was a 80 % stenosis.  CX:   --  Circumflex: Angiography showed minor luminal irregularities with  no flow limiting lesions.  RCA:   --  Mid RCA: There was a 90 % stenosis.  COMPLICATIONS: There were no complications.  DIAGNOSTIC RECOMMENDATIONS: ASA and Plavix for 2 weeks  INTERVENTIONAL RECOMMENDATIONS: ASA and Plavix for 2 weeks  Prepared and signed by  Alex Weaver M.D.  Signed 2018 13:25:24  HEMODYNAMIC TABLES  Pressures:  Baseline  Pressures:  - HR: 72  Pressures:  - Rhythm:  Pressures:  -- Aortic Pressure (S/D/M): 151/72/105  Pressures:  Intervention  Pressures:  - HR: 73  Pressures:  - Rhythm:  Pressures:  -- Aortic Pressure (S/D/M): 130/59/89  Outputs:  Baseline  Outputs:  -- CALCULATIONS[age]  Outputs:  -- CALCULATIONS: Body Surface Area: 1.50  Outputs:  -- CALCULATIONS: Height in cm: 155.00  Outputs:  -- CALCULATIONS: Sex: Female  Outputs:  -- CALCULATIONS: Weight in k.00

## 2018-01-05 NOTE — DISCHARGE NOTE ADULT - MEDICATION SUMMARY - MEDICATIONS TO STOP TAKING
I will STOP taking the medications listed below when I get home from the hospital:    hydrOXYzine hydrochloride 10 mg oral tablet  -- 1 tab(s) by mouth once a day, As Needed

## 2018-01-05 NOTE — CONSULT NOTE ADULT - PROVIDER SPECIALTY LIST ADULT
Cardiology
Electrophysiology
GYN
Infectious Disease
Nephrology
Neurosurgery
Rheumatology
Toxicology
Vascular Surgery
Neurology

## 2018-01-06 LAB
ANION GAP SERPL CALC-SCNC: 10 MMOL/L — SIGNIFICANT CHANGE UP (ref 5–17)
BUN SERPL-MCNC: 27 MG/DL — HIGH (ref 7–23)
C3 SERPL-MCNC: 41 MG/DL — LOW (ref 80–180)
C4 SERPL-MCNC: 14 MG/DL — SIGNIFICANT CHANGE UP (ref 10–45)
CALCIUM SERPL-MCNC: 8.8 MG/DL — SIGNIFICANT CHANGE UP (ref 8.4–10.5)
CHLORIDE SERPL-SCNC: 96 MMOL/L — SIGNIFICANT CHANGE UP (ref 96–108)
CO2 SERPL-SCNC: 28 MMOL/L — SIGNIFICANT CHANGE UP (ref 22–31)
CREAT SERPL-MCNC: 4.66 MG/DL — HIGH (ref 0.5–1.3)
GLUCOSE BLDC GLUCOMTR-MCNC: 111 MG/DL — HIGH (ref 70–99)
GLUCOSE BLDC GLUCOMTR-MCNC: 146 MG/DL — HIGH (ref 70–99)
GLUCOSE BLDC GLUCOMTR-MCNC: 93 MG/DL — SIGNIFICANT CHANGE UP (ref 70–99)
GLUCOSE BLDC GLUCOMTR-MCNC: 96 MG/DL — SIGNIFICANT CHANGE UP (ref 70–99)
GLUCOSE SERPL-MCNC: 82 MG/DL — SIGNIFICANT CHANGE UP (ref 70–99)
HAPTOGLOB SERPL-MCNC: 174 MG/DL — SIGNIFICANT CHANGE UP (ref 34–200)
HBV CORE AB SER-ACNC: REACTIVE
HBV SURFACE AB SER-ACNC: 637.4 MIU/ML — SIGNIFICANT CHANGE UP
HBV SURFACE AG SER-ACNC: SIGNIFICANT CHANGE UP
HCT VFR BLD CALC: 26.4 % — LOW (ref 34.5–45)
HCV AB S/CO SERPL IA: 0.22 S/CO — SIGNIFICANT CHANGE UP
HCV AB SERPL-IMP: SIGNIFICANT CHANGE UP
HGB BLD-MCNC: 8.9 G/DL — LOW (ref 11.5–15.5)
LDH SERPL L TO P-CCNC: 199 U/L — SIGNIFICANT CHANGE UP (ref 50–242)
MCHC RBC-ENTMCNC: 31.3 PG — SIGNIFICANT CHANGE UP (ref 27–34)
MCHC RBC-ENTMCNC: 33.7 GM/DL — SIGNIFICANT CHANGE UP (ref 32–36)
MCV RBC AUTO: 92.9 FL — SIGNIFICANT CHANGE UP (ref 80–100)
PLATELET # BLD AUTO: 81 K/UL — LOW (ref 150–400)
POTASSIUM SERPL-MCNC: 3.6 MMOL/L — SIGNIFICANT CHANGE UP (ref 3.5–5.3)
POTASSIUM SERPL-SCNC: 3.6 MMOL/L — SIGNIFICANT CHANGE UP (ref 3.5–5.3)
RBC # BLD: 2.84 M/UL — LOW (ref 3.8–5.2)
RBC # BLD: 2.84 M/UL — LOW (ref 3.8–5.2)
RBC # FLD: 13.3 % — SIGNIFICANT CHANGE UP (ref 10.3–14.5)
RETICS #: 20.2 K/UL — LOW (ref 25–125)
RETICS/RBC NFR: 0.7 % — SIGNIFICANT CHANGE UP (ref 0.5–2.5)
SODIUM SERPL-SCNC: 134 MMOL/L — LOW (ref 135–145)
WBC # BLD: 4.4 K/UL — SIGNIFICANT CHANGE UP (ref 3.8–10.5)
WBC # FLD AUTO: 4.4 K/UL — SIGNIFICANT CHANGE UP (ref 3.8–10.5)

## 2018-01-06 PROCEDURE — 99233 SBSQ HOSP IP/OBS HIGH 50: CPT | Mod: GC

## 2018-01-06 PROCEDURE — 72192 CT PELVIS W/O DYE: CPT | Mod: 26

## 2018-01-06 PROCEDURE — 99233 SBSQ HOSP IP/OBS HIGH 50: CPT

## 2018-01-06 PROCEDURE — 90935 HEMODIALYSIS ONE EVALUATION: CPT

## 2018-01-06 PROCEDURE — 99232 SBSQ HOSP IP/OBS MODERATE 35: CPT

## 2018-01-06 RX ADMIN — ATORVASTATIN CALCIUM 40 MILLIGRAM(S): 80 TABLET, FILM COATED ORAL at 21:54

## 2018-01-06 RX ADMIN — Medication 100 MILLIGRAM(S): at 05:50

## 2018-01-06 RX ADMIN — Medication 20 MILLIGRAM(S): at 12:18

## 2018-01-06 RX ADMIN — Medication 650 MILLIGRAM(S): at 12:00

## 2018-01-06 RX ADMIN — Medication 81 MILLIGRAM(S): at 12:17

## 2018-01-06 RX ADMIN — OXYCODONE HYDROCHLORIDE 5 MILLIGRAM(S): 5 TABLET ORAL at 10:17

## 2018-01-06 RX ADMIN — LATANOPROST 1 DROP(S): 0.05 SOLUTION/ DROPS OPHTHALMIC; TOPICAL at 21:53

## 2018-01-06 RX ADMIN — OXYCODONE HYDROCHLORIDE 5 MILLIGRAM(S): 5 TABLET ORAL at 03:53

## 2018-01-06 RX ADMIN — ERTAPENEM SODIUM 100 MILLIGRAM(S): 1 INJECTION, POWDER, LYOPHILIZED, FOR SOLUTION INTRAMUSCULAR; INTRAVENOUS at 22:02

## 2018-01-06 RX ADMIN — Medication 1 DROP(S): at 12:18

## 2018-01-06 RX ADMIN — Medication 1 TABLET(S): at 12:18

## 2018-01-06 RX ADMIN — OXYCODONE HYDROCHLORIDE 5 MILLIGRAM(S): 5 TABLET ORAL at 23:00

## 2018-01-06 RX ADMIN — Medication 80 MILLIGRAM(S): at 05:50

## 2018-01-06 RX ADMIN — Medication 1 TABLET(S): at 12:17

## 2018-01-06 RX ADMIN — OXYCODONE HYDROCHLORIDE 5 MILLIGRAM(S): 5 TABLET ORAL at 10:30

## 2018-01-06 RX ADMIN — Medication 25 MILLIGRAM(S): at 21:53

## 2018-01-06 RX ADMIN — OXYCODONE HYDROCHLORIDE 5 MILLIGRAM(S): 5 TABLET ORAL at 04:53

## 2018-01-06 RX ADMIN — CLOPIDOGREL BISULFATE 75 MILLIGRAM(S): 75 TABLET, FILM COATED ORAL at 12:17

## 2018-01-06 RX ADMIN — OXYCODONE HYDROCHLORIDE 5 MILLIGRAM(S): 5 TABLET ORAL at 16:00

## 2018-01-06 RX ADMIN — Medication 5 MILLIGRAM(S): at 21:53

## 2018-01-06 RX ADMIN — Medication 25 MICROGRAM(S): at 05:50

## 2018-01-06 RX ADMIN — Medication 650 MILLIGRAM(S): at 11:04

## 2018-01-06 RX ADMIN — OXYCODONE HYDROCHLORIDE 5 MILLIGRAM(S): 5 TABLET ORAL at 22:00

## 2018-01-06 RX ADMIN — Medication 100 MILLIGRAM(S): at 17:42

## 2018-01-06 RX ADMIN — OXYCODONE HYDROCHLORIDE 5 MILLIGRAM(S): 5 TABLET ORAL at 15:58

## 2018-01-06 RX ADMIN — SENNA PLUS 2 TABLET(S): 8.6 TABLET ORAL at 21:53

## 2018-01-06 NOTE — PROGRESS NOTE ADULT - SUBJECTIVE AND OBJECTIVE BOX
HPI: no further VT on telemetery    MEDICATIONS  (STANDING):  aspirin enteric coated 81 milliGRAM(s) Oral daily  atorvastatin 40 milliGRAM(s) Oral at bedtime  bisacodyl 5 milliGRAM(s) Oral at bedtime  calcium carbonate 1250 mG (OsCal) 1 Tablet(s) Oral daily  clopidogrel Tablet 75 milliGRAM(s) Oral daily  dextrose 5%. 1000 milliLiter(s) (50 mL/Hr) IV Continuous <Continuous>  dextrose 50% Injectable 12.5 Gram(s) IV Push once  dextrose 50% Injectable 25 Gram(s) IV Push once  dextrose 50% Injectable 25 Gram(s) IV Push once  dextrose 50% Injectable 12.5 Gram(s) IV Push once  docusate sodium 100 milliGRAM(s) Oral two times a day  ertapenem  IVPB      ertapenem  IVPB 500 milliGRAM(s) IV Intermittent every 24 hours  furosemide    Tablet 80 milliGRAM(s) Oral <User Schedule>  insulin lispro (HumaLOG) corrective regimen sliding scale   SubCutaneous three times a day before meals  insulin lispro (HumaLOG) corrective regimen sliding scale   SubCutaneous at bedtime  latanoprost 0.005% Ophthalmic Solution 1 Drop(s) Both EYES at bedtime  levothyroxine 25 MICROGram(s) Oral daily  metoprolol succinate ER 25 milliGRAM(s) Oral daily  Nephro-madiha 1 Tablet(s) Oral daily  ondansetron Injectable 4 milliGRAM(s) IV Push once  PARoxetine 20 milliGRAM(s) Oral daily  senna 2 Tablet(s) Oral at bedtime  timolol 0.5% Solution 1 Drop(s) Both EYES daily    MEDICATIONS  (PRN):  acetaminophen   Tablet 650 milliGRAM(s) Oral every 6 hours PRN For Temp greater than 38 C (100.4 F)  acetaminophen   Tablet. 650 milliGRAM(s) Oral every 6 hours PRN Mild Pain (1 - 3)  dextrose Gel 1 Dose(s) Oral once PRN Blood Glucose LESS THAN 70 milliGRAM(s)/deciliter  glucagon  Injectable 1 milliGRAM(s) IntraMuscular once PRN Glucose LESS THAN 70 milligrams/deciliter  oxyCODONE    IR 5 milliGRAM(s) Oral every 6 hours PRN Moderate Pain (4 - 6)    Allergies  penicillin (Rash)    REVIEW OF SYSTEM:    Constitutional: denies fever, chills, fatigue  Neuro: denies headache, numbness, weakness, dizziness  Resp: denies cough, wheezing, shortness of breath  CVS: denies chest pain, palpitations, leg swelling  GI: denies abdominal pain, nausea, vomiting, diarrhea   : denies dysuria, frequency, incontinence  Skin: denies itching, burning, rashes, or lesions   Msk: denies joint pain or swelling    Vital Signs Last 24 Hrs  T(C): 36.7 (2018 04:20), Max: 37 (2018 00:00)  T(F): 98.1 (2018 04:20), Max: 98.6 (2018 00:00)  HR: 71 (2018 04:20) (69 - 77)  BP: 139/72 (2018 04:20) (125/74 - 158/74)  RR: 18 (2018 04:20) (18 - 18)  SpO2: 100% (2018 04:20) (98% - 100%)    Physical Exam:  General : well developed, well nourished,  and no acute distress  Neuro : alert and oriented x 3  son at bedside   HEENT : neck supple  Lungs: Clear to Ascultation, no wheezing , rales or rhonchi   Cardiovascular : + 1 +2, RRR, no murmurs, no rubs  GI : abdomen soft, NT, ND, + BS   : no suprapubic tenderness  Extremities : No edema, + 2 DP and +2 PT, feet warm   Skin : no cyanosis    TELE: SR 70 -90's       LABS:                        8.9    4.4   )-----------( 81       ( 2018 07:04 )             26.4     01-06    134<L>  |  96  |  27<H>  ----------------------------<  82  3.6   |  28  |  4.66<H>    Ca    8.8      2018 07:04  Phos  1.7     01-05  Mg     2.1     01-05  Urinalysis Basic - ( 2018 13:29 )    Color: Brown / Appearance: Turbid / S.022 / pH: x  Gluc: x / Ketone: Negative  / Bili: Negative / Urobili: Negative   Blood: x / Protein: 150 mg/dL / Nitrite: Negative   Leuk Esterase: Large / RBC: >50 /HPF / WBC >50 /HPF   Sq Epi: x / Non Sq Epi: x / Bacteria: Many /HPF    RADIOLOGY & ADDITIONAL TESTS:

## 2018-01-06 NOTE — PROGRESS NOTE ADULT - PROBLEM SELECTOR PLAN 2
s/p LHC/RHC, s/p ANABELA to RCA without complication. Appreciated EP recs -   given concern for post-intervention VT, continue tele for now  - monitor electrolyes s/p LHC/RHC, s/p ANABELA to RCA without complication. Appreciated EP recs -   given concern for post-intervention VT, continue tele for now  - monitor electrolytes and replete   - continue DAPT  - appreciate EP recs

## 2018-01-06 NOTE — PROGRESS NOTE ADULT - ATTENDING COMMENTS
Galina Ma MD  Division of Hospital Medicine  Pager: [phone]  Office: [phone] CT cystogram negative for fistula but heterogenous collection of fluid in bladder. Care discussed with ID (Dr. Davidson) - still concerned for fistula given the puss/thick appearance of the fluid in the vance. Recommend continuation of abx. Care discussed with urology resident as well - thinks unlikely fistula, could be related to esrd, fact that she doesn't urinate much, can collect some fluids in there. Recommend removal of vance. Will monitor her on abx for any s/s of acute infection. Given that she has had vaginal bleeding, possibly bladder was the source? pt may need cystogram    Galina Ma MD  Division of Hospital Medicine  Pager: [phone]  Office: [phone]

## 2018-01-06 NOTE — PROGRESS NOTE ADULT - ASSESSMENT
56 yo PMH SLE (diagnosed in April) with lupus nephritis, ESRD on HD via permacath (M, W, F) s/p AVF (not matured), DM2 who presented to [facility] on 12/19/17 with 3 weeks of progressive bilateral LE pain and numbness felt to be autoimmune in nature with tentative plan to initiate IV steroids.   Cloudy, greenish, milky urine does raise concern for possible fistula and perforation.     Plan:  --Continue with Invanz at current dose  --CT pending, would need atleast po contrast if we are looking for fistula.   --f/u blood cultures  --f/u urine culture  --Recommend holding off of steroids until imaging results return

## 2018-01-06 NOTE — CHART NOTE - NSCHARTNOTEFT_GEN_A_CORE
Post Procedural Site Note    Right femoral artery without bleed or hematoma. Distal pulses positive. Discharge plan as per primary team.      Son Kaplan, VI  x [phone]

## 2018-01-06 NOTE — PROGRESS NOTE ADULT - PROBLEM SELECTOR PLAN 10
no pharmacologic ppx in setting of thrombocytopenia and prior vaginal bleeding DVT ppx: no pharmacologic ppx in setting of thrombocytopenia and prior vaginal bleeding  PT consult as pt with prolonged hospitalization

## 2018-01-06 NOTE — PROGRESS NOTE ADULT - PROBLEM SELECTOR PLAN 1
Pt with thick, yellowish/greenish drainage into vance catheter, concerning for fistula formation. Consulted urology, ID - recommending CT cystogram for evaluation.  - continue ertapenam (pt tolerating well)  - fu CT cystogram  - BCx, UCx results pending  - appreciate ID, urology recs  - holding steroids in setting of concern for fistula  - continue vance for now and remove when appropriate  - monitor leukocytosis, fever

## 2018-01-06 NOTE — PROGRESS NOTE ADULT - SUBJECTIVE AND OBJECTIVE BOX
58 yo F with SLE diagnosed in April; course noteworthy for lupus nephritis, ESRD on HD via permacath (M, W, F) s/p AVF (not matured).  Also, hx. of DM2, HTN, & HLD.  Adm due to 3 week h/o bilateral LE pain and numbness.  Rheum w/u in progress.  Found to have 13 seconds of NSVT on telemetry.  Diagnostic cath revealed CAD, now s/p PCI/ANABELA.  Alert, no cardiac sxs. at present.    Medications:  acetaminophen   Tablet 650 milliGRAM(s) Oral every 6 hours PRN  acetaminophen   Tablet. 650 milliGRAM(s) Oral every 6 hours PRN  aspirin enteric coated 81 milliGRAM(s) Oral daily  atorvastatin 40 milliGRAM(s) Oral at bedtime  bisacodyl 5 milliGRAM(s) Oral at bedtime  calcium carbonate 1250 mG (OsCal) 1 Tablet(s) Oral daily  clopidogrel Tablet 75 milliGRAM(s) Oral daily  dextrose 5%. 1000 milliLiter(s) IV Continuous <Continuous>  dextrose 50% Injectable 12.5 Gram(s) IV Push once  dextrose 50% Injectable 25 Gram(s) IV Push once  dextrose 50% Injectable 25 Gram(s) IV Push once  dextrose 50% Injectable 12.5 Gram(s) IV Push once  dextrose Gel 1 Dose(s) Oral once PRN  docusate sodium 100 milliGRAM(s) Oral two times a day  ertapenem  IVPB      ertapenem  IVPB 500 milliGRAM(s) IV Intermittent every 24 hours  furosemide    Tablet 80 milliGRAM(s) Oral <User Schedule>  glucagon  Injectable 1 milliGRAM(s) IntraMuscular once PRN  insulin lispro (HumaLOG) corrective regimen sliding scale   SubCutaneous three times a day before meals  insulin lispro (HumaLOG) corrective regimen sliding scale   SubCutaneous at bedtime  latanoprost 0.005% Ophthalmic Solution 1 Drop(s) Both EYES at bedtime  levothyroxine 25 MICROGram(s) Oral daily  metoprolol succinate ER 25 milliGRAM(s) Oral daily  Nephro-madiha 1 Tablet(s) Oral daily  ondansetron Injectable 4 milliGRAM(s) IV Push once  oxyCODONE    IR 5 milliGRAM(s) Oral every 6 hours PRN  PARoxetine 20 milliGRAM(s) Oral daily  senna 2 Tablet(s) Oral at bedtime  timolol 0.5% Solution 1 Drop(s) Both EYES daily    PMH/PSH/FH/SH:  Unchanged    REVIEW OF SYSTEMS:  CONSTITUTIONAL: No fever, or fatigue  ENT:  No sinus or throat pain  NECK: No pain or stiffness  RESPIRATORY: No cough, wheezing, chills or hemoptysis; No shortness of breath  CARDIOVASCULAR: No chest pain, palpitations, dizziness, or leg swelling  GASTROINTESTINAL: No abdominal or epigastric pain. No nausea, vomiting, or hematemesis; No diarrhea or constipation. No melena or hematochezia.  GENITOURINARY: No dysuria, frequency, hematuria, or incontinence  NEUROLOGICAL: No headaches, memory loss, loss of strength, numbness,  SKIN: No itching, burning, rashes, or lesions   MUSCULOSKELETAL: No joint pain or swelling; No muscle, back, or extremity pain    Vitals:  T(C): 36.8 (18 @ 11:15), Max: 37 (18 @ 00:00)  HR: 75 (18 @ 11:15) (71 - 78)  BP: 142/76 (18 @ 11:15) (122/64 - 142/76)  RR: 18 (18 @ 11:15) (18 - 18)  SpO2: 99% (18 @ 11:15) (98% - 100%)  Daily Weight in k.7 (2018 11:15)    PHYSICAL EXAM:  CONSTITUTIONAL:  NAD     NECK: No bruits; noJVD  RESPIRATORY:   airway patent; breath sounds equal; good air movement; respirations non-labored; clear to auscultation bilaterally; no chest wall tenderness; no intercostal retractions; no rales,rhonchi or wheeze  chest wall: Rt catheter present, nontender, no erythema, no hematoma or area of fluctuance  CARDIOVASCULAR: regular rate and rhythm  no rub  no murmur  normal PMI  GASTROINTESTINAL:  no distention; no masses palpable; bowel sounds normal  EXTREMITIES: No cyanosis, clubbing or edema  VASCULAR:  Equal and normal pulses (radial, femoral)  EUROLOGICAL:  Alert and oriented x 3; cranial nerves grossly  intact;  SKIN: No lesions; no rash  MUSCULOSKELETAL:  No calf tenderness  no joint swelling	    TELE:  NSR 70 - 90    I&O's Summary    2018 07:01  -  2018 07:00  --------------------------------------------------------  IN: 240 mL / OUT: 250 mL / NET: -10 mL    2018 07:  -  2018 15:26  --------------------------------------------------------  IN: 700 mL / OUT: 1550 mL / NET: -850 mL    LABS:                        8.9    4.4   )-----------( 81       ( 2018 07:04 )             26.4       01-06    134<L>  |  96  |  27<H>  ----------------------------<  82  3.6   |  28  |  4.66<H>    Ca    8.8      2018 07:04  Phos  1.7     01-05  Mg     2.1     01-05      Cardiac Cath Lab - Adult (18 @ 09:23) >  Patient: WILLARD BOB  Case Physician(s):  Alex Weaver M.D.  Fellow:  Kristin Guillaume D.O.  PROCEDURE:  --  Left coronary angiography.  --  Right coronary angiography.  --  Sheath Exchange for Intervention.  --  Intervention on mid RCA: drug-eluting stent.  CORONARY VESSELS: The coronary circulation is right dominant.  LM:   --  LM: Angiography showed minor luminal irregularities with no flow limiting lesions.  LAD:   --  Mid LAD: There was a 80 % stenosis.  CX:   --  Circumflex: Angiography showed minor luminal irregularities with no flow limiting lesions.  RCA:   --  Mid RCA: There was a 90 % stenosis.  Balloon angioplasty was performed; drug-eluting stent was placed across the RCA lesion.  INTERVENTIONAL RECOMMENDATIONS: ASA and Plavix for 2 weeks  Prepared and signed by  Alex Weaver M.D.

## 2018-01-06 NOTE — PROGRESS NOTE ADULT - SUBJECTIVE AND OBJECTIVE BOX
Urology called given thick output from vance which was placed after concern for fistula. CT scan was negative.     Per son output from vance has become less thick in consistency. States pt has not had any signs of bladder infection such as SP or flank pain, fevers, dysuria.     Vance irrigated gently with output quickly cleared.     Given overall presentation, pyocystis which can occur in anuric patients is unlikely but pt with + urine cx --> E. Coli. Pt should continue with appropriate treatment of abx and await finalized results. Vance can be removed in AM if output continues to remain thin in consistency and clearing in color.

## 2018-01-06 NOTE — CHART NOTE - NSCHARTNOTEFT_GEN_A_CORE
Patient had a peripheral nerve, left sural biopsy on 12/28/2017. The results of H&E and EVG-trichrome stain show markedly decreased density of large myelinated fibers. CD45 confirms the presence of perivascular hematopoietic mononuclear cells. 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 The results and copy of the biopsy report were printed and placed in the patient's chart.

## 2018-01-06 NOTE — PROGRESS NOTE ADULT - SUBJECTIVE AND OBJECTIVE BOX
57y old  Female who presents with a chief complaint of LE pain and inability to walk    Interval history:  Afebrile, denies abdominal pain, some discomfort at vance site and pain in the legs unchanged.       Antimicrobials:    ertapenem  IVPB 500 milliGRAM(s) IV Intermittent every 24 hours    REVIEW OF SYSTEMS:    No chest pain or palpitations  No cough, no SOB  No N/V/D, no abdominal pain      Vital Signs Last 24 Hrs  T(C): 36.8 (01-06-18 @ 11:15), Max: 37 (01-06-18 @ 00:00)  T(F): 98.3 (01-06-18 @ 11:15), Max: 98.6 (01-06-18 @ 00:00)  HR: 75 (01-06-18 @ 11:15) (71 - 78)  BP: 142/76 (01-06-18 @ 11:15) (122/64 - 158/74)  RR: 18 (01-06-18 @ 11:15) (18 - 18)  SpO2: 99% (01-06-18 @ 11:15) (98% - 100%)    PHYSICAL EXAM:  Patient in no acute distress. Alert, oriented.   No icterus, no oral ulcers.  Cardiovascular: S1S2 normal.  Lungs: Good air entry B/L lung fields.  Gastrointestinal: soft, nontender, nondistended.  Extremities: no edema.  IV sites not inflamed.   Rt chest permacath  Lt arm fistula with thrill  + vance.                           8.9    4.4   )-----------( 81       ( 06 Jan 2018 07:04 )             26.4   01-06    134<L>  |  96  |  27<H>  ----------------------------<  82  3.6   |  28  |  4.66<H>    Ca    8.8      06 Jan 2018 07:04  Phos  1.7     01-05  Mg     2.1     01-05

## 2018-01-06 NOTE — PROGRESS NOTE ADULT - ASSESSMENT
56 yo f with h/o lupus diagnosed in April, lupus nephritis, ESRD on HD via permacath (M, W, F) s/p AVF (not matured), DM2, HTN, HLD, hypothyroid presented with 3 weeks h/o bilateral LE pain and numbness presumed polyneuropathy course c/b lost HD access s/p permacath by IR 1/3/18 and NSVT, s/p LHC/RHC, s/p ANABELA to RCA, noted to have thick, yellowish/green drainage in the vance, concerning for fistula formation, awaiting CT cystogram

## 2018-01-06 NOTE — PROGRESS NOTE ADULT - PROBLEM SELECTOR PLAN 1
continue with telemetry monitoring no more VT on telemetry this may be related to revascularization +/- BB therapy    Continue with beta blocker, Toprol 25mg QD  may up titrate as BP  tolerates  K+>4, MG++>2

## 2018-01-06 NOTE — PROGRESS NOTE ADULT - ASSESSMENT
Patient is a 57 year old female with past medical history of HTN, HLD, DMT2, hypothyroidism, SLE(diagnosed in April), lupus nephritis,  ESRD on HD s/p AVF (not matured)  presented to ED for bilateral lower extremity numbness. Peripheral nerve biopsy was suggestive of vasculitic neuropathy. Rheumatology work up in progress  EP consult was called for episode of 8- 13 seconds of NSVT and noted to have + troponin 0.09. May 2017, ECHO revealed EF 75%, now s/p right and left heart cath on 1/5 with drug eluding stent to mid RCA

## 2018-01-06 NOTE — PROGRESS NOTE ADULT - SUBJECTIVE AND OBJECTIVE BOX
[facility] DIVISION OF KIDNEY DISEASES AND HYPERTENSION -- HEMODIALYSIS NOTE  --------------------------------------------------------------------------------  Chief Complaint: ESRD/Ongoing hemodialysis requirement    24 hour events/subjective: Nerve bx showing vasculitis.   Pending CT abdomen per nephew at bedside.        PAST HISTORY  --------------------------------------------------------------------------------  No significant changes to PMH, PSH, FHx, SHx, unless otherwise noted    ALLERGIES & MEDICATIONS  --------------------------------------------------------------------------------  Allergies    penicillin (Rash)    Intolerances      Standing Inpatient Medications  aspirin enteric coated 81 milliGRAM(s) Oral daily  atorvastatin 40 milliGRAM(s) Oral at bedtime  bisacodyl 5 milliGRAM(s) Oral at bedtime  calcium carbonate 1250 mG (OsCal) 1 Tablet(s) Oral daily  clopidogrel Tablet 75 milliGRAM(s) Oral daily  dextrose 5%. 1000 milliLiter(s) IV Continuous <Continuous>  dextrose 50% Injectable 12.5 Gram(s) IV Push once  dextrose 50% Injectable 25 Gram(s) IV Push once  dextrose 50% Injectable 25 Gram(s) IV Push once  dextrose 50% Injectable 12.5 Gram(s) IV Push once  docusate sodium 100 milliGRAM(s) Oral two times a day  ertapenem  IVPB      ertapenem  IVPB 500 milliGRAM(s) IV Intermittent every 24 hours  furosemide    Tablet 80 milliGRAM(s) Oral <User Schedule>  insulin lispro (HumaLOG) corrective regimen sliding scale   SubCutaneous three times a day before meals  insulin lispro (HumaLOG) corrective regimen sliding scale   SubCutaneous at bedtime  latanoprost 0.005% Ophthalmic Solution 1 Drop(s) Both EYES at bedtime  levothyroxine 25 MICROGram(s) Oral daily  metoprolol succinate ER 25 milliGRAM(s) Oral daily  Nephro-madiha 1 Tablet(s) Oral daily  ondansetron Injectable 4 milliGRAM(s) IV Push once  PARoxetine 20 milliGRAM(s) Oral daily  senna 2 Tablet(s) Oral at bedtime  timolol 0.5% Solution 1 Drop(s) Both EYES daily    PRN Inpatient Medications  acetaminophen   Tablet 650 milliGRAM(s) Oral every 6 hours PRN  acetaminophen   Tablet. 650 milliGRAM(s) Oral every 6 hours PRN  dextrose Gel 1 Dose(s) Oral once PRN  glucagon  Injectable 1 milliGRAM(s) IntraMuscular once PRN  oxyCODONE    IR 5 milliGRAM(s) Oral every 6 hours PRN      REVIEW OF SYSTEMS  --------------------------------------------------------------------------------  Constitutional: [x ] no Fever [ ] Chills [ ] Fatigue [ ] Weight change   HEENT: [ ] Blurred vision [ ] Eye Pain [ ] Headache [ ] Runny nose [ ] Sore Throat   Respiratory: [ ] Cough [ ] Wheezing [ ] Shortness of breath  Cardiovascular: [ ] Chest Pain [ ] Palpitations [ ] GARCIA [ ] PND [ ] Orthopnea  Gastrointestinal: [ ] Abdominal Pain [ ] Diarrhea [ ] Constipation [ ] Hemorrhoids [ ] Nausea [ ] Vomiting  Genitourinary: [ ] Nocturia [ ] Dysuria [ ] Incontinence  Extremities: [ ] Swelling [ ] Joint Pain  Neurologic: [ ] Focal deficit [ ] Paresthesias [ ] Syncope  Lymphatic: [ ] Swelling [ ] Lymphadenopathy   Skin: [ ] Rash [ ] Ecchymoses [ ] Wounds [ ] Lesions  Psychiatry: [ ] Depression [ ] Suicidal/Homicidal Ideation [ ] Anxiety [ ] Sleep Disturbances  [x ] 10 point review of systems is otherwise negative except as mentioned above              [ ]Unable to obtain due to   All other systems were reviewed and are negative, except as noted.      VITALS/PHYSICAL EXAM  --------------------------------------------------------------------------------  T(C): 36.7 (01-06-18 @ 04:20), Max: 37 (01-06-18 @ 00:00)  HR: 71 (01-06-18 @ 04:20) (69 - 77)  BP: 139/72 (01-06-18 @ 04:20) (125/74 - 158/74)  RR: 18 (01-06-18 @ 04:20) (18 - 18)  SpO2: 100% (01-06-18 @ 04:20) (98% - 100%)  Wt(kg): --        01-05-18 @ 07:01  -  01-06-18 @ 07:00  --------------------------------------------------------  IN: 240 mL / OUT: 250 mL / NET: -10 mL      Physical Exam:  	Gen: NAD, well-appearing  	HEENT: on room air  	Pulm: CTA B/L  	CV: normal S1S2; no rub  	Abd: soft                      Back : No sacral edema  	: No vance  	LE: no edema  	Skin: Warm, without rashes  	Vascular access: RIJ tunnelled dialysis catheter, no bleed/ swelling/ discharge around catheter. LUE AVF with palpable thrill and audible bruit.    LABS/STUDIES  --------------------------------------------------------------------------------              8.9    4.4   >-----------<  81       [01-06-18 @ 07:04]              26.4     134  |  96  |  27  ----------------------------<  82      [01-06-18 @ 07:04]  3.6   |  28  |  4.66        Ca     8.8     [01-06-18 @ 07:04]      Mg     2.1     [01-05-18 @ 06:46]      Phos  1.7     [01-05-18 @ 06:46]                [01-06-18 @ 07:04]    Iron 77, TIBC 90, %sat 86      [05-29-17 @ 09:39]  Ferritin 781.0      [05-29-17 @ 09:39]  PTH -- (Ca 8.1)      [01-04-18 @ 12:57]   65  PTH -- (Ca 7.5)      [06-01-17 @ 08:12]   253  Vitamin D (25OH) <4.0      [05-31-17 @ 08:52]  HbA1c 4.7      [12-20-17 @ 07:23]  TSH 8.03      [12-23-17 @ 08:00]  Lipid: chol 227, , HDL 47,       [05-30-17 @ 07:28]    HBsAb Reactive      [12-23-17 @ 08:00]  HBsAg Nonreact      [12-23-17 @ 08:00]  HBcAb Nonreact      [12-23-17 @ 08:00]  HCV 0.18, Nonreact      [12-23-17 @ 08:00]

## 2018-01-06 NOTE — PROGRESS NOTE ADULT - SUBJECTIVE AND OBJECTIVE BOX
Patient is a 57y old  Female who presents with a chief complaint of LE pain and inability to walk (19 Dec 2017 10:27)      SUBJECTIVE / OVERNIGHT EVENTS:  no acute events overnight. vss, afebrile. Pt had severe pain in RLE this morning, requiring oxycodone for pain control. She went for HD this morning and tolerated the session well. Plan for CT cystogram as per urology to evaluate a fistula. Denies fever/chills, chest pain, abdominal pain, n/v/c/d.     MEDICATIONS  (STANDING):  aspirin enteric coated 81 milliGRAM(s) Oral daily  atorvastatin 40 milliGRAM(s) Oral at bedtime  bisacodyl 5 milliGRAM(s) Oral at bedtime  calcium carbonate 1250 mG (OsCal) 1 Tablet(s) Oral daily  clopidogrel Tablet 75 milliGRAM(s) Oral daily  dextrose 5%. 1000 milliLiter(s) (50 mL/Hr) IV Continuous <Continuous>  dextrose 50% Injectable 12.5 Gram(s) IV Push once  dextrose 50% Injectable 25 Gram(s) IV Push once  dextrose 50% Injectable 25 Gram(s) IV Push once  dextrose 50% Injectable 12.5 Gram(s) IV Push once  docusate sodium 100 milliGRAM(s) Oral two times a day  ertapenem  IVPB      ertapenem  IVPB 500 milliGRAM(s) IV Intermittent every 24 hours  furosemide    Tablet 80 milliGRAM(s) Oral <User Schedule>  insulin lispro (HumaLOG) corrective regimen sliding scale   SubCutaneous three times a day before meals  insulin lispro (HumaLOG) corrective regimen sliding scale   SubCutaneous at bedtime  latanoprost 0.005% Ophthalmic Solution 1 Drop(s) Both EYES at bedtime  levothyroxine 25 MICROGram(s) Oral daily  metoprolol succinate ER 25 milliGRAM(s) Oral daily  Nephro-madiha 1 Tablet(s) Oral daily  ondansetron Injectable 4 milliGRAM(s) IV Push once  PARoxetine 20 milliGRAM(s) Oral daily  senna 2 Tablet(s) Oral at bedtime  timolol 0.5% Solution 1 Drop(s) Both EYES daily    MEDICATIONS  (PRN):  acetaminophen   Tablet 650 milliGRAM(s) Oral every 6 hours PRN For Temp greater than 38 C (100.4 F)  acetaminophen   Tablet. 650 milliGRAM(s) Oral every 6 hours PRN Mild Pain (1 - 3)  dextrose Gel 1 Dose(s) Oral once PRN Blood Glucose LESS THAN 70 milliGRAM(s)/deciliter  glucagon  Injectable 1 milliGRAM(s) IntraMuscular once PRN Glucose LESS THAN 70 milligrams/deciliter  oxyCODONE    IR 5 milliGRAM(s) Oral every 6 hours PRN Moderate Pain (4 - 6)      CAPILLARY BLOOD GLUCOSE      POCT Blood Glucose.: 111 mg/dL (2018 08:21)  POCT Blood Glucose.: 117 mg/dL (2018 21:27)  POCT Blood Glucose.: 89 mg/dL (2018 17:58)  POCT Blood Glucose.: 76 mg/dL (2018 13:58)    I&O's Summary    2018 07:01  -  2018 07:00  --------------------------------------------------------  IN: 240 mL / OUT: 250 mL / NET: -10 mL        PHYSICAL EXAM:  Vital Signs Last 24 Hrs  T(C): 36.7 (2018 04:20), Max: 37 (2018 00:00)  T(F): 98.1 (2018 04:20), Max: 98.6 (2018 00:00)  HR: 71 (2018 04:20) (69 - 77)  BP: 139/72 (2018 04:20) (125/74 - 158/74)  BP(mean): --  RR: 18 (2018 04:20) (18 - 18)  SpO2: 100% (2018 04:20) (98% - 100%)      GENERAL: NAD, well-developed  HEAD:  Atraumatic, Normocephalic  EYES: EOMI, PERRLA, conjunctiva and sclera clear  NECK: Supple, No JVD  CHEST/LUNG: Clear to auscultation bilaterally; No wheeze  HEART: Regular rate and rhythm; No murmurs, rubs, or gallops  ABDOMEN: Soft, Nontender, Nondistended; Bowel sounds present  EXTREMITIES:  2+ Peripheral Pulses, No clubbing, cyanosis, or edema  VASCULAR: Left brachiocephalic AVF with thrill and bruit,  NEUROLOGY: AAOX3; non-focal  SKIN: No rashes or lesions    LABS:  personally reviewed                        8.9    4.4   )-----------( 81       ( 2018 07:04 )             26.4     01-06    134<L>  |  96  |  27<H>  ----------------------------<  82  3.6   |  28  |  4.66<H>    Ca    8.8      2018 07:04  Phos  1.7     01-05  Mg     2.1     -05            Urinalysis Basic - ( 2018 13:29 )    Color: Brown / Appearance: Turbid / S.022 / pH: x  Gluc: x / Ketone: Negative  / Bili: Negative / Urobili: Negative   Blood: x / Protein: 150 mg/dL / Nitrite: Negative   Leuk Esterase: Large / RBC: >50 /HPF / WBC >50 /HPF   Sq Epi: x / Non Sq Epi: x / Bacteria: Many /HPF        RADIOLOGY & ADDITIONAL TESTS:    Imaging Personally Reviewed:   - tele monitor: NSR in 70-90s, no events    Consultant(s) Notes Reviewed:  EP, nephrology    Care Discussed with Consultants/Other Providers: VI Saucedo Patient is a 57y old  Female who presents with a chief complaint of LE pain and inability to walk (19 Dec 2017 10:27)      SUBJECTIVE / OVERNIGHT EVENTS:  no acute events overnight. vss, afebrile. Pt had severe pain in RLE this morning, requiring oxycodone for pain control. She went for HD this morning and tolerated the session well. Plan for CT cystogram as per urology to evaluate a fistula. Denies fever/chills, chest pain, abdominal pain, n/v/c/d.     MEDICATIONS  (STANDING):  aspirin enteric coated 81 milliGRAM(s) Oral daily  atorvastatin 40 milliGRAM(s) Oral at bedtime  bisacodyl 5 milliGRAM(s) Oral at bedtime  calcium carbonate 1250 mG (OsCal) 1 Tablet(s) Oral daily  clopidogrel Tablet 75 milliGRAM(s) Oral daily  dextrose 5%. 1000 milliLiter(s) (50 mL/Hr) IV Continuous <Continuous>  dextrose 50% Injectable 12.5 Gram(s) IV Push once  dextrose 50% Injectable 25 Gram(s) IV Push once  dextrose 50% Injectable 25 Gram(s) IV Push once  dextrose 50% Injectable 12.5 Gram(s) IV Push once  docusate sodium 100 milliGRAM(s) Oral two times a day  ertapenem  IVPB      ertapenem  IVPB 500 milliGRAM(s) IV Intermittent every 24 hours  furosemide    Tablet 80 milliGRAM(s) Oral <User Schedule>  insulin lispro (HumaLOG) corrective regimen sliding scale   SubCutaneous three times a day before meals  insulin lispro (HumaLOG) corrective regimen sliding scale   SubCutaneous at bedtime  latanoprost 0.005% Ophthalmic Solution 1 Drop(s) Both EYES at bedtime  levothyroxine 25 MICROGram(s) Oral daily  metoprolol succinate ER 25 milliGRAM(s) Oral daily  Nephro-madiha 1 Tablet(s) Oral daily  ondansetron Injectable 4 milliGRAM(s) IV Push once  PARoxetine 20 milliGRAM(s) Oral daily  senna 2 Tablet(s) Oral at bedtime  timolol 0.5% Solution 1 Drop(s) Both EYES daily    MEDICATIONS  (PRN):  acetaminophen   Tablet 650 milliGRAM(s) Oral every 6 hours PRN For Temp greater than 38 C (100.4 F)  acetaminophen   Tablet. 650 milliGRAM(s) Oral every 6 hours PRN Mild Pain (1 - 3)  dextrose Gel 1 Dose(s) Oral once PRN Blood Glucose LESS THAN 70 milliGRAM(s)/deciliter  glucagon  Injectable 1 milliGRAM(s) IntraMuscular once PRN Glucose LESS THAN 70 milligrams/deciliter  oxyCODONE    IR 5 milliGRAM(s) Oral every 6 hours PRN Moderate Pain (4 - 6)      CAPILLARY BLOOD GLUCOSE      POCT Blood Glucose.: 111 mg/dL (2018 08:21)  POCT Blood Glucose.: 117 mg/dL (2018 21:27)  POCT Blood Glucose.: 89 mg/dL (2018 17:58)  POCT Blood Glucose.: 76 mg/dL (2018 13:58)    I&O's Summary    2018 07:01  -  2018 07:00  --------------------------------------------------------  IN: 240 mL / OUT: 250 mL / NET: -10 mL        PHYSICAL EXAM:  Vital Signs Last 24 Hrs  T(C): 36.7 (2018 04:20), Max: 37 (2018 00:00)  T(F): 98.1 (2018 04:20), Max: 98.6 (2018 00:00)  HR: 71 (2018 04:20) (69 - 77)  BP: 139/72 (2018 04:20) (125/74 - 158/74)  BP(mean): --  RR: 18 (2018 04:20) (18 - 18)  SpO2: 100% (2018 04:20) (98% - 100%)      GENERAL: NAD, well-developed  HEAD:  Atraumatic, Normocephalic  EYES: EOMI, PERRLA, conjunctiva and sclera clear  NECK: Supple, No JVD  CHEST/LUNG: Clear to auscultation bilaterally; No wheeze  HEART: Regular rate and rhythm; No murmurs, rubs, or gallops  ABDOMEN: Soft, Nontender, Nondistended; Bowel sounds present  : vance draining thick/yellow/green drainage  EXTREMITIES:  2+ Peripheral Pulses, No clubbing, cyanosis, or edema  VASCULAR: Left brachiocephalic AVF with thrill and bruit,  NEUROLOGY: AAOX3; non-focal  SKIN: No rashes or lesions    LABS:  personally reviewed                        8.9    4.4   )-----------( 81       ( 2018 07:04 )             26.4     01-06    134<L>  |  96  |  27<H>  ----------------------------<  82  3.6   |  28  |  4.66<H>    Ca    8.8      2018 07:04  Phos  1.7     01-05  Mg     2.1     -05            Urinalysis Basic - ( 2018 13:29 )    Color: Brown / Appearance: Turbid / S.022 / pH: x  Gluc: x / Ketone: Negative  / Bili: Negative / Urobili: Negative   Blood: x / Protein: 150 mg/dL / Nitrite: Negative   Leuk Esterase: Large / RBC: >50 /HPF / WBC >50 /HPF   Sq Epi: x / Non Sq Epi: x / Bacteria: Many /HPF        RADIOLOGY & ADDITIONAL TESTS:    Imaging Personally Reviewed:   - tele monitor: NSR in 70-90s, no events    Consultant(s) Notes Reviewed:  EP, nephrology    Care Discussed with Consultants/Other Providers: VI Saucedo

## 2018-01-06 NOTE — PROGRESS NOTE ADULT - PROBLEM SELECTOR PLAN 1
Pt with ESRD on HD. Pt s/p tunneled HD catheter 1/3/2018.   Seen while receiving HD today via RIJ tunnelled catheter. Tolerating HD well. BP maintained on HD and RIJ catheter working well during HD.   Clinically stable, plan for next maintenance HD on Monday as pt's outpatient schedule is MWF.

## 2018-01-06 NOTE — CHART NOTE - NSCHARTNOTEFT_GEN_A_CORE
Spoke with radiologist, Dr. Alarcon, regarding protocoling of 'CT abdomen/pelvis w/ IV contrast with indication of cystogram' to 'CT pelvis with no contrast'. Will continue with 'CT pelvis w/o contrast' as abdominal imaging does not visualize bladder. Was explained pt will receive retroperitoneal contrast via [facility]. Patient will go for CT after HD today.    Milvia Saucedo PA-C  #[id number]

## 2018-01-06 NOTE — PROGRESS NOTE ADULT - ASSESSMENT
58 yo f with h/o lupus diagnosed in April, lupus nephritis, ESRD on HD via perma catha ter-recently placed s/p AVF (not matured), DM2, HTN, HLD, hypothyroid presented with 3 weeks h/o bilateral LE pain and numbness  Found to have 13 seconds of NSVT.  S/P RCA PCI/ANABELA.      REC:  1.  CAD, s/p RCA PCI/ANABELA  Continue ASA/Plavix/statin    2.  NSVT   Continue to monitor on tele  Continue telemetry.    3.  HTN  Continue metoprolol.     Kaushal Lowe M.D.  Cardiology Consult Service  [phone] or [phone] 56 yo f with h/o lupus diagnosed in April, lupus nephritis, ESRD on HD via perma cath[facility] ter-recently placed s/p AVF (not matured), DM2, HTN, HLD, hypothyroid presented with 3 weeks h/o bilateral LE pain and numbness  Found to have 13 seconds of NSVT.  S/P RCA PCI/ANABELA.      REC:  1.  CAD, s/p RCA PCI/ANABELA; 80% mid-LAD lesion  Continue ASA/Plavix/statin/beta-blocker    2.  NSVT   Continue to monitor on tele  Continue telemetry.    3.  HTN  Continue metoprolol.     Kaushal Lowe M.D.  Cardiology Consult Service  [phone] or [phone]

## 2018-01-07 LAB
-  AMIKACIN: SIGNIFICANT CHANGE UP
-  AMIKACIN: SIGNIFICANT CHANGE UP
-  AMPICILLIN/SULBACTAM: SIGNIFICANT CHANGE UP
-  AMPICILLIN/SULBACTAM: SIGNIFICANT CHANGE UP
-  AMPICILLIN: SIGNIFICANT CHANGE UP
-  AMPICILLIN: SIGNIFICANT CHANGE UP
-  AZTREONAM: SIGNIFICANT CHANGE UP
-  AZTREONAM: SIGNIFICANT CHANGE UP
-  CEFAZOLIN: SIGNIFICANT CHANGE UP
-  CEFAZOLIN: SIGNIFICANT CHANGE UP
-  CEFEPIME: SIGNIFICANT CHANGE UP
-  CEFEPIME: SIGNIFICANT CHANGE UP
-  CEFOXITIN: SIGNIFICANT CHANGE UP
-  CEFOXITIN: SIGNIFICANT CHANGE UP
-  CEFTAZIDIME: SIGNIFICANT CHANGE UP
-  CEFTAZIDIME: SIGNIFICANT CHANGE UP
-  CEFTRIAXONE: SIGNIFICANT CHANGE UP
-  CEFTRIAXONE: SIGNIFICANT CHANGE UP
-  CIPROFLOXACIN: SIGNIFICANT CHANGE UP
-  CIPROFLOXACIN: SIGNIFICANT CHANGE UP
-  ERTAPENEM: SIGNIFICANT CHANGE UP
-  ERTAPENEM: SIGNIFICANT CHANGE UP
-  GENTAMICIN: SIGNIFICANT CHANGE UP
-  GENTAMICIN: SIGNIFICANT CHANGE UP
-  IMIPENEM: SIGNIFICANT CHANGE UP
-  IMIPENEM: SIGNIFICANT CHANGE UP
-  LEVOFLOXACIN: SIGNIFICANT CHANGE UP
-  LEVOFLOXACIN: SIGNIFICANT CHANGE UP
-  MEROPENEM: SIGNIFICANT CHANGE UP
-  MEROPENEM: SIGNIFICANT CHANGE UP
-  NITROFURANTOIN: SIGNIFICANT CHANGE UP
-  NITROFURANTOIN: SIGNIFICANT CHANGE UP
-  PIPERACILLIN/TAZOBACTAM: SIGNIFICANT CHANGE UP
-  PIPERACILLIN/TAZOBACTAM: SIGNIFICANT CHANGE UP
-  TOBRAMYCIN: SIGNIFICANT CHANGE UP
-  TOBRAMYCIN: SIGNIFICANT CHANGE UP
-  TRIMETHOPRIM/SULFAMETHOXAZOLE: SIGNIFICANT CHANGE UP
-  TRIMETHOPRIM/SULFAMETHOXAZOLE: SIGNIFICANT CHANGE UP
ANION GAP SERPL CALC-SCNC: 10 MMOL/L — SIGNIFICANT CHANGE UP (ref 5–17)
BUN SERPL-MCNC: 17 MG/DL — SIGNIFICANT CHANGE UP (ref 7–23)
CALCIUM SERPL-MCNC: 8.9 MG/DL — SIGNIFICANT CHANGE UP (ref 8.4–10.5)
CHLORIDE SERPL-SCNC: 100 MMOL/L — SIGNIFICANT CHANGE UP (ref 96–108)
CO2 SERPL-SCNC: 27 MMOL/L — SIGNIFICANT CHANGE UP (ref 22–31)
CREAT SERPL-MCNC: 3.41 MG/DL — HIGH (ref 0.5–1.3)
CULTURE RESULTS: SIGNIFICANT CHANGE UP
DSDNA AB SER-ACNC: 82 IU/ML — HIGH
GLUCOSE BLDC GLUCOMTR-MCNC: 80 MG/DL — SIGNIFICANT CHANGE UP (ref 70–99)
GLUCOSE BLDC GLUCOMTR-MCNC: 92 MG/DL — SIGNIFICANT CHANGE UP (ref 70–99)
GLUCOSE BLDC GLUCOMTR-MCNC: 92 MG/DL — SIGNIFICANT CHANGE UP (ref 70–99)
GLUCOSE BLDC GLUCOMTR-MCNC: 96 MG/DL — SIGNIFICANT CHANGE UP (ref 70–99)
GLUCOSE SERPL-MCNC: 91 MG/DL — SIGNIFICANT CHANGE UP (ref 70–99)
HCT VFR BLD CALC: 28.6 % — LOW (ref 34.5–45)
HGB BLD-MCNC: 9.7 G/DL — LOW (ref 11.5–15.5)
MAGNESIUM SERPL-MCNC: 2.1 MG/DL — SIGNIFICANT CHANGE UP (ref 1.6–2.6)
MCHC RBC-ENTMCNC: 31.9 PG — SIGNIFICANT CHANGE UP (ref 27–34)
MCHC RBC-ENTMCNC: 33.9 GM/DL — SIGNIFICANT CHANGE UP (ref 32–36)
MCV RBC AUTO: 94.2 FL — SIGNIFICANT CHANGE UP (ref 80–100)
METHOD TYPE: SIGNIFICANT CHANGE UP
METHOD TYPE: SIGNIFICANT CHANGE UP
ORGANISM # SPEC MICROSCOPIC CNT: SIGNIFICANT CHANGE UP
PLATELET # BLD AUTO: 97 K/UL — LOW (ref 150–400)
POTASSIUM SERPL-MCNC: 3.9 MMOL/L — SIGNIFICANT CHANGE UP (ref 3.5–5.3)
POTASSIUM SERPL-SCNC: 3.9 MMOL/L — SIGNIFICANT CHANGE UP (ref 3.5–5.3)
RBC # BLD: 3.03 M/UL — LOW (ref 3.8–5.2)
RBC # FLD: 13.5 % — SIGNIFICANT CHANGE UP (ref 10.3–14.5)
SODIUM SERPL-SCNC: 137 MMOL/L — SIGNIFICANT CHANGE UP (ref 135–145)
SPECIMEN SOURCE: SIGNIFICANT CHANGE UP
WBC # BLD: 4.1 K/UL — SIGNIFICANT CHANGE UP (ref 3.8–10.5)
WBC # FLD AUTO: 4.1 K/UL — SIGNIFICANT CHANGE UP (ref 3.8–10.5)

## 2018-01-07 PROCEDURE — 99233 SBSQ HOSP IP/OBS HIGH 50: CPT | Mod: GC

## 2018-01-07 PROCEDURE — 99233 SBSQ HOSP IP/OBS HIGH 50: CPT

## 2018-01-07 PROCEDURE — 93010 ELECTROCARDIOGRAM REPORT: CPT

## 2018-01-07 PROCEDURE — 99232 SBSQ HOSP IP/OBS MODERATE 35: CPT

## 2018-01-07 RX ORDER — OXYCODONE HYDROCHLORIDE 5 MG/1
5 TABLET ORAL EVERY 6 HOURS
Qty: 0 | Refills: 0 | Status: DISCONTINUED | OUTPATIENT
Start: 2018-01-07 | End: 2018-01-14

## 2018-01-07 RX ADMIN — Medication 81 MILLIGRAM(S): at 11:29

## 2018-01-07 RX ADMIN — Medication 1 TABLET(S): at 11:29

## 2018-01-07 RX ADMIN — Medication 1 DROP(S): at 11:29

## 2018-01-07 RX ADMIN — Medication 650 MILLIGRAM(S): at 08:40

## 2018-01-07 RX ADMIN — Medication 650 MILLIGRAM(S): at 10:00

## 2018-01-07 RX ADMIN — Medication 80 MILLIGRAM(S): at 05:29

## 2018-01-07 RX ADMIN — OXYCODONE HYDROCHLORIDE 5 MILLIGRAM(S): 5 TABLET ORAL at 04:27

## 2018-01-07 RX ADMIN — ERTAPENEM SODIUM 100 MILLIGRAM(S): 1 INJECTION, POWDER, LYOPHILIZED, FOR SOLUTION INTRAMUSCULAR; INTRAVENOUS at 21:47

## 2018-01-07 RX ADMIN — LATANOPROST 1 DROP(S): 0.05 SOLUTION/ DROPS OPHTHALMIC; TOPICAL at 21:48

## 2018-01-07 RX ADMIN — Medication 25 MICROGRAM(S): at 05:29

## 2018-01-07 RX ADMIN — OXYCODONE HYDROCHLORIDE 5 MILLIGRAM(S): 5 TABLET ORAL at 03:30

## 2018-01-07 RX ADMIN — Medication 20 MILLIGRAM(S): at 11:29

## 2018-01-07 RX ADMIN — OXYCODONE HYDROCHLORIDE 5 MILLIGRAM(S): 5 TABLET ORAL at 20:00

## 2018-01-07 RX ADMIN — CLOPIDOGREL BISULFATE 75 MILLIGRAM(S): 75 TABLET, FILM COATED ORAL at 11:29

## 2018-01-07 RX ADMIN — Medication 50 MILLIGRAM(S): at 18:25

## 2018-01-07 RX ADMIN — OXYCODONE HYDROCHLORIDE 5 MILLIGRAM(S): 5 TABLET ORAL at 12:10

## 2018-01-07 RX ADMIN — ATORVASTATIN CALCIUM 40 MILLIGRAM(S): 80 TABLET, FILM COATED ORAL at 21:48

## 2018-01-07 RX ADMIN — OXYCODONE HYDROCHLORIDE 5 MILLIGRAM(S): 5 TABLET ORAL at 13:42

## 2018-01-07 RX ADMIN — Medication 650 MILLIGRAM(S): at 02:13

## 2018-01-07 RX ADMIN — OXYCODONE HYDROCHLORIDE 5 MILLIGRAM(S): 5 TABLET ORAL at 18:10

## 2018-01-07 RX ADMIN — Medication 25 MILLIGRAM(S): at 21:50

## 2018-01-07 RX ADMIN — Medication 650 MILLIGRAM(S): at 01:04

## 2018-01-07 NOTE — PROGRESS NOTE ADULT - PROBLEM SELECTOR PLAN 10
DVT ppx: no pharmacologic ppx in setting of thrombocytopenia and prior vaginal bleeding  PT consult as pt with prolonged hospitalization

## 2018-01-07 NOTE — PROGRESS NOTE ADULT - ASSESSMENT
58 yo f with h/o lupus diagnosed in April, lupus nephritis, ESRD on HD via permacath (M, W, F) s/p AVF (not matured), DM2, HTN, HLD, hypothyroid presented with 3 weeks h/o bilateral LE pain and numbness presumed polyneuropathy course c/b lost HD access s/p permacath by IR 1/3/18 and NSVT, s/p LHC/RHC, s/p ANABELA to RCA, noted to have thick, yellowish/green drainage in the vance, concerning for fistula formation but no evidence on CT cystogram

## 2018-01-07 NOTE — PROGRESS NOTE ADULT - ASSESSMENT
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 Sural Nerve bx results today are concerning for vasculitis which may be SLE related. On closer review of pathology report, no definitive vascular wall damage has been reported and there is concern for chronic inflammatory infiltrates. Pt has also demyelination noted on bx.   It is unclear whether this is true acute vasculitis.   Will need to speak with pathologist re: bx results. In interim  - Can start Prednisone 50mg qd. Hold off on pulse steroids at this time.   - Please reconsult neurology to get further input re: causes of demyelinating disease. Toxicology had evaluated pt and recommended repeating Cadmium levels as unclear whether this is related to her underlying neuropathy.     2) SLE- pt with hx of +SULLY, +dsDNA +SSA +Smith.  Also w/ Class V lupus nephritis progressed to ESRD on HD  Current manifestations of possible SLE flare include thrombocytopenia, leukopenia, anemia (although improved from before) and possible neuropathy  Pt with elevated dsDNA 70, +SULLY 1:1280, +SSA >8 , +Smith 8.4  1/6 labs dsDNA 82, C3 41 C4 14.   - Prednisone 50mg     3) Thrombocytopenia/leukopenia/anemia- may all be related to underlying SLE vasculitis and SLE flare.  LDH Haptoglobin wnl.     4) UTI- Pt has +Ur Cx growing E Coli. Currently on Ertapenem, followed by ID    Will follow with you.

## 2018-01-07 NOTE — PROGRESS NOTE ADULT - PROBLEM SELECTOR PLAN 1
Pt with thick, yellowish/greenish drainage into vance catheter, concerning for fistula formation. CT cystogram negative for fistula.   - appreciate urology recs  - continue ertapenam (pt tolerating well) as per ID (day 3)  - BCx, UCx results pending  - no contraindication for steroids if simple UTI  - dc vance  - monitor leukocytosis, fever

## 2018-01-07 NOTE — PROGRESS NOTE ADULT - PROBLEM SELECTOR PLAN 3
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 Positive serology for Lupus,  neuro following. s/p Immunoglobulin infusion.  Appreciate Toxicology consult for cadmium level-per them no evidence of heavy metal exposure by imaging and no other symptoms, does not require chelation.   MRI lumbar, C spine show no cord compression.    Continue with Lyrica- renally dosed.   PT eval- recommend JESSICA placement.   Continue to hold Plaquenil.   s/p Sural nerve biopsy. Follow up results (still pending this morning).   Per neuro no further IVIG at this time.    - sural bx consistent with vasculitic neuropathy  - initially planned for pulse dose steroids but was held in setting of possible fistula formation. Now that fistula is ruled out, will discuss with rheum re: pulse dose

## 2018-01-07 NOTE — PROGRESS NOTE ADULT - ASSESSMENT
58 yo PMH SLE (diagnosed in April) with lupus nephritis, ESRD on HD via permacath (M, W, F) s/p AVF (not matured), DM2 who presented to [facility] on 12/19/17 with 3 weeks of progressive bilateral LE pain and numbness felt to be autoimmune in nature with tentative plan to initiate IV steroids.   Found to have UTI due to E coli. Sural nerve bx consistent with vasculitis.     Plan:  --Continue with Invanz at current dose  --CT with no fistula.  --f/u prelim blood cultures  --f/u final urine culture  --Given no fistula and only UTI, ok to give steroids if needed, pt already s/p 3 days of abx.

## 2018-01-07 NOTE — PROGRESS NOTE ADULT - SUBJECTIVE AND OBJECTIVE BOX
WILLARD BOB  [id number]    INTERVAL HPI/OVERNIGHT EVENTS:  No acute events overnight. Pt's niece and nephew at bedside- report that pt has been more tired and lethargic today, possibly more confused.   They state this happens when she doesn't get her dialysis.    Pt herself reports pain in her legs but denies fevers/chills, CP/SOB.     MEDICATIONS  (STANDING):  aspirin enteric coated 81 milliGRAM(s) Oral daily  atorvastatin 40 milliGRAM(s) Oral at bedtime  bisacodyl 5 milliGRAM(s) Oral at bedtime  calcium carbonate 1250 mG (OsCal) 1 Tablet(s) Oral daily  clopidogrel Tablet 75 milliGRAM(s) Oral daily  dextrose 5%. 1000 milliLiter(s) (50 mL/Hr) IV Continuous <Continuous>  dextrose 50% Injectable 12.5 Gram(s) IV Push once  dextrose 50% Injectable 25 Gram(s) IV Push once  dextrose 50% Injectable 25 Gram(s) IV Push once  dextrose 50% Injectable 12.5 Gram(s) IV Push once  docusate sodium 100 milliGRAM(s) Oral two times a day  ertapenem  IVPB      ertapenem  IVPB 500 milliGRAM(s) IV Intermittent every 24 hours  furosemide    Tablet 80 milliGRAM(s) Oral <User Schedule>  insulin lispro (HumaLOG) corrective regimen sliding scale   SubCutaneous three times a day before meals  insulin lispro (HumaLOG) corrective regimen sliding scale   SubCutaneous at bedtime  latanoprost 0.005% Ophthalmic Solution 1 Drop(s) Both EYES at bedtime  levothyroxine 25 MICROGram(s) Oral daily  metoprolol succinate ER 25 milliGRAM(s) Oral daily  Nephro-madiha 1 Tablet(s) Oral daily  ondansetron Injectable 4 milliGRAM(s) IV Push once  PARoxetine 20 milliGRAM(s) Oral daily  senna 2 Tablet(s) Oral at bedtime  timolol 0.5% Solution 1 Drop(s) Both EYES daily    MEDICATIONS  (PRN):  acetaminophen   Tablet 650 milliGRAM(s) Oral every 6 hours PRN For Temp greater than 38 C (100.4 F)  acetaminophen   Tablet. 650 milliGRAM(s) Oral every 6 hours PRN Mild Pain (1 - 3)  dextrose Gel 1 Dose(s) Oral once PRN Blood Glucose LESS THAN 70 milliGRAM(s)/deciliter  glucagon  Injectable 1 milliGRAM(s) IntraMuscular once PRN Glucose LESS THAN 70 milligrams/deciliter  oxyCODONE    IR 5 milliGRAM(s) Oral every 6 hours PRN Moderate Pain (4 - 6)      Allergies    penicillin (Rash)    Intolerances        Review of Systems:   General: No fevers/chills, no fatigue  CVS: No CP/palpitations  Resp: No SOB/wheezing  GI: No N/V/C/D/abdominal pain  MSK: +leg pains  Skin: No new rashes  Neuro: No headaches      Vital Signs Last 24 Hrs  T(C): 36.6 (07 Jan 2018 12:27), Max: 37.1 (06 Jan 2018 21:03)  T(F): 97.9 (07 Jan 2018 12:27), Max: 98.8 (06 Jan 2018 21:03)  HR: 76 (07 Jan 2018 12:27) (76 - 78)  BP: 162/77 (07 Jan 2018 12:27) (144/85 - 162/77)  BP(mean): --  RR: 18 (07 Jan 2018 12:27) (17 - 18)  SpO2: 99% (07 Jan 2018 12:27) (97% - 100%)    Physical Exam:  General: NAD, slightly lethargic  HEENT: EOMI, MMM  Cardio: +S1/S2, RRR  Resp: CTA b/l  GI: +BS, soft, NT/ND  MSK: No major joint abnormalities noted  Neuro: AAOx2-3. LE muscle strength exam 3+/5  Psych: wnl    LABS:                        9.7    4.1   )-----------( 97       ( 07 Jan 2018 06:50 )             28.6     01-07    137  |  100  |  17  ----------------------------<  91  3.9   |  27  |  3.41<H>    Ca    8.9      07 Jan 2018 06:50  Mg     2.1     01-07    RADIOLOGY & ADDITIONAL TESTS:  Surgical Pathology Report (12.28.17 @ 08:23)    Surgical Pathology Report:   ACCESSION No:  [id number]    WILLARD BOB                     2        Surgical Final Report          Final Diagnosis  The case was examined and reported in its entirety by Dr. Pavel Mendez at [facility], [place], [place]  (accession #  [id number]). The report has been entered into [employer] only for  purposes of documentation.  Dr. Bowman has not examined this  material.    Diagnosis  A. Peripheral nerve, left sural, biopsy  -Severe chronic axonal loss with active axonal degeneration, see  note  -Perivascular chronic inflammatory infiltrates, suggestive for  vasculitic neuropathy, see note    Note: H and E and EVG-trichrome stained sections show peripheral  nerve in cross-sections and longitudinal arrays with myelin  ovoids and a markedly decreases density of large myelinated  fibers.  An immunohistochemical stain for neurofilament with  appropriate controls confirms severe depletion of large  myelinated axons.  A Congo Red stain is negative for amyloid  deposition.    Foci of perivascular chronic inflammatory cells are present  around small perineurial and epineurial blood vessels without  definitive evidence of vascular wall damage.  An  immunohistochemical stain for CD45 with appropriate controls  confirms the presence of perivascular hematopoietic mononuclear  cells.  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    The sample submitted in glutaraldehyde will be embedded in epoxy  plastic blocks and sections of these blocks will be examined by  light microscopy.  Subsequent electron microscopy may be  performed, if the findings from the sections from the epoxy  plastic blocks are judged to warrant further investigation.  An  addendum will be issued with these subsequent findings.

## 2018-01-07 NOTE — PROGRESS NOTE ADULT - SUBJECTIVE AND OBJECTIVE BOX
Patient is a 57y old  Female who presents with a chief complaint of LE pain and inability to walk (19 Dec 2017 10:27)      SUBJECTIVE / OVERNIGHT EVENTS:  no acute events overnight. vss, afebrile. Pt had few episodes of diarrhea and associated abdominal discomfort but this morning, resolved. Vance catheter minimally draining. Denies fever/chills, chest pain, sob, abdominal pain, n/v.     MEDICATIONS  (STANDING):  aspirin enteric coated 81 milliGRAM(s) Oral daily  atorvastatin 40 milliGRAM(s) Oral at bedtime  bisacodyl 5 milliGRAM(s) Oral at bedtime  calcium carbonate 1250 mG (OsCal) 1 Tablet(s) Oral daily  clopidogrel Tablet 75 milliGRAM(s) Oral daily  dextrose 5%. 1000 milliLiter(s) (50 mL/Hr) IV Continuous <Continuous>  dextrose 50% Injectable 12.5 Gram(s) IV Push once  dextrose 50% Injectable 25 Gram(s) IV Push once  dextrose 50% Injectable 25 Gram(s) IV Push once  dextrose 50% Injectable 12.5 Gram(s) IV Push once  docusate sodium 100 milliGRAM(s) Oral two times a day  ertapenem  IVPB      ertapenem  IVPB 500 milliGRAM(s) IV Intermittent every 24 hours  furosemide    Tablet 80 milliGRAM(s) Oral <User Schedule>  insulin lispro (HumaLOG) corrective regimen sliding scale   SubCutaneous three times a day before meals  insulin lispro (HumaLOG) corrective regimen sliding scale   SubCutaneous at bedtime  latanoprost 0.005% Ophthalmic Solution 1 Drop(s) Both EYES at bedtime  levothyroxine 25 MICROGram(s) Oral daily  metoprolol succinate ER 25 milliGRAM(s) Oral daily  Nephro-madiha 1 Tablet(s) Oral daily  ondansetron Injectable 4 milliGRAM(s) IV Push once  PARoxetine 20 milliGRAM(s) Oral daily  senna 2 Tablet(s) Oral at bedtime  timolol 0.5% Solution 1 Drop(s) Both EYES daily    MEDICATIONS  (PRN):  acetaminophen   Tablet 650 milliGRAM(s) Oral every 6 hours PRN For Temp greater than 38 C (100.4 F)  acetaminophen   Tablet. 650 milliGRAM(s) Oral every 6 hours PRN Mild Pain (1 - 3)  dextrose Gel 1 Dose(s) Oral once PRN Blood Glucose LESS THAN 70 milliGRAM(s)/deciliter  glucagon  Injectable 1 milliGRAM(s) IntraMuscular once PRN Glucose LESS THAN 70 milligrams/deciliter  oxyCODONE    IR 5 milliGRAM(s) Oral every 6 hours PRN Moderate Pain (4 - 6)      CAPILLARY BLOOD GLUCOSE      POCT Blood Glucose.: 92 mg/dL (2018 09:10)  POCT Blood Glucose.: 96 mg/dL (2018 21:24)  POCT Blood Glucose.: 93 mg/dL (2018 17:48)  POCT Blood Glucose.: 146 mg/dL (2018 13:16)    I&O's Summary    2018 07:01  -  2018 07:00  --------------------------------------------------------  IN: 750 mL / OUT: 1600 mL / NET: -850 mL        PHYSICAL EXAM:  Vital Signs Last 24 Hrs  T(C): 36.8 (2018 04:08), Max: 37.1 (2018 21:03)  T(F): 98.2 (2018 04:08), Max: 98.8 (2018 21:03)  HR: 78 (2018 04:08) (76 - 78)  BP: 150/77 (2018 04:08) (144/85 - 150/77)  BP(mean): --  RR: 18 (2018 04:08) (17 - 18)  SpO2: 97% (2018 04:08) (97% - 100%)    GENERAL: NAD, well-developed  HEAD:  Atraumatic, Normocephalic  EYES: EOMI, PERRLA, conjunctiva and sclera clear  NECK: Supple, No JVD  CHEST/LUNG: Clear to auscultation bilaterally; No wheeze  HEART: Regular rate and rhythm; No murmurs, rubs, or gallops  ABDOMEN: Soft, Nontender, Nondistended; Bowel sounds present  : vance draining thick/yellow/green drainage  EXTREMITIES:  2+ Peripheral Pulses, No clubbing, cyanosis, or edema  VASCULAR: Left brachiocephalic AVF with thrill and bruit,  NEUROLOGY: AAOX3; non-focal  SKIN: No rashes or lesions    LABS:  personally reviewed                        9.7    4.1   )-----------( 97       ( 2018 06:50 )             28.6         137  |  100  |  17  ----------------------------<  91  3.9   |  27  |  3.41<H>    Ca    8.9      2018 06:50  Mg     2.1                 Urinalysis Basic - ( 2018 13:29 )    Color: Brown / Appearance: Turbid / S.022 / pH: x  Gluc: x / Ketone: Negative  / Bili: Negative / Urobili: Negative   Blood: x / Protein: 150 mg/dL / Nitrite: Negative   Leuk Esterase: Large / RBC: >50 /HPF / WBC >50 /HPF   Sq Epi: x / Non Sq Epi: x / Bacteria: Many /HPF    Left sural biopsy result reviewed - consistent with vasculitic neuropathy    RADIOLOGY & ADDITIONAL TESTS:    Imaging Personally Reviewed:  - CT cystogram: no fistula  - tele: NSR in 70-90s, no events    Consultant(s) Notes Reviewed:  ID, urology    Care Discussed with Consultants/Other Providers: Dr. Davidson

## 2018-01-07 NOTE — PROGRESS NOTE ADULT - SUBJECTIVE AND OBJECTIVE BOX
57y old  Female who presents with a chief complaint of LE pain and inability to walk      Interval history:  Afebrile, + diarrhea, leg pain persists.      Antimicrobials:  ertapenem  IVPB 500 milliGRAM(s) IV Intermittent every 24 hours    REVIEW OF SYSTEMS:    No chest pain or palpitations  No cough, no SOB  No N/V, no abdominal pain       Vital Signs Last 24 Hrs  T(C): 36.8 (01-07-18 @ 04:08), Max: 37.1 (01-06-18 @ 21:03)  T(F): 98.2 (01-07-18 @ 04:08), Max: 98.8 (01-06-18 @ 21:03)  HR: 78 (01-07-18 @ 04:08) (76 - 78)  BP: 150/77 (01-07-18 @ 04:08) (144/85 - 150/77)  RR: 18 (01-07-18 @ 04:08) (17 - 18)  SpO2: 97% (01-07-18 @ 04:08) (97% - 100%)    PHYSICAL EXAM:  Patient in no acute distress. Alert, oriented.   No icterus, no oral ulcers.  Cardiovascular: S1S2 normal.  Lungs: Good air entry B/L lung fields.  Gastrointestinal: soft, nontender, nondistended.  Extremities: no edema.  IV sites not inflamed.   Rt chest permacath  Lt arm fistula with thrill                        9.7    4.1   )-----------( 97       ( 07 Jan 2018 06:50 )             28.6   01-07    137  |  100  |  17  ----------------------------<  91  3.9   |  27  |  3.41<H>    Ca    8.9      07 Jan 2018 06:50  Mg     2.1     01-07        Culture - Blood (collected 05 Jan 2018 22:49)  Source: .Blood Blood  Preliminary Report (06 Jan 2018 23:01):    No growth to date.    Culture - Blood (collected 05 Jan 2018 22:49)  Source: .Blood Blood  Preliminary Report (06 Jan 2018 23:01):    No growth to date.    Culture - Urine (collected 05 Jan 2018 16:52)  Source: .Urine Catheterized  Preliminary Report (06 Jan 2018 17:50):    >100,000 CFU/ml Escherichia coli      Radiology:  < from: CT Pelvis No Cont (01.06.18 @ 14:11) >  IMPRESSION:     Approximately 175 cc of contrast administered via Johnson catheter.    Heterogeneous collection in the dependent portion of the bladder,   nonspecific. May represent intraluminal blood, pus, or less likely,    focal bladder wall thickening.    No bladder contrast extravasation. No definitive evidence of a fistula.

## 2018-01-07 NOTE — PROGRESS NOTE ADULT - PROBLEM SELECTOR PLAN 2
s/p LHC/RHC, s/p ANABELA to RCA without complication. Appreciated EP recs -   given concern for post-intervention VT, continue tele for now  - monitor electrolytes and replete   - continue DAPT  - appreciate EP recs

## 2018-01-08 LAB
ANION GAP SERPL CALC-SCNC: 11 MMOL/L — SIGNIFICANT CHANGE UP (ref 5–17)
BUN SERPL-MCNC: 30 MG/DL — HIGH (ref 7–23)
CALCIUM SERPL-MCNC: 9.3 MG/DL — SIGNIFICANT CHANGE UP (ref 8.4–10.5)
CHLORIDE SERPL-SCNC: 97 MMOL/L — SIGNIFICANT CHANGE UP (ref 96–108)
CO2 SERPL-SCNC: 26 MMOL/L — SIGNIFICANT CHANGE UP (ref 22–31)
CREAT SERPL-MCNC: 4.7 MG/DL — HIGH (ref 0.5–1.3)
GLUCOSE BLDC GLUCOMTR-MCNC: 120 MG/DL — HIGH (ref 70–99)
GLUCOSE BLDC GLUCOMTR-MCNC: 123 MG/DL — HIGH (ref 70–99)
GLUCOSE BLDC GLUCOMTR-MCNC: 148 MG/DL — HIGH (ref 70–99)
GLUCOSE BLDC GLUCOMTR-MCNC: 157 MG/DL — HIGH (ref 70–99)
GLUCOSE SERPL-MCNC: 125 MG/DL — HIGH (ref 70–99)
M TB TUBERC IFN-G BLD QL: 0.01 IU/ML — SIGNIFICANT CHANGE UP
M TB TUBERC IFN-G BLD QL: 0.1 IU/ML — SIGNIFICANT CHANGE UP
M TB TUBERC IFN-G BLD QL: NEGATIVE — SIGNIFICANT CHANGE UP
MITOGEN IGNF BCKGRD COR BLD-ACNC: 3.29 IU/ML — SIGNIFICANT CHANGE UP
POTASSIUM SERPL-MCNC: 4.4 MMOL/L — SIGNIFICANT CHANGE UP (ref 3.5–5.3)
POTASSIUM SERPL-SCNC: 4.4 MMOL/L — SIGNIFICANT CHANGE UP (ref 3.5–5.3)
SODIUM SERPL-SCNC: 134 MMOL/L — LOW (ref 135–145)

## 2018-01-08 PROCEDURE — 99233 SBSQ HOSP IP/OBS HIGH 50: CPT

## 2018-01-08 PROCEDURE — 99232 SBSQ HOSP IP/OBS MODERATE 35: CPT | Mod: GC

## 2018-01-08 PROCEDURE — 90935 HEMODIALYSIS ONE EVALUATION: CPT | Mod: GC

## 2018-01-08 RX ADMIN — Medication 50 MILLIGRAM(S): at 05:54

## 2018-01-08 RX ADMIN — OXYCODONE HYDROCHLORIDE 5 MILLIGRAM(S): 5 TABLET ORAL at 23:20

## 2018-01-08 RX ADMIN — Medication 81 MILLIGRAM(S): at 10:44

## 2018-01-08 RX ADMIN — LATANOPROST 1 DROP(S): 0.05 SOLUTION/ DROPS OPHTHALMIC; TOPICAL at 21:40

## 2018-01-08 RX ADMIN — OXYCODONE HYDROCHLORIDE 5 MILLIGRAM(S): 5 TABLET ORAL at 16:41

## 2018-01-08 RX ADMIN — OXYCODONE HYDROCHLORIDE 5 MILLIGRAM(S): 5 TABLET ORAL at 01:26

## 2018-01-08 RX ADMIN — OXYCODONE HYDROCHLORIDE 5 MILLIGRAM(S): 5 TABLET ORAL at 22:43

## 2018-01-08 RX ADMIN — OXYCODONE HYDROCHLORIDE 5 MILLIGRAM(S): 5 TABLET ORAL at 00:05

## 2018-01-08 RX ADMIN — ERTAPENEM SODIUM 100 MILLIGRAM(S): 1 INJECTION, POWDER, LYOPHILIZED, FOR SOLUTION INTRAMUSCULAR; INTRAVENOUS at 21:40

## 2018-01-08 RX ADMIN — Medication 1 DROP(S): at 10:44

## 2018-01-08 RX ADMIN — CLOPIDOGREL BISULFATE 75 MILLIGRAM(S): 75 TABLET, FILM COATED ORAL at 10:44

## 2018-01-08 RX ADMIN — Medication 1 TABLET(S): at 10:44

## 2018-01-08 RX ADMIN — Medication 1: at 17:58

## 2018-01-08 RX ADMIN — Medication 25 MILLIGRAM(S): at 21:40

## 2018-01-08 RX ADMIN — ATORVASTATIN CALCIUM 40 MILLIGRAM(S): 80 TABLET, FILM COATED ORAL at 21:40

## 2018-01-08 RX ADMIN — OXYCODONE HYDROCHLORIDE 5 MILLIGRAM(S): 5 TABLET ORAL at 10:31

## 2018-01-08 RX ADMIN — Medication 20 MILLIGRAM(S): at 10:44

## 2018-01-08 RX ADMIN — Medication 25 MICROGRAM(S): at 05:54

## 2018-01-08 RX ADMIN — OXYCODONE HYDROCHLORIDE 5 MILLIGRAM(S): 5 TABLET ORAL at 18:06

## 2018-01-08 RX ADMIN — OXYCODONE HYDROCHLORIDE 5 MILLIGRAM(S): 5 TABLET ORAL at 08:57

## 2018-01-08 NOTE — PROGRESS NOTE ADULT - PROBLEM SELECTOR PLAN 1
Pt with ESRD on HD. Pt s/p tunneled HD catheter 1/3/2018.   Plan for maintenance HD today. Monitor BMP and BP

## 2018-01-08 NOTE — PROGRESS NOTE ADULT - ASSESSMENT
58 yo PMH SLE (diagnosed in April) with lupus nephritis, ESRD on HD via permacath (M, W, F) s/p AVF (not matured), DM2 who presented to [facility] on 12/19/17 with 3 weeks of progressive bilateral LE pain and numbness felt to be autoimmune in nature with tentative plan to initiate IV steroids.   Found to have UTI due to E coli. Sural nerve bx consistent with vasculitis.   Ct with no fistula  Cystitis leading to pus in vance  Clinically better  Can soon de-escalate to oral fluroquinolones  7-10 day total course  Other plan per primary  Case d/w Med NP  Will Follow.  Beeper [id number]/[id number]/No response-[id number]

## 2018-01-08 NOTE — PROGRESS NOTE ADULT - SUBJECTIVE AND OBJECTIVE BOX
Cardiology Attending Progress Note    CHIEF COMPLAINT/REASON FOR CONSULT: NSVT    HISTORY OF PRESENT ILLNESS:    56 yo F w/ Lupus (dx 2017),, lupus nephritis, ESRD on HD via permacath (M, W, F) s/p AVF (not matured), DM2, HTN, HL, hypothyroid, admitted 2017 with 3 weeks h/o bilateral LE pain and numbness, subsequently found to have NSVT on telemetry, now s/p C with PCI on 2018 (ANABELA placed to mRCA lesion with 90% stenosis).    INTERVAL EVENTS:     Allergies    penicillin (Rash)    Intolerances    	    MEDICATIONS:  aspirin enteric coated 81 milliGRAM(s) Oral daily  clopidogrel Tablet 75 milliGRAM(s) Oral daily  furosemide    Tablet 80 milliGRAM(s) Oral <User Schedule>  metoprolol succinate ER 25 milliGRAM(s) Oral daily    ertapenem  IVPB      ertapenem  IVPB 500 milliGRAM(s) IV Intermittent every 24 hours      acetaminophen   Tablet 650 milliGRAM(s) Oral every 6 hours PRN  acetaminophen   Tablet. 650 milliGRAM(s) Oral every 6 hours PRN  ondansetron Injectable 4 milliGRAM(s) IV Push once  oxyCODONE    IR 5 milliGRAM(s) Oral every 6 hours PRN  PARoxetine 20 milliGRAM(s) Oral daily    bisacodyl 5 milliGRAM(s) Oral at bedtime  docusate sodium 100 milliGRAM(s) Oral two times a day  senna 2 Tablet(s) Oral at bedtime    atorvastatin 40 milliGRAM(s) Oral at bedtime  dextrose 50% Injectable 12.5 Gram(s) IV Push once  dextrose 50% Injectable 25 Gram(s) IV Push once  dextrose 50% Injectable 25 Gram(s) IV Push once  dextrose 50% Injectable 12.5 Gram(s) IV Push once  dextrose Gel 1 Dose(s) Oral once PRN  glucagon  Injectable 1 milliGRAM(s) IntraMuscular once PRN  insulin lispro (HumaLOG) corrective regimen sliding scale   SubCutaneous three times a day before meals  insulin lispro (HumaLOG) corrective regimen sliding scale   SubCutaneous at bedtime  levothyroxine 25 MICROGram(s) Oral daily  predniSONE   Tablet 50 milliGRAM(s) Oral daily    calcium carbonate 1250 mG (OsCal) 1 Tablet(s) Oral daily  dextrose 5%. 1000 milliLiter(s) IV Continuous <Continuous>  latanoprost 0.005% Ophthalmic Solution 1 Drop(s) Both EYES at bedtime  Nephro-madiha 1 Tablet(s) Oral daily  timolol 0.5% Solution 1 Drop(s) Both EYES daily      PAST MEDICAL & SURGICAL HISTORY:  Lupus (systemic lupus erythematosus)  ESRD (end-stage renal disease) due to SLE  Glaucoma  Other hyperlipidemia  Type 2 diabetes mellitus without complication, without long-term current use of insulin  Essential hypertension  Hypercholesteremia  Hypertension  Diabetes  S/P appendectomy  H/O gastric bypass: 2016  S/P  section: times two      FAMILY HISTORY:  History of hypertension (Sibling): sibling  DM (diabetes mellitus) (Sibling): siblings  History of hypertension: father and mother  DM (diabetes mellitus): mother and father      SOCIAL HISTORY:    Never smoked, no ETOH, no IVDU    REVIEW OF SYSTEMS:    CONSTITUTIONAL: No weakness, fevers or chills  EYES/ENT: No visual changes;  No vertigo or throat pain   NECK: No pain or stiffness  RESPIRATORY: No cough, wheezing, hemoptysis; No shortness of breath  CARDIOVASCULAR: No chest pain or palpitations  GASTROINTESTINAL: No abdominal or epigastric pain. No nausea, vomiting, or hematemesis; No diarrhea or constipation. No melena or hematochezia.  GENITOURINARY: No dysuria, frequency or hematuria  NEUROLOGICAL: No numbness or weakness  SKIN: No itching, burning, rashes, or lesions   All other review of systems is negative unless indicated above.    PHYSICAL EXAM:  T(C): 37.2 (18 @ 05:07), Max: 37.2 (18 @ 21:24)  HR: 79 (18 @ 05:07) (76 - 79)  BP: 153/80 (18 @ 05:07) (150/77 - 162/77)  RR: 18 (18 @ 05:07) (18 - 18)  SpO2: 99% (18 @ 05:07) (97% - 99%)  Wt(kg): --  I&O's Summary    2018 07:01  -  2018 07:00  --------------------------------------------------------  IN: 170 mL / OUT: 150 mL / NET: 20 mL        Appearance: Normal	  HEENT:   Normal oral mucosa, PERRL, EOMI	  Lymphatic: No lymphadenopathy  Cardiovascular: Normal S1 S2, No JVD, No murmurs, No edema  Respiratory: Lungs clear to auscultation	  Psychiatry: A & O x 3, Mood & affect appropriate  Gastrointestinal:  Soft, Non-tender, + BS	  Skin: No rashes, No ecchymoses, No cyanosis	  Neurologic: Non-focal  Extremities: Normal range of motion, No clubbing, cyanosis or edema  Vascular: Peripheral pulses palpable 2+ bilaterally    LABS:	 	    CBC Full  -  ( 2018 06:50 )  WBC Count : 4.1 K/uL  Hemoglobin : 9.7 g/dL  Hematocrit : 28.6 %  Platelet Count - Automated : 97 K/uL  Mean Cell Volume : 94.2 fl  Mean Cell Hemoglobin : 31.9 pg  Mean Cell Hemoglobin Concentration : 33.9 gm/dL  Auto Neutrophil # : x  Auto Lymphocyte # : x  Auto Monocyte # : x  Auto Eosinophil # : x  Auto Basophil # : x  Auto Neutrophil % : x  Auto Lymphocyte % : x  Auto Monocyte % : x  Auto Eosinophil % : x  Auto Basophil % : x        134<L>  |  97  |  30<H>  ----------------------------<  125<H>  4.4   |  26  |  4.70<H>      137  |  100  |  17  ----------------------------<  91  3.9   |  27  |  3.41<H>    Ca    9.3      2018 06:28  Ca    8.9      2018 06:50  Mg     2.1             proBNP:   Lipid Profile:   HgA1c:   TSH:     CARDIAC MARKERS:            ECG:    TELEMETRY: NSR 70-90, no ectopy  	    	  CXR:     TTE: 2017  Conclusions:  1. Normal left ventricular internal dimensions and wall  thicknesses. There is assymetrical basal hypertrophy up to  1.5cm.  2. Hyperdynamic left ventricular systolic function. No  segmental wall motion abnormalities.  3. Mild diastolic dysfunction (Stage I).  4. Normal right ventricular size and function.     2017: LHC/PCI   A successful drug-eluting stent was performed on the 90 % lesion in the mid RCA (INTEGRITY 3.50 X 22 drug-eluting stent)  CORONARY VESSELS: The coronary circulation is right dominant.  LM:   --  LM: Angiography showed minor luminal irregularities with no flow  limiting lesions.  LAD:   --  Mid LAD: There was a 80 % stenosis.  CX:   --  Circumflex: Angiography showed minor luminal irregularities with  no flow limiting lesions.  RCA:   --  Mid RCA: There was a 90 % stenosis.    A/P:    1.      Liam Cespedes MD  Cardiology Attending  Cell: [phone] Cardiology Attending Progress Note    CHIEF COMPLAINT/REASON FOR CONSULT: NSVT    HISTORY OF PRESENT ILLNESS:    58 yo F w/ Lupus (dx 2017),, lupus nephritis, ESRD on HD via permacath (M, W, F) s/p AVF (not matured), DM2, HTN, HL, hypothyroid, admitted 2017 with 3 weeks h/o bilateral LE pain and numbness, subsequently found to have NSVT on telemetry, now s/p C with PCI on 2018 (ANABELA placed to mRCA lesion with 90% stenosis).    INTERVAL EVENTS:   Patient seen and examined. Overall she states that she fells well. No Chest Pain. No SOB. No HA/Dizziness. No Palpitations. No N/V/D/F/C.      Allergies  penicillin (Rash)    Intolerances  MEDICATIONS:  aspirin enteric coated 81 milliGRAM(s) Oral daily  clopidogrel Tablet 75 milliGRAM(s) Oral daily  furosemide    Tablet 80 milliGRAM(s) Oral <User Schedule>  metoprolol succinate ER 25 milliGRAM(s) Oral daily    ertapenem  IVPB      ertapenem  IVPB 500 milliGRAM(s) IV Intermittent every 24 hours      acetaminophen   Tablet 650 milliGRAM(s) Oral every 6 hours PRN  acetaminophen   Tablet. 650 milliGRAM(s) Oral every 6 hours PRN  ondansetron Injectable 4 milliGRAM(s) IV Push once  oxyCODONE    IR 5 milliGRAM(s) Oral every 6 hours PRN  PARoxetine 20 milliGRAM(s) Oral daily    bisacodyl 5 milliGRAM(s) Oral at bedtime  docusate sodium 100 milliGRAM(s) Oral two times a day  senna 2 Tablet(s) Oral at bedtime    atorvastatin 40 milliGRAM(s) Oral at bedtime  dextrose 50% Injectable 12.5 Gram(s) IV Push once  dextrose 50% Injectable 25 Gram(s) IV Push once  dextrose 50% Injectable 25 Gram(s) IV Push once  dextrose 50% Injectable 12.5 Gram(s) IV Push once  dextrose Gel 1 Dose(s) Oral once PRN  glucagon  Injectable 1 milliGRAM(s) IntraMuscular once PRN  insulin lispro (HumaLOG) corrective regimen sliding scale   SubCutaneous three times a day before meals  insulin lispro (HumaLOG) corrective regimen sliding scale   SubCutaneous at bedtime  levothyroxine 25 MICROGram(s) Oral daily  predniSONE   Tablet 50 milliGRAM(s) Oral daily    calcium carbonate 1250 mG (OsCal) 1 Tablet(s) Oral daily  dextrose 5%. 1000 milliLiter(s) IV Continuous <Continuous>  latanoprost 0.005% Ophthalmic Solution 1 Drop(s) Both EYES at bedtime  Nephro-madiha 1 Tablet(s) Oral daily  timolol 0.5% Solution 1 Drop(s) Both EYES daily      PAST MEDICAL & SURGICAL HISTORY:  Lupus (systemic lupus erythematosus)  ESRD (end-stage renal disease) due to SLE  Glaucoma  Other hyperlipidemia  Type 2 diabetes mellitus without complication, without long-term current use of insulin  Essential hypertension  Hypercholesteremia  Hypertension  Diabetes  S/P appendectomy  H/O gastric bypass: 2016  S/P  section: times two      FAMILY HISTORY:  History of hypertension (Sibling): sibling  DM (diabetes mellitus) (Sibling): siblings  History of hypertension: father and mother  DM (diabetes mellitus): mother and father      SOCIAL HISTORY:    Never smoked, no ETOH, no IVDU    REVIEW OF SYSTEMS:    CONSTITUTIONAL: No weakness, fevers or chills  EYES/ENT: No visual changes;  No vertigo or throat pain   NECK: No pain or stiffness  RESPIRATORY: No cough, wheezing, hemoptysis; No shortness of breath  CARDIOVASCULAR: No chest pain or palpitations  GASTROINTESTINAL: No abdominal or epigastric pain. No nausea, vomiting, or hematemesis; No diarrhea or constipation. No melena or hematochezia.  GENITOURINARY: No dysuria, frequency or hematuria  NEUROLOGICAL: No numbness or weakness  SKIN: No itching, burning, rashes, or lesions   All other review of systems is negative unless indicated above.    PHYSICAL EXAM:  T(C): 37.2 (18 @ 05:07), Max: 37.2 (18 @ 21:24)  HR: 79 (18 @ 05:07) (76 - 79)  BP: 153/80 (18 @ 05:07) (150/77 - 162/77)  RR: 18 (18 @ 05:07) (18 - 18)  SpO2: 99% (18 @ 05:07) (97% - 99%)  Wt(kg): --  I&O's Summary    2018 07:01  -  2018 07:00  --------------------------------------------------------  IN: 170 mL / OUT: 150 mL / NET: 20 mL        Appearance: Normal	  HEENT:   Normal oral mucosa, PERRL, EOMI	  Lymphatic: No lymphadenopathy  Cardiovascular: Normal S1 S2, No JVD, 1/6 SUNSHINE  Respiratory: Lungs clear to auscultation	  Psychiatry: A & O x 3, Mood & affect appropriate  Gastrointestinal:  Soft, Non-tender, + BS	  Skin: No rashes, No ecchymoses, No cyanosis	  Neurologic: Non-focal  Extremities: Normal range of motion, No clubbing, cyanosis. Trace LE edema  Vascular: Peripheral pulses palpable 2+ bilaterally    LABS:	 	    CBC Full  -  ( 2018 06:50 )  WBC Count : 4.1 K/uL  Hemoglobin : 9.7 g/dL  Hematocrit : 28.6 %  Platelet Count - Automated : 97 K/uL  Mean Cell Volume : 94.2 fl  Mean Cell Hemoglobin : 31.9 pg  Mean Cell Hemoglobin Concentration : 33.9 gm/dL  Auto Neutrophil # : x  Auto Lymphocyte # : x  Auto Monocyte # : x  Auto Eosinophil # : x  Auto Basophil # : x  Auto Neutrophil % : x  Auto Lymphocyte % : x  Auto Monocyte % : x  Auto Eosinophil % : x  Auto Basophil % : x        134<L>  |  97  |  30<H>  ----------------------------<  125<H>  4.4   |  26  |  4.70<H>      137  |  100  |  17  ----------------------------<  91  3.9   |  27  |  3.41<H>    Ca    9.3      2018 06:28  Ca    8.9      2018 06:50  Mg     2.1             CARDIAC MARKERS:    CKMB: 4.7 -> 4.3  Trop: 0.14 -> 0.14 (2017)    EC2017: NSR, borderline LAD, no ST or TW changes    TELEMETRY: NSR 70-90, no ectopy  	    	  CXR: 2017:  FINDINGS:   Interval removal of right sided central venous catheter. A right   subclavian vascular mesh stent is visualized.  The heart size is normal.   The lungs are clear. There are no pleural effusions or pneumothorax.    IMPRESSION:   Interval removal of a right-sided central venous catheter. No   pneumothorax.     TTE: 2017  Conclusions:  1. Normal left ventricular internal dimensions and wall  thicknesses. There is assymetrical basal hypertrophy up to  1.5cm.  2. Hyperdynamic left ventricular systolic function. No  segmental wall motion abnormalities.  3. Mild diastolic dysfunction (Stage I).  4. Normal right ventricular size and function.     2017: LHC/PCI   A successful drug-eluting stent was performed on the 90 % lesion in the mid RCA (INTEGRITY 3.50 X 22 drug-eluting stent)  CORONARY VESSELS: The coronary circulation is right dominant.  LM:   --  LM: Angiography showed minor luminal irregularities with no flow  limiting lesions.  LAD:   --  Mid LAD: There was a 80 % stenosis.  CX:   --  Circumflex: Angiography showed minor luminal irregularities with  no flow limiting lesions.  RCA:   --  Mid RCA: There was a 90 % stenosis.    A/P: 58 yo F w/ SLE (dx 2017), lupus nephritis, ESRD on HD via permacath (M, W, F) s/p AVF (not matured), DM2, HTN, HL, hypothyroid, admitted 2017 with 3 weeks h/o bilateral LE pain and numbness, subsequently found to have NSVT on telemetry, now s/p LHC with PCI on 2018 (ANABELA placed to mRCA lesion with 90% stenosis), now with labs/imaging concerning for SLE flare.    1. CAD s/p PCI - s/p ANABELA to mRCA for 90% stenosis thought to be the culprit lesion, residual mLAD lesion (80%).  -Suspect recent NSVT 2/2 ischemia/CAD, now s/p PCI. No further NSVT noted. Appreciate EPS f/u.  -Cont ASA 81 mg po QD lifelong  -Cont Plavix 75 mg po QD x 1 year  -Cont Atorvastatin 40 mg po QHS  -Cont Metoprolol ER 25 mg po QD  -Reasonable to DC Telemetry at this time.  -Rheum w/u in process. Sural nerve Bx concerning for ?vasculitis. Patient is being maintained on prednisone for SLE.    2. HTN -   -Cont Metoprolol as above    3. HL - Cont Atorvastatin 40 mg po QHS    -Cardiology will sign off at this time. Please re-consult as needed.  -On discharge, Please arrange for the outpatient cardiology follow-up within 4-6 weeks with Dr. Pauly Colon at the [facility] by calling [phone].    Liam Cespedes MD  Cardiology Attending  [facility] / [facility]   Cell: [phone]  (Cardiology Nocturnist cell number available 7 pm - 7 am every night; available daytime week days for follow-up only; daytime weekends covered by general cardiology consult service)

## 2018-01-08 NOTE — PROGRESS NOTE ADULT - PROBLEM SELECTOR PLAN 4
likely from fluid overload, monitor levels. Continue with Lasix and Zaroxylyn per renal  monitor Na  s/p HD yesterday and today

## 2018-01-08 NOTE — PROGRESS NOTE ADULT - SUBJECTIVE AND OBJECTIVE BOX
WILLARD BOB  [id number]    INTERVAL HPI/OVERNIGHT EVENTS:  No acute events overnight. Pt seen and evaluated in dialysis.  Reports pain in LE and continued weakness. Denies fevers/chills/cp/sob/headaches    MEDICATIONS  (STANDING):  aspirin enteric coated 81 milliGRAM(s) Oral daily  atorvastatin 40 milliGRAM(s) Oral at bedtime  bisacodyl 5 milliGRAM(s) Oral at bedtime  calcium carbonate 1250 mG (OsCal) 1 Tablet(s) Oral daily  clopidogrel Tablet 75 milliGRAM(s) Oral daily  dextrose 5%. 1000 milliLiter(s) (50 mL/Hr) IV Continuous <Continuous>  dextrose 50% Injectable 12.5 Gram(s) IV Push once  dextrose 50% Injectable 25 Gram(s) IV Push once  dextrose 50% Injectable 25 Gram(s) IV Push once  dextrose 50% Injectable 12.5 Gram(s) IV Push once  docusate sodium 100 milliGRAM(s) Oral two times a day  ertapenem  IVPB      ertapenem  IVPB 500 milliGRAM(s) IV Intermittent every 24 hours  furosemide    Tablet 80 milliGRAM(s) Oral <User Schedule>  insulin lispro (HumaLOG) corrective regimen sliding scale   SubCutaneous three times a day before meals  insulin lispro (HumaLOG) corrective regimen sliding scale   SubCutaneous at bedtime  latanoprost 0.005% Ophthalmic Solution 1 Drop(s) Both EYES at bedtime  levothyroxine 25 MICROGram(s) Oral daily  metoprolol succinate ER 25 milliGRAM(s) Oral daily  Nephro-madiha 1 Tablet(s) Oral daily  ondansetron Injectable 4 milliGRAM(s) IV Push once  PARoxetine 20 milliGRAM(s) Oral daily  predniSONE   Tablet 50 milliGRAM(s) Oral daily  senna 2 Tablet(s) Oral at bedtime  timolol 0.5% Solution 1 Drop(s) Both EYES daily    MEDICATIONS  (PRN):  acetaminophen   Tablet 650 milliGRAM(s) Oral every 6 hours PRN For Temp greater than 38 C (100.4 F)  acetaminophen   Tablet. 650 milliGRAM(s) Oral every 6 hours PRN Mild Pain (1 - 3)  dextrose Gel 1 Dose(s) Oral once PRN Blood Glucose LESS THAN 70 milliGRAM(s)/deciliter  glucagon  Injectable 1 milliGRAM(s) IntraMuscular once PRN Glucose LESS THAN 70 milligrams/deciliter  oxyCODONE    IR 5 milliGRAM(s) Oral every 6 hours PRN Moderate Pain (4 - 6)      Allergies    penicillin (Rash)    Intolerances    Review of Systems:   General: No fevers/chills, no fatigue  HEENT: No blurry vision  CVS: No CP/palpitations  Resp: No SOB/wheezing  GI: No N/V/C/D/abdominal pain  MSK:  +LE pains and weakness  Skin: No new rashes  Neuro: No headaches      Vital Signs Last 24 Hrs  T(C): 37 (08 Jan 2018 15:40), Max: 37.2 (07 Jan 2018 21:24)  T(F): 98.6 (08 Jan 2018 15:40), Max: 98.9 (07 Jan 2018 21:24)  HR: 80 (08 Jan 2018 16:39) (72 - 80)  BP: 127/78 (08 Jan 2018 16:39) (127/78 - 176/96)  BP(mean): --  RR: 16 (08 Jan 2018 16:39) (16 - 18)  SpO2: 100% (08 Jan 2018 16:39) (97% - 100%)    Physical Exam:  General: NAD  HEENT: EOMI, MMM  Cardio: +S1/S2, RRR  Resp: CTA b/l  GI: +BS, soft, NT/ND  MSK: continued LE weakness, similar to previous exams.  Neuro: AAOx3  Psych: wnl    LABS:                        9.7    4.1   )-----------( 97       ( 07 Jan 2018 06:50 )             28.6     01-08    134<L>  |  97  |  30<H>  ----------------------------<  125<H>  4.4   |  26  |  4.70<H>    Ca    9.3      08 Jan 2018 06:28  Mg     2.1     01-07              RADIOLOGY & ADDITIONAL TESTS:

## 2018-01-08 NOTE — CHART NOTE - NSCHARTNOTEFT_GEN_A_CORE
Source: Patient [ ]    Family [x ]     other [ x]chart  as per Hospitalist note on 1/7    Assessment and Plan:   · Assessment	  58 yo f with h/o lupus diagnosed in April, lupus nephritis, ESRD on HD via permacath (M, W, F) s/p AVF (not matured), DM2, HTN, HLD, hypothyroid presented with 3 weeks h/o bilateral LE pain and numbness presumed polyneuropathy course c/b lost HD access s/p permacath by IR 1/3/18 and NSVT, s/p LHC/RHC, s/p ANABELA to RCA, noted to have thick, yellowish/green drainage in the vance, concerning for fistula formation but no evidence on CT cystogram     Problem/Plan - 1:  ·  Problem: R/O Fistula.  Plan: Pt with thick, yellowish/greenish drainage into vance catheter, concerning for fistula formation. CT cystogram negative for fistula.   - appreciate urology recs  - continue ertapenam (pt tolerating well) as per ID (day 3)  - BCx, UCx results pending  - no contraindication for steroids if simple UTI  - dc vance  - monitor leukocytosis, fever.      Problem/Plan - 2:  ·  Problem: NSVT (nonsustained ventricular tachycardia).  Plan: s/p LHC/RHC, s/p ANABELA to RCA without complication. Appreciated EP recs -   given concern for post-intervention VT, continue tele for now  - monitor electrolytes and replete   - continue DAPT  - appreciate EP recs.      Problem/Plan - 3:  ·  Problem: Weakness of both lower extremities.  Plan: Polyneuropathy likely autoimmune etiology in the setting of SLE. Positive serology for Lupus,  neuro following. s/p Immunoglobulin infusion.  Appreciate Toxicology consult for cadmium level-per them no evidence of heavy metal exposure by imaging and no other symptoms, does not require chelation.   MRI lumbar, C spine show no cord compression.    Continue with Lyrica- renally dosed.   PT eval- recommend JESSICA placement.   Continue to hold Plaquenil.   s/p Sural nerve biopsy. Follow up results (still pending this morning).   Per neuro no further IVIG at this time.    - sural bx consistent with vasculitic neuropathy  - initially planned for pulse dose steroids but was held in setting of possible fistula formation. Now that fistula is ruled out, will discuss with rheum re: pulse dose.      Problem/Plan - 4:  ·  Problem: Hyponatremia.  Plan: likely from fluid overload, monitor levels. Continue with Lasix and Zaroxylyn per renal  s/p HD yesterday and today.      Problem/Plan - 5:  ·  Problem: Vaginal bleeding.  Plan: outpatient gyn followup.      Problem/Plan - 6:  Problem: ESRD (end-stage renal disease) due to SLE. Plan: s/p permacath by IR 1/3/18  Renal following.    last HD yesterday.     Problem/Plan - 7:  ·  Problem: Systemic lupus erythematosus with glomerular disease, unspecified SLE type.  Plan: Plaquenil on hold in setting of LE weakness as outpt.   - appreciate rheum re: pulse dose steroids.      Problem/Plan - 8:  ·  Problem: Type 2 diabetes mellitus with chronic kidney disease on chronic dialysis, unspecified long term insulin use status.  Plan: Not on any home meds at this time.   Well controlled as per family s/p gastric bypass  On insulin sliding scale.      Problem/Plan - 9:  ·  Problem: Hypothyroidism, unspecified type.  Plan: Cont with synthroid.      Problem/Plan - 10:  Problem: Preventative health care. Plan; DVT ppx: no pharmacologic ppx in setting of thrombocytopenia and prior vaginal bleeding  PT consult as pt with prolonged hospitalization.    Attending Attestation:     Diet : [id number]  Nepro 2 x daily      Patient reports [ ] nausea  [ ] vomiting [ ] diarrhea [ ] constipation  [ ]chewing problems [ ] swallowing issues  [X ] other: discussed pt po with son at bedside. she is consuming 6 x daily. she is unable to consume meals all at one time, therefore she is eating 3 meals daily spread out over the day, eating every 2 hours. agreed to receive snacks in between meals-which will be provided. consuming 2 x Nepro daily and refused need to increase.      PO intake:  < 50% [ ] 50-75% [ ]   % [x ]  other :     Source for PO intake [ ] Patient [x ] family [ ] chart [ ] staff [ ] other     Enteral /Parenteral Nutrition:       Current Weight: 116.8pounds/53kg  % Weight Change:6% loss since previous assess on 1/3    Pertinent Medications: MEDICATIONS  (STANDING):  aspirin enteric coated 81 milliGRAM(s) Oral daily  atorvastatin 40 milliGRAM(s) Oral at bedtime  bisacodyl 5 milliGRAM(s) Oral at bedtime  calcium carbonate 1250 mG (OsCal) 1 Tablet(s) Oral daily  clopidogrel Tablet 75 milliGRAM(s) Oral daily  dextrose 5%. 1000 milliLiter(s) (50 mL/Hr) IV Continuous <Continuous>  dextrose 50% Injectable 12.5 Gram(s) IV Push once  dextrose 50% Injectable 25 Gram(s) IV Push once  dextrose 50% Injectable 25 Gram(s) IV Push once  dextrose 50% Injectable 12.5 Gram(s) IV Push once  docusate sodium 100 milliGRAM(s) Oral two times a day  ertapenem  IVPB      ertapenem  IVPB 500 milliGRAM(s) IV Intermittent every 24 hours  furosemide    Tablet 80 milliGRAM(s) Oral <User Schedule>  insulin lispro (HumaLOG) corrective regimen sliding scale   SubCutaneous three times a day before meals  insulin lispro (HumaLOG) corrective regimen sliding scale   SubCutaneous at bedtime  latanoprost 0.005% Ophthalmic Solution 1 Drop(s) Both EYES at bedtime  levothyroxine 25 MICROGram(s) Oral daily  metoprolol succinate ER 25 milliGRAM(s) Oral daily  Nephro-madiha 1 Tablet(s) Oral daily  ondansetron Injectable 4 milliGRAM(s) IV Push once  PARoxetine 20 milliGRAM(s) Oral daily  predniSONE   Tablet 50 milliGRAM(s) Oral daily  senna 2 Tablet(s) Oral at bedtime  timolol 0.5% Solution 1 Drop(s) Both EYES daily    MEDICATIONS  (PRN):  acetaminophen   Tablet 650 milliGRAM(s) Oral every 6 hours PRN For Temp greater than 38 C (100.4 F)  acetaminophen   Tablet. 650 milliGRAM(s) Oral every 6 hours PRN Mild Pain (1 - 3)  dextrose Gel 1 Dose(s) Oral once PRN Blood Glucose LESS THAN 70 milliGRAM(s)/deciliter  glucagon  Injectable 1 milliGRAM(s) IntraMuscular once PRN Glucose LESS THAN 70 milligrams/deciliter  oxyCODONE    IR 5 milliGRAM(s) Oral every 6 hours PRN Moderate Pain (4 - 6)    Pertinent Labs:  01-08 Na134 mmol/L<L> Glu 125 mg/dL<H> K+ 4.4 mmol/L Cr  4.70 mg/dL<H> BUN 30 mg/dL<H>      CAPILLARY BLOOD GLUCOSE      POCT Blood Glucose.: 123 mg/dL (08 Jan 2018 08:47)  POCT Blood Glucose.: 96 mg/dL (07 Jan 2018 21:03)  POCT Blood Glucose.: 80 mg/dL (07 Jan 2018 17:56)  POCT Blood Glucose.: 92 mg/dL (07 Jan 2018 13:00)    Hemoglobin A1C, Whole Blood: 4.7 % (12-20 @ 07:23)          Skin: +1 left/right ankle, surgical incision    Estimated Needs:   [x ] no change since previous assessment  [ ] recalculated:       Previous Nutrition Diagnosis:     [ ] Inadequate Energy Intake [ ]Inadequate Oral Intake [ ] Excessive Energy Intake     [ ] Underweight [ ] Increased Nutrient Needs [ ] Overweight/Obesity     [ ] Altered GI Function [ ] Unintended Weight Loss [ ] Food & Nutrition Related Knowledge Deficit [x] Malnutrition-severe         Nutrition Diagnosis is [x ] ongoing  [ ] resolved [ ] not applicable -being addressed with supplements and snacks         New Nutrition Diagnosis: [ x] not applicable    [ ] Inadequate Protein Energy Intake [ ]Inadequate Oral Intake [ ] Excessive Energy Intake     [ ] Underweight [ ] Increased Nutrient Needs [ ] Overweight/Obesity     [ ] Altered GI Function [ ] Unintended Weight Loss [ ] Food & Nutrition Related Knowledge Deficit[ ] Limited Adherence to nutrition related recommendations [ ] Malnutrition  [ ] other: Free text       Related to:      As evidenced by:      Interventions:     Recommend    [ ] Change Diet To:    [ ] Nutrition Supplement    [ ] Nutrition Support    [ ] Other:        Monitoring and Evaluation:     [ x] PO intake [x ] Tolerance to diet prescription [x ] weights [x ] follow up per protocol    [x ] other: snack preferences

## 2018-01-08 NOTE — PROGRESS NOTE ADULT - SUBJECTIVE AND OBJECTIVE BOX
[facility] DIVISION OF KIDNEY DISEASES AND HYPERTENSION -- [phone]   FOLLOW UP NOTE  --------------------------------------------------------------------------------  HPI:  57 year old woman with ESRD on HD presenting with limb weakness, now s/p sural nerve biopsy Sural Nerve bx results are concerning for vasculitis which may be SLE related, currently on steroids.  Pt also with UTI on abx. Pt also found to have NSVT on telemetry, now s/p [facility] with PCI on 01/05/2018 (ANABELA placed to mRCA lesion with 90% stenosis).  Pt seen and examined at bedside.   PAST HISTORY  --------------------------------------------------------------------------------  No significant changes to PMH, PSH, FHx, SHx, unless otherwise noted    ALLERGIES & MEDICATIONS  --------------------------------------------------------------------------------  Allergies    penicillin (Rash)    Intolerances      Standing Inpatient Medications  aspirin enteric coated 81 milliGRAM(s) Oral daily  atorvastatin 40 milliGRAM(s) Oral at bedtime  bisacodyl 5 milliGRAM(s) Oral at bedtime  calcium carbonate 1250 mG (OsCal) 1 Tablet(s) Oral daily  clopidogrel Tablet 75 milliGRAM(s) Oral daily  dextrose 5%. 1000 milliLiter(s) IV Continuous <Continuous>  dextrose 50% Injectable 12.5 Gram(s) IV Push once  dextrose 50% Injectable 25 Gram(s) IV Push once  dextrose 50% Injectable 25 Gram(s) IV Push once  dextrose 50% Injectable 12.5 Gram(s) IV Push once  docusate sodium 100 milliGRAM(s) Oral two times a day  ertapenem  IVPB      ertapenem  IVPB 500 milliGRAM(s) IV Intermittent every 24 hours  furosemide    Tablet 80 milliGRAM(s) Oral <User Schedule>  insulin lispro (HumaLOG) corrective regimen sliding scale   SubCutaneous three times a day before meals  insulin lispro (HumaLOG) corrective regimen sliding scale   SubCutaneous at bedtime  latanoprost 0.005% Ophthalmic Solution 1 Drop(s) Both EYES at bedtime  levothyroxine 25 MICROGram(s) Oral daily  metoprolol succinate ER 25 milliGRAM(s) Oral daily  Nephro-madiha 1 Tablet(s) Oral daily  ondansetron Injectable 4 milliGRAM(s) IV Push once  PARoxetine 20 milliGRAM(s) Oral daily  predniSONE   Tablet 50 milliGRAM(s) Oral daily  senna 2 Tablet(s) Oral at bedtime  timolol 0.5% Solution 1 Drop(s) Both EYES daily    PRN Inpatient Medications  acetaminophen   Tablet 650 milliGRAM(s) Oral every 6 hours PRN  acetaminophen   Tablet. 650 milliGRAM(s) Oral every 6 hours PRN  dextrose Gel 1 Dose(s) Oral once PRN  glucagon  Injectable 1 milliGRAM(s) IntraMuscular once PRN  oxyCODONE    IR 5 milliGRAM(s) Oral every 6 hours PRN      REVIEW OF SYSTEMS  --------------------------------------------------------------------------------  General: no fever  CVS: no chest pain  RESP: no sob, no cough  ABD: no abdominal pain  : no dysuria,  MSK: no edema     VITALS/PHYSICAL EXAM  --------------------------------------------------------------------------------  PRIYANK): 37.2 (01-08-18 @ 05:07), Max: 37.2 (01-07-18 @ 21:24)  HR: 79 (01-08-18 @ 05:07) (76 - 79)  BP: 153/80 (01-08-18 @ 05:07) (150/77 - 162/77)  RR: 18 (01-08-18 @ 05:07) (18 - 18)  SpO2: 99% (01-08-18 @ 05:07) (97% - 99%)  Wt(kg): --        01-07-18 @ 07:01  -  01-08-18 @ 07:00  --------------------------------------------------------  IN: 170 mL / OUT: 150 mL / NET: 20 mL      Physical Exam:  	Gen: NAD, well-appearing  	HEENT: on room air  	Pulm: CTA B/L  	CV: normal S1S2; no rub  	Abd: soft                      Back : No sacral edema  	: No vance  	LE: no edema  	Skin: Warm, without rashes  	Vascular access: RIJ tunnelled dialysis catheter, no bleed/ swelling/ discharge around catheter. LUE AVF with palpable thrill and audible bruit.      LABS/STUDIES  --------------------------------------------------------------------------------              9.7    4.1   >-----------<  97       [01-07-18 @ 06:50]              28.6     134  |  97  |  30  ----------------------------<  125      [01-08-18 @ 06:28]  4.4   |  26  |  4.70        Ca     9.3     [01-08-18 @ 06:28]      Mg     2.1     [01-07-18 @ 06:50]            Creatinine Trend:  SCr 4.70 [01-08 @ 06:28]  SCr 3.41 [01-07 @ 06:50]  SCr 4.66 [01-06 @ 07:04]  SCr 3.37 [01-05 @ 06:46]  SCr 4.21 [01-04 @ 06:37]    Urinalysis - [01-05-18 @ 13:29]      Color Brown / Appearance Turbid / SG 1.022 / pH 7.0      Gluc Negative / Ketone Negative  / Bili Negative / Urobili Negative       Blood Small / Protein 150 / Leuk Est Large / Nitrite Negative      RBC >50 / WBC >50 / Hyaline  / Gran  / Sq Epi  / Non Sq Epi  / Bacteria Many      Iron 77, TIBC 90, %sat 86      [05-29-17 @ 09:39]  Ferritin 781.0      [05-29-17 @ 09:39]  PTH -- (Ca 8.1)      [01-04-18 @ 12:57]   65  PTH -- (Ca 7.5)      [06-01-17 @ 08:12]   253  Vitamin D (25OH) <4.0      [05-31-17 @ 08:52]  HbA1c 4.7      [12-20-17 @ 07:23]  TSH 8.03      [12-23-17 @ 08:00]  Lipid: chol 227, , HDL 47,       [05-30-17 @ 07:28]    HBsAb 637.4      [01-06-18 @ 12:57]  HBsAb Reactive      [12-23-17 @ 08:00]  HBsAg Nonreact      [01-06-18 @ 12:57]  HBcAb Reactive      [01-06-18 @ 12:57]  HCV 0.22, Nonreact      [01-06-18 @ 12:57]    SULLY: titer 1:1280, pattern Homogeneous      [12-23-17 @ 08:00]  dsDNA 82      [01-06-18 @ 08:55]  C3 Complement 41      [01-06-18 @ 08:24]  C4 Complement 14      [01-06-18 @ 08:24]  ANCA: cANCA Negative, pANCA Negative, atypical ANCA Indeterminate Method interference due to SULLY fluorescence      [12-23-17 @ 08:00]  Immunofixation Serum:   Weak  IgG Kappa Band Identified    Reference Range: None Detected      [12-23-17 @ 08:00]  SPEP Interpretation: Weak Gamma-Migrating Paraprotein Identified      [12-23-17 @ 08:00]

## 2018-01-08 NOTE — PROGRESS NOTE ADULT - SUBJECTIVE AND OBJECTIVE BOX
Patient is a 57y old  Female who presents with a chief complaint of LE pain and inability to walk (19 Dec 2017 10:27)        SUBJECTIVE / OVERNIGHT EVENTS:      MEDICATIONS  (STANDING):  aspirin enteric coated 81 milliGRAM(s) Oral daily  atorvastatin 40 milliGRAM(s) Oral at bedtime  bisacodyl 5 milliGRAM(s) Oral at bedtime  calcium carbonate 1250 mG (OsCal) 1 Tablet(s) Oral daily  clopidogrel Tablet 75 milliGRAM(s) Oral daily  dextrose 5%. 1000 milliLiter(s) (50 mL/Hr) IV Continuous <Continuous>  dextrose 50% Injectable 12.5 Gram(s) IV Push once  dextrose 50% Injectable 25 Gram(s) IV Push once  dextrose 50% Injectable 25 Gram(s) IV Push once  dextrose 50% Injectable 12.5 Gram(s) IV Push once  docusate sodium 100 milliGRAM(s) Oral two times a day  ertapenem  IVPB      ertapenem  IVPB 500 milliGRAM(s) IV Intermittent every 24 hours  furosemide    Tablet 80 milliGRAM(s) Oral <User Schedule>  insulin lispro (HumaLOG) corrective regimen sliding scale   SubCutaneous three times a day before meals  insulin lispro (HumaLOG) corrective regimen sliding scale   SubCutaneous at bedtime  latanoprost 0.005% Ophthalmic Solution 1 Drop(s) Both EYES at bedtime  levothyroxine 25 MICROGram(s) Oral daily  metoprolol succinate ER 25 milliGRAM(s) Oral daily  Nephro-madiha 1 Tablet(s) Oral daily  ondansetron Injectable 4 milliGRAM(s) IV Push once  PARoxetine 20 milliGRAM(s) Oral daily  predniSONE   Tablet 50 milliGRAM(s) Oral daily  senna 2 Tablet(s) Oral at bedtime  timolol 0.5% Solution 1 Drop(s) Both EYES daily    MEDICATIONS  (PRN):  acetaminophen   Tablet 650 milliGRAM(s) Oral every 6 hours PRN For Temp greater than 38 C (100.4 F)  acetaminophen   Tablet. 650 milliGRAM(s) Oral every 6 hours PRN Mild Pain (1 - 3)  dextrose Gel 1 Dose(s) Oral once PRN Blood Glucose LESS THAN 70 milliGRAM(s)/deciliter  glucagon  Injectable 1 milliGRAM(s) IntraMuscular once PRN Glucose LESS THAN 70 milligrams/deciliter  oxyCODONE    IR 5 milliGRAM(s) Oral every 6 hours PRN Moderate Pain (4 - 6)      Vital Signs Last 24 Hrs  T(C): 37.1 (08 Jan 2018 12:40), Max: 37.2 (07 Jan 2018 21:24)  T(F): 98.8 (08 Jan 2018 12:40), Max: 98.9 (07 Jan 2018 21:24)  HR: 80 (08 Jan 2018 16:39) (77 - 80)  BP: 127/78 (08 Jan 2018 16:39) (127/78 - 176/96)  BP(mean): --  RR: 16 (08 Jan 2018 16:39) (16 - 18)  SpO2: 100% (08 Jan 2018 16:39) (97% - 100%)  CAPILLARY BLOOD GLUCOSE      POCT Blood Glucose.: 148 mg/dL (08 Jan 2018 12:18)  POCT Blood Glucose.: 123 mg/dL (08 Jan 2018 08:47)  POCT Blood Glucose.: 96 mg/dL (07 Jan 2018 21:03)  POCT Blood Glucose.: 80 mg/dL (07 Jan 2018 17:56)    I&O's Summary    07 Jan 2018 07:01  -  08 Jan 2018 07:00  --------------------------------------------------------  IN: 170 mL / OUT: 150 mL / NET: 20 mL    08 Jan 2018 07:01  -  08 Jan 2018 16:42  --------------------------------------------------------  IN: 0 mL / OUT: 2000 mL / NET: -2000 mL        ENERAL: NAD, well-developed  HEAD:  Atraumatic, Normocephalic  EYES: EOMI, PERRLA, conjunctiva and sclera clear  NECK: Supple, No JVD  CHEST/LUNG: Clear to auscultation bilaterally; No wheeze  HEART: Regular rate and rhythm; No murmurs, rubs, or gallops  ABDOMEN: Soft, Nontender, Nondistended; Bowel sounds present  : vance draining thick/yellow/green drainage  EXTREMITIES:  2+ Peripheral Pulses, No clubbing, cyanosis, or edema  VASCULAR: Left brachiocephalic AVF with thrill and bruit,  NEUROLOGY: AAOX3; non-focal  SKIN: No rashes or lesions    LABS:                        9.7    4.1   )-----------( 97       ( 07 Jan 2018 06:50 )             28.6     01-08    134<L>  |  97  |  30<H>  ----------------------------<  125<H>  4.4   |  26  |  4.70<H>    Ca    9.3      08 Jan 2018 06:28  Mg     2.1     01-07                RADIOLOGY & ADDITIONAL TESTS:    Imaging Personally Reviewed:   EKG tracing 1/7 reviewed - NSR@77bpm, no acute ischemic changes  Consultant(s) Notes Reviewed: ID    Care Discussed with Consultants/Other Providers: Patient is a 57y old  Female who presents with a chief complaint of LE pain and inability to walk (19 Dec 2017 10:27)      TranslatorID #[id number]  SUBJECTIVE / OVERNIGHT EVENTS: no acute complaints. states LE weakness improved with steroids      MEDICATIONS  (STANDING):  aspirin enteric coated 81 milliGRAM(s) Oral daily  atorvastatin 40 milliGRAM(s) Oral at bedtime  bisacodyl 5 milliGRAM(s) Oral at bedtime  calcium carbonate 1250 mG (OsCal) 1 Tablet(s) Oral daily  clopidogrel Tablet 75 milliGRAM(s) Oral daily  dextrose 5%. 1000 milliLiter(s) (50 mL/Hr) IV Continuous <Continuous>  dextrose 50% Injectable 12.5 Gram(s) IV Push once  dextrose 50% Injectable 25 Gram(s) IV Push once  dextrose 50% Injectable 25 Gram(s) IV Push once  dextrose 50% Injectable 12.5 Gram(s) IV Push once  docusate sodium 100 milliGRAM(s) Oral two times a day  ertapenem  IVPB      ertapenem  IVPB 500 milliGRAM(s) IV Intermittent every 24 hours  furosemide    Tablet 80 milliGRAM(s) Oral <User Schedule>  insulin lispro (HumaLOG) corrective regimen sliding scale   SubCutaneous three times a day before meals  insulin lispro (HumaLOG) corrective regimen sliding scale   SubCutaneous at bedtime  latanoprost 0.005% Ophthalmic Solution 1 Drop(s) Both EYES at bedtime  levothyroxine 25 MICROGram(s) Oral daily  metoprolol succinate ER 25 milliGRAM(s) Oral daily  Nephro-mdaiha 1 Tablet(s) Oral daily  ondansetron Injectable 4 milliGRAM(s) IV Push once  PARoxetine 20 milliGRAM(s) Oral daily  predniSONE   Tablet 50 milliGRAM(s) Oral daily  senna 2 Tablet(s) Oral at bedtime  timolol 0.5% Solution 1 Drop(s) Both EYES daily    MEDICATIONS  (PRN):  acetaminophen   Tablet 650 milliGRAM(s) Oral every 6 hours PRN For Temp greater than 38 C (100.4 F)  acetaminophen   Tablet. 650 milliGRAM(s) Oral every 6 hours PRN Mild Pain (1 - 3)  dextrose Gel 1 Dose(s) Oral once PRN Blood Glucose LESS THAN 70 milliGRAM(s)/deciliter  glucagon  Injectable 1 milliGRAM(s) IntraMuscular once PRN Glucose LESS THAN 70 milligrams/deciliter  oxyCODONE    IR 5 milliGRAM(s) Oral every 6 hours PRN Moderate Pain (4 - 6)      Vital Signs Last 24 Hrs  T(C): 37.1 (08 Jan 2018 12:40), Max: 37.2 (07 Jan 2018 21:24)  T(F): 98.8 (08 Jan 2018 12:40), Max: 98.9 (07 Jan 2018 21:24)  HR: 80 (08 Jan 2018 16:39) (77 - 80)  BP: 127/78 (08 Jan 2018 16:39) (127/78 - 176/96)  BP(mean): --  RR: 16 (08 Jan 2018 16:39) (16 - 18)  SpO2: 100% (08 Jan 2018 16:39) (97% - 100%)  CAPILLARY BLOOD GLUCOSE      POCT Blood Glucose.: 148 mg/dL (08 Jan 2018 12:18)  POCT Blood Glucose.: 123 mg/dL (08 Jan 2018 08:47)  POCT Blood Glucose.: 96 mg/dL (07 Jan 2018 21:03)  POCT Blood Glucose.: 80 mg/dL (07 Jan 2018 17:56)    I&O's Summary    07 Jan 2018 07:01  -  08 Jan 2018 07:00  --------------------------------------------------------  IN: 170 mL / OUT: 150 mL / NET: 20 mL    08 Jan 2018 07:01  -  08 Jan 2018 16:42  --------------------------------------------------------  IN: 0 mL / OUT: 2000 mL / NET: -2000 mL        ENERAL: NAD, well-developed  HEAD:  Atraumatic, Normocephalic  EYES: EOMI, PERRLA, conjunctiva and sclera clear  NECK: Supple, No JVD  CHEST/LUNG: Clear to auscultation bilaterally; No wheeze  HEART: Regular rate and rhythm; No murmurs, rubs, or gallops  ABDOMEN: Soft, Nontender, Nondistended; Bowel sounds present  : vance draining thick/yellow/green drainage  EXTREMITIES:  2+ Peripheral Pulses, No clubbing, cyanosis, or edema  VASCULAR: Left brachiocephalic AVF with thrill and bruit,  NEUROLOGY: AAOX3; non-focal  SKIN: No rashes or lesions    LABS:                        9.7    4.1   )-----------( 97       ( 07 Jan 2018 06:50 )             28.6     01-08    134<L>  |  97  |  30<H>  ----------------------------<  125<H>  4.4   |  26  |  4.70<H>    Ca    9.3      08 Jan 2018 06:28  Mg     2.1     01-07                RADIOLOGY & ADDITIONAL TESTS:    Imaging Personally Reviewed:   EKG tracing 1/7 reviewed - NSR@77bpm, no acute ischemic changes  Consultant(s) Notes Reviewed: ID    Care Discussed with Consultants/Other Providers:

## 2018-01-08 NOTE — PROGRESS NOTE ADULT - ASSESSMENT
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 Sural Nerve bx results today are concerning for vasculitis which may be SLE related. On closer review of pathology report, no definitive vascular wall damage has been reported and there is concern for chronic inflammatory infiltrates. Pt has also demyelination noted on bx.     Spoke to pathologist Dr. Pavel Mendez at [facility]- states that it is difficult to give definitive diagnosis of vasculitis. Says pt has tremendous amount of axonal loss with around 5% of axons remaining. Also has chronic infiltrative processes but has myelin ovoids which are suggestive of ongoing disease.   There is concern for demyelinating neuropathy vs. vasculitic neuropathy  - c/w Prednisone 50mg qd. Hold off on pulse steroids at this time.   - Neuro chart note reviewed- pt has been started on immunosuppressant therapy- Prednisone. However, she has evidence of demyelinating disease and we would appreciate input re: whether this may be AIDP CIDP? Future management will depend on true underlying process    2) SLE- pt with hx of +SULLY, +dsDNA +SSA +Smith.  Also w/ Class V lupus nephritis progressed to ESRD on HD  Current manifestations of possible SLE flare include thrombocytopenia, leukopenia, anemia (although improved from before) and possible neuropathy  Pt with elevated dsDNA 70, +SULLY 1:1280, +SSA >8 , +Smith 8.4  1/6 labs dsDNA 82, C3 41 C4 14.   - Prednisone 50mg     3) Thrombocytopenia/leukopenia/anemia- may all be related to underlying SLE vasculitis and SLE flare.  LDH Haptoglobin wnl.     4) UTI- Pt has +Ur Cx growing E Coli. Currently on Ertapenem, followed by ID    Will follow

## 2018-01-08 NOTE — PROGRESS NOTE ADULT - ASSESSMENT
56 yo F w/ SLE (dx 04/2017), lupus nephritis, ESRD on HD via permacath (M, W, F) s/p AVF (not matured), DM2, HTN, HL, hypothyroid, admitted 12/20/2017 with 3 weeks h/o bilateral LE pain and numbness, subsequently found to have NSVT on telemetry, now s/p [facility] with PCI on 01/05/2018 (ANABELA placed to mRCA lesion with 90% stenosis), now with labs/imaging concerning for SLE flare. No further NSVT noted on telemetry.    Continue Toprol 25mg qd  Continue telemetry monitoring  Will sign off, please reconsult as needed

## 2018-01-08 NOTE — PROGRESS NOTE ADULT - PROBLEM SELECTOR PLAN 1
Pt with thick, yellowish/greenish drainage into vance catheter, concerning for fistula formation. CT cystogram negative for fistula.   - appreciate urology recs  - continue ertapenam (pt tolerating well) as per ID (day 4) plan to change to Levaquin tomorrow per ID  - BCx neg, UCx Ecoli - s to levaquin  - no contraindication for steroids if simple UTI  - dc vance  - monitor leukocytosis, fever

## 2018-01-08 NOTE — PROGRESS NOTE ADULT - SUBJECTIVE AND OBJECTIVE BOX
EPS NP Note:    Asleep, easily arousable with namecalling, alert, Ox4;  son at bedside  Denies any complaints at this time; no events noted overnight      PE: /80  HR 79   RR 18   Temperature 98.9    O2 saturation 99%  Telemetry: NSR 70-90's, no NSVT noted    LABS:  Basic Metabolic Panel in AM (01.08.18 @ 06:28)    Sodium, Serum: 134 mmol/L    Potassium, Serum: 4.4 mmol/L    Chloride, Serum: 97 mmol/L    Carbon Dioxide, Serum: 26 mmol/L    Anion Gap, Serum: 11 mmol/L    Blood Urea Nitrogen, Serum: 30 mg/dL    Creatinine, Serum: 4.70 mg/dL    Glucose, Serum: 125 mg/dL    Calcium, Total Serum: 9.3 mg/dL    eGFR if Non : 10: Interpretative comment  The units for eGFR are ml/min/1.73m2 (normalized body surface area). The  eGFR is calculated from a serum creatinine using the CKD-EPI equation.  Other variables required for calculation are race, age and sex. Among  patients with chronic kidney disease (CKD), the eGFR is useful in  determining the stage of disease according to KDOQI CKD classification.  All eGFR results are reported numerically with the following  interpretation.          GFR                    With                 Without     (ml/min/1.73 m2)    Kidney Damage       Kidney Damage        >= 90                    Stage 1                     Normal        60-89                    Stage 2                     Decreased GFR        30-59     Stage 3                     Stage 3        15-29                    Stage 4                     Stage 4        < 15                      Stage 5                     Stage 5  Each stage of CKD assumes that the associated GFR level has been in  effect for at least 3 months. Determination of stages one and two (with  eGFR > 59 ml/min/m2) requires estimation of kidney damage for at least 3  months as defined by structural or functional abnormalities.  Limitations: All estimates of GFR will be less accurate for patients at  extremes of muscle mass (including but not limited to frail elderly,  critically ill, or cancer patients), those with unusual diets, and those  with conditions associated with reduced secretion or extrarenal  elimination of creatinine. The eGFR equation is not recommended for use  in patients with unstable creatinine levels. mL/min/1.73M2    eGFR if African American: 11 mL/min/1.73M2

## 2018-01-08 NOTE — PROGRESS NOTE ADULT - PROBLEM SELECTOR PLAN 3
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 Positive serology for Lupus,  neuro following. s/p Immunoglobulin infusion.  Appreciate Toxicology consult for cadmium level-per them no evidence of heavy metal exposure by imaging and no other symptoms, does not require chelation.   MRI lumbar, C spine show no cord compression.    Continue with Lyrica- renally dosed.   PT eval- recommend JESSICA placement.   Continue to hold Plaquenil.   Per neuro no further IVIG at this time.    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 Positive serology for Lupus,  neuro following. s/p Immunoglobulin infusion.  Appreciate Toxicology consult for cadmium level-per them no evidence of heavy metal exposure by imaging and no other symptoms, does not require chelation.   MRI lumbar, C spine show no cord compression.    Continue with Lyrica- renally dosed.   PT eval- recommend JESSICA placement.   Continue to hold Plaquenil.   Per neuro no further IVIG at this time.    - sural bx consistent with vasculitic neuropathy - per rheum, c/w prednisone 50mg daily - no pulse steriods at this time  Neurology consult in am

## 2018-01-08 NOTE — PROGRESS NOTE ADULT - SUBJECTIVE AND OBJECTIVE BOX
reviewed Biopsy results as below    Surgical Pathology Report (12.28.17 @ 08:23)    Surgical Pathology Report:   ACCESSION No:  [id number]    WILLARD BOB                     2        Surgical Final Report          Final Diagnosis  The case was examined and reported in its entirety by Dr. Pavel Mendez at [facility], [place], [place]  (accession #  [id number]). The report has been entered into [employer] only for  purposes of documentation.  Dr. Bowman has not examined this  material.    Diagnosis  A. Peripheral nerve, left sural, biopsy  -Severe chronic axonal loss with active axonal degeneration, see  note  -Perivascular chronic inflammatory infiltrates, suggestive for  vasculitic neuropathy, see note    Note: H and E and EVG-trichrome stained sections show peripheral  nerve in cross-sections and longitudinal arrays with myelin  ovoids and a markedly decreases density of large myelinated  fibers.  An immunohistochemical stain for neurofilament with  appropriate controls confirms severe depletion of large  myelinated axons.  A Congo Red stain is negative for amyloid  deposition.    Foci of perivascular chronic inflammatory cells are present  around small perineurial and epineurial blood vessels without  definitive evidence of vascular wall damage.  An  immunohistochemical stain for CD45 with appropriate controls  confirms the presence of perivascular hematopoietic mononuclear  cells.  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    The sample submitted in glutaraldehyde will be embedded in epoxy  plastic blocks and sections of these blocks will be examined by  light microscopy.  Subsequent electron microscopy may be  performed, if the findings from the sections from the epoxy  plastic blocks are judged to warrant further investigation.  An  addendum will be issued with these subsequent findings.    Correlation with clinical and laboratory findings is necessary.  Results sent by FAX to Caroline Kumari and              WILLARD BOB                    2        Surgical Final Report          Wiley Bowman on 1/4/18 and by electronic mail to [facility]  medical secretaries on 1/4/18.    Verified by: DULCE BOWMAN MD  (Electronic Signature)  Reported on: 01/05/18 14:18 EST,[address] [place] [address], [place]  [address]  _________________________________________________________________    Clinical History  peripheral neuropathy    Specimen(s) Submitted  1     Left sural nerve    Gross Description  1.  The specimen is received fresh wrapped in saline moistened  gauze, not soaked, not floating in saline and the specimen  container is labeled: Left sural nerve. It consists of a 2.9 x  0.5 x 0.2 cm tan pink segment of nerve tissue.  Gross description  only.  No section is submitted.  Entirely sent to [facility],  [facility].    In addition to other data that may appear on the specimen  container, the label has been inspected to confirm the presence  of the patient's name and date of birth.  fr/12/28/17 10:48

## 2018-01-08 NOTE — PROGRESS NOTE ADULT - SUBJECTIVE AND OBJECTIVE BOX
Patient is a 57y old  Female who presents with a chief complaint of LE pain and inability to walk (19 Dec 2017 10:27)    Being followed by ID for UTI    Interval history:feels well  vance removed yesterday  No other acute events      ROS:  No cough,SOB,CP  No N/V/D  No abd pain  No urinary complaints  No HA  No joint or limb pain  No other complaints      Antimicrobials:    ertapenem  IVPB      ertapenem  IVPB 500 milliGRAM(s) IV Intermittent every 24 hours      other medications reviewed    Vital Signs Last 24 Hrs  T(C): 37.2 (01-08-18 @ 05:07), Max: 37.2 (01-07-18 @ 21:24)  T(F): 98.9 (01-08-18 @ 05:07), Max: 98.9 (01-07-18 @ 21:24)  HR: 79 (01-08-18 @ 05:07) (76 - 79)  BP: 153/80 (01-08-18 @ 05:07) (150/77 - 162/77)  BP(mean): --  RR: 18 (01-08-18 @ 05:07) (18 - 18)  SpO2: 99% (01-08-18 @ 05:07) (97% - 99%)    Physical Exam:    Constitutional well preserved,comfortable,pleasant    HEENT PERRLA EOMI,No pallor or icterus    No oral exudate or erythema    Neck supple no JVD or LN    Chest Good AE,CTA    CVS RRR S1 S2 WNl No murmur or rub or gallop    Abd soft BS normal No tenderness no masses    Ext No cyanosis clubbing or edema    IV site no erythema tenderness or discharge    Joints no swelling or LOM    CNS AAO X 3 no focal    Lab Data:                          9.7    4.1   )-----------( 97       ( 07 Jan 2018 06:50 )             28.6       01-08    134<L>  |  97  |  30<H>  ----------------------------<  125<H>  4.4   |  26  |  4.70<H>    Ca    9.3      08 Jan 2018 06:28  Mg     2.1     01-07          Culture - Blood (collected 05 Jan 2018 22:49)  Source: .Blood Blood  Preliminary Report (06 Jan 2018 23:01):    No growth to date.    Culture - Blood (collected 05 Jan 2018 22:49)  Source: .Blood Blood  Preliminary Report (06 Jan 2018 23:01):    No growth to date.    Culture - Urine (collected 05 Jan 2018 16:52)  Source: .Urine Catheterized  Final Report (07 Jan 2018 19:23):    >100,000 CFU/ml Escherichia coli Multiple Morphological Strains  Organism: Escherichia coli  Escherichia coli (07 Jan 2018 19:23)  Organism: Escherichia coli (07 Jan 2018 19:23)      -  Amikacin: S <=8      -  Ampicillin: S <=2      -  Ampicillin/Sulbactam: S <=4/2      -  Aztreonam: S <=4      -  Cefazolin: S <=2      -  Cefepime: S <=2      -  Cefoxitin: S <=4      -  Ceftazidime: S <=1      -  Ceftriaxone: S <=1      -  Ciprofloxacin: S <=0.5      -  Ertapenem: S <=0.5      -  Gentamicin: S <=1      -  Imipenem: S <=1      -  Levofloxacin: S <=1      -  Meropenem: S <=1      -  Nitrofurantoin: S <=32      -  Piperacillin/Tazobactam: S <=8      -  Tobramycin: S <=2      -  Trimethoprim/Sulfamethoxazole: S <=0.5/9.5      Method Type: BRIANNA  Organism: Escherichia coli (07 Jan 2018 19:23)      -  Amikacin: S <=8      -  Ampicillin: R >16      -  Ampicillin/Sulbactam: S 8/4      -  Aztreonam: S <=4      -  Cefazolin: R >16      -  Cefepime: S <=2      -  Cefoxitin: S <=4      -  Ceftazidime: S <=1      -  Ceftriaxone: S <=1      -  Ciprofloxacin: S <=0.5      -  Ertapenem: S <=0.5      -  Gentamicin: S <=1      -  Imipenem: S <=1      -  Levofloxacin: S <=1      -  Meropenem: S <=1      -  Nitrofurantoin: S <=32      -  Piperacillin/Tazobactam: S <=8      -  Tobramycin: S <=2      -  Trimethoprim/Sulfamethoxazole: S <=0.5/9.5      Method Type: BRIANNA

## 2018-01-08 NOTE — PROGRESS NOTE ADULT - PROBLEM SELECTOR PLAN 2
s/p LHC/RHC, s/p ANABELA to RCA without complication. Appreciated EP recs -   given concern for post-intervention VT, continue tele for now  - monitor electrolytes and replete   - continue DAPT  - appreciate EP recs  d/c telemetry - no events

## 2018-01-08 NOTE — PROGRESS NOTE ADULT - ASSESSMENT
GIven Biopsy concerning for Vasulitic Neuropathy which can be seen in Lupus recommend Rheumatology reevaluation for immunosuppressant treatment of Lupus.

## 2018-01-09 DIAGNOSIS — N39.0 URINARY TRACT INFECTION, SITE NOT SPECIFIED: ICD-10-CM

## 2018-01-09 LAB
ANION GAP SERPL CALC-SCNC: 9 MMOL/L — SIGNIFICANT CHANGE UP (ref 5–17)
BUN SERPL-MCNC: 24 MG/DL — HIGH (ref 7–23)
CALCIUM SERPL-MCNC: 9.1 MG/DL — SIGNIFICANT CHANGE UP (ref 8.4–10.5)
CHLORIDE SERPL-SCNC: 98 MMOL/L — SIGNIFICANT CHANGE UP (ref 96–108)
CO2 SERPL-SCNC: 30 MMOL/L — SIGNIFICANT CHANGE UP (ref 22–31)
CREAT SERPL-MCNC: 3.29 MG/DL — HIGH (ref 0.5–1.3)
GAD65 AB SER-MCNC: 0.01 NMOL/L — SIGNIFICANT CHANGE UP
GANGLIOSIDE AB IGG MONOS GM1: SIGNIFICANT CHANGE UP
GD1A GANGL IGM SER-ACNC: SIGNIFICANT CHANGE UP
GD1A GANGL IGM SER-ACNC: SIGNIFICANT CHANGE UP
GLUCOSE BLDC GLUCOMTR-MCNC: 122 MG/DL — HIGH (ref 70–99)
GLUCOSE BLDC GLUCOMTR-MCNC: 130 MG/DL — HIGH (ref 70–99)
GLUCOSE BLDC GLUCOMTR-MCNC: 153 MG/DL — HIGH (ref 70–99)
GLUCOSE BLDC GLUCOMTR-MCNC: 200 MG/DL — HIGH (ref 70–99)
GLUCOSE SERPL-MCNC: 83 MG/DL — SIGNIFICANT CHANGE UP (ref 70–99)
GM1 ASIALO IGG SER-ACNC: SIGNIFICANT CHANGE UP
GM1 ASIALO IGM SER-ACNC: SIGNIFICANT CHANGE UP
GM1 ASIALO IGM TITR SER: SIGNIFICANT CHANGE UP
HCT VFR BLD CALC: 25.4 % — LOW (ref 34.5–45)
HGB BLD-MCNC: 8.3 G/DL — LOW (ref 11.5–15.5)
MCHC RBC-ENTMCNC: 30.7 PG — SIGNIFICANT CHANGE UP (ref 27–34)
MCHC RBC-ENTMCNC: 32.9 GM/DL — SIGNIFICANT CHANGE UP (ref 32–36)
MCV RBC AUTO: 93.6 FL — SIGNIFICANT CHANGE UP (ref 80–100)
PLATELET # BLD AUTO: 111 K/UL — LOW (ref 150–400)
POTASSIUM SERPL-MCNC: 3.9 MMOL/L — SIGNIFICANT CHANGE UP (ref 3.5–5.3)
POTASSIUM SERPL-SCNC: 3.9 MMOL/L — SIGNIFICANT CHANGE UP (ref 3.5–5.3)
RBC # BLD: 2.71 M/UL — LOW (ref 3.8–5.2)
RBC # FLD: 13.8 % — SIGNIFICANT CHANGE UP (ref 10.3–14.5)
SODIUM SERPL-SCNC: 137 MMOL/L — SIGNIFICANT CHANGE UP (ref 135–145)
WBC # BLD: 5.8 K/UL — SIGNIFICANT CHANGE UP (ref 3.8–10.5)
WBC # FLD AUTO: 5.8 K/UL — SIGNIFICANT CHANGE UP (ref 3.8–10.5)

## 2018-01-09 PROCEDURE — 99233 SBSQ HOSP IP/OBS HIGH 50: CPT

## 2018-01-09 PROCEDURE — 99232 SBSQ HOSP IP/OBS MODERATE 35: CPT

## 2018-01-09 RX ADMIN — SENNA PLUS 2 TABLET(S): 8.6 TABLET ORAL at 23:54

## 2018-01-09 RX ADMIN — OXYCODONE HYDROCHLORIDE 5 MILLIGRAM(S): 5 TABLET ORAL at 12:15

## 2018-01-09 RX ADMIN — Medication 1 TABLET(S): at 10:58

## 2018-01-09 RX ADMIN — OXYCODONE HYDROCHLORIDE 5 MILLIGRAM(S): 5 TABLET ORAL at 05:20

## 2018-01-09 RX ADMIN — Medication 80 MILLIGRAM(S): at 06:01

## 2018-01-09 RX ADMIN — Medication 25 MICROGRAM(S): at 06:01

## 2018-01-09 RX ADMIN — Medication 20 MILLIGRAM(S): at 10:58

## 2018-01-09 RX ADMIN — Medication 650 MILLIGRAM(S): at 04:40

## 2018-01-09 RX ADMIN — Medication 5 MILLIGRAM(S): at 23:54

## 2018-01-09 RX ADMIN — ATORVASTATIN CALCIUM 40 MILLIGRAM(S): 80 TABLET, FILM COATED ORAL at 23:54

## 2018-01-09 RX ADMIN — Medication 1: at 18:21

## 2018-01-09 RX ADMIN — OXYCODONE HYDROCHLORIDE 5 MILLIGRAM(S): 5 TABLET ORAL at 19:00

## 2018-01-09 RX ADMIN — CLOPIDOGREL BISULFATE 75 MILLIGRAM(S): 75 TABLET, FILM COATED ORAL at 10:59

## 2018-01-09 RX ADMIN — Medication 1 DROP(S): at 11:03

## 2018-01-09 RX ADMIN — Medication 50 MILLIGRAM(S): at 06:01

## 2018-01-09 RX ADMIN — OXYCODONE HYDROCHLORIDE 5 MILLIGRAM(S): 5 TABLET ORAL at 18:20

## 2018-01-09 RX ADMIN — Medication 1: at 13:01

## 2018-01-09 RX ADMIN — OXYCODONE HYDROCHLORIDE 5 MILLIGRAM(S): 5 TABLET ORAL at 10:59

## 2018-01-09 RX ADMIN — OXYCODONE HYDROCHLORIDE 5 MILLIGRAM(S): 5 TABLET ORAL at 04:46

## 2018-01-09 RX ADMIN — Medication 650 MILLIGRAM(S): at 03:58

## 2018-01-09 RX ADMIN — LATANOPROST 1 DROP(S): 0.05 SOLUTION/ DROPS OPHTHALMIC; TOPICAL at 23:55

## 2018-01-09 RX ADMIN — Medication 25 MILLIGRAM(S): at 21:40

## 2018-01-09 RX ADMIN — Medication 81 MILLIGRAM(S): at 10:59

## 2018-01-09 RX ADMIN — Medication 1 TABLET(S): at 10:59

## 2018-01-09 NOTE — PROGRESS NOTE ADULT - ASSESSMENT
58 yo PMH SLE (diagnosed in April) with lupus nephritis, ESRD on HD via permacath (M, W, F) s/p AVF (not matured), DM2 who presented to [facility] on 12/19/17 with 3 weeks of progressive bilateral LE pain and numbness felt to be autoimmune in nature with tentative plan to initiate IV steroids.   Found to have UTI due to E coli. Sural nerve bx consistent with vasculitis.   Ct with no fistula  Cystitis leading to pus in vance  Clinically better  Agree with  de-escalating  to oral fluroquinolones  7-10 day total course  Other plan per primary  Case d/w Med NP  Will Follow.  Beeper [id number]-wnwf/afterhours/No response-[phone]

## 2018-01-09 NOTE — PROGRESS NOTE ADULT - PROBLEM SELECTOR PLAN 2
no fistula on imaging   s/p invanz, continue treatment for total 7-10 day course with levaquin Q48 (Day 5)

## 2018-01-09 NOTE — PROGRESS NOTE ADULT - SUBJECTIVE AND OBJECTIVE BOX
Patient is a 57y old  Female who presents with a chief complaint of LE pain and inability to walk (19 Dec 2017 10:27)        SUBJECTIVE / OVERNIGHT EVENTS: strength improves every day.       MEDICATIONS  (STANDING):  aspirin enteric coated 81 milliGRAM(s) Oral daily  atorvastatin 40 milliGRAM(s) Oral at bedtime  bisacodyl 5 milliGRAM(s) Oral at bedtime  calcium carbonate 1250 mG (OsCal) 1 Tablet(s) Oral daily  clopidogrel Tablet 75 milliGRAM(s) Oral daily  dextrose 5%. 1000 milliLiter(s) (50 mL/Hr) IV Continuous <Continuous>  dextrose 50% Injectable 12.5 Gram(s) IV Push once  dextrose 50% Injectable 25 Gram(s) IV Push once  dextrose 50% Injectable 25 Gram(s) IV Push once  dextrose 50% Injectable 12.5 Gram(s) IV Push once  docusate sodium 100 milliGRAM(s) Oral two times a day  furosemide    Tablet 80 milliGRAM(s) Oral <User Schedule>  insulin lispro (HumaLOG) corrective regimen sliding scale   SubCutaneous three times a day before meals  insulin lispro (HumaLOG) corrective regimen sliding scale   SubCutaneous at bedtime  latanoprost 0.005% Ophthalmic Solution 1 Drop(s) Both EYES at bedtime  levoFLOXacin  Tablet 500 milliGRAM(s) Oral every 48 hours  levothyroxine 25 MICROGram(s) Oral daily  metoprolol succinate ER 25 milliGRAM(s) Oral daily  Nephro-madiha 1 Tablet(s) Oral daily  ondansetron Injectable 4 milliGRAM(s) IV Push once  PARoxetine 20 milliGRAM(s) Oral daily  predniSONE   Tablet 50 milliGRAM(s) Oral daily  senna 2 Tablet(s) Oral at bedtime  timolol 0.5% Solution 1 Drop(s) Both EYES daily    MEDICATIONS  (PRN):  acetaminophen   Tablet 650 milliGRAM(s) Oral every 6 hours PRN For Temp greater than 38 C (100.4 F)  acetaminophen   Tablet. 650 milliGRAM(s) Oral every 6 hours PRN Mild Pain (1 - 3)  dextrose Gel 1 Dose(s) Oral once PRN Blood Glucose LESS THAN 70 milliGRAM(s)/deciliter  glucagon  Injectable 1 milliGRAM(s) IntraMuscular once PRN Glucose LESS THAN 70 milligrams/deciliter  oxyCODONE    IR 5 milliGRAM(s) Oral every 6 hours PRN Moderate Pain (4 - 6)      Vital Signs Last 24 Hrs  T(C): 36.6 (09 Jan 2018 04:10), Max: 37.1 (08 Jan 2018 20:31)  T(F): 97.9 (09 Jan 2018 04:10), Max: 98.8 (08 Jan 2018 20:31)  HR: 73 (09 Jan 2018 06:00) (73 - 80)  BP: 131/68 (09 Jan 2018 06:00) (127/78 - 155/75)  BP(mean): --  RR: 18 (09 Jan 2018 04:10) (16 - 18)  SpO2: 99% (09 Jan 2018 04:10) (99% - 100%)  CAPILLARY BLOOD GLUCOSE      POCT Blood Glucose.: 200 mg/dL (09 Jan 2018 12:48)  POCT Blood Glucose.: 122 mg/dL (09 Jan 2018 09:12)  POCT Blood Glucose.: 120 mg/dL (08 Jan 2018 21:22)  POCT Blood Glucose.: 157 mg/dL (08 Jan 2018 17:50)    I&O's Summary    08 Jan 2018 07:01  -  09 Jan 2018 07:00  --------------------------------------------------------  IN: 400 mL / OUT: 2000 mL / NET: -1600 mL        PHYSICAL EXAM:  GENERAL: NAD  HEAD:  Atraumatic, Normocephalic  EYES: conjunctiva and sclera clear  NECK: No JVD  CHEST/LUNG: CTA b/l  HEART: S1 S2 RRR  ABDOMEN: +BS Soft, NT/ND  EXTREMITIES:  2+ DP Pulses, No c/c/e  NEUROLOGY: AAOx3 but a little delayed in response today. 4+/5 MS b/l LE  SKIN: No rashes or lesions    LABS:                        8.3    5.8   )-----------( 111      ( 09 Jan 2018 07:10 )             25.4     01-09    137  |  98  |  24<H>  ----------------------------<  83  3.9   |  30  |  3.29<H>    Ca    9.1      09 Jan 2018 07:10                RADIOLOGY & ADDITIONAL TESTS:    Imaging Personally Reviewed:  Consultant(s) Notes Reviewed: ID    Care Discussed with Consultants/Other Providers: d/w Neurology plan of care

## 2018-01-09 NOTE — PROGRESS NOTE ADULT - SUBJECTIVE AND OBJECTIVE BOX
Patient is a 57y old  Female who presents with a chief complaint of LE pain and inability to walk (19 Dec 2017 10:27)    Being followed by ID for UTI    Interval history:No abd pain -no urinary complaints  No acute events      ROS:  No cough,SOB,CP  No N/V/D.  No other complaints      Antimicrobials:    levoFLOXacin  Tablet 500 milliGRAM(s) Oral every 48 hours    Other medications reviewed    Vital Signs Last 24 Hrs  T(C): 36.6 (01-09-18 @ 04:10), Max: 37.1 (01-08-18 @ 12:40)  T(F): 97.9 (01-09-18 @ 04:10), Max: 98.8 (01-08-18 @ 12:40)  HR: 73 (01-09-18 @ 06:00) (72 - 80)  BP: 131/68 (01-09-18 @ 06:00) (127/78 - 176/96)  BP(mean): --  RR: 18 (01-09-18 @ 04:10) (16 - 18)  SpO2: 99% (01-09-18 @ 04:10) (98% - 100%)    Physical Exam:        HEENT PERRLA EOMI    No oral exudate or erythema    Chest Good AE,CTA    CVS RRR S1 S2 WNl No murmur or rub or gallop    Abd soft BS normal No tenderness no masses    IV site no erythema tenderness or discharge    CNS AAO X 3 no focal    Lab Data:                          8.3    5.8   )-----------( 111      ( 09 Jan 2018 07:10 )             25.4       01-09    137  |  98  |  24<H>  ----------------------------<  83  3.9   |  30  |  3.29<H>    Ca    9.1      09 Jan 2018 07:10          Culture - Blood (collected 05 Jan 2018 22:49)  Source: .Blood Blood  Preliminary Report (06 Jan 2018 23:01):    No growth to date.    Culture - Blood (collected 05 Jan 2018 22:49)  Source: .Blood Blood  Preliminary Report (06 Jan 2018 23:01):    No growth to date.    Culture - Urine (collected 05 Jan 2018 16:52)  Source: .Urine Catheterized  Final Report (07 Jan 2018 19:23):    >100,000 CFU/ml Escherichia coli Multiple Morphological Strains  Organism: Escherichia coli  Escherichia coli (07 Jan 2018 19:23)  Organism: Escherichia coli (07 Jan 2018 19:23)      -  Amikacin: S <=8      -  Ampicillin: S <=2      -  Ampicillin/Sulbactam: S <=4/2      -  Aztreonam: S <=4      -  Cefazolin: S <=2      -  Cefepime: S <=2      -  Cefoxitin: S <=4      -  Ceftazidime: S <=1      -  Ceftriaxone: S <=1      -  Ciprofloxacin: S <=0.5      -  Ertapenem: S <=0.5      -  Gentamicin: S <=1      -  Imipenem: S <=1      -  Levofloxacin: S <=1      -  Meropenem: S <=1      -  Nitrofurantoin: S <=32      -  Piperacillin/Tazobactam: S <=8      -  Tobramycin: S <=2      -  Trimethoprim/Sulfamethoxazole: S <=0.5/9.5      Method Type: BRIANNA  Organism: Escherichia coli (07 Jan 2018 19:23)      -  Amikacin: S <=8      -  Ampicillin: R >16      -  Ampicillin/Sulbactam: S 8/4      -  Aztreonam: S <=4      -  Cefazolin: R >16      -  Cefepime: S <=2      -  Cefoxitin: S <=4      -  Ceftazidime: S <=1      -  Ceftriaxone: S <=1      -  Ciprofloxacin: S <=0.5      -  Ertapenem: S <=0.5      -  Gentamicin: S <=1      -  Imipenem: S <=1      -  Levofloxacin: S <=1      -  Meropenem: S <=1      -  Nitrofurantoin: S <=32      -  Piperacillin/Tazobactam: S <=8      -  Tobramycin: S <=2      -  Trimethoprim/Sulfamethoxazole: S <=0.5/9.5      Method Type: BRIANNA

## 2018-01-10 DIAGNOSIS — I25.10 ATHEROSCLEROTIC HEART DISEASE OF NATIVE CORONARY ARTERY WITHOUT ANGINA PECTORIS: ICD-10-CM

## 2018-01-10 LAB
ANION GAP SERPL CALC-SCNC: 11 MMOL/L — SIGNIFICANT CHANGE UP (ref 5–17)
BUN SERPL-MCNC: 46 MG/DL — HIGH (ref 7–23)
CALCIUM SERPL-MCNC: 9.5 MG/DL — SIGNIFICANT CHANGE UP (ref 8.4–10.5)
CHLORIDE SERPL-SCNC: 97 MMOL/L — SIGNIFICANT CHANGE UP (ref 96–108)
CO2 SERPL-SCNC: 26 MMOL/L — SIGNIFICANT CHANGE UP (ref 22–31)
CREAT SERPL-MCNC: 4.66 MG/DL — HIGH (ref 0.5–1.3)
CULTURE RESULTS: SIGNIFICANT CHANGE UP
CULTURE RESULTS: SIGNIFICANT CHANGE UP
GLUCOSE BLDC GLUCOMTR-MCNC: 114 MG/DL — HIGH (ref 70–99)
GLUCOSE BLDC GLUCOMTR-MCNC: 114 MG/DL — HIGH (ref 70–99)
GLUCOSE BLDC GLUCOMTR-MCNC: 123 MG/DL — HIGH (ref 70–99)
GLUCOSE BLDC GLUCOMTR-MCNC: 154 MG/DL — HIGH (ref 70–99)
GLUCOSE SERPL-MCNC: 88 MG/DL — SIGNIFICANT CHANGE UP (ref 70–99)
HCT VFR BLD CALC: 26.8 % — LOW (ref 34.5–45)
HGB BLD-MCNC: 9.1 G/DL — LOW (ref 11.5–15.5)
MCHC RBC-ENTMCNC: 31.2 PG — SIGNIFICANT CHANGE UP (ref 27–34)
MCHC RBC-ENTMCNC: 33.9 GM/DL — SIGNIFICANT CHANGE UP (ref 32–36)
MCV RBC AUTO: 92 FL — SIGNIFICANT CHANGE UP (ref 80–100)
PLATELET # BLD AUTO: 114 K/UL — LOW (ref 150–400)
POTASSIUM SERPL-MCNC: 4.1 MMOL/L — SIGNIFICANT CHANGE UP (ref 3.5–5.3)
POTASSIUM SERPL-SCNC: 4.1 MMOL/L — SIGNIFICANT CHANGE UP (ref 3.5–5.3)
RBC # BLD: 2.92 M/UL — LOW (ref 3.8–5.2)
RBC # FLD: 13.7 % — SIGNIFICANT CHANGE UP (ref 10.3–14.5)
SODIUM SERPL-SCNC: 134 MMOL/L — LOW (ref 135–145)
SPECIMEN SOURCE: SIGNIFICANT CHANGE UP
SPECIMEN SOURCE: SIGNIFICANT CHANGE UP
WBC # BLD: 7.6 K/UL — SIGNIFICANT CHANGE UP (ref 3.8–10.5)
WBC # FLD AUTO: 7.6 K/UL — SIGNIFICANT CHANGE UP (ref 3.8–10.5)

## 2018-01-10 PROCEDURE — 99232 SBSQ HOSP IP/OBS MODERATE 35: CPT

## 2018-01-10 PROCEDURE — 99233 SBSQ HOSP IP/OBS HIGH 50: CPT

## 2018-01-10 PROCEDURE — 99232 SBSQ HOSP IP/OBS MODERATE 35: CPT | Mod: GC

## 2018-01-10 RX ORDER — ACETAMINOPHEN 500 MG
650 TABLET ORAL ONCE
Qty: 0 | Refills: 0 | Status: DISCONTINUED | OUTPATIENT
Start: 2018-01-11 | End: 2018-01-12

## 2018-01-10 RX ORDER — DIPHENHYDRAMINE HCL 50 MG
25 CAPSULE ORAL ONCE
Qty: 0 | Refills: 0 | Status: DISCONTINUED | OUTPATIENT
Start: 2018-01-11 | End: 2018-01-12

## 2018-01-10 RX ORDER — RITUXIMAB 10 MG/ML
1000 INJECTION, SOLUTION INTRAVENOUS ONCE
Qty: 0 | Refills: 0 | Status: DISCONTINUED | OUTPATIENT
Start: 2018-01-11 | End: 2018-01-12

## 2018-01-10 RX ADMIN — OXYCODONE HYDROCHLORIDE 5 MILLIGRAM(S): 5 TABLET ORAL at 18:01

## 2018-01-10 RX ADMIN — CLOPIDOGREL BISULFATE 75 MILLIGRAM(S): 75 TABLET, FILM COATED ORAL at 11:47

## 2018-01-10 RX ADMIN — OXYCODONE HYDROCHLORIDE 5 MILLIGRAM(S): 5 TABLET ORAL at 06:27

## 2018-01-10 RX ADMIN — Medication 5 MILLIGRAM(S): at 22:18

## 2018-01-10 RX ADMIN — Medication 25 MILLIGRAM(S): at 22:18

## 2018-01-10 RX ADMIN — LATANOPROST 1 DROP(S): 0.05 SOLUTION/ DROPS OPHTHALMIC; TOPICAL at 22:18

## 2018-01-10 RX ADMIN — Medication 25 MICROGRAM(S): at 06:27

## 2018-01-10 RX ADMIN — ATORVASTATIN CALCIUM 40 MILLIGRAM(S): 80 TABLET, FILM COATED ORAL at 22:18

## 2018-01-10 RX ADMIN — OXYCODONE HYDROCHLORIDE 5 MILLIGRAM(S): 5 TABLET ORAL at 07:00

## 2018-01-10 RX ADMIN — Medication 1 TABLET(S): at 11:47

## 2018-01-10 RX ADMIN — Medication 20 MILLIGRAM(S): at 11:48

## 2018-01-10 RX ADMIN — Medication 50 MILLIGRAM(S): at 06:27

## 2018-01-10 RX ADMIN — OXYCODONE HYDROCHLORIDE 5 MILLIGRAM(S): 5 TABLET ORAL at 19:00

## 2018-01-10 RX ADMIN — Medication 1 TABLET(S): at 11:48

## 2018-01-10 RX ADMIN — Medication 1 DROP(S): at 11:48

## 2018-01-10 RX ADMIN — Medication 1: at 12:33

## 2018-01-10 RX ADMIN — Medication 100 MILLIGRAM(S): at 17:52

## 2018-01-10 RX ADMIN — Medication 81 MILLIGRAM(S): at 11:48

## 2018-01-10 RX ADMIN — SENNA PLUS 2 TABLET(S): 8.6 TABLET ORAL at 22:18

## 2018-01-10 NOTE — PROGRESS NOTE ADULT - ASSESSMENT
58 yo F w/ SLE (dx 04/2017), lupus nephritis, ESRD on HD via permacath (M, W, F) s/p AVF (not matured), DM2, HTN, HL, hypothyroid, admitted 12/20/2017 with 3 weeks h/o bilateral LE pain and numbness, subsequently found to have NSVT on telemetry, now s/p LHC with PCI on 01/05/2018 (ANABELA placed to mRCA lesion with 90% stenosis), now with labs/imaging concerning for SLE flare.

## 2018-01-10 NOTE — PROGRESS NOTE ADULT - SUBJECTIVE AND OBJECTIVE BOX
S: The patient seen at bedside. Per son cognition is mildly worse stares and doesn't always answers questions right away. Team endorses delay in HD for 4 days    O:                                       9.1    7.6   )-----------( 114      ( 10 Dre 2018 05:46 )             26.8     01-10    134<L>  |  97  |  46<H>  ----------------------------<  88  4.1   |  26  |  4.66<H>    Ca    9.5      10 Der 2018 05:46      CAPILLARY BLOOD GLUCOSE      POCT Blood Glucose.: 114 mg/dL (10 Dre 2018 08:52)  POCT Blood Glucose.: 130 mg/dL (09 Jan 2018 21:30)  POCT Blood Glucose.: 153 mg/dL (09 Jan 2018 18:10)  POCT Blood Glucose.: 200 mg/dL (09 Jan 2018 12:48)    Vital Signs Last 24 Hrs  T(C): 36.7 (10 Dre 2018 04:04), Max: 37 (09 Jan 2018 21:18)  T(F): 98 (10 Dre 2018 04:04), Max: 98.6 (09 Jan 2018 21:18)  HR: 71 (10 Dre 2018 06:25) (71 - 76)  BP: 157/78 (10 Dre 2018 06:25) (150/80 - 157/78)  BP(mean): --  RR: 18 (10 Dre 2018 04:04) (18 - 18)  SpO2: 100% (10 Dre 2018 04:04) (99% - 100%)    Exam:  A&O x 3, follows simple commands, difficulty with complex cross commands  Surgical pupils, EOMI, speech unremarkable, no aphasia or dysarthria, no nystagmus  4/5 strength in LE extremities and 4+/5 in UE, Improved compare to previous exam  decreased tone and bulk,  Deep tendon reflexes:   1+ in UE, diminished in BL LE  Toes, mute    Pathology: concerning for vasculitis

## 2018-01-10 NOTE — PROGRESS NOTE ADULT - SUBJECTIVE AND OBJECTIVE BOX
Patient is a 57y old  Female who presents with a chief complaint of LE pain and inability to walk (19 Dec 2017 10:27)    Being followed by ID for UTI    Interval history:Sylvia so bedside-patient at times with slow mentation per them but when seen answering appropriately  No acute events      ROS:  No urinary complaints  No cough,SOB,CP  No N/V/D./abd pain  No other complaints      Antimicrobials:    levoFLOXacin  Tablet 500 milliGRAM(s) Oral every 48 hours    Other medications reviewed    Vital Signs Last 24 Hrs  T(C): 36.7 (01-10-18 @ 04:04), Max: 37 (01-09-18 @ 21:18)  T(F): 98 (01-10-18 @ 04:04), Max: 98.6 (01-09-18 @ 21:18)  HR: 71 (01-10-18 @ 06:25) (71 - 76)  BP: 157/78 (01-10-18 @ 06:25) (150/80 - 157/78)  BP(mean): --  RR: 18 (01-10-18 @ 04:04) (18 - 18)  SpO2: 100% (01-10-18 @ 04:04) (99% - 100%)    Physical Exam:        HEENT PERRLA EOMI    No oral exudate or erythema    Chest Good AE,CTA    CVS RRR S1 S2 WNl No murmur or rub or gallop    Abd soft BS normal No tenderness no masses    IV site no erythema tenderness or discharge  L arm AVF no tenderness or erythema      CNS AAO X 3 no focal    Lab Data:                          9.1    7.6   )-----------( 114      ( 10 Dre 2018 05:46 )             26.8       01-10    134<L>  |  97  |  46<H>  ----------------------------<  88  4.1   |  26  |  4.66<H>    Ca    9.5      10 Dre 2018 05:46          Culture - Blood (collected 05 Jan 2018 22:49)  Source: .Blood Blood  Preliminary Report (06 Jan 2018 23:01):    No growth to date.    Culture - Blood (collected 05 Jan 2018 22:49)  Source: .Blood Blood  Preliminary Report (06 Jan 2018 23:01):    No growth to date.    Culture - Urine (collected 05 Jan 2018 16:52)  Source: .Urine Catheterized  Final Report (07 Jan 2018 19:23):    >100,000 CFU/ml Escherichia coli Multiple Morphological Strains  Organism: Escherichia coli  Escherichia coli (07 Jan 2018 19:23)  Organism: Escherichia coli (07 Jan 2018 19:23)      -  Amikacin: S <=8      -  Ampicillin: S <=2      -  Ampicillin/Sulbactam: S <=4/2      -  Aztreonam: S <=4      -  Cefazolin: S <=2      -  Cefepime: S <=2      -  Cefoxitin: S <=4      -  Ceftazidime: S <=1      -  Ceftriaxone: S <=1      -  Ciprofloxacin: S <=0.5      -  Ertapenem: S <=0.5      -  Gentamicin: S <=1      -  Imipenem: S <=1      -  Levofloxacin: S <=1      -  Meropenem: S <=1      -  Nitrofurantoin: S <=32      -  Piperacillin/Tazobactam: S <=8      -  Tobramycin: S <=2      -  Trimethoprim/Sulfamethoxazole: S <=0.5/9.5      Method Type: BRIANNA  Organism: Escherichia coli (07 Jan 2018 19:23)      -  Amikacin: S <=8      -  Ampicillin: R >16      -  Ampicillin/Sulbactam: S 8/4      -  Aztreonam: S <=4      -  Cefazolin: R >16      -  Cefepime: S <=2      -  Cefoxitin: S <=4      -  Ceftazidime: S <=1      -  Ceftriaxone: S <=1      -  Ciprofloxacin: S <=0.5      -  Ertapenem: S <=0.5      -  Gentamicin: S <=1      -  Imipenem: S <=1      -  Levofloxacin: S <=1      -  Meropenem: S <=1      -  Nitrofurantoin: S <=32      -  Piperacillin/Tazobactam: S <=8      -  Tobramycin: S <=2      -  Trimethoprim/Sulfamethoxazole: S <=0.5/9.5      Method Type: BRIANNA

## 2018-01-10 NOTE — PROGRESS NOTE ADULT - PROBLEM SELECTOR PLAN 1
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 Positive serology for Lupus,  neuro following. s/p Immunoglobulin infusion.  Appreciate Toxicology consult for cadmium level-per them no evidence of heavy metal exposure by imaging and no other symptoms, does not require chelation.   MRI lumbar, C spine show no cord compression.    Continue with Lyrica- renally dosed.   PT eval- recommend JESSICA placement.   Continue to hold Plaquenil.   Per neuro no further IVIG at this time.    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 biopsy c/w vasculitis but also findings of demyelination - weakness improved s/p IVIG and steroids - d/w Rheumatology who is considering treatment with rituxan.     Appreciate Toxicology consult for cadmium level-per them no evidence of heavy metal exposure by imaging and no other symptoms, does not require chelation.   f/u repeat testing    MRI lumbar, C spine show no cord compression.    Continue with Lyrica- renally dosed.   PT eval- recommend JESSICA placement.   Continue to hold Plaquenil.   no pulse steriods at this time. c/w Prednisone 50mg daily

## 2018-01-10 NOTE — PROGRESS NOTE ADULT - SUBJECTIVE AND OBJECTIVE BOX
[facility] DIVISION OF KIDNEY DISEASES AND HYPERTENSION -- [phone]   FOLLOW UP NOTE  --------------------------------------------------------------------------------  HPI:  57 year old woman with ESRD on HD presenting with limb weakness, now s/p sural nerve biopsy Sural Nerve bx results are concerning for vasculitis currently on steroids.  Pt also with UTI on abx. Pt also found to have NSVT on telemetry, now s/p [facility] with PCI on 01/05/2018 (ANABELA placed to mRCA lesion with 90% stenosis).  Pt seen and examined at bedside.    PAST HISTORY  --------------------------------------------------------------------------------  No significant changes to PMH, PSH, FHx, SHx, unless otherwise noted    ALLERGIES & MEDICATIONS  --------------------------------------------------------------------------------  Allergies    penicillin (Rash)    Intolerances      Standing Inpatient Medications  aspirin enteric coated 81 milliGRAM(s) Oral daily  atorvastatin 40 milliGRAM(s) Oral at bedtime  bisacodyl 5 milliGRAM(s) Oral at bedtime  calcium carbonate 1250 mG (OsCal) 1 Tablet(s) Oral daily  clopidogrel Tablet 75 milliGRAM(s) Oral daily  dextrose 5%. 1000 milliLiter(s) IV Continuous <Continuous>  dextrose 50% Injectable 12.5 Gram(s) IV Push once  dextrose 50% Injectable 25 Gram(s) IV Push once  dextrose 50% Injectable 25 Gram(s) IV Push once  dextrose 50% Injectable 12.5 Gram(s) IV Push once  docusate sodium 100 milliGRAM(s) Oral two times a day  furosemide    Tablet 80 milliGRAM(s) Oral <User Schedule>  insulin lispro (HumaLOG) corrective regimen sliding scale   SubCutaneous three times a day before meals  insulin lispro (HumaLOG) corrective regimen sliding scale   SubCutaneous at bedtime  latanoprost 0.005% Ophthalmic Solution 1 Drop(s) Both EYES at bedtime  levoFLOXacin  Tablet 500 milliGRAM(s) Oral every 48 hours  levothyroxine 25 MICROGram(s) Oral daily  metoprolol succinate ER 25 milliGRAM(s) Oral daily  Nephro-madiha 1 Tablet(s) Oral daily  ondansetron Injectable 4 milliGRAM(s) IV Push once  PARoxetine 20 milliGRAM(s) Oral daily  predniSONE   Tablet 50 milliGRAM(s) Oral daily  senna 2 Tablet(s) Oral at bedtime  timolol 0.5% Solution 1 Drop(s) Both EYES daily    PRN Inpatient Medications  acetaminophen   Tablet 650 milliGRAM(s) Oral every 6 hours PRN  acetaminophen   Tablet. 650 milliGRAM(s) Oral every 6 hours PRN  dextrose Gel 1 Dose(s) Oral once PRN  glucagon  Injectable 1 milliGRAM(s) IntraMuscular once PRN  oxyCODONE    IR 5 milliGRAM(s) Oral every 6 hours PRN      REVIEW OF SYSTEMS  --------------------------------------------------------------------------------  General: no fever  CVS: no chest pain  RESP: no sob, no cough  ABD: no abdominal pain  : no dysuria,  MSK: no edema     VITALS/PHYSICAL EXAM  --------------------------------------------------------------------------------  T(C): 36.7 (01-10-18 @ 04:04), Max: 37 (01-09-18 @ 21:18)  HR: 71 (01-10-18 @ 06:25) (71 - 76)  BP: 157/78 (01-10-18 @ 06:25) (150/80 - 157/78)  RR: 18 (01-10-18 @ 04:04) (18 - 18)  SpO2: 100% (01-10-18 @ 04:04) (99% - 100%)  Wt(kg): --        01-09-18 @ 07:01  -  01-10-18 @ 07:00  --------------------------------------------------------  IN: 200 mL / OUT: 0 mL / NET: 200 mL      Physical Exam:  	Gen: NAD, well-appearing  	HEENT: on room air  	Pulm: CTA B/L  	CV: normal S1S2; no rub  	Abd: soft                      Back : No sacral edema  	: No vance  	LE: no edema  	Skin: Warm, without rashes  	Vascular access: RIJ tunnelled dialysis catheter, no bleed/ swelling/ discharge around catheter. LUE AVF with palpable thrill and audible bruit.      LABS/STUDIES  --------------------------------------------------------------------------------              9.1    7.6   >-----------<  114      [01-10-18 @ 05:46]              26.8     134  |  97  |  46  ----------------------------<  88      [01-10-18 @ 05:46]  4.1   |  26  |  4.66        Ca     9.5     [01-10-18 @ 05:46]            Creatinine Trend:  SCr 4.66 [01-10 @ 05:46]  SCr 3.29 [01-09 @ 07:10]  SCr 4.70 [01-08 @ 06:28]  SCr 3.41 [01-07 @ 06:50]  SCr 4.66 [01-06 @ 07:04]    Urinalysis - [01-05-18 @ 13:29]      Color Brown / Appearance Turbid / SG 1.022 / pH 7.0      Gluc Negative / Ketone Negative  / Bili Negative / Urobili Negative       Blood Small / Protein 150 / Leuk Est Large / Nitrite Negative      RBC >50 / WBC >50 / Hyaline  / Gran  / Sq Epi  / Non Sq Epi  / Bacteria Many      Iron 77, TIBC 90, %sat 86      [05-29-17 @ 09:39]  Ferritin 781.0      [05-29-17 @ 09:39]  PTH -- (Ca 8.1)      [01-04-18 @ 12:57]   65  PTH -- (Ca 7.5)      [06-01-17 @ 08:12]   253  Vitamin D (25OH) <4.0      [05-31-17 @ 08:52]  HbA1c 4.7      [12-20-17 @ 07:23]  TSH 8.03      [12-23-17 @ 08:00]  Lipid: chol 227, , HDL 47,       [05-30-17 @ 07:28]    HBsAb 637.4      [01-06-18 @ 12:57]  HBsAb Reactive      [12-23-17 @ 08:00]  HBsAg Nonreact      [01-06-18 @ 12:57]  HBcAb Reactive      [01-06-18 @ 12:57]  HCV 0.22, Nonreact      [01-06-18 @ 12:57]    SULYL: titer 1:1280, pattern Homogeneous      [12-23-17 @ 08:00]  dsDNA 82      [01-06-18 @ 08:55]  C3 Complement 41      [01-06-18 @ 08:24]  C4 Complement 14      [01-06-18 @ 08:24]  ANCA: cANCA Negative, pANCA Negative, atypical ANCA Indeterminate Method interference due to SULLY fluorescence      [12-23-17 @ 08:00]  Immunofixation Serum:   Weak  IgG Kappa Band Identified    Reference Range: None Detected      [12-23-17 @ 08:00]  SPEP Interpretation: Weak Gamma-Migrating Paraprotein Identified      [12-23-17 @ 08:00]

## 2018-01-10 NOTE — PROGRESS NOTE ADULT - SUBJECTIVE AND OBJECTIVE BOX
Patient is a 57y old  Female who presents with a chief complaint of LE pain and inability to walk (19 Dec 2017 10:27)        SUBJECTIVE / OVERNIGHT EVENTS: no acute complaints. feeling better       MEDICATIONS  (STANDING):  aspirin enteric coated 81 milliGRAM(s) Oral daily  atorvastatin 40 milliGRAM(s) Oral at bedtime  bisacodyl 5 milliGRAM(s) Oral at bedtime  calcium carbonate 1250 mG (OsCal) 1 Tablet(s) Oral daily  clopidogrel Tablet 75 milliGRAM(s) Oral daily  dextrose 5%. 1000 milliLiter(s) (50 mL/Hr) IV Continuous <Continuous>  dextrose 50% Injectable 12.5 Gram(s) IV Push once  dextrose 50% Injectable 25 Gram(s) IV Push once  dextrose 50% Injectable 25 Gram(s) IV Push once  dextrose 50% Injectable 12.5 Gram(s) IV Push once  docusate sodium 100 milliGRAM(s) Oral two times a day  furosemide    Tablet 80 milliGRAM(s) Oral <User Schedule>  insulin lispro (HumaLOG) corrective regimen sliding scale   SubCutaneous three times a day before meals  insulin lispro (HumaLOG) corrective regimen sliding scale   SubCutaneous at bedtime  latanoprost 0.005% Ophthalmic Solution 1 Drop(s) Both EYES at bedtime  levoFLOXacin  Tablet 500 milliGRAM(s) Oral every 48 hours  levothyroxine 25 MICROGram(s) Oral daily  metoprolol succinate ER 25 milliGRAM(s) Oral daily  Nephro-madiha 1 Tablet(s) Oral daily  ondansetron Injectable 4 milliGRAM(s) IV Push once  PARoxetine 20 milliGRAM(s) Oral daily  predniSONE   Tablet 50 milliGRAM(s) Oral daily  senna 2 Tablet(s) Oral at bedtime  timolol 0.5% Solution 1 Drop(s) Both EYES daily    MEDICATIONS  (PRN):  acetaminophen   Tablet 650 milliGRAM(s) Oral every 6 hours PRN For Temp greater than 38 C (100.4 F)  acetaminophen   Tablet. 650 milliGRAM(s) Oral every 6 hours PRN Mild Pain (1 - 3)  dextrose Gel 1 Dose(s) Oral once PRN Blood Glucose LESS THAN 70 milliGRAM(s)/deciliter  glucagon  Injectable 1 milliGRAM(s) IntraMuscular once PRN Glucose LESS THAN 70 milligrams/deciliter  oxyCODONE    IR 5 milliGRAM(s) Oral every 6 hours PRN Moderate Pain (4 - 6)      Vital Signs Last 24 Hrs  T(C): 36.6 (10 Dre 2018 12:45), Max: 37 (09 Jan 2018 21:18)  T(F): 97.9 (10 Dre 2018 12:45), Max: 98.6 (09 Jan 2018 21:18)  HR: 70 (10 Dre 2018 12:45) (70 - 76)  BP: 172/80 (10 Dre 2018 12:45) (150/80 - 172/80)  BP(mean): --  RR: 18 (10 Dre 2018 12:45) (18 - 18)  SpO2: 98% (10 Dre 2018 12:45) (98% - 100%)  CAPILLARY BLOOD GLUCOSE      POCT Blood Glucose.: 154 mg/dL (10 Dre 2018 12:29)  POCT Blood Glucose.: 114 mg/dL (10 Dre 2018 08:52)  POCT Blood Glucose.: 130 mg/dL (09 Jan 2018 21:30)  POCT Blood Glucose.: 153 mg/dL (09 Jan 2018 18:10)    I&O's Summary    09 Jan 2018 07:01  -  10 Dre 2018 07:00  --------------------------------------------------------  IN: 200 mL / OUT: 0 mL / NET: 200 mL    10 Dre 2018 07:01  -  10 Dre 2018 14:36  --------------------------------------------------------  IN: 360 mL / OUT: 0 mL / NET: 360 mL        PHYSICAL EXAM:  GENERAL: NAD  HEAD:  Atraumatic, Normocephalic  EYES: conjunctiva and sclera clear  NECK: No JVD  CHEST/LUNG: CTA b/l  HEART: S1 S2 RRR  ABDOMEN: +BS Soft, NT/ND  EXTREMITIES:  2+ DP Pulses, No c/c/e  NEUROLOGY: AAOx3, 4+/5 MS b/l LE  SKIN: No rashes or lesions    LABS:                        9.1    7.6   )-----------( 114      ( 10 Dre 2018 05:46 )             26.8     01-10    134<L>  |  97  |  46<H>  ----------------------------<  88  4.1   |  26  |  4.66<H>    Ca    9.5      10 Dre 2018 05:46                RADIOLOGY & ADDITIONAL TESTS:    Imaging Personally Reviewed:  Consultant(s) Notes Reviewed:    Care Discussed with Consultants/Other Providers: d/w Rheumatology fellow - Dr. Reich regarding plan of care Patient is a 57y old  Female who presents with a chief complaint of LE pain and inability to walk (19 Dec 2017 10:27)        SUBJECTIVE / OVERNIGHT EVENTS: no acute complaints. feeling better       MEDICATIONS  (STANDING):  aspirin enteric coated 81 milliGRAM(s) Oral daily  atorvastatin 40 milliGRAM(s) Oral at bedtime  bisacodyl 5 milliGRAM(s) Oral at bedtime  calcium carbonate 1250 mG (OsCal) 1 Tablet(s) Oral daily  clopidogrel Tablet 75 milliGRAM(s) Oral daily  dextrose 5%. 1000 milliLiter(s) (50 mL/Hr) IV Continuous <Continuous>  dextrose 50% Injectable 12.5 Gram(s) IV Push once  dextrose 50% Injectable 25 Gram(s) IV Push once  dextrose 50% Injectable 25 Gram(s) IV Push once  dextrose 50% Injectable 12.5 Gram(s) IV Push once  docusate sodium 100 milliGRAM(s) Oral two times a day  furosemide    Tablet 80 milliGRAM(s) Oral <User Schedule>  insulin lispro (HumaLOG) corrective regimen sliding scale   SubCutaneous three times a day before meals  insulin lispro (HumaLOG) corrective regimen sliding scale   SubCutaneous at bedtime  latanoprost 0.005% Ophthalmic Solution 1 Drop(s) Both EYES at bedtime  levoFLOXacin  Tablet 500 milliGRAM(s) Oral every 48 hours  levothyroxine 25 MICROGram(s) Oral daily  metoprolol succinate ER 25 milliGRAM(s) Oral daily  Nephro-amdiha 1 Tablet(s) Oral daily  ondansetron Injectable 4 milliGRAM(s) IV Push once  PARoxetine 20 milliGRAM(s) Oral daily  predniSONE   Tablet 50 milliGRAM(s) Oral daily  senna 2 Tablet(s) Oral at bedtime  timolol 0.5% Solution 1 Drop(s) Both EYES daily    MEDICATIONS  (PRN):  acetaminophen   Tablet 650 milliGRAM(s) Oral every 6 hours PRN For Temp greater than 38 C (100.4 F)  acetaminophen   Tablet. 650 milliGRAM(s) Oral every 6 hours PRN Mild Pain (1 - 3)  dextrose Gel 1 Dose(s) Oral once PRN Blood Glucose LESS THAN 70 milliGRAM(s)/deciliter  glucagon  Injectable 1 milliGRAM(s) IntraMuscular once PRN Glucose LESS THAN 70 milligrams/deciliter  oxyCODONE    IR 5 milliGRAM(s) Oral every 6 hours PRN Moderate Pain (4 - 6)      Vital Signs Last 24 Hrs  T(C): 36.6 (10 Dre 2018 12:45), Max: 37 (09 Jan 2018 21:18)  T(F): 97.9 (10 Dre 2018 12:45), Max: 98.6 (09 Jan 2018 21:18)  HR: 70 (10 Dre 2018 12:45) (70 - 76)  BP: 172/80 (10 Dre 2018 12:45) (150/80 - 172/80)  BP(mean): --  RR: 18 (10 Dre 2018 12:45) (18 - 18)  SpO2: 98% (10 Dre 2018 12:45) (98% - 100%)  CAPILLARY BLOOD GLUCOSE      POCT Blood Glucose.: 154 mg/dL (10 Dre 2018 12:29)  POCT Blood Glucose.: 114 mg/dL (10 Dre 2018 08:52)  POCT Blood Glucose.: 130 mg/dL (09 Jan 2018 21:30)  POCT Blood Glucose.: 153 mg/dL (09 Jan 2018 18:10)    I&O's Summary    09 Jan 2018 07:01  -  10 Dre 2018 07:00  --------------------------------------------------------  IN: 200 mL / OUT: 0 mL / NET: 200 mL    10 Dre 2018 07:01  -  10 Dre 2018 14:36  --------------------------------------------------------  IN: 360 mL / OUT: 0 mL / NET: 360 mL        PHYSICAL EXAM:  GENERAL: NAD  HEAD:  Atraumatic, Normocephalic  EYES: conjunctiva and sclera clear  NECK: No JVD  CHEST/LUNG: CTA b/l  HEART: S1 S2 RRR  ABDOMEN: +BS Soft, NT/ND  EXTREMITIES:  2+ DP Pulses, No c/c/e  NEUROLOGY: AAOx3, 4+/5 MS b/l LE  SKIN: No rashes or lesions    LABS:                        9.1    7.6   )-----------( 114      ( 10 Dre 2018 05:46 )             26.8     01-10    134<L>  |  97  |  46<H>  ----------------------------<  88  4.1   |  26  |  4.66<H>    Ca    9.5      10 Dre 2018 05:46                RADIOLOGY & ADDITIONAL TESTS:    Imaging Personally Reviewed:  Consultant(s) Notes Reviewed: Neuro, ID  Care Discussed with Consultants/Other Providers: d/w Rheumatology fellow - Dr. Reich regarding plan of care

## 2018-01-10 NOTE — PROGRESS NOTE ADULT - ASSESSMENT
56 yo PMH SLE (diagnosed in April) with lupus nephritis, ESRD on HD via permacath (M, W, F) s/p AVF (not matured), DM2 who presented to [facility] on 12/19/17 with 3 weeks of progressive bilateral LE pain and numbness felt to be autoimmune in nature with tentative plan to initiate IV steroids.   Sural nerve bx consistent with vasculitis.   Found to have UTI due to E coli.   Clinically better  Continue levaquin    7-10 day total course  Other plan per primary  immunosuppressive will have risk of infection,should weigh risk benefit with patient,no ID contraindication to using them though.  Case d/w Med NP  Will Follow.  Beeper [id number]-[id number]/afterhours/No response-[id number] 58 yo PMH SLE (diagnosed in April) with lupus nephritis, ESRD on HD via permacath (M, W, F) s/p AVF (not matured), DM2 who presented to [facility] on 12/19/17 with 3 weeks of progressive bilateral LE pain and numbness felt to be autoimmune in nature with tentative plan to initiate IV steroids.   Sural nerve bx consistent with vasculitis.   Found to have UTI due to E coli.   Clinically better  Continue levaquin    7-10 day total course  Other plan per primary  immunosuppressive will have risk of infection,should weigh risk benefit with patient,no ID contraindication to using them though.Quant gold and hep checked already  Case d/w Med NP  Will Follow.  Beeper [id number]-qjdw/afterhours/No response-[id number]

## 2018-01-10 NOTE — PROGRESS NOTE ADULT - SUBJECTIVE AND OBJECTIVE BOX
PROGRESS NOTE  Reason for follow up: CAD  Overnight: No new events.   Update: Patient resting in bed comfortably, s/p HD today. Scheduled for cardiac cath and FFR/PCI mid LAD lesion tomorrow. Discussed with Dr. Weaver and son.    Subjective: "I am good"  Complains of: none  Review of symptoms: Cardiac:  No chest pain. No dyspnea. No palpitations.  Respiratory:no cough. No dyspnea  Gastrointestinal: No diarrhea. No abdominal pain. No bleeding.     Past medical history: No updates.   Chronic conditions:  Hypertension: controlled. CAD: Stable ischemic heart disease. DM: Stable;  Hyperlipidemia: stable  	  Vitals:  T(C): 36.7 (01-10-18 @ 21:06), Max: 36.9 (01-10-18 @ 16:50)  HR: 66 (01-10-18 @ 21:06) (65 - 76)  BP: 136/72 (01-10-18 @ 21:06) (130/74 - 172/80)  RR: 18 (01-10-18 @ 21:06) (17 - 19)  SpO2: 97% (01-10-18 @ 21:06) (97% - 100%)  Wt(kg): --  I&O's Summary    09 Jan 2018 07:01  -  10 Dre 2018 07:00  --------------------------------------------------------  IN: 200 mL / OUT: 0 mL / NET: 200 mL    10 Dre 2018 07:01  -  10 Dre 2018 23:29  --------------------------------------------------------  IN: 660 mL / OUT: 2000 mL / NET: -1340 mL          PHYSICAL EXAM:  Appearance: Comfortable. No acute distress  HEENT:  Head and neck: Atraumatic. Normocephalic.  Normal oral mucosa, PERRL, Neck is supple. No JVD, No carotid bruit.   Neurologic: A & O x 3, no focal deficits. EOMI   Lymphatic: No cervical lymphadenopathy  Cardiovascular: Normal S1 S2, No murmur, rubs/gallops. No JVD, No edema  Respiratory: Lungs clear to auscultation  Gastrointestinal:  Soft, Non-tender, + BS  Lower Extremities: No edema  Psychiatry: Patient is calm. No agitation. Mood & affect appropriate  Skin: No rashes/ ecchymoses/cyanosis/ulcers visualized on the face, hands or feet.    CURRENT MEDICATIONS:  furosemide    Tablet 80 milliGRAM(s) Oral <User Schedule>  metoprolol succinate ER 25 milliGRAM(s) Oral daily    ondansetron Injectable  PARoxetine  bisacodyl  docusate sodium  senna  atorvastatin  dextrose 50% Injectable  dextrose 50% Injectable  dextrose 50% Injectable  dextrose 50% Injectable  insulin lispro (HumaLOG) corrective regimen sliding scale  insulin lispro (HumaLOG) corrective regimen sliding scale  levothyroxine  predniSONE   Tablet  aspirin enteric coated  calcium carbonate 1250 mG (OsCal)  clopidogrel Tablet  dextrose 5%.  latanoprost 0.005% Ophthalmic Solution  Nephro-madiha  timolol 0.5% Solution      LABS:	 	                              9.1    7.6   )-----------( 114      ( 10 Dre 2018 05:46 )             26.8     01-10    134<L>  |  97  |  46<H>  ----------------------------<  88  4.1   |  26  |  4.66<H>    Ca    9.5      10 Dre 2018 05:46      proBNP:   Lipid Profile:   HgA1c: TSH:     TELEMETRY: Reviewed  no events  ECG:  Reviewed by me. 	Normal sinus rhythm    DIAGNOSTIC TESTING:  [ ] Echocardiogram:     < from: Transthoracic Echocardiogram (05.31.17 @ 16:49) >  1. Normal left ventricular internal dimensions and wall  thicknesses. There is assymetrical basalhypertrophy up to  1.5cm.  2. Hyperdynamic left ventricular systolic function. No  segmental wall motion abnormalities.  3. Mild diastolic dysfunction (Stage I).  4. Normal right ventricular size and function.    < end of copied text >  [ ]  Catheterization:  < from: Cardiac Cath Lab - Adult (01.05.18 @ 09:23) >  The coronary circulation is right dominant.  LM:   --  LM: Angiography showed minor luminal irregularities with no flow  limiting lesions.  LAD:   --  Mid LAD: There was a 80 % stenosis.  CX:   --  Circumflex: Angiography showed minor luminal irregularities with  no flow limiting lesions.  RCA:   --  Mid RCA: There was a 90 % stenosis.    < end of copied text >  < from: Cardiac Cath Lab - Adult (01.05.18 @ 09:23) >  Intervention on mid RCA: drug-eluting stent.    < end of copied text >    [ ] Stress Test:    OTHER:

## 2018-01-10 NOTE — PROGRESS NOTE ADULT - ASSESSMENT
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 Sural Nerve bx results are concerning for vasculitis which may be SLE related. On closer review of pathology report, no definitive vascular wall damage has been reported and there is concern for chronic inflammatory infiltrates.  Spoke to pathologist Dr. Pavel Mendez at [place]- states that it is difficult to give definitive diagnosis of vasculitis. Says pt has tremendous amount of axonal loss with around 5% of axons remaining. Also has chronic infiltrative processes but has myelin ovoids which are suggestive of ongoing disease.   There is concern for demyelinating neuropathy vs. vasculitic neuropathy    In setting of current vasculitic process, will treat with   - Rituximab 1000mg IV- infusion to be administered on 1/11. Chemo nurse aware.   Consent obtained verbally by patient. Information re: medication and risks, benefits, side effects discussed in detail via [facility]  ID # [id number]  - c/w Prednisone 50mg qd for now  - Would appreciate neuro input regarding other causes of demyelinating disease   - Spoke w/ pts outpt rheumatologist Dr. Eve Gaming who also is aware of plan and need for outpt f/u    2) SLE- pt with hx of +SLULY, +dsDNA +SSA +Smith.  Also w/ Class V lupus nephritis progressed to ESRD on HD  Current manifestations of possible SLE flare include thrombocytopenia, leukopenia, anemia (although improved from before) and possible neuropathy  Pt with elevated dsDNA 70, +SULLY 1:1280, +SSA >8 , +Smith 8.4  1/6 labs dsDNA 82, C3 41 C4 14.   - Prednisone 50mg     3) Thrombocytopenia/leukopenia/anemia- may all be related to underlying SLE vasculitis and SLE flare.  LDH Haptoglobin wnl.     4) UTI- Pt has +Ur Cx growing E Coli. Currently on Ertapenem, followed by ID    5) +Hep B Core IgM- pt initially with Hep B core Ab negative, when rechecked, IgM is positive.  - Will recheck in AM. If +, will reevaluate safety of Rituxan.  Will follow 57yoF hx of SLE (dx 4/2017), Class V lupus nephritis/ESRD now on HD, renal osteodystrophy, HTN, T2DM presenting with lower extremity weakness x 5 weeks.     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 Sural Nerve bx results are concerning for vasculitis which may be SLE related. On closer review of pathology report, no definitive vascular wall damage has been reported and there is concern for chronic inflammatory infiltrates.  Spoke to pathologist Dr. Pavel Mendez at [place]- states that it is difficult to give definitive diagnosis of vasculitis. Says pt has tremendous amount of axonal loss with around 5% of axons remaining. Also has chronic infiltrative processes but has myelin ovoids which are suggestive of ongoing disease.   There is concern for demyelinating neuropathy vs. vasculitic neuropathy    In setting of current vasculitic process, will treat with   - Rituximab 1000mg IV- infusion to be administered on 1/11. Chemo nurse aware.   Consent obtained verbally by patient. Information re: medication and risks, benefits, side effects discussed in detail via [facility]  ID # [id number] and patient verbalized understadning  - c/w Prednisone 50mg qd for now  - Spoke w/ pts outpt rheumatologist Dr. Eve Gaming who also is aware of plan and need for outpt f/u    2) SLE- pt with hx of +SULLY, +dsDNA +SSA +Smith.  Also w/ Class V lupus nephritis and DM nephropathy, ESRD on HD  Current manifestations of possible SLE flare include thrombocytopenia, leukopenia, anemia (although improved from before), vasculitis process causing neuropathy  Pt with elevated dsDNA 70, +SULLY 1:1280, +SSA >8 , +Smith 8.4  1/6 labs dsDNA 82, C3 41 C4 14.   - Prednisone 50mg     3) Thrombocytopenia/leukopenia/anemia- may all be related to underlying SLE vasculitis and SLE flare.  LDH Haptoglobin wnl.     4) UTI- Pt has +Ur Cx growing E Coli. Currently on Ertapenem, followed by ID, no objection to immunosuppression    5) +Hep B Core IgM- pt initially with Hep B core Ab negative, when rechecked, IgM is positive.  - Will recheck in AM. If +, will reevaluate safety of Rituxan.  Will follow

## 2018-01-10 NOTE — PROGRESS NOTE ADULT - ATTENDING COMMENTS
Thank you. My team will follow.    Pauly Colon M.D, F.A.C.C  [facility]   [phone]

## 2018-01-10 NOTE — PROGRESS NOTE ADULT - SUBJECTIVE AND OBJECTIVE BOX
WILLARD BOB  [id number]    INTERVAL HPI/OVERNIGHT EVENTS:  Pt seen and examined . No acute events overnight.  No complaints today     MEDICATIONS  (STANDING):  aspirin enteric coated 81 milliGRAM(s) Oral daily  atorvastatin 40 milliGRAM(s) Oral at bedtime  bisacodyl 5 milliGRAM(s) Oral at bedtime  calcium carbonate 1250 mG (OsCal) 1 Tablet(s) Oral daily  clopidogrel Tablet 75 milliGRAM(s) Oral daily  dextrose 5%. 1000 milliLiter(s) (50 mL/Hr) IV Continuous <Continuous>  dextrose 50% Injectable 12.5 Gram(s) IV Push once  dextrose 50% Injectable 25 Gram(s) IV Push once  dextrose 50% Injectable 25 Gram(s) IV Push once  dextrose 50% Injectable 12.5 Gram(s) IV Push once  docusate sodium 100 milliGRAM(s) Oral two times a day  furosemide    Tablet 80 milliGRAM(s) Oral <User Schedule>  insulin lispro (HumaLOG) corrective regimen sliding scale   SubCutaneous three times a day before meals  insulin lispro (HumaLOG) corrective regimen sliding scale   SubCutaneous at bedtime  latanoprost 0.005% Ophthalmic Solution 1 Drop(s) Both EYES at bedtime  levothyroxine 25 MICROGram(s) Oral daily  metoprolol succinate ER 25 milliGRAM(s) Oral daily  Nephro-madiha 1 Tablet(s) Oral daily  ondansetron Injectable 4 milliGRAM(s) IV Push once  PARoxetine 20 milliGRAM(s) Oral daily  predniSONE   Tablet 50 milliGRAM(s) Oral daily  senna 2 Tablet(s) Oral at bedtime  timolol 0.5% Solution 1 Drop(s) Both EYES daily    MEDICATIONS  (PRN):  acetaminophen   Tablet 650 milliGRAM(s) Oral every 6 hours PRN For Temp greater than 38 C (100.4 F)  acetaminophen   Tablet. 650 milliGRAM(s) Oral every 6 hours PRN Mild Pain (1 - 3)  dextrose Gel 1 Dose(s) Oral once PRN Blood Glucose LESS THAN 70 milliGRAM(s)/deciliter  glucagon  Injectable 1 milliGRAM(s) IntraMuscular once PRN Glucose LESS THAN 70 milligrams/deciliter  oxyCODONE    IR 5 milliGRAM(s) Oral every 6 hours PRN Moderate Pain (4 - 6)      Allergies    penicillin (Rash)    Intolerances        Review of Systems:   General: No fevers/chills, no fatigue  HEENT: No blurry vision  CVS: No CP/palpitations  Resp: No SOB/wheezing  GI: No N/V/C/D/abdominal pain  MSK:  +LLE pain   Skin: No new rashes  Neuro: No headaches      Vital Signs Last 24 Hrs  T(C): 36.4 (10 Dre 2018 13:50), Max: 37 (09 Jan 2018 21:18)  T(F): 97.6 (10 Dre 2018 13:50), Max: 98.6 (09 Jan 2018 21:18)  HR: 71 (10 Dre 2018 13:50) (70 - 76)  BP: 132/73 (10 Dre 2018 13:50) (132/73 - 172/80)  BP(mean): --  RR: 19 (10 Dre 2018 13:50) (18 - 19)  SpO2: 97% (10 Dre 2018 13:50) (97% - 100%)    Physical Exam:  General: NAD  HEENT: EOMI, MMM  Cardio: +S1/S2, RRR  Resp: CTA b/l  GI: +BS, soft, NT/ND  MSK: Pt with continued pain in LE on palpation  Neuro: AAOx3, muscle strength exam remains stable- 3/5 strength in LE b/l  Psych: wnl    LABS:                        9.1    7.6   )-----------( 114      ( 10 Dre 2018 05:46 )             26.8     01-10    134<L>  |  97  |  46<H>  ----------------------------<  88  4.1   |  26  |  4.66<H>    Ca    9.5      10 Dre 2018 05:46              RADIOLOGY & ADDITIONAL TESTS:

## 2018-01-10 NOTE — PROGRESS NOTE ADULT - ASSESSMENT
57 year old woman with ESRD on HD presenting with limb weakness, now s/p sural nerve biopsy consistent with vaculitis on steroids

## 2018-01-10 NOTE — PROGRESS NOTE ADULT - ASSESSMENT
58YO F w/ Lupus, ESRD, DM, HTN p/w with progressive lower extremity weakness and sensory loss. Symptoms have progressed over 1-2 months. Includes pain, weakness, inability to ambulate, and numbness in LE in distal LE B/L. Neurological exam reveals LE weakness with areflexia. MRI L-C spine did not show any canal narrowing, no deformity, no signal change. 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 Sural N biopsy was performed, results pending. LP was performed, cytology WNL, cryptococcus -, cultures -, ds DNA ab +., Anti SS-A +, Anti SS-B -, Anti Jalloh +, Elevated cadmium. Biopsy concerning for vasculitis procees Patient shows some improvement in motor exam with IVIG. Now on steroids    Team called as family concerned about fluctutaing mental status in the setting of 4 days between HD treatment    -REEG  -continue to monitor if still concern for fluctuating or worsening cognition then can get MRI, MRA

## 2018-01-10 NOTE — PROGRESS NOTE ADULT - ATTENDING COMMENTS
Seen and examined on rounds today with housestaff.  Met patient and son today.  He describes word-finding difficulty, staring over the course of the last week.  As per team, her cognition has improved over the last week.  Can check a routine EEG in light of these events.  If her cognition worsens, plan for MRI/MRA head.

## 2018-01-10 NOTE — PROGRESS NOTE ADULT - PROBLEM SELECTOR PLAN 2
no fistula on imaging   s/p invanz, continue treatment for total 7-10 day course with levaquin Q48 (Day 5) no fistula on imaging   s/p invanz, continue treatment for total 7-10 day course with levaquin Q48 (Day 6)

## 2018-01-10 NOTE — PROGRESS NOTE ADULT - ATTENDING COMMENTS
Seen on HD and tolerating her treatment well  Access tunnel cath functioning properly  AVF still needs outpt angioplasty

## 2018-01-10 NOTE — PROGRESS NOTE ADULT - PROBLEM SELECTOR PLAN 1
S/P PCI mid RCA, now scheduled for FFR and possible PCI of mid LAD lesion tomorrow.   Continue dual antiplatelet therapy. Start coreg 3.125mg po BID

## 2018-01-11 LAB
ANION GAP SERPL CALC-SCNC: 14 MMOL/L — SIGNIFICANT CHANGE UP (ref 5–17)
BUN SERPL-MCNC: 28 MG/DL — HIGH (ref 7–23)
CALCIUM SERPL-MCNC: 9.2 MG/DL — SIGNIFICANT CHANGE UP (ref 8.4–10.5)
CHLORIDE SERPL-SCNC: 97 MMOL/L — SIGNIFICANT CHANGE UP (ref 96–108)
CO2 SERPL-SCNC: 27 MMOL/L — SIGNIFICANT CHANGE UP (ref 22–31)
CREAT SERPL-MCNC: 3.38 MG/DL — HIGH (ref 0.5–1.3)
GLUCOSE BLDC GLUCOMTR-MCNC: 103 MG/DL — HIGH (ref 70–99)
GLUCOSE BLDC GLUCOMTR-MCNC: 111 MG/DL — HIGH (ref 70–99)
GLUCOSE BLDC GLUCOMTR-MCNC: 132 MG/DL — HIGH (ref 70–99)
GLUCOSE BLDC GLUCOMTR-MCNC: 88 MG/DL — SIGNIFICANT CHANGE UP (ref 70–99)
GLUCOSE SERPL-MCNC: 74 MG/DL — SIGNIFICANT CHANGE UP (ref 70–99)
HBV CORE AB SER-ACNC: REACTIVE
HBV CORE IGM SER-ACNC: SIGNIFICANT CHANGE UP
POTASSIUM SERPL-MCNC: 4.1 MMOL/L — SIGNIFICANT CHANGE UP (ref 3.5–5.3)
POTASSIUM SERPL-SCNC: 4.1 MMOL/L — SIGNIFICANT CHANGE UP (ref 3.5–5.3)
SODIUM SERPL-SCNC: 138 MMOL/L — SIGNIFICANT CHANGE UP (ref 135–145)

## 2018-01-11 PROCEDURE — 99233 SBSQ HOSP IP/OBS HIGH 50: CPT

## 2018-01-11 PROCEDURE — 92928 PRQ TCAT PLMT NTRAC ST 1 LES: CPT | Mod: LD

## 2018-01-11 PROCEDURE — 93010 ELECTROCARDIOGRAM REPORT: CPT | Mod: 59

## 2018-01-11 PROCEDURE — 99152 MOD SED SAME PHYS/QHP 5/>YRS: CPT

## 2018-01-11 RX ADMIN — Medication 25 MICROGRAM(S): at 06:03

## 2018-01-11 RX ADMIN — OXYCODONE HYDROCHLORIDE 5 MILLIGRAM(S): 5 TABLET ORAL at 17:18

## 2018-01-11 RX ADMIN — ATORVASTATIN CALCIUM 40 MILLIGRAM(S): 80 TABLET, FILM COATED ORAL at 22:23

## 2018-01-11 RX ADMIN — Medication 5 MILLIGRAM(S): at 22:22

## 2018-01-11 RX ADMIN — Medication 20 MILLIGRAM(S): at 17:19

## 2018-01-11 RX ADMIN — Medication 100 MILLIGRAM(S): at 17:18

## 2018-01-11 RX ADMIN — Medication 100 MILLIGRAM(S): at 06:03

## 2018-01-11 RX ADMIN — Medication 25 MILLIGRAM(S): at 22:22

## 2018-01-11 RX ADMIN — OXYCODONE HYDROCHLORIDE 5 MILLIGRAM(S): 5 TABLET ORAL at 18:08

## 2018-01-11 RX ADMIN — Medication 50 MILLIGRAM(S): at 04:00

## 2018-01-11 RX ADMIN — Medication 1 TABLET(S): at 17:19

## 2018-01-11 RX ADMIN — OXYCODONE HYDROCHLORIDE 5 MILLIGRAM(S): 5 TABLET ORAL at 04:30

## 2018-01-11 RX ADMIN — SENNA PLUS 2 TABLET(S): 8.6 TABLET ORAL at 22:22

## 2018-01-11 RX ADMIN — Medication 1 TABLET(S): at 17:18

## 2018-01-11 RX ADMIN — LATANOPROST 1 DROP(S): 0.05 SOLUTION/ DROPS OPHTHALMIC; TOPICAL at 22:23

## 2018-01-11 RX ADMIN — Medication 80 MILLIGRAM(S): at 06:02

## 2018-01-11 RX ADMIN — CLOPIDOGREL BISULFATE 75 MILLIGRAM(S): 75 TABLET, FILM COATED ORAL at 07:45

## 2018-01-11 RX ADMIN — OXYCODONE HYDROCHLORIDE 5 MILLIGRAM(S): 5 TABLET ORAL at 03:58

## 2018-01-11 RX ADMIN — Medication 81 MILLIGRAM(S): at 07:45

## 2018-01-11 RX ADMIN — Medication 1 DROP(S): at 22:22

## 2018-01-11 NOTE — CHART NOTE - NSCHARTNOTEFT_GEN_A_CORE
Removal of Femoral Sheath    Pulses in the right lower extremity are (palpable/audible by doppler/absent). The patient was placed in the supine position. The insertion site was identified and the sutures were removed per protocol.  The __6__ Yemeni femoral sheath was then removed. Direct pressure was applied for  _20_____ minutes.     Monitoring of the right groin and both lower extremities including neuro-vascular checks and vital signs every 15 minutes x 4, then every 30 minutes x 2, then every 1 hour was ordered.    Complications: None/Other    Comments: sheath removed by CORNELIA lara. Excellent manual hold, no complications, no hematoma/ bleeding or oozing noted post sheath removal. Procedural results explained to patient with + understanding. Activity restrictions explained as well.

## 2018-01-11 NOTE — PROGRESS NOTE ADULT - SUBJECTIVE AND OBJECTIVE BOX
PROGRESS NOTE  Reason for follow up: CAD  Overnight: No new events.   Update: Patient resting in bed comfortably, s/p left heart cath with PCI of mid LAD ANABELA. Son and daughter at bedside.     Subjective: "I am good"  Complains of: none  Review of symptoms: Cardiac:  No chest pain. No dyspnea. No palpitations.  Respiratory:no cough. No dyspnea  Gastrointestinal: No diarrhea. No abdominal pain. No bleeding.     Past medical history: No updates.   Chronic conditions:  Hypertension: controlled. CAD: Stable ischemic heart disease. DM: Stable;  Hyperlipidemia: stable  	  Vitals:  Vital Signs Last 24 Hrs  T(C): 36.8 (11 Jan 2018 14:10), Max: 36.8 (11 Jan 2018 14:10)  T(F): 98.3 (11 Jan 2018 14:10), Max: 98.3 (11 Jan 2018 14:10)  HR: 84 (11 Jan 2018 15:07) (66 - 92)  BP: 132/76 (11 Jan 2018 15:07) (125/74 - 155/78)  BP(mean): --  RR: 17 (11 Jan 2018 14:10) (17 - 18)  SpO2: 99% (11 Jan 2018 14:10) (97% - 100%)        PHYSICAL EXAM:  Appearance: Comfortable. No acute distress  HEENT:  Head and neck: Atraumatic. Normocephalic.  Normal oral mucosa, PERRL, Neck is supple. No JVD, No carotid bruit.   Neurologic: A & O x 3, no focal deficits. EOMI   Lymphatic: No cervical lymphadenopathy  Cardiovascular: Normal S1 S2, No murmur, rubs/gallops. No JVD, No edema  Respiratory: Lungs clear to auscultation  Gastrointestinal:  Soft, Non-tender, + BS  Lower Extremities: No edema  Psychiatry: Patient is calm. No agitation. Mood & affect appropriate  Skin: No rashes/ ecchymoses/cyanosis/ulcers visualized on the face, hands or feet.    CURRENT MEDICATIONS:  MEDICATIONS  (STANDING):  furosemide    Tablet 80 milliGRAM(s) Oral <User Schedule>  metoprolol succinate ER 25 milliGRAM(s) Oral daily    diphenhydrAMINE   Injectable  bisacodyl  docusate sodium  senna  levoFLOXacin  Tablet  acetaminophen   Tablet.  aspirin enteric coated  atorvastatin  calcium carbonate 1250 mG (OsCal)  clopidogrel Tablet  dextrose 5%.  dextrose 50% Injectable  dextrose 50% Injectable  dextrose 50% Injectable  dextrose 50% Injectable  insulin lispro (HumaLOG) corrective regimen sliding scale  insulin lispro (HumaLOG) corrective regimen sliding scale  latanoprost 0.005% Ophthalmic Solution  levothyroxine  methylPREDNISolone sodium succinate Injectable  Nephro-madiha  ondansetron Injectable  PARoxetine  predniSONE   Tablet  riTUXimab IVPB (Non - oncologic)  timolol 0.5% Solution    PRN: acetaminophen   Tablet PRN  acetaminophen   Tablet. PRN  dextrose Gel PRN  glucagon  Injectable PRN  oxyCODONE    IR PRN      LABS:	 	                                                 9.1    7.6   )-----------( 114      ( 10 Dre 2018 05:46 )             26.8   N=x    ; L=x        11 Jan 2018 06:35    138    |  97     |  28     ----------------------------<  74     4.1     |  27     |  3.38     Ca    9.2        11 Jan 2018 06:35        proBNP:   Lipid Profile:   HgA1c: TSH:     TELEMETRY: Reviewed  no events  ECG:  Reviewed by me. 	Normal sinus rhythm    DIAGNOSTIC TESTING:  [ ] Echocardiogram:     < from: Transthoracic Echocardiogram (05.31.17 @ 16:49) >  1. Normal left ventricular internal dimensions and wall  thicknesses. There is assymetrical basalhypertrophy up to  1.5cm.  2. Hyperdynamic left ventricular systolic function. No  segmental wall motion abnormalities.  3. Mild diastolic dysfunction (Stage I).  4. Normal right ventricular size and function.    < end of copied text >  [ ]  Catheterization:  < from: Cardiac Cath Lab - Adult (01.05.18 @ 09:23) >  The coronary circulation is right dominant.  LM:   --  LM: Angiography showed minor luminal irregularities with no flow  limiting lesions.  LAD:   --  Mid LAD: There was a 80 % stenosis.  CX:   --  Circumflex: Angiography showed minor luminal irregularities with  no flow limiting lesions.  RCA:   --  Mid RCA: There was a 90 % stenosis.    < end of copied text >  < from: Cardiac Cath Lab - Adult (01.05.18 @ 09:23) >  Intervention on mid RCA: drug-eluting stent.    < end of copied text >    [ ] Stress Test:    OTHER:

## 2018-01-11 NOTE — PROGRESS NOTE ADULT - SUBJECTIVE AND OBJECTIVE BOX
Patient is a 57y old  Female who presents with a chief complaint of LE pain and inability to walk (19 Dec 2017 10:27)        SUBJECTIVE / OVERNIGHT EVENTS: no acute complaints      MEDICATIONS  (STANDING):  acetaminophen   Tablet. 650 milliGRAM(s) Oral once  aspirin enteric coated 81 milliGRAM(s) Oral daily  atorvastatin 40 milliGRAM(s) Oral at bedtime  bisacodyl 5 milliGRAM(s) Oral at bedtime  calcium carbonate 1250 mG (OsCal) 1 Tablet(s) Oral daily  clopidogrel Tablet 75 milliGRAM(s) Oral daily  dextrose 5%. 1000 milliLiter(s) (50 mL/Hr) IV Continuous <Continuous>  dextrose 50% Injectable 12.5 Gram(s) IV Push once  dextrose 50% Injectable 25 Gram(s) IV Push once  dextrose 50% Injectable 25 Gram(s) IV Push once  dextrose 50% Injectable 12.5 Gram(s) IV Push once  diphenhydrAMINE   Injectable 25 milliGRAM(s) IV Push once  docusate sodium 100 milliGRAM(s) Oral two times a day  furosemide    Tablet 80 milliGRAM(s) Oral <User Schedule>  insulin lispro (HumaLOG) corrective regimen sliding scale   SubCutaneous three times a day before meals  insulin lispro (HumaLOG) corrective regimen sliding scale   SubCutaneous at bedtime  latanoprost 0.005% Ophthalmic Solution 1 Drop(s) Both EYES at bedtime  levoFLOXacin  Tablet 500 milliGRAM(s) Oral every 24 hours  levothyroxine 25 MICROGram(s) Oral daily  methylPREDNISolone sodium succinate Injectable 100 milliGRAM(s) IV Push once  metoprolol succinate ER 25 milliGRAM(s) Oral daily  Nephro-madiha 1 Tablet(s) Oral daily  ondansetron Injectable 4 milliGRAM(s) IV Push once  PARoxetine 20 milliGRAM(s) Oral daily  predniSONE   Tablet 50 milliGRAM(s) Oral daily  riTUXimab IVPB (Non - oncologic) 1000 milliGRAM(s) IV Intermittent once  senna 2 Tablet(s) Oral at bedtime  timolol 0.5% Solution 1 Drop(s) Both EYES daily    MEDICATIONS  (PRN):  acetaminophen   Tablet 650 milliGRAM(s) Oral every 6 hours PRN For Temp greater than 38 C (100.4 F)  acetaminophen   Tablet. 650 milliGRAM(s) Oral every 6 hours PRN Mild Pain (1 - 3)  dextrose Gel 1 Dose(s) Oral once PRN Blood Glucose LESS THAN 70 milliGRAM(s)/deciliter  glucagon  Injectable 1 milliGRAM(s) IntraMuscular once PRN Glucose LESS THAN 70 milligrams/deciliter  oxyCODONE    IR 5 milliGRAM(s) Oral every 6 hours PRN Moderate Pain (4 - 6)      Vital Signs Last 24 Hrs  T(C): 36.8 (11 Jan 2018 14:10), Max: 36.9 (10 Dre 2018 16:50)  T(F): 98.3 (11 Jan 2018 14:10), Max: 98.4 (10 Dre 2018 16:50)  HR: 84 (11 Jan 2018 15:07) (65 - 92)  BP: 132/76 (11 Jan 2018 15:07) (125/74 - 155/78)  BP(mean): --  RR: 17 (11 Jan 2018 14:10) (17 - 18)  SpO2: 99% (11 Jan 2018 14:10) (97% - 100%)  CAPILLARY BLOOD GLUCOSE      POCT Blood Glucose.: 132 mg/dL (11 Jan 2018 12:02)  POCT Blood Glucose.: 111 mg/dL (11 Jan 2018 09:57)  POCT Blood Glucose.: 114 mg/dL (10 Dre 2018 20:54)  POCT Blood Glucose.: 123 mg/dL (10 Dre 2018 17:42)    I&O's Summary    10 Dre 2018 07:01  -  11 Jan 2018 07:00  --------------------------------------------------------  IN: 960 mL / OUT: 2000 mL / NET: -1040 mL        PHYSICAL EXAM:  GENERAL: NAD  HEAD:  Atraumatic, Normocephalic  EYES: conjunctiva and sclera clear  NECK: No JVD  CHEST/LUNG: CTA b/l  HEART: S1 S2 RRR  ABDOMEN: +BS Soft, NT/ND  EXTREMITIES:  2+ DP Pulses, No c/c/e  NEUROLOGY: AAOx3, 4+/5 MS b/l LE  SKIN: No rashes or lesions        LABS:                        9.1    7.6   )-----------( 114      ( 10 Dre 2018 05:46 )             26.8     01-11    138  |  97  |  28<H>  ----------------------------<  74  4.1   |  27  |  3.38<H>    Ca    9.2      11 Jan 2018 06:35                RADIOLOGY & ADDITIONAL TESTS:    Imaging Personally Reviewed:  Consultant(s) Notes Reviewed: Neurology   Care Discussed with Consultants/Other Providers: d/w Shiraz Reich regarding plan of care

## 2018-01-11 NOTE — PROGRESS NOTE ADULT - ASSESSMENT
58 yo f with h/o lupus diagnosed in April, lupus nephritis, ESRD on HD via permacath (M, W, F) s/p AVF (not matured), DM2, HTN, HLD, hypothyroid presented with 3 weeks h/o bilateral LE pain and numbness presumed polyneuropathy course c/b lost HD access s/p permacath by IR 1/3/18 and NSVT, s/p LHC/RHC, s/p ANABELA to RCA, noted to have thick, yellowish/green drainage in the vance, concerning for fistula formation but no evidence on CT cystogram, sural n biopsy c/w vasculitic neuropathy started on prednisone with improvement of LE, staged cath 1/11 with BMS to LAD

## 2018-01-11 NOTE — PROGRESS NOTE ADULT - ASSESSMENT
56 yo PMH SLE (diagnosed in April) with lupus nephritis, ESRD on HD via permacath (M, W, F) s/p AVF (not matured), DM2 who presented to [facility] on 12/19/17 with 3 weeks of progressive bilateral LE pain and numbness felt to be autoimmune in nature with tentative plan to initiate IV steroids.   Sural nerve bx consistent with vasculitis.   Found to have UTI due to E coli.   Clinically better  Continue levaquin    7-10 day total course  Other plan per primary  immunosuppressive will have risk of infection,should weigh risk benefit with patient,no ID contraindication to using them though.Quant gold anegative  Hep B was negative 12/23- now Core +  Unclear if has past(cleared infection) or false + ? cross reactive antibodies  No prior transfusions or needlesticks,but is also HD   Would check Hep B PCR,Hep B e AG and Hep B e Ab  If s/o infection may need to evaluate further, ?prophylaxis/Rx if on rituxan  Case d/w Med NP,Dr Gonzalez  Will Follow.  Beeper [id number]-bexj/afterhours/No response-[id number]

## 2018-01-11 NOTE — PROGRESS NOTE ADULT - PROBLEM SELECTOR PLAN 1
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 biopsy c/w vasculitis but also findings of demyelination - weakness improved s/p IVIG and steroids - d/w Rheumatology who is considering treatment with rituxan.     Appreciate Toxicology consult for cadmium level-per them no evidence of heavy metal exposure by imaging and no other symptoms, does not require chelation.   f/u repeat testing    MRI lumbar, C spine show no cord compression.    Continue with Lyrica- renally dosed.   PT eval- recommend JESSICA placement.   Continue to hold Plaquenil.   no pulse steriods at this time. c/w Prednisone 50mg daily

## 2018-01-11 NOTE — PROGRESS NOTE ADULT - PROBLEM SELECTOR PLAN 2
no fistula on imaging   s/p invanz, continue treatment for total 7-10 day course with levaquin Q48 (Day 6)

## 2018-01-11 NOTE — PROGRESS NOTE ADULT - SUBJECTIVE AND OBJECTIVE BOX
Patient is a 57y old  Female who presents with a chief complaint of LE pain and inability to walk (19 Dec 2017 10:27)    Being followed by ID for uTI    Interval history:Hep B repeat serology +  s/p cath today  No other acute events      ROS:Feels well  No cough,SOB,CP  No N/V/D  No abd pain  No urinary complaints  No HA  No joint or limb pain  No other complaints      Antimicrobials:    levoFLOXacin  Tablet 500 milliGRAM(s) Oral every 24 hours      other medications reviewed    Vital Signs Last 24 Hrs  T(C): 36.8 (01-11-18 @ 14:10), Max: 36.9 (01-10-18 @ 16:50)  T(F): 98.3 (01-11-18 @ 14:10), Max: 98.4 (01-10-18 @ 16:50)  HR: 84 (01-11-18 @ 15:07) (65 - 92)  BP: 132/76 (01-11-18 @ 15:07) (125/74 - 155/78)  BP(mean): --  RR: 17 (01-11-18 @ 14:10) (17 - 18)  SpO2: 99% (01-11-18 @ 14:10) (97% - 100%)    Physical Exam:    Constitutional well preserved,comfortable,pleasant    HEENT PERRLA EOMI,No pallor or icterus    No oral exudate or erythema    Neck supple no JVD or LN    Chest Good AE,CTA    CVS RRR S1 S2 WNl No murmur or rub or gallop    Abd soft BS normal No tenderness no masses    Ext No cyanosis clubbing or edema    IV site no erythema tenderness or discharge    Joints no swelling or LOM    CNS AAO X 3 LLe weakness    Lab Data:                          9.1    7.6   )-----------( 114      ( 10 Dre 2018 05:46 )             26.8       01-11    138  |  97  |  28<H>  ----------------------------<  74  4.1   |  27  |  3.38<H>    Ca    9.2      11 Jan 2018 06:35        Hepatitis B Core IgM Antibody (01.11.18 @ 09:12)    Hepatitis B Core IgM Antibody: Nonreact        Hepatitis B Core Antibody, Total (01.11.18 @ 09:12)    Hepatitis B Core Antibody, Total: Reactive: Test repeated.      Hepatitis B Core Antibody, Total (01.06.18 @ 12:57)    Hepatitis B Core Antibody, Total: Reactive: Test repeated.    Hepatitis B Core Antibody, Total (12.23.17 @ 08:00)    Hepatitis B Core Antibody, Total: Nonreact    Hepatitis B Surface Antibody (12.23.17 @ 08:00)    Hepatitis B Surface Antibody: Reactive

## 2018-01-11 NOTE — PROGRESS NOTE ADULT - ATTENDING COMMENTS
Thank you. My team will follow.    Pauly Colon M.D, F.A.C.C  [facility]   [phone]

## 2018-01-11 NOTE — EEG REPORT - NS EEG TEXT BOX
[facility]   COMPREHENSIVE EPILEPSY CENTER   REPORT OF ROUTINE VIDEO EEG     [facility]: [address] [place] [address] , [address], [place] [address], Ph#: [phone]  [facility]: [id number], [place] [address], Ph#: [phone]  Office: [address] [place] [address], [facility] [address], [place] [address] Ph#: [phone]    Patient Name: WILLARD BOB  Age and : 57y ([birth year])  MRN #: [id number]  Location: [facility] [id number] D1    Study Date: 18    _____________________________________________________________  TECHNICAL INFORMATION    EEG Placement and Labeling of Electrodes:  The EEG was performed utilizing 20 channels referential EEG connections (coronal over temporal over parasagittal montage) using all standard 10-20 electrode placements with EKG.  Recording was at a sampling rate of 256 samples per second per channel.  Time synchronized digital video recording was done simultaneously with EEG recording.  A low light infrared camera was used for low light recording.  Roe and seizure detection algorithms were utilized.    _____________________________________________________________  HISTORY:  Patient is a 57y old  Female who presents with a chief complaint of LE pain and inability to walk (19 Dec 2017 10:27) and word finding difficulties      MEDICATIONS  (STANDING):  acetaminophen   Tablet. 650 milliGRAM(s) Oral once  aspirin enteric coated 81 milliGRAM(s) Oral daily  atorvastatin 40 milliGRAM(s) Oral at bedtime  bisacodyl 5 milliGRAM(s) Oral at bedtime  calcium carbonate 1250 mG (OsCal) 1 Tablet(s) Oral daily  clopidogrel Tablet 75 milliGRAM(s) Oral daily  diphenhydrAMINE   Injectable 25 milliGRAM(s) IV Push once  furosemide    Tablet 80 milliGRAM(s) Oral <User Schedule>  levoFLOXacin  Tablet 500 milliGRAM(s) Oral every 24 hours  levothyroxine 25 MICROGram(s) Oral daily  methylPREDNISolone sodium succinate Injectable 100 milliGRAM(s) IV Push once  metoprolol succinate ER 25 milliGRAM(s) Oral daily  ondansetron Injectable 4 milliGRAM(s) IV Push once  PARoxetine 20 milliGRAM(s) Oral daily  predniSONE   Tablet 50 milliGRAM(s) Oral daily  riTUXimab IVPB (Non - oncologic) 1000 milliGRAM(s) IV Intermittent once  timolol 0.5% Solution 1 Drop(s) Both EYES daily    _____________________________________________________________  STUDY INTERPRETATION    Background:  The background was continuous, spontaneously variable, reactive, and predominantly consisted of theta activities. During the few periods of wakefulness, the posteriorly dominant rhythm consisted of symmetric, 7 Hz activity/     Sleep:  Drowsiness was characterized by fragmentation, attenuation, and slowing of the background activity.    Stage II sleep transients were not recorded.    Non-epileptiform Paroxysmal Abnormalities:  None    Interictal Epileptiform Abnormalities:   None    Ictal Abnormalities:   No clinical events.  No electrographic seizures.    Activation Procedures:   Hyperventilation was not performed.    Photic stimulation was not performed.     Artifacts:  Intermittent myogenic and movement artifacts were noted.    ECG:  The heart rate on single channel ECG was predominantly between 60-70 BPM.    _____________________________________________________________  EEG CLASSIFICATION/SUMMARY:    Abnormal EEG, mostly in the drowsy state with little wakefulness showing minor to moderate generalized slowing.     _____________________________________________________________  CLINICAL IMPRESSION:    Abnormal EEG study consistent with moderate diffuse or multifocal cerebral dysfunction.  No epileptic pattern or seizure seen.      Juan Pablo Mcmahan MD PhD  Attending Physician, [facility] Epilepsy [place]

## 2018-01-12 LAB
ANION GAP SERPL CALC-SCNC: 14 MMOL/L — SIGNIFICANT CHANGE UP (ref 5–17)
BUN SERPL-MCNC: 49 MG/DL — HIGH (ref 7–23)
CALCIUM SERPL-MCNC: 9.6 MG/DL — SIGNIFICANT CHANGE UP (ref 8.4–10.5)
CHLORIDE SERPL-SCNC: 95 MMOL/L — LOW (ref 96–108)
CO2 SERPL-SCNC: 26 MMOL/L — SIGNIFICANT CHANGE UP (ref 22–31)
CREAT SERPL-MCNC: 4.91 MG/DL — HIGH (ref 0.5–1.3)
GLUCOSE BLDC GLUCOMTR-MCNC: 126 MG/DL — HIGH (ref 70–99)
GLUCOSE BLDC GLUCOMTR-MCNC: 81 MG/DL — SIGNIFICANT CHANGE UP (ref 70–99)
GLUCOSE BLDC GLUCOMTR-MCNC: 88 MG/DL — SIGNIFICANT CHANGE UP (ref 70–99)
GLUCOSE BLDC GLUCOMTR-MCNC: 89 MG/DL — SIGNIFICANT CHANGE UP (ref 70–99)
GLUCOSE SERPL-MCNC: 60 MG/DL — LOW (ref 70–99)
HBV DNA # SERPL NAA+PROBE: SIGNIFICANT CHANGE UP
HBV DNA SERPL NAA+PROBE-LOG#: SIGNIFICANT CHANGE UP LOGIU/ML
HCT VFR BLD CALC: 24.3 % — LOW (ref 34.5–45)
HGB BLD-MCNC: 8.5 G/DL — LOW (ref 11.5–15.5)
MCHC RBC-ENTMCNC: 31.4 PG — SIGNIFICANT CHANGE UP (ref 27–34)
MCHC RBC-ENTMCNC: 35.1 GM/DL — SIGNIFICANT CHANGE UP (ref 32–36)
MCV RBC AUTO: 89.6 FL — SIGNIFICANT CHANGE UP (ref 80–100)
PLATELET # BLD AUTO: 145 K/UL — LOW (ref 150–400)
POTASSIUM SERPL-MCNC: 4.4 MMOL/L — SIGNIFICANT CHANGE UP (ref 3.5–5.3)
POTASSIUM SERPL-SCNC: 4.4 MMOL/L — SIGNIFICANT CHANGE UP (ref 3.5–5.3)
RBC # BLD: 2.71 M/UL — LOW (ref 3.8–5.2)
RBC # FLD: 14.2 % — SIGNIFICANT CHANGE UP (ref 10.3–14.5)
SODIUM SERPL-SCNC: 135 MMOL/L — SIGNIFICANT CHANGE UP (ref 135–145)
WBC # BLD: 7.8 K/UL — SIGNIFICANT CHANGE UP (ref 3.8–10.5)
WBC # FLD AUTO: 7.8 K/UL — SIGNIFICANT CHANGE UP (ref 3.8–10.5)

## 2018-01-12 PROCEDURE — 99232 SBSQ HOSP IP/OBS MODERATE 35: CPT | Mod: GC

## 2018-01-12 PROCEDURE — 99232 SBSQ HOSP IP/OBS MODERATE 35: CPT

## 2018-01-12 PROCEDURE — 99233 SBSQ HOSP IP/OBS HIGH 50: CPT

## 2018-01-12 RX ORDER — ONDANSETRON 8 MG/1
4 TABLET, FILM COATED ORAL ONCE
Qty: 0 | Refills: 0 | Status: COMPLETED | OUTPATIENT
Start: 2018-01-12 | End: 2018-01-12

## 2018-01-12 RX ORDER — METOCLOPRAMIDE HCL 10 MG
5 TABLET ORAL THREE TIMES A DAY
Qty: 0 | Refills: 0 | Status: DISCONTINUED | OUTPATIENT
Start: 2018-01-12 | End: 2018-01-16

## 2018-01-12 RX ADMIN — OXYCODONE HYDROCHLORIDE 5 MILLIGRAM(S): 5 TABLET ORAL at 12:19

## 2018-01-12 RX ADMIN — Medication 25 MICROGRAM(S): at 05:39

## 2018-01-12 RX ADMIN — Medication 5 MILLIGRAM(S): at 17:23

## 2018-01-12 RX ADMIN — Medication 25 MILLIGRAM(S): at 21:16

## 2018-01-12 RX ADMIN — CLOPIDOGREL BISULFATE 75 MILLIGRAM(S): 75 TABLET, FILM COATED ORAL at 12:19

## 2018-01-12 RX ADMIN — OXYCODONE HYDROCHLORIDE 5 MILLIGRAM(S): 5 TABLET ORAL at 20:03

## 2018-01-12 RX ADMIN — Medication 1 TABLET(S): at 12:19

## 2018-01-12 RX ADMIN — Medication 20 MILLIGRAM(S): at 12:19

## 2018-01-12 RX ADMIN — Medication 5 MILLIGRAM(S): at 21:16

## 2018-01-12 RX ADMIN — OXYCODONE HYDROCHLORIDE 5 MILLIGRAM(S): 5 TABLET ORAL at 20:30

## 2018-01-12 RX ADMIN — Medication 81 MILLIGRAM(S): at 12:19

## 2018-01-12 RX ADMIN — OXYCODONE HYDROCHLORIDE 5 MILLIGRAM(S): 5 TABLET ORAL at 13:00

## 2018-01-12 RX ADMIN — ONDANSETRON 4 MILLIGRAM(S): 8 TABLET, FILM COATED ORAL at 06:17

## 2018-01-12 RX ADMIN — LATANOPROST 1 DROP(S): 0.05 SOLUTION/ DROPS OPHTHALMIC; TOPICAL at 21:16

## 2018-01-12 RX ADMIN — SENNA PLUS 2 TABLET(S): 8.6 TABLET ORAL at 21:16

## 2018-01-12 RX ADMIN — Medication 1 DROP(S): at 21:16

## 2018-01-12 RX ADMIN — Medication 50 MILLIGRAM(S): at 05:39

## 2018-01-12 RX ADMIN — Medication 100 MILLIGRAM(S): at 05:39

## 2018-01-12 RX ADMIN — Medication 100 MILLIGRAM(S): at 17:23

## 2018-01-12 RX ADMIN — ATORVASTATIN CALCIUM 40 MILLIGRAM(S): 80 TABLET, FILM COATED ORAL at 21:16

## 2018-01-12 NOTE — PROGRESS NOTE ADULT - SUBJECTIVE AND OBJECTIVE BOX
WILLARDBYRON BOB  [id number]    INTERVAL HPI/OVERNIGHT EVENTS:  Pt seen and evaluated while in dialysis, sister at bedside.   Pt appears to be more tired today. Nurse states she had been hypotensive earlier during HD session.   Pt has no new complaints today.   Spoke to pt today via [place]  ID [id number]    MEDICATIONS  (STANDING):  acetaminophen   Tablet. 650 milliGRAM(s) Oral once  aspirin enteric coated 81 milliGRAM(s) Oral daily  atorvastatin 40 milliGRAM(s) Oral at bedtime  bisacodyl 5 milliGRAM(s) Oral at bedtime  calcium carbonate 1250 mG (OsCal) 1 Tablet(s) Oral daily  clopidogrel Tablet 75 milliGRAM(s) Oral daily  dextrose 5%. 1000 milliLiter(s) (50 mL/Hr) IV Continuous <Continuous>  dextrose 50% Injectable 12.5 Gram(s) IV Push once  dextrose 50% Injectable 25 Gram(s) IV Push once  dextrose 50% Injectable 25 Gram(s) IV Push once  dextrose 50% Injectable 12.5 Gram(s) IV Push once  diphenhydrAMINE   Injectable 25 milliGRAM(s) IV Push once  docusate sodium 100 milliGRAM(s) Oral two times a day  furosemide    Tablet 80 milliGRAM(s) Oral <User Schedule>  insulin lispro (HumaLOG) corrective regimen sliding scale   SubCutaneous three times a day before meals  insulin lispro (HumaLOG) corrective regimen sliding scale   SubCutaneous at bedtime  latanoprost 0.005% Ophthalmic Solution 1 Drop(s) Both EYES at bedtime  levoFLOXacin  Tablet 500 milliGRAM(s) Oral every 24 hours  levothyroxine 25 MICROGram(s) Oral daily  methylPREDNISolone sodium succinate Injectable 100 milliGRAM(s) IV Push once  metoclopramide 5 milliGRAM(s) Oral three times a day  metoprolol succinate ER 25 milliGRAM(s) Oral daily  Nephro-madiha 1 Tablet(s) Oral daily  ondansetron Injectable 4 milliGRAM(s) IV Push once  PARoxetine 20 milliGRAM(s) Oral daily  predniSONE   Tablet 50 milliGRAM(s) Oral daily  riTUXimab IVPB (Non - oncologic) 1000 milliGRAM(s) IV Intermittent once  senna 2 Tablet(s) Oral at bedtime  timolol 0.5% Solution 1 Drop(s) Both EYES daily    MEDICATIONS  (PRN):  acetaminophen   Tablet 650 milliGRAM(s) Oral every 6 hours PRN For Temp greater than 38 C (100.4 F)  acetaminophen   Tablet. 650 milliGRAM(s) Oral every 6 hours PRN Mild Pain (1 - 3)  dextrose Gel 1 Dose(s) Oral once PRN Blood Glucose LESS THAN 70 milliGRAM(s)/deciliter  glucagon  Injectable 1 milliGRAM(s) IntraMuscular once PRN Glucose LESS THAN 70 milligrams/deciliter  oxyCODONE    IR 5 milliGRAM(s) Oral every 6 hours PRN Moderate Pain (4 - 6)      Allergies    penicillin (Rash)    Intolerances        Review of Systems:   General: +tired  CVS: No CP/palpitations  Resp: No SOB/wheezing  GI: No N/V/C/D/abdominal pain  MSK: leg pains continued but feel mildly better  Skin: No new rashes  Neuro: No headaches      Vital Signs Last 24 Hrs  T(C): 36.9 (12 Jan 2018 12:02), Max: 36.9 (12 Jan 2018 12:02)  T(F): 98.5 (12 Jan 2018 12:02), Max: 98.5 (12 Jan 2018 12:02)  HR: 74 (12 Jan 2018 12:02) (71 - 88)  BP: 116/73 (12 Jan 2018 12:02) (116/73 - 160/82)  BP(mean): --  RR: 18 (12 Jan 2018 12:02) (16 - 18)  SpO2: 99% (12 Jan 2018 12:02) (98% - 100%)    Physical Exam:  General: lethargic  HEENT: EOMI, MMM  Cardio: +S1/S2, RRR  Resp: CTA b/l  GI: +BS, soft, NT/ND  MSK: No joint swelling noted  Neuro: AAOx3. Full muscle strength exam not performed as pt is currently getting HD    LABS:                        8.5    7.8   )-----------( 145      ( 12 Jan 2018 06:39 )             24.3     01-12    135  |  95<L>  |  49<H>  ----------------------------<  60<L>  4.4   |  26  |  4.91<H>    Ca    9.6      12 Jan 2018 06:39      RADIOLOGY & ADDITIONAL TESTS:

## 2018-01-12 NOTE — PROGRESS NOTE ADULT - SUBJECTIVE AND OBJECTIVE BOX
Patient is a 57y old  Female who presents with a chief complaint of LE pain and inability to walk (19 Dec 2017 10:27)        SUBJECTIVE / OVERNIGHT EVENTS: no acute complaints      MEDICATIONS  (STANDING):  acetaminophen   Tablet. 650 milliGRAM(s) Oral once  aspirin enteric coated 81 milliGRAM(s) Oral daily  atorvastatin 40 milliGRAM(s) Oral at bedtime  bisacodyl 5 milliGRAM(s) Oral at bedtime  calcium carbonate 1250 mG (OsCal) 1 Tablet(s) Oral daily  clopidogrel Tablet 75 milliGRAM(s) Oral daily  dextrose 5%. 1000 milliLiter(s) (50 mL/Hr) IV Continuous <Continuous>  dextrose 50% Injectable 12.5 Gram(s) IV Push once  dextrose 50% Injectable 25 Gram(s) IV Push once  dextrose 50% Injectable 25 Gram(s) IV Push once  dextrose 50% Injectable 12.5 Gram(s) IV Push once  diphenhydrAMINE   Injectable 25 milliGRAM(s) IV Push once  docusate sodium 100 milliGRAM(s) Oral two times a day  furosemide    Tablet 80 milliGRAM(s) Oral <User Schedule>  insulin lispro (HumaLOG) corrective regimen sliding scale   SubCutaneous three times a day before meals  insulin lispro (HumaLOG) corrective regimen sliding scale   SubCutaneous at bedtime  latanoprost 0.005% Ophthalmic Solution 1 Drop(s) Both EYES at bedtime  levoFLOXacin  Tablet 500 milliGRAM(s) Oral every 24 hours  levothyroxine 25 MICROGram(s) Oral daily  methylPREDNISolone sodium succinate Injectable 100 milliGRAM(s) IV Push once  metoprolol succinate ER 25 milliGRAM(s) Oral daily  Nephro-madiha 1 Tablet(s) Oral daily  ondansetron Injectable 4 milliGRAM(s) IV Push once  PARoxetine 20 milliGRAM(s) Oral daily  predniSONE   Tablet 50 milliGRAM(s) Oral daily  riTUXimab IVPB (Non - oncologic) 1000 milliGRAM(s) IV Intermittent once  senna 2 Tablet(s) Oral at bedtime  timolol 0.5% Solution 1 Drop(s) Both EYES daily    MEDICATIONS  (PRN):  acetaminophen   Tablet 650 milliGRAM(s) Oral every 6 hours PRN For Temp greater than 38 C (100.4 F)  acetaminophen   Tablet. 650 milliGRAM(s) Oral every 6 hours PRN Mild Pain (1 - 3)  dextrose Gel 1 Dose(s) Oral once PRN Blood Glucose LESS THAN 70 milliGRAM(s)/deciliter  glucagon  Injectable 1 milliGRAM(s) IntraMuscular once PRN Glucose LESS THAN 70 milligrams/deciliter  oxyCODONE    IR 5 milliGRAM(s) Oral every 6 hours PRN Moderate Pain (4 - 6)      Vital Signs Last 24 Hrs  T(C): 36.5 (12 Jan 2018 04:43), Max: 36.8 (11 Jan 2018 14:10)  T(F): 97.7 (12 Jan 2018 04:43), Max: 98.3 (11 Jan 2018 14:10)  HR: 74 (12 Jan 2018 04:43) (70 - 92)  BP: 120/69 (12 Jan 2018 04:43) (120/69 - 146/81)  BP(mean): --  RR: 17 (12 Jan 2018 04:43) (16 - 17)  SpO2: 99% (12 Jan 2018 04:43) (98% - 100%)  CAPILLARY BLOOD GLUCOSE      POCT Blood Glucose.: 88 mg/dL (11 Jan 2018 22:07)  POCT Blood Glucose.: 103 mg/dL (11 Jan 2018 17:57)  POCT Blood Glucose.: 132 mg/dL (11 Jan 2018 12:02)  POCT Blood Glucose.: 111 mg/dL (11 Jan 2018 09:57)    I&O's Summary    11 Jan 2018 07:01  -  12 Jan 2018 07:00  --------------------------------------------------------  IN: 260 mL / OUT: 0 mL / NET: 260 mL        PHYSICAL EXAM:  GENERAL: NAD  HEAD:  Atraumatic, Normocephalic  EYES: conjunctiva and sclera clear  NECK: No JVD  CHEST/LUNG: CTA b/l  HEART: S1 S2 RRR  ABDOMEN: +BS Soft, NT/ND  EXTREMITIES:  2+ DP Pulses, No c/c/e  NEUROLOGY: AAOx3, 4+/5 MS b/l LE  SKIN: No rashes or lesions    LABS:    01-12    135  |  95<L>  |  49<H>  ----------------------------<  60<L>  4.4   |  26  |  4.91<H>    Ca    9.6      12 Jan 2018 06:39                RADIOLOGY & ADDITIONAL TESTS:    Imaging Personally Reviewed:  Consultant(s) Notes Reviewed: Cards, ID   Care Discussed with Consultants/Other Providers: d/w Etta from core lab - Though the Hepatitis B PCR was requested to be added yesturday afternoon, blood has still not been sent for testing - will be sending it for testing now and will expedite

## 2018-01-12 NOTE — PROGRESS NOTE ADULT - ASSESSMENT
56 yo PMH SLE (diagnosed in April) with lupus nephritis, ESRD on HD via permacath (M, W, F) s/p AVF (not matured), DM2 who presented to [facility] on 12/19/17 with 3 weeks of progressive bilateral LE pain and numbness felt to be autoimmune in nature with tentative plan to initiate IV steroids.   Sural nerve bx consistent with vasculitis.   Found to have UTI due to E coli.   Clinically better  Continue levaquin  Other plan per primary  immunosuppressive will have risk of infection,should weigh risk benefit with patient,no ID contraindication to using them though.Quant gold anegative  Hep B was negative 12/23- now Core +  Unclear if has past(cleared infection) or false + ? cross reactive antibodies  No prior transfusions or needlesticks,but is also HD   Would check Hep B PCR,Hep B e AG and Hep B e Ab  If s/o infection may need to evaluate further/Rx,  Else if PCR negative  since HepB S ag negative will need enhanced surveillance with Q 3 months PCR and Hep B s Ag  Case d/w Med NP,Dr Gonzalez  Infectious Diseases Service will cover over next 3 days.  Please call [phone] if issues.

## 2018-01-12 NOTE — PROGRESS NOTE ADULT - SUBJECTIVE AND OBJECTIVE BOX
PROGRESS NOTE  Reason for follow up: CAD  Overnight: No new events.   Update: Patient resting in bed comfortably,Son at bedside.   History and physical performed in [place]    Subjective: "I am good"  Complains of: none  Review of symptoms: Cardiac:  No chest pain. No dyspnea. No palpitations.  Respiratory:no cough. No dyspnea  Gastrointestinal: No diarrhea. No abdominal pain. No bleeding.   Vasc: no radial tenderness, mild tenderness at Rt groin    Past medical history: No updates.   Chronic conditions:  Hypertension: controlled. CAD: Stable ischemic heart disease. DM: Stable;  Hyperlipidemia: stable  	  Vitals:  Vital Signs Last 24 Hrs  T(C): 36.9 (12 Jan 2018 12:02), Max: 36.9 (12 Jan 2018 12:02)  T(F): 98.5 (12 Jan 2018 12:02), Max: 98.5 (12 Jan 2018 12:02)  HR: 74 (12 Jan 2018 12:02) (71 - 92)  BP: 116/73 (12 Jan 2018 12:02) (116/73 - 160/82)  BP(mean): --  RR: 18 (12 Jan 2018 12:02) (16 - 18)  SpO2: 99% (12 Jan 2018 12:02) (98% - 100%)      PHYSICAL EXAM:  Appearance: lying flat in bed, NAD  HEENT:  Head and neck: Atraumatic. Normocephalic.  Normal oral mucosa, PERRL, Neck is supple. No JVD, No carotid bruit.   Neurologic: A & O x 3, no focal deficits. EOMI   Lymphatic: No cervical lymphadenopathy  Cardiovascular: Normal S1 S2, No murmur, rubs/gallops. No JVD, No edema  Respiratory: Lungs clear to auscultation  Gastrointestinal:  Soft, Non-tender, + BS  Lower Extremities: No edema  Psychiatry: Patient is calm. No agitation. Mood & affect appropriate  Skin: No rashes/ ecchymoses/cyanosis/ulcers visualized on the face, hands or feet.  Vascular: rt radial : +1 pulse, Rt groin + pulse, mild tendeness, no hematoma    CURRENT MEDICATIONS:  MEDICATIONS  (STANDING):  MEDICATIONS  (STANDING):  acetaminophen   Tablet. 650 milliGRAM(s) Oral once  aspirin enteric coated 81 milliGRAM(s) Oral daily  atorvastatin 40 milliGRAM(s) Oral at bedtime  bisacodyl 5 milliGRAM(s) Oral at bedtime  calcium carbonate 1250 mG (OsCal) 1 Tablet(s) Oral daily  clopidogrel Tablet 75 milliGRAM(s) Oral daily  dextrose 5%. 1000 milliLiter(s) (50 mL/Hr) IV Continuous <Continuous>  dextrose 50% Injectable 12.5 Gram(s) IV Push once  dextrose 50% Injectable 25 Gram(s) IV Push once  dextrose 50% Injectable 25 Gram(s) IV Push once  dextrose 50% Injectable 12.5 Gram(s) IV Push once  diphenhydrAMINE   Injectable 25 milliGRAM(s) IV Push once  docusate sodium 100 milliGRAM(s) Oral two times a day  furosemide    Tablet 80 milliGRAM(s) Oral <User Schedule>  insulin lispro (HumaLOG) corrective regimen sliding scale   SubCutaneous three times a day before meals  insulin lispro (HumaLOG) corrective regimen sliding scale   SubCutaneous at bedtime  latanoprost 0.005% Ophthalmic Solution 1 Drop(s) Both EYES at bedtime  levoFLOXacin  Tablet 500 milliGRAM(s) Oral every 24 hours  levothyroxine 25 MICROGram(s) Oral daily  methylPREDNISolone sodium succinate Injectable 100 milliGRAM(s) IV Push once  metoclopramide 5 milliGRAM(s) Oral three times a day  metoprolol succinate ER 25 milliGRAM(s) Oral daily  Nephro-madiha 1 Tablet(s) Oral daily  ondansetron Injectable 4 milliGRAM(s) IV Push once  PARoxetine 20 milliGRAM(s) Oral daily  predniSONE   Tablet 50 milliGRAM(s) Oral daily  riTUXimab IVPB (Non - oncologic) 1000 milliGRAM(s) IV Intermittent once  senna 2 Tablet(s) Oral at bedtime  timolol 0.5% Solution 1 Drop(s) Both EYES daily    MEDICATIONS  (PRN):  acetaminophen   Tablet 650 milliGRAM(s) Oral every 6 hours PRN For Temp greater than 38 C (100.4 F)  acetaminophen   Tablet. 650 milliGRAM(s) Oral every 6 hours PRN Mild Pain (1 - 3)  dextrose Gel 1 Dose(s) Oral once PRN Blood Glucose LESS THAN 70 milliGRAM(s)/deciliter  glucagon  Injectable 1 milliGRAM(s) IntraMuscular once PRN Glucose LESS THAN 70 milligrams/deciliter  oxyCODONE    IR 5 milliGRAM(s) Oral every 6 hours PRN Moderate Pain (4 - 6)        LABS:	 	                          8.5    7.8   )-----------( 145      ( 12 Jan 2018 06:39 )             24.3   01-12    135  |  95<L>  |  49<H>  ----------------------------<  60<L>  4.4   |  26  |  4.91<H>    Ca    9.6      12 Jan 2018 06:39    proBNP:   Lipid Profile:   HgA1c: TSH:     TELEMETRY: discontinued    DIAGNOSTIC TESTING:  [ ] Echocardiogram:     < from: Transthoracic Echocardiogram (05.31.17 @ 16:49) >  1. Normal left ventricular internal dimensions and wall  thicknesses. There is assymetrical basalhypertrophy up to  1.5cm.  2. Hyperdynamic left ventricular systolic function. No  segmental wall motion abnormalities.  3. Mild diastolic dysfunction (Stage I).  4. Normal right ventricular size and function.    < end of copied text >  [ ]  Catheterization:  < from: Cardiac Cath Lab - Adult (01.05.18 @ 09:23) >  The coronary circulation is right dominant.  LM:   --  LM: Angiography showed minor luminal irregularities with no flow  limiting lesions.  LAD:   --  Mid LAD: There was a 80 % stenosis.  CX:   --  Circumflex: Angiography showed minor luminal irregularities with  no flow limiting lesions.  RCA:   --  Mid RCA: There was a 90 % stenosis.    < end of copied text >  < from: Cardiac Cath Lab - Adult (01.05.18 @ 09:23) >  Intervention on mid RCA: drug-eluting stent.    < end of copied text >    [ ] Stress Test:    OTHER:

## 2018-01-12 NOTE — PROGRESS NOTE ADULT - PROBLEM SELECTOR PLAN 1
S/P PCI mid RCA, mid LAD.  Continue dual antiplatelet therapy.   plan for ASA and plavix for 1 year  continue beta blocker

## 2018-01-12 NOTE — PROGRESS NOTE ADULT - ASSESSMENT
58 yo F w/ SLE (dx 04/2017), lupus nephritis, ESRD on HD via permacath (M, W, F) s/p AVF (not matured), DM2, HTN, HL, hypothyroid, admitted 12/20/2017 with 3 weeks h/o bilateral LE pain and numbness, subsequently found to have NSVT on telemetry, now s/p LHC with PCI on 01/05/2018 (ANABELA placed to mRCA lesion with 90% stenosis), staged PCI to LAD 1/11/17

## 2018-01-12 NOTE — PROGRESS NOTE ADULT - ATTENDING COMMENTS
Thank you. My team will follow remotely, please contact cardiology if any further questions    Rey Mcgovern MD, PhD  Cardiology Attending  [phone]

## 2018-01-12 NOTE — PROGRESS NOTE ADULT - SUBJECTIVE AND OBJECTIVE BOX
Patient is a 57y old  Female who presents with a chief complaint of LE pain and inability to walk (19 Dec 2017 10:27)    Being followed by ID for UTI    Interval history:depressed ,son at bedside  No acute events      ROS:  No cough,SOB,CP  No N/V/D./abd pain  No urinary complaints  No other complaints      Antimicrobials:    levoFLOXacin  Tablet 500 milliGRAM(s) Oral every 24 hours    Other medications reviewed    Vital Signs Last 24 Hrs  T(C): 36.9 (01-12-18 @ 12:02), Max: 36.9 (01-12-18 @ 12:02)  T(F): 98.5 (01-12-18 @ 12:02), Max: 98.5 (01-12-18 @ 12:02)  HR: 74 (01-12-18 @ 12:02) (70 - 92)  BP: 116/73 (01-12-18 @ 12:02) (116/73 - 160/82)  BP(mean): --  RR: 18 (01-12-18 @ 12:02) (16 - 18)  SpO2: 99% (01-12-18 @ 12:02) (98% - 100%)    Physical Exam:        HEENT PERRLA EOMI    No oral exudate or erythema    Chest Good AE,CTA    CVS RRR S1 S2 WNl No murmur or rub or gallop    Abd soft BS normal No tenderness no masses    IV site no erythema tenderness or discharge    CNS AAO X 3 LLe weakness    Lab Data:                          8.5    7.8   )-----------( 145      ( 12 Jan 2018 06:39 )             24.3       01-12    135  |  95<L>  |  49<H>  ----------------------------<  60<L>  4.4   |  26  |  4.91<H>    Ca    9.6      12 Jan 2018 06:39

## 2018-01-12 NOTE — PROGRESS NOTE ADULT - ASSESSMENT
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 Sural Nerve bx results are concerning for vasculitis which may be SLE related. On closer review of pathology report, no definitive vascular wall damage has been reported and there is concern for chronic inflammatory infiltrates.  Spoke to pathologist Dr. Pavel Mendez at [place]- states that it is difficult to give definitive diagnosis of vasculitis. Says pt has tremendous amount of axonal loss with around 5% of axons remaining. Also has chronic infiltrative processes but has myelin ovoids which are suggestive of ongoing disease.   There is concern for demyelinating neuropathy vs. vasculitic neuropathy    Pt has Reactive Hep B Core Ab. It was previously negative but when rechecked, it is positive x2. ID is following and aware.  Rec Hep B PCR and Hep Be Antigen  Labs are currently pending in lab.   Will hold on Rituxan pending lab results.     If Hep serologies return negative, and if ID clears, will treat over weekend with  - Rituximab 1000mg IV- infusion. Chemo nurse will need to be made aware.     Consent obtained verbally by patient. Information re: medication and risks, benefits, side effects discussed in detail via [place]  ID # [id number] and patient verbalized understadning  - c/w Prednisone 50mg qd for now  - Spoke w/ pts outpt rheumatologist Dr. Eve Gaming who also is aware of plan and need for outpt f/u    2) SLE- pt with hx of +SULLY, +dsDNA +SSA +Smith.  Also w/ Class V lupus nephritis and DM nephropathy, ESRD on HD  Current manifestations of possible SLE flare include thrombocytopenia, leukopenia, anemia (although improved from before), vasculitis process causing neuropathy  Pt with elevated dsDNA 70, +SULLY 1:1280, +SSA >8 , +Smith 8.4  1/6 labs dsDNA 82, C3 41 C4 14.   - Prednisone 50mg     3) Thrombocytopenia/leukopenia/anemia- may all be related to underlying SLE vasculitis and SLE flare.  LDH Haptoglobin wnl.     4) UTI- Pt has +Ur Cx growing E Coli. Currently on Levaquin, followed by ID    From 1/12-1/15:  Fellow on call: Dr. Destiny Bronson  Attending on call: Dr. Novoa

## 2018-01-12 NOTE — PROGRESS NOTE ADULT - PROBLEM SELECTOR PLAN 1
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 biopsy c/w vasculitis but also findings of demyelination - weakness improved s/p IVIG and steroids - d/w Rheumatology who is considering treatment with rituxan. However, with Hep B core ab+ will need Hepatitis BeAg/Ab and PCR - which core lab has told me PCR will not be testing within 1 hour and will expedite test.     Appreciate Toxicology consult for cadmium level-per them no evidence of heavy metal exposure by imaging and no other symptoms, does not require chelation.   f/u repeat testing - still pending    MRI lumbar, C spine show no cord compression.    Continue with Lyrica- renally dosed.   PT eval- recommend JESSICA placement.   Continue to hold Plaquenil.   no pulse steriods at this time. c/w Prednisone 50mg daily - weakness improved

## 2018-01-12 NOTE — PROGRESS NOTE ADULT - PROBLEM SELECTOR PLAN 2
no fistula on imaging   s/p invanz, continue treatment for total 7-10 day course with levaquin Q48 (Day 7)  ID follow up no fistula on imaging   s/p invanz, continue treatment for total 7-10 day course with levaquin Q48 (Day 8)  ID follow up

## 2018-01-13 LAB
ANION GAP SERPL CALC-SCNC: 13 MMOL/L — SIGNIFICANT CHANGE UP (ref 5–17)
BUN SERPL-MCNC: 28 MG/DL — HIGH (ref 7–23)
CALCIUM SERPL-MCNC: 9 MG/DL — SIGNIFICANT CHANGE UP (ref 8.4–10.5)
CHLORIDE SERPL-SCNC: 97 MMOL/L — SIGNIFICANT CHANGE UP (ref 96–108)
CO2 SERPL-SCNC: 27 MMOL/L — SIGNIFICANT CHANGE UP (ref 22–31)
CREAT SERPL-MCNC: 3.29 MG/DL — HIGH (ref 0.5–1.3)
GLUCOSE BLDC GLUCOMTR-MCNC: 119 MG/DL — HIGH (ref 70–99)
GLUCOSE BLDC GLUCOMTR-MCNC: 143 MG/DL — HIGH (ref 70–99)
GLUCOSE BLDC GLUCOMTR-MCNC: 159 MG/DL — HIGH (ref 70–99)
GLUCOSE BLDC GLUCOMTR-MCNC: 85 MG/DL — SIGNIFICANT CHANGE UP (ref 70–99)
GLUCOSE SERPL-MCNC: 65 MG/DL — LOW (ref 70–99)
HBV E AB SER-ACNC: NEGATIVE — SIGNIFICANT CHANGE UP
HBV E AG SER-ACNC: NEGATIVE — SIGNIFICANT CHANGE UP
HCT VFR BLD CALC: 27 % — LOW (ref 34.5–45)
HGB BLD-MCNC: 9 G/DL — LOW (ref 11.5–15.5)
MCHC RBC-ENTMCNC: 30 PG — SIGNIFICANT CHANGE UP (ref 27–34)
MCHC RBC-ENTMCNC: 33.2 GM/DL — SIGNIFICANT CHANGE UP (ref 32–36)
MCV RBC AUTO: 90.2 FL — SIGNIFICANT CHANGE UP (ref 80–100)
PLATELET # BLD AUTO: 142 K/UL — LOW (ref 150–400)
POTASSIUM SERPL-MCNC: 4.3 MMOL/L — SIGNIFICANT CHANGE UP (ref 3.5–5.3)
POTASSIUM SERPL-SCNC: 4.3 MMOL/L — SIGNIFICANT CHANGE UP (ref 3.5–5.3)
RBC # BLD: 3 M/UL — LOW (ref 3.8–5.2)
RBC # FLD: 14.4 % — SIGNIFICANT CHANGE UP (ref 10.3–14.5)
SODIUM SERPL-SCNC: 137 MMOL/L — SIGNIFICANT CHANGE UP (ref 135–145)
WBC # BLD: 6.5 K/UL — SIGNIFICANT CHANGE UP (ref 3.8–10.5)
WBC # FLD AUTO: 6.5 K/UL — SIGNIFICANT CHANGE UP (ref 3.8–10.5)

## 2018-01-13 PROCEDURE — 99232 SBSQ HOSP IP/OBS MODERATE 35: CPT | Mod: GC

## 2018-01-13 PROCEDURE — 99233 SBSQ HOSP IP/OBS HIGH 50: CPT

## 2018-01-13 RX ORDER — DIPHENHYDRAMINE HCL 50 MG
25 CAPSULE ORAL ONCE
Qty: 0 | Refills: 0 | Status: COMPLETED | OUTPATIENT
Start: 2018-01-14 | End: 2018-01-14

## 2018-01-13 RX ORDER — DIPHENHYDRAMINE HCL 50 MG
25 CAPSULE ORAL ONCE
Qty: 0 | Refills: 0 | Status: DISCONTINUED | OUTPATIENT
Start: 2018-01-14 | End: 2018-01-16

## 2018-01-13 RX ORDER — ACETAMINOPHEN 500 MG
650 TABLET ORAL ONCE
Qty: 0 | Refills: 0 | Status: COMPLETED | OUTPATIENT
Start: 2018-01-14 | End: 2018-01-14

## 2018-01-13 RX ORDER — RITUXIMAB 10 MG/ML
1000 INJECTION, SOLUTION INTRAVENOUS ONCE
Qty: 0 | Refills: 0 | Status: COMPLETED | OUTPATIENT
Start: 2018-01-14 | End: 2018-01-14

## 2018-01-13 RX ADMIN — Medication 5 MILLIGRAM(S): at 21:48

## 2018-01-13 RX ADMIN — CLOPIDOGREL BISULFATE 75 MILLIGRAM(S): 75 TABLET, FILM COATED ORAL at 09:36

## 2018-01-13 RX ADMIN — Medication 100 MILLIGRAM(S): at 18:22

## 2018-01-13 RX ADMIN — Medication 650 MILLIGRAM(S): at 09:35

## 2018-01-13 RX ADMIN — Medication 81 MILLIGRAM(S): at 09:36

## 2018-01-13 RX ADMIN — Medication 1 DROP(S): at 21:49

## 2018-01-13 RX ADMIN — Medication 50 MILLIGRAM(S): at 05:58

## 2018-01-13 RX ADMIN — Medication 80 MILLIGRAM(S): at 05:58

## 2018-01-13 RX ADMIN — Medication 25 MILLIGRAM(S): at 21:48

## 2018-01-13 RX ADMIN — Medication 100 MILLIGRAM(S): at 05:58

## 2018-01-13 RX ADMIN — Medication 1 TABLET(S): at 09:35

## 2018-01-13 RX ADMIN — Medication 1: at 13:23

## 2018-01-13 RX ADMIN — LATANOPROST 1 DROP(S): 0.05 SOLUTION/ DROPS OPHTHALMIC; TOPICAL at 23:16

## 2018-01-13 RX ADMIN — Medication 20 MILLIGRAM(S): at 09:36

## 2018-01-13 RX ADMIN — ATORVASTATIN CALCIUM 40 MILLIGRAM(S): 80 TABLET, FILM COATED ORAL at 21:48

## 2018-01-13 RX ADMIN — Medication 650 MILLIGRAM(S): at 10:30

## 2018-01-13 RX ADMIN — Medication 25 MICROGRAM(S): at 05:58

## 2018-01-13 RX ADMIN — Medication 5 MILLIGRAM(S): at 05:58

## 2018-01-13 RX ADMIN — Medication 5 MILLIGRAM(S): at 13:23

## 2018-01-13 RX ADMIN — Medication 1 TABLET(S): at 09:36

## 2018-01-13 NOTE — PROGRESS NOTE ADULT - SUBJECTIVE AND OBJECTIVE BOX
INTERVAL HPI/OVERNIGHT EVENTS:  Pt feeling well, no acute complaints.     MEDICATIONS  (STANDING):  predniSONE   Tablet 50 milliGRAM(s) Oral daily    aspirin enteric coated 81 milliGRAM(s) Oral daily  atorvastatin 40 milliGRAM(s) Oral at bedtime  bisacodyl 5 milliGRAM(s) Oral at bedtime  calcium carbonate 1250 mG (OsCal) 1 Tablet(s) Oral daily  clopidogrel Tablet 75 milliGRAM(s) Oral adis  furosemide    Tablet 80 milliGRAM(s) Oral <User Schedule>  insulin lispro (HumaLOG) corrective regimen sliding scale   SubCutaneous three times a day before meals  insulin lispro (HumaLOG) corrective regimen sliding scale   SubCutaneous at bedtime  latanoprost 0.005% Ophthalmic Solution 1 Drop(s) Both EYES at bedtime  levoFLOXacin  Tablet 500 milliGRAM(s) Oral every 24 hours  levothyroxine 25 MICROGram(s) Oral daily  metoclopramide 5 milliGRAM(s) Oral three times a day  metoprolol succinate ER 25 milliGRAM(s) Oral daily  Nephro-madiha 1 Tablet(s) Oral daily  ondansetron Injectable 4 milliGRAM(s) IV Push once  PARoxetine 20 milliGRAM(s) Oral daily    senna 2 Tablet(s) Oral at bedtime  timolol 0.5% Solution 1 Drop(s) Both EYES daily    MEDICATIONS  (PRN):  acetaminophen   Tablet 650 milliGRAM(s) Oral every 6 hours PRN For Temp greater than 38 C (100.4 F)  acetaminophen   Tablet. 650 milliGRAM(s) Oral every 6 hours PRN Mild Pain (1 - 3)  dextrose Gel 1 Dose(s) Oral once PRN Blood Glucose LESS THAN 70 milliGRAM(s)/deciliter  glucagon  Injectable 1 milliGRAM(s) IntraMuscular once PRN Glucose LESS THAN 70 milligrams/deciliter  oxyCODONE    IR 5 milliGRAM(s) Oral every 6 hours PRN Moderate Pain (4 -    Vital Signs Last 24 Hrs  T(C): 36.7 (13 Jan 2018 12:27), Max: 37.1 (12 Jan 2018 20:06)  T(F): 98 (13 Jan 2018 12:27), Max: 98.7 (12 Jan 2018 20:06)  HR: 76 (13 Jan 2018 12:27) (69 - 76)  BP: 101/63 (13 Jan 2018 12:27) (101/63 - 137/76)  BP(mean): --  RR: 17 (13 Jan 2018 12:27) (17 - 18)  SpO2: 97% (13 Jan 2018 12:27) (97% - 100%)    PHYSICAL EXAM:  General: lethargic  HEENT: EOMI, MMM  Cardio: +S1/S2, RRR  Resp: CTA b/l  GI: +BS, soft, NT/ND  MSK: No joint swelling noted  Neuro: AAOx3. Full muscle strength exam not performed as pt is currently getting HD    LABS:                        9.0    6.5   )-----------( 142      ( 13 Jan 2018 06:32 )             27.0     01-13    137  |  97  |  28<H>  ----------------------------<  65<L>  4.3   |  27  |  3.29<H>    Ca    9.0      13 Jan 2018 06:32    HBV PCR DNA neg  HBV c Ab positive x 2   HBV e Ag neg  HBV e Ab pending

## 2018-01-13 NOTE — PROGRESS NOTE ADULT - PROBLEM SELECTOR PLAN 3
s/p LHC/RHC, s/p ANABELA to RCA without complication. Appreciated EP recs -   given concern for post-intervention VT, continue tele for now  - monitor electrolytes and replete   - continue DAPT  - appreciate EP recs  - d/c telemetry - no events

## 2018-01-13 NOTE — PROGRESS NOTE ADULT - ATTENDING COMMENTS
patient seen and eaxmined with fellow.  Agree with assessment and plans as above. Patient is aware and in agreement with our plans.  Spoke with her in Czech today.  Please notify rheum of any complications/infusion reactions.

## 2018-01-13 NOTE — PROGRESS NOTE ADULT - PROBLEM SELECTOR PLAN 1
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 biopsy c/w vasculitis but also findings of demyelination   - weakness improved s/p IVIG and steroids   - d/w Rheumatology who is considering treatment with rituxan. HepB e antigen, PCR negative. Pending ID clearance for initiation of rituxan   - Appreciate Toxicology consult for cadmium level-per them no evidence of heavy metal exposure by imaging and no other symptoms, does not require chelation.   f/u repeat testing - still pending  - MRI lumbar, C spine show no cord compression.    - Continue with Lyrica- renally dosed.   - PT eval- recommend JESSICA placement.   - Continue to hold Plaquenil. 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 biopsy c/w vasculitis but also findings of demyelination   - weakness improved s/p IVIG and steroids   - HepB e antigen, PCR negative.   - care discussed with Dr. Bronson (rheum): will start Rituxan today  - care discussed with Dr. Kahn (ID): no contraindication to rituxan. Pt will need enhanced surveillance with HepB PCR [id number]  - Appreciate Toxicology consult for cadmium level-per them no evidence of heavy metal exposure by imaging and no other symptoms, does not require chelation.   f/u repeat testing - still pending  - MRI lumbar, C spine show no cord compression.    - Continue with Lyrica- renally dosed.   - PT eval- recommend JESSICA placement.   - Continue to hold Plaquenil.

## 2018-01-13 NOTE — PROGRESS NOTE ADULT - ASSESSMENT
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    Plan:  1) Vasculitic neuropathy  -Rituximab indicated for vasculitis, but given + HBcAb, further evaluation was warranted. HBV PCR and HBVeAg have come back negative, and ID has cleared patient for rituximab.  Discussed labs and rituximab with family and verbal consent obtained.  -Chemo nurse and pharmacy on board. RTX and pre-medications (Tylenol, solumedrol, benadryl) has been ordered for tomorrow, 1/14/18.  -c/w Prednisone 50mg qd for now  -Spoke w/ pts outpt rheumatologist Dr. Eve Gaming who also is aware of plan and need for outpt f/u  -If no complications from RTX arise, patient is cleared from our end to be discharged on prednisone 50mg to rehab. Patient will follow up with outpt rheumatologist upon discharge.    2) SLE- pt with hx of +SULLY, +dsDNA +SSA +Smith.  Also w/ Class V lupus nephritis and DM nephropathy, ESRD on HD  Current manifestations of possible SLE flare include thrombocytopenia, leukopenia, anemia (although improved from before), vasculitis process causing neuropathy  Pt with elevated dsDNA 70, +SULLY 1:1280, +SSA >8 , +Smith 8.4  1/6 labs dsDNA 82, C3 41 C4 14.   - Prednisone 50mg     3) Thrombocytopenia/leukopenia/anemia- may all be related to underlying SLE vasculitis and SLE flare.  LDH Haptoglobin wnl.     4) UTI- Pt has +Ur Cx growing E Coli. Currently on Levaquin, followed by ID    HEBER Novoa

## 2018-01-13 NOTE — PROGRESS NOTE ADULT - PROBLEM SELECTOR PLAN 2
- no fistula on imaging   - s/p invanz, continue treatment for total 7-10 day course with levaquin Q48 (Day 9)  - ID follow up

## 2018-01-13 NOTE — PROGRESS NOTE ADULT - SUBJECTIVE AND OBJECTIVE BOX
Patient is a 57y old  Female who presents with a chief complaint of LE pain and inability to walk (19 Dec 2017 10:27)      SUBJECTIVE / OVERNIGHT EVENTS:  no acute events overnight. vss, afebrile. Pt feels better this morning, less confused than yesterday as per son at bedside. Complains of mild pain in her right leg but denies chest pain, sob, abdominal pain, diarrhea.    MEDICATIONS  (STANDING):  aspirin enteric coated 81 milliGRAM(s) Oral daily  atorvastatin 40 milliGRAM(s) Oral at bedtime  bisacodyl 5 milliGRAM(s) Oral at bedtime  calcium carbonate 1250 mG (OsCal) 1 Tablet(s) Oral daily  clopidogrel Tablet 75 milliGRAM(s) Oral daily  dextrose 5%. 1000 milliLiter(s) (50 mL/Hr) IV Continuous <Continuous>  dextrose 50% Injectable 12.5 Gram(s) IV Push once  dextrose 50% Injectable 25 Gram(s) IV Push once  dextrose 50% Injectable 25 Gram(s) IV Push once  dextrose 50% Injectable 12.5 Gram(s) IV Push once  docusate sodium 100 milliGRAM(s) Oral two times a day  furosemide    Tablet 80 milliGRAM(s) Oral <User Schedule>  insulin lispro (HumaLOG) corrective regimen sliding scale   SubCutaneous three times a day before meals  insulin lispro (HumaLOG) corrective regimen sliding scale   SubCutaneous at bedtime  latanoprost 0.005% Ophthalmic Solution 1 Drop(s) Both EYES at bedtime  levoFLOXacin  Tablet 500 milliGRAM(s) Oral every 24 hours  levothyroxine 25 MICROGram(s) Oral daily  metoclopramide 5 milliGRAM(s) Oral three times a day  metoprolol succinate ER 25 milliGRAM(s) Oral daily  Nephro-madiha 1 Tablet(s) Oral daily  ondansetron Injectable 4 milliGRAM(s) IV Push once  PARoxetine 20 milliGRAM(s) Oral daily  predniSONE   Tablet 50 milliGRAM(s) Oral daily  senna 2 Tablet(s) Oral at bedtime  timolol 0.5% Solution 1 Drop(s) Both EYES daily    MEDICATIONS  (PRN):  acetaminophen   Tablet 650 milliGRAM(s) Oral every 6 hours PRN For Temp greater than 38 C (100.4 F)  acetaminophen   Tablet. 650 milliGRAM(s) Oral every 6 hours PRN Mild Pain (1 - 3)  dextrose Gel 1 Dose(s) Oral once PRN Blood Glucose LESS THAN 70 milliGRAM(s)/deciliter  glucagon  Injectable 1 milliGRAM(s) IntraMuscular once PRN Glucose LESS THAN 70 milligrams/deciliter  oxyCODONE    IR 5 milliGRAM(s) Oral every 6 hours PRN Moderate Pain (4 - 6)      CAPILLARY BLOOD GLUCOSE      POCT Blood Glucose.: 85 mg/dL (13 Jan 2018 08:56)  POCT Blood Glucose.: 81 mg/dL (12 Jan 2018 22:00)  POCT Blood Glucose.: 89 mg/dL (12 Jan 2018 17:59)    I&O's Summary    12 Jan 2018 07:01  -  13 Jan 2018 07:00  --------------------------------------------------------  IN: 360 mL / OUT: 1000 mL / NET: -640 mL    13 Jan 2018 07:01  -  13 Jan 2018 13:09  --------------------------------------------------------  IN: 120 mL / OUT: 0 mL / NET: 120 mL        PHYSICAL EXAM:  Vital Signs Last 24 Hrs  T(C): 36.7 (13 Jan 2018 12:27), Max: 37.1 (12 Jan 2018 20:06)  T(F): 98 (13 Jan 2018 12:27), Max: 98.7 (12 Jan 2018 20:06)  HR: 76 (13 Jan 2018 12:27) (69 - 76)  BP: 101/63 (13 Jan 2018 12:27) (101/63 - 137/76)  BP(mean): --  RR: 17 (13 Jan 2018 12:27) (17 - 18)  SpO2: 97% (13 Jan 2018 12:27) (97% - 100%)    GENERAL: NAD, well-developed  HEAD:  Atraumatic, Normocephalic  EYES: EOMI, PERRLA, conjunctiva and sclera clear  NECK: Supple, No JVD  CHEST/LUNG: Clear to auscultation bilaterally; No wheeze  HEART: Regular rate and rhythm; No murmurs, rubs, or gallops  ABDOMEN: Soft, Nontender, Nondistended; Bowel sounds present  : vance draining thick/yellow/green drainage  EXTREMITIES:  2+ Peripheral Pulses, No clubbing, cyanosis, or edema  VASCULAR: Left brachiocephalic AVF with thrill and bruit,  NEUROLOGY: AAOX3; non-focal  SKIN: No rashes or lesions    LABS:  personally reviewed                        9.0    6.5   )-----------( 142      ( 13 Jan 2018 06:32 )             27.0     01-13    137  |  97  |  28<H>  ----------------------------<  65<L>  4.3   |  27  |  3.29<H>    Ca    9.0      13 Jan 2018 06:32                RADIOLOGY & ADDITIONAL TESTS:    Imaging Personally Reviewed:    Consultant(s) Notes Reviewed:  rheum, ID    Care Discussed with Consultants/Other Providers: Dr. Bronson

## 2018-01-13 NOTE — PROGRESS NOTE ADULT - ASSESSMENT
56 yo f with h/o lupus diagnosed in April, lupus nephritis, ESRD on HD via permacath (M, W, F) s/p AVF (not matured), DM2, HTN, HLD, hypothyroid presented with 3 weeks h/o bilateral LE pain and numbness presumed polyneuropathy course c/b lost HD access s/p permacath by IR 1/3/18 and NSVT, s/p LHC/RHC, s/p ANABELA to RCA, noted to have thick, yellowish/green drainage in the vance, concerning for fistula formation but no evidence on CT cystogram, sural n biopsy c/w vasculitic neuropathy started on prednisone with improvement of LE, staged cath 1/11 with BMS to LAD

## 2018-01-14 LAB
GLUCOSE BLDC GLUCOMTR-MCNC: 100 MG/DL — HIGH (ref 70–99)
GLUCOSE BLDC GLUCOMTR-MCNC: 168 MG/DL — HIGH (ref 70–99)
GLUCOSE BLDC GLUCOMTR-MCNC: 200 MG/DL — HIGH (ref 70–99)
GLUCOSE BLDC GLUCOMTR-MCNC: 227 MG/DL — HIGH (ref 70–99)
HCT VFR BLD CALC: 24.8 % — LOW (ref 34.5–45)
HGB BLD-MCNC: 8.6 G/DL — LOW (ref 11.5–15.5)
MCHC RBC-ENTMCNC: 31.5 PG — SIGNIFICANT CHANGE UP (ref 27–34)
MCHC RBC-ENTMCNC: 34.5 GM/DL — SIGNIFICANT CHANGE UP (ref 32–36)
MCV RBC AUTO: 91.3 FL — SIGNIFICANT CHANGE UP (ref 80–100)
PLATELET # BLD AUTO: 137 K/UL — LOW (ref 150–400)
RBC # BLD: 2.72 M/UL — LOW (ref 3.8–5.2)
RBC # FLD: 14.4 % — SIGNIFICANT CHANGE UP (ref 10.3–14.5)
WBC # BLD: 7.7 K/UL — SIGNIFICANT CHANGE UP (ref 3.8–10.5)
WBC # FLD AUTO: 7.7 K/UL — SIGNIFICANT CHANGE UP (ref 3.8–10.5)

## 2018-01-14 PROCEDURE — 99232 SBSQ HOSP IP/OBS MODERATE 35: CPT

## 2018-01-14 PROCEDURE — 99233 SBSQ HOSP IP/OBS HIGH 50: CPT

## 2018-01-14 RX ORDER — OXYCODONE HYDROCHLORIDE 5 MG/1
5 TABLET ORAL EVERY 6 HOURS
Qty: 0 | Refills: 0 | Status: DISCONTINUED | OUTPATIENT
Start: 2018-01-14 | End: 2018-01-16

## 2018-01-14 RX ADMIN — Medication 50 MILLIGRAM(S): at 06:30

## 2018-01-14 RX ADMIN — Medication 5 MILLIGRAM(S): at 21:24

## 2018-01-14 RX ADMIN — Medication 80 MILLIGRAM(S): at 06:30

## 2018-01-14 RX ADMIN — Medication 5 MILLIGRAM(S): at 14:12

## 2018-01-14 RX ADMIN — Medication 1: at 14:11

## 2018-01-14 RX ADMIN — Medication 650 MILLIGRAM(S): at 11:36

## 2018-01-14 RX ADMIN — RITUXIMAB 166.67 MILLIGRAM(S): 10 INJECTION, SOLUTION INTRAVENOUS at 12:30

## 2018-01-14 RX ADMIN — Medication 1 DROP(S): at 21:24

## 2018-01-14 RX ADMIN — Medication 1 TABLET(S): at 09:36

## 2018-01-14 RX ADMIN — Medication 100 MILLIGRAM(S): at 18:38

## 2018-01-14 RX ADMIN — Medication 25 MILLIGRAM(S): at 21:24

## 2018-01-14 RX ADMIN — SENNA PLUS 2 TABLET(S): 8.6 TABLET ORAL at 21:24

## 2018-01-14 RX ADMIN — Medication 100 MILLIGRAM(S): at 06:31

## 2018-01-14 RX ADMIN — ATORVASTATIN CALCIUM 40 MILLIGRAM(S): 80 TABLET, FILM COATED ORAL at 21:24

## 2018-01-14 RX ADMIN — CLOPIDOGREL BISULFATE 75 MILLIGRAM(S): 75 TABLET, FILM COATED ORAL at 09:36

## 2018-01-14 RX ADMIN — Medication 1: at 18:38

## 2018-01-14 RX ADMIN — Medication 25 MICROGRAM(S): at 06:30

## 2018-01-14 RX ADMIN — Medication 5 MILLIGRAM(S): at 06:30

## 2018-01-14 RX ADMIN — Medication 25 MILLIGRAM(S): at 11:34

## 2018-01-14 RX ADMIN — Medication 81 MILLIGRAM(S): at 09:36

## 2018-01-14 RX ADMIN — LATANOPROST 1 DROP(S): 0.05 SOLUTION/ DROPS OPHTHALMIC; TOPICAL at 21:25

## 2018-01-14 RX ADMIN — Medication 20 MILLIGRAM(S): at 09:36

## 2018-01-14 RX ADMIN — Medication 100 MILLIGRAM(S): at 11:35

## 2018-01-14 NOTE — ADVANCED PRACTICE NURSE CONSULT - ASSESSMENT
Cycle # 1 day 1/1. Lab results as per Md monik aware of same. Vital signs stable prior to the start of chemotherapy, see sunrise.  Pt educated on the importance of saving urine, verbalize understanding of same.Pt education done re chemo regimen, drug effects and potential side effects, written material provided to the son in Greek, states understanding. .Pt with piv on right inner arm , site free from signs and symptoms of infection, positive blood return obtained. Pre-medicated with tylenol 650mg pox1 , benadryl 25mg ivp x1, solumedrol 100mg ivpx1 pt started on rituxin 1000mg ivss start as intial infusion intiated at 1230 at 50cc/hr increased by 50 cc/hr every 30min as tolerated son at the bedside pt oob in chair v/s stable and checked every 30 min pt with no complaints Cycle # 1 day 1/1. Lab results as per Md monik aware of same. Vital signs stable prior to the start of chemotherapy, see monik.  Pt is on dialysis education done re chemo regimen, drug effects and potential side effects, written material provided to the son in American, Son states understanding. .Pt with piv on right inner arm , site free from signs and symptoms of infection, positive blood return obtained. Pre-medicated with tylenol 650mg pox1 , benadryl 25mg ivp x1, solumedrol 100mg ivpx1 pt started on rituxin 1000mg ivss start as intial infusion intiated at 1230 at 50cc/hr increased by 50 cc/hr every 30min as tolerated son at the bedside pt oob in chair v/s stable and checked every 30 min pt with no complaints increase rate to a max goal rate of 400cc/hr and completed the infusion with no adverse effects v/s stable as documented  report given to the area nurse

## 2018-01-14 NOTE — PROGRESS NOTE ADULT - PROBLEM SELECTOR PLAN 1
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 biopsy c/w vasculitis but also findings of demyelination   - Plan for Rituxan today  - monitor for complication/infusion reactions  - HepB e antigen, PCR negative.   - appreciate rheum recs  - care discussed with Dr. Kahn (ID): no contraindication to rituxan. Pt will need enhanced surveillance with HepB PCR [id number]  - Appreciate Toxicology consult for cadmium level-per them no evidence of heavy metal exposure by imaging and no other symptoms, does not require chelation.   f/u repeat testing - still pending  - MRI lumbar, C spine show no cord compression.    - Continue with Lyrica- renally dosed.   - PT eval- recommend JESSICA placement.   - Continue to hold Plaquenil.

## 2018-01-14 NOTE — PROGRESS NOTE ADULT - SUBJECTIVE AND OBJECTIVE BOX
Patient is a 57y old  Female who presents with a chief complaint of LE pain and inability to walk (19 Dec 2017 10:27)    Being followed by ID for UTI     Interval history:feels well  No urinary complaints  No acute events      ROS:  No cough,SOB,CP  No N/V/D./abd pain  No other complaints      Antimicrobials:levquin course dced      Other medications reviewed    Vital Signs Last 24 Hrs  T(C): 36.7 (01-14-18 @ 04:02), Max: 36.9 (01-13-18 @ 21:46)  T(F): 98 (01-14-18 @ 04:02), Max: 98.5 (01-13-18 @ 21:46)  HR: 77 (01-14-18 @ 04:02) (74 - 77)  BP: 148/74 (01-14-18 @ 04:02) (101/63 - 148/74)  BP(mean): --  RR: 17 (01-14-18 @ 04:02) (17 - 17)  SpO2: 99% (01-14-18 @ 04:02) (97% - 100%)    Physical Exam:        HEENT PERRLA EOMI    No oral exudate or erythema    Chest Good AE,CTA    CVS RRR S1 S2 WNl No murmur or rub or gallop    Abd soft BS normal No tenderness no masses    IV site no erythema tenderness or discharge    CNS AAO X 3 no focal    Lab Data:                          8.6    7.7   )-----------( 137      ( 14 Jan 2018 06:07 )             24.8       01-13    137  |  97  |  28<H>  ----------------------------<  65<L>  4.3   |  27  |  3.29<H>    Ca    9.0      13 Jan 2018 06:32        Hepatitis B PCR Quantitative (01.12.18 @ 08:20)    Hepatitis B DNA PCR Log: NotDetec: HBV DNA Quantification by Real Time PCR using Abbott m2000:  Assay dynamic range:  15 IU/mL to 1,000,000,000 HBV DNA IU/mL  1.18 (log10) IU/mL to 9.00(log10) IU/mL  Assay reference range: Not Detected  The results of this test should be interpreted with consideration of all  clinical and laboratory findings. A result of  No HBV DNA Detected does  not preclude the possibility of infection with hepatitis B virus.  In  particular, caution should be used when interpreting low level positive  results when the test is used for diagnostic purposes. LogIU/mL    Hepatitis B PCR Quantitative: NotDetec          Hepatitis Be Antibody (01.12.18 @ 02:57)    Hepatitis Be Antibody: Negative      Hepatitis Be Antigen (01.12.18 @ 02:57)    Hepatitis Be Antigen: Negative      Hepatitis Be Antibody (01.12.18 @ 02:57)    Hepatitis Be Antibody: Negative

## 2018-01-14 NOTE — PROGRESS NOTE ADULT - ASSESSMENT
58 yo f with h/o lupus diagnosed in April, lupus nephritis, ESRD on HD via permacath (M, W, F) s/p AVF (not matured), DM2, HTN, HLD, hypothyroid presented with 3 weeks h/o bilateral LE pain and numbness presumed polyneuropathy course c/b lost HD access s/p permacath by IR 1/3/18 and NSVT, s/p LHC/RHC, s/p ANABELA to RCA, noted to have thick, yellowish/green drainage in the vance, concerning for fistula formation but no evidence on CT cystogram, sural n biopsy c/w vasculitic neuropathy started on prednisone with improvement of LE, staged cath 1/11 with ANABELA to LAD

## 2018-01-14 NOTE — PROGRESS NOTE ADULT - PROBLEM SELECTOR PLAN 10
DVT ppx: no pharmacologic ppx in setting of thrombocytopenia and prior vaginal bleeding  Dispo: JESSICA

## 2018-01-14 NOTE — PROGRESS NOTE ADULT - ASSESSMENT
58 yo PMH SLE (diagnosed in April) with lupus nephritis, ESRD on HD via permacath (M, W, F) s/p AVF (not matured), DM2 who presented to [facility] on 12/19/17 with 3 weeks of progressive bilateral LE pain and numbness felt to be autoimmune in nature with tentative plan to initiate IV steroids.   Sural nerve bx consistent with vasculitis.   Found to have UTI due to E coli.   s/p course of levaquin-no s/s UTI  Likely chronic Hep B(though 12/17 labs surprising)  No s/s active infection  PCR negative  + HepB S ag negative- will need enhanced surveillance with Q 3 months PCR and Hep B s Ag  Rituxan/immunosuppressives will have risk of activation,No absolute contraindications but risk/benefits and alternatives should be discused with patient.Will defer to rheum  Other plan per primary  ID will follow as needed,please call [phone] if any questions or issues.

## 2018-01-14 NOTE — PROGRESS NOTE ADULT - SUBJECTIVE AND OBJECTIVE BOX
Patient is a 57y old  Female who presents with a chief complaint of LE pain and inability to walk (19 Dec 2017 10:27)      SUBJECTIVE / OVERNIGHT EVENTS:  no acute events overnight. vss, afebrile. Pt has no complaints. Plan for initiation of Rituxan today.     MEDICATIONS  (STANDING):  aspirin enteric coated 81 milliGRAM(s) Oral daily  atorvastatin 40 milliGRAM(s) Oral at bedtime  bisacodyl 5 milliGRAM(s) Oral at bedtime  calcium carbonate 1250 mG (OsCal) 1 Tablet(s) Oral daily  clopidogrel Tablet 75 milliGRAM(s) Oral daily  dextrose 5%. 1000 milliLiter(s) (50 mL/Hr) IV Continuous <Continuous>  dextrose 50% Injectable 12.5 Gram(s) IV Push once  dextrose 50% Injectable 25 Gram(s) IV Push once  dextrose 50% Injectable 25 Gram(s) IV Push once  dextrose 50% Injectable 12.5 Gram(s) IV Push once  docusate sodium 100 milliGRAM(s) Oral two times a day  furosemide    Tablet 80 milliGRAM(s) Oral <User Schedule>  insulin lispro (HumaLOG) corrective regimen sliding scale   SubCutaneous three times a day before meals  insulin lispro (HumaLOG) corrective regimen sliding scale   SubCutaneous at bedtime  latanoprost 0.005% Ophthalmic Solution 1 Drop(s) Both EYES at bedtime  levothyroxine 25 MICROGram(s) Oral daily  metoclopramide 5 milliGRAM(s) Oral three times a day  metoprolol succinate ER 25 milliGRAM(s) Oral daily  Nephro-madiha 1 Tablet(s) Oral daily  ondansetron Injectable 4 milliGRAM(s) IV Push once  PARoxetine 20 milliGRAM(s) Oral daily  predniSONE   Tablet 50 milliGRAM(s) Oral daily  riTUXimab IVPB (Non - oncologic) 1000 milliGRAM(s) IV Intermittent once  senna 2 Tablet(s) Oral at bedtime  timolol 0.5% Solution 1 Drop(s) Both EYES daily    MEDICATIONS  (PRN):  acetaminophen   Tablet 650 milliGRAM(s) Oral every 6 hours PRN For Temp greater than 38 C (100.4 F)  acetaminophen   Tablet. 650 milliGRAM(s) Oral every 6 hours PRN Mild Pain (1 - 3)  dextrose Gel 1 Dose(s) Oral once PRN Blood Glucose LESS THAN 70 milliGRAM(s)/deciliter  diphenhydrAMINE   Capsule 25 milliGRAM(s) Oral once PRN Rash and/or Itching  glucagon  Injectable 1 milliGRAM(s) IntraMuscular once PRN Glucose LESS THAN 70 milligrams/deciliter  oxyCODONE    IR 5 milliGRAM(s) Oral every 6 hours PRN Moderate Pain (4 - 6)      CAPILLARY BLOOD GLUCOSE      POCT Blood Glucose.: 100 mg/dL (14 Jan 2018 08:39)  POCT Blood Glucose.: 119 mg/dL (13 Jan 2018 21:47)  POCT Blood Glucose.: 143 mg/dL (13 Jan 2018 18:05)  POCT Blood Glucose.: 159 mg/dL (13 Jan 2018 13:12)    I&O's Summary    13 Jan 2018 07:01  -  14 Jan 2018 07:00  --------------------------------------------------------  IN: 540 mL / OUT: 0 mL / NET: 540 mL    14 Jan 2018 07:01  -  14 Jan 2018 11:41  --------------------------------------------------------  IN: 240 mL / OUT: 0 mL / NET: 240 mL        PHYSICAL EXAM:  Vital Signs Last 24 Hrs  T(C): 36.7 (14 Jan 2018 11:32), Max: 36.9 (13 Jan 2018 21:46)  T(F): 98.1 (14 Jan 2018 11:32), Max: 98.5 (13 Jan 2018 21:46)  HR: 74 (14 Jan 2018 11:32) (74 - 77)  BP: 160/84 (14 Jan 2018 11:32) (101/63 - 160/84)  BP(mean): --  RR: 17 (14 Jan 2018 11:32) (17 - 17)  SpO2: 99% (14 Jan 2018 11:32) (97% - 100%)    GENERAL: NAD, well-developed  HEAD:  Atraumatic, Normocephalic  EYES: EOMI, PERRLA, conjunctiva and sclera clear  NECK: Supple, No JVD  CHEST/LUNG: Clear to auscultation bilaterally; No wheeze  HEART: Regular rate and rhythm; No murmurs, rubs, or gallops  ABDOMEN: Soft, Nontender, Nondistended; Bowel sounds present  : vance draining thick/yellow/green drainage  EXTREMITIES:  2+ Peripheral Pulses, No clubbing, cyanosis, or edema  VASCULAR: Left brachiocephalic AVF with thrill and bruit,  NEUROLOGY: AAOX3; non-focal  SKIN: No rashes or lesions      LABS:  personally reviewed                        8.6    7.7   )-----------( 137      ( 14 Jan 2018 06:07 )             24.8     01-13    137  |  97  |  28<H>  ----------------------------<  65<L>  4.3   |  27  |  3.29<H>    Ca    9.0      13 Jan 2018 06:32                RADIOLOGY & ADDITIONAL TESTS:    Imaging Personally Reviewed:    Consultant(s) Notes Reviewed:  rheum, ID    Care Discussed with Consultants/Other Providers: Dr. Bronson, Dr. Kahn

## 2018-01-15 DIAGNOSIS — D63.8 ANEMIA IN OTHER CHRONIC DISEASES CLASSIFIED ELSEWHERE: ICD-10-CM

## 2018-01-15 LAB
ANION GAP SERPL CALC-SCNC: 14 MMOL/L — SIGNIFICANT CHANGE UP (ref 5–17)
BLD GP AB SCN SERPL QL: NEGATIVE — SIGNIFICANT CHANGE UP
BUN SERPL-MCNC: 73 MG/DL — HIGH (ref 7–23)
CADMIUM SERPL-MCNC: 9.5 UG/L — HIGH (ref 0–1.2)
CALCIUM SERPL-MCNC: 9.2 MG/DL — SIGNIFICANT CHANGE UP (ref 8.4–10.5)
CHLORIDE SERPL-SCNC: 96 MMOL/L — SIGNIFICANT CHANGE UP (ref 96–108)
CO2 SERPL-SCNC: 25 MMOL/L — SIGNIFICANT CHANGE UP (ref 22–31)
CREAT SERPL-MCNC: 6.53 MG/DL — HIGH (ref 0.5–1.3)
GLUCOSE BLDC GLUCOMTR-MCNC: 113 MG/DL — HIGH (ref 70–99)
GLUCOSE BLDC GLUCOMTR-MCNC: 118 MG/DL — HIGH (ref 70–99)
GLUCOSE BLDC GLUCOMTR-MCNC: 180 MG/DL — HIGH (ref 70–99)
GLUCOSE SERPL-MCNC: 115 MG/DL — HIGH (ref 70–99)
HCT VFR BLD CALC: 20.4 % — CRITICAL LOW (ref 34.5–45)
HGB BLD-MCNC: 7.3 G/DL — LOW (ref 11.5–15.5)
MCHC RBC-ENTMCNC: 32.7 PG — SIGNIFICANT CHANGE UP (ref 27–34)
MCHC RBC-ENTMCNC: 35.9 GM/DL — SIGNIFICANT CHANGE UP (ref 32–36)
MCV RBC AUTO: 90.9 FL — SIGNIFICANT CHANGE UP (ref 80–100)
PLATELET # BLD AUTO: 109 K/UL — LOW (ref 150–400)
POTASSIUM SERPL-MCNC: 4.5 MMOL/L — SIGNIFICANT CHANGE UP (ref 3.5–5.3)
POTASSIUM SERPL-SCNC: 4.5 MMOL/L — SIGNIFICANT CHANGE UP (ref 3.5–5.3)
RBC # BLD: 2.24 M/UL — LOW (ref 3.8–5.2)
RBC # FLD: 14.9 % — HIGH (ref 10.3–14.5)
RH IG SCN BLD-IMP: POSITIVE — SIGNIFICANT CHANGE UP
SODIUM SERPL-SCNC: 135 MMOL/L — SIGNIFICANT CHANGE UP (ref 135–145)
WBC # BLD: 6.1 K/UL — SIGNIFICANT CHANGE UP (ref 3.8–10.5)
WBC # FLD AUTO: 6.1 K/UL — SIGNIFICANT CHANGE UP (ref 3.8–10.5)

## 2018-01-15 PROCEDURE — 99233 SBSQ HOSP IP/OBS HIGH 50: CPT

## 2018-01-15 PROCEDURE — 99232 SBSQ HOSP IP/OBS MODERATE 35: CPT

## 2018-01-15 RX ADMIN — CLOPIDOGREL BISULFATE 75 MILLIGRAM(S): 75 TABLET, FILM COATED ORAL at 13:29

## 2018-01-15 RX ADMIN — Medication 5 MILLIGRAM(S): at 06:18

## 2018-01-15 RX ADMIN — Medication 1 TABLET(S): at 13:29

## 2018-01-15 RX ADMIN — Medication 5 MILLIGRAM(S): at 13:29

## 2018-01-15 RX ADMIN — Medication 100 MILLIGRAM(S): at 17:19

## 2018-01-15 RX ADMIN — Medication 81 MILLIGRAM(S): at 13:29

## 2018-01-15 RX ADMIN — Medication 5 MILLIGRAM(S): at 21:28

## 2018-01-15 RX ADMIN — SENNA PLUS 2 TABLET(S): 8.6 TABLET ORAL at 21:28

## 2018-01-15 RX ADMIN — Medication 25 MICROGRAM(S): at 06:19

## 2018-01-15 RX ADMIN — Medication 100 MILLIGRAM(S): at 06:18

## 2018-01-15 RX ADMIN — Medication 1: at 13:29

## 2018-01-15 RX ADMIN — Medication 50 MILLIGRAM(S): at 06:18

## 2018-01-15 RX ADMIN — LATANOPROST 1 DROP(S): 0.05 SOLUTION/ DROPS OPHTHALMIC; TOPICAL at 21:28

## 2018-01-15 RX ADMIN — ATORVASTATIN CALCIUM 40 MILLIGRAM(S): 80 TABLET, FILM COATED ORAL at 21:31

## 2018-01-15 RX ADMIN — Medication 1 DROP(S): at 21:28

## 2018-01-15 RX ADMIN — Medication 20 MILLIGRAM(S): at 13:29

## 2018-01-15 RX ADMIN — Medication 25 MILLIGRAM(S): at 21:31

## 2018-01-15 NOTE — PROGRESS NOTE ADULT - ASSESSMENT
57 year old woman with ESRD on HD presenting with limb weakness, now s/p sural nerve biopsy consistent with vaculitis s/p rituximab infusion and on steroids

## 2018-01-15 NOTE — CHART NOTE - NSCHARTNOTEFT_GEN_A_CORE
WILLARD BOB    Notified by RN patient with critical lab result Hb /Hct  7.3 /20.4. seen and examined , denied any dizziness or palpitations , Vitals stable , d/w DR Rodriguez advised  for repeat CBC, Pt difficult stick , attempted Arterial stick x 2 unsuccessful,  BP stable , No signs of any acute bleed .  Anemia likely dilutional Monitor Pt clinically will sign out Pt to night covering Team .       Dawit London   FNP -C (Medicine NP ) [phone]

## 2018-01-15 NOTE — PROGRESS NOTE ADULT - SUBJECTIVE AND OBJECTIVE BOX
[facility] DIVISION OF KIDNEY DISEASES AND HYPERTENSION -- INITIAL CONSULT NOTE  --------------------------------------------------------------------------------  Chief Complaint: ESRD/Ongoing hemodialysis requirement    24 hour events/subjective:  Pt seen on dialysis no new complaints.       PAST HISTORY  --------------------------------------------------------------------------------  No significant changes to PMH, PSH, FHx, SHx, unless otherwise noted    ALLERGIES & MEDICATIONS  --------------------------------------------------------------------------------  Allergies    penicillin (Rash)    Intolerances      Standing Inpatient Medications  aspirin enteric coated 81 milliGRAM(s) Oral daily  atorvastatin 40 milliGRAM(s) Oral at bedtime  bisacodyl 5 milliGRAM(s) Oral at bedtime  calcium carbonate 1250 mG (OsCal) 1 Tablet(s) Oral daily  clopidogrel Tablet 75 milliGRAM(s) Oral daily  dextrose 5%. 1000 milliLiter(s) IV Continuous <Continuous>  dextrose 50% Injectable 12.5 Gram(s) IV Push once  dextrose 50% Injectable 25 Gram(s) IV Push once  dextrose 50% Injectable 25 Gram(s) IV Push once  dextrose 50% Injectable 12.5 Gram(s) IV Push once  docusate sodium 100 milliGRAM(s) Oral two times a day  furosemide    Tablet 80 milliGRAM(s) Oral <User Schedule>  insulin lispro (HumaLOG) corrective regimen sliding scale   SubCutaneous three times a day before meals  insulin lispro (HumaLOG) corrective regimen sliding scale   SubCutaneous at bedtime  latanoprost 0.005% Ophthalmic Solution 1 Drop(s) Both EYES at bedtime  levothyroxine 25 MICROGram(s) Oral daily  metoclopramide 5 milliGRAM(s) Oral three times a day  metoprolol succinate ER 25 milliGRAM(s) Oral daily  Nephro-madiha 1 Tablet(s) Oral daily  ondansetron Injectable 4 milliGRAM(s) IV Push once  PARoxetine 20 milliGRAM(s) Oral daily  predniSONE   Tablet 50 milliGRAM(s) Oral daily  senna 2 Tablet(s) Oral at bedtime  timolol 0.5% Solution 1 Drop(s) Both EYES daily    PRN Inpatient Medications  acetaminophen   Tablet 650 milliGRAM(s) Oral every 6 hours PRN  acetaminophen   Tablet. 650 milliGRAM(s) Oral every 6 hours PRN  dextrose Gel 1 Dose(s) Oral once PRN  diphenhydrAMINE   Capsule 25 milliGRAM(s) Oral once PRN  glucagon  Injectable 1 milliGRAM(s) IntraMuscular once PRN  oxyCODONE    IR 5 milliGRAM(s) Oral every 6 hours PRN      REVIEW OF SYSTEMS  --------------------------------------------------------------------------------  Gen: No  fevers/chills, weakness  Skin: No rashes  Respiratory: No dyspnea, cough, wheezing, hemoptysis  CV: No chest pain, PND, orthopnea  GI: No abdominal pain, diarrhea, constipation, nausea, vomiting, melena, hematochezia  : No increased frequency, dysuria, hematuria, nocturia  MSK: No joint pain/swelling; no back pain; no edema  Neuro: LE weakness    All other systems were reviewed and are negative, except as noted.    VITALS/PHYSICAL EXAM  --------------------------------------------------------------------------------  T(C): 36.7 (01-15-18 @ 08:40), Max: 36.9 (01-14-18 @ 12:59)  HR: 72 (01-15-18 @ 08:40) (70 - 76)  BP: 153/79 (01-15-18 @ 08:40) (116/75 - 169/91)  RR: 19 (01-15-18 @ 08:40) (17 - 19)  SpO2: 100% (01-15-18 @ 08:40) (97% - 100%)  Wt(kg): --        01-14-18 @ 07:01  -  01-15-18 @ 07:00  --------------------------------------------------------  IN: 1840 mL / OUT: 0 mL / NET: 1840 mL      Physical Exam:  	Gen: NAD, well-appearing  	HEENT: PERRL, supple neck, clear oropharynx  	Pulm: CTA B/L  	CV: RRR, S1S2; no rub  	Abd: +BS, soft, nontender/nondistended  	: No suprapubic tenderness  	LE: Warm, FROM, no clubbing, intact strength; no edema  	Skin: Warm, without rashes  	Vascular access:  tunneled cath    LABS/STUDIES  --------------------------------------------------------------------------------              7.3    6.1   >-----------<  109      [01-15-18 @ 10:20]              20.4     135  |  96  |  73  ----------------------------<  115      [01-15-18 @ 10:20]  4.5   |  25  |  6.53        Ca     9.2     [01-15-18 @ 10:20]            Iron 77, TIBC 90, %sat 86      [05-29-17 @ 09:39]  Ferritin 781.0      [05-29-17 @ 09:39]  PTH -- (Ca 8.1)      [01-04-18 @ 12:57]   65  PTH -- (Ca 7.5)      [06-01-17 @ 08:12]   253  Vitamin D (25OH) <4.0      [05-31-17 @ 08:52]  HbA1c 4.7      [12-20-17 @ 07:23]  TSH 8.03      [12-23-17 @ 08:00]  Lipid: chol 227, , HDL 47,       [05-30-17 @ 07:28]    HBsAb 637.4      [01-06-18 @ 12:57]  HBsAb Reactive      [12-23-17 @ 08:00]  HBsAg Nonreact      [01-06-18 @ 12:57]  HBcAb Reactive      [01-11-18 @ 09:12]  HCV 0.22, Nonreact      [01-06-18 @ 12:57]

## 2018-01-15 NOTE — PROVIDER CONTACT NOTE (OTHER) - ACTION/TREATMENT ORDERED:
continue to monitor. maintain bedrest at this time. bed alarm on and active.
As per NP, continue to monitor BP. have second IV placed if fluid bolus needed
1/2 amp d50
CBC monitoring and continue to monitor urine
Give IV zofran, do not re give AM medications
NP Perla to order fluid bolus, stat CBC. pt to be placed on tele monitoring.  called
NP aware and spoke with Francesca Benavidez who states ok to hold off on repeating CBC.
PA ordered stat BMP, Mg and Phos levels. All labs drawn and sent to lab as ordered. Will continue to monitor.
PA will come assess PAtient, EKG done.
STAT ekg,CARDIAC ENZYMES AND BMP DONE

## 2018-01-15 NOTE — PROVIDER CONTACT NOTE (CRITICAL VALUE NOTIFICATION) - ACTION/TREATMENT ORDERED:
NP aware. CBC stat and T+S.
MD notified and aware. Will continue to monitor
[facility]

## 2018-01-15 NOTE — PROVIDER CONTACT NOTE (OTHER) - REASON
Ectopy on Telemetry
Pt vomited 1x
Repeat CBC unable. Multiple attempts made.
fs 68  and 74 pt npo
patient LOC on commode, patient became alert again when transferred back to bed
patient had 26 beats of wide complex tachycardia
pt hypotensive, due for IVIG
small amount of blood noted in Johnson tubing with urine
patient had positive orthostatic blood pressure from laying to sitting.
decrease in BP on IVIG

## 2018-01-15 NOTE — PROGRESS NOTE ADULT - SUBJECTIVE AND OBJECTIVE BOX
INTERVAL HPI/OVERNIGHT EVENTS:  Pt tolerated RTX infusion well.    MEDICATIONS  (STANDING):  predniSONE   Tablet 50 milliGRAM(s) Oral daily    aspirin enteric coated 81 milliGRAM(s) Oral daily  atorvastatin 40 milliGRAM(s) Oral at bedtime  bisacodyl 5 milliGRAM(s) Oral at bedtime  calcium carbonate 1250 mG (OsCal) 1 Tablet(s) Oral daily  clopidogrel Tablet 75 milliGRAM(s) Oral daily  docusate sodium 100 milliGRAM(s) Oral two times a day  furosemide    Tablet 80 milliGRAM(s) Oral <User Schedule>  insulin lispro (HumaLOG) corrective regimen sliding scale   SubCutaneous three times a day before meals  insulin lispro (HumaLOG) corrective regimen sliding scale   SubCutaneous at bedtime  latanoprost 0.005% Ophthalmic Solution 1 Drop(s) Both EYES at bedtime  levothyroxine 25 MICROGram(s) Oral daily  metoclopramide 5 milliGRAM(s) Oral three times a day  metoprolol succinate ER 25 milliGRAM(s) Oral daily  Nephro-madiha 1 Tablet(s) Oral daily  ondansetron Injectable 4 milliGRAM(s) IV Push once  PARoxetine 20 milliGRAM(s) Oral daily  senna 2 Tablet(s) Oral at bedtime  timolol 0.5% Solution 1 Drop(s) Both EYES daily    MEDICATIONS  (PRN):  acetaminophen   Tablet 650 milliGRAM(s) Oral every 6 hours PRN For Temp greater than 38 C (100.4 F)  acetaminophen   Tablet. 650 milliGRAM(s) Oral every 6 hours PRN Mild Pain (1 - 3)  dextrose Gel 1 Dose(s) Oral once PRN Blood Glucose LESS THAN 70 milliGRAM(s)/deciliter  diphenhydrAMINE   Capsule 25 milliGRAM(s) Oral once PRN Rash and/or Itching  glucagon  Injectable 1 milliGRAM(s) IntraMuscular once PRN Glucose LESS THAN 70 milligrams/deciliter  oxyCODONE    IR 5 milliGRAM(s) Oral every 6 hours PRN Moderate Pain (4 - 6)    Vital Signs Last 24 Hrs  T(C): 36.9 (15 Rde 2018 03:51), Max: 36.9 (14 Jan 2018 12:59)  T(F): 98.4 (15 Dre 2018 03:51), Max: 98.5 (14 Jan 2018 12:59)  HR: 75 (15 Dre 2018 03:51) (70 - 76)  BP: 160/78 (15 Dre 2018 03:51) (116/75 - 169/91)  BP(mean): --  RR: 18 (15 Dre 2018 03:51) (17 - 18)  SpO2: 99% (15 Dre 2018 03:51) (97% - 100%)    PHYSICAL EXAM:  General: lethargic  HEENT: EOMI, MMM  Cardio: +S1/S2, RRR  Resp: CTA b/l  GI: +BS, soft, NT/ND  MSK: No joint swelling noted  Neuro: AAOx3. Full muscle strength exam not performed as pt is currently getting HDGeneral: lethargic  HEENT: EOMI, MMM  Cardio: +S1/S2, RRR  Resp: CTA b/l  GI: +BS, soft, NT/ND  MSK: No joint swelling noted  Neuro: AAOx3. Full muscle strength      LABS:                        8.6    7.7   )-----------( 137      ( 14 Jan 2018 06:07 )             24.8

## 2018-01-15 NOTE — PROVIDER CONTACT NOTE (OTHER) - RECOMMENDATIONS
notify provider.reassess in am, maintain bedrest at this time
1/2 amp d50
EKG AND stat labs to be done
Give zofran
NP and nurse have tried to draw blood. small amount of blood drawn for T+S
PA to review electrolytes.
fluid
monitor
patient returned to normal state A&Ox4. patient pupils were fixed at time of LOC and 30 seconds after became reactive to light. testing of neurological status

## 2018-01-15 NOTE — PROGRESS NOTE ADULT - SUBJECTIVE AND OBJECTIVE BOX
Patient is a 57y old  Female who presents with a chief complaint of LE pain and inability to walk (19 Dec 2017 10:27)      SUBJECTIVE / OVERNIGHT EVENTS:  no acute events overnight. Pt tolerated rituxan infusion well without any reaction. BP mildly elevated overnight but pt remains asymptomatic.   In HD session this morning - pt has no complaints.     MEDICATIONS  (STANDING):  aspirin enteric coated 81 milliGRAM(s) Oral daily  atorvastatin 40 milliGRAM(s) Oral at bedtime  bisacodyl 5 milliGRAM(s) Oral at bedtime  calcium carbonate 1250 mG (OsCal) 1 Tablet(s) Oral daily  clopidogrel Tablet 75 milliGRAM(s) Oral daily  dextrose 5%. 1000 milliLiter(s) (50 mL/Hr) IV Continuous <Continuous>  dextrose 50% Injectable 12.5 Gram(s) IV Push once  dextrose 50% Injectable 25 Gram(s) IV Push once  dextrose 50% Injectable 25 Gram(s) IV Push once  dextrose 50% Injectable 12.5 Gram(s) IV Push once  docusate sodium 100 milliGRAM(s) Oral two times a day  furosemide    Tablet 80 milliGRAM(s) Oral <User Schedule>  insulin lispro (HumaLOG) corrective regimen sliding scale   SubCutaneous three times a day before meals  insulin lispro (HumaLOG) corrective regimen sliding scale   SubCutaneous at bedtime  latanoprost 0.005% Ophthalmic Solution 1 Drop(s) Both EYES at bedtime  levothyroxine 25 MICROGram(s) Oral daily  metoclopramide 5 milliGRAM(s) Oral three times a day  metoprolol succinate ER 25 milliGRAM(s) Oral daily  Nephro-madiha 1 Tablet(s) Oral daily  ondansetron Injectable 4 milliGRAM(s) IV Push once  PARoxetine 20 milliGRAM(s) Oral daily  predniSONE   Tablet 50 milliGRAM(s) Oral daily  senna 2 Tablet(s) Oral at bedtime  timolol 0.5% Solution 1 Drop(s) Both EYES daily    MEDICATIONS  (PRN):  acetaminophen   Tablet 650 milliGRAM(s) Oral every 6 hours PRN For Temp greater than 38 C (100.4 F)  acetaminophen   Tablet. 650 milliGRAM(s) Oral every 6 hours PRN Mild Pain (1 - 3)  dextrose Gel 1 Dose(s) Oral once PRN Blood Glucose LESS THAN 70 milliGRAM(s)/deciliter  diphenhydrAMINE   Capsule 25 milliGRAM(s) Oral once PRN Rash and/or Itching  glucagon  Injectable 1 milliGRAM(s) IntraMuscular once PRN Glucose LESS THAN 70 milligrams/deciliter  oxyCODONE    IR 5 milliGRAM(s) Oral every 6 hours PRN Moderate Pain (4 - 6)      CAPILLARY BLOOD GLUCOSE      POCT Blood Glucose.: 227 mg/dL (14 Jan 2018 21:54)  POCT Blood Glucose.: 200 mg/dL (14 Jan 2018 17:54)  POCT Blood Glucose.: 168 mg/dL (14 Jan 2018 13:01)    I&O's Summary    14 Jan 2018 07:01  -  15 Dre 2018 07:00  --------------------------------------------------------  IN: 1840 mL / OUT: 0 mL / NET: 1840 mL        PHYSICAL EXAM:  Vital Signs Last 24 Hrs  T(C): 36.7 (15 Dre 2018 08:40), Max: 36.9 (14 Jan 2018 12:59)  T(F): 98.1 (15 Dre 2018 08:40), Max: 98.5 (14 Jan 2018 12:59)  HR: 72 (15 Dre 2018 08:40) (70 - 76)  BP: 153/79 (15 Dre 2018 08:40) (116/75 - 169/91)  BP(mean): --  RR: 19 (15 Dre 2018 08:40) (17 - 19)  SpO2: 100% (15 Dre 2018 08:40) (97% - 100%)    GENERAL: NAD, well-developed  HEAD:  Atraumatic, Normocephalic  EYES: EOMI, PERRLA, conjunctiva and sclera clear  NECK: Supple, No JVD  CHEST/LUNG: Clear to auscultation bilaterally; No wheeze  HEART: Regular rate and rhythm; No murmurs, rubs, or gallops  ABDOMEN: Soft, Nontender, Nondistended; Bowel sounds present  : vance draining thick/yellow/green drainage  EXTREMITIES:  2+ Peripheral Pulses, No clubbing, cyanosis, or edema  VASCULAR: Left brachiocephalic AVF with thrill and bruit,  NEUROLOGY: AAOX3; non-focal  SKIN: No rashes or lesions      LABS:  personally reviewed                        7.3    6.1   )-----------( 109      ( 15 Dre 2018 10:20 )             20.4     01-15    135  |  96  |  73<H>  ----------------------------<  115<H>  4.5   |  25  |  6.53<H>    Ca    9.2      15 Dre 2018 10:20                RADIOLOGY & ADDITIONAL TESTS:    Imaging Personally Reviewed:    Consultant(s) Notes Reviewed:  rheum, nephro    Care Discussed with Consultants/Other Providers: Dr. Bronson

## 2018-01-15 NOTE — PROVIDER CONTACT NOTE (OTHER) - NAME OF MD/NP/PA/DO NOTIFIED:
Erinn MUNROE
IV Warner
NP Boniitya
SALIMA London NP
SHANEKA Bridges
VI Cardenas
VI Cramer
Светлана Valentino np
VI perez
VI Quiles

## 2018-01-15 NOTE — PROVIDER CONTACT NOTE (OTHER) - BACKGROUND
pt admitted with LE weakness, second dose of IVIG today
H/H this am=7.33/20.4.
HISTORY OF LUPUS
Hx of lupus, ESRD, DM
Pt admitted with symptoms or signs involving musculoskeletal system. History of ESRD and Lupus. Pt previously had 35 beats of wide complex while on 6 Jeremiah.
admit with B/l LE weakness
admit with b/l LE weakness
admitted with LE weakness
pt admitted with LE weakness, lupus. s/p nerve biopsy today
pt npo for perma cath

## 2018-01-15 NOTE — PROVIDER CONTACT NOTE (CRITICAL VALUE NOTIFICATION) - BACKGROUND
Patient admitted with other symptoms or sign involving musculoskeletal system
admitted for LE weakness
Pt admitted for leg pain. Biopsy done. Bloods drawn in HD.

## 2018-01-15 NOTE — PROGRESS NOTE ADULT - NSHPATTENDINGPLANDISCUSS_GEN_ALL_CORE
medicine attending
primary team
NP Kali
HD unit
Hernandez
nurse and daughter bedside
nurse and son bedside
nurse and son bedside
dialysis team, house staff
HD unit
HD unit and primary team
2 sons including Alex
son at bedside
VI Sotomayor

## 2018-01-15 NOTE — PROVIDER CONTACT NOTE (OTHER) - DATE AND TIME:
01-Jan-2018 19:40
03-Jan-2018 13:00
03-Jan-2018 20:11
05-Jan-2018 20:00
15-Dre-2018 16:45
20-Dec-2017 06:41
28-Dec-2017 03:35
28-Dec-2017 15:00
29-Dec-2017 16:45
27-Dec-2017 02:30

## 2018-01-15 NOTE — PROVIDER CONTACT NOTE (OTHER) - SITUATION
148/81 decreased to 116/73 sitting.  unable to assess standing d/t weakness. positive orthostatic noted
8 beats of wide complex on telemetry.
Pt given all medications, pt had BM. Pt also feeling nauseous and vomited 1x.
Repeat CBC unable. Multiple attempts made.
fs 68  and 74 pt npo
patient is on tele .patient had 12 beats of wide complex beats and 26 beats of wide complex tachycardia admitted for LE  weakness
patient wanted to use commode, "can not make number two on bedpan" per patient. assisted to commode and went limp/ LOC. patient came back alert when transferred back to bed
pt hypotensive, due for IVIG
small amount of blood noted in Johnson tubing with urine
BP decrease while getting IVIG

## 2018-01-15 NOTE — PROGRESS NOTE ADULT - PROBLEM SELECTOR PLAN 2
Hgb dropped from 8.6 to 7.3 this morning. Baseline hgb 8-9 likely 2/2 chronic kidney disease as well as SLE. No s/s active bleed. Unclear if it is hemodilution (last HD 1/12)  - repeat cbc  - continue epo with HD  - discussed with Dr. Brnoson (rheum): unlikely side effects of Rituxan  - transfuse goal hgb > 7

## 2018-01-15 NOTE — PROGRESS NOTE ADULT - ASSESSMENT
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    Plan:  1) Vasculitic neuropathy  -s/p Rituximab 1g (1/14/18). Next dose in 2 weeks to be organized as outpatient.  -c/w Prednisone 50mg qd for now  -pts outpt rheumatologist Dr. Eve Gaming on board  -Patient is cleared from our end to be discharged on prednisone 50mg to rehab. Patient will follow up with outpt rheumatologist upon discharge.    2) SLE- pt with hx of +SULLY, +dsDNA +SSA +Smith.  Also w/ Class V lupus nephritis and DM nephropathy, ESRD on HD  Current manifestations of possible SLE flare include thrombocytopenia, leukopenia, anemia (although improved from before), vasculitis process causing neuropathy  Pt with elevated dsDNA 70, +SULLY 1:1280, +SSA >8 , +Smith 8.4  1/6 labs dsDNA 82, C3 41 C4 14.   - Prednisone 50mg     3) Thrombocytopenia/leukopenia/anemia- may all be related to underlying SLE vasculitis and SLE flare.  LDH Haptoglobin wnl.     4) UTI- Pt has +Ur Cx growing E Coli. Currently on Levaquin, followed by ID    HEBER Novoa

## 2018-01-15 NOTE — PROGRESS NOTE ADULT - PROBLEM SELECTOR PLAN 1
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 biopsy c/w vasculitis but also findings of demyelination   - s/p Rituxan (1/14): plan for another infusion in 2 weeks  - care discussed with Dr. Bronson: will arrange the next dose with pt's outpatient rheumatologist at [facility]  - care discussed with Dr. Kahn (ID): no contraindication to rituxan. Pt will need enhanced surveillance with HepB PCR/HepB Ag [id number]  - Appreciate Toxicology consult for cadmium level-per them no evidence of heavy metal exposure by imaging and no other symptoms, does not require chelation.   f/u repeat testing - still pending  - MRI lumbar, C spine show no cord compression.    - Continue with Lyrica- renally dosed.   - PT eval- recommend JESSICA placement.   - Continue to hold Plaquenil.

## 2018-01-16 VITALS
DIASTOLIC BLOOD PRESSURE: 74 MMHG | SYSTOLIC BLOOD PRESSURE: 119 MMHG | HEART RATE: 76 BPM | RESPIRATION RATE: 18 BRPM | TEMPERATURE: 98 F | OXYGEN SATURATION: 99 %

## 2018-01-16 LAB
ANION GAP SERPL CALC-SCNC: 13 MMOL/L — SIGNIFICANT CHANGE UP (ref 5–17)
BUN SERPL-MCNC: 36 MG/DL — HIGH (ref 7–23)
CALCIUM SERPL-MCNC: 9.4 MG/DL — SIGNIFICANT CHANGE UP (ref 8.4–10.5)
CHLORIDE SERPL-SCNC: 98 MMOL/L — SIGNIFICANT CHANGE UP (ref 96–108)
CO2 SERPL-SCNC: 27 MMOL/L — SIGNIFICANT CHANGE UP (ref 22–31)
CREAT SERPL-MCNC: 4.22 MG/DL — HIGH (ref 0.5–1.3)
GLUCOSE BLDC GLUCOMTR-MCNC: 129 MG/DL — HIGH (ref 70–99)
GLUCOSE BLDC GLUCOMTR-MCNC: 305 MG/DL — HIGH (ref 70–99)
GLUCOSE SERPL-MCNC: 93 MG/DL — SIGNIFICANT CHANGE UP (ref 70–99)
HCT VFR BLD CALC: 26.9 % — LOW (ref 34.5–45)
HGB BLD-MCNC: 9.4 G/DL — LOW (ref 11.5–15.5)
MCHC RBC-ENTMCNC: 31.7 PG — SIGNIFICANT CHANGE UP (ref 27–34)
MCHC RBC-ENTMCNC: 34.9 GM/DL — SIGNIFICANT CHANGE UP (ref 32–36)
MCV RBC AUTO: 91 FL — SIGNIFICANT CHANGE UP (ref 80–100)
PHOSPHATE SERPL-MCNC: 3.4 MG/DL — SIGNIFICANT CHANGE UP (ref 2.5–4.5)
PLATELET # BLD AUTO: 141 K/UL — LOW (ref 150–400)
POTASSIUM SERPL-MCNC: 4.2 MMOL/L — SIGNIFICANT CHANGE UP (ref 3.5–5.3)
POTASSIUM SERPL-SCNC: 4.2 MMOL/L — SIGNIFICANT CHANGE UP (ref 3.5–5.3)
RBC # BLD: 2.96 M/UL — LOW (ref 3.8–5.2)
RBC # FLD: 15.3 % — HIGH (ref 10.3–14.5)
SODIUM SERPL-SCNC: 138 MMOL/L — SIGNIFICANT CHANGE UP (ref 135–145)
WBC # BLD: 6.3 K/UL — SIGNIFICANT CHANGE UP (ref 3.8–10.5)
WBC # FLD AUTO: 6.3 K/UL — SIGNIFICANT CHANGE UP (ref 3.8–10.5)

## 2018-01-16 PROCEDURE — 99233 SBSQ HOSP IP/OBS HIGH 50: CPT

## 2018-01-16 PROCEDURE — 99232 SBSQ HOSP IP/OBS MODERATE 35: CPT

## 2018-01-16 RX ORDER — HYDROXYZINE HCL 10 MG
1 TABLET ORAL
Qty: 0 | Refills: 0 | COMMUNITY

## 2018-01-16 RX ORDER — ASPIRIN/CALCIUM CARB/MAGNESIUM 324 MG
1 TABLET ORAL
Qty: 0 | Refills: 0 | DISCHARGE
Start: 2018-01-16

## 2018-01-16 RX ORDER — ATORVASTATIN CALCIUM 80 MG/1
1 TABLET, FILM COATED ORAL
Qty: 0 | Refills: 0 | DISCHARGE
Start: 2018-01-16

## 2018-01-16 RX ORDER — ACETAMINOPHEN 500 MG
2 TABLET ORAL
Qty: 0 | Refills: 0 | DISCHARGE
Start: 2018-01-16

## 2018-01-16 RX ORDER — HYDROXYCHLOROQUINE SULFATE 200 MG
1 TABLET ORAL
Qty: 0 | Refills: 0 | COMMUNITY

## 2018-01-16 RX ORDER — CLOPIDOGREL BISULFATE 75 MG/1
1 TABLET, FILM COATED ORAL
Qty: 0 | Refills: 0 | DISCHARGE
Start: 2018-01-16

## 2018-01-16 RX ORDER — FUROSEMIDE 40 MG
1 TABLET ORAL
Qty: 0 | Refills: 0 | COMMUNITY

## 2018-01-16 RX ORDER — METOPROLOL TARTRATE 50 MG
1 TABLET ORAL
Qty: 0 | Refills: 0 | DISCHARGE
Start: 2018-01-16

## 2018-01-16 RX ADMIN — Medication 81 MILLIGRAM(S): at 12:21

## 2018-01-16 RX ADMIN — Medication 1 TABLET(S): at 12:20

## 2018-01-16 RX ADMIN — Medication 20 MILLIGRAM(S): at 12:21

## 2018-01-16 RX ADMIN — Medication 4: at 13:09

## 2018-01-16 RX ADMIN — Medication 5 MILLIGRAM(S): at 06:24

## 2018-01-16 RX ADMIN — Medication 25 MICROGRAM(S): at 06:24

## 2018-01-16 RX ADMIN — CLOPIDOGREL BISULFATE 75 MILLIGRAM(S): 75 TABLET, FILM COATED ORAL at 12:20

## 2018-01-16 RX ADMIN — Medication 5 MILLIGRAM(S): at 12:23

## 2018-01-16 RX ADMIN — Medication 80 MILLIGRAM(S): at 06:24

## 2018-01-16 RX ADMIN — Medication 1 TABLET(S): at 12:21

## 2018-01-16 RX ADMIN — Medication 50 MILLIGRAM(S): at 06:24

## 2018-01-16 RX ADMIN — Medication 100 MILLIGRAM(S): at 06:24

## 2018-01-16 NOTE — PROGRESS NOTE ADULT - PROBLEM SELECTOR PROBLEM 9
Hypothyroidism, unspecified type
Type 2 diabetes mellitus with chronic kidney disease on chronic dialysis, unspecified long term insulin use status
Hypothyroidism, unspecified type
Essential hypertension
Hypothyroidism, unspecified type
Preventative health care
Preventative health care

## 2018-01-16 NOTE — PROGRESS NOTE ADULT - PROBLEM SELECTOR PLAN 7
Plaquenil on hold in setting of LE weakness as outpt.   no need for pulse steroids per rheum - c/w prednisone 50mg qd for now  if no further treatment planned per rheum, d/c planning to JESSICA
Plaquenil on hold in setting of LE weakness as outpt.   Rheum following: awaiting sural biopsy results
in the setting of Lupus nephritis on dialysis, autonomic dysfunction cannot be ruled out.  Consider Midodrine if patient persistently orthostatic. .
Plaquenil on hold in setting of LE weakness as outpt.   no need for pulse steroids per rheum - c/w prednisone 50mg qd for now
Not on any home meds at this time.   Well controlled as per family s/p gastric bypass  On insulin sliding scale
Plaquenil on hold in setting of LE weakness as outpt.   - appreciate rheum re: pulse dose steroids
Plaquenil on hold in setting of LE weakness as outpt.   Rheum following: awaiting sural biopsy results
Plaquenil on hold in setting of LE weakness as outpt.   no need for pulse steroids per rheum - c/w prednisone 50mg qd for now
Plaquenil on hold in setting of LE weakness as outpt.   no need for pulse steroids per rheum - c/w prednisone 50mg qd for now  if no further treatment planned per rheum, d/c planning to JESSICA
Plaquenil on hold in setting of LE weakness as outpt.   will monitor. Med recently started 3 weeks ago as outpt.  Prior no meds  Rheum following.
Plaquenil on hold in setting of LE weakness as outpt.   will monitor. Med recently started 3 weeks ago as outpt.  Prior no meds  Rheum following.
Cont with BP med

## 2018-01-16 NOTE — PROGRESS NOTE ADULT - PROVIDER SPECIALTY LIST ADULT
Cardiology
Electrophysiology
Hospitalist
Infectious Disease
Intervent Radiology
Nephrology
Neurology
Neurosurgery
Rheumatology
Urology
Vascular Surgery
Vascular Surgery
Cardiology
Infectious Disease
Neurosurgery
Infectious Disease
Nephrology
Hospitalist
Nephrology
Hospitalist

## 2018-01-16 NOTE — PROGRESS NOTE ADULT - PROBLEM SELECTOR PLAN 2
Hgb dropped to 7 yesterday but likely dilutional pre-HD. Hgb 9.4 this morning, no s/s active bleed.   - continue epo with HD  - discussed with Dr. Bronson (rheum): unlikely side effects of Rituxan  - transfuse goal hgb > 7

## 2018-01-16 NOTE — PROGRESS NOTE ADULT - PROBLEM SELECTOR PROBLEM 2
Anemia due to chronic kidney disease
NSVT (nonsustained ventricular tachycardia)
Weakness of both lower extremities
Anemia due to chronic kidney disease
ESRD (end-stage renal disease) due to SLE
Vaginal bleeding
Anemia due to chronic kidney disease
Anemia, chronic disease
ESRD (end-stage renal disease) due to SLE
UTI (urinary tract infection)
Weakness of both lower extremities
Anemia due to chronic kidney disease
Anemia, chronic disease
ESRD (end-stage renal disease) due to SLE
Hypertension
NSVT (nonsustained ventricular tachycardia)
NSVT (nonsustained ventricular tachycardia)
UTI (urinary tract infection)
Vaginal bleeding
Weakness of both lower extremities
Weakness of both lower extremities
Anemia due to chronic kidney disease
UTI (urinary tract infection)
ESRD (end-stage renal disease) due to SLE
Vaginal bleeding

## 2018-01-16 NOTE — PROGRESS NOTE ADULT - ASSESSMENT
56 yo f with h/o lupus diagnosed in April, lupus nephritis, ESRD on HD via permacath (M, W, F) s/p AVF (not matured), DM2, HTN, HLD, hypothyroid presented with 3 weeks h/o bilateral LE pain and numbness presumed polyneuropathy course c/b lost HD access s/p permacath by IR 1/3/18 and NSVT, s/p LHC/RHC, s/p ANABELA to RCA, noted to have thick, yellowish/green drainage in the vance, concerning for fistula formation but no evidence on CT cystogram, sural n biopsy c/w vasculitic neuropathy started on prednisone with improvement of LE, staged cath 1/11 with ANABELA to LAD

## 2018-01-16 NOTE — PROGRESS NOTE ADULT - PROBLEM SELECTOR PLAN 1
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 biopsy c/w vasculitis but also findings of demyelination   - s/p Rituxan (1/14): plan for another infusion in 2 weeks as outpatient  - care discussed with Dr. Bronson: will arrange the next dose with pt's outpatient rheumatologist (Dr. Eve Gaming) at [facility]  - care discussed with Dr. Kahn (ID): no contraindication to rituxan. Pt will need enhanced surveillance with HepB PCR/HepB Ag [id number]  - Appreciate Toxicology consult for cadmium level-per them no evidence of heavy metal exposure by imaging and no other symptoms, does not require chelation.   f/u repeat testing - still pending  - MRI lumbar, C spine show no cord compression.    - Continue with Lyrica- renally dosed.   - PT eval- recommend JESSICA placement.   - Continue to hold Plaquenil.

## 2018-01-16 NOTE — PROGRESS NOTE ADULT - PROBLEM SELECTOR PROBLEM 7
Systemic lupus erythematosus with glomerular disease, unspecified SLE type
Orthostatic hypotension
Systemic lupus erythematosus with glomerular disease, unspecified SLE type
Type 2 diabetes mellitus with chronic kidney disease on chronic dialysis, unspecified long term insulin use status
Systemic lupus erythematosus with glomerular disease, unspecified SLE type
Systemic lupus erythematosus with glomerular disease, unspecified SLE type
Essential hypertension

## 2018-01-16 NOTE — PROGRESS NOTE ADULT - PROBLEM SELECTOR PROBLEM 1
Weakness of both lower extremities
Weakness of both lower extremities
ESRD (end stage renal disease) on dialysis
ESRD (end stage renal disease) on dialysis
NSVT (nonsustained ventricular tachycardia)
NSVT (nonsustained ventricular tachycardia)
Weakness of both lower extremities
Weakness of both lower extremities
CAD (coronary artery disease)
ESRD (end stage renal disease) on dialysis
NSVT (nonsustained ventricular tachycardia)
R/O Fistula
R/O Fistula
Weakness of both lower extremities
ESRD (end stage renal disease) on dialysis
Weakness of both lower extremities
R/O Fistula
NSVT (nonsustained ventricular tachycardia)

## 2018-01-16 NOTE — PROGRESS NOTE ADULT - PROBLEM SELECTOR PROBLEM 8
Type 2 diabetes mellitus with chronic kidney disease on chronic dialysis, unspecified long term insulin use status
Type 2 diabetes mellitus with chronic kidney disease on chronic dialysis, unspecified long term insulin use status
Systemic lupus erythematosus with glomerular disease, unspecified SLE type
Type 2 diabetes mellitus with chronic kidney disease on chronic dialysis, unspecified long term insulin use status
Hypothyroidism, unspecified type
Type 2 diabetes mellitus with chronic kidney disease on chronic dialysis, unspecified long term insulin use status

## 2018-01-16 NOTE — CHART NOTE - NSCHARTNOTEFT_GEN_A_CORE
Source: Patient [ ]    Family [x ]     other [x ]chart    as per Hospitalist Note    Assessment and Plan:   · Assessment	  58 yo f with h/o lupus diagnosed in April, lupus nephritis, ESRD on HD via permacath (M, W, F) s/p AVF (not matured), DM2, HTN, HLD, hypothyroid presented with 3 weeks h/o bilateral LE pain and numbness presumed polyneuropathy course c/b lost HD access s/p permacath by IR 1/3/18 and NSVT, s/p LHC/RHC, s/p ANABELA to RCA, noted to have thick, yellowish/green drainage in the vance, concerning for fistula formation but no evidence on CT cystogram, sural n biopsy c/w vasculitic neuropathy started on prednisone with improvement of LE, staged cath 1/11 with ANABELA to LAD     Problem/Plan - 1:  ·  Problem: Weakness of both lower extremities.  Plan: Polyneuropathy likely autoimmune etiology in the setting of SLE. biopsy c/w vasculitis but also findings of demyelination   - s/p Rituxan (1/14): plan for another infusion in 2 weeks  - care discussed with Dr. Bronson: will arrange the next dose with pt's outpatient rheumatologist at [facility]  - care discussed with Dr. Kahn (ID): no contraindication to rituxan. Pt will need enhanced surveillance with HepB PCR/HepB Ag [id number]  - Appreciate Toxicology consult for cadmium level-per them no evidence of heavy metal exposure by imaging and no other symptoms, does not require chelation.   f/u repeat testing - still pending  - MRI lumbar, C spine show no cord compression.    - Continue with Lyrica- renally dosed.   - PT eval- recommend JESSICA placement.   - Continue to hold Plaquenil.      Problem/Plan - 2:  ·  Problem: Anemia, chronic disease.  Plan: Hgb dropped from 8.6 to 7.3 this morning. Baseline hgb 8-9 likely 2/2 chronic kidney disease as well as SLE. No s/s active bleed. Unclear if it is hemodilution (last HD 1/12)  - repeat cbc  - continue epo with HD  - discussed with Dr. Bronson (rheum): unlikely side effects of Rituxan  - transfuse goal hgb > 7.      Problem/Plan - 3:  ·  Problem: NSVT (nonsustained ventricular tachycardia).  Plan: s/p LHC/RHC, s/p ANABELA to RCA without complication. Appreciated EP recs -   given concern for post-intervention VT, continue tele for now  - monitor electrolytes and replete   - continue DAPT  - appreciate EP recs  - d/c telemetry - no events.      Problem/Plan - 4:  ·  Problem: Hyponatremia.  Plan: likely from fluid overload, monitor levels.   monitor Na.      Problem/Plan - 5:  ·  Problem: Vaginal bleeding.  Plan: outpatient gyn followup.      Problem/Plan - 6:  Problem: ESRD (end-stage renal disease) due to SLE. Plan: s/p permacath by IR 1/3/18 on HD  Renal following.  ?need to continue lasix.     Problem/Plan - 7:  ·  Problem: Systemic lupus erythematosus with glomerular disease, unspecified SLE type.  Plan: Plaquenil on hold in setting of LE weakness as outpt.   no need for pulse steroids per rheum - c/w prednisone 50mg qd for now.      Problem/Plan - 8:  ·  Problem: Type 2 diabetes mellitus with chronic kidney disease on chronic dialysis, unspecified long term insulin use status.  Plan: Not on any home meds at this time.   Well controlled as per family s/p gastric bypass  On insulin sliding scale.      Problem/Plan - 9:  ·  Problem: Hypothyroidism, unspecified type.  Plan: Cont with synthroid.      Problem/Plan - 10:  Problem: Preventative health care. Plan; DVT ppx: no pharmacologic ppx in setting of thrombocytopenia and prior vaginal blee            Diet : CSTCHOSNREN/[id number]/NEPRO 2 CANS DAILY      Patient reports [ ] nausea  [ ] vomiting [ ] diarrhea [ ] constipation  [ ]chewing problems [ ] swallowing issues  [X ] other: seen for family request renal diet ed. and severe malnutrition follow-up.        PO intake:  < 50% [x ] 50-75% [ ]   % [ ]  other : dislikes hospital foods and family wants to bring in food from home     Source for PO intake [ ] Patient [x ] family [ ] chart [ ] staff [ ] other     Enteral /Parenteral Nutrition:       Current Weight: 115.9pounds/52.6kg  % Weight Change:3% loss since previous assess on 1/8    Pertinent Medications: MEDICATIONS  (STANDING):  aspirin enteric coated 81 milliGRAM(s) Oral daily  atorvastatin 40 milliGRAM(s) Oral at bedtime  bisacodyl 5 milliGRAM(s) Oral at bedtime  calcium carbonate 1250 mG (OsCal) 1 Tablet(s) Oral daily  clopidogrel Tablet 75 milliGRAM(s) Oral daily  dextrose 5%. 1000 milliLiter(s) (50 mL/Hr) IV Continuous <Continuous>  dextrose 50% Injectable 12.5 Gram(s) IV Push once  dextrose 50% Injectable 25 Gram(s) IV Push once  dextrose 50% Injectable 25 Gram(s) IV Push once  dextrose 50% Injectable 12.5 Gram(s) IV Push once  docusate sodium 100 milliGRAM(s) Oral two times a day  furosemide    Tablet 80 milliGRAM(s) Oral <User Schedule>  insulin lispro (HumaLOG) corrective regimen sliding scale   SubCutaneous three times a day before meals  insulin lispro (HumaLOG) corrective regimen sliding scale   SubCutaneous at bedtime  latanoprost 0.005% Ophthalmic Solution 1 Drop(s) Both EYES at bedtime  levothyroxine 25 MICROGram(s) Oral daily  metoclopramide 5 milliGRAM(s) Oral three times a day  metoprolol succinate ER 25 milliGRAM(s) Oral daily  Nephro-madiha 1 Tablet(s) Oral daily  ondansetron Injectable 4 milliGRAM(s) IV Push once  PARoxetine 20 milliGRAM(s) Oral daily  predniSONE   Tablet 50 milliGRAM(s) Oral daily  senna 2 Tablet(s) Oral at bedtime  timolol 0.5% Solution 1 Drop(s) Both EYES daily    MEDICATIONS  (PRN):  acetaminophen   Tablet 650 milliGRAM(s) Oral every 6 hours PRN For Temp greater than 38 C (100.4 F)  acetaminophen   Tablet. 650 milliGRAM(s) Oral every 6 hours PRN Mild Pain (1 - 3)  dextrose Gel 1 Dose(s) Oral once PRN Blood Glucose LESS THAN 70 milliGRAM(s)/deciliter  diphenhydrAMINE   Capsule 25 milliGRAM(s) Oral once PRN Rash and/or Itching  glucagon  Injectable 1 milliGRAM(s) IntraMuscular once PRN Glucose LESS THAN 70 milligrams/deciliter  oxyCODONE    IR 5 milliGRAM(s) Oral every 6 hours PRN Moderate Pain (4 - 6)    Pertinent Labs:  01-16 Na138 mmol/L Glu 93 mg/dL K+ 4.2 mmol/L Cr  4.22 mg/dL<H> BUN 36 mg/dL<H> Phos n/a   Alb n/a   PAB n/a     Na 138, K+ 4.2, BUN 36, Cr 4.22, BG 93, Phos --,  , AST --, ALT --, Mg --, Ca 9.4,       CAPILLARY BLOOD GLUCOSE      POCT Blood Glucose.: 113 mg/dL (15 Dre 2018 22:21)  POCT Blood Glucose.: 118 mg/dL (15 Dre 2018 18:05)  POCT Blood Glucose.: 180 mg/dL (15 Dre 2018 12:55)    Hemoglobin A1C, Whole Blood: 4.7 % (12-20 @ 07:23)          Skin: no edema noted, surgical incision    Estimated Needs:   [x ] no change since previous assessment  [ ] recalculated:       Previous Nutrition Diagnosis:     [ ] Inadequate Energy Intake [ ]Inadequate Oral Intake [ ] Excessive Energy Intake     [ ] Underweight [ ] Increased Nutrient Needs [ ] Overweight/Obesity     [ ] Altered GI Function [ ] Unintended Weight Loss [ ] Food & Nutrition Related Knowledge Deficit [x ] Malnutrition-severe         Nutrition Diagnosis is [x ] ongoing  [ ] resolved [ ] not applicable          New Nutrition Diagnosis: [x ] not applicable    [ ] Inadequate Protein Energy Intake [ ]Inadequate Oral Intake [ ] Excessive Energy Intake     [ ] Underweight [ ] Increased Nutrient Needs [ ] Overweight/Obesity     [ ] Altered GI Function [ ] Unintended Weight Loss [ ] Food & Nutrition Related Knowledge Deficit[ ] Limited Adherence to nutrition related recommendations [ ] Malnutrition  [ ] other: Free text       Related to:      As evidenced by:      Interventions:     Recommend    [ ] Change Diet To:    [ ] Nutrition Supplement    [ ] Nutrition Support    [x ] Other: phosphorous lab value, Nephro-madiha daily       Monitoring and Evaluation:     [x ] PO intake [x ] Tolerance to diet prescription [ x] weights [ x] follow up per protocol    [x ] other: provided HD diet ed in [place], monitor need to provide diet ed reinforcement

## 2018-01-16 NOTE — PROGRESS NOTE ADULT - ATTENDING COMMENTS
Safe discharge planning discussed with patient, daughter at bedside, consultants, NP and CM. She is medically stable to be discharged to [facility]. Plan for Rituxan in 2 weeks as outpatient with Dr. Eve Gaming. All questions and concerns were addressed.    Galina Ma MD  Division of Hospital Medicine  Pager: [phone]  Office: [phone]

## 2018-01-16 NOTE — PROGRESS NOTE ADULT - PROBLEM SELECTOR PLAN 10
DVT ppx: no pharmacologic ppx in setting of thrombocytopenia and prior vaginal bleeding  Dispo: JESSICA Mcclelland CM aware

## 2018-01-16 NOTE — PROGRESS NOTE ADULT - PROBLEM SELECTOR PLAN 8
Not on any home meds at this time.   Well controlled as per family s/p gastric bypass  On insulin sliding scale
Plaquenil on hold in setting of LE weakness as outpt.   will monitor. Med recently started 3 weeks ago as outpt.  Prior no meds  Rheum following.
Not on any home meds at this time.   Well controlled as per family s/p gastric bypass  On insulin sliding scale
Cont with synthroid
Not on any home meds at this time.   Well controlled as per family s/p gastric bypass  On insulin sliding scale
Not on any home meds at this time.   Well controlled as per family s/p gastric bypass  On insulin sliding scale while on steroids

## 2018-01-16 NOTE — PROGRESS NOTE ADULT - PROBLEM SELECTOR PLAN 9
Cont with synthroid
Not on any home meds at this time.   Well controlled as per family s/p gastric bypass  On insulin sliding scale
Cont with synthroid
Monitor blood pressure, continue to hold Lasix.
Monitor blood pressure, continue to hold Lasix.
Patient hypotensive, hold off Lasix.   Monitor Blood pressure.
no pharmacologic ppx in setting of thrombocytopenia and prior vaginal bleeding
no pharmacologic ppx in setting of thrombocytopenia and prior vaginal bleeding

## 2018-01-17 NOTE — CHART NOTE - NSCHARTNOTESELECT_GEN_ALL_CORE
Nutrition Services
Discharge--Late Entry/Event Note
Event Note
Event Note/Critical Hb/Hct
Event Note/Hypoglycemia
Gynecology/Event Note
Hypotension/Event Note
Nutrition Services
UPDATE-NEUROLOGY

## 2018-01-17 NOTE — CHART NOTE - NSCHARTNOTEFT_GEN_A_CORE
Patient is a 57y old  Female who presents with a chief complaint of LE weakness (05 Jan 2018 11:12)      Vital Signs Last 24 Hrs  T(C): --  T(F): --  HR: --  BP: --  BP(mean): --  RR: --  SpO2: --      Labs:                          9.4    6.3   )-----------( 141      ( 16 Jan 2018 07:08 )             26.9     01-16    138  |  98  |  36<H>  ----------------------------<  93  4.2   |  27  |  4.22<H>    Ca    9.4      16 Jan 2018 07:08  Phos  3.4     01-16      Physical Exam:  General: WN/WD NAD  Neurology: A&Ox3, nonfocal, ANGEL x 4  Head:  Normocephalic, atraumatic  Respiratory: CTA B/L  CV: RRR, S1S2, no murmur  Abdominal: Soft, NT, ND no palpable mass  MSK: No edema, + peripheral pulses, FROM all 4 extremity    Discharge Note and Plan:  >Follow up with PMD in 1 week.   >Continue present medication regimen.   >Pt and her family verbalize understanding of medication regimen and discharge plan.   >Pt to follow up with Dr Gaming  re: rituximab.      Светлана Roche ANP-BC  Spectralink #[id number]

## 2018-01-27 LAB
CULTURE RESULTS: SIGNIFICANT CHANGE UP
SPECIMEN SOURCE: SIGNIFICANT CHANGE UP

## 2018-02-09 ENCOUNTER — OUTPATIENT (OUTPATIENT)
Dept: OUTPATIENT SERVICES | Facility: HOSPITAL | Age: 58
LOS: 1 days | End: 2018-02-09
Payer: MEDICAID

## 2018-02-09 DIAGNOSIS — D63.1 ANEMIA IN CHRONIC KIDNEY DISEASE: ICD-10-CM

## 2018-02-09 DIAGNOSIS — Z90.49 ACQUIRED ABSENCE OF OTHER SPECIFIED PARTS OF DIGESTIVE TRACT: Chronic | ICD-10-CM

## 2018-02-09 DIAGNOSIS — Z98.890 OTHER SPECIFIED POSTPROCEDURAL STATES: Chronic | ICD-10-CM

## 2018-02-09 DIAGNOSIS — Z98.891 HISTORY OF UTERINE SCAR FROM PREVIOUS SURGERY: Chronic | ICD-10-CM

## 2018-02-09 LAB
ABO RH CONFIRMATION: SIGNIFICANT CHANGE UP
BLD GP AB SCN SERPL QL: SIGNIFICANT CHANGE UP
HCT VFR BLD CALC: 23.5 % — LOW (ref 34.5–45)
HGB BLD-MCNC: 7.8 G/DL — LOW (ref 11.5–15.5)
MCHC RBC-ENTMCNC: 31 PG — SIGNIFICANT CHANGE UP (ref 27–34)
MCHC RBC-ENTMCNC: 33.1 GM/DL — SIGNIFICANT CHANGE UP (ref 32–36)
MCV RBC AUTO: 93.5 FL — SIGNIFICANT CHANGE UP (ref 80–100)
PLATELET # BLD AUTO: 152 K/UL — SIGNIFICANT CHANGE UP (ref 150–400)
RBC # BLD: 2.52 M/UL — LOW (ref 3.8–5.2)
RBC # FLD: 18.3 % — HIGH (ref 10.3–14.5)
WBC # BLD: 9.6 K/UL — SIGNIFICANT CHANGE UP (ref 3.8–10.5)
WBC # FLD AUTO: 9.6 K/UL — SIGNIFICANT CHANGE UP (ref 3.8–10.5)

## 2018-02-09 PROCEDURE — 85027 COMPLETE CBC AUTOMATED: CPT

## 2018-02-09 PROCEDURE — 86923 COMPATIBILITY TEST ELECTRIC: CPT

## 2018-02-09 PROCEDURE — 36430 TRANSFUSION BLD/BLD COMPNT: CPT

## 2018-02-09 PROCEDURE — 86900 BLOOD TYPING SEROLOGIC ABO: CPT

## 2018-02-09 PROCEDURE — P9016: CPT

## 2018-02-09 PROCEDURE — 86850 RBC ANTIBODY SCREEN: CPT

## 2018-02-09 PROCEDURE — 86901 BLOOD TYPING SEROLOGIC RH(D): CPT

## 2018-02-09 RX ORDER — ACETAMINOPHEN 500 MG
650 TABLET ORAL ONCE
Qty: 0 | Refills: 0 | Status: COMPLETED | OUTPATIENT
Start: 2018-02-09 | End: 2018-02-09

## 2018-02-09 RX ORDER — DIPHENHYDRAMINE HCL 50 MG
25 CAPSULE ORAL ONCE
Qty: 0 | Refills: 0 | Status: COMPLETED | OUTPATIENT
Start: 2018-02-09 | End: 2018-02-09

## 2018-02-09 RX ADMIN — Medication 650 MILLIGRAM(S): at 13:38

## 2018-02-09 RX ADMIN — Medication 25 MILLIGRAM(S): at 13:38

## 2018-02-17 LAB
CULTURE RESULTS: SIGNIFICANT CHANGE UP
SPECIMEN SOURCE: SIGNIFICANT CHANGE UP

## 2018-02-28 ENCOUNTER — INPATIENT (INPATIENT)
Facility: HOSPITAL | Age: 58
LOS: 11 days | Discharge: HOME HEALTH | End: 2018-03-12
Attending: INTERNAL MEDICINE | Admitting: INTERNAL MEDICINE
Payer: MEDICAID

## 2018-02-28 VITALS
SYSTOLIC BLOOD PRESSURE: 175 MMHG | OXYGEN SATURATION: 100 % | RESPIRATION RATE: 16 BRPM | TEMPERATURE: 98 F | DIASTOLIC BLOOD PRESSURE: 94 MMHG | HEART RATE: 87 BPM

## 2018-02-28 DIAGNOSIS — Z98.891 HISTORY OF UTERINE SCAR FROM PREVIOUS SURGERY: Chronic | ICD-10-CM

## 2018-02-28 DIAGNOSIS — Z90.49 ACQUIRED ABSENCE OF OTHER SPECIFIED PARTS OF DIGESTIVE TRACT: Chronic | ICD-10-CM

## 2018-02-28 DIAGNOSIS — Z98.890 OTHER SPECIFIED POSTPROCEDURAL STATES: Chronic | ICD-10-CM

## 2018-02-28 NOTE — ED ADULT NURSE NOTE - OBJECTIVE STATEMENT
Pete RN: Patient received in room #22 for AMS. Patient from [facility], returned from dialysis, completed full course of dialysis. As per EMS, patient is baseline A&Ox4, currently non-verbal, responsive to painful stimuli. Patient currently not following verbal commands. No facial droop noted, patient moving extremitiesx4. 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 NSR on CM. Stage 4 ulcer noted to sacral area. 22G IV placed in right hand, labs drawn and sent. VS as noted. Will monitor. Report given to primary RN Jessica. Pete RN: Patient received in room #22 for AMS. Patient from [facility], returned from dialysis, completed full course of dialysis. As per EMS, patient is baseline A&Ox4, currently non-verbal, responsive to painful stimuli. Patient currently not following verbal commands. As per EMS, last known well approx. 22:20. No facial droop noted, patient moving extremitiesx4. No code stroke called. 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 NSR on CM. Stage 4 ulcer noted to sacral area. 22G IV placed in right hand, labs drawn and sent. VS as noted. Will monitor. Report given to primary RN Jessica.

## 2018-03-01 DIAGNOSIS — H40.9 UNSPECIFIED GLAUCOMA: ICD-10-CM

## 2018-03-01 DIAGNOSIS — I25.10 ATHEROSCLEROTIC HEART DISEASE OF NATIVE CORONARY ARTERY WITHOUT ANGINA PECTORIS: ICD-10-CM

## 2018-03-01 DIAGNOSIS — M32.14 GLOMERULAR DISEASE IN SYSTEMIC LUPUS ERYTHEMATOSUS: ICD-10-CM

## 2018-03-01 DIAGNOSIS — E78.00 PURE HYPERCHOLESTEROLEMIA, UNSPECIFIED: ICD-10-CM

## 2018-03-01 DIAGNOSIS — E11.9 TYPE 2 DIABETES MELLITUS WITHOUT COMPLICATIONS: ICD-10-CM

## 2018-03-01 DIAGNOSIS — G93.40 ENCEPHALOPATHY, UNSPECIFIED: ICD-10-CM

## 2018-03-01 DIAGNOSIS — Z29.9 ENCOUNTER FOR PROPHYLACTIC MEASURES, UNSPECIFIED: ICD-10-CM

## 2018-03-01 DIAGNOSIS — M32.9 SYSTEMIC LUPUS ERYTHEMATOSUS, UNSPECIFIED: ICD-10-CM

## 2018-03-01 DIAGNOSIS — R41.82 ALTERED MENTAL STATUS, UNSPECIFIED: ICD-10-CM

## 2018-03-01 LAB
ALBUMIN SERPL ELPH-MCNC: 2.6 G/DL — LOW (ref 3.3–5)
ALBUMIN SERPL ELPH-MCNC: 2.8 G/DL — LOW (ref 3.3–5)
ALP SERPL-CCNC: 148 U/L — HIGH (ref 40–120)
ALP SERPL-CCNC: 162 U/L — HIGH (ref 40–120)
ALT FLD-CCNC: 17 U/L — SIGNIFICANT CHANGE UP (ref 4–33)
ALT FLD-CCNC: 18 U/L — SIGNIFICANT CHANGE UP (ref 4–33)
APPEARANCE UR: SIGNIFICANT CHANGE UP
APTT BLD: 38.2 SEC — HIGH (ref 27.5–37.4)
AST SERPL-CCNC: 23 U/L — SIGNIFICANT CHANGE UP (ref 4–32)
AST SERPL-CCNC: 24 U/L — SIGNIFICANT CHANGE UP (ref 4–32)
BASE EXCESS BLDV CALC-SCNC: 4.4 MMOL/L — SIGNIFICANT CHANGE UP
BASOPHILS # BLD AUTO: 0.01 K/UL — SIGNIFICANT CHANGE UP (ref 0–0.2)
BASOPHILS NFR BLD AUTO: 0.1 % — SIGNIFICANT CHANGE UP (ref 0–2)
BILIRUB SERPL-MCNC: 0.5 MG/DL — SIGNIFICANT CHANGE UP (ref 0.2–1.2)
BILIRUB SERPL-MCNC: 0.5 MG/DL — SIGNIFICANT CHANGE UP (ref 0.2–1.2)
BILIRUB UR-MCNC: NEGATIVE — SIGNIFICANT CHANGE UP
BLOOD GAS VENOUS - CREATININE: 2.32 MG/DL — HIGH (ref 0.5–1.3)
BLOOD UR QL VISUAL: HIGH
BUN SERPL-MCNC: 21 MG/DL — SIGNIFICANT CHANGE UP (ref 7–23)
BUN SERPL-MCNC: 25 MG/DL — HIGH (ref 7–23)
CALCIUM SERPL-MCNC: 8.6 MG/DL — SIGNIFICANT CHANGE UP (ref 8.4–10.5)
CALCIUM SERPL-MCNC: 8.9 MG/DL — SIGNIFICANT CHANGE UP (ref 8.4–10.5)
CHLORIDE BLDV-SCNC: 99 MMOL/L — SIGNIFICANT CHANGE UP (ref 96–108)
CHLORIDE SERPL-SCNC: 95 MMOL/L — LOW (ref 98–107)
CHLORIDE SERPL-SCNC: 96 MMOL/L — LOW (ref 98–107)
CK MB BLD-MCNC: 2.27 NG/ML — SIGNIFICANT CHANGE UP (ref 1–4.7)
CK MB BLD-MCNC: 2.65 NG/ML — SIGNIFICANT CHANGE UP (ref 1–4.7)
CK SERPL-CCNC: 17 U/L — LOW (ref 25–170)
CK SERPL-CCNC: 18 U/L — LOW (ref 25–170)
CO2 SERPL-SCNC: 26 MMOL/L — SIGNIFICANT CHANGE UP (ref 22–31)
CO2 SERPL-SCNC: 33 MMOL/L — HIGH (ref 22–31)
COLOR SPEC: HIGH
CREAT SERPL-MCNC: 2.48 MG/DL — HIGH (ref 0.5–1.3)
CREAT SERPL-MCNC: 2.98 MG/DL — HIGH (ref 0.5–1.3)
EOSINOPHIL # BLD AUTO: 0 K/UL — SIGNIFICANT CHANGE UP (ref 0–0.5)
EOSINOPHIL NFR BLD AUTO: 0 % — SIGNIFICANT CHANGE UP (ref 0–6)
GAS PNL BLDV: 134 MMOL/L — LOW (ref 136–146)
GLUCOSE BLDV-MCNC: 174 — HIGH (ref 70–99)
GLUCOSE SERPL-MCNC: 138 MG/DL — HIGH (ref 70–99)
GLUCOSE SERPL-MCNC: 180 MG/DL — HIGH (ref 70–99)
GLUCOSE UR-MCNC: NEGATIVE — SIGNIFICANT CHANGE UP
HCO3 BLDV-SCNC: 29 MMOL/L — HIGH (ref 20–27)
HCT VFR BLD CALC: 32.2 % — LOW (ref 34.5–45)
HCT VFR BLD CALC: 34.1 % — LOW (ref 34.5–45)
HCT VFR BLDV CALC: 28.1 % — LOW (ref 34.5–45)
HGB BLD-MCNC: 11.3 G/DL — LOW (ref 11.5–15.5)
HGB BLD-MCNC: 11.3 G/DL — LOW (ref 11.5–15.5)
HGB BLDV-MCNC: 9 G/DL — LOW (ref 11.5–15.5)
IMM GRANULOCYTES # BLD AUTO: 0.08 # — SIGNIFICANT CHANGE UP
IMM GRANULOCYTES NFR BLD AUTO: 1 % — SIGNIFICANT CHANGE UP (ref 0–1.5)
INR BLD: 0.96 — SIGNIFICANT CHANGE UP (ref 0.88–1.17)
KETONES UR-MCNC: NEGATIVE — SIGNIFICANT CHANGE UP
LACTATE BLDV-MCNC: 2.9 MMOL/L — HIGH (ref 0.5–2)
LEUKOCYTE ESTERASE UR-ACNC: HIGH
LYMPHOCYTES # BLD AUTO: 0.42 K/UL — LOW (ref 1–3.3)
LYMPHOCYTES # BLD AUTO: 5.1 % — LOW (ref 13–44)
MAGNESIUM SERPL-MCNC: 2.2 MG/DL — SIGNIFICANT CHANGE UP (ref 1.6–2.6)
MCHC RBC-ENTMCNC: 30.9 PG — SIGNIFICANT CHANGE UP (ref 27–34)
MCHC RBC-ENTMCNC: 32.2 PG — SIGNIFICANT CHANGE UP (ref 27–34)
MCHC RBC-ENTMCNC: 33.1 % — SIGNIFICANT CHANGE UP (ref 32–36)
MCHC RBC-ENTMCNC: 35.1 % — SIGNIFICANT CHANGE UP (ref 32–36)
MCV RBC AUTO: 91.7 FL — SIGNIFICANT CHANGE UP (ref 80–100)
MCV RBC AUTO: 93.2 FL — SIGNIFICANT CHANGE UP (ref 80–100)
MONOCYTES # BLD AUTO: 0.5 K/UL — SIGNIFICANT CHANGE UP (ref 0–0.9)
MONOCYTES NFR BLD AUTO: 6 % — SIGNIFICANT CHANGE UP (ref 2–14)
MUCOUS THREADS # UR AUTO: SIGNIFICANT CHANGE UP
NEUTROPHILS # BLD AUTO: 7.29 K/UL — SIGNIFICANT CHANGE UP (ref 1.8–7.4)
NEUTROPHILS NFR BLD AUTO: 87.8 % — HIGH (ref 43–77)
NITRITE UR-MCNC: NEGATIVE — SIGNIFICANT CHANGE UP
NRBC # FLD: 0 — SIGNIFICANT CHANGE UP
NRBC # FLD: 0 — SIGNIFICANT CHANGE UP
PCO2 BLDV: 33 MMHG — LOW (ref 41–51)
PH BLDV: 7.53 PH — HIGH (ref 7.32–7.43)
PH UR: 5.5 — SIGNIFICANT CHANGE UP (ref 4.6–8)
PHOSPHATE SERPL-MCNC: 1.6 MG/DL — LOW (ref 2.5–4.5)
PLATELET # BLD AUTO: 137 K/UL — LOW (ref 150–400)
PLATELET # BLD AUTO: 151 K/UL — SIGNIFICANT CHANGE UP (ref 150–400)
PMV BLD: 9.5 FL — SIGNIFICANT CHANGE UP (ref 7–13)
PMV BLD: 9.6 FL — SIGNIFICANT CHANGE UP (ref 7–13)
PO2 BLDV: 177 MMHG — HIGH (ref 35–40)
POTASSIUM BLDV-SCNC: 4.2 MMOL/L — SIGNIFICANT CHANGE UP (ref 3.4–4.5)
POTASSIUM SERPL-MCNC: 4.1 MMOL/L — SIGNIFICANT CHANGE UP (ref 3.5–5.3)
POTASSIUM SERPL-MCNC: 4.3 MMOL/L — SIGNIFICANT CHANGE UP (ref 3.5–5.3)
POTASSIUM SERPL-SCNC: 4.1 MMOL/L — SIGNIFICANT CHANGE UP (ref 3.5–5.3)
POTASSIUM SERPL-SCNC: 4.3 MMOL/L — SIGNIFICANT CHANGE UP (ref 3.5–5.3)
PROT SERPL-MCNC: 6.1 G/DL — SIGNIFICANT CHANGE UP (ref 6–8.3)
PROT SERPL-MCNC: 6.6 G/DL — SIGNIFICANT CHANGE UP (ref 6–8.3)
PROT UR-MCNC: >600 MG/DL — SIGNIFICANT CHANGE UP
PROTHROM AB SERPL-ACNC: 10.6 SEC — SIGNIFICANT CHANGE UP (ref 9.8–13.1)
RBC # BLD: 3.51 M/UL — LOW (ref 3.8–5.2)
RBC # BLD: 3.66 M/UL — LOW (ref 3.8–5.2)
RBC # FLD: 18.6 % — HIGH (ref 10.3–14.5)
RBC # FLD: 18.8 % — HIGH (ref 10.3–14.5)
RBC CASTS # UR COMP ASSIST: HIGH (ref 0–?)
SAO2 % BLDV: 99.3 % — HIGH (ref 60–85)
SODIUM SERPL-SCNC: 137 MMOL/L — SIGNIFICANT CHANGE UP (ref 135–145)
SODIUM SERPL-SCNC: 138 MMOL/L — SIGNIFICANT CHANGE UP (ref 135–145)
SP GR SPEC: 1.03 — SIGNIFICANT CHANGE UP (ref 1–1.04)
TROPONIN T SERPL-MCNC: 0.29 NG/ML — HIGH (ref 0–0.06)
TROPONIN T SERPL-MCNC: 0.3 NG/ML — HIGH (ref 0–0.06)
TSH SERPL-MCNC: 1.19 UIU/ML — SIGNIFICANT CHANGE UP (ref 0.27–4.2)
UROBILINOGEN FLD QL: NORMAL MG/DL — SIGNIFICANT CHANGE UP
WBC # BLD: 6.6 K/UL — SIGNIFICANT CHANGE UP (ref 3.8–10.5)
WBC # BLD: 8.3 K/UL — SIGNIFICANT CHANGE UP (ref 3.8–10.5)
WBC # FLD AUTO: 6.6 K/UL — SIGNIFICANT CHANGE UP (ref 3.8–10.5)
WBC # FLD AUTO: 8.3 K/UL — SIGNIFICANT CHANGE UP (ref 3.8–10.5)
WBC UR QL: SIGNIFICANT CHANGE UP (ref 0–?)

## 2018-03-01 PROCEDURE — 71045 X-RAY EXAM CHEST 1 VIEW: CPT | Mod: 26

## 2018-03-01 PROCEDURE — 99223 1ST HOSP IP/OBS HIGH 75: CPT | Mod: GC

## 2018-03-01 PROCEDURE — 74177 CT ABD & PELVIS W/CONTRAST: CPT | Mod: 26

## 2018-03-01 PROCEDURE — 70450 CT HEAD/BRAIN W/O DYE: CPT | Mod: 26

## 2018-03-01 RX ORDER — METOPROLOL TARTRATE 50 MG
25 TABLET ORAL DAILY
Qty: 0 | Refills: 0 | Status: DISCONTINUED | OUTPATIENT
Start: 2018-03-01 | End: 2018-03-12

## 2018-03-01 RX ORDER — CALCIUM CARBONATE 500(1250)
1 TABLET ORAL DAILY
Qty: 0 | Refills: 0 | Status: DISCONTINUED | OUTPATIENT
Start: 2018-03-01 | End: 2018-03-12

## 2018-03-01 RX ORDER — GLUCAGON INJECTION, SOLUTION 0.5 MG/.1ML
1 INJECTION, SOLUTION SUBCUTANEOUS ONCE
Qty: 0 | Refills: 0 | Status: DISCONTINUED | OUTPATIENT
Start: 2018-03-01 | End: 2018-03-01

## 2018-03-01 RX ORDER — HEPARIN SODIUM 5000 [USP'U]/ML
5000 INJECTION INTRAVENOUS; SUBCUTANEOUS EVERY 12 HOURS
Qty: 0 | Refills: 0 | Status: DISCONTINUED | OUTPATIENT
Start: 2018-03-01 | End: 2018-03-12

## 2018-03-01 RX ORDER — LATANOPROST 0.05 MG/ML
1 SOLUTION/ DROPS OPHTHALMIC; TOPICAL AT BEDTIME
Qty: 0 | Refills: 0 | Status: DISCONTINUED | OUTPATIENT
Start: 2018-03-01 | End: 2018-03-12

## 2018-03-01 RX ORDER — INSULIN LISPRO 100/ML
VIAL (ML) SUBCUTANEOUS
Qty: 0 | Refills: 0 | Status: DISCONTINUED | OUTPATIENT
Start: 2018-03-01 | End: 2018-03-02

## 2018-03-01 RX ORDER — DEXTROSE 50 % IN WATER 50 %
25 SYRINGE (ML) INTRAVENOUS ONCE
Qty: 0 | Refills: 0 | Status: DISCONTINUED | OUTPATIENT
Start: 2018-03-01 | End: 2018-03-12

## 2018-03-01 RX ORDER — ASPIRIN/CALCIUM CARB/MAGNESIUM 324 MG
81 TABLET ORAL DAILY
Qty: 0 | Refills: 0 | Status: DISCONTINUED | OUTPATIENT
Start: 2018-03-01 | End: 2018-03-12

## 2018-03-01 RX ORDER — LEVOTHYROXINE SODIUM 125 MCG
25 TABLET ORAL DAILY
Qty: 0 | Refills: 0 | Status: DISCONTINUED | OUTPATIENT
Start: 2018-03-01 | End: 2018-03-12

## 2018-03-01 RX ORDER — DEXTROSE 50 % IN WATER 50 %
1 SYRINGE (ML) INTRAVENOUS ONCE
Qty: 0 | Refills: 0 | Status: DISCONTINUED | OUTPATIENT
Start: 2018-03-01 | End: 2018-03-01

## 2018-03-01 RX ORDER — CLOPIDOGREL BISULFATE 75 MG/1
75 TABLET, FILM COATED ORAL DAILY
Qty: 0 | Refills: 0 | Status: DISCONTINUED | OUTPATIENT
Start: 2018-03-01 | End: 2018-03-08

## 2018-03-01 RX ORDER — SODIUM CHLORIDE 9 MG/ML
1000 INJECTION, SOLUTION INTRAVENOUS
Qty: 0 | Refills: 0 | Status: DISCONTINUED | OUTPATIENT
Start: 2018-03-01 | End: 2018-03-01

## 2018-03-01 RX ORDER — OXYCODONE HYDROCHLORIDE 5 MG/1
5 TABLET ORAL ONCE
Qty: 0 | Refills: 0 | Status: DISCONTINUED | OUTPATIENT
Start: 2018-03-01 | End: 2018-03-01

## 2018-03-01 RX ORDER — DEXTROSE 50 % IN WATER 50 %
12.5 SYRINGE (ML) INTRAVENOUS ONCE
Qty: 0 | Refills: 0 | Status: DISCONTINUED | OUTPATIENT
Start: 2018-03-01 | End: 2018-03-12

## 2018-03-01 RX ADMIN — LATANOPROST 1 DROP(S): 0.05 SOLUTION/ DROPS OPHTHALMIC; TOPICAL at 22:01

## 2018-03-01 RX ADMIN — Medication 1: at 12:51

## 2018-03-01 RX ADMIN — Medication 1 TABLET(S): at 12:51

## 2018-03-01 RX ADMIN — CLOPIDOGREL BISULFATE 75 MILLIGRAM(S): 75 TABLET, FILM COATED ORAL at 12:51

## 2018-03-01 RX ADMIN — Medication 1: at 17:32

## 2018-03-01 RX ADMIN — OXYCODONE HYDROCHLORIDE 5 MILLIGRAM(S): 5 TABLET ORAL at 17:03

## 2018-03-01 RX ADMIN — Medication 81 MILLIGRAM(S): at 12:51

## 2018-03-01 NOTE — ED PROVIDER NOTE - MEDICAL DECISION MAKING DETAILS
altered mental status following dialysis. given hx and physcial possibly tox vs neurological vs cardiac vs infection. plan for basic labs, tsh, xray chest, ekg, neuro recs appreciated. plan for admission

## 2018-03-01 NOTE — PATIENT PROFILE ADULT. - VISION (WITH CORRECTIVE LENSES IF THE PATIENT USUALLY WEARS THEM):
unable to formally assess/Normal vision: sees adequately in most situations; can see medication labels, newsprint

## 2018-03-01 NOTE — CONSULT NOTE ADULT - ASSESSMENT
56 yo W, with SLE ( diagnosed April 2017, + SULLY, DsDNA, +Sm, +aPLs, cytopenias , lupus nephritis with ESRD, recent diagnosis of vasculitic neuropathy s/p RTX on 1/14/18 and remains on prednisone 50 mg od), now with acute change in mental status     The differential for this acute change in mental status is broad from infectious, neurological ( including seizures vs CVA which is a concern in this patient with significant comorbidities and +aPL) or CNS lupus ( which is less likely as patient is on steroids and received recently RTX)  Would recommend  a broad infectious and neurological work up including MRI/MRA and if needed an LP with neurology evaluation  Can continue prednisone at current dose for now until further information obtained    Plan discussed with medical attending  Family at beside aware of the plan    Will follow with you    Kelsie Khan MD   Rheumatology Fellow  Pager: [phone]

## 2018-03-01 NOTE — H&P ADULT - ATTENDING COMMENTS
Pt seen and examined, chart and labs reviewed.  Case discussed with housestaff team.  Upon my examination of patient, she was communicative and responsive in [place] of [place].  Pt's encephalopathy seems to be improving, but clearly was abnormal on exam in the previous 24 hours.     #Acute encephalopathy: unclear etiology, clinically improving now.  Agree with differential diagnosis as written above, including infectious vs metabolic from lupus cerebritis (less likely).  CT head reviewed, no hemorrhage or large mass noted.  Agree with full infectious workup, including blood cultures, straight cath for UA/Ucx, CT A/P as pt had tenderness on exam.  If pt's mental status continues to improve, would hold off on MRI/EEG.      #SLE associated with lupus nephritis now ESRD on HD MWF: Rheumatology to see patient.  Pt was supposed to schedule Rituxan infusion 2 weeks ago, [facility].  Will need to be ruled out for infection prior to initiating Rituxan.  Will obtain C3/C4, dsDNA levels.  House renal to see patient for HD orders (via permacath, fistula not matured)

## 2018-03-01 NOTE — H&P ADULT - NSHPLABSRESULTS_GEN_ALL_CORE
11.3   6.60  )-----------( 137      ( 01 Mar 2018 05:40 )             34.1       03-01    137  |  95<L>  |  25<H>  ----------------------------<  138<H>  4.1   |  33<H>  |  2.98<H>    Ca    8.9      01 Mar 2018 05:40  Phos  1.6     03-01  Mg     2.2     03-01    TPro  6.6  /  Alb  2.6<L>  /  TBili  0.5  /  DBili  x   /  AST  24  /  ALT  17  /  AlkPhos  148<H>  03-01                  PT/INR - ( 28 Feb 2018 23:40 )   PT: 10.6 SEC;   INR: 0.96          PTT - ( 28 Feb 2018 23:40 )  PTT:38.2 SEC    Lactate Trend      CARDIAC MARKERS ( 01 Mar 2018 05:40 )  x     / 0.29 ng/mL / 17 u/L / 2.27 ng/mL / x      CARDIAC MARKERS ( 28 Feb 2018 23:40 )  x     / 0.30 ng/mL / 18 u/L / 2.65 ng/mL / x          < from: CT Head No Cont (03.01.18 @ 01:05) >    EXAM:  CT BRAIN        PROCEDURE DATE:  Mar  1 2018         INTERPRETATION:  CLINICAL INFORMATION: Altered mental status.    TECHNIQUE: Noncontrast axial CT images were acquired through the head.   Two-dimensional sagittal and coronal reformats were obtained    COMPARISON: CT head 12/28/2017    FINDINGS:   There is no acute intracranial hemorrhage, extra-axial fluid collection,   hydrocephalus, mass effect or midline shift. No acute territorial infarct   is demonstrated.    Ventricles and sulciare normal in size for the patient's age. Basal   cisterns are patent.    Paranasal sinuses and mastoid air cells are clear. Calvarium is intact.     IMPRESSION:   No acute territorial infarct, hemorrhage or mass effect        < end of copied text >

## 2018-03-01 NOTE — H&P ADULT - PROBLEM SELECTOR PLAN 1
Pt nonverbal on admission, minimally responsive to verbal stimuli. Etiology of AMS unknown at this time. DDx includes infection (UTI, intraabdominal, meningitis, sepsis from sacral wound), lupus nephritis flare, vasculitis, seizure activity.  - Will send blood cultures  - Spot EEG for ?seizure activity, post-ictal state  - f/u ESR, CRP, dsDNA, Complement levels for ?lupus flare  - CT a/p for ?occult infection  - MRI non-contrast   - will get wound c/s for sacral decubitus ulcer management  - pt aseptic on exam, no leukocytosis or fever. Will continue to monitor off antibiotics for now - Pt nonverbal on admission, minimally responsive to verbal stimuli. Etiology of encephalopathy unknown at this time. DDx includes infection (UTI, intraabdominal, meningitis, sepsis from sacral wound), lupus nephritis flare, vasculitis, seizure activity.  - Will send blood cultures, straight cath for UA and UCx  - Spot EEG for ?seizure activity, post-ictal state  - f/u ESR, CRP, dsDNA, Complement levels for ?lupus flare  - CT a/p for ?occult infection  - MRI non-contrast head   - will get wound c/s for sacral decubitus ulcer management  - pt aseptic on exam, no leukocytosis or fever. Will continue to monitor off antibiotics for now

## 2018-03-01 NOTE — H&P ADULT - PROBLEM SELECTOR PROBLEM 5
Hypercholesteremia Type 2 diabetes mellitus without complication, without long-term current use of insulin

## 2018-03-01 NOTE — H&P ADULT - NSHPPHYSICALEXAM_GEN_ALL_CORE
.  VITAL SIGNS:  T(C): 36.8 (03-01-18 @ 09:17), Max: 37.1 (03-01-18 @ 06:42)  T(F): 98.3 (03-01-18 @ 09:17), Max: 98.7 (03-01-18 @ 06:42)  HR: 92 (03-01-18 @ 09:17) (85 - 92)  BP: 146/82 (03-01-18 @ 09:17) (118/90 - 175/94)  BP(mean): --  RR: 18 (03-01-18 @ 09:17) (15 - 18)  SpO2: 98% (03-01-18 @ 09:17) (98% - 100%)  Wt(kg): --    PHYSICAL EXAM:    Constitutional: minimally responsive to verbal stimuli, nonverbal  Head: NC/AT  Eyes: PERRLA, no purposeful eye movements  ENT: no nasal discharge; no oropharyngeal erythema or exudates; dry oral mucosa  Neck: supple; no JVD or thyromegaly  Respiratory: exam limited due to pt not following commands, no wheezes, rhonchi, rubs appreciated  Cardiac: +S1/S2; RRR; no M/R/G; PMI non-displaced  Gastrointestinal: nondistended, pt grimaces when lower abdomen is palpated, +Bowel sounds  Back: Stage II sacral decubitus ulcer with bandage C/D/I  Extremities: WWP, no clubbing or cyanosis; no peripheral edema  Vascular: 2+ radial, DP/PT pulses B/L, permacath C/D/I without erythema  Lymphatic: no submandibular or cervical LAD  Neurologic: AAOx0; nonverbal; no nuchal rigidity or photophobia appreciated, Kernig and Brudzinski technically difficult to perform, but seemingly negative

## 2018-03-01 NOTE — H&P ADULT - ASSESSMENT
57F with PMHx of lupus (dx April 2017), ESRD on HD (M/W/F), NSTEMI (1/2018), DMII, HTN, HLD, hypothyroidism who presents from dialysis with acute mental status changes of unclear etiology; possibly 2/2 occult infection, vasculitis, seizure activity, or lupus flare.

## 2018-03-01 NOTE — H&P ADULT - HISTORY OF PRESENT ILLNESS
Pt is a 57F with PMHx of lupus (dx April 2017), ESRD on HD (M/W/F), NSTEMI (1/2018), DMII, HTN, HLD, hypothyroidism, who presents from dialysis with altered mental status. Pt is nonverbal on presentation, hx obtained via chart review. Pt was recently hospitalized from 12/19/2017 - 1/16/2018 for LE numbness and weakness found to have polyneuropathy and sural nerve biopsy c/w vasculitis with hospital course c/b permacath placement on 1/3/18 and NSTEMI s/p cath and ANABELA placed on 1/5/18. Pt was discharged to rehab and was at baseline when she became acutely encephalopathic/unresponsive during her hemodialysis yesterday. Pt was at baseline mental status upon arrival and initiation of dialysis. No other events noted. She was transferred to [facility] for further management.    In ED, VS were: T98.7 HR88 /90 RR15 SaO2 100% on Room Air. Labs were notable for elevated creatinine, but otherwise unremarkable. Troponin T was slightly elevated at 0.29. A CXR showed clear lungs and CT head showed no acute intracranial process. She was admitted to the general medical floor for further management of acute encephalopathy of unknown etiology.

## 2018-03-01 NOTE — CONSULT NOTE ADULT - SUBJECTIVE AND OBJECTIVE BOX
HPI:  Ms. Henriquez is a 57 year-old woman with history of multiple medical issues including hypertension, type 2 diabetes mellitus, lupus, coronary artery disease, and end stage renal disease on dialysis MWF at [facility]. She is s/p admission 2017-2018 at [facility] with numbness/weakness from polyneuropathy from lupus; the admission was c/b ESRD; she now undergoes chronic HD at [facility] ([facility]). She acutely developed encephalopathy yesterday at dialysis/became nonverbal and nonresponsive. Therefore, she was sent to the [facility] ER.      PAST MEDICAL & SURGICAL HISTORY:  Lupus (systemic lupus erythematosus)  ESRD (end-stage renal disease) due to SLE  Glaucoma  Other hyperlipidemia  Type 2 diabetes mellitus without complication, without long-term current use of insulin  Essential hypertension  Hypercholesteremia  Hypertension  Diabetes  S/P appendectomy  H/O gastric bypass: 2016  S/P  section: times two      Allergies  penicillin (Rash)    SOCIAL HISTORY:  Denies ETOh,Smoking,     FAMILY HISTORY:  History of hypertension (Sibling): sibling  DM (diabetes mellitus) (Sibling): siblings  History of hypertension: father and mother  DM (diabetes mellitus): mother and father      REVIEW OF SYSTEMS:  CONSTITUTIONAL: No weakness, fevers or chills  EYES/ENT: No visual changes;  No vertigo or throat pain   NECK: No pain or stiffness  RESPIRATORY: No cough, wheezing, hemoptysis; No shortness of breath  CARDIOVASCULAR: No chest pain or palpitations  GASTROINTESTINAL: No abdominal or epigastric pain. No nausea, vomiting, or hematemesis; No diarrhea or constipation. No melena or hematochezia.  GENITOURINARY: No dysuria, frequency or hematuria  NEUROLOGICAL: No numbness or weakness  SKIN: No itching, burning, rashes, or lesions   All other review of systems is negative unless indicated above.    VITAL:  T(C): , Max: 37.1 (18 @ 06:42)  T(F): , Max: 98.7 (18 @ 06:42)  HR: 92 (18 @ 09:17)  BP: 146/82 (18 @ 09:17)  BP(mean): --  RR: 18 (18 @ 09:17)  SpO2: 98% (18 @ 09:17)  Wt(kg): --    PHYSICAL EXAM:  Constitutional: NAD, Alert  HEENT: NCAT, MMM  Neck: Supple, No JVD  Respiratory: CTA-b/l  Cardiovascular: RRR s1s2, no m/r/g  Gastrointestinal: BS+, soft, NT/ND  Extremities: No peripheral edema b/l  Neurological: no focal deficits; strength grossly intact  Psychiatric: Normal mood, normal affect  Back: no CVAT b/l  Skin: No rashes, no nevi    LABS:                        11.3   6.60  )-----------( 137      ( 01 Mar 2018 05:40 )             34.1     Na(137)/K(4.1)/Cl(95)/HCO3(33)/BUN(25)/Cr(2.98)Glu(138)/Ca(8.9)/Mg(2.2)/PO4(1.6)     @ 05:40  Na(138)/K(4.3)/Cl(96)/HCO3(26)/BUN(21)/Cr(2.48)Glu(180)/Ca(8.6)/Mg(--)/PO4(--)     @ 23:40    Urinalysis Basic - ( 01 Mar 2018 14:20 )    Color: GRAEME / Appearance: TURBID / S.030 / pH: 5.5  Gluc: NEGATIVE / Ketone: NEGATIVE  / Bili: NEGATIVE / Urobili: NORMAL mg/dL   Blood: MODERATE / Protein: >600 mg/dL / Nitrite: NEGATIVE   Leuk Esterase: MODERATE / RBC: 5-10 / WBC 10-25   Sq Epi: x / Non Sq Epi: x / Bacteria: x            IMAGING:    ASSESSMENT:      RECOMMEND:      Thank you for involving [facility] in this patient's care.    With warm regards,    Orlando Hernandez MD   [facility]  [phone] HPI:  Ms. Henriquez is a 57 year-old woman with history of multiple medical issues including hypertension, type 2 diabetes mellitus, lupus, coronary artery disease, and end stage renal disease on dialysis. She is s/p admission 2017-2018 at [facility] with numbness/weakness from polyneuropathy from lupus; the admission was complicated by her dialysis catheter coming out - it was replaced during the admisison. Of late, she has been undergoing subacute rehab at [place] and dialysis at  [facility] [place] ([facility]). Per her son, she was in her usual state of health until 2 days ago when she started to develop fatigue and confusion. Her symptoms worsened up through yesterday at dialysis, when she was noted to be nonverbal and nonresponsive to verbal/tactile stimuli. In light of this, she was sent to the [facility] ER.    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 He tells me that she was started on hemodialysis in 2017 at [facility]. He states that her vascular surgeon is Dr. Silvio Deluca; she was scheduled for AVF maturation tomorrow at his office; her AVF remains immature and she remains dependent on her tunneled dialysis catheter for now. He informs me that during her recent admission at [facility], she was being treated with Rituxan; she received 1 dose there, and was supposed to receive a second dose 2 weeks later as outpatient, but has been unable to receive the second dose of yet.      PAST MEDICAL & SURGICAL HISTORY:  Lupus (systemic lupus erythematosus)  ESRD (end-stage renal disease) due to SLE  Glaucoma  Other hyperlipidemia  Type 2 diabetes mellitus without complication, without long-term current use of insulin  Essential hypertension  Hypercholesteremia  Hypertension  Diabetes  S/P appendectomy  H/O gastric bypass: 2016  S/P  section: times two    Allergies  penicillin (Rash)    SOCIAL HISTORY:  Denies ETOh,Smoking,     FAMILY HISTORY:  History of hypertension (Sibling): sibling  DM (diabetes mellitus) (Sibling): siblings  History of hypertension: father and mother  DM (diabetes mellitus): mother and father    REVIEW OF SYSTEMS:  unable to obtain from patient (nonverbal/noncommunicative)    VITAL:  T(C): , Max: 37.1 (18 @ 06:42)  T(F): , Max: 98.7 (18 @ 06:42)  HR: 92 (18 @ 09:17)  BP: 146/82 (18 @ 09:17)  BP(mean): --  RR: 18 (18 @ 09:17)  SpO2: 98% (18 @ 09:17)    PHYSICAL EXAM:  Constitutional: Noncommunicative/somnolent  HEENT: NCAT, DMM  Neck: Supple, No JVD  Respiratory: CTA-b/l  Cardiovascular: RRR s1s2, no m/r/g  Gastrointestinal: BS+, soft, NT/ND  Extremities: No peripheral edema b/l  Neurological: no focal deficits; strength grossly intact  Psychiatric: Normal mood, normal affect  Back: no CVAT b/l  Skin: No rashes, no nevi  LUE AVF (+)thrill/immature; RIJ permacath(+)  LABS:                        11.3   6.60  )-----------( 137      ( 01 Mar 2018 05:40 )             34.1     Na(137)/K(4.1)/Cl(95)/HCO3(33)/BUN(25)/Cr(2.98)Glu(138)/Ca(8.9)/Mg(2.2)/PO4(1.6)     @ 05:40  Na(138)/K(4.3)/Cl(96)/HCO3(26)/BUN(21)/Cr(2.48)Glu(180)/Ca(8.6)/Mg(--)/PO4(--)     @ 23:40    Urinalysis Basic - ( 01 Mar 2018 14:20 )  Color: GRAEME / Appearance: TURBID / S.030 / pH: 5.5  Gluc: NEGATIVE / Ketone: NEGATIVE  / Bili: NEGATIVE / Urobili: NORMAL mg/dL   Blood: MODERATE / Protein: >600 mg/dL / Nitrite: NEGATIVE   Leuk Esterase: MODERATE / RBC: 5-10 / WBC 10-25   Sq Epi: x / Non Sq Epi: x / Bacteria: x    IMAGING:  < from: Xray Chest 1 View- PORTABLE-Urgent (18 @ 01:17) >  IMPRESSION:   Clear lungs.      ASSESSMENT:  (1)Renal - ESRD-HD MWF. No urgent need for HD today. Can wait until tomorrow (3/2/18) for her next dialysis session.    (2)AMS - lupus cerebritis?     (3)Rheum - lupus cerebritis?    (4)Vascular - immature AVF      RECOMMEND:  (1)Next HD tomorrow  (2)Dose new meds for GFR<10/HD  (3)No Gadolinium if for MRI head  (4)Rheumatology followup  (5)Could involve Vascular Dr. Deluca - could he attempt maturation of her AVF during this admission? In all fairness, whether or not to proceed with AVF maturation here appears to depend on prognosis (will her mental status improve?)      Thank you for involving [facility] in this patient's care.    With warm regards,    Orlando Hernandez MD   [facility]  [phone] HPI:  Ms. Henriquez is a 57 year-old woman with history of multiple medical issues including hypertension, type 2 diabetes mellitus, lupus, coronary artery disease, and end stage renal disease on dialysis. She is s/p admission 2017-2018 at [facility] with numbness/weakness from polyneuropathy from lupus; the admission was complicated by her dialysis catheter coming out - it was replaced during the admisison. Of late, she has been undergoing subacute rehab at [place] and dialysis at  [facility] [place] ([facility]). Per her son, she was in her usual state of health until 2 days ago when she started to develop fatigue and confusion. Her symptoms worsened up through yesterday at dialysis, when she was noted to be nonverbal and nonresponsive to verbal/tactile stimuli. In light of this, she was sent to the [facility] ER after completion of her dialysis session.    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 He tells me that she was started on hemodialysis in 2017 at [facility]. He states that her vascular surgeon is Dr. Silvio Deluca; she was scheduled for AVF maturation tomorrow at his office; her AVF remains immature and she remains dependent on her tunneled dialysis catheter for now. He informs me that during her recent admission at [facility], she was being treated with Rituxan; she received 1 dose there, and was supposed to receive a second dose 2 weeks later as outpatient, but has been unable to receive the second dose of yet.      PAST MEDICAL & SURGICAL HISTORY:  Lupus (systemic lupus erythematosus)  ESRD (end-stage renal disease) due to SLE  Glaucoma  Other hyperlipidemia  Type 2 diabetes mellitus without complication, without long-term current use of insulin  Essential hypertension  Hypercholesteremia  Hypertension  Diabetes  S/P appendectomy  H/O gastric bypass: 2016  S/P  section: times two    Allergies  penicillin (Rash)    SOCIAL HISTORY:  Denies ETOh,Smoking,     FAMILY HISTORY:  History of hypertension (Sibling): sibling  DM (diabetes mellitus) (Sibling): siblings  History of hypertension: father and mother  DM (diabetes mellitus): mother and father    REVIEW OF SYSTEMS:  unable to obtain from patient (nonverbal/noncommunicative)    VITAL:  T(C): , Max: 37.1 (18 @ 06:42)  T(F): , Max: 98.7 (18 @ 06:42)  HR: 92 (18 @ 09:17)  BP: 146/82 (18 @ 09:17)  BP(mean): --  RR: 18 (18 @ 09:17)  SpO2: 98% (18 @ 09:17)    PHYSICAL EXAM:  Constitutional: Noncommunicative/somnolent  HEENT: NCAT, DMM  Neck: Supple, No JVD  Respiratory: CTA-b/l  Cardiovascular: RRR s1s2, no m/r/g  Gastrointestinal: BS+, soft, NT/ND  Extremities: No peripheral edema b/l  Neurological: no focal deficits; strength grossly intact  Psychiatric: Normal mood, normal affect  Back: no CVAT b/l  Skin: No rashes, no nevi  LUE AVF (+)thrill/immature; RIJ permacath(+)  LABS:                        11.3   6.60  )-----------( 137      ( 01 Mar 2018 05:40 )             34.1     Na(137)/K(4.1)/Cl(95)/HCO3(33)/BUN(25)/Cr(2.98)Glu(138)/Ca(8.9)/Mg(2.2)/PO4(1.6)     @ 05:40  Na(138)/K(4.3)/Cl(96)/HCO3(26)/BUN(21)/Cr(2.48)Glu(180)/Ca(8.6)/Mg(--)/PO4(--)     @ 23:40    Urinalysis Basic - ( 01 Mar 2018 14:20 )  Color: GRAEME / Appearance: TURBID / S.030 / pH: 5.5  Gluc: NEGATIVE / Ketone: NEGATIVE  / Bili: NEGATIVE / Urobili: NORMAL mg/dL   Blood: MODERATE / Protein: >600 mg/dL / Nitrite: NEGATIVE   Leuk Esterase: MODERATE / RBC: 5-10 / WBC 10-25   Sq Epi: x / Non Sq Epi: x / Bacteria: x    IMAGING:  < from: Xray Chest 1 View- PORTABLE-Urgent (18 @ 01:17) >  IMPRESSION:   Clear lungs.      ASSESSMENT:  (1)Renal - ESRD-HD MWF. No urgent need for HD today. Can wait until tomorrow (3/2/18) for her next dialysis session.    (2)AMS - lupus cerebritis?     (3)Rheum - lupus cerebritis?    (4)Vascular - immature AVF      RECOMMEND:  (1)Next HD tomorrow  (2)Dose new meds for GFR<10/HD  (3)No Gadolinium if for MRI head  (4)Rheumatology followup  (5)Could involve Vascular Dr. Deluca - could he attempt maturation of her AVF during this admission? In all fairness, whether or not to proceed with AVF maturation here appears to depend on prognosis (will her mental status improve?)      Thank you for involving [facility] in this patient's care.    With warm regards,    Orlando Hernandez MD   [facility]  [phone]

## 2018-03-01 NOTE — CONSULT NOTE ADULT - SUBJECTIVE AND OBJECTIVE BOX
WILLARD BOB  [id number]    HISTORY OF PRESENT ILLNESS:    57F with PMH of SLE (dx April 2017), ESRD on HD, DMII, HTN, HLD, hypothyroidism, brought in from dialysis with altered mental status.  Pt was recently hospitalized from 12/19/2017 - 1/16/2018 for LE numbness and weakness found to have polyneuropathy and sural nerve biopsy c/w vasculitis with hospital course c/b permacath placement on 1/3/18 and NSTEMI s/p cath and ANABELA placed on 1/5/18.  Pt was discharged to rehab and was at baseline , she was noted to become non verbal and poorly responsive during her hemodialysis yesterday.               Review of Systems:  Gen:  No fevers/chills, weight loss  HEENT: No blurry vision, no difficulty swallowing  CVS: No chest pain/palpitations  Resp: No SOB/wheezing  GI: No N/V/C/D/abdominal pain  MSK:  Skin: No new rashes  Neuro: No headaches    MEDICATIONS  (STANDING):  aspirin  chewable 81 milliGRAM(s) Oral daily  calcium carbonate 1250 mG (OsCal) 1 Tablet(s) Oral daily  clopidogrel Tablet 75 milliGRAM(s) Oral daily  insulin lispro (HumaLOG) corrective regimen sliding scale   SubCutaneous three times a day before meals  latanoprost 0.005% Ophthalmic Solution 1 Drop(s) Both EYES at bedtime  levothyroxine 25 MICROGram(s) Oral daily  metoprolol succinate ER 25 milliGRAM(s) Oral daily  predniSONE   Tablet 50 milliGRAM(s) Oral daily          Allergies    penicillin (Rash)    Intolerances        PERTINENT MEDICATION HISTORY:    SOCIAL HISTORY:  OCCUPATION:  TRAVEL HISTORY:    FAMILY HISTORY:  History of hypertension (Sibling): sibling  DM (diabetes mellitus) (Sibling): siblings  History of hypertension: father and mother  DM (diabetes mellitus): mother and father      Vital Signs Last 24 Hrs  T(C): 36.8 (01 Mar 2018 09:17), Max: 37.1 (01 Mar 2018 06:42)  T(F): 98.3 (01 Mar 2018 09:17), Max: 98.7 (01 Mar 2018 06:42)  HR: 92 (01 Mar 2018 09:17) (85 - 92)  BP: 146/82 (01 Mar 2018 09:17) (118/90 - 175/94)  BP(mean): --  RR: 18 (01 Mar 2018 09:17) (15 - 18)  SpO2: 98% (01 Mar 2018 09:17) (98% - 100%)    Daily     Daily     Physical Exam:  General: No apparent distress  HEENT: EOMI, MMM  CVS: +S1/S2, RRR, no murmurs/rubs/gallops  Resp: CTA b/l. No crackles/wheezing  GI: Soft, NT/ND +BS  MSK:  Shoulders: wnl  Elbows: wnl  Wrists: wnl  MCPs: wnl  PIPs: wnl  DIPs: wnl   Hips: wnl  Knees: wnl   Ankle: wnl  Neuro: AAOx3  Skin: no visible rashes    LABS:                        11.3   6.60  )-----------( 137      ( 01 Mar 2018 05:40 )             34.1     03-01    137  |  95<L>  |  25<H>  ----------------------------<  138<H>  4.1   |  33<H>  |  2.98<H>    Ca    8.9      01 Mar 2018 05:40  Phos  1.6     03-01  Mg     2.2     03-01    TPro  6.6  /  Alb  2.6<L>  /  TBili  0.5  /  DBili  x   /  AST  24  /  ALT  17  /  AlkPhos  148<H>  03-01    PT/INR - ( 28 Feb 2018 23:40 )   PT: 10.6 SEC;   INR: 0.96          PTT - ( 28 Feb 2018 23:40 )  PTT:38.2 SEC      RADIOLOGY & ADDITIONAL STUDIES:  CT Head No Cont (03.01.18 @ 01:05) >  IMPRESSION:   No acute territorial infarct, hemorrhage or mass effect WILLARD BOB  [id number]    HISTORY OF PRESENT ILLNESS:    57F with PMH of SLE (dx April 2017), ESRD on HD, DMII, HTN, HLD, hypothyroidism, brought in from dialysis with altered mental status.  Pt was recently hospitalized from 12/19/2017 - 1/16/2018 for LE numbness and weakness found to have polyneuropathy and sural nerve biopsy c/w vasculitis with hospital course c/b permacath placement on 1/3/18 and NSTEMI s/p cath and ANABELA placed on 1/5/18.  Pt was discharged to rehab and was at baseline , she was noted to become non verbal and poorly responsive during her hemodialysis yesterday.     SLE History:  - Diagnosed in April 2017  - rheumatologist Dr. Eve Gaming   - +SULLY, +dsDNA +SSA +Smith  LA +, aCL Ig M 25, B2GP -   - manifestations: Class V lupus nephritis and DM nephropathy, ESRD on HD, cytopenias   Most recently found to have a Vasculitic neuropathy ( Peripheral nerve, left sural, biopsy: Severe chronic axonal loss with active axonal degeneration, perivascular chronic inflammatory infiltrates, suggestive for vasculitic neuropathy)   s/p Rituximab 1g (1/14/18). Next dose in 2 weeks was planned to be organized as outpatient.  and discharged on Prednisone 50mg qd for now  ( 12/19/2017 - 1/16/2018 at [facility] )                 Review of Systems:  Gen:  No fevers/chills, weight loss  HEENT: No blurry vision, no difficulty swallowing  CVS: No chest pain/palpitations  Resp: No SOB/wheezing  GI: No N/V/C/D/abdominal pain  MSK:  Skin: No new rashes  Neuro: No headaches    MEDICATIONS  (STANDING):  aspirin  chewable 81 milliGRAM(s) Oral daily  calcium carbonate 1250 mG (OsCal) 1 Tablet(s) Oral daily  clopidogrel Tablet 75 milliGRAM(s) Oral daily  insulin lispro (HumaLOG) corrective regimen sliding scale   SubCutaneous three times a day before meals  latanoprost 0.005% Ophthalmic Solution 1 Drop(s) Both EYES at bedtime  levothyroxine 25 MICROGram(s) Oral daily  metoprolol succinate ER 25 milliGRAM(s) Oral daily  predniSONE   Tablet 50 milliGRAM(s) Oral daily          Allergies    penicillin (Rash)    Intolerances        PERTINENT MEDICATION HISTORY:    SOCIAL HISTORY:  OCCUPATION:  TRAVEL HISTORY:    FAMILY HISTORY:  History of hypertension (Sibling): sibling  DM (diabetes mellitus) (Sibling): siblings  History of hypertension: father and mother  DM (diabetes mellitus): mother and father      Vital Signs Last 24 Hrs  T(C): 36.8 (01 Mar 2018 09:17), Max: 37.1 (01 Mar 2018 06:42)  T(F): 98.3 (01 Mar 2018 09:17), Max: 98.7 (01 Mar 2018 06:42)  HR: 92 (01 Mar 2018 09:17) (85 - 92)  BP: 146/82 (01 Mar 2018 09:17) (118/90 - 175/94)  BP(mean): --  RR: 18 (01 Mar 2018 09:17) (15 - 18)  SpO2: 98% (01 Mar 2018 09:17) (98% - 100%)    Daily     Daily     Physical Exam:  General: No apparent distress  HEENT: EOMI, MMM  CVS: +S1/S2, RRR, no murmurs/rubs/gallops  Resp: CTA b/l. No crackles/wheezing  GI: Soft, NT/ND +BS  MSK:  Shoulders: wnl  Elbows: wnl  Wrists: wnl  MCPs: wnl  PIPs: wnl  DIPs: wnl   Hips: wnl  Knees: wnl   Ankle: wnl  Neuro:   Skin: no visible rashes    LABS:                        11.3   6.60  )-----------( 137      ( 01 Mar 2018 05:40 )             34.1     03-01    137  |  95<L>  |  25<H>  ----------------------------<  138<H>  4.1   |  33<H>  |  2.98<H>    Ca    8.9      01 Mar 2018 05:40  Phos  1.6     03-01  Mg     2.2     03-01    TPro  6.6  /  Alb  2.6<L>  /  TBili  0.5  /  DBili  x   /  AST  24  /  ALT  17  /  AlkPhos  148<H>  03-01    PT/INR - ( 28 Feb 2018 23:40 )   PT: 10.6 SEC;   INR: 0.96          PTT - ( 28 Feb 2018 23:40 )  PTT:38.2 SEC      RADIOLOGY & ADDITIONAL STUDIES:  CT Head No Cont (03.01.18 @ 01:05) >  IMPRESSION:   No acute territorial infarct, hemorrhage or mass effect WILLARDBYRON BOB  [id number]    HISTORY OF PRESENT ILLNESS:    57F with PMH of SLE (dx April 2017), ESRD on HD, DMII, HTN, HLD, hypothyroidism, brought in from dialysis with altered mental status.  Pt was recently hospitalized from 12/19/2017 - 1/16/2018 for LE numbness and weakness found to have polyneuropathy and sural nerve biopsy c/w vasculitis with hospital course c/b permacath placement on 1/3/18 and NSTEMI s/p cath and ANABELA placed on 1/5/18.  Pt was discharged to rehab and was at baseline , she was noted to become non verbal and poorly responsive during her hemodialysis yesterday.   The son at beside reports that his mother hasn't been herself over the past 2 weeks or so, she would be crying and sad most of the days, then he noticed that she started saying that she was seeing some of her family members and was asking repetitively for her mother and sister      SLE History:  - Diagnosed in April 2017  - rheumatologist Dr. Eve Gaming   - +SULLY, +dsDNA +SSA +Smith  LA +, aCL Ig M 25, B2GP -   - manifestations: Class V lupus nephritis and DM nephropathy, ESRD on HD, cytopenias   Most recently found to have a Vasculitic neuropathy ( Peripheral nerve, left sural, biopsy: Severe chronic axonal loss with active axonal degeneration, perivascular chronic inflammatory infiltrates, suggestive for vasculitic neuropathy)   s/p Rituximab 1g (1/14/18). Next dose in 2 weeks was planned to be organized as outpatient.  and discharged on Prednisone 50mg qd for now  ( 12/19/2017 - 1/16/2018 at [facility] )                 Review of Systems:  Gen:  No fevers/chills, weight loss  HEENT: No blurry vision, no difficulty swallowing  CVS: No chest pain/palpitations  Resp: No SOB/wheezing  GI: No N/V/C/D/abdominal pain  MSK:  Skin: No new rashes  Neuro: No headaches    MEDICATIONS  (STANDING):  aspirin  chewable 81 milliGRAM(s) Oral daily  calcium carbonate 1250 mG (OsCal) 1 Tablet(s) Oral daily  clopidogrel Tablet 75 milliGRAM(s) Oral daily  insulin lispro (HumaLOG) corrective regimen sliding scale   SubCutaneous three times a day before meals  latanoprost 0.005% Ophthalmic Solution 1 Drop(s) Both EYES at bedtime  levothyroxine 25 MICROGram(s) Oral daily  metoprolol succinate ER 25 milliGRAM(s) Oral daily  predniSONE   Tablet 50 milliGRAM(s) Oral daily          Allergies    penicillin (Rash)    Intolerances        PERTINENT MEDICATION HISTORY:    SOCIAL HISTORY:  OCCUPATION:  TRAVEL HISTORY:    FAMILY HISTORY:  History of hypertension (Sibling): sibling  DM (diabetes mellitus) (Sibling): siblings  History of hypertension: father and mother  DM (diabetes mellitus): mother and father      Vital Signs Last 24 Hrs  T(C): 36.8 (01 Mar 2018 09:17), Max: 37.1 (01 Mar 2018 06:42)  T(F): 98.3 (01 Mar 2018 09:17), Max: 98.7 (01 Mar 2018 06:42)  HR: 92 (01 Mar 2018 09:17) (85 - 92)  BP: 146/82 (01 Mar 2018 09:17) (118/90 - 175/94)  BP(mean): --  RR: 18 (01 Mar 2018 09:17) (15 - 18)  SpO2: 98% (01 Mar 2018 09:17) (98% - 100%)    Daily     Daily     Physical Exam:  General: No apparent distress  HEENT: EOMI, MMM  CVS: +S1/S2, RRR, no murmurs/rubs/gallops  Resp: CTA b/l. No crackles/wheezing  GI: Soft, NT/ND +BS  MSK:  Shoulders: wnl  Elbows: wnl  Wrists: wnl  MCPs: wnl  PIPs: wnl  DIPs: wnl   Hips: wnl  Knees: wnl   Ankle: wnl  Neuro:   Skin: no visible rashes    LABS:                        11.3   6.60  )-----------( 137      ( 01 Mar 2018 05:40 )             34.1     03-01    137  |  95<L>  |  25<H>  ----------------------------<  138<H>  4.1   |  33<H>  |  2.98<H>    Ca    8.9      01 Mar 2018 05:40  Phos  1.6     03-01  Mg     2.2     03-01    TPro  6.6  /  Alb  2.6<L>  /  TBili  0.5  /  DBili  x   /  AST  24  /  ALT  17  /  AlkPhos  148<H>  03-01    PT/INR - ( 28 Feb 2018 23:40 )   PT: 10.6 SEC;   INR: 0.96          PTT - ( 28 Feb 2018 23:40 )  PTT:38.2 SEC      RADIOLOGY & ADDITIONAL STUDIES:  CT Head No Cont (03.01.18 @ 01:05) >  IMPRESSION:   No acute territorial infarct, hemorrhage or mass effect WILLARD BOB  [id number]    HISTORY OF PRESENT ILLNESS:    57F with PMH of SLE (dx April 2017), ESRD on HD, DMII, HTN, HLD, hypothyroidism, brought in from dialysis with altered mental status.  Pt was recently hospitalized from 12/19/2017 - 1/16/2018 for LE numbness and weakness found to have polyneuropathy and sural nerve biopsy c/w vasculitis with hospital course c/b permacath placement on 1/3/18 and NSTEMI s/p cath and ANABELA placed on 1/5/18.  Pt was discharged to rehab and was at baseline , she was noted to become non verbal and poorly responsive during her hemodialysis yesterday.   The son at beside reports that his mother hasn't been herself she would be crying and sad most of the days, then he noticed that she started saying that she was seeing some of her family members and was asking repetitively for her mother and sister  Reports that she had transiently some runny nose, no known fevers or chills, no cough  Patient attended a family dinner on sunday and was at her normal     SLE History:  - Diagnosed in April 2017  - rheumatologist Dr. Eve Gaming   - +SULLY, +dsDNA +SSA +Smith  LA +, aCL Ig M 25, B2GP -   - manifestations: Class V lupus nephritis and DM nephropathy, ESRD on HD, cytopenias   Most recently found to have a Vasculitic neuropathy ( Peripheral nerve, left sural, biopsy: Severe chronic axonal loss with active axonal degeneration, perivascular chronic inflammatory infiltrates, suggestive for vasculitic neuropathy)   s/p Rituximab 1g (1/14/18). Next dose in 2 weeks was planned to be organized as outpatient.  and discharged on Prednisone 50mg qd for now  ( 12/19/2017 - 1/16/2018 at [facility] )                 Review of Systems:  Gen:  No fevers/chills, weight loss  HEENT: No blurry vision, no difficulty swallowing  CVS: No chest pain/palpitations  Resp: No SOB/wheezing  GI: No N/V/C/D/abdominal pain  MSK:  Skin: No new rashes  Neuro: No headaches    MEDICATIONS  (STANDING):  aspirin  chewable 81 milliGRAM(s) Oral daily  calcium carbonate 1250 mG (OsCal) 1 Tablet(s) Oral daily  clopidogrel Tablet 75 milliGRAM(s) Oral daily  insulin lispro (HumaLOG) corrective regimen sliding scale   SubCutaneous three times a day before meals  latanoprost 0.005% Ophthalmic Solution 1 Drop(s) Both EYES at bedtime  levothyroxine 25 MICROGram(s) Oral daily  metoprolol succinate ER 25 milliGRAM(s) Oral daily  predniSONE   Tablet 50 milliGRAM(s) Oral daily          Allergies    penicillin (Rash)    Intolerances        PERTINENT MEDICATION HISTORY:    SOCIAL HISTORY:  OCCUPATION:  TRAVEL HISTORY:    FAMILY HISTORY:  History of hypertension (Sibling): sibling  DM (diabetes mellitus) (Sibling): siblings  History of hypertension: father and mother  DM (diabetes mellitus): mother and father      Vital Signs Last 24 Hrs  T(C): 36.8 (01 Mar 2018 09:17), Max: 37.1 (01 Mar 2018 06:42)  T(F): 98.3 (01 Mar 2018 09:17), Max: 98.7 (01 Mar 2018 06:42)  HR: 92 (01 Mar 2018 09:17) (85 - 92)  BP: 146/82 (01 Mar 2018 09:17) (118/90 - 175/94)  BP(mean): --  RR: 18 (01 Mar 2018 09:17) (15 - 18)  SpO2: 98% (01 Mar 2018 09:17) (98% - 100%)    Daily     Daily     Physical Exam:  General: No apparent distress  HEENT: EOMI, MMM  CVS: +S1/S2, RRR, no murmurs/rubs/gallops  Resp: CTA b/l. No crackles/wheezing  GI: Soft, NT/ND +BS  MSK:  Shoulders: wnl  Elbows: wnl  Wrists: wnl  MCPs: wnl  PIPs: wnl  DIPs: wnl   Hips: wnl  Knees: wnl   Ankle: wnl  Neuro:   Skin: no visible rashes    LABS:                        11.3   6.60  )-----------( 137      ( 01 Mar 2018 05:40 )             34.1     03-01    137  |  95<L>  |  25<H>  ----------------------------<  138<H>  4.1   |  33<H>  |  2.98<H>    Ca    8.9      01 Mar 2018 05:40  Phos  1.6     03-01  Mg     2.2     03-01    TPro  6.6  /  Alb  2.6<L>  /  TBili  0.5  /  DBili  x   /  AST  24  /  ALT  17  /  AlkPhos  148<H>  03-01    PT/INR - ( 28 Feb 2018 23:40 )   PT: 10.6 SEC;   INR: 0.96          PTT - ( 28 Feb 2018 23:40 )  PTT:38.2 SEC      RADIOLOGY & ADDITIONAL STUDIES:  CT Head No Cont (03.01.18 @ 01:05) >  IMPRESSION:   No acute territorial infarct, hemorrhage or mass effect WILLARD BOB  [id number]    HISTORY OF PRESENT ILLNESS:    57F with PMH of SLE (dx April 2017), ESRD on HD, DMII, HTN, HLD, hypothyroidism, brought in from dialysis with altered mental status.  Pt was recently hospitalized from 12/19/2017 - 1/16/2018 for LE numbness and weakness found to have polyneuropathy and sural nerve biopsy c/w vasculitis with hospital course c/b permacath placement on 1/3/18 and NSTEMI s/p cath and ANABELA placed on 1/5/18.  Pt was discharged to rehab and was at baseline , she was noted to become non verbal and poorly responsive during her hemodialysis yesterday.   The son at beside reports that his mother hasn't been herself she would be crying and sad most of the days, then he noticed that she started saying that she was seeing some of her family members and was asking repetitively for her mother and sister  Reports that she had transiently some runny nose, no known fevers or chills, no cough  Patient attended a family dinner on Sunday and was at her normal state       SLE History:  - Diagnosed in April 2017  - rheumatologist Dr. Eve Gaming   - +SULLY, +dsDNA +SSA +Smith  LA +, aCL Ig M 25, B2GP -   - manifestations: Class V lupus nephritis and DM nephropathy, ESRD on HD, cytopenias   Most recently found to have a Vasculitic neuropathy ( Peripheral nerve, left sural, biopsy: Severe chronic axonal loss with active axonal degeneration, perivascular chronic inflammatory infiltrates, suggestive for vasculitic neuropathy)   s/p Rituximab 1g (1/14/18). Next dose in 2 weeks was planned to be organized as outpatient.  and discharged on Prednisone 50mg qd for now  ( 12/19/2017 - 1/16/2018 at [facility] )           Review of Systems:  patient denies any headaches or pain when asked       MEDICATIONS  (STANDING):  aspirin  chewable 81 milliGRAM(s) Oral daily  calcium carbonate 1250 mG (OsCal) 1 Tablet(s) Oral daily  clopidogrel Tablet 75 milliGRAM(s) Oral daily  insulin lispro (HumaLOG) corrective regimen sliding scale   SubCutaneous three times a day before meals  latanoprost 0.005% Ophthalmic Solution 1 Drop(s) Both EYES at bedtime  levothyroxine 25 MICROGram(s) Oral daily  metoprolol succinate ER 25 milliGRAM(s) Oral daily  predniSONE   Tablet 50 milliGRAM(s) Oral daily          Allergies    penicillin (Rash)    Intolerances        FAMILY HISTORY:  History of hypertension (Sibling): sibling  DM (diabetes mellitus) (Sibling): siblings  History of hypertension: father and mother  DM (diabetes mellitus): mother and father      Vital Signs Last 24 Hrs  T(C): 36.8 (01 Mar 2018 09:17), Max: 37.1 (01 Mar 2018 06:42)  T(F): 98.3 (01 Mar 2018 09:17), Max: 98.7 (01 Mar 2018 06:42)  HR: 92 (01 Mar 2018 09:17) (85 - 92)  BP: 146/82 (01 Mar 2018 09:17) (118/90 - 175/94)  BP(mean): --  RR: 18 (01 Mar 2018 09:17) (15 - 18)  SpO2: 98% (01 Mar 2018 09:17) (98% - 100%)    Daily     Daily     Physical Exam:  General: awake, alert, doesn't follow commands, answers simple questions   HEENT: doesn't follow for extraocular muscle exam   CVS: +S1/S2, RRR, no carotid murmur   Resp: CTA b/l. poor inspiratory effort   GI: Soft, NT/ND +BS  MSK:  no evidence of synovitis   Neuro: alert, not oriented to place , time or person  doesn't follow commands  answers simple questions  constant gaze   Skin: no visible rashes    LABS:                        11.3   6.60  )-----------( 137      ( 01 Mar 2018 05:40 )             34.1     03-01    137  |  95<L>  |  25<H>  ----------------------------<  138<H>  4.1   |  33<H>  |  2.98<H>    Ca    8.9      01 Mar 2018 05:40  Phos  1.6     03-01  Mg     2.2     03-01    TPro  6.6  /  Alb  2.6<L>  /  TBili  0.5  /  DBili  x   /  AST  24  /  ALT  17  /  AlkPhos  148<H>  03-01    PT/INR - ( 28 Feb 2018 23:40 )   PT: 10.6 SEC;   INR: 0.96   PTT - ( 28 Feb 2018 23:40 )  PTT:38.2 SEC      RADIOLOGY & ADDITIONAL STUDIES:  CT Head No Cont (03.01.18 @ 01:05) >  IMPRESSION:   No acute territorial infarct, hemorrhage or mass effect. WILLARD BOB  [id number]    HISTORY OF PRESENT ILLNESS:    57F with PMH of SLE (dx April 2017), ESRD on HD, DMII, HTN, HLD, hypothyroidism, brought in from dialysis with altered mental status.  Pt was recently hospitalized from 12/19/2017 - 1/16/2018 for LE numbness and weakness found to have polyneuropathy and sural nerve biopsy c/w vasculitis with hospital course c/b permacath placement on 1/3/18 and NSTEMI s/p cath and ANABELA placed on 1/5/18.  Pt was discharged to rehab and was at baseline , she was noted to become non verbal and poorly responsive during her hemodialysis yesterday.   The son at beside reports that his mother hasn't been herself she would be crying and sad most of the days, then he noticed that she started saying that she was seeing some of her family members and was asking repetitively for her mother and sister  Reports that she had transiently some runny nose, no known fevers or chills, no cough  Patient attended a family dinner on Sunday and was at her normal state       SLE History:  - Diagnosed in April 2017  - rheumatologist Dr. Eve Gaming   - +SULLY, +dsDNA +SSA +Smith  LA +, aCL Ig M 25, B2GP -   - manifestations: Class V lupus nephritis and DM nephropathy, ESRD on HD, cytopenias   Most recently found to have a Vasculitic neuropathy ( Peripheral nerve, left sural, biopsy: Severe chronic axonal loss with active axonal degeneration, perivascular chronic inflammatory infiltrates, suggestive for vasculitic neuropathy)   s/p Rituximab 1g (1/14/18). Next dose in 2 weeks was planned to be organized as outpatient.  and discharged on Prednisone 50mg qd for now  ( 12/19/2017 - 1/16/2018 at [facility] )           Review of Systems:  patient denies any headaches or pain when asked       MEDICATIONS  (STANDING):  aspirin  chewable 81 milliGRAM(s) Oral daily  calcium carbonate 1250 mG (OsCal) 1 Tablet(s) Oral daily  clopidogrel Tablet 75 milliGRAM(s) Oral daily  insulin lispro (HumaLOG) corrective regimen sliding scale   SubCutaneous three times a day before meals  latanoprost 0.005% Ophthalmic Solution 1 Drop(s) Both EYES at bedtime  levothyroxine 25 MICROGram(s) Oral daily  metoprolol succinate ER 25 milliGRAM(s) Oral daily  predniSONE   Tablet 50 milliGRAM(s) Oral daily          Allergies    penicillin (Rash)    Intolerances        FAMILY HISTORY:  History of hypertension (Sibling): sibling  DM (diabetes mellitus) (Sibling): siblings  History of hypertension: father and mother  DM (diabetes mellitus): mother and father      Vital Signs Last 24 Hrs  T(C): 36.8 (01 Mar 2018 09:17), Max: 37.1 (01 Mar 2018 06:42)  T(F): 98.3 (01 Mar 2018 09:17), Max: 98.7 (01 Mar 2018 06:42)  HR: 92 (01 Mar 2018 09:17) (85 - 92)  BP: 146/82 (01 Mar 2018 09:17) (118/90 - 175/94)  BP(mean): --  RR: 18 (01 Mar 2018 09:17) (15 - 18)  SpO2: 98% (01 Mar 2018 09:17) (98% - 100%)    Daily     Daily     Physical Exam:  General: awake, alert, doesn't follow commands, answers simple questions   HEENT: doesn't follow for extraocular muscle exam   CVS: +S1/S2, RRR, no carotid murmur   Resp: CTA b/l. poor inspiratory effort   GI: Soft, NT/ND +BS  MSK:  no evidence of synovitis   Neuro: alert, not oriented to place , time or person  doesn't follow commands  answers simple questions  constant gaze   facial asymmetry decreased nasolabial fold R side     Skin: no visible rashes  2 x 3 cm round sacral decub ulcer with packing   no visible granulation tissue  LABS:                        11.3   6.60  )-----------( 137      ( 01 Mar 2018 05:40 )             34.1     03-01    137  |  95<L>  |  25<H>  ----------------------------<  138<H>  4.1   |  33<H>  |  2.98<H>    Ca    8.9      01 Mar 2018 05:40  Phos  1.6     03-01  Mg     2.2     03-01    TPro  6.6  /  Alb  2.6<L>  /  TBili  0.5  /  DBili  x   /  AST  24  /  ALT  17  /  AlkPhos  148<H>  03-01    PT/INR - ( 28 Feb 2018 23:40 )   PT: 10.6 SEC;   INR: 0.96   PTT - ( 28 Feb 2018 23:40 )  PTT:38.2 SEC      RADIOLOGY & ADDITIONAL STUDIES:  CT Head No Cont (03.01.18 @ 01:05) >  IMPRESSION:   No acute territorial infarct, hemorrhage or mass effect.

## 2018-03-01 NOTE — H&P ADULT - PROBLEM SELECTOR PLAN 3
Diagnosed with lupus in April 2017. On prednisone 50mg as outpatient due to side effect profile of hydroxychloroquine and concern for infection during previous visit limiting immunosuppressive agents.  - f/u lupus flare bloodwork as above  - c/s rheumatology, f/u recs  - will c/w prednisone 50mg QD pending rheum recs - Diagnosed with lupus in April 2017. On prednisone 50mg as outpatient due to side effect profile of hydroxychloroquine and concern for infection during previous visit limiting immunosuppressive agents.  - f/u lupus flare bloodwork as above  - c/s rheumatology, f/u recs  - will c/w prednisone 50mg QD pending rheum recs

## 2018-03-01 NOTE — H&P ADULT - PROBLEM SELECTOR PLAN 5
- c/w home statin = A1C from Dec 2017 was 4.7  - will recheck A1C  - check FS and place on ISS for now, if wnl then d/c

## 2018-03-01 NOTE — ED PROVIDER NOTE - ATTENDING CONTRIBUTION TO CARE
DR. BLOCH, ATTENDING MD-  I performed a face to face bedside interview with patient regarding history of present illness, review of symptoms and past medical history. I completed an independent physical exam.  I have discussed patient's plan of care with the resident.  Patient minimally responsive, but maintaining airway, moving all extremities, HEENT nmlheart sounds nml,ungs clear, abd soft nontender. FS 200s. labs. CT.ecg

## 2018-03-01 NOTE — ED PROVIDER NOTE - OBJECTIVE STATEMENT
58 yo f with h/o lupus diagnosed in April, lupus nephritis, ESRD on HD via permacath (M, W, F) s/p AVF (not matured), DM2, HTN, HLD, hypothyroid presents to the ED for altered mental status.  Per EMS, patient at baseline today, while finishing dialysis she was noted to be unresponsive. Noted to have finger stick in 200's. patient grimacing to pain.

## 2018-03-01 NOTE — H&P ADULT - PROBLEM SELECTOR PLAN 2
On dialysis M/W/F, electrolytes wnl on admission, not fluid overloaded on exam or CXR.   - will c/s house renal for dialysis orders - On dialysis M/W/F, electrolytes wnl on admission, not fluid overloaded on exam or CXR.   - will c/s house renal for dialysis orders

## 2018-03-01 NOTE — H&P ADULT - PROBLEM SELECTOR PLAN 4
A1C from Dec 2017 was 4.7  - will recheck A1C  - check FS and place on ISS for now, if wnl then d/c - Pt with fresh bare metal stent placed to mid LAD on Jan 11, 2018  - Will need to remain on statin, ASA/Plavix x 1 year

## 2018-03-02 ENCOUNTER — APPOINTMENT (OUTPATIENT)
Age: 58
End: 2018-03-02

## 2018-03-02 LAB
4/8 RATIO: 1.15 CELLS/UL — LOW (ref 1.69–2.84)
ABS CD8: 192 CELLS/UL — LOW (ref 291–576)
ALBUMIN SERPL ELPH-MCNC: 2.4 G/DL — LOW (ref 3.3–5)
ALP SERPL-CCNC: 144 U/L — HIGH (ref 40–120)
ALT FLD-CCNC: 12 U/L — SIGNIFICANT CHANGE UP (ref 4–33)
AST SERPL-CCNC: 20 U/L — SIGNIFICANT CHANGE UP (ref 4–32)
BILIRUB SERPL-MCNC: 0.6 MG/DL — SIGNIFICANT CHANGE UP (ref 0.2–1.2)
BUN SERPL-MCNC: 43 MG/DL — HIGH (ref 7–23)
BUN SERPL-MCNC: 43 MG/DL — HIGH (ref 7–23)
C3 SERPL-MCNC: 75 MG/DL — LOW (ref 90–180)
C4 SERPL-MCNC: 25.3 MG/DL — SIGNIFICANT CHANGE UP (ref 10–40)
CALCIUM SERPL-MCNC: 8.7 MG/DL — SIGNIFICANT CHANGE UP (ref 8.4–10.5)
CALCIUM SERPL-MCNC: 8.7 MG/DL — SIGNIFICANT CHANGE UP (ref 8.4–10.5)
CD16+CD56+ CELLS NFR BLD: 28 % — HIGH (ref 6–22)
CD16+CD56+ CELLS NFR SPEC: 169 CELL/UL — SIGNIFICANT CHANGE UP (ref 59–453)
CD19 BLASTS SPEC-ACNC: 2 CELL/UL — LOW (ref 105–430)
CD19 BLASTS SPEC-ACNC: < 1 % — LOW (ref 8–22)
CD3 BLASTS SPEC-ACNC: 419 CELLS/UL — LOW (ref 678–2144)
CD3 BLASTS SPEC-ACNC: 70 % — SIGNIFICANT CHANGE UP (ref 62–83)
CD4 %: 37 % — LOW (ref 43–52)
CD8 %: 32 % — HIGH (ref 17–28)
CHLORIDE SERPL-SCNC: 94 MMOL/L — LOW (ref 98–107)
CHLORIDE SERPL-SCNC: 94 MMOL/L — LOW (ref 98–107)
CO2 SERPL-SCNC: 23 MMOL/L — SIGNIFICANT CHANGE UP (ref 22–31)
CO2 SERPL-SCNC: 23 MMOL/L — SIGNIFICANT CHANGE UP (ref 22–31)
CREAT SERPL-MCNC: 4.8 MG/DL — HIGH (ref 0.5–1.3)
CREAT SERPL-MCNC: 4.8 MG/DL — HIGH (ref 0.5–1.3)
ERYTHROCYTE [SEDIMENTATION RATE] IN BLOOD: 92 MM/HR — HIGH (ref 4–25)
GLUCOSE SERPL-MCNC: 185 MG/DL — HIGH (ref 70–99)
GLUCOSE SERPL-MCNC: 185 MG/DL — HIGH (ref 70–99)
HBA1C BLD-MCNC: 7.3 % — HIGH (ref 4–5.6)
IGA FLD-MCNC: 678 MG/DL — HIGH (ref 70–400)
IGG FLD-MCNC: 1127 MG/DL — SIGNIFICANT CHANGE UP (ref 700–1600)
IGM SERPL-MCNC: 51 MG/DL — SIGNIFICANT CHANGE UP (ref 40–230)
MAGNESIUM SERPL-MCNC: 2 MG/DL — SIGNIFICANT CHANGE UP (ref 1.6–2.6)
PHOSPHATE SERPL-MCNC: 2.8 MG/DL — SIGNIFICANT CHANGE UP (ref 2.5–4.5)
POTASSIUM SERPL-MCNC: 4.6 MMOL/L — SIGNIFICANT CHANGE UP (ref 3.5–5.3)
POTASSIUM SERPL-MCNC: 4.6 MMOL/L — SIGNIFICANT CHANGE UP (ref 3.5–5.3)
POTASSIUM SERPL-SCNC: 4.6 MMOL/L — SIGNIFICANT CHANGE UP (ref 3.5–5.3)
POTASSIUM SERPL-SCNC: 4.6 MMOL/L — SIGNIFICANT CHANGE UP (ref 3.5–5.3)
PROT SERPL-MCNC: 6.3 G/DL — SIGNIFICANT CHANGE UP (ref 6–8.3)
SODIUM SERPL-SCNC: 135 MMOL/L — SIGNIFICANT CHANGE UP (ref 135–145)
SODIUM SERPL-SCNC: 135 MMOL/L — SIGNIFICANT CHANGE UP (ref 135–145)
SPECIMEN SOURCE: SIGNIFICANT CHANGE UP
SPECIMEN SOURCE: SIGNIFICANT CHANGE UP
T-CELL CD4 SUBSET PNL BLD: 222 CELL/UL — LOW (ref 431–1434)
VIT B12 SERPL-MCNC: 1066 PG/ML — HIGH (ref 200–900)

## 2018-03-02 PROCEDURE — 70551 MRI BRAIN STEM W/O DYE: CPT | Mod: 26

## 2018-03-02 PROCEDURE — 70544 MR ANGIOGRAPHY HEAD W/O DYE: CPT | Mod: 26,59

## 2018-03-02 PROCEDURE — 99233 SBSQ HOSP IP/OBS HIGH 50: CPT | Mod: GC

## 2018-03-02 PROCEDURE — 99223 1ST HOSP IP/OBS HIGH 75: CPT

## 2018-03-02 RX ORDER — ACETAMINOPHEN 500 MG
650 TABLET ORAL EVERY 6 HOURS
Qty: 0 | Refills: 0 | Status: DISCONTINUED | OUTPATIENT
Start: 2018-03-02 | End: 2018-03-12

## 2018-03-02 RX ORDER — VANCOMYCIN HCL 1 G
750 VIAL (EA) INTRAVENOUS ONCE
Qty: 0 | Refills: 0 | Status: COMPLETED | OUTPATIENT
Start: 2018-03-02 | End: 2018-03-02

## 2018-03-02 RX ORDER — INSULIN LISPRO 100/ML
VIAL (ML) SUBCUTANEOUS
Qty: 0 | Refills: 0 | Status: DISCONTINUED | OUTPATIENT
Start: 2018-03-02 | End: 2018-03-12

## 2018-03-02 RX ORDER — ACYCLOVIR SODIUM 500 MG
VIAL (EA) INTRAVENOUS
Qty: 0 | Refills: 0 | Status: DISCONTINUED | OUTPATIENT
Start: 2018-03-02 | End: 2018-03-02

## 2018-03-02 RX ORDER — INSULIN GLARGINE 100 [IU]/ML
14 INJECTION, SOLUTION SUBCUTANEOUS AT BEDTIME
Qty: 0 | Refills: 0 | Status: DISCONTINUED | OUTPATIENT
Start: 2018-03-02 | End: 2018-03-04

## 2018-03-02 RX ORDER — CEFTRIAXONE 500 MG/1
2000 INJECTION, POWDER, FOR SOLUTION INTRAMUSCULAR; INTRAVENOUS EVERY 24 HOURS
Qty: 0 | Refills: 0 | Status: DISCONTINUED | OUTPATIENT
Start: 2018-03-02 | End: 2018-03-02

## 2018-03-02 RX ORDER — INSULIN LISPRO 100/ML
VIAL (ML) SUBCUTANEOUS AT BEDTIME
Qty: 0 | Refills: 0 | Status: DISCONTINUED | OUTPATIENT
Start: 2018-03-02 | End: 2018-03-12

## 2018-03-02 RX ORDER — OXYCODONE HYDROCHLORIDE 5 MG/1
5 TABLET ORAL ONCE
Qty: 0 | Refills: 0 | Status: DISCONTINUED | OUTPATIENT
Start: 2018-03-02 | End: 2018-03-02

## 2018-03-02 RX ORDER — INSULIN LISPRO 100/ML
10 VIAL (ML) SUBCUTANEOUS ONCE
Qty: 0 | Refills: 0 | Status: COMPLETED | OUTPATIENT
Start: 2018-03-02 | End: 2018-03-02

## 2018-03-02 RX ORDER — MEROPENEM 1 G/30ML
500 INJECTION INTRAVENOUS EVERY 24 HOURS
Qty: 0 | Refills: 0 | Status: DISCONTINUED | OUTPATIENT
Start: 2018-03-02 | End: 2018-03-09

## 2018-03-02 RX ORDER — CEFTRIAXONE 500 MG/1
2 INJECTION, POWDER, FOR SOLUTION INTRAMUSCULAR; INTRAVENOUS EVERY 24 HOURS
Qty: 0 | Refills: 0 | Status: DISCONTINUED | OUTPATIENT
Start: 2018-03-02 | End: 2018-03-02

## 2018-03-02 RX ORDER — ACYCLOVIR SODIUM 500 MG
240 VIAL (EA) INTRAVENOUS ONCE
Qty: 0 | Refills: 0 | Status: COMPLETED | OUTPATIENT
Start: 2018-03-02 | End: 2018-03-02

## 2018-03-02 RX ADMIN — HEPARIN SODIUM 5000 UNIT(S): 5000 INJECTION INTRAVENOUS; SUBCUTANEOUS at 17:38

## 2018-03-02 RX ADMIN — OXYCODONE HYDROCHLORIDE 5 MILLIGRAM(S): 5 TABLET ORAL at 23:08

## 2018-03-02 RX ADMIN — HEPARIN SODIUM 5000 UNIT(S): 5000 INJECTION INTRAVENOUS; SUBCUTANEOUS at 06:56

## 2018-03-02 RX ADMIN — Medication 150 MILLIGRAM(S): at 13:05

## 2018-03-02 RX ADMIN — Medication 2: at 09:55

## 2018-03-02 RX ADMIN — Medication 25 MICROGRAM(S): at 06:56

## 2018-03-02 RX ADMIN — CEFTRIAXONE 100 GRAM(S): 500 INJECTION, POWDER, FOR SOLUTION INTRAMUSCULAR; INTRAVENOUS at 11:45

## 2018-03-02 RX ADMIN — Medication 81 MILLIGRAM(S): at 11:45

## 2018-03-02 RX ADMIN — Medication 50 MILLIGRAM(S): at 06:56

## 2018-03-02 RX ADMIN — Medication 1 TABLET(S): at 11:45

## 2018-03-02 RX ADMIN — Medication 54.8 MILLIGRAM(S): at 12:06

## 2018-03-02 RX ADMIN — OXYCODONE HYDROCHLORIDE 5 MILLIGRAM(S): 5 TABLET ORAL at 22:08

## 2018-03-02 RX ADMIN — CLOPIDOGREL BISULFATE 75 MILLIGRAM(S): 75 TABLET, FILM COATED ORAL at 11:45

## 2018-03-02 RX ADMIN — Medication 12: at 17:37

## 2018-03-02 RX ADMIN — Medication 650 MILLIGRAM(S): at 07:03

## 2018-03-02 RX ADMIN — Medication 25 MILLIGRAM(S): at 07:06

## 2018-03-02 RX ADMIN — Medication 10 UNIT(S): at 19:38

## 2018-03-02 RX ADMIN — Medication 3: at 12:06

## 2018-03-02 RX ADMIN — INSULIN GLARGINE 14 UNIT(S): 100 INJECTION, SOLUTION SUBCUTANEOUS at 23:28

## 2018-03-02 NOTE — PROGRESS NOTE ADULT - PROBLEM SELECTOR PLAN 5
= A1C from Dec 2017 was 4.7  - will recheck A1C  - check FS and place on ISS for now, if wnl then d/c

## 2018-03-02 NOTE — ADVANCED PRACTICE NURSE CONSULT - REASON FOR CONSULT
Patient seen on skin care rounds after wound care referral received for assessment of skin impairment and recommendations of topical management. Chart reviewed: Serum albumin 2.6 g/dL, Virgilio 11. Patient H/O lupus (dx April 2017), ESRD on HD (M/W/F), NSTEMI (1/2018), DMII, HTN, HLD, hypothyroidism who presents from dialysis with acute mental status changes of unclear etiology; possibly 2/2 occult infection, vasculitis, seizure activity, or lupus flare. On contact precautions for rule out meningitis in setting of acute encephalopathy. Followed by Rheumatology for lethargy and change in mental status concern for SLE activity, Nephrology for dialysis evaluation.     Full note to follow. Patient seen on skin care rounds after wound care referral received for assessment of skin impairment and recommendations of topical management. Chart reviewed: Serum albumin 2.6 g/dL, Virgilio 11. Patient H/O lupus (dx April 2017), ESRD on HD (M/W/F), NSTEMI (1/2018), DMII, HTN, HLD, hypothyroidism who presents from dialysis with acute mental status changes of unclear etiology; possibly 2/2 occult infection, vasculitis, seizure activity, or lupus flare. On contact precautions for rule out meningitis in setting of acute encephalopathy. Followed by Rheumatology for lethargy and change in mental status concern for SLE activity, Nephrology for dialysis evaluation. Son and daughter at bedside interviewed. Son stated that patient was recently admitted to [facility] and treated for Vasculitis of bilateral lower legs, tissue biopsy was taken from Left lateral lower leg. During hospital stay "mom was very sick, bedbound." Was discharged to [facility], there she was able to walk with two person assist and walker. Son stated "during rehabilitation nursing staff noted wound on sacrum. Staff reported an odor from wound." Unable to recall type of dressing used. As per son and daughter at bedside, pt appears more responsive today and returning back to baseline.     Full note to follow. Patient seen on skin care rounds after wound care referral received for assessment of skin impairment and recommendations of topical management. Chart reviewed: Serum albumin 2.6 g/dL, Virgilio 11. Patient H/O lupus (dx April 2017), ESRD on HD (M/W/F), NSTEMI (1/2018), DMII, HTN, HLD, hypothyroidism who presents from dialysis with acute mental status changes of unclear etiology; possibly 2/2 occult infection, vasculitis, seizure activity, or lupus flare. On contact precautions for rule out meningitis in setting of acute encephalopathy. Followed by Rheumatology for lethargy and change in mental status concern for SLE activity, Nephrology for dialysis evaluation. Son and daughter at bedside interviewed. Son stated that patient was recently admitted to [facility] and treated for Vasculitis of bilateral lower legs, tissue biopsy was taken from Left lateral lower leg. During hospital stay "mom was very sick, bedbound." Was discharged to [facility], there she was able to walk with two person assist and walker. Son stated "during rehabilitation nursing staff noted wound on sacrum. Staff reported an odor from wound." Unable to recall type of dressing used. As per son and daughter at bedside, pt appears more responsive today and returning back to baseline.

## 2018-03-02 NOTE — CONSULT NOTE ADULT - ATTENDING COMMENTS
patient seen and examined by me personally  agree with a/p as above     PT with clear dramatic change in mental status since 2/27/18  I met her once in January and she was very appropriate and intact.  This is a very obvious difference, the son says it essentially just happened 2 days ago  She was well enough to go to a family party on 2/25/18 and then became nearly obtunded days later.  exam notable for facial asymmetry, inability to follow commands  cannot truly assess neuro status.    We know she has peripheral n.s. effect of SLE, but for her to develop cerebritis while on high dose steroid would be very unusual, and now is at the time when rituxan would be effective still.    PML is a potential infectious complication of rituxan but since she's had only 1/2 dose (1gm) this would be low on DDX .  can check MAGUI virus PCR if any suggestion onMRI  please see above summary as well for other considerations  will follow with you
Silvio Lora MD  Pager [phone]  After 5pm/weekends call [phone]

## 2018-03-02 NOTE — PROGRESS NOTE ADULT - SUBJECTIVE AND OBJECTIVE BOX
No pain, no shortness of breath      VITAL:  T(C): , Max: 38.7 (18 @ 06:44)  T(F): , Max: 101.7 (18 @ 06:44)  HR: 105 (18 @ 06:27)  BP: 138/75 (18 @ 06:27)  BP(mean): --  RR: 18 (18 @ 06:27)  SpO2: 99% (18 @ 06:27)      PHYSICAL EXAM:  Constitutional: Noncommunicative/somnolent  HEENT: NCAT, DMM  Neck: Supple, No JVD  Respiratory: CTA-b/l  Cardiovascular: RRR s1s2, no m/r/g  Gastrointestinal: BS+, soft, NT/ND  Extremities: No peripheral edema b/l  Neurological: no focal deficits; strength grossly intact  Psychiatric: Normal mood, normal affect  Back: no CVAT b/l  Skin: No rashes, no nevi  LUE AVF (+)thrill/immature; RIJ permacath(+)      LABS:                        11.3   6.60  )-----------( 137      ( 01 Mar 2018 05:40 )             34.1     Na(135)/K(4.6)/Cl(94)/HCO3(23)/BUN(43)/Cr(4.80)Glu(185)/Ca(8.7)/Mg(2.0)/PO4(2.8)     @ 06:19  Na(137)/K(4.1)/Cl(95)/HCO3(33)/BUN(25)/Cr(2.98)Glu(138)/Ca(8.9)/Mg(2.2)/PO4(1.6)     @ 05:40  Na(138)/K(4.3)/Cl(96)/HCO3(26)/BUN(21)/Cr(2.48)Glu(180)/Ca(8.6)/Mg(--)/PO4(--)     @ 23:40    Urinalysis Basic - ( 01 Mar 2018 14:20 )  Color: GRAEME / Appearance: TURBID / S.030 / pH: 5.5  Gluc: NEGATIVE / Ketone: NEGATIVE  / Bili: NEGATIVE / Urobili: NORMAL mg/dL   Blood: MODERATE / Protein: >600 mg/dL / Nitrite: NEGATIVE   Leuk Esterase: MODERATE / RBC: 5-10 / WBC 10-25   Sq Epi: x / Non Sq Epi: x / Bacteria: x      IMPRESSION: 57F w/ HTN, DM2, CAD, SLE, and ESRD-HD MWF due to lupus nephritis, 18 a/w AMS    (1)Renal - ESRD-HD MWF. Due for HD today.    (2)AMS - Lupus cerebritis? Awaiting MRA head    (3)Vascular - immature AVF      RECOMMEND:  (1)HD today as ordered  (2)Dose new meds for GFR<10/HD  (3)No Gadolinium if for MRI head  (4)Would hold off with efforts to mature AVF until we see evidence that her mental status is improving        Orlando Hernandez MD  [facility]  [phone] No pain, no shortness of breath      VITAL:  T(C): , Max: 38.7 (18 @ 06:44)  T(F): , Max: 101.7 (18 @ 06:44)  HR: 105 (18 @ 06:27)  BP: 138/75 (18 @ 06:27)  BP(mean): --  RR: 18 (18 @ 06:27)  SpO2: 99% (18 @ 06:27)      PHYSICAL EXAM:  Constitutional: lethargic but alert today - Citizen of [place]-speaking  HEENT: NCAT, DMM  Neck: Supple, No JVD  Respiratory: CTA-b/l  Cardiovascular: RRR s1s2, no m/r/g  Gastrointestinal: BS+, soft, NT/ND  Extremities: No peripheral edema b/l  Neurological: no focal deficits; strength grossly intact  Psychiatric: Normal mood, normal affect  Back: no CVAT b/l  Skin: No rashes, no nevi  LUE AVF (+)thrill/immature; RIJ permacath(+)      LABS:                        11.3   6.60  )-----------( 137      ( 01 Mar 2018 05:40 )             34.1     Na(135)/K(4.6)/Cl(94)/HCO3(23)/BUN(43)/Cr(4.80)Glu(185)/Ca(8.7)/Mg(2.0)/PO4(2.8)     @ 06:19  Na(137)/K(4.1)/Cl(95)/HCO3(33)/BUN(25)/Cr(2.98)Glu(138)/Ca(8.9)/Mg(2.2)/PO4(1.6)     @ 05:40  Na(138)/K(4.3)/Cl(96)/HCO3(26)/BUN(21)/Cr(2.48)Glu(180)/Ca(8.6)/Mg(--)/PO4(--)     @ 23:40    Urinalysis Basic - ( 01 Mar 2018 14:20 )  Color: GRAEME / Appearance: TURBID / S.030 / pH: 5.5  Gluc: NEGATIVE / Ketone: NEGATIVE  / Bili: NEGATIVE / Urobili: NORMAL mg/dL   Blood: MODERATE / Protein: >600 mg/dL / Nitrite: NEGATIVE   Leuk Esterase: MODERATE / RBC: 5-10 / WBC 10-25   Sq Epi: x / Non Sq Epi: x / Bacteria: x      IMPRESSION: 57F w/ HTN, DM2, CAD, SLE, and ESRD-HD MWF due to lupus nephritis, 18 a/w AMS    (1)Renal - ESRD-HD MWF. Due for HD today.    (2)AMS - Lupus cerebritis? Awaiting MRA head    (3)Vascular - immature AVF      RECOMMEND:  (1)HD today as ordered  (2)Dose new meds for GFR<10/HD  (3)No Gadolinium if for MRI head  (4)Would hold off with efforts to mature AVF until we see evidence that her mental status is improving        Orlando Hernandez MD  [facility]  [phone] No pain, no shortness of breath      VITAL:  T(C): , Max: 38.7 (18 @ 06:44)  T(F): , Max: 101.7 (18 @ 06:44)  HR: 105 (18 @ 06:27)  BP: 138/75 (18 @ 06:27)  BP(mean): --  RR: 18 (18 @ 06:27)  SpO2: 99% (18 @ 06:27)      PHYSICAL EXAM:  Constitutional: lethargic but alert today - Estonian-speaking  HEENT: NCAT, DMM  Neck: Supple, No JVD  Respiratory: CTA-b/l  Cardiovascular: RRR s1s2, no m/r/g  Gastrointestinal: BS+, soft, NT/ND  Extremities: No peripheral edema b/l  Neurological: no focal deficits; strength grossly intact  Psychiatric: Normal mood, normal affect  Back: no CVAT b/l  Skin: No rashes, no nevi  LUE AVF (+)thrill/immature; RIJ permacath(+)      LABS:                        11.3   6.60  )-----------( 137      ( 01 Mar 2018 05:40 )             34.1     Na(135)/K(4.6)/Cl(94)/HCO3(23)/BUN(43)/Cr(4.80)Glu(185)/Ca(8.7)/Mg(2.0)/PO4(2.8)     @ 06:19  Na(137)/K(4.1)/Cl(95)/HCO3(33)/BUN(25)/Cr(2.98)Glu(138)/Ca(8.9)/Mg(2.2)/PO4(1.6)     @ 05:40  Na(138)/K(4.3)/Cl(96)/HCO3(26)/BUN(21)/Cr(2.48)Glu(180)/Ca(8.6)/Mg(--)/PO4(--)     @ 23:40    Urinalysis Basic - ( 01 Mar 2018 14:20 )  Color: GRAEME / Appearance: TURBID / S.030 / pH: 5.5  Gluc: NEGATIVE / Ketone: NEGATIVE  / Bili: NEGATIVE / Urobili: NORMAL mg/dL   Blood: MODERATE / Protein: >600 mg/dL / Nitrite: NEGATIVE   Leuk Esterase: MODERATE / RBC: 5-10 / WBC 10-25   Sq Epi: x / Non Sq Epi: x / Bacteria: x      IMPRESSION: 57F w/ HTN, DM2, CAD, SLE, and ESRD-HD MWF due to lupus nephritis, 18 a/w AMS    (1)Renal - ESRD-HD MWF. Due for HD today.    (2)AMS - Lupus cerebritis? Awaiting MRA head. Internal Medicine team input appreciated - spiked fever overnight; there is now concern for meningoencephalitis; now on empiric broad spectrum antimicrobials.    (3)Vascular - immature AVF      RECOMMEND:  (1)HD today as ordered  (2)Dose antimicrobials for GFR<10/HD  (3)No Gadolinium if for MRI head  (4)Would hold off with efforts to mature AVF until we see evidence that her mental status is improving        Orlando Hernandez MD  [facility]  [phone] No pain, no shortness of breath      VITAL:  T(C): , Max: 38.7 (18 @ 06:44)  T(F): , Max: 101.7 (18 @ 06:44)  HR: 105 (18 @ 06:27)  BP: 138/75 (18 @ 06:27)  BP(mean): --  RR: 18 (18 @ 06:27)  SpO2: 99% (18 @ 06:27)      PHYSICAL EXAM:  Constitutional: lethargic but alert today - [place]vian-speaking  HEENT: NCAT, DMM  Neck: Supple, No JVD  Respiratory: CTA-b/l  Cardiovascular: RRR s1s2, no m/r/g  Gastrointestinal: BS+, soft, NT/ND  Extremities: No peripheral edema b/l  Neurological: no focal deficits; strength grossly intact  Psychiatric: Normal mood, normal affect  Back: no CVAT b/l  Skin: No rashes, no nevi  LUE AVF (+)thrill/immature; RIJ permacath(+)      LABS:                        11.3   6.60  )-----------( 137      ( 01 Mar 2018 05:40 )             34.1     Na(135)/K(4.6)/Cl(94)/HCO3(23)/BUN(43)/Cr(4.80)Glu(185)/Ca(8.7)/Mg(2.0)/PO4(2.8)     @ 06:19  Na(137)/K(4.1)/Cl(95)/HCO3(33)/BUN(25)/Cr(2.98)Glu(138)/Ca(8.9)/Mg(2.2)/PO4(1.6)     @ 05:40  Na(138)/K(4.3)/Cl(96)/HCO3(26)/BUN(21)/Cr(2.48)Glu(180)/Ca(8.6)/Mg(--)/PO4(--)     @ 23:40    Urinalysis Basic - ( 01 Mar 2018 14:20 )  Color: GRAEME / Appearance: TURBID / S.030 / pH: 5.5  Gluc: NEGATIVE / Ketone: NEGATIVE  / Bili: NEGATIVE / Urobili: NORMAL mg/dL   Blood: MODERATE / Protein: >600 mg/dL / Nitrite: NEGATIVE   Leuk Esterase: MODERATE / RBC: 5-10 / WBC 10-25   Sq Epi: x / Non Sq Epi: x / Bacteria: x      IMPRESSION: 57F w/ HTN, DM2, CAD, SLE, and ESRD-HD MWF due to lupus nephritis, 18 a/w AMS    (1)Renal - ESRD-HD MWF. Due for HD today.    (2)AMS - Lupus cerebritis? Awaiting MRA head. Internal Medicine team input appreciated - spiked fever overnight; there is now concern for meningoencephalitis; now on empiric broad spectrum antimicrobials.    (3)Vascular - immature AVF      RECOMMEND:  (1)HD today as ordered  (2)Dose antimicrobials for GFR<10/HD (present dosing, including for Acyclovir, appears appropriate  (3)No Gadolinium if for MRI head  (4)Would hold off with efforts to mature AVF until we see evidence that her mental status is improving        Orlando Hernandez MD  [facility]  [phone]

## 2018-03-02 NOTE — PROGRESS NOTE ADULT - SUBJECTIVE AND OBJECTIVE BOX
PROVIDER CONTACT INFO:  MD KOSTA Del RealJ Pager: [phone]  Long Range Pager: [phone]    WILLARD BOB  57y  Female      Patient is a 57y old  Female who presents with a chief complaint of Altered Mental Status (01 Mar 2018 11:00)      INTERVAL HPI/OVERNIGHT EVENTS:    T(C): 38.7 (18 @ 06:44), Max: 38.7 (18 @ 06:44)  HR: 105 (18 @ 06:27) (85 - 105)  BP: 138/75 (18 @ 06:27) (120/87 - 164/76)  RR: 18 (18 @ 06:27) (16 - 18)  SpO2: 99% (18 @ 06:27) (97% - 100%)  Wt(kg): --Vital Signs Last 24 Hrs  T(C): 38.7 (02 Mar 2018 06:44), Max: 38.7 (02 Mar 2018 06:44)  T(F): 101.7 (02 Mar 2018 06:44), Max: 101.7 (02 Mar 2018 06:44)  HR: 105 (02 Mar 2018 06:27) (85 - 105)  BP: 138/75 (02 Mar 2018 06:27) (120/87 - 164/76)  BP(mean): --  RR: 18 (02 Mar 2018 06:27) (16 - 18)  SpO2: 99% (02 Mar 2018 06:27) (97% - 100%)    PHYSICAL EXAM:  GENERAL: NAD  HEAD:  Atraumatic, Normocephalic  EYES: EOMI, PERRLA, conjunctiva and sclera clear  ENMT: No tonsillar erythema, exudates,; Moist mucous membranes. No lesions  NECK: Supple, No JVD, Normal thyroid  CHEST/LUNG: Clear to percussion bilaterally; No rales, rhonchi, wheezing, or rubs  HEART: Regular rate and rhythm; No murmurs, rubs, or gallops  ABDOMEN: Soft, Nontender, Nondistended; Bowel sounds present.  No hepatosplenomegaly  EXTREMITIES:  2+ Peripheral Pulses, No clubbing, cyanosis, or edema  LYMPH: No lymphadenopathy noted  SKIN: No rashes or lesions  PSYCH: Alert & Oriented x0    Consultant(s) Notes Reviewed:  [x ] YES  [ ] NO  Care Discussed with Consultants/Other Providers [ x] YES  [ ] NO    LABS:                        11.3   6.60  )-----------( 137      ( 01 Mar 2018 05:40 )             34.1         137  |  95<L>  |  25<H>  ----------------------------<  138<H>  4.1   |  33<H>  |  2.98<H>    Ca    8.9      01 Mar 2018 05:40  Phos  1.6       Mg     2.2         TPro  6.6  /  Alb  2.6<L>  /  TBili  0.5  /  DBili  x   /  AST  24  /  ALT  17  /  AlkPhos  148<H>      PT/INR - ( 2018 23:40 )   PT: 10.6 SEC;   INR: 0.96          PTT - ( 2018 23:40 )  PTT:38.2 SEC  Urinalysis Basic - ( 01 Mar 2018 14:20 )    Color: GRAEME / Appearance: TURBID / S.030 / pH: 5.5  Gluc: NEGATIVE / Ketone: NEGATIVE  / Bili: NEGATIVE / Urobili: NORMAL mg/dL   Blood: MODERATE / Protein: >600 mg/dL / Nitrite: NEGATIVE   Leuk Esterase: MODERATE / RBC: 5-10 / WBC 10-25   Sq Epi: x / Non Sq Epi: x / Bacteria: x      CAPILLARY BLOOD GLUCOSE      POCT Blood Glucose.: 163 mg/dL (01 Mar 2018 21:58)  POCT Blood Glucose.: 198 mg/dL (01 Mar 2018 17:16)  POCT Blood Glucose.: 173 mg/dL (01 Mar 2018 12:24)        Urinalysis Basic - ( 01 Mar 2018 14:20 )    Color: GRAEME / Appearance: TURBID / S.030 / pH: 5.5  Gluc: NEGATIVE / Ketone: NEGATIVE  / Bili: NEGATIVE / Urobili: NORMAL mg/dL   Blood: MODERATE / Protein: >600 mg/dL / Nitrite: NEGATIVE   Leuk Esterase: MODERATE / RBC: 5-10 / WBC 10-25   Sq Epi: x / Non Sq Epi: x / Bacteria: x PROVIDER CONTACT INFO:  MD JER Del Real Pager: [phone]  Long Range Pager: [phone]    WILLARD BOB  57y  Female      Patient is a 57y old  Female who presents with a chief complaint of Altered Mental Status (01 Mar 2018 11:00)      INTERVAL HPI/OVERNIGHT EVENTS: This AM pt spiked fever to 101.7 rectally. This AM pt more alert, hx obtained via  ID #[id number]. Pt cannot recall what brought her to the hospital or what her most recent memory prior to hospitalization is. She denies any physical complaints at this time. Denies F/C/N/V, CP/SOB, abd pain, constipation/diarrhea. She states she has pain around sacral ulcer only when she moves her legs or lies directly on it.     T(C): 38.7 (18 @ 06:44), Max: 38.7 (18 @ 06:44)  HR: 105 (18 @ 06:27) (85 - 105)  BP: 138/75 (18 @ 06:27) (120/87 - 164/76)  RR: 18 (18 @ 06:27) (16 - 18)  SpO2: 99% (18 @ 06:27) (97% - 100%)  Wt(kg): --Vital Signs Last 24 Hrs  T(C): 38.7 (02 Mar 2018 06:44), Max: 38.7 (02 Mar 2018 06:44)  T(F): 101.7 (02 Mar 2018 06:44), Max: 101.7 (02 Mar 2018 06:44)  HR: 105 (02 Mar 2018 06:27) (85 - 105)  BP: 138/75 (02 Mar 2018 06:27) (120/87 - 164/76)  BP(mean): --  RR: 18 (02 Mar 2018 06:27) (16 - 18)  SpO2: 99% (02 Mar 2018 06:27) (97% - 100%)    PHYSICAL EXAM:  GENERAL: NAD  HEAD:  Atraumatic, Normocephalic  EYES: EOMI, PERRLA, conjunctiva and sclera clear  ENMT: No tonsillar erythema, exudates,; Moist mucous membranes. No lesions  NECK: Supple, No JVD, Normal thyroid, no nuchal rigidity  CHEST/LUNG: Clear to percussion bilaterally; No rales, rhonchi, wheezing, or rubs  HEART: Regular rate and rhythm; No murmurs, rubs, or gallops  ABDOMEN: Soft, Nontender, Nondistended; Bowel sounds present  EXTREMITIES:  2+ Peripheral Pulses, No clubbing, cyanosis, or edema, pain upon passive movement of legs localized to the back  LYMPH: No lymphadenopathy noted  SKIN: No rashes or lesions  PSYCH: Alert & Oriented x2    Consultant(s) Notes Reviewed:  [x ] YES  [ ] NO  Care Discussed with Consultants/Other Providers [ x] YES  [ ] NO    LABS:                        11.3   6.60  )-----------( 137      ( 01 Mar 2018 05:40 )             34.1         137  |  95<L>  |  25<H>  ----------------------------<  138<H>  4.1   |  33<H>  |  2.98<H>    Ca    8.9      01 Mar 2018 05:40  Phos  1.6     -  Mg     2.2         TPro  6.6  /  Alb  2.6<L>  /  TBili  0.5  /  DBili  x   /  AST  24  /  ALT  17  /  AlkPhos  148<H>      PT/INR - ( 2018 23:40 )   PT: 10.6 SEC;   INR: 0.96          PTT - ( 2018 23:40 )  PTT:38.2 SEC  Urinalysis Basic - ( 01 Mar 2018 14:20 )    Color: GRAEME / Appearance: TURBID / S.030 / pH: 5.5  Gluc: NEGATIVE / Ketone: NEGATIVE  / Bili: NEGATIVE / Urobili: NORMAL mg/dL   Blood: MODERATE / Protein: >600 mg/dL / Nitrite: NEGATIVE   Leuk Esterase: MODERATE / RBC: 5-10 / WBC 10-25   Sq Epi: x / Non Sq Epi: x / Bacteria: x      CAPILLARY BLOOD GLUCOSE      POCT Blood Glucose.: 163 mg/dL (01 Mar 2018 21:58)  POCT Blood Glucose.: 198 mg/dL (01 Mar 2018 17:16)  POCT Blood Glucose.: 173 mg/dL (01 Mar 2018 12:24)        Urinalysis Basic - ( 01 Mar 2018 14:20 )    Color: GRAEME / Appearance: TURBID / S.030 / pH: 5.5  Gluc: NEGATIVE / Ketone: NEGATIVE  / Bili: NEGATIVE / Urobili: NORMAL mg/dL   Blood: MODERATE / Protein: >600 mg/dL / Nitrite: NEGATIVE   Leuk Esterase: MODERATE / RBC: 5-10 / WBC 10-25   Sq Epi: x / Non Sq Epi: x / Bacteria: x

## 2018-03-02 NOTE — ADVANCED PRACTICE NURSE CONSULT - ASSESSMENT
General: A&O x 3, at times stares blankly, able to reorient, answers all questions appropriately. Bedbound, upper and lower extremities flaccid, able to follow commands; able to flex and extend bilateral feet and hands at wrist joint. Left upper arm with AV-fistula; maturing, awaiting fistulogram (+) thrill and (+) bruit. Incontinent of soft stool. Skin warm, dry, scattered areas of hyperpigmentation on bilateral upper and lower extremities. Adequate skin turgor.    Vascular: Bilateral lower extremities with scattered areas of hyperpignmentation. Dry, flaky skin of anterior shins. Area of purple-maroon discoloration on right medial lower leg (4cmx1.5cmx0). Wound of Left lateral lower leg and medial right great toe. Right heel lateral heel with hypopigmentation, skin intact. No temperature changes noted. No edema noted. Capillary refill <3 seconds. Non palpable DP/PT pulses, with monophasic doppler sounds.     Left lateral lower leg- Wound s/p biopsy site (see interview with son above)- [id number], 100% black hard firmly attached stable eschar, irregular borders. Periwound skin intact, dry and flaky. No increased warmth, no induration, no edema, no erythema noted. Goal of care: maintain stable eschar.    Right medial great toe extending to medial toenail bed- arterial wound- [id number], 100% black, hard firmly attached eschar. Periwound skin intact. No erythema, no increased warmth, no edema, no induration noted. Goals of care: maintain stable eschar.    Sacrum extending to bilateral buttocks- Stage 4 pressure injury complicated by incontinence associated dermatitis- pt lying on right side during assessment- unable to visualize wound when pt is lying in natural anatomical position- 4.5cmx0.5cmx1.8cm, (+) probe to bone, bone is not visible.  Undermining from 6-12 o'clock, ranging from 0.5-4cm with 4cm from 9-12 o'clock. 80% dusky moist hypergranulation tissue with scattered areas of purple-maroon discoloration, 20% gray tan moist firmly attached slough. Small serous drainage, (+) malodorous. Periwound skin with induration from and trace edema from 1-2 o'clock extending 4 cm from wound edges. Unable to express drainage from this location. Blanchable erythema and mild increase in warmth noted extending 1cm from wound edge. Periwound skin complicated by incontinence associated dermatitis, presenting as hyperpigmentation that extending from periwound skin to perianal area. Goal of care: control/manage odor, enzymatically debride necrotic tissue, pack areas of dead space, protect periwound skin. General: A&O x 3, at times stares blankly, able to reorient, answers all questions appropriately. Bedbound, upper and lower extremities flaccid, able to follow commands; able to flex and extend bilateral feet and hands at wrist joint. Left upper arm with AV-fistula; maturing, awaiting fistulogram (+) thrill and (+) bruit. Incontinent of soft stool. Skin warm, dry, scattered areas of hyperpigmentation on bilateral upper and lower extremities. Adequate skin turgor. Bilateral medial knees and bilateral heels with blanchable erythema.     Vascular: Bilateral lower extremities with scattered areas of hyperpignmentation. Dry, flaky skin of anterior shins. Area of purple-maroon discoloration on right medial lower leg (4cmx1.5cmx0). Wound of Left lateral lower leg and medial right great toe. Right heel lateral heel with hypopigmentation, skin intact. No temperature changes noted. No edema noted. Capillary refill <3 seconds. Non palpable DP/PT pulses, with monophasic doppler sounds.     Left lateral lower leg- Wound s/p biopsy site (see interview with son above)- [id number], 100% black hard firmly attached stable eschar, irregular borders. Periwound skin intact, dry and flaky. No increased warmth, no induration, no edema, no erythema noted. Goal of care: maintain stable eschar.    Right medial great toe extending to medial toenail bed- arterial wound- [id number], 100% black, hard firmly attached eschar. Periwound skin intact. No erythema, no increased warmth, no edema, no induration noted. Goals of care: maintain stable eschar.    Sacrum extending to bilateral buttocks- Stage 4 pressure injury complicated by incontinence associated dermatitis- pt lying on right side during assessment- unable to visualize wound when pt is lying in natural anatomical position- 4.5cmx0.5cmx1.8cm, (+) probe to bone, bone is not visible.  Undermining from 6-12 o'clock, ranging from 0.5-4cm with 4cm from 9-12 o'clock. 80% dusky moist hypergranulation tissue with scattered areas of purple-maroon discoloration, 20% gray tan moist firmly attached slough. Small serous drainage, (+) malodorous. Periwound skin with induration from and trace edema from 1-2 o'clock extending 4 cm from wound edges. Unable to express drainage from this location. Blanchable erythema and mild increase in warmth noted extending 1cm from wound edge. Periwound skin complicated by incontinence associated dermatitis, presenting as hyperpigmentation that extending from periwound skin to perianal area. Goal of care: control/manage odor, enzymatically debride necrotic tissue, pack areas of dead space, protect periwound skin.

## 2018-03-02 NOTE — PROGRESS NOTE ADULT - PROBLEM SELECTOR PLAN 4
- Pt with fresh bare metal stent placed to mid LAD on Jan 11, 2018  - Will need to remain on statin, ASA/Plavix x 1 year

## 2018-03-02 NOTE — CONSULT NOTE ADULT - ASSESSMENT
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     s/p RTX on 1/14/18 and remains on prednisone 50 mg daily.    Discussed case with neuroradiology attending, but due to incomplete MRI exam and motion artifact on MRI was not a good test.    Recommend:  -In order to r/o infectious causes of AMS and now with fevers, would need LP to exclude meningitis.  -In CSF would check for counts, glucose, protein, CSF PCR panel, MAGUI virus PCR, AFB, Crypt ag.  -In serum, check quant TB gold, crypt ag in serum.  -May need repeat MRI when patient more able to tolerate.  -Consider Neurology input.  -F/U blood cxs.  -Wound care consult for left buttock decubitus ulcer  -Continue vanco by level and meropenem pending workup. 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     s/p RTX on 1/14/18 and remains on prednisone 50 mg daily.    Discussed case with neuroradiology attending, but due to incomplete MRI exam and motion artifact on MRI was not a good test.    Recommend:  -In order to r/o infectious causes of AMS and now with fevers, would need LP to exclude meningitis.  -In CSF would check for counts, glucose, protein, CSF PCR panel, MAGUI virus PCR, AFB, Crypt ag.  -In serum, check quant TB gold, crypt ag in serum.  -May need repeat MRI when patient more able to tolerate.  -Consider Neurology input.  -F/U blood cxs.  -Wound care consult for left buttock decubitus ulcer  -Continue vanco by level and meropenem pending workup.  -MRI left buttock region to r/o om. 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     s/p RTX on 1/14/18 and remains on prednisone 50 mg daily.    Discussed case with neuroradiology attending, but due to incomplete MRI exam and motion artifact on MRI was not a good test.    Recommend:  -In order to r/o infectious causes of AMS and now with fevers, would need LP to exclude meningitis.  -In CSF would check for counts, glucose, protein, CSF PCR panel, MAGUI virus PCR, AFB, Crypt ag.  -In serum, check quant TB gold, crypt ag in serum.  -May need repeat MRI when patient more able to tolerate.  -Consider Neurology input.  -F/U blood cxs.  -Wound care consult for left buttock decubitus ulcer  -Continue vanco by level and meropenem pending workup.  -Consider MRI left buttock region to r/o om.

## 2018-03-02 NOTE — PROGRESS NOTE ADULT - PROBLEM SELECTOR PLAN 3
- Diagnosed with lupus in April 2017. On prednisone 50mg as outpatient due to side effect profile of hydroxychloroquine and concern for infection during previous visit limiting immunosuppressive agents.  - f/u lupus flare bloodwork as above  - c/s rheumatology, f/u recs  - will c/w prednisone 50mg QD pending rheum recs - Diagnosed with lupus in April 2017. On prednisone 50mg as outpatient due to side effect profile of hydroxychloroquine and concern for infection during previous visit limiting immunosuppressive agents.  - f/u lupus flare bloodwork as above  - rheumatology consulted, appreciate recs. Will c/w prednisone 50mg QD.

## 2018-03-02 NOTE — PROGRESS NOTE ADULT - PROBLEM SELECTOR PLAN 1
- Pt nonverbal on admission, minimally responsive to verbal stimuli. Etiology of encephalopathy unknown at this time. DDx includes infection (UTI, intraabdominal, meningitis, sepsis from sacral wound), lupus nephritis flare, vasculitis, seizure activity.  - Will send blood cultures, straight cath for UA and UCx  - Spot EEG for ?seizure activity, post-ictal state  - f/u ESR, CRP, dsDNA, Complement levels for ?lupus flare  - CT a/p for ?occult infection  - MRI non-contrast head   - will get wound c/s for sacral decubitus ulcer management  - pt aseptic on exam, no leukocytosis or fever. Will continue to monitor off antibiotics for now Improving. Pt nonverbal on admission, minimally responsive to verbal stimuli, now awake and alert, able to have full conversation in native [place] citizen. Etiology of encephalopathy unknown at this time. DDx includes infection (UTI, intraabdominal, meningitis, sepsis from sacral wound), lupus nephritis flare, vasculitis, seizure activity.  - blood and urine cultures pending  - concern for infection from sacral wound or meningitis higher after pt spiked feve this AM, will start empiric abx coverage with ceftriaxone, vancomycin, and acyclovir. ID consult for further recs regarding meningitis. Pt not a candidate for bedside LP at this time due to plavix use for bare metal stent placed January 2018. Consider neuro-IR LP if pt decompensates or has exam findings concerning for acute bacterial meningitis.  - ESR slightly elevated, C3 decreased, C4 wnl. Will f/u CRP, dsDNA for ?lupus flare. Lower suspicion at this time given patient's prolonged prednisone use  - f/u official CT scan read for ?occult infection  - MRI/MRA head was motion limited, but showed no evidence of large infacrt. Will consider re-imaging if she develops new focal PE findings or decompensates  - will follow-up wound c/s for sacral decubitus ulcer management - Improving. Pt was nonverbal on admission, minimally responsive to verbal stimuli, now awake and alert, able to have full conversation in native [place]. Etiology of encephalopathy likely infectious as stage 4 sacral ulcer appears infected as wound care team.  Low suspicion for lupus cerebritis.   - blood and urine cultures pending  - concern for infection from sacral wound higher after pt spiked fever this AM, will start empiric abx coverage with vanco/meropenem. ID consult for further recs regarding meningitis. Pt not a candidate for bedside LP at this time due to plavix use for bare metal stent placed January 2018. Consider neuro-IR for LP if pt decompensates or has exam findings concerning for acute bacterial meningitis.  - ESR slightly elevated, C3 decreased, C4 wnl. Will f/u CRP, dsDNA for ?lupus flare. Lower suspicion at this time given patient's prolonged prednisone use  - CT A/P negative for intraabdominal source of infection  - MRI/MRA head was motion limited, but showed no evidence of large infarct. Will consider re-imaging if she develops new focal PE findings or decompensates  - will follow-up wound c/s for sacral decubitus ulcer management

## 2018-03-02 NOTE — PROGRESS NOTE ADULT - SUBJECTIVE AND OBJECTIVE BOX
INTERVAL HPI/OVERNIGHT EVENTS:  no major events overnight  reviewed ongoing consultations and labs   case d/w medicine resident and ID Attending   seems slightly more appropriate today but would not follow commands still    MEDICATIONS  (STANDING):  aspirin  chewable 81 milliGRAM(s) Oral daily  calcium carbonate 1250 mG (OsCal) 1 Tablet(s) Oral daily  clopidogrel Tablet 75 milliGRAM(s) Oral daily  dextrose 50% Injectable 12.5 Gram(s) IV Push once  dextrose 50% Injectable 25 Gram(s) IV Push once  dextrose 50% Injectable 25 Gram(s) IV Push once  heparin  Injectable 5000 Unit(s) SubCutaneous every 12 hours  insulin lispro (HumaLOG) corrective regimen sliding scale   SubCutaneous three times a day before meals  insulin lispro (HumaLOG) corrective regimen sliding scale   SubCutaneous at bedtime  latanoprost 0.005% Ophthalmic Solution 1 Drop(s) Both EYES at bedtime  levothyroxine 25 MICROGram(s) Oral daily  meropenem  IVPB 500 milliGRAM(s) IV Intermittent every 24 hours  metoprolol succinate ER 25 milliGRAM(s) Oral daily  predniSONE   Tablet 50 milliGRAM(s) Oral daily    MEDICATIONS  (PRN):  acetaminophen   Tablet 650 milliGRAM(s) Oral every 6 hours PRN For Temp greater than 38 C (100.4 F)      Allergies    penicillin (Rash)    Intolerances             Vital Signs Last 24 Hrs  T(C): 37.1 (02 Mar 2018 14:00), Max: 38.7 (02 Mar 2018 06:44)  T(F): 98.7 (02 Mar 2018 14:00), Max: 101.7 (02 Mar 2018 06:44)  HR: 78 (02 Mar 2018 14:00) (78 - 105)  BP: 141/79 (02 Mar 2018 14:00) (138/75 - 164/76)  BP(mean): --  RR: 16 (02 Mar 2018 14:00) (16 - 18)  SpO2: 100% (02 Mar 2018 14:00) (97% - 100%)    PHYSICAL EXAM:      Constitutional:  A x 0 x 2   Eyes:    ENMT:    Neck:    Breasts:    Back:    Respiratory:    Cardiovascular:    Gastrointestinal:    Genitourinary:    Rectal:    Extremities:    Vascular:    Neurological:  unchanged   Skin:  unchanged  Lymph Nodes:    Musculoskeletal:wouldn't cooperate     Psychiatric:        LABS:                        11.3   6.60  )-----------( 137      ( 01 Mar 2018 05:40 )             34.1     03-02    135  |  94<L>  |  43<H>  ----------------------------<  185<H>  4.6   |  23  |  4.80<H>    Ca    8.7      02 Mar 2018 06:19  Phos  2.8     03-02  Mg     2.0     -02    TPro  6.3  /  Alb  2.4<L>  /  TBili  0.6  /  DBili  x   /  AST  20  /  ALT  12  /  AlkPhos  144<H>  03-02    PT/INR - ( 2018 23:40 )   PT: 10.6 SEC;   INR: 0.96          PTT - ( 2018 23:40 )  PTT:38.2 SEC  Urinalysis Basic - ( 01 Mar 2018 14:20 )    Color: GRAEME / Appearance: TURBID / S.030 / pH: 5.5  Gluc: NEGATIVE / Ketone: NEGATIVE  / Bili: NEGATIVE / Urobili: NORMAL mg/dL   Blood: MODERATE / Protein: >600 mg/dL / Nitrite: NEGATIVE   Leuk Esterase: MODERATE / RBC: 5-10 / WBC 10-25   Sq Epi: x / Non Sq Epi: x / Bacteria: x          RADIOLOGY & ADDITIONAL TESTS:

## 2018-03-02 NOTE — CONSULT NOTE ADULT - SUBJECTIVE AND OBJECTIVE BOX
HPI:  57 year old female with Lupus (dx 2017), ESRD on HD (M/W/F), NSTEMI (2018), DMII, HTN, HLD, hypothyroidism, who presented from dialysis with altered mental status. Pt was recently hospitalized from 2017 - 2018 for LE numbness and weakness found to have polyneuropathy and sural nerve biopsy c/w vasculitis with permacath placement on 1/3/18 and NSTEMI s/p cath and ANABELA placed on 18. Pt was discharged to rehab and was at baseline when she became acutely encephalopathic/unresponsive during her hemodialysis session. Pt was at baseline mental status upon arrival and initiation of dialysis. No other events noted. She was transferred to [facility] for further management. Per daughter and son at bedside, the patient was well on Monday, and then acutely became altered on Tuesday and having hallucinations. The children states prior to Tuesday, she did not have any complaints and did not have any complaints. Patient currently AAOx2, at times confused. Has no complaints today.    Upon arrival was afebrile, but this morning was febrile to 101.7F. As per primary team, mental status improving. Blood cxs negative x 24 hours. CT head and CT A/P no acute process. MRI performed but was incomplete study.    Allergic PCN and states had a rash.    PAST MEDICAL & SURGICAL HISTORY:  Lupus (systemic lupus erythematosus)  ESRD (end-stage renal disease) due to SLE  Glaucoma  Other hyperlipidemia  Type 2 diabetes mellitus without complication, without long-term current use of insulin  Essential hypertension  Hypercholesteremia  Hypertension  Diabetes  S/P appendectomy  H/O gastric bypass: 2016  S/P  section: times two      Allergies    penicillin (Rash)    Intolerances        ANTIMICROBIALS:  meropenem  IVPB 500 every 24 hours  vancomycin by level      SOCIAL HISTORY: Denies smoking, alcohol, drug use.    FAMILY HISTORY:  History of hypertension (Sibling): sibling  DM (diabetes mellitus) (Sibling): siblings  History of hypertension: father and mother  DM (diabetes mellitus): mother and father      Drug Dosing Weight  Height (cm): 130 (2018 10:29)  Weight (kg): 48.4 (02 Mar 2018 06:27)  BMI (kg/m2): 28.6 (02 Mar 2018 06:27)  BSA (m2): 1.27 (02 Mar 2018 06:27)    PE:    Vital Signs Last 24 Hrs  T(C): 38.7 (02 Mar 2018 06:44), Max: 38.7 (02 Mar 2018 06:44)  T(F): 101.7 (02 Mar 2018 06:44), Max: 101.7 (02 Mar 2018 06:44)  HR: 105 (02 Mar 2018 06:27) (88 - 105)  BP: 138/75 (02 Mar 2018 06:27) (129/72 - 164/76)  BP(mean): --  RR: 18 (02 Mar 2018 06:27) (18 - 18)  SpO2: 99% (02 Mar 2018 06:27) (97% - 100%)    Gen: NAD, non-toxic, pleasant  CV: S1+S2 normal, no murmurs  Resp: Clear bilat, no resp distress  Abd: Soft, nontender, +BS  Ext: No LE edema, no wounds  : No Johnson  IV/Skin: No thrombophlebitis, left deep sacral decub , right chest permacath  Msk: No arthralgias, no joint swelling  Neuro: AAOx2, no meningismus signs    LABS:                          11.3   6.60  )-----------( 137      ( 01 Mar 2018 05:40 )             34.1       03-02    135  |  94<L>  |  43<H>  ----------------------------<  185<H>  4.6   |  23  |  4.80<H>    Ca    8.7      02 Mar 2018 06:19  Phos  2.8     -  Mg     2.0         TPro  6.3  /  Alb  2.4<L>  /  TBili  0.6  /  DBili  x   /  AST  20  /  ALT  12  /  AlkPhos  144<H>  03-02      Urinalysis Basic - ( 01 Mar 2018 14:20 )    Color: GRAEME / Appearance: TURBID / S.030 / pH: 5.5  Gluc: NEGATIVE / Ketone: NEGATIVE  / Bili: NEGATIVE / Urobili: NORMAL mg/dL   Blood: MODERATE / Protein: >600 mg/dL / Nitrite: NEGATIVE   Leuk Esterase: MODERATE / RBC: 5-10 / WBC 10-25   Sq Epi: x / Non Sq Epi: x / Bacteria: x        MICROBIOLOGY:  v  BLOOD PERIPHERAL  18 --  --  --    RADIOLOGY:    < from: MR Head No Cont (18 @ 09:40) >  IMPRESSION:    Brain MRI:    Motion limited incomplete examination without gross evidence for large   acute infarct or large acute hemorrhage. If the patient remains   symptomatic, consider repeat attempt at exam.    Brain MRA:    Severely motion limited study without gross evidence for large major   vessel occlusion about the [place].      < end of copied text >    < from: CT Abdomen and Pelvis w/ IV Cont (18 @ 18:40) >    IMPRESSION:     Large amount of stool in the sigmoid colon and rectum.  No evidence of intra-abdominal or pelvic infectious source.    < end of copied text >    < from: Xray Chest 1 View- PORTABLE-Urgent (18 @ 01:17) >  IMPRESSION:   Clear lungs.      < end of copied text >

## 2018-03-02 NOTE — PROGRESS NOTE ADULT - PROBLEM SELECTOR PLAN 2
- On dialysis M/W/F, electrolytes wnl on admission, not fluid overloaded on exam or CXR.   - will c/s house renal for dialysis orders - On dialysis M/W/F, electrolytes wnl on admission, not fluid overloaded on exam or CXR. Renal following, appreciate recs  - plan for dialysis today

## 2018-03-02 NOTE — PROGRESS NOTE ADULT - ATTENDING COMMENTS
Pt seen and examined, chart and labs reviewed.  Care discussed with son Alex at bedside who states that pt's mental status is now back to baseline.  She was able to hold a full conversation with me this morning in [place] and able to recognize family members and able to tell me where she was.  There are no focal neurologic exam findings and MRI/A showing no major infarcts, though a limited study.      #Infectious Encephalopathy: source is likely an infected stage 4 sacral ulcer; pt remains septic from this morning (tachy and febrile), but fever curve improving now that pt is on abx.  Agree with IV vanco with HD and meropenem.  My suspicion for meningitis is much lower, no nuchal rigidity/no physical exam findings concerning for meningitis, however if mental status declines, would consult neuro-rads for LP.  We are currently unable to perform LP at bedside as pt requires DAPT for a fresh bare metal stent placed in Jan 2018.  F/U BCx, MRI L-spine pending to r/o OM.    #SLE with SLE nephritis now on ESRD on HD: lower suspicion of lupus cerebritis/acute lupus flare.  Pt would not be candidate for aggressive immunomodulator therapy at this time given sepsis.  Rheum following.  Consider lowering dose of Prednisone in setting of active infection?    Agree with remainder of resident note.

## 2018-03-02 NOTE — PROGRESS NOTE ADULT - ASSESSMENT
57 yof with hx of SLE/GN/vasculitis   on chronic steroids   p/w altered MS, has severe sacral wound, high fever today especially given that she is on steroids  MRI/MRA done but patient was moving     Suggest neurology consult, agree with ID recommendation of MRI to r/o osteomyelitis, suggested LP as well    dsDNA pending, C3 has increased since last visit which goes against SLE flare and in favor of infection    Given that we don't think this is SLE related at this time, would start to lower her steroids  Please DECREASE TO 40mg qd and plan to taper weekly by 10mg every WEEK  Will monitor for evidence of SLE activity when she is tapered.    Please call for any acute changes.

## 2018-03-02 NOTE — ADVANCED PRACTICE NURSE CONSULT - RECOMMEDATIONS
Obtain height. Recommend [facility] Wound MD, Dr. Alexander, for evaluation of sacral wound; suspicious to be source of infection. Contact number: [phone].     R/o Osteomyelitis of Sacral Stage 4 pressure injury.    Obtain height.   Serum Pre-albumin with following blood draws.    Topical Recommendations:  Apply sween 24 to bilateral upper and lower extremities daily.   Apply liquid barrier film to bilateral heels daily.     Left lateral lower leg and Right medial great toe- Gently cleanse with NS. Pat dry. Paint with betadine daily.     Sacrum extending to bilateral buttocks- Stage 4 pressure injury complicated by incontinence associated dermatitis- Cleanse wound and periwound skin with SAF-clens, rinse with NS, pat dry. Apply Liquid barrier film to periwound skin. Apply Collagenase (1) 9 gram tube to base of wound, lightly pack with Daiken's moistened gauze, leaving 2" out at end. Cover with dry 4x4 gauze, abdominal pad. Secure with Tegaderm. Change twice a day.    After wound dressing is applied, provide perineal care twice a day and prn, dry well. Apply thin layer of TRIAD moisture barrier paste. When providing perineal care gently remove soiled layer of TRIAD, reapply top coat.     Continue low air loss bed therapy, continue heel elevation with Z-flex fluidized positioning boots, continue to turn & reposition q2h with Z-juan fluidized positioning device, soft pillow between bony prominences, continue incontinence management as recommended above & single breathable pad, continue measures to decrease friction/shear/pressure.     Continue with nutritional support as per dietary/orders.    Findings and plan discussed with patient, family at bedside, attending at bedside during assessment. Education provided on topical wound therapy, all questions and concerns addressed.    Please contact Wound Care Service Line if we can be of further assistance (ext [phone]). Recommend [facility] Wound MD, Dr. Alexander, for evaluation of sacral wound; suspicious to be source of infection. Contact number: [phone].     R/o Osteomyelitis of Sacral Stage 4 pressure injury.    Obtain height.   Serum Pre-albumin with following blood draws.    Topical Recommendations:  Apply sween 24 to bilateral upper and lower extremities daily.   Apply liquid barrier film to bilateral heels daily.     Left lateral lower leg and Right medial great toe- Gently cleanse with NS. Pat dry. Paint with betadine daily.     Sacrum extending to bilateral buttocks- Stage 4 pressure injury complicated by incontinence associated dermatitis- Cleanse wound and periwound skin with SAF-clens, rinse with NS, pat dry. Apply Liquid barrier film to periwound skin. Apply Collagenase (1) 9 gram tube to base of wound, lightly pack with 1/4 strength Daiken's moistened gauze, leaving 2" out at end. Cover with dry 4x4 gauze, abdominal pad. Secure with Tegaderm. Change twice a day.    After wound dressing is applied, provide perineal care twice a day and prn, dry well. Apply thin layer of TRIAD moisture barrier paste. When providing perineal care gently remove soiled layer of TRIAD, reapply top coat.     Continue low air loss bed therapy, continue heel elevation with Z-flex fluidized positioning boots, continue to turn & reposition q2h with Z-juan fluidized positioning device, soft pillow between bony prominences, continue incontinence management as recommended above & single breathable pad, continue measures to decrease friction/shear/pressure.     Continue with nutritional support as per dietary/orders.    Findings and plan discussed with patient, family at bedside, attending at bedside during assessment. Education provided on topical wound therapy, all questions and concerns addressed.    Please contact Wound Care Service Line if we can be of further assistance (ext [phone]).

## 2018-03-03 LAB
BACTERIA UR CULT: SIGNIFICANT CHANGE UP
BUN SERPL-MCNC: 18 MG/DL — SIGNIFICANT CHANGE UP (ref 7–23)
CALCIUM SERPL-MCNC: 8.7 MG/DL — SIGNIFICANT CHANGE UP (ref 8.4–10.5)
CHLORIDE SERPL-SCNC: 98 MMOL/L — SIGNIFICANT CHANGE UP (ref 98–107)
CO2 SERPL-SCNC: 27 MMOL/L — SIGNIFICANT CHANGE UP (ref 22–31)
CREAT SERPL-MCNC: 2.59 MG/DL — HIGH (ref 0.5–1.3)
DSDNA AB SER-ACNC: 43 IU/ML — HIGH
GLUCOSE SERPL-MCNC: 111 MG/DL — HIGH (ref 70–99)
HCT VFR BLD CALC: 33.4 % — LOW (ref 34.5–45)
HGB BLD-MCNC: 11 G/DL — LOW (ref 11.5–15.5)
MAGNESIUM SERPL-MCNC: 2.7 MG/DL — HIGH (ref 1.6–2.6)
MCHC RBC-ENTMCNC: 30.6 PG — SIGNIFICANT CHANGE UP (ref 27–34)
MCHC RBC-ENTMCNC: 32.9 % — SIGNIFICANT CHANGE UP (ref 32–36)
MCV RBC AUTO: 93 FL — SIGNIFICANT CHANGE UP (ref 80–100)
NRBC # FLD: 0 — SIGNIFICANT CHANGE UP
PHOSPHATE SERPL-MCNC: 2.6 MG/DL — SIGNIFICANT CHANGE UP (ref 2.5–4.5)
PLATELET # BLD AUTO: 169 K/UL — SIGNIFICANT CHANGE UP (ref 150–400)
PMV BLD: 9.8 FL — SIGNIFICANT CHANGE UP (ref 7–13)
POTASSIUM SERPL-MCNC: 3.9 MMOL/L — SIGNIFICANT CHANGE UP (ref 3.5–5.3)
POTASSIUM SERPL-SCNC: 3.9 MMOL/L — SIGNIFICANT CHANGE UP (ref 3.5–5.3)
RBC # BLD: 3.59 M/UL — LOW (ref 3.8–5.2)
RBC # FLD: 18.8 % — HIGH (ref 10.3–14.5)
RIBOSOMAL P AB SER-ACNC: 0.4 — SIGNIFICANT CHANGE UP
SODIUM SERPL-SCNC: 138 MMOL/L — SIGNIFICANT CHANGE UP (ref 135–145)
SPECIMEN SOURCE: SIGNIFICANT CHANGE UP
VANCOMYCIN FLD-MCNC: 11.2 UG/ML — SIGNIFICANT CHANGE UP
WBC # BLD: 9.19 K/UL — SIGNIFICANT CHANGE UP (ref 3.8–10.5)
WBC # FLD AUTO: 9.19 K/UL — SIGNIFICANT CHANGE UP (ref 3.8–10.5)

## 2018-03-03 PROCEDURE — 99232 SBSQ HOSP IP/OBS MODERATE 35: CPT

## 2018-03-03 PROCEDURE — 99233 SBSQ HOSP IP/OBS HIGH 50: CPT | Mod: GC

## 2018-03-03 RX ORDER — VANCOMYCIN HCL 1 G
1000 VIAL (EA) INTRAVENOUS ONCE
Qty: 0 | Refills: 0 | Status: COMPLETED | OUTPATIENT
Start: 2018-03-03 | End: 2018-03-03

## 2018-03-03 RX ADMIN — Medication 8: at 17:18

## 2018-03-03 RX ADMIN — Medication 40 MILLIGRAM(S): at 11:16

## 2018-03-03 RX ADMIN — Medication 81 MILLIGRAM(S): at 11:18

## 2018-03-03 RX ADMIN — MEROPENEM 100 MILLIGRAM(S): 1 INJECTION INTRAVENOUS at 02:05

## 2018-03-03 RX ADMIN — Medication 50 MILLIGRAM(S): at 06:07

## 2018-03-03 RX ADMIN — Medication 25 MICROGRAM(S): at 06:07

## 2018-03-03 RX ADMIN — HEPARIN SODIUM 5000 UNIT(S): 5000 INJECTION INTRAVENOUS; SUBCUTANEOUS at 06:07

## 2018-03-03 RX ADMIN — HEPARIN SODIUM 5000 UNIT(S): 5000 INJECTION INTRAVENOUS; SUBCUTANEOUS at 17:22

## 2018-03-03 RX ADMIN — Medication 25 MILLIGRAM(S): at 06:07

## 2018-03-03 RX ADMIN — INSULIN GLARGINE 14 UNIT(S): 100 INJECTION, SOLUTION SUBCUTANEOUS at 23:07

## 2018-03-03 RX ADMIN — Medication 10: at 12:19

## 2018-03-03 RX ADMIN — Medication 1 TABLET(S): at 11:18

## 2018-03-03 RX ADMIN — Medication 250 MILLIGRAM(S): at 11:16

## 2018-03-03 RX ADMIN — LATANOPROST 1 DROP(S): 0.05 SOLUTION/ DROPS OPHTHALMIC; TOPICAL at 22:50

## 2018-03-03 RX ADMIN — LATANOPROST 1 DROP(S): 0.05 SOLUTION/ DROPS OPHTHALMIC; TOPICAL at 02:05

## 2018-03-03 RX ADMIN — CLOPIDOGREL BISULFATE 75 MILLIGRAM(S): 75 TABLET, FILM COATED ORAL at 11:18

## 2018-03-03 NOTE — PROGRESS NOTE ADULT - ATTENDING COMMENTS
Patient is admitted for acute encephalopathy 2/2 infection of Stage 4 decubitus sacral ulcer, concerning for OM. Currently on meropenem with mental status currently at baseline. Patient afebrile and hemodynamically stable.   - continue IV abx  - obtain MRI of lumbar spine  - decrease to prednisone 40mg as per rheum. Low suspicion for lupus flare at this time  - remainder of plan as mentioned above Patient is admitted for acute encephalopathy 2/2 infection of Stage 4 decubitus sacral ulcer, concerning for OM. Currently on Vancomycin and meropenem with mental status currently at baseline. Patient afebrile and hemodynamically stable.   - continue IV abx. Goal Vanc trough 15-20. Adjust Vanc dosing accordingly  - obtain MRI of lumbar spine  - decrease to prednisone 40mg as per rheum. Low suspicion for lupus flare at this time  - remainder of plan as mentioned above

## 2018-03-03 NOTE — PROGRESS NOTE ADULT - SUBJECTIVE AND OBJECTIVE BOX
Patient seen and examined lying in bed with family at bedside. Much improvement in mental status, able to participate in conversation via . S/p HD yesterday.    REVIEW OF SYSTEMS:    CONSTITUTIONAL: No weakness, fevers or chills  EYES/ENT: No visual changes;  No vertigo or throat pain   NECK: No pain or stiffness  RESPIRATORY: No cough, wheezing, hemoptysis; No shortness of breath  CARDIOVASCULAR: No chest pain or palpitations  GASTROINTESTINAL: No abdominal or epigastric pain. No nausea, vomiting, or hematemesis; No diarrhea or constipation. No melena or hematochezia.  GENITOURINARY: No dysuria, frequency or hematuria  NEUROLOGICAL: No numbness or weakness  SKIN: No itching, burning, rashes, or lesions   All other review of systems is negative unless indicated above.      MEDICATIONS  (STANDING):  aspirin  chewable 81 milliGRAM(s) Oral daily  calcium carbonate 1250 mG (OsCal) 1 Tablet(s) Oral daily  clopidogrel Tablet 75 milliGRAM(s) Oral daily  dextrose 50% Injectable 12.5 Gram(s) IV Push once  dextrose 50% Injectable 25 Gram(s) IV Push once  dextrose 50% Injectable 25 Gram(s) IV Push once  heparin  Injectable 5000 Unit(s) SubCutaneous every 12 hours  insulin glargine Injectable (LANTUS) 14 Unit(s) SubCutaneous at bedtime  insulin lispro (HumaLOG) corrective regimen sliding scale   SubCutaneous three times a day before meals  insulin lispro (HumaLOG) corrective regimen sliding scale   SubCutaneous at bedtime  latanoprost 0.005% Ophthalmic Solution 1 Drop(s) Both EYES at bedtime  levothyroxine 25 MICROGram(s) Oral daily  meropenem  IVPB 500 milliGRAM(s) IV Intermittent every 24 hours  metoprolol succinate ER 25 milliGRAM(s) Oral daily  predniSONE   Tablet 40 milliGRAM(s) Oral daily    MEDICATIONS  (PRN):  acetaminophen   Tablet 650 milliGRAM(s) Oral every 6 hours PRN For Temp greater than 38 C (100.4 F)      VITAL:  T(F): , Max: 98.8 (03-03-18 @ 13:31)  HR: 85 (03-03-18 @ 13:31)  BP: 152/90 (03-03-18 @ 13:31)  BP(mean): --  RR: 18 (03-03-18 @ 13:31)  SpO2: 99% (03-03-18 @ 13:31)  Wt(kg): --    I and O's:        PHYSICAL EXAM:    Constitutional: NAD  HEENT: PERRLA, EOMI,  MMM  Neck: No LAD, No JVD  Respiratory: CTAB  Cardiovascular: S1 and S2  Gastrointestinal: BS+, soft, NT/ND  Extremities: No peripheral edema  Neurological: A/O x 3, no focal deficits  Psychiatric: Normal mood, normal affect  : No Johnson  Skin: No rashes  Access: LUE AVF (+)thrill/immature; RIJ permacath(+)    LABS:                          11.0   9.19  )-----------( 169      ( 03 Mar 2018 06:30 )             33.4                         11.3   6.60  )-----------( 137      ( 01 Mar 2018 05:40 )             34.1                         11.3   8.30  )-----------( 151      ( 28 Feb 2018 23:40 )             32.2     03-03    138  |  98  |  18  ----------------------------<  111<H>  3.9   |  27  |  2.59<H>  03-02    135  |  94<L>  |  43<H>  ----------------------------<  185<H>  4.6   |  23  |  4.80<H>  03-01    137  |  95<L>  |  25<H>  ----------------------------<  138<H>  4.1   |  33<H>  |  2.98<H>  02-28    138  |  96<L>  |  21  ----------------------------<  180<H>  4.3   |  26  |  2.48<H>    Phos  2.6     03-03  Phos  2.8     03-02  Phos  1.6     03-01  Mg     2.7     03-03  Mg     2.0     03-02  Mg     2.2     03-01    TPro  6.3  /  Alb  2.4<L>  /  TBili  0.6  /  DBili  x   /  AST  20  /  ALT  12  /  AlkPhos  144<H>  03-02  TPro  6.6  /  Alb  2.6<L>  /  TBili  0.5  /  DBili  x   /  AST  24  /  ALT  17  /  AlkPhos  148<H>  03-01  TPro  6.1  /  Alb  2.8<L>  /  TBili  0.5  /  DBili  x   /  AST  23  /  ALT  18  /  AlkPhos  162<H>  02-28      Urine Studies:          RADIOLOGY & ADDITIONAL STUDIES:    < from: MR Head No Cont (03.02.18 @ 09:40) >  Brain MRI:    Motion limited incomplete examination without gross evidence for large   acute infarct or large acute hemorrhage. If the patient remains   symptomatic, consider repeat attempt at exam.    Brain MRA:    Severely motion limited study without gross evidence for large major   vessel occlusion about the [place] of [place].    < end of copied text >  < from: MR Head No Cont (03.02.18 @ 09:40) >      Mitul Esposito NP  [facility]  [phone]

## 2018-03-03 NOTE — PROGRESS NOTE ADULT - PROBLEM SELECTOR PLAN 1
- Improving. Pt was nonverbal on admission, minimally responsive to verbal stimuli, now awake and alert, able to have full conversation in native [place]. Etiology of encephalopathy likely infectious as stage 4 sacral ulcer appears infected as wound care team.  Low suspicion for lupus cerebritis.   - blood and urine cultures pending  - concern for infection from sacral wound higher after pt spiked fever 3/2, will c/w empiric abx coverage with vanco/meropenem. Pt not a candidate for bedside LP at this time due to plavix use for bare metal stent placed January 2018. Consider neuro-IR for LP if pt decompensates or has exam findings concerning for acute bacterial meningitis.  - ESR slightly elevated, C3 decreased, C4 wnl. Will f/u CRP, dsDNA for ?lupus flare. Lower suspicion at this time given patient's prolonged prednisone use  - CT A/P negative for intraabdominal source of infection  - MRI/MRA head was motion limited, but showed no evidence of large infarct. Will consider re-imaging if she develops new focal PE findings or decompensates  - will follow-up wound c/s for sacral decubitus ulcer management  - ID consulted, appreciate recs. Will f/u quant gold - Improving. Pt was nonverbal on admission, minimally responsive to verbal stimuli, now awake and alert, able to have full conversation in native Samoan. Etiology of encephalopathy likely infectious as stage 4 sacral ulcer appears infected as wound care team.  Low suspicion for lupus cerebritis.   - blood and urine cultures pending  - concern for infection from sacral wound higher after pt spiked fever 3/2, will c/w empiric abx coverage with vanco/meropenem. Pt not a candidate for bedside LP at this time due to plavix use for bare metal stent placed January 2018. Consider neuro-IR for LP if pt decompensates or has exam findings concerning for acute bacterial meningitis.  - ESR slightly elevated, C3 decreased, C4 wnl. More consistent with infection as opposed to lupus flare. Will f/u CRP, dsDNA for ?lupus flare. Lower suspicion at this time given patient's prolonged prednisone use  - CT A/P negative for intraabdominal source of infection  - MRI/MRA head was motion limited, but showed no evidence of large infarct. Will consider re-imaging if she develops new focal PE findings or decompensates  - will follow-up wound c/s for sacral decubitus ulcer management  - ID consulted, appreciate recs. Will f/u quant gold

## 2018-03-03 NOTE — PROGRESS NOTE ADULT - ASSESSMENT
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 Infectious disease following, would need LP to exclude meningitis, may need to repeat MRI when pt more cooperative, now on empiric broad spectrum antimicrobials  (3)Vascular - immature AVF      RECOMMEND:  (1) Next HD Monday  (2)Dose antimicrobials for GFR<10/HD (present dosing, including for Acyclovir, appears appropriate)  (3)Would hold off with efforts to mature AVF until we see evidence that her mental status is improving    Family updated

## 2018-03-03 NOTE — PROGRESS NOTE ADULT - SUBJECTIVE AND OBJECTIVE BOX
CC: F/U AMS    Interval History/ROS: Patient remains awake, alert, AAOx2. c/o pain in the left buttock. Otherwise has no other complaints. Denies fever, chills.    Allergies  penicillin (Rash)      ANTIMICROBIALS:  meropenem  IVPB 500 every 24 hours  vancomycin  IVPB 1000 once    PE:    Vital Signs Last 24 Hrs  T(C): 36.6 (03 Mar 2018 05:20), Max: 37.1 (02 Mar 2018 14:00)  T(F): 97.8 (03 Mar 2018 05:20), Max: 98.7 (02 Mar 2018 14:00)  HR: 83 (03 Mar 2018 05:20) (78 - 87)  BP: 140/63 (03 Mar 2018 05:20) (140/63 - 155/79)  BP(mean): --  RR: 18 (03 Mar 2018 05:20) (16 - 18)  SpO2: 99% (03 Mar 2018 05:20) (99% - 100%)    Gen: AOx2, NAD, non-toxic, pleasant  CV: S1+S2 normal, no murmurs  Resp: Clear bilat, no resp distress  Abd: Soft, nontender, +BS  Ext: No LE edema, no wounds  : No Johnson  IV/Skin: No thrombophlebitis, left deep buttock debubitus ulcer with serosanguinous drainage on packing.   Neuro: no focal deficits    LABS:                          11.0   9.19  )-----------( 169      ( 03 Mar 2018 06:30 )             33.4       03-03    138  |  98  |  18  ----------------------------<  111<H>  3.9   |  27  |  2.59<H>    Ca    8.7      03 Mar 2018 06:30  Phos  2.6     03-03  Mg     2.7     03-03    TPro  6.3  /  Alb  2.4<L>  /  TBili  0.6  /  DBili  x   /  AST  20  /  ALT  12  /  AlkPhos  144<H>  03-02      Urinalysis Basic - ( 01 Mar 2018 14:20 )    Color: GRAEME / Appearance: TURBID / S.030 / pH: 5.5  Gluc: NEGATIVE / Ketone: NEGATIVE  / Bili: NEGATIVE / Urobili: NORMAL mg/dL   Blood: MODERATE / Protein: >600 mg/dL / Nitrite: NEGATIVE   Leuk Esterase: MODERATE / RBC: 5-10 / WBC 10-25   Sq Epi: x / Non Sq Epi: x / Bacteria: x        MICROBIOLOGY:  Vancomycin Level, Random: 11.2 ug/mL (18 @ 06:30)  v  BLOOD VENOUS  18 --  --  --      URINE CATHETER  18 --  --  --      BLOOD PERIPHERAL  18 --  --  --    RADIOLOGY:    No new images.

## 2018-03-03 NOTE — PROGRESS NOTE ADULT - SUBJECTIVE AND OBJECTIVE BOX
PROVIDER CONTACT INFO:  MD JER Del Real Pager: [phone]  Long Range Pager: [phone]    WILLARD BOB  57y  Female      Patient is a 57y old  Female who presents with a chief complaint of Altered Mental Status (01 Mar 2018 11:00)      INTERVAL HPI/OVERNIGHT EVENTS: No overnight events.    T(C): 36.6 (18 @ 05:20), Max: 37.1 (18 @ 14:00)  HR: 83 (18 @ 05:20) (78 - 87)  BP: 140/63 (18 @ 05:20) (140/63 - 155/79)  RR: 18 (18 @ 05:20) (16 - 18)  SpO2: 99% (18 @ 05:20) (99% - 100%)  Wt(kg): --Vital Signs Last 24 Hrs  T(C): 36.6 (03 Mar 2018 05:20), Max: 37.1 (02 Mar 2018 14:00)  T(F): 97.8 (03 Mar 2018 05:20), Max: 98.7 (02 Mar 2018 14:00)  HR: 83 (03 Mar 2018 05:20) (78 - 87)  BP: 140/63 (03 Mar 2018 05:20) (140/63 - 155/79)  BP(mean): --  RR: 18 (03 Mar 2018 05:20) (16 - 18)  SpO2: 99% (03 Mar 2018 05:20) (99% - 100%)    PHYSICAL EXAM:  GENERAL: NAD  HEAD:  Atraumatic, Normocephalic  EYES: EOMI, PERRLA, conjunctiva and sclera clear  ENMT: No tonsillar erythema, exudates,; Moist mucous membranes. No lesions  NECK: Supple, No JVD, Normal thyroid  CHEST/LUNG: Clear to percussion bilaterally; No rales, rhonchi, wheezing, or rubs  HEART: Regular rate and rhythm; No murmurs, rubs, or gallops  ABDOMEN: Soft, Nontender, Nondistended; Bowel sounds present.  EXTREMITIES:  2+ Peripheral Pulses, No clubbing, cyanosis, or edema  LYMPH: No lymphadenopathy noted  SKIN: No rashes or lesions  PSYCH: Alert & Oriented x2    Consultant(s) Notes Reviewed:  [x ] YES  [ ] NO  Care Discussed with Consultants/Other Providers [ x] YES  [ ] NO    LABS:        135  |  94<L>  |  43<H>  ----------------------------<  185<H>  4.6   |  23  |  4.80<H>    Ca    8.7      02 Mar 2018 06:19  Phos  2.8       Mg     2.0         TPro  6.3  /  Alb  2.4<L>  /  TBili  0.6  /  DBili  x   /  AST  20  /  ALT  12  /  AlkPhos  144<H>  -      Urinalysis Basic - ( 01 Mar 2018 14:20 )    Color: GRAEME / Appearance: TURBID / S.030 / pH: 5.5  Gluc: NEGATIVE / Ketone: NEGATIVE  / Bili: NEGATIVE / Urobili: NORMAL mg/dL   Blood: MODERATE / Protein: >600 mg/dL / Nitrite: NEGATIVE   Leuk Esterase: MODERATE / RBC: 5-10 / WBC 10-25   Sq Epi: x / Non Sq Epi: x / Bacteria: x      CAPILLARY BLOOD GLUCOSE      POCT Blood Glucose.: 108 mg/dL (02 Mar 2018 22:42)  POCT Blood Glucose.: 461 mg/dL (02 Mar 2018 19:12)  POCT Blood Glucose.: 442 mg/dL (02 Mar 2018 17:11)  POCT Blood Glucose.: 274 mg/dL (02 Mar 2018 12:04)  POCT Blood Glucose.: 213 mg/dL (02 Mar 2018 09:35)        Urinalysis Basic - ( 01 Mar 2018 14:20 )    Color: GRAEME / Appearance: TURBID / S.030 / pH: 5.5  Gluc: NEGATIVE / Ketone: NEGATIVE  / Bili: NEGATIVE / Urobili: NORMAL mg/dL   Blood: MODERATE / Protein: >600 mg/dL / Nitrite: NEGATIVE   Leuk Esterase: MODERATE / RBC: 5-10 / WBC 10-25   Sq Epi: x / Non Sq Epi: x / Bacteria: x        RADIOLOGY & ADDITIONAL TESTS:    Imaging Personally Reviewed:  [ ] YES  [ ] NO PROVIDER CONTACT INFO:  MD JER Del Real Pager: [phone]  Long [place] Pager: [phone]    WILLARD BOB  57y  Female      Patient is a 57y old  Female who presents with a chief complaint of Altered Mental Status (01 Mar 2018 11:00)      INTERVAL HPI/OVERNIGHT EVENTS: No overnight events. This AM pt denies F/C/N/V, CP/SOB, abd pain, dysuria. c/o continued pain in back around sacral ulcer    T(C): 36.6 (18 @ 05:20), Max: 37.1 (18 @ 14:00)  HR: 83 (18 @ 05:20) (78 - 87)  BP: 140/63 (18 @ 05:20) (140/63 - 155/79)  RR: 18 (18 @ 05:20) (16 - 18)  SpO2: 99% (18 @ 05:20) (99% - 100%)  Wt(kg): --Vital Signs Last 24 Hrs  T(C): 36.6 (03 Mar 2018 05:20), Max: 37.1 (02 Mar 2018 14:00)  T(F): 97.8 (03 Mar 2018 05:20), Max: 98.7 (02 Mar 2018 14:00)  HR: 83 (03 Mar 2018 05:20) (78 - 87)  BP: 140/63 (03 Mar 2018 05:20) (140/63 - 155/79)  BP(mean): --  RR: 18 (03 Mar 2018 05:20) (16 - 18)  SpO2: 99% (03 Mar 2018 05:20) (99% - 100%)    PHYSICAL EXAM:  GENERAL: NAD  HEAD:  Atraumatic, Normocephalic  EYES: EOMI, PERRLA, conjunctiva and sclera clear  ENMT: No tonsillar erythema, exudates,; Moist mucous membranes. No lesions  NECK: Supple, No JVD, Normal thyroid  CHEST/LUNG: Clear to percussion bilaterally; No rales, rhonchi, wheezing, or rubs  HEART: Regular rate and rhythm; No murmurs, rubs, or gallops  ABDOMEN: Soft, Nontender, Nondistended; Bowel sounds present.  EXTREMITIES:  2+ Peripheral Pulses, No clubbing, cyanosis, or edema  BACK: sacral decubitus ulcer with bandage C/D/I no erythema or drainage  LYMPH: No lymphadenopathy noted  SKIN: No rashes or lesions  PSYCH: Alert & Oriented x2    Consultant(s) Notes Reviewed:  [x ] YES  [ ] NO  Care Discussed with Consultants/Other Providers [ x] YES  [ ] NO    LABS:        135  |  94<L>  |  43<H>  ----------------------------<  185<H>  4.6   |  23  |  4.80<H>    Ca    8.7      02 Mar 2018 06:19  Phos  2.8       Mg     2.0         TPro  6.3  /  Alb  2.4<L>  /  TBili  0.6  /  DBili  x   /  AST  20  /  ALT  12  /  AlkPhos  144<H>        Urinalysis Basic - ( 01 Mar 2018 14:20 )    Color: GRAEME / Appearance: TURBID / S.030 / pH: 5.5  Gluc: NEGATIVE / Ketone: NEGATIVE  / Bili: NEGATIVE / Urobili: NORMAL mg/dL   Blood: MODERATE / Protein: >600 mg/dL / Nitrite: NEGATIVE   Leuk Esterase: MODERATE / RBC: 5-10 / WBC 10-25   Sq Epi: x / Non Sq Epi: x / Bacteria: x      CAPILLARY BLOOD GLUCOSE      POCT Blood Glucose.: 108 mg/dL (02 Mar 2018 22:42)  POCT Blood Glucose.: 461 mg/dL (02 Mar 2018 19:12)  POCT Blood Glucose.: 442 mg/dL (02 Mar 2018 17:11)  POCT Blood Glucose.: 274 mg/dL (02 Mar 2018 12:04)  POCT Blood Glucose.: 213 mg/dL (02 Mar 2018 09:35)        Urinalysis Basic - ( 01 Mar 2018 14:20 )    Color: GRAEME / Appearance: TURBID / S.030 / pH: 5.5  Gluc: NEGATIVE / Ketone: NEGATIVE  / Bili: NEGATIVE / Urobili: NORMAL mg/dL   Blood: MODERATE / Protein: >600 mg/dL / Nitrite: NEGATIVE   Leuk Esterase: MODERATE / RBC: 5-10 / WBC 10-25   Sq Epi: x / Non Sq Epi: x / Bacteria: x        RADIOLOGY & ADDITIONAL TESTS:    Imaging Personally Reviewed:  [ ] YES  [ ] NO

## 2018-03-03 NOTE — PROGRESS NOTE ADULT - ASSESSMENT
57F with PMHx of lupus (dx April 2017), ESRD on HD (M/W/F), NSTEMI (1/2018), DMII, HTN, HLD, hypothyroidism who presents from dialysis with acute mental status changes of unclear etiology; possibly 2/2 occult infection, vasculitis, seizure activity, or lupus flare. 57F with PMHx of lupus (dx April 2017), ESRD on HD (M/W/F), NSTEMI (1/2018), DMII, HTN, HLD, hypothyroidism who presents from dialysis with acute mental status changes of unclear etiology; most likely 2/2 sacral wound infection; now mentating at baseline.

## 2018-03-03 NOTE — PROGRESS NOTE ADULT - PROBLEM SELECTOR PLAN 3
- Diagnosed with lupus in April 2017. On prednisone 50mg as outpatient due to side effect profile of hydroxychloroquine and concern for infection during previous visit limiting immunosuppressive agents.  - f/u lupus flare bloodwork as above  - rheumatology consulted, appreciate recs. Will c/w prednisone 50mg QD. - Diagnosed with lupus in April 2017. On prednisone 50mg as outpatient due to side effect profile of hydroxychloroquine and concern for infection during previous visit limiting immunosuppressive agents.  - f/u lupus flare bloodwork as above  - rheumatology consulted, appreciate recs. Will decrease prednisone to 40mg QD with plan to taper by decreasing dose by 10mg every week.

## 2018-03-03 NOTE — PROGRESS NOTE ADULT - ASSESSMENT
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     s/p RTX on 1/14/18 and was on prednisone 50 mg daily.    Recommend:  -In order to r/o infectious causes of AMS and now with fevers, would need LP to exclude meningitis.  -In CSF would check for counts, glucose, protein, CSF PCR panel, MAGUI virus PCR, AFB, Crypt ag.  -F/U quant TB gold, crypt ag in serum.  -May need repeat MRI when patient more able to tolerate.  -Consider Neurology input.  -F/U blood cxs - thus far no growth to date  -F/U Wound care consult for left buttock decubitus ulcer  -Continue vanco by level and meropenem pending workup.  -Consider MRI left buttock region to r/o om.

## 2018-03-03 NOTE — PROGRESS NOTE ADULT - PROBLEM SELECTOR PLAN 2
- On dialysis M/W/F, electrolytes wnl on admission, not fluid overloaded on exam or CXR. Renal following, appreciate recs  - dialysis per renal recs

## 2018-03-03 NOTE — CHART NOTE - NSCHARTNOTEFT_GEN_A_CORE
patient seen and examined by me personally  dramatic improvement in the last many days suggestive of infection and response to ABX in my opinion    no indication of SLE flare   once cleared of infection plan for 2nd 1 gm IV of rituxan that she was due for last month so she can get it before leaving   continue to taper steroids as noted in 3/2/18 Rheumatology Progress note    patient aware of our impression and plan

## 2018-03-03 NOTE — PROGRESS NOTE ADULT - PROBLEM SELECTOR PLAN 5
- A1C 7.3 on admission, blood sugars uncontrolled yesterday PM so lantus 14 was added  - c/w ISS and FS

## 2018-03-04 ENCOUNTER — TRANSCRIPTION ENCOUNTER (OUTPATIENT)
Age: 58
End: 2018-03-04

## 2018-03-04 DIAGNOSIS — S31.000A UNSPECIFIED OPEN WOUND OF LOWER BACK AND PELVIS WITHOUT PENETRATION INTO RETROPERITONEUM, INITIAL ENCOUNTER: ICD-10-CM

## 2018-03-04 LAB
BUN SERPL-MCNC: 37 MG/DL — HIGH (ref 7–23)
CALCIUM SERPL-MCNC: 9.4 MG/DL — SIGNIFICANT CHANGE UP (ref 8.4–10.5)
CHLORIDE SERPL-SCNC: 94 MMOL/L — LOW (ref 98–107)
CO2 SERPL-SCNC: 23 MMOL/L — SIGNIFICANT CHANGE UP (ref 22–31)
CREAT SERPL-MCNC: 3.87 MG/DL — HIGH (ref 0.5–1.3)
CRYPTOC AG FLD QL: NEGATIVE — SIGNIFICANT CHANGE UP
GLUCOSE SERPL-MCNC: 210 MG/DL — HIGH (ref 70–99)
HCT VFR BLD CALC: 32.4 % — LOW (ref 34.5–45)
HGB BLD-MCNC: 11 G/DL — LOW (ref 11.5–15.5)
MAGNESIUM SERPL-MCNC: 2.1 MG/DL — SIGNIFICANT CHANGE UP (ref 1.6–2.6)
MCHC RBC-ENTMCNC: 32.1 PG — SIGNIFICANT CHANGE UP (ref 27–34)
MCHC RBC-ENTMCNC: 34 % — SIGNIFICANT CHANGE UP (ref 32–36)
MCV RBC AUTO: 94.5 FL — SIGNIFICANT CHANGE UP (ref 80–100)
NRBC # FLD: 0 — SIGNIFICANT CHANGE UP
PHOSPHATE SERPL-MCNC: 3.9 MG/DL — SIGNIFICANT CHANGE UP (ref 2.5–4.5)
PLATELET # BLD AUTO: 199 K/UL — SIGNIFICANT CHANGE UP (ref 150–400)
PMV BLD: 9.4 FL — SIGNIFICANT CHANGE UP (ref 7–13)
POTASSIUM SERPL-MCNC: 4.3 MMOL/L — SIGNIFICANT CHANGE UP (ref 3.5–5.3)
POTASSIUM SERPL-SCNC: 4.3 MMOL/L — SIGNIFICANT CHANGE UP (ref 3.5–5.3)
RBC # BLD: 3.43 M/UL — LOW (ref 3.8–5.2)
RBC # FLD: 18.5 % — HIGH (ref 10.3–14.5)
SODIUM SERPL-SCNC: 135 MMOL/L — SIGNIFICANT CHANGE UP (ref 135–145)
VANCOMYCIN FLD-MCNC: 26.6 UG/ML — CRITICAL HIGH
WBC # BLD: 9.9 K/UL — SIGNIFICANT CHANGE UP (ref 3.8–10.5)
WBC # FLD AUTO: 9.9 K/UL — SIGNIFICANT CHANGE UP (ref 3.8–10.5)

## 2018-03-04 PROCEDURE — 99233 SBSQ HOSP IP/OBS HIGH 50: CPT | Mod: GC

## 2018-03-04 RX ORDER — INSULIN GLARGINE 100 [IU]/ML
15 INJECTION, SOLUTION SUBCUTANEOUS AT BEDTIME
Qty: 0 | Refills: 0 | Status: DISCONTINUED | OUTPATIENT
Start: 2018-03-04 | End: 2018-03-10

## 2018-03-04 RX ORDER — COLLAGENASE CLOSTRIDIUM HIST. 250 UNIT/G
1 OINTMENT (GRAM) TOPICAL DAILY
Qty: 0 | Refills: 0 | Status: DISCONTINUED | OUTPATIENT
Start: 2018-03-04 | End: 2018-03-12

## 2018-03-04 RX ORDER — SODIUM HYPOCHLORITE 0.125 %
1 SOLUTION, NON-ORAL MISCELLANEOUS DAILY
Qty: 0 | Refills: 0 | Status: DISCONTINUED | OUTPATIENT
Start: 2018-03-04 | End: 2018-03-12

## 2018-03-04 RX ORDER — INSULIN LISPRO 100/ML
3 VIAL (ML) SUBCUTANEOUS
Qty: 0 | Refills: 0 | Status: DISCONTINUED | OUTPATIENT
Start: 2018-03-04 | End: 2018-03-10

## 2018-03-04 RX ORDER — INSULIN GLARGINE 100 [IU]/ML
16 INJECTION, SOLUTION SUBCUTANEOUS AT BEDTIME
Qty: 0 | Refills: 0 | Status: DISCONTINUED | OUTPATIENT
Start: 2018-03-04 | End: 2018-03-04

## 2018-03-04 RX ADMIN — Medication 25 MILLIGRAM(S): at 06:10

## 2018-03-04 RX ADMIN — CLOPIDOGREL BISULFATE 75 MILLIGRAM(S): 75 TABLET, FILM COATED ORAL at 12:17

## 2018-03-04 RX ADMIN — Medication 1 TABLET(S): at 12:17

## 2018-03-04 RX ADMIN — HEPARIN SODIUM 5000 UNIT(S): 5000 INJECTION INTRAVENOUS; SUBCUTANEOUS at 17:37

## 2018-03-04 RX ADMIN — Medication 81 MILLIGRAM(S): at 12:17

## 2018-03-04 RX ADMIN — INSULIN GLARGINE 15 UNIT(S): 100 INJECTION, SOLUTION SUBCUTANEOUS at 22:03

## 2018-03-04 RX ADMIN — HEPARIN SODIUM 5000 UNIT(S): 5000 INJECTION INTRAVENOUS; SUBCUTANEOUS at 06:10

## 2018-03-04 RX ADMIN — LATANOPROST 1 DROP(S): 0.05 SOLUTION/ DROPS OPHTHALMIC; TOPICAL at 22:03

## 2018-03-04 RX ADMIN — Medication 6: at 12:17

## 2018-03-04 RX ADMIN — MEROPENEM 100 MILLIGRAM(S): 1 INJECTION INTRAVENOUS at 05:16

## 2018-03-04 RX ADMIN — Medication 4: at 08:43

## 2018-03-04 RX ADMIN — Medication 25 MICROGRAM(S): at 06:10

## 2018-03-04 RX ADMIN — Medication 40 MILLIGRAM(S): at 06:10

## 2018-03-04 RX ADMIN — Medication 1 APPLICATION(S): at 12:17

## 2018-03-04 RX ADMIN — Medication 10: at 17:37

## 2018-03-04 RX ADMIN — Medication 1 APPLICATION(S): at 12:18

## 2018-03-04 RX ADMIN — Medication 3 UNIT(S): at 17:37

## 2018-03-04 NOTE — PROGRESS NOTE ADULT - PROBLEM SELECTOR PROBLEM 6
Hypercholesteremia Type 2 diabetes mellitus without complication, without long-term current use of insulin Coronary artery disease involving native coronary artery of native heart without angina pectoris

## 2018-03-04 NOTE — DISCHARGE NOTE ADULT - DURABLE MEDICAL EQUIPMENT AGENCY
Wound Vac ordered form [employer]  to be delivered to pt home pending auth Wound Vac ordered form [employer]  to be delivered to pt home. Wound Vac ordered form [employer]  to be delivered to pt home tomorrow. weather permitting Wound Vac ordered from  [facility]  to be delivered to pt home tomorrow, weather permitting.

## 2018-03-04 NOTE — PHYSICAL THERAPY INITIAL EVALUATION ADULT - GAIT DEVIATIONS NOTED, PT EVAL
increased time in double stance/decreased stride length/decreased weight-shifting ability/decreased velocity of limb motion/decreased jitendra/decreased step length

## 2018-03-04 NOTE — PHYSICAL THERAPY INITIAL EVALUATION ADULT - GENERAL OBSERVATIONS, REHAB EVAL
Consult received, chart reviewed. Patient received supine in bed, NAD, children present to translate. Pt. has bilateral LE AFO's, but no shoes present to use them in. Patient agreed to EVALUATION from Physical Therapist.

## 2018-03-04 NOTE — DISCHARGE NOTE ADULT - HOME CARE AGENCY
[place] [phone].  RN will visit day after discharge.  RN will call you to arrange visit time [facility] .   RN will visit 3/13/18. .  RN will call you to arrange visit time [facility] .   RN will visit tomorrow,  3/13/18. .  RN will call you to arrange visit time [place] [phone].   RN will visit tomorrow,  3/13/18 weather permitting .  RN will call you to arrange visit time. [place] [phone].   RN will visit tomorrow,  3/13/18 weather permitting .  RN will call you to arrange visit time. Physical therapy to follow.

## 2018-03-04 NOTE — DISCHARGE NOTE ADULT - CARE PROVIDER_API CALL
Deisy Young  Tel: [phone]   For wound care  Phone: (   )    -  Fax: (   )    -    Amberly,   Rheumatology   Dr. Gaming (Appt 03/21 3pm at [place])  Phone: (   )    -  Fax: (   )    -    Silvio Lora), Internal Medicine  [address] [place] [address]  Phone: [phone]  Fax: [phone]

## 2018-03-04 NOTE — PHYSICAL THERAPY INITIAL EVALUATION ADULT - PERTINENT HX OF CURRENT PROBLEM, REHAB EVAL
Pt hospitalized from 12/19/2018-1/16/2018 for LE numbness/weakness (polyneuropathy), was unresponsive at rehab center, now admitted for acute encephalopathy, hx of lupus

## 2018-03-04 NOTE — PHYSICAL THERAPY INITIAL EVALUATION ADULT - ADDITIONAL COMMENTS
Prior to rehab, pt. was an independent ambulator. At rehab, pt. required rolling walker and assist of one person to walk ~100 feet    Pt left supine in bed, NAD, children present, CORNELIA finn Prior to rehab, pt. was an independent ambulator. At rehab, pt. required rolling walker and assist of one person to walk ~100 feet    Pt left supine in bed, NAD, children present, +bed alarm, CORNELIA finn.

## 2018-03-04 NOTE — PROGRESS NOTE ADULT - SUBJECTIVE AND OBJECTIVE BOX
Patient seen and examined lying flat in bed on room air, calm and cooperative. Vanco dose held due to elevated vanco level this am.    REVIEW OF SYSTEMS:    CONSTITUTIONAL: No weakness, fevers or chills  EYES/ENT: No visual changes;  No vertigo or throat pain   NECK: No pain or stiffness  RESPIRATORY: No cough, wheezing, hemoptysis; No shortness of breath  CARDIOVASCULAR: No chest pain or palpitations  GASTROINTESTINAL: No abdominal or epigastric pain. No nausea, vomiting, or hematemesis; No diarrhea or constipation. No melena or hematochezia.  GENITOURINARY: No dysuria, frequency or hematuria  NEUROLOGICAL: No numbness or weakness  SKIN: No itching, burning, rashes, or lesions   All other review of systems is negative unless indicated above.      MEDICATIONS  (STANDING):  aspirin  chewable 81 milliGRAM(s) Oral daily  calcium carbonate 1250 mG (OsCal) 1 Tablet(s) Oral daily  clopidogrel Tablet 75 milliGRAM(s) Oral daily  collagenase Ointment 1 Application(s) Topical daily  Dakins Solution - 1/4 Strength 1 Application(s) Topical daily  dextrose 50% Injectable 12.5 Gram(s) IV Push once  dextrose 50% Injectable 25 Gram(s) IV Push once  dextrose 50% Injectable 25 Gram(s) IV Push once  heparin  Injectable 5000 Unit(s) SubCutaneous every 12 hours  insulin glargine Injectable (LANTUS) 14 Unit(s) SubCutaneous at bedtime  insulin lispro (HumaLOG) corrective regimen sliding scale   SubCutaneous three times a day before meals  insulin lispro (HumaLOG) corrective regimen sliding scale   SubCutaneous at bedtime  latanoprost 0.005% Ophthalmic Solution 1 Drop(s) Both EYES at bedtime  levothyroxine 25 MICROGram(s) Oral daily  meropenem  IVPB 500 milliGRAM(s) IV Intermittent every 24 hours  metoprolol succinate ER 25 milliGRAM(s) Oral daily  predniSONE   Tablet 40 milliGRAM(s) Oral daily    MEDICATIONS  (PRN):  acetaminophen   Tablet 650 milliGRAM(s) Oral every 6 hours PRN For Temp greater than 38 C (100.4 F)      VITAL:  T(F): , Max: 98.8 (03-03-18 @ 13:31)  HR: 71 (03-04-18 @ 05:52)  BP: 155/81 (03-04-18 @ 05:52)  BP(mean): --  RR: 18 (03-04-18 @ 05:52)  SpO2: 100% (03-04-18 @ 05:52)  Wt(kg): --    I and O's:    Height (cm): 157.48 (03-04 @ 05:52)    PHYSICAL EXAM:    Constitutional: NAD  HEENT: PERRLA, EOMI,  MMM  Neck: No LAD, No JVD  Respiratory: CTAB  Cardiovascular: S1 and S2  Gastrointestinal: BS+, soft, NT/ND  Extremities: No peripheral edema  Neurological: A/O x 3, no focal deficits  Psychiatric: Normal mood, normal affect  : No Johnson  Skin: No rashes  Access: LUE AVF (+)thrill/immature; [facility] permacat    LABS:                          11.0   9.90  )-----------( 199      ( 04 Mar 2018 07:32 )             32.4                         11.0   9.19  )-----------( 169      ( 03 Mar 2018 06:30 )             33.4     03-04    135  |  94<L>  |  37<H>  ----------------------------<  210<H>  4.3   |  23  |  3.87<H>  03-03    138  |  98  |  18  ----------------------------<  111<H>  3.9   |  27  |  2.59<H>  03-02    135  |  94<L>  |  43<H>  ----------------------------<  185<H>  4.6   |  23  |  4.80<H>    Phos  3.9     03-04  Phos  2.6     03-03  Phos  2.8     03-02  Mg     2.1     03-04  Mg     2.7     03-03  Mg     2.0     03-02    TPro  6.3  /  Alb  2.4<L>  /  TBili  0.6  /  DBili  x   /  AST  20  /  ALT  12  /  AlkPhos  144<H>  03-02      Urine Studies:          RADIOLOGY & ADDITIONAL STUDIES:    Mitul Esposito NP  [facility]  [phone]

## 2018-03-04 NOTE — DISCHARGE NOTE ADULT - PLAN OF CARE
Follow up with your wound care surgeon Dr. Dean in 1-2 weeks after discharge. Infected ulcer treated Please return to ER or visit with PMD if altered mental status recurs. Please resume dialysis on 3/14 as outpatient Please follow up with outpatient rheumatology You have an appointment with Dr. Gaming at [place] on 03/21/2018 at 3pm. Continue with abx treatment with dialysis Follow up with Dr. Lora (ID) in 3 weeks from discharge. Please adhere to wound vac use at home after discharge.

## 2018-03-04 NOTE — PHYSICAL THERAPY INITIAL EVALUATION ADULT - BRACE/ORTHOTICS
Pt has bilateral LE AFO's present but no shoes present to use, pt will benefit from use of AFO's, daughter reports she is picking up the braces from rehab center

## 2018-03-04 NOTE — PROGRESS NOTE ADULT - PROBLEM SELECTOR PROBLEM 5
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

## 2018-03-04 NOTE — PROGRESS NOTE ADULT - ASSESSMENT
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 Infectious disease following, would need LP to exclude meningitis, may need to repeat MRI when pt more cooperative, now on empiric broad spectrum antimicrobials  (3)Vascular - immature AVF    RECOMMEND:  (1) Next HD tomorrow  (2)Dose antimicrobials for GFR<10/HD  (3) monitor mental status prior to efforts to mature AVF

## 2018-03-04 NOTE — DISCHARGE NOTE ADULT - PATIENT PORTAL LINK FT
You can access the [facility] Portal, offered by [facility], by registering with the following website: [email or web address]

## 2018-03-04 NOTE — PROGRESS NOTE ADULT - PROBLEM SELECTOR PLAN 3
- Diagnosed with lupus in April 2017. On prednisone 50mg as outpatient due to side effect profile of hydroxychloroquine and concern for infection during previous visit limiting immunosuppressive agents.  - f/u lupus flare bloodwork as above  - rheumatology consulted, appreciate recs. Will decrease prednisone to 40mg QD with plan to taper by decreasing dose by 10mg every week. -dx 4/2017, rapidly progressing end-organ damage (Lupus Nephritis)  -labs do not support Lupus flare  -Rheum following: c/w Prednisone 40mg daily with plan to taper by decreasing dose by 10mg every week (on Fridays)  -will coordinate additional Rituxan dose prior to discharge after resolution of infection

## 2018-03-04 NOTE — DISCHARGE NOTE ADULT - MEDICATION SUMMARY - MEDICATIONS TO TAKE
I will START or STAY ON the medications listed below when I get home from the hospital:    Wound vac  -- Apply to buttock wound  -- Indication: For ulcer    predniSONE 10 mg oral tablet  -- 3 tab(s) by mouth once a day for one week (until 3/18), then followed by 2 tabs by mouth once a day for another week.  -- Indication: For Lupus (systemic lupus erythematosus)    metroNIDAZOLE 500 mg oral tablet  -- 1 tab(s) by mouth 2 times a day  -- Indication: For Osteomyelitis of coccyx    acetaminophen 325 mg oral tablet  -- 2 tab(s) by mouth every 6 hours, As needed, For Temp greater than 38 C (100.4 F)  -- Indication: For Fever    acetaminophen 325 mg oral tablet  -- 2 tab(s) by mouth every 6 hours, As needed, Mild Pain (1 - 3)  -- Indication: For Pain    aspirin 81 mg oral delayed release tablet  -- 1 tab(s) by mouth once a day  -- Indication: For CAD    oxyCODONE 5 mg oral capsule  -- 1 cap(s) by mouth every 6 hours, As Needed for severe pain MDD:4  -- Caution federal law prohibits the transfer of this drug to any person other  than the person for whom it was prescribed.  It is very important that you take or use this exactly as directed.  Do not skip doses or discontinue unless directed by your doctor.  May cause drowsiness.  Alcohol may intensify this effect.  Use care when operating dangerous machinery.  This prescription cannot be refilled.  Using more of this medication than prescribed may cause serious breathing problems.    -- Indication: For Pain    calcium carbonate 1250 mg (500 mg elemental calcium) oral tablet  -- 1 tab(s) by mouth once a day  -- Indication: For Supplement    gabapentin 100 mg oral capsule  -- 200 milligram(s) by mouth 2 times a day  -- Indication: For Pain    atorvastatin 40 mg oral tablet  -- 1 tab(s) by mouth once a day (at bedtime)  -- Indication: For CAD    clopidogrel 75 mg oral tablet  -- 1 tab(s) by mouth once a day  -- Indication: For CAD    metoprolol succinate 25 mg oral tablet, extended release  -- 1 tab(s) by mouth once a day  -- Indication: For CAD    cefepime 2 g intravenous injection  -- 2 gram(s) intravenous   -- Indication: For Osteomyelitis of coccyx    vancomycin 500 mg intravenous injection  -- 500 milligram(s) intravenous   -- Indication: For Osteomyelitis of coccyx    bisacodyl 5 mg oral delayed release tablet  -- 1 tab(s) by mouth once a day  -- Indication: For Constipation    Betimol 0.5% ophthalmic solution  -- 1 drop(s) to each affected eye 2 times a day  -- Verified by [facility] [phone]  -- Indication: For Eye    Travatan Z 0.004% ophthalmic solution  -- 1 drop(s) to each affected eye once a day (at bedtime)  -- Verified by [facility] [phone]  -- Indication: For Eye    calcium acetate 667 mg oral tablet  -- 1 tab(s) by mouth once a day  -- Indication: For Supplement    levothyroxine 25 mcg (0.025 mg) oral tablet  -- 1 tab(s) by mouth once a day  -- Indication: For Hypothyroidism    Nephro-Bravo oral tablet  -- 1 tab(s) by mouth once a day  -- Indication: For ESRD (end-stage renal disease) due to SLE I will START or STAY ON the medications listed below when I get home from the hospital:    Wound vac  -- Apply to buttock wound  -- Indication: For ulcer    predniSONE 10 mg oral tablet  -- 3 tab(s) by mouth once a day for one week (until 3/18), then followed by 2 tabs by mouth once a day for another week.  -- Indication: For Lupus (systemic lupus erythematosus)    metroNIDAZOLE 500 mg oral tablet  -- 1 tab(s) by mouth 2 times a day  -- Indication: For Osteomyelitis of coccyx    acetaminophen 325 mg oral tablet  -- 2 tab(s) by mouth every 6 hours, As needed, For Temp greater than 38 C (100.4 F)  -- Indication: For Fever    acetaminophen 325 mg oral tablet  -- 2 tab(s) by mouth every 6 hours, As needed, Mild Pain (1 - 3)  -- Indication: For Pain    aspirin 81 mg oral delayed release tablet  -- 1 tab(s) by mouth once a day  -- Indication: For CAD    oxyCODONE 5 mg oral capsule  -- 1 cap(s) by mouth every 6 hours, As Needed for severe pain MDD:4  -- Caution federal law prohibits the transfer of this drug to any person other  than the person for whom it was prescribed.  It is very important that you take or use this exactly as directed.  Do not skip doses or discontinue unless directed by your doctor.  May cause drowsiness.  Alcohol may intensify this effect.  Use care when operating dangerous machinery.  This prescription cannot be refilled.  Using more of this medication than prescribed may cause serious breathing problems.    -- Indication: For Pain    calcium carbonate 1250 mg (500 mg elemental calcium) oral tablet  -- 1 tab(s) by mouth once a day  -- Indication: For Supplement    gabapentin 100 mg oral capsule  -- 200 milligram(s) by mouth 2 times a day  -- Indication: For Pain    atorvastatin 40 mg oral tablet  -- 1 tab(s) by mouth once a day (at bedtime)  -- Indication: For CAD    clopidogrel 75 mg oral tablet  -- 1 tab(s) by mouth once a day  -- Indication: For CAD    metoprolol succinate 25 mg oral tablet, extended release  -- 1 tab(s) by mouth once a day  -- Indication: For CAD    cefepime 2 g intravenous injection  -- 2 gram(s) intravenous   -- Indication: For Osteomyelitis of coccyx    vancomycin 500 mg intravenous injection  -- 500 milligram(s) intravenous   -- Indication: For Osteomyelitis of coccyx    bisacodyl 5 mg oral delayed release tablet  -- 1 tab(s) by mouth once a day  -- Indication: For Constipation    Betimol 0.5% ophthalmic solution  -- 1 drop(s) to each affected eye 2 times a day  -- Verified by [facility] [phone]  -- Indication: For Eye    Travatan Z 0.004% ophthalmic solution  -- 1 drop(s) to each affected eye once a day (at bedtime)  -- Verified by [facility] [phone]  -- Indication: For Eye    calcium acetate 667 mg oral tablet  -- 1 tab(s) by mouth once a day  -- Indication: For Supplement    levothyroxine 25 mcg (0.025 mg) oral tablet  -- 1 tab(s) by mouth once a day  -- Indication: For Hypothyroidism    Nephro-Bravo oral tablet  -- 1 tab(s) by mouth once a day  -- Indication: For ESRD (end-stage renal disease) due to SLE

## 2018-03-04 NOTE — PROGRESS NOTE ADULT - PROBLEM SELECTOR PLAN 5
- A1C 7.3 on admission, blood sugars uncontrolled yesterday PM so lantus 14 was added  - c/w ISS and FS -s/p BMS  to mLAD on 1/1/18  -c/w ASA/Plavix/Statin x1 year at a minimum -c/w HD on M/W/F  -appreciate Renal input

## 2018-03-04 NOTE — PROGRESS NOTE ADULT - PROBLEM SELECTOR PLAN 4
- Pt with fresh bare metal stent placed to mid LAD on Jan 11, 2018  - Will need to remain on statin, ASA/Plavix x 1 year -c/w HD on M/W/F  -appreciate Renal input -exacerbated with current steroid use  -Hgb A1c = 7.3%, uptrending from 4.7% in 12/2017  -started on Lantus on 3/3 -> will increase Lantus to 15U as AM FS >200 and will start Premeal Humalog 4U qac which will need to be titrated  -c/w FS/ISS qac/qhs -exacerbated with current steroid use  -Hgb A1c = 7.3%, uptrending from 4.7% in 12/2017  -started on Lantus on 3/3 -> will increase Lantus to 15U as AM FS >200 and will start Premeal Humalog 3U qac which will need to be titrated  -c/w FS/ISS qac/qhs

## 2018-03-04 NOTE — PROGRESS NOTE ADULT - PROBLEM SELECTOR PLAN 6
- c/w home statin -exacerbated with current steroid use  -Hgb A1c = 7.3%, uptrending from 4.7% in 12/2017  -started on Lantus on 3/3 -> will increase Lantus to 15U as AM FS >200 and will start Premeal Humalog 4U qac which will need to be titrated  -c/w FS/ISS qac/qhs -s/p BMS  to mLAD on 1/1/18  -c/w ASA/Plavix/Statin x1 year at a minimum

## 2018-03-04 NOTE — PROGRESS NOTE ADULT - PROBLEM SELECTOR PLAN 9
-Diet: CC  -DVT PPX: HSQ        Nestor Donnelly M.D.  Medicine Resident, PGY-2  Pager: [phone]  Weekday PM after 7 pm and Weekends after 12, page [phone]

## 2018-03-04 NOTE — PROGRESS NOTE ADULT - PROBLEM SELECTOR PLAN 1
- Improving. Pt was nonverbal on admission, minimally responsive to verbal stimuli, now awake and alert, able to have full conversation in native [place]. Etiology of encephalopathy likely infectious as stage 4 sacral ulcer appears infected as wound care team.  Low suspicion for lupus cerebritis.   - blood and urine cultures pending  - concern for infection from sacral wound higher after pt spiked fever 3/2, will c/w empiric abx coverage with vanco/meropenem. Pt not a candidate for bedside LP at this time due to plavix use for bare metal stent placed January 2018. Consider neuro-IR for LP if pt decompensates or has exam findings concerning for acute bacterial meningitis.  - ESR slightly elevated, C3 decreased, C4 wnl. More consistent with infection as opposed to lupus flare. Will f/u CRP, dsDNA for ?lupus flare. Lower suspicion at this time given patient's prolonged prednisone use  - CT A/P negative for intraabdominal source of infection  - MRI/MRA head was motion limited, but showed no evidence of large infarct. Will consider re-imaging if she develops new focal PE findings or decompensates  - will follow-up wound c/s for sacral decubitus ulcer management  - ID consulted, appreciate recs. Will f/u quant gold -resolved, likely metabolic due to sacral wound  -c/w Vanc/Meropenem for broad coverage  -appreciate ID recs re: LP. pt currently not a candidate for bedside LP due to Plavix which cannot be held safely 2/2 recent BMS (1/2018). Will need to consult Neuro-Rads for LP this upcoming week  -ESR slightly elevated, C3 decreased, C4 wnl. More consistent with infection as opposed to lupus flare. lower suspicion for autoimmune process, appreciate Rheum recs  - MRI/MRA head was motion limited, but showed no evidence of large infarct. Will consider re-imaging if she develops new focal PE findings or decompensates

## 2018-03-04 NOTE — PROGRESS NOTE ADULT - PROBLEM SELECTOR PLAN 2
- On dialysis M/W/F, electrolytes wnl on admission, not fluid overloaded on exam or CXR. Renal following, appreciate recs  - dialysis per renal recs -concern for primary source of infection  -c/w Vanc/Meropenem given recent prolonged hospitalizations\  -MRI Spine pending to evaluate for OM  -appreciate Wound Care recs: collagenase and Dakins ordered  -will reach out to Wound Care MD (Yosvany Flor) this week for further recs

## 2018-03-04 NOTE — DISCHARGE NOTE ADULT - PROVIDER TOKENS
FREE:[LAST:[Dr. oGddard],FIRST:[Deisy],PHONE:[(   )    -],FAX:[(   )    -],ADDRESS:[Tel: [phone]   For wound care]],FREE:[LAST:[Amberly],PHONE:[(   )    -],FAX:[(   )    -],ADDRESS:[Rheumatology   Dr. Gaimng (Appt 03/21 3pm at [place])]],TOKEN:'[id number]'

## 2018-03-04 NOTE — PROGRESS NOTE ADULT - ASSESSMENT
57F with PMHx of lupus (dx April 2017), ESRD on HD (M/W/F), NSTEMI (1/2018), DMII, HTN, HLD, hypothyroidism who presents from dialysis with acute mental status changes of unclear etiology; most likely 2/2 sacral wound infection; now mentating at baseline. 57F with PMHx of lupus (dx April 2017), ESRD on HD (M/W/F), NSTEMI (1/2018), DMII, HTN, HLD, hypothyroidism who presents from dialysis with encephalopathy most likely 2/2 sacral wound infection; now resolved.

## 2018-03-04 NOTE — DISCHARGE NOTE ADULT - CARE PLAN
Principal Discharge DX:	Altered mental state  Secondary Diagnosis:	ESRD (end-stage renal disease) due to SLE  Secondary Diagnosis:	Lupus (systemic lupus erythematosus) Principal Discharge DX:	Altered mental state  Goal:	Infected ulcer treated  Assessment and plan of treatment:	Please return to ER or visit with PMD if altered mental status recurs.  Secondary Diagnosis:	ESRD (end-stage renal disease) due to SLE  Goal:	Please resume dialysis on 3/14 as outpatient  Secondary Diagnosis:	Lupus (systemic lupus erythematosus)  Goal:	Please follow up with outpatient rheumatology  Assessment and plan of treatment:	You have an appointment with Dr. Gaming at [place] on 03/21/2018 at 3pm.  Secondary Diagnosis:	Osteomyelitis of coccyx  Goal:	Continue with abx treatment with dialysis  Assessment and plan of treatment:	Follow up with Dr. Lora (ID) in 3 weeks from discharge.  Secondary Diagnosis:	Sacral wound  Goal:	Please adhere to wound vac use at home after discharge.  Assessment and plan of treatment:	Follow up with your wound care surgeon Dr. Dean in 1-2 weeks after discharge.

## 2018-03-04 NOTE — DISCHARGE NOTE ADULT - HOSPITAL COURSE
Pt is a 57F with PMHx of lupus (dx April 2017), ESRD on HD (M/W/F), NSTEMI (1/2018), DMII, HTN, HLD, hypothyroidism, who presents from dialysis with altered mental status. Pt is nonverbal on presentation, hx obtained via chart review. Pt was recently hospitalized from 12/19/2017 - 1/16/2018 for LE numbness and weakness found to have polyneuropathy and sural nerve biopsy c/w vasculitis with hospital course c/b permacath placement on 1/3/18 and NSTEMI s/p cath and ANABELA placed on 1/5/18. Pt was discharged to rehab and was at baseline when she became acutely encephalopathic/unresponsive during her hemodialysis yesterday. Pt was at baseline mental status upon arrival and initiation of dialysis. No other events noted. She was transferred to [facility] for further management.    In ED, VS were: T98.7 HR88 /90 RR15 SaO2 100% on Room Air. Labs were notable for elevated creatinine, but otherwise unremarkable. Troponin T was slightly elevated at 0.29. A CXR showed clear lungs and CT head showed no acute intracranial process. She was admitted to the general medical floor for further management of acute encephalopathy of unknown etiology. Pt initially unresponsive on admission, but then had a dramatic improvement over her first day in the hospital and returned to baseline mental status the day after admission. She was started on vanc by level and meropenem after spiking the night after admission, with the presumed source osteomyelitis from an infected sacral wound. CT scan was negative for any acute intraabdominal process or infection, it showed a heavy stool burden. MRI/MRA head was negative for stroke, but limited by motion. She had no focal neuro deficits on exam. Rheum and ID were consulted. Rheumatology lab work came back with findings not suggestive of a lupus flare, so her prednisone was decreased on a taper.  Blood sugars were consistently elevated so pre-meal insulin was added to basal and was titrated based on blood sugars. She was not a candidate for a bedside LP due to her Plavix use so neuroradiology was consulted. Pt is a 57F with PMHx of lupus (dx April 2017), ESRD on HD (M/W/F), NSTEMI (1/2018), DMII, HTN, HLD, hypothyroidism, who presents from dialysis with altered mental status. Pt is nonverbal on presentation, hx obtained via chart review. Pt was recently hospitalized from 12/19/2017 - 1/16/2018 for LE numbness and weakness found to have polyneuropathy and sural nerve biopsy c/w vasculitis with hospital course c/b permacath placement on 1/3/18 and NSTEMI s/p cath and ANABELA placed on 1/5/18. Pt was discharged to rehab and was at baseline when she became acutely encephalopathic/unresponsive during her hemodialysis yesterday. Pt was at baseline mental status upon arrival and initiation of dialysis. No other events noted. She was transferred to [facility] for further management.    In ED, VS were: T98.7 HR88 /90 RR15 SaO2 100% on Room Air. Labs were notable for elevated creatinine, but otherwise unremarkable. Troponin T was slightly elevated at 0.29. A CXR showed clear lungs and CT head showed no acute intracranial process. She was admitted to the general medical floor for further management of acute encephalopathy of unknown etiology. Pt initially unresponsive on admission, but then had a dramatic improvement over her first day in the hospital and returned to baseline mental status the day after admission. She was started on vanc by level and meropenem after spiking the night after admission, with the presumed source osteomyelitis from an infected sacral wound. CT scan was negative for any acute intraabdominal process or infection. MRI/MRA head was negative for stroke, but limited by motion. She had no focal neuro deficits on exam. Rheum and ID were consulted. Rheumatology lab work came back with findings not suggestive of a lupus flare, so her prednisone was decreased on a taper. MRI on 3/7 identified coccyx osteomyelitis. ID recommended empiric abx coverage with vanco, cefepime and flagyl for 6 weeks after wound care recommended against debridement/wound culture. Patient was placed on wound vac for healing. On 3/11, patient finished 2 doses of IVIG with plan for weekly tapering of steroids per rheumatology. At this point, patient stable for discharge with outpatient f/u.

## 2018-03-04 NOTE — DISCHARGE NOTE ADULT - MEDICATION SUMMARY - MEDICATIONS TO CHANGE
I will SWITCH the dose or number of times a day I take the medications listed below when I get home from the hospital:    predniSONE 50 mg oral tablet  -- 1 tab(s) by mouth once a day

## 2018-03-04 NOTE — PROGRESS NOTE ADULT - ATTENDING COMMENTS
Patient is admitted for acute encephalopathy 2/2 infection of Stage 4 decubitus sacral ulcer, concerning for OM. Currently on Vancomycin and meropenem with mental status currently at baseline. Patient afebrile and hemodynamically stable. Noted elevated Vanc trough  - continue IV meropenem. Hold vancomycin today. Check vanc trough levels. Goal Vanc trough 15-20. Adjust Vanc dosing accordingly  - obtain MRI of lumbar spine  - prednisone 40mg as per rheum.  - remainder of plan as mentioned above .

## 2018-03-04 NOTE — DISCHARGE NOTE ADULT - SECONDARY DIAGNOSIS.
ESRD (end-stage renal disease) due to SLE Lupus (systemic lupus erythematosus) Osteomyelitis of coccyx Sacral wound

## 2018-03-04 NOTE — PROGRESS NOTE ADULT - PROBLEM SELECTOR PROBLEM 4
Coronary artery disease involving native coronary artery of native heart without angina pectoris ESRD (end-stage renal disease) due to SLE Type 2 diabetes mellitus without complication, without long-term current use of insulin

## 2018-03-04 NOTE — PROGRESS NOTE ADULT - SUBJECTIVE AND OBJECTIVE BOX
incomplete Patient is a 57y old  Female who presents with a chief complaint of Altered Mental Status (01 Mar 2018 11:00)      SUBJECTIVE / OVERNIGHT EVENTS: no acute events overnight. patient denies fever, chills. appears to be at baseline mental status. ongoing pain at sacral wound    REVIEW OF SYSTEMS:  CONSTITUTIONAL: No fevers or chills  NECK: No pain or stiffness  RESPIRATORY: No cough or shortness of breath  CARDIOVASCULAR: No chest pain or palpitations  GASTROINTESTINAL: No abdominal pain, nausea  GENITOURINARY: No dysuria, frequency  NEUROLOGICAL: No numbness or weakness  SKIN: +wound  All other review of systems is negative unless indicated above.    MEDICATIONS  (STANDING):  aspirin  chewable 81 milliGRAM(s) Oral daily  calcium carbonate 1250 mG (OsCal) 1 Tablet(s) Oral daily  clopidogrel Tablet 75 milliGRAM(s) Oral daily  collagenase Ointment 1 Application(s) Topical daily  Dakins Solution - 1/4 Strength 1 Application(s) Topical daily  dextrose 50% Injectable 12.5 Gram(s) IV Push once  dextrose 50% Injectable 25 Gram(s) IV Push once  dextrose 50% Injectable 25 Gram(s) IV Push once  heparin  Injectable 5000 Unit(s) SubCutaneous every 12 hours  insulin glargine Injectable (LANTUS) 14 Unit(s) SubCutaneous at bedtime  insulin lispro (HumaLOG) corrective regimen sliding scale   SubCutaneous three times a day before meals  insulin lispro (HumaLOG) corrective regimen sliding scale   SubCutaneous at bedtime  latanoprost 0.005% Ophthalmic Solution 1 Drop(s) Both EYES at bedtime  levothyroxine 25 MICROGram(s) Oral daily  meropenem  IVPB 500 milliGRAM(s) IV Intermittent every 24 hours  metoprolol succinate ER 25 milliGRAM(s) Oral daily  predniSONE   Tablet 40 milliGRAM(s) Oral daily    MEDICATIONS  (PRN):  acetaminophen   Tablet 650 milliGRAM(s) Oral every 6 hours PRN For Temp greater than 38 C (100.4 F)      T(C): 36.4 (03-04-18 @ 05:52), Max: 37.1 (03-03-18 @ 13:31)  HR: 71 (03-04-18 @ 05:52) (71 - 85)  BP: 155/81 (03-04-18 @ 05:52) (146/83 - 155/81)  RR: 18 (03-04-18 @ 05:52) (16 - 18)  SpO2: 100% (03-04-18 @ 05:52) (99% - 100%)    CAPILLARY BLOOD GLUCOSE      POCT Blood Glucose.: 224 mg/dL (04 Mar 2018 08:40)  POCT Blood Glucose.: 241 mg/dL (03 Mar 2018 23:02)  POCT Blood Glucose.: 350 mg/dL (03 Mar 2018 17:13)    GENERAL: NAD  HEAD:  Atraumatic, Normocephalic  EYES: EOMI, PERRLA, conjunctiva and sclera clear  ENMT: No tonsillar erythema, exudates,; Moist mucous membranes. No lesions  NECK: Supple, No JVD, Normal thyroid  CHEST/LUNG: Clear to percussion bilaterally; No rales, rhonchi, wheezing, or rubs  HEART: Regular rate and rhythm; No murmurs, rubs, or gallops  ABDOMEN: Soft, Nontender, Nondistended; Bowel sounds present.  EXTREMITIES:  2+ Peripheral Pulses, No clubbing, cyanosis, or edema  BACK: sacral decubitus ulcer with bandage C/D/I no erythema or drainage  LYMPH: No lymphadenopathy noted  SKIN: No rashes or lesions  PSYCH: Alert & Oriented x2    LABS:                        11.0   9.90  )-----------( 199      ( 04 Mar 2018 07:32 )             32.4     03-04    135  |  94<L>  |  37<H>  ----------------------------<  210<H>  4.3   |  23  |  3.87<H>    Ca    9.4      04 Mar 2018 07:32  Phos  3.9     03-04  Mg     2.1     03-04              RADIOLOGY & ADDITIONAL TESTS:  Imaging Personally Reviewed:  Consultant(s) Notes Reviewed: Rheum, Renal, ID  Care Discussed with Consultants/Other Providers:

## 2018-03-05 LAB
BUN SERPL-MCNC: 55 MG/DL — HIGH (ref 7–23)
CALCIUM SERPL-MCNC: 9.4 MG/DL — SIGNIFICANT CHANGE UP (ref 8.4–10.5)
CHLORIDE SERPL-SCNC: 95 MMOL/L — LOW (ref 98–107)
CO2 SERPL-SCNC: 21 MMOL/L — LOW (ref 22–31)
CREAT SERPL-MCNC: 5.36 MG/DL — HIGH (ref 0.5–1.3)
GLUCOSE SERPL-MCNC: 76 MG/DL — SIGNIFICANT CHANGE UP (ref 70–99)
M TB TUBERC IFN-G BLD QL: 0 IU/ML — SIGNIFICANT CHANGE UP
M TB TUBERC IFN-G BLD QL: 0.04 IU/ML — SIGNIFICANT CHANGE UP
M TB TUBERC IFN-G BLD QL: SIGNIFICANT CHANGE UP
MITOGEN IGNF BCKGRD COR BLD-ACNC: 0.18 IU/ML — SIGNIFICANT CHANGE UP
POTASSIUM SERPL-MCNC: 4.7 MMOL/L — SIGNIFICANT CHANGE UP (ref 3.5–5.3)
POTASSIUM SERPL-SCNC: 4.7 MMOL/L — SIGNIFICANT CHANGE UP (ref 3.5–5.3)
SODIUM SERPL-SCNC: 135 MMOL/L — SIGNIFICANT CHANGE UP (ref 135–145)
VANCOMYCIN FLD-MCNC: 24.5 UG/ML — SIGNIFICANT CHANGE UP

## 2018-03-05 PROCEDURE — 99232 SBSQ HOSP IP/OBS MODERATE 35: CPT

## 2018-03-05 PROCEDURE — 72148 MRI LUMBAR SPINE W/O DYE: CPT | Mod: 26

## 2018-03-05 PROCEDURE — 99233 SBSQ HOSP IP/OBS HIGH 50: CPT | Mod: GC

## 2018-03-05 PROCEDURE — 99232 SBSQ HOSP IP/OBS MODERATE 35: CPT | Mod: GC

## 2018-03-05 RX ORDER — MEROPENEM 1 G/30ML
500 INJECTION INTRAVENOUS ONCE
Qty: 0 | Refills: 0 | Status: COMPLETED | OUTPATIENT
Start: 2018-03-05 | End: 2018-03-05

## 2018-03-05 RX ADMIN — Medication 3 UNIT(S): at 08:21

## 2018-03-05 RX ADMIN — Medication 40 MILLIGRAM(S): at 05:34

## 2018-03-05 RX ADMIN — Medication 1 APPLICATION(S): at 12:31

## 2018-03-05 RX ADMIN — Medication 25 MICROGRAM(S): at 05:34

## 2018-03-05 RX ADMIN — Medication 3 UNIT(S): at 12:24

## 2018-03-05 RX ADMIN — CLOPIDOGREL BISULFATE 75 MILLIGRAM(S): 75 TABLET, FILM COATED ORAL at 12:24

## 2018-03-05 RX ADMIN — Medication 2: at 12:24

## 2018-03-05 RX ADMIN — Medication 25 MILLIGRAM(S): at 05:34

## 2018-03-05 RX ADMIN — HEPARIN SODIUM 5000 UNIT(S): 5000 INJECTION INTRAVENOUS; SUBCUTANEOUS at 05:35

## 2018-03-05 RX ADMIN — INSULIN GLARGINE 15 UNIT(S): 100 INJECTION, SOLUTION SUBCUTANEOUS at 21:47

## 2018-03-05 RX ADMIN — LATANOPROST 1 DROP(S): 0.05 SOLUTION/ DROPS OPHTHALMIC; TOPICAL at 21:47

## 2018-03-05 RX ADMIN — Medication 1 TABLET(S): at 12:24

## 2018-03-05 RX ADMIN — Medication 81 MILLIGRAM(S): at 12:24

## 2018-03-05 RX ADMIN — HEPARIN SODIUM 5000 UNIT(S): 5000 INJECTION INTRAVENOUS; SUBCUTANEOUS at 21:46

## 2018-03-05 NOTE — PROGRESS NOTE ADULT - PROBLEM SELECTOR PLAN 2
-concern for primary source of infection  -c/w Vanc/Meropenem given recent prolonged hospitalizations\  -MRI Spine pending to evaluate for OM  -appreciate Wound Care recs: collagenase and Dakins ordered  -will reach out to Wound Care MD (Yosvany Flor) this week for further recs - Concern for primary source of infection  - c/w Vanc/Meropenem given recent prolonged hospitalizations\  - MRI spine and pelvis pending to evaluate for OM  - Appreciate Wound Care recs: collagenase and Dakins ordered  - Reached out to Wound Care MD (Yosvany Flor) 3/5/2018 - Concern for primary source of infection  - c/w Vanc/Meropenem given recent prolonged hospitalizations\  - MRI spine, hip/pelvis pending to evaluate for OM  - Appreciate Wound Care recs: collagenase and Dakins ordered  - Reached out to Wound Care MD (Yosvany Flor) 3/5/2018

## 2018-03-05 NOTE — PROGRESS NOTE ADULT - ASSESSMENT
56 yo W, with SLE ( diagnosed April 2017, + SULLY, DsDNA, +Sm, +aPLs, cytopenias , lupus nephritis with ESRD, recent diagnosis of vasculitic neuropathy s/p RTX on 1/14/18 and on chronic steroids(prednisone 50mg qd)), now with acute change in mental status improved since admission    Plan:  Differential for AMS is broad from infectious (pt. has severe sacral wound) , neurological ( including seizures vs CVA which is a concern in this patient with significant comorbidities and +aPL) or CNS lupus ( which is less likely as patient is on steroids and received recently RTX)    Initial workup shows dsDNA (43) and increased C3 has increased since last visit which goes against SLE flare and in favor of infection, patient mentation has dramatically improved since start of antibiotics  - MRI/MRA done but patient was moving (suboptimal study) , MRI Lumbar spine shows no signs of osteomyelitis - sacral MRI to r/o osteo  - Please continue r/o infectious etiology per ID reccs   -F/U blood cxs - thus far no growth to date  -F/U wound care consult for left buttock decubitus ulcer    once cleared of infection plan for 2nd 1 gm IV of Rituxan that she was due for last month so she can get it before leaving   Given that we don't think this is SLE related at this time, would start to taper steroids - Please plan to taper weekly by 10mg every WEEK - currently lowered to 40mg  Will monitor for evidence of SLE activity when she is tapered.    Plan to be discussed with attending, please follow up with Attendings attestation for final/ complete recommendations.    Neelima Donnelly MD  Internal Medicine PGY1  Pager: [phone]/ [phone]

## 2018-03-05 NOTE — PROGRESS NOTE ADULT - PROBLEM SELECTOR PLAN 1
-resolved, likely metabolic due to sacral wound  -c/w Vanc/Meropenem for broad coverage  -appreciate ID recs re: LP. pt currently not a candidate for bedside LP due to Plavix which cannot be held safely 2/2 recent BMS (1/2018). Will need to consult Neuro-Rads for LP this upcoming week  -ESR slightly elevated, C3 decreased, C4 wnl. More consistent with infection as opposed to lupus flare. lower suspicion for autoimmune process, appreciate Rheum recs  - MRI/MRA head was motion limited, but showed no evidence of large infarct. Will consider re-imaging if she develops new focal PE findings or decompensates - Resolved, likely metabolic due to sacral wound  - c/w Vanc/Meropenem for broad coverage  - Appreciate ID recs. Pt currently not a candidate for bedside LP due to Plavix which cannot be held safely 2/2 recent BMS (1/2018). Will consider consulting Neuro-Rads for LP this week, however patient mental status is improved  - ESR slightly elevated, C3 decreased, C4 wnl. More consistent with infection as opposed to lupus flare. Low suspicion for autoimmune process. Appreciate Rheum recs

## 2018-03-05 NOTE — PROGRESS NOTE ADULT - PROBLEM SELECTOR PLAN 3
-dx 4/2017, rapidly progressing end-organ damage (Lupus Nephritis)  -labs do not support Lupus flare  -Rheum following: c/w Prednisone 40mg daily with plan to taper by decreasing dose by 10mg every week (on Fridays)  -will coordinate additional Rituxan dose prior to discharge after resolution of infection - Dx 4/2017, rapidly progressing end-organ damage (Lupus Nephritis)  - Labs do not support Lupus flare  - Rheum following: c/w Prednisone 40mg daily with plan to taper by decreasing dose by 10mg every week (on Fridays)

## 2018-03-05 NOTE — PROGRESS NOTE ADULT - SUBJECTIVE AND OBJECTIVE BOX
No pain, no shortness of breath      VITAL:  T(C): , Max: 36.8 (03-04-18 @ 20:44)  T(F): , Max: 98.2 (03-04-18 @ 20:44)  HR: 79 (03-05-18 @ 04:52)  BP: 155/84 (03-05-18 @ 04:52)  BP(mean): --  RR: 18 (03-05-18 @ 04:52)  SpO2: 99% (03-05-18 @ 04:52)      PHYSICAL EXAM:  Constitutional: lethargic but alert today - [place]-speaking  HEENT: NCAT, DMM  Neck: Supple, No JVD  Respiratory: CTA-b/l  Cardiovascular: RRR s1s2, no m/r/g  Gastrointestinal: BS+, soft, NT/ND  Extremities: No peripheral edema b/l  Neurological: no focal deficits; strength grossly intact  Psychiatric: Normal mood, normal affect  Back: no CVAT b/l  Skin: No rashes, no nevi  LUE AVF (+)thrill/immature; RIJ permacath(+)      LABS:                        11.0   9.90  )-----------( 199      ( 04 Mar 2018 07:32 )             32.4     Na(135)/K(4.7)/Cl(95)/HCO3(21)/BUN(55)/Cr(5.36)Glu(76)/Ca(9.4)/Mg(--)/PO4(--)    03-05 @ 07:00  Na(135)/K(4.3)/Cl(94)/HCO3(23)/BUN(37)/Cr(3.87)Glu(210)/Ca(9.4)/Mg(2.1)/PO4(3.9)    03-04 @ 07:32  Na(138)/K(3.9)/Cl(98)/HCO3(27)/BUN(18)/Cr(2.59)Glu(111)/Ca(8.7)/Mg(2.7)/PO4(2.6)    03-03 @ 06:30      IMPRESSION: 57F w/ HTN, DM2, CAD, SLE, and 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 Due for HD today.    (2)AMS - unclear exact etiology; due to sacral infection? Now improved    (3)ID - infected sacral ulcer - on Meropenem    (4)Vascular - immature AVF      RECOMMEND:  (1)HD today as ordered  (2)Could involve Vascular Dr. Deluca/make efforts to mature AVF this admission  (3)Abx for GFR<10/HD (meropenem dosing is acceptable)        Orlando Hernandez MD  [facility]  [phone] No pain, no shortness of breath      VITAL:  T(C): , Max: 36.8 (03-04-18 @ 20:44)  T(F): , Max: 98.2 (03-04-18 @ 20:44)  HR: 79 (03-05-18 @ 04:52)  BP: 155/84 (03-05-18 @ 04:52)  BP(mean): --  RR: 18 (03-05-18 @ 04:52)  SpO2: 99% (03-05-18 @ 04:52)      PHYSICAL EXAM:  Constitutional: NAD; Wallisian-speaking  HEENT: NCAT, DMM  Neck: Supple, No JVD  Respiratory: CTA-b/l  Cardiovascular: RRR s1s2, no m/r/g  Gastrointestinal: BS+, soft, NT/ND  Extremities: No peripheral edema b/l  Neurological: no focal deficits; strength grossly intact  Psychiatric: Normal mood, normal affect  Back: no CVAT b/l  Skin: No rashes, no nevi  LUE AVF (+)thrill/immature; RIJ permacath(+)      LABS:                        11.0   9.90  )-----------( 199      ( 04 Mar 2018 07:32 )             32.4     Na(135)/K(4.7)/Cl(95)/HCO3(21)/BUN(55)/Cr(5.36)Glu(76)/Ca(9.4)/Mg(--)/PO4(--)    03-05 @ 07:00  Na(135)/K(4.3)/Cl(94)/HCO3(23)/BUN(37)/Cr(3.87)Glu(210)/Ca(9.4)/Mg(2.1)/PO4(3.9)    03-04 @ 07:32  Na(138)/K(3.9)/Cl(98)/HCO3(27)/BUN(18)/Cr(2.59)Glu(111)/Ca(8.7)/Mg(2.7)/PO4(2.6)    03-03 @ 06:30      IMPRESSION: 57F w/ HTN, DM2, CAD, SLE, and 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 Due for HD today.    (2)AMS - unclear exact etiology; due to sacral infection? Now improved    (3)ID - infected sacral ulcer - on Meropenem    (4)Vascular - immature AVF      RECOMMEND:  (1)HD today as ordered  (2)Could involve Vascular Dr. Deluca/make efforts to mature AVF this admission  (3)Abx for GFR<10/HD (meropenem dosing is acceptable)        Orlando Hernandez MD  [facility]  [phone]

## 2018-03-05 NOTE — PROGRESS NOTE ADULT - SUBJECTIVE AND OBJECTIVE BOX
58 yo W, with SLE ( diagnosed April 2017, + SULLY, DsDNA, +Sm, +aPLs, cytopenias , lupus nephritis with ESRD, recent diagnosis of vasculitic neuropathy s/p RTX on 1/14/18 and remains on prednisone 50 mg od), now with acute change in mental status     INTERVAL HPI/OVERNIGHT EVENTS: NAEON, patient afebrile with VSS. Patient mentation has improved since admission per son at bedside. Patient denies chest pain, sob , vision changes, new rashes, fevers, chills or nausea/ vomitting.    Reviewed ongoing consultations and labs    MEDICATIONS  (STANDING):  aspirin  chewable 81 milliGRAM(s) Oral daily  calcium carbonate 1250 mG (OsCal) 1 Tablet(s) Oral daily  clopidogrel Tablet 75 milliGRAM(s) Oral daily  collagenase Ointment 1 Application(s) Topical daily  Dakins Solution - 1/4 Strength 1 Application(s) Topical daily  dextrose 50% Injectable 12.5 Gram(s) IV Push once  dextrose 50% Injectable 25 Gram(s) IV Push once  dextrose 50% Injectable 25 Gram(s) IV Push once  heparin  Injectable 5000 Unit(s) SubCutaneous every 12 hours  insulin glargine Injectable (LANTUS) 15 Unit(s) SubCutaneous at bedtime  insulin lispro (HumaLOG) corrective regimen sliding scale   SubCutaneous three times a day before meals  insulin lispro (HumaLOG) corrective regimen sliding scale   SubCutaneous at bedtime  insulin lispro Injectable (HumaLOG) 3 Unit(s) SubCutaneous three times a day before meals  latanoprost 0.005% Ophthalmic Solution 1 Drop(s) Both EYES at bedtime  levothyroxine 25 MICROGram(s) Oral daily  meropenem  IVPB 500 milliGRAM(s) IV Intermittent every 24 hours  metoprolol succinate ER 25 milliGRAM(s) Oral daily  predniSONE   Tablet 40 milliGRAM(s) Oral daily    MEDICATIONS  (PRN):  acetaminophen   Tablet 650 milliGRAM(s) Oral every 6 hours PRN For Temp greater than 38 C (100.4 F)      Allergies    penicillin (Rash)    Intolerances        	    Vital Signs Last 24 Hrs  T(C): 36.5 (05 Mar 2018 04:52), Max: 36.8 (04 Mar 2018 20:44)  T(F): 97.7 (05 Mar 2018 04:52), Max: 98.2 (04 Mar 2018 20:44)  HR: 79 (05 Mar 2018 04:52) (79 - 81)  BP: 155/84 (05 Mar 2018 04:52) (155/74 - 155/84)  BP(mean): --  RR: 18 (05 Mar 2018 04:52) (18 - 18)  SpO2: 99% (05 Mar 2018 04:52) (99% - 99%)    PHYSICAL EXAM:      Constitutional: NAD; [place]-speaking    Eyes: EOMI, PERRLA, conjunctiva and sclera clear    ENMT: no tonsillar erythema/exudates/enlargement    Neck: Supple; no JVD; thyroid normal without masses or enlargement    Back: no CVAT b/l    Respiratory: Clear to auscultation B/L; no rales, rhonchi or wheezing    Cardiovascular: Regular rate and rhythm; normal S1/S2; no murmurs, rubs, or gallops    Gastrointestinal: Soft, nontender, nondistended; bowel sounds present    Extremities: Bilateral LE grossly 3+/5    Vascular: LUE AVF (+)thrill/immature; RIJ permacath(+)    Neurological: Alert and oriented to person/place/time, patient has difficulties recalling details for example which hospital she is admitted to, pt has sensation intact grossly, follows commands with son translating    Skin: no visible rashes, 2 x 3 cm round sacral decub ulcer with packing    Musculoskeletal: no evidence of synovitis    Psychiatric: Normal mood, normal affect        LABS:                        11.0   9.90  )-----------( 199      ( 04 Mar 2018 07:32 )             32.4     03-05    135  |  95<L>  |  55<H>  ----------------------------<  76  4.7   |  21<L>  |  5.36<H>    Ca    9.4      05 Mar 2018 07:00  Phos  3.9     03-04  Mg     2.1     03-04              RADIOLOGY & ADDITIONAL TESTS:  < from: MR Lumbar Spine No Cont (03.05.18 @ 11:04) >  IMPRESSION:    No gross MRI evidence for osteomyelitis in the lumbar spine and   visualized portions of the upper sacrum. If the patient remains   symptomatic, consider dedicated sacral MRI exam.    < end of copied text >

## 2018-03-05 NOTE — PROGRESS NOTE ADULT - SUBJECTIVE AND OBJECTIVE BOX
CC: F/U AMS    Interval History/ROS: Patient feels well. Has no complaints. Denies fever, chills. Still remains with some confusion at times during conversation.     Allergies  penicillin (Rash)    ANTIMICROBIALS:  meropenem  IVPB 500 every 24 hours; vancomycin by level    PE:    Vital Signs Last 24 Hrs  T(C): 36.5 (05 Mar 2018 04:52), Max: 36.8 (04 Mar 2018 20:44)  T(F): 97.7 (05 Mar 2018 04:52), Max: 98.2 (04 Mar 2018 20:44)  HR: 79 (05 Mar 2018 04:52) (79 - 81)  BP: 155/84 (05 Mar 2018 04:52) (155/74 - 155/84)  BP(mean): --  RR: 18 (05 Mar 2018 04:52) (18 - 18)  SpO2: 99% (05 Mar 2018 04:52) (99% - 99%)    Gen: AOx2, NAD, non-toxic, pleasant  CV: S1+S2 normal, no murmurs  Resp: Clear bilat, no resp distress  Abd: Soft, nontender, +BS  Ext: No LE edema, no wounds  : No Johnson  IV/Skin: No thrombophlebitis; deep left deep buttock decubitus ulcer with packing  Neuro: no focal deficits    LABS:                          11.0   9.90  )-----------( 199      ( 04 Mar 2018 07:32 )             32.4       03-05    135  |  95<L>  |  55<H>  ----------------------------<  76  4.7   |  21<L>  |  5.36<H>    Ca    9.4      05 Mar 2018 07:00  Phos  3.9     03-04  Mg     2.1     03-04    MICROBIOLOGY:  Vancomycin Level, Random: 24.5 ug/mL (03-05-18 @ 07:00)  v  PORT DEVICE  03-02-18 --  --  --      BLOOD VENOUS  03-02-18 --  --  --      URINE CATHETER  03-01-18 --  --  --      BLOOD PERIPHERAL  03-01-18 --  --  --    RADIOLOGY:    < from: MR Lumbar Spine No Cont (03.05.18 @ 11:04) >  IMPRESSION:    No gross MRI evidence for osteomyelitis in the lumbar spine and   visualized portions of the upper sacrum. If the patient remains   symptomatic, consider dedicated sacral MRI exam.    < end of copied text >

## 2018-03-05 NOTE — PROGRESS NOTE ADULT - PROBLEM SELECTOR PLAN 4
-exacerbated with current steroid use  -Hgb A1c = 7.3%, uptrending from 4.7% in 12/2017  - Lantus to 15U, Humalog 3U qac  -c/w FS/ISS qac/qhs - Exacerbated with current steroid use  - Hgb A1c = 7.3%, uptrending from 4.7% in 12/2017  - Lantus to 15U, Humalog 3U qac  - C/w FS/ISS qac/qhs

## 2018-03-05 NOTE — PROGRESS NOTE ADULT - PROBLEM SELECTOR PLAN 9
-Diet: CC  -DVT PPX: HSQ        Nestor Donnelly M.D.  Medicine Resident, PGY-2  Pager: [phone]  Weekday PM after 7 pm and Weekends after 12, page [phone] - Diet: CC  - DVT PPX: HSQ        Nestor Donnelly M.D.  Medicine Resident, PGY-2  Pager: [phone]  Weekday PM after 7 pm and Weekends after 12, page [phone] - Diet: CC  - DVT PPX: HSQ

## 2018-03-05 NOTE — PROGRESS NOTE ADULT - ATTENDING COMMENTS
58 y/o woman with SLE, HTN, DM2, ESRD on HD who presented with AMS, likely 2/2 toxic metabolic encephalopathy 2/2 L gluteal wound infection, with mental status improved with antibiotics.    L gluteal wound examined - deep, with gauze packing in place.    Appreciate ID, rheumatology recommendations.    Need MRI pelvis/L hip to further evaluate wound and to r/o osteomyelitis. Also needs surgery evaluation of the wound.    Rheum would like to give rituximab for SLE prior to d/c if infection is controlled.

## 2018-03-05 NOTE — PROGRESS NOTE ADULT - ASSESSMENT
57F with PMHx of lupus (dx April 2017), ESRD on HD (M/W/F), NSTEMI (1/2018), DMII, HTN, HLD, hypothyroidism who presents from dialysis with encephalopathy most likely 2/2 sacral wound infection; now resolved. 57F with PMHx of lupus (dx April 2017), ESRD on HD (M/W/F), NSTEMI (1/2018), DMII, HTN, HLD, hypothyroidism who presents from dialysis with encephalopathy with unclear etiology, likely 2/2 sacral wound infection; now resolved.

## 2018-03-05 NOTE — PROGRESS NOTE ADULT - SUBJECTIVE AND OBJECTIVE BOX
For Night coverage 7pm-7am: NS- page [phone] Team1-3, page [phone] Team4 & Care Model  Sat/[facility] [employer] Coverage 12pm-7pm: NS- page 1443 for Team1-4, JER- pager forwarded to covering Resident    CONTACT INFO:  PETER Lemos MD  Internal Medicine PGY1  Pager: [phone]    WILLARD BOB  57y  Female    Patient is a 57y old  Female who presents with a chief complaint of Altered Mental Status (04 Mar 2018 22:28)    INTERVAL HPI / OVERNIGHT EVENTS: No acute events overnight. Patient seen and evaluated at bedside.       OBJECTIVE:  Vitals Signs (24 Hrs):  T(C): 36.5 (03-05-18 @ 04:52), Max: 36.8 (03-04-18 @ 20:44)  HR: 79 (03-05-18 @ 04:52) (78 - 81)  BP: 155/84 (03-05-18 @ 04:52) (144/74 - 155/84)  RR: 18 (03-05-18 @ 04:52) (18 - 18)  SpO2: 99% (03-05-18 @ 04:52) (99% - 100%)    PHYSICAL EXAM:  General: Comfortable, no apparent distress  HEENT: Atraumatic; EOMI, PERRLA, conjunctiva and sclera clear; no tonsillar erythema/exudates/enlargement  Neck: Supple; no JVD; thyroid normal without masses or enlargement  Chest/Lungs: Clear to auscultation B/L; no rales, rhonchi or wheezing  Heart: Regular rate and rhythm; normal S1/S2; no murmurs, rubs, or gallops  Abdomen: Soft, nontender, nondistended; bowel sounds present  Extremities: PT/DP pulses 2+ B/L; no LE edema  Skin: No rashes or lesions  Neurological: Alert and oriented to person/place/time    LABS:                        11.0   9.90  )-----------( 199      ( 04 Mar 2018 07:32 )             32.4     03-04    135  |  94<L>  |  37<H>  ----------------------------<  210<H>  4.3   |  23  |  3.87<H>    Ca    9.4      04 Mar 2018 07:32  Phos  3.9     03-04  Mg     2.1     03-04          CAPILLARY BLOOD GLUCOSE      POCT Blood Glucose.: 250 mg/dL (04 Mar 2018 22:01)  POCT Blood Glucose.: 390 mg/dL (04 Mar 2018 17:19)  POCT Blood Glucose.: 290 mg/dL (04 Mar 2018 12:15)  POCT Blood Glucose.: 224 mg/dL (04 Mar 2018 08:40)        RADIOLOGY & ADDITIONAL TESTS:    MEDICATIONS:  acetaminophen   Tablet 650 milliGRAM(s) Oral every 6 hours PRN For Temp greater than 38 C (100.4 F)  aspirin  chewable 81 milliGRAM(s) Oral daily  calcium carbonate 1250 mG (OsCal) 1 Tablet(s) Oral daily  clopidogrel Tablet 75 milliGRAM(s) Oral daily  collagenase Ointment 1 Application(s) Topical daily  Dakins Solution - 1/4 Strength 1 Application(s) Topical daily  dextrose 50% Injectable 12.5 Gram(s) IV Push once  dextrose 50% Injectable 25 Gram(s) IV Push once  dextrose 50% Injectable 25 Gram(s) IV Push once  heparin  Injectable 5000 Unit(s) SubCutaneous every 12 hours  insulin glargine Injectable (LANTUS) 15 Unit(s) SubCutaneous at bedtime  insulin lispro (HumaLOG) corrective regimen sliding scale   SubCutaneous three times a day before meals  insulin lispro (HumaLOG) corrective regimen sliding scale   SubCutaneous at bedtime  insulin lispro Injectable (HumaLOG) 3 Unit(s) SubCutaneous three times a day before meals  latanoprost 0.005% Ophthalmic Solution 1 Drop(s) Both EYES at bedtime  levothyroxine 25 MICROGram(s) Oral daily  meropenem  IVPB 500 milliGRAM(s) IV Intermittent every 24 hours  metoprolol succinate ER 25 milliGRAM(s) Oral daily  predniSONE   Tablet 40 milliGRAM(s) Oral daily      ALLERGIES:  penicillin (Rash)      Labs and imaging personally reviewed and interpreted [  ]  Consult notes reviewed   Case discussed with consultants For Night coverage 7pm-7am: NS- page 1443 Team1-3, page 1446 Team4 & Care Model  Sat/[facility] Cross Coverage 12pm-7pm: NS- page 1443 for Team1-4, JER- pager forwarded to covering Resident    CONTACT INFO:  PETER Lemos MD  Internal Medicine PGY1  Pager: [phone]    WILLARD BOB  57y  Female    Patient is a 57y old  Female who presents with a chief complaint of Altered Mental Status (04 Mar 2018 22:28)    INTERVAL HPI / OVERNIGHT EVENTS: No acute events overnight. Patient seen and evaluated at bedside. Breathing comfortably Denies chest pain, SOB at rest, nausea/vomiting, fever/chills      OBJECTIVE:  Vitals Signs (24 Hrs):  T(C): 36.5 (03-05-18 @ 04:52), Max: 36.8 (03-04-18 @ 20:44)  HR: 79 (03-05-18 @ 04:52) (78 - 81)  BP: 155/84 (03-05-18 @ 04:52) (144/74 - 155/84)  RR: 18 (03-05-18 @ 04:52) (18 - 18)  SpO2: 99% (03-05-18 @ 04:52) (99% - 100%)    PHYSICAL EXAM:  General: Comfortable, no apparent distress  HEENT: Atraumatic; EOMI, PERRLA, conjunctiva and sclera clear; no tonsillar erythema/exudates/enlargement  Neck: Supple; no JVD; thyroid normal without masses or enlargement  Chest/Lungs: Clear to auscultation B/L; no rales, rhonchi or wheezing  Heart: Regular rate and rhythm; normal S1/S2; no murmurs, rubs, or gallops  Abdomen: Soft, nontender, nondistended; bowel sounds present  Extremities: PT/DP pulses 2+ B/L; no LE edema  Back: sacral decubitus ulcer with bandage C/D/I no erythema or drainage  Skin: No rashes or lesions  Neurological: Alert and oriented x 3    LABS:                        11.0   9.90  )-----------( 199      ( 04 Mar 2018 07:32 )             32.4     03-04    135  |  94<L>  |  37<H>  ----------------------------<  210<H>  4.3   |  23  |  3.87<H>    Ca    9.4      04 Mar 2018 07:32  Phos  3.9     03-04  Mg     2.1     03-04          CAPILLARY BLOOD GLUCOSE      POCT Blood Glucose.: 250 mg/dL (04 Mar 2018 22:01)  POCT Blood Glucose.: 390 mg/dL (04 Mar 2018 17:19)  POCT Blood Glucose.: 290 mg/dL (04 Mar 2018 12:15)  POCT Blood Glucose.: 224 mg/dL (04 Mar 2018 08:40)        RADIOLOGY & ADDITIONAL TESTS:    MEDICATIONS:  acetaminophen   Tablet 650 milliGRAM(s) Oral every 6 hours PRN For Temp greater than 38 C (100.4 F)  aspirin  chewable 81 milliGRAM(s) Oral daily  calcium carbonate 1250 mG (OsCal) 1 Tablet(s) Oral daily  clopidogrel Tablet 75 milliGRAM(s) Oral daily  collagenase Ointment 1 Application(s) Topical daily  Dakins Solution - 1/4 Strength 1 Application(s) Topical daily  dextrose 50% Injectable 12.5 Gram(s) IV Push once  dextrose 50% Injectable 25 Gram(s) IV Push once  dextrose 50% Injectable 25 Gram(s) IV Push once  heparin  Injectable 5000 Unit(s) SubCutaneous every 12 hours  insulin glargine Injectable (LANTUS) 15 Unit(s) SubCutaneous at bedtime  insulin lispro (HumaLOG) corrective regimen sliding scale   SubCutaneous three times a day before meals  insulin lispro (HumaLOG) corrective regimen sliding scale   SubCutaneous at bedtime  insulin lispro Injectable (HumaLOG) 3 Unit(s) SubCutaneous three times a day before meals  latanoprost 0.005% Ophthalmic Solution 1 Drop(s) Both EYES at bedtime  levothyroxine 25 MICROGram(s) Oral daily  meropenem  IVPB 500 milliGRAM(s) IV Intermittent every 24 hours  metoprolol succinate ER 25 milliGRAM(s) Oral daily  predniSONE   Tablet 40 milliGRAM(s) Oral daily      ALLERGIES:  penicillin (Rash)      Labs and imaging personally reviewed and interpreted [  ]  Consult notes reviewed   Case discussed with consultants

## 2018-03-05 NOTE — PROGRESS NOTE ADULT - PROBLEM SELECTOR PLAN 6
-s/p BMS  to mLAD on 1/1/18  -c/w ASA/Plavix/Statin x1 year at a minimum - S/p BMS  to mLAD on 1/1/18  - C/w ASA/Plavix/Statin x1 year at a minimum

## 2018-03-06 LAB
BACTERIA BLD CULT: SIGNIFICANT CHANGE UP
BACTERIA BLD CULT: SIGNIFICANT CHANGE UP
BUN SERPL-MCNC: 28 MG/DL — HIGH (ref 7–23)
CALCIUM SERPL-MCNC: 9.3 MG/DL — SIGNIFICANT CHANGE UP (ref 8.4–10.5)
CHLORIDE SERPL-SCNC: 97 MMOL/L — LOW (ref 98–107)
CK SERPL-CCNC: 42 U/L — SIGNIFICANT CHANGE UP (ref 25–170)
CO2 SERPL-SCNC: 20 MMOL/L — LOW (ref 22–31)
CREAT SERPL-MCNC: 3.22 MG/DL — HIGH (ref 0.5–1.3)
FOLATE RBC-MCNC: 4136 — SIGNIFICANT CHANGE UP
GLUCOSE SERPL-MCNC: 98 MG/DL — SIGNIFICANT CHANGE UP (ref 70–99)
HCT VFR BLD CALC: 33 % — LOW (ref 34.5–45)
MAGNESIUM SERPL-MCNC: 2.2 MG/DL — SIGNIFICANT CHANGE UP (ref 1.6–2.6)
PHOSPHATE SERPL-MCNC: 2.7 MG/DL — SIGNIFICANT CHANGE UP (ref 2.5–4.5)
POTASSIUM SERPL-MCNC: 5 MMOL/L — SIGNIFICANT CHANGE UP (ref 3.5–5.3)
POTASSIUM SERPL-SCNC: 5 MMOL/L — SIGNIFICANT CHANGE UP (ref 3.5–5.3)
SODIUM SERPL-SCNC: 136 MMOL/L — SIGNIFICANT CHANGE UP (ref 135–145)

## 2018-03-06 PROCEDURE — 99232 SBSQ HOSP IP/OBS MODERATE 35: CPT

## 2018-03-06 PROCEDURE — 99223 1ST HOSP IP/OBS HIGH 75: CPT

## 2018-03-06 PROCEDURE — 99233 SBSQ HOSP IP/OBS HIGH 50: CPT | Mod: GC

## 2018-03-06 RX ADMIN — Medication 1 TABLET(S): at 12:17

## 2018-03-06 RX ADMIN — Medication 3 UNIT(S): at 12:17

## 2018-03-06 RX ADMIN — INSULIN GLARGINE 15 UNIT(S): 100 INJECTION, SOLUTION SUBCUTANEOUS at 23:21

## 2018-03-06 RX ADMIN — LATANOPROST 1 DROP(S): 0.05 SOLUTION/ DROPS OPHTHALMIC; TOPICAL at 21:30

## 2018-03-06 RX ADMIN — Medication 25 MILLIGRAM(S): at 05:05

## 2018-03-06 RX ADMIN — MEROPENEM 100 MILLIGRAM(S): 1 INJECTION INTRAVENOUS at 23:20

## 2018-03-06 RX ADMIN — Medication 40 MILLIGRAM(S): at 05:05

## 2018-03-06 RX ADMIN — CLOPIDOGREL BISULFATE 75 MILLIGRAM(S): 75 TABLET, FILM COATED ORAL at 12:16

## 2018-03-06 RX ADMIN — HEPARIN SODIUM 5000 UNIT(S): 5000 INJECTION INTRAVENOUS; SUBCUTANEOUS at 17:21

## 2018-03-06 RX ADMIN — Medication 1 APPLICATION(S): at 12:16

## 2018-03-06 RX ADMIN — Medication 81 MILLIGRAM(S): at 12:16

## 2018-03-06 RX ADMIN — MEROPENEM 100 MILLIGRAM(S): 1 INJECTION INTRAVENOUS at 00:27

## 2018-03-06 RX ADMIN — Medication 3 UNIT(S): at 17:21

## 2018-03-06 RX ADMIN — HEPARIN SODIUM 5000 UNIT(S): 5000 INJECTION INTRAVENOUS; SUBCUTANEOUS at 05:05

## 2018-03-06 RX ADMIN — Medication 4: at 08:20

## 2018-03-06 RX ADMIN — Medication 0.5 MILLIGRAM(S): at 18:22

## 2018-03-06 RX ADMIN — Medication 3 UNIT(S): at 08:20

## 2018-03-06 RX ADMIN — Medication 25 MICROGRAM(S): at 05:05

## 2018-03-06 RX ADMIN — Medication 4: at 12:17

## 2018-03-06 RX ADMIN — Medication 2: at 17:21

## 2018-03-06 NOTE — PROGRESS NOTE ADULT - PROBLEM SELECTOR PLAN 1
- Resolved, likely metabolic due to sacral wound  - c/w Vanc/Meropenem for broad coverage  - Appreciate ID recs. Pt currently not a candidate for bedside LP due to Plavix which cannot be held safely 2/2 recent BMS (1/2018). Will consider consulting Neuro-[facility] for LP this week, however patient mental status is improved  - ESR slightly elevated, C3 decreased, C4 wnl. More consistent with infection as opposed to lupus flare. Low suspicion for autoimmune process. Appreciate Rheum recs

## 2018-03-06 NOTE — CONSULT NOTE ADULT - SUBJECTIVE AND OBJECTIVE BOX
Patient is a 57y old  Female with sacral ( for 8 weeks) and lower extremity ulcers,  presented with a chief complaint of Altered Mental Status (04 Mar 2018 22:28)  PMHx of lupus (dx 2017), ESRD on HD (M/W/F), NSTEMI (2018), DMII, HTN, HLD, hypothyroidism, who presents from dialysis with altered mental status. Pt is nonverbal on presentation, hx obtained via chart review. Pt was recently hospitalized from 2017 - 2018 for LE numbness and weakness found to have polyneuropathy and sural nerve biopsy c/w vasculitis with hospital course c/b permacath placement on 1/3/18 and NSTEMI s/p cath and ANABELA placed on 18. Pt was discharged to rehab and was at baseline when she became acutely encephalopathic/unresponsive during her hemodialysis yesterday. Pt was at baseline mental status upon arrival and initiation of dialysis. No other events noted. She was transferred to [facility] for further management.  In ED, VS were: T98.7 HR88 /90 RR15 SaO2 100% on Room Air. Labs were notable for elevated creatinine, but otherwise unremarkable. Troponin T was slightly elevated at 0.29. A CXR showed clear lungs and CT head showed no acute intracranial process. She was admitted to the general medical floor for further management of acute encephalopathy of unknown etiology. (01 Mar 2018 11:00)  gets dialysis three days a week, h/o biopsy left leg, new black eschar right 1st toe and skin tear mid tibia    REVIEW OF SYSTEMS  Allergies    penicillin (Rash)    Intolerances      General:	Prydeinig   Skin/Breast: ulcers legs  ENMT:	ok  Respiratory and Thorax:normal  Cardiovascular:	left arm fistula  Gastrointestinal:	 normal  Genitourinary:	dialysis  Musculoskeletal:	 rom intact  Neurological:	intact  Endocrine:	lupus vasculaitis/ dm on steroids    MEDICATIONS  (STANDING):  aspirin  chewable 81 milliGRAM(s) Oral daily  calcium carbonate 1250 mG (OsCal) 1 Tablet(s) Oral daily  clopidogrel Tablet 75 milliGRAM(s) Oral daily  collagenase Ointment 1 Application(s) Topical daily  Dakins Solution - 1/4 Strength 1 Application(s) Topical daily  dextrose 50% Injectable 12.5 Gram(s) IV Push once  dextrose 50% Injectable 25 Gram(s) IV Push once  dextrose 50% Injectable 25 Gram(s) IV Push once  heparin  Injectable 5000 Unit(s) SubCutaneous every 12 hours  insulin glargine Injectable (LANTUS) 15 Unit(s) SubCutaneous at bedtime  insulin lispro (HumaLOG) corrective regimen sliding scale   SubCutaneous three times a day before meals  insulin lispro (HumaLOG) corrective regimen sliding scale   SubCutaneous at bedtime  insulin lispro Injectable (HumaLOG) 3 Unit(s) SubCutaneous three times a day before meals  latanoprost 0.005% Ophthalmic Solution 1 Drop(s) Both EYES at bedtime  levothyroxine 25 MICROGram(s) Oral daily  meropenem  IVPB 500 milliGRAM(s) IV Intermittent every 24 hours  metoprolol succinate ER 25 milliGRAM(s) Oral daily  predniSONE   Tablet 40 milliGRAM(s) Oral daily    MEDICATIONS  (PRN):  acetaminophen   Tablet 650 milliGRAM(s) Oral every 6 hours PRN For Temp greater than 38 C (100.4 F)      FAMILY HISTORY:  History of hypertension (Sibling): sibling  DM (diabetes mellitus) (Sibling): siblings  History of hypertension: father and mother  DM (diabetes mellitus): mother and father      Social history: lives with family non smoker    PAST MEDICAL & SURGICAL HISTORY:  Lupus (systemic lupus erythematosus)  ESRD (end-stage renal disease) due to SLE  Glaucoma  Other hyperlipidemia  Type 2 diabetes mellitus without complication, without long-term current use of insulin  Essential hypertension  Hypercholesteremia  Hypertension  Diabetes  S/P appendectomy  H/O gastric bypass: 2016  S/P  section: times two      I&O's Summary    05 Mar 2018 07:01  -  06 Mar 2018 07:00  --------------------------------------------------------  IN: 400 mL / OUT: 1400 mL / NET: -1000 mL        Vital Signs Last 24 Hrs  T(C): 36.3 (06 Mar 2018 15:29), Max: 36.8 (05 Mar 2018 21:19)  T(F): 97.3 (06 Mar 2018 15:29), Max: 98.3 (05 Mar 2018 21:19)  HR: 77 (06 Mar 2018 15:29) (77 - 91)  BP: 146/64 (06 Mar 2018 15:29) (125/79 - 146/64)  BP(mean): --  RR: 18 (06 Mar 2018 15:29) (17 - 18)  SpO2: 100% (06 Mar 2018 15:29) (100% - 100%)        PHYSICAL EXAM:  Constitutional:nad, speaks Prydeinig  ENMT:geovannala eomi  Back: sacral stage 4 4.5x3x1.8, undermined 6-12 3cm  Respiratory:clear  Cardiovascular:rrr, fistula left arm  Gastrointestinal:soft  LN: negative  : dry intact  Extremities:skin tear right mid tibia [id number],  right great toe with black eshcar  distal medial cuticle.5x1  Skin: left lat lower leg eshar 4x.7x0  previous biopsy site  vascular palpable 2 + dp on left , pt, right not palp, pt 1+ only  Musculoskeletal:rom weak upper and lower extremitis        03-06    136  |  97<L>  |  28<H>  ----------------------------<  98  5.0   |  20<L>  |  3.22<H>    Ca    9.3      06 Mar 2018 06:30  Phos  2.7     03-06  Mg     2.2     03-06        eGFR if AA18  eGFR if non AA15  sed 92/ 7.3   immune deficient          Radiology:  no dopplers

## 2018-03-06 NOTE — PROGRESS NOTE ADULT - SUBJECTIVE AND OBJECTIVE BOX
For Night coverage 7pm-7am: NS- page 1443 Team1-3, page 1446 Team4 & Care Model  Sat/[facility]ell Cross Coverage 12pm-7pm: NS- page 1443 for Team1-4, JER- pager forwarded to covering Resident    CONTACT INFO:  PETER Lemos MD  Internal Medicine PGY1  Pager: [phone]    WILLARD BOB  57y  Female    Patient is a 57y old  Female who presents with a chief complaint of Altered Mental Status (04 Mar 2018 22:28)    INTERVAL HPI / OVERNIGHT EVENTS: No acute events overnight. Patient seen and evaluated at bedside.     OBJECTIVE:  Vitals Signs (24 Hrs):  T(C): 36.8 (03-06-18 @ 05:03), Max: 36.8 (03-05-18 @ 21:19)  HR: 91 (03-06-18 @ 05:03) (75 - 91)  BP: 125/79 (03-06-18 @ 05:03) (125/79 - 160/90)  RR: 17 (03-06-18 @ 05:03) (17 - 18)  SpO2: 100% (03-06-18 @ 05:03) (98% - 100%)    PHYSICAL EXAM:  General: Comfortable, no apparent distress  HEENT: Atraumatic; EOMI, PERRLA, conjunctiva and sclera clear; no tonsillar erythema/exudates/enlargement  Neck: Supple; no JVD; thyroid normal without masses or enlargement  Chest/Lungs: Clear to auscultation B/L; no rales, rhonchi or wheezing  Heart: Regular rate and rhythm; normal S1/S2; no murmurs, rubs, or gallops  Abdomen: Soft, nontender, nondistended; bowel sounds present  Extremities: PT/DP pulses 2+ B/L; no LE edema  Back: sacral decubitus ulcer with bandage C/D/I no erythema or drainage  Skin: No rashes or lesions  Neurological: Alert and oriented x 3    LABS:    03-06    136  |  97<L>  |  28<H>  ----------------------------<  98  5.0   |  20<L>  |  3.22<H>    Ca    9.3      06 Mar 2018 06:30  Phos  2.7     03-06  Mg     2.2     03-06          CAPILLARY BLOOD GLUCOSE      POCT Blood Glucose.: 203 mg/dL (05 Mar 2018 21:42)  POCT Blood Glucose.: 115 mg/dL (05 Mar 2018 16:54)  POCT Blood Glucose.: 198 mg/dL (05 Mar 2018 11:54)  POCT Blood Glucose.: 115 mg/dL (05 Mar 2018 08:15)        RADIOLOGY & ADDITIONAL TESTS:    MEDICATIONS:  acetaminophen   Tablet 650 milliGRAM(s) Oral every 6 hours PRN For Temp greater than 38 C (100.4 F)  aspirin  chewable 81 milliGRAM(s) Oral daily  calcium carbonate 1250 mG (OsCal) 1 Tablet(s) Oral daily  clopidogrel Tablet 75 milliGRAM(s) Oral daily  collagenase Ointment 1 Application(s) Topical daily  Dakins Solution - 1/4 Strength 1 Application(s) Topical daily  dextrose 50% Injectable 12.5 Gram(s) IV Push once  dextrose 50% Injectable 25 Gram(s) IV Push once  dextrose 50% Injectable 25 Gram(s) IV Push once  heparin  Injectable 5000 Unit(s) SubCutaneous every 12 hours  insulin glargine Injectable (LANTUS) 15 Unit(s) SubCutaneous at bedtime  insulin lispro (HumaLOG) corrective regimen sliding scale   SubCutaneous three times a day before meals  insulin lispro (HumaLOG) corrective regimen sliding scale   SubCutaneous at bedtime  insulin lispro Injectable (HumaLOG) 3 Unit(s) SubCutaneous three times a day before meals  latanoprost 0.005% Ophthalmic Solution 1 Drop(s) Both EYES at bedtime  levothyroxine 25 MICROGram(s) Oral daily  meropenem  IVPB 500 milliGRAM(s) IV Intermittent every 24 hours  metoprolol succinate ER 25 milliGRAM(s) Oral daily  predniSONE   Tablet 40 milliGRAM(s) Oral daily      ALLERGIES:  penicillin (Rash)      Labs and imaging personally reviewed and interpreted [  ]  Consult notes reviewed   Case discussed with consultants For Night coverage 7pm-7am: NS- page 1443 Team1-3, page 1446 Team4 & Care Model  Sat/Howell Cross Coverage 12pm-7pm: NS- page 1443 for Team1-4, JER- pager forwarded to covering Resident    CONTACT INFO:  PETER Lemos MD  Internal Medicine PGY1  Pager: [phone]    WILLARD BOB  57y  Female    Patient is a 57y old  Female who presents with a chief complaint of Altered Mental Status (04 Mar 2018 22:28)    INTERVAL HPI / OVERNIGHT EVENTS: No acute events overnight. Patient seen and evaluated at bedside. Feeling better today. Denies fever/chills, nausea/vomiting, CP, SOB.     OBJECTIVE:  Vitals Signs (24 Hrs):  T(C): 36.8 (03-06-18 @ 05:03), Max: 36.8 (03-05-18 @ 21:19)  HR: 91 (03-06-18 @ 05:03) (75 - 91)  BP: 125/79 (03-06-18 @ 05:03) (125/79 - 160/90)  RR: 17 (03-06-18 @ 05:03) (17 - 18)  SpO2: 100% (03-06-18 @ 05:03) (98% - 100%)    PHYSICAL EXAM:  General: Comfortable, no apparent distress  HEENT: Atraumatic; EOMI, PERRLA, conjunctiva and sclera clear; no tonsillar erythema/exudates/enlargement  Neck: Supple; no JVD; thyroid normal without masses or enlargement  Chest/Lungs: Clear to auscultation B/L; no rales, rhonchi or wheezing  Heart: Regular rate and rhythm; normal S1/S2; no murmurs, rubs, or gallops  Abdomen: Soft, nontender, nondistended; bowel sounds present  Extremities: PT/DP pulses 2+ B/L; no LE edema  Back: sacral decubitus ulcer with bandage C/D/I no erythema or drainage  Skin: No rashes or lesions  Neurological: Alert and oriented x 3    LABS:    03-06    136  |  97<L>  |  28<H>  ----------------------------<  98  5.0   |  20<L>  |  3.22<H>    Ca    9.3      06 Mar 2018 06:30  Phos  2.7     03-06  Mg     2.2     03-06          CAPILLARY BLOOD GLUCOSE      POCT Blood Glucose.: 203 mg/dL (05 Mar 2018 21:42)  POCT Blood Glucose.: 115 mg/dL (05 Mar 2018 16:54)  POCT Blood Glucose.: 198 mg/dL (05 Mar 2018 11:54)  POCT Blood Glucose.: 115 mg/dL (05 Mar 2018 08:15)        RADIOLOGY & ADDITIONAL TESTS:    MEDICATIONS:  acetaminophen   Tablet 650 milliGRAM(s) Oral every 6 hours PRN For Temp greater than 38 C (100.4 F)  aspirin  chewable 81 milliGRAM(s) Oral daily  calcium carbonate 1250 mG (OsCal) 1 Tablet(s) Oral daily  clopidogrel Tablet 75 milliGRAM(s) Oral daily  collagenase Ointment 1 Application(s) Topical daily  Dakins Solution - 1/4 Strength 1 Application(s) Topical daily  dextrose 50% Injectable 12.5 Gram(s) IV Push once  dextrose 50% Injectable 25 Gram(s) IV Push once  dextrose 50% Injectable 25 Gram(s) IV Push once  heparin  Injectable 5000 Unit(s) SubCutaneous every 12 hours  insulin glargine Injectable (LANTUS) 15 Unit(s) SubCutaneous at bedtime  insulin lispro (HumaLOG) corrective regimen sliding scale   SubCutaneous three times a day before meals  insulin lispro (HumaLOG) corrective regimen sliding scale   SubCutaneous at bedtime  insulin lispro Injectable (HumaLOG) 3 Unit(s) SubCutaneous three times a day before meals  latanoprost 0.005% Ophthalmic Solution 1 Drop(s) Both EYES at bedtime  levothyroxine 25 MICROGram(s) Oral daily  meropenem  IVPB 500 milliGRAM(s) IV Intermittent every 24 hours  metoprolol succinate ER 25 milliGRAM(s) Oral daily  predniSONE   Tablet 40 milliGRAM(s) Oral daily      ALLERGIES:  penicillin (Rash)      Labs and imaging personally reviewed and interpreted [  ]  Consult notes reviewed   Case discussed with consultants

## 2018-03-06 NOTE — PROGRESS NOTE ADULT - SUBJECTIVE AND OBJECTIVE BOX
CC: F/U AMS    Interval History/ROS: Patient states she is feeling better. Has no complaints today. Denies fever, chills.    Allergies  penicillin (Rash)    ANTIMICROBIALS:  meropenem  IVPB 500 every 24 hours    PE:    Vital Signs Last 24 Hrs  T(C): 36.8 (06 Mar 2018 05:03), Max: 36.8 (05 Mar 2018 21:19)  T(F): 98.2 (06 Mar 2018 05:03), Max: 98.3 (05 Mar 2018 21:19)  HR: 91 (06 Mar 2018 05:03) (75 - 91)  BP: 125/79 (06 Mar 2018 05:03) (125/79 - 160/90)  BP(mean): --  RR: 17 (06 Mar 2018 05:03) (17 - 18)  SpO2: 100% (06 Mar 2018 05:03) (98% - 100%)    Gen: AOx2, NAD, non-toxic, pleasant  CV: S1+S2 normal, no murmurs  Resp: Clear bilat, no resp distress  Abd: Soft, nontender, +BS  IV/Skin: No thrombophlebitis; deep left deep buttock decubitus ulcer with packing  : No Johnson  IV/Skin: No thrombophlebitis  Neuro: no focal deficits    LABS:        03-06    136  |  97<L>  |  28<H>  ----------------------------<  98  5.0   |  20<L>  |  3.22<H>    Ca    9.3      06 Mar 2018 06:30  Phos  2.7     03-06  Mg     2.2     03-06    MICROBIOLOGY:  v  PORT DEVICE  03-02-18 --  --  --      BLOOD VENOUS  03-02-18 --  --  --      URINE CATHETER  03-01-18 --  --  --      BLOOD PERIPHERAL  03-01-18 --  --  --    RADIOLOGY:    No new images.

## 2018-03-06 NOTE — PROGRESS NOTE ADULT - PROBLEM SELECTOR PLAN 2
- Concern for primary source of infection  - c/w Vanc/Meropenem given recent prolonged hospitalizations\  - MRI spine, hip/pelvis pending to evaluate for OM  - Appreciate Wound Care recs: collagenase and Dakins ordered  - Surgery consult. Reached out to Wound Care MD (Yosvany Flor) 3/5/2018

## 2018-03-06 NOTE — PROGRESS NOTE ADULT - ASSESSMENT
57F with PMHx of lupus (dx April 2017), ESRD on HD (M/W/F), NSTEMI (1/2018), DMII, HTN, HLD, hypothyroidism who presents from dialysis with encephalopathy with unclear etiology, likely 2/2 sacral wound infection; now resolved.

## 2018-03-06 NOTE — PROGRESS NOTE ADULT - SUBJECTIVE AND OBJECTIVE BOX
No pain, no shortness of breath      VITAL:  T(C): , Max: 36.8 (03-05-18 @ 21:19)  T(F): , Max: 98.3 (03-05-18 @ 21:19)  HR: 77 (03-06-18 @ 15:29)  BP: 146/64 (03-06-18 @ 15:29)  BP(mean): --  RR: 18 (03-06-18 @ 15:29)  SpO2: 100% (03-06-18 @ 15:29)      PHYSICAL EXAM:  Constitutional: NAD; Kinyarwanda-speaking  HEENT: NCAT, DMM  Neck: Supple, No JVD  Respiratory: CTA-b/l  Cardiovascular: RRR s1s2, no m/r/g  Gastrointestinal: BS+, soft, NT/ND  Extremities: No peripheral edema b/l  Neurological: no focal deficits; strength grossly intact  Psychiatric: Normal mood, normal affect  Back: no CVAT b/l  Skin: No rashes, no nevi  LUE AVF (+)thrill/immature; RIJ permacath(+)      LABS:    Na(136)/K(5.0)/Cl(97)/HCO3(20)/BUN(28)/Cr(3.22)Glu(98)/Ca(9.3)/Mg(2.2)/PO4(2.7)    03-06 @ 06:30  Na(135)/K(4.7)/Cl(95)/HCO3(21)/BUN(55)/Cr(5.36)Glu(76)/Ca(9.4)/Mg(--)/PO4(--)    03-05 @ 07:00  Na(135)/K(4.3)/Cl(94)/HCO3(23)/BUN(37)/Cr(3.87)Glu(210)/Ca(9.4)/Mg(2.1)/PO4(3.9)    03-04 @ 07:32        IMPRESSION: 57F w/ HTN, DM2, CAD, SLE, and 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 Due for HD today.    (2)AMS - unclear exact etiology; due to sacral infection? Now improved    (3)ID - infected sacral ulcer - on Meropenem    (4)Vascular - immature AVF      RECOMMEND:  (1)Next HD tomorrow  (2)Abx for GFR<10/HD          Orlando Hernandez MD  [facility],   [phone] Crying, stating that she wants to go home.       VITAL:  T(C): , Max: 36.8 (03-05-18 @ 21:19)  T(F): , Max: 98.3 (03-05-18 @ 21:19)  HR: 77 (03-06-18 @ 15:29)  BP: 146/64 (03-06-18 @ 15:29)  BP(mean): --  RR: 18 (03-06-18 @ 15:29)  SpO2: 100% (03-06-18 @ 15:29)      PHYSICAL EXAM:  Constitutional: NAD; Indonesian-speaking  HEENT: NCAT, DMM  Neck: Supple, No JVD  Respiratory: CTA-b/l  Cardiovascular: RRR s1s2, no m/r/g  Gastrointestinal: BS+, soft, NT/ND  Extremities: No peripheral edema b/l  Neurological: no focal deficits; strength grossly intact  Psychiatric: Normal mood, normal affect  Back: no CVAT b/l  Skin: No rashes, no nevi  LUE AVF (+)thrill/immature; RIJ permacath(+)      LABS:    Na(136)/K(5.0)/Cl(97)/HCO3(20)/BUN(28)/Cr(3.22)Glu(98)/Ca(9.3)/Mg(2.2)/PO4(2.7)    03-06 @ 06:30  Na(135)/K(4.7)/Cl(95)/HCO3(21)/BUN(55)/Cr(5.36)Glu(76)/Ca(9.4)/Mg(--)/PO4(--)    03-05 @ 07:00  Na(135)/K(4.3)/Cl(94)/HCO3(23)/BUN(37)/Cr(3.87)Glu(210)/Ca(9.4)/Mg(2.1)/PO4(3.9)    03-04 @ 07:32        IMPRESSION: 57F w/ HTN, DM2, CAD, SLE, and 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 Due for HD today.    (2)AMS - unclear exact etiology; due to sacral infection? Now improved    (3)ID - infected sacral ulcer - on Meropenem    (4)Vascular - immature AVF      RECOMMEND:  (1)Next HD tomorrow  (2)Abx for GFR<10/HD          Orlando Hernandez MD  [facility]  [phone]

## 2018-03-06 NOTE — PROGRESS NOTE ADULT - PROBLEM SELECTOR PLAN 4
- Exacerbated with current steroid use  - Hgb A1c = 7.3%, uptrending from 4.7% in 12/2017  - Lantus to 15U, Humalog 3U qac  - C/w FS/ISS qac/qhs

## 2018-03-06 NOTE — PROGRESS NOTE ADULT - ASSESSMENT
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     s/p RTX on 1/14/18 and was on prednisone 50 mg daily on admission.    Mental status improving, however, was on antibiotic coverage for meningitis.   Has deep left buttock sacral ulcer.    Recommend:  -In order to r/o infectious causes of AMS and now with fevers, would need LP to exclude meningitis.  -Blood cxs thus far no growth to date  -F/U Wound care consult for left buttock decubitus ulcer  -Continue vanco by level and meropenem pending workup.  -MRI spine negative for OM - F/U MRI left buttock region to r/o om.    Silvio Lora MD  Pager [phone]  After 5pm/weekends call [phone]

## 2018-03-06 NOTE — CONSULT NOTE ADULT - ASSESSMENT
57y old  Female with sacral stage 4 ulcer( for 8 weeks) and lower extremity ulcers, ESRD, lupus with vasculitis on prednisone, right toe eschar and left ankle wound.   sacral ulcer will benefit from vac at 125 mmhg, will order  for eshar on toe and leg betadine daily  for skin tear right leg, foam and cavilon, avoid trauma, patient is immune deficient and has thin skin  needs doppler jae assessment for left lower extremity  nutrition assessment with dm and esrd  PT for strength and rom

## 2018-03-06 NOTE — PROGRESS NOTE ADULT - PROBLEM SELECTOR PLAN 3
- Dx 4/2017, rapidly progressing end-organ damage (Lupus Nephritis)  - Labs do not support Lupus flare  - Rheum following: c/w Prednisone 40mg daily with plan to taper by decreasing dose by 10mg every week (on Fridays). Rituximab  prior to d/c if infection is controlled.

## 2018-03-06 NOTE — PROGRESS NOTE ADULT - ATTENDING COMMENTS
Mental status improved. Still awaiting MRI pelvis/hip to assess for osteomyelitis. Spoke to wound care Dr. Goddard - will see patient after clinic today.

## 2018-03-07 LAB
BACTERIA BLD CULT: SIGNIFICANT CHANGE UP
BUN SERPL-MCNC: 47 MG/DL — HIGH (ref 7–23)
CALCIUM SERPL-MCNC: 8.7 MG/DL — SIGNIFICANT CHANGE UP (ref 8.4–10.5)
CHLORIDE SERPL-SCNC: 97 MMOL/L — LOW (ref 98–107)
CO2 SERPL-SCNC: 25 MMOL/L — SIGNIFICANT CHANGE UP (ref 22–31)
CREAT SERPL-MCNC: 5.03 MG/DL — HIGH (ref 0.5–1.3)
GLUCOSE SERPL-MCNC: 193 MG/DL — HIGH (ref 70–99)
HCT VFR BLD CALC: 29.4 % — LOW (ref 34.5–45)
HGB BLD-MCNC: 9.4 G/DL — LOW (ref 11.5–15.5)
MAGNESIUM SERPL-MCNC: 2.1 MG/DL — SIGNIFICANT CHANGE UP (ref 1.6–2.6)
MCHC RBC-ENTMCNC: 31.1 PG — SIGNIFICANT CHANGE UP (ref 27–34)
MCHC RBC-ENTMCNC: 32 % — SIGNIFICANT CHANGE UP (ref 32–36)
MCV RBC AUTO: 97.4 FL — SIGNIFICANT CHANGE UP (ref 80–100)
NRBC # FLD: 0 — SIGNIFICANT CHANGE UP
PHOSPHATE SERPL-MCNC: 3.2 MG/DL — SIGNIFICANT CHANGE UP (ref 2.5–4.5)
PLATELET # BLD AUTO: 171 K/UL — SIGNIFICANT CHANGE UP (ref 150–400)
PMV BLD: 9.5 FL — SIGNIFICANT CHANGE UP (ref 7–13)
POTASSIUM SERPL-MCNC: 4.4 MMOL/L — SIGNIFICANT CHANGE UP (ref 3.5–5.3)
POTASSIUM SERPL-SCNC: 4.4 MMOL/L — SIGNIFICANT CHANGE UP (ref 3.5–5.3)
RBC # BLD: 3.02 M/UL — LOW (ref 3.8–5.2)
RBC # FLD: 18.4 % — HIGH (ref 10.3–14.5)
SODIUM SERPL-SCNC: 138 MMOL/L — SIGNIFICANT CHANGE UP (ref 135–145)
VANCOMYCIN TROUGH SERPL-MCNC: 9.7 UG/ML — LOW (ref 10–20)
WBC # BLD: 6.53 K/UL — SIGNIFICANT CHANGE UP (ref 3.8–10.5)
WBC # FLD AUTO: 6.53 K/UL — SIGNIFICANT CHANGE UP (ref 3.8–10.5)

## 2018-03-07 PROCEDURE — 99232 SBSQ HOSP IP/OBS MODERATE 35: CPT

## 2018-03-07 PROCEDURE — 72195 MRI PELVIS W/O DYE: CPT | Mod: 26

## 2018-03-07 PROCEDURE — 99233 SBSQ HOSP IP/OBS HIGH 50: CPT | Mod: GC

## 2018-03-07 RX ORDER — LACTOBACILLUS ACIDOPHILUS 100MM CELL
1 CAPSULE ORAL
Qty: 0 | Refills: 0 | Status: DISCONTINUED | OUTPATIENT
Start: 2018-03-07 | End: 2018-03-12

## 2018-03-07 RX ORDER — ACETAMINOPHEN 500 MG
650 TABLET ORAL ONCE
Qty: 0 | Refills: 0 | Status: COMPLETED | OUTPATIENT
Start: 2018-03-07 | End: 2018-03-07

## 2018-03-07 RX ORDER — HYDROMORPHONE HYDROCHLORIDE 2 MG/ML
0.5 INJECTION INTRAMUSCULAR; INTRAVENOUS; SUBCUTANEOUS ONCE
Qty: 0 | Refills: 0 | Status: DISCONTINUED | OUTPATIENT
Start: 2018-03-07 | End: 2018-03-07

## 2018-03-07 RX ORDER — VANCOMYCIN HCL 1 G
1000 VIAL (EA) INTRAVENOUS ONCE
Qty: 0 | Refills: 0 | Status: DISCONTINUED | OUTPATIENT
Start: 2018-03-07 | End: 2018-03-07

## 2018-03-07 RX ADMIN — Medication 1 TABLET(S): at 18:59

## 2018-03-07 RX ADMIN — Medication 0.5 MILLIGRAM(S): at 20:36

## 2018-03-07 RX ADMIN — Medication 81 MILLIGRAM(S): at 12:24

## 2018-03-07 RX ADMIN — Medication 0.5 MILLIGRAM(S): at 00:46

## 2018-03-07 RX ADMIN — Medication 1 TABLET(S): at 12:25

## 2018-03-07 RX ADMIN — HEPARIN SODIUM 5000 UNIT(S): 5000 INJECTION INTRAVENOUS; SUBCUTANEOUS at 06:54

## 2018-03-07 RX ADMIN — HYDROMORPHONE HYDROCHLORIDE 0.5 MILLIGRAM(S): 2 INJECTION INTRAMUSCULAR; INTRAVENOUS; SUBCUTANEOUS at 22:15

## 2018-03-07 RX ADMIN — Medication 3 UNIT(S): at 12:24

## 2018-03-07 RX ADMIN — HEPARIN SODIUM 5000 UNIT(S): 5000 INJECTION INTRAVENOUS; SUBCUTANEOUS at 18:59

## 2018-03-07 RX ADMIN — Medication 40 MILLIGRAM(S): at 06:54

## 2018-03-07 RX ADMIN — Medication 25 MICROGRAM(S): at 06:54

## 2018-03-07 RX ADMIN — CLOPIDOGREL BISULFATE 75 MILLIGRAM(S): 75 TABLET, FILM COATED ORAL at 12:25

## 2018-03-07 RX ADMIN — Medication 25 MILLIGRAM(S): at 06:54

## 2018-03-07 RX ADMIN — Medication 260 MILLIGRAM(S): at 21:40

## 2018-03-07 RX ADMIN — Medication 4: at 12:24

## 2018-03-07 NOTE — PROGRESS NOTE ADULT - ASSESSMENT
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     s/p RTX on 1/14/18 and was on prednisone 50 mg daily on admission.    Mental status improving, however, was on antibiotic coverage for meningitis.   Has deep left buttock sacral ulcer.    Recommend:  -Blood cxs thus far no growth to date  -F/U Wound care consult for left buttock decubitus ulcer  -Continue vanco by level and meropenem pending workup.  -MRI spine negative for OM - F/U MRI left buttock region to r/o om.    Silvio Lora MD  Pager [phone]  After 5pm/weekends call [phone]

## 2018-03-07 NOTE — PROGRESS NOTE ADULT - SUBJECTIVE AND OBJECTIVE BOX
CC: F/U ams    Interval History/ROS: Patient still remains with confusion at times during conversation. Today, AAOx1. She was unaware of where she was and did not know the date. Otherwise, has no other complaints. Denies fever, chills.    Allergies  penicillin (Rash)    ANTIMICROBIALS:  meropenem  IVPB 500 every 24 hours  vancomycin  IVPB 1000 once    PE:    Vital Signs Last 24 Hrs  T(C): 37.2 (07 Mar 2018 13:44), Max: 37.2 (07 Mar 2018 13:44)  T(F): 98.9 (07 Mar 2018 13:44), Max: 98.9 (07 Mar 2018 13:44)  HR: 80 (07 Mar 2018 13:44) (77 - 86)  BP: 128/81 (07 Mar 2018 13:44) (110/47 - 151/85)  BP(mean): --  RR: 18 (07 Mar 2018 13:44) (16 - 18)  SpO2: 100% (07 Mar 2018 13:44) (98% - 100%)    Gen: NAD, non-toxic, pleasant  CV: S1+S2 normal, no murmurs  Resp: Clear bilat, no resp distress  Abd: Soft, nontender, +BS  Ext: No LE edema, left buttock deep wound ulcer   : No Johnson  IV/Skin: No thrombophlebitis  Neuro: AAOx1    LABS:    03-06    136  |  97<L>  |  28<H>  ----------------------------<  98  5.0   |  20<L>  |  3.22<H>    Ca    9.3      06 Mar 2018 06:30  Phos  2.7     03-06  Mg     2.2     03-06    MICROBIOLOGY:  v  PORT DEVICE  03-02-18 --  --  --      BLOOD VENOUS  03-02-18 --  --  --      URINE CATHETER  03-01-18 --  --  --      BLOOD PERIPHERAL  03-01-18 --  --  --    RADIOLOGY:    No new images.

## 2018-03-07 NOTE — PROGRESS NOTE ADULT - PROBLEM SELECTOR PLAN 2
- Concern for primary source of infection and osteomyelitis   - c/w Vanc/Meropenem given recent prolonged hospitalizations\  - MRI spine, hip/pelvis pending to evaluate for OM  - Appreciate Wound Care recs: collagenase and Dakins ordered  - Surgery consult. Reached out to Wound Care MD (Yosvany Flor)- recommend wound vac.

## 2018-03-07 NOTE — PROGRESS NOTE ADULT - ASSESSMENT
57F with PMHx of lupus (dx April 2017), ESRD on HD (M/W/F), NSTEMI (1/2018), DMII, HTN, HLD, hypothyroidism who presents from dialysis with encephalopathy with unclear etiology, likely 2/2 sacral wound infection; AMS now resolved.

## 2018-03-07 NOTE — PROGRESS NOTE ADULT - ATTENDING COMMENTS
Patient awaiting MRI of pelvis and L hip. Depending on results, may need orthopedics or surgery consult for debridement. Appreciate recommendations by wound care. ID consult recommendations appreciated.

## 2018-03-07 NOTE — PROGRESS NOTE ADULT - SUBJECTIVE AND OBJECTIVE BOX
For Night coverage 7pm-7am: NS- page [phone] Team1-3, page [phone] Team4 & Care Model  Sat/Howell Cross Coverage 12pm-7pm: NS- page [phone] for Team1-4, JER- pager forwarded to covering Resident    CONTACT INFO:  PETER Lemos MD  Internal Medicine PGY1  Pager: [phone]    WILLARD BOB  57y  Female    Patient is a 57y old  Female who presents with a chief complaint of Altered Mental Status (04 Mar 2018 22:28)    INTERVAL HPI / OVERNIGHT EVENTS: Cried last night wanting to leave AMA. Family at bedside. Questions answered and risk of leaving AMA explained including risk of death. Patient agreed to stay for MRI and surgical evaluation of sacral wound. Patient givenativan overnight.  Patient seen and evaluated at bedside.       OBJECTIVE:  Vitals Signs (24 Hrs):  T(C): 36.8 (03-07-18 @ 06:53), Max: 36.8 (03-06-18 @ 20:57)  HR: 82 (03-07-18 @ 06:53) (77 - 86)  BP: 151/85 (03-07-18 @ 06:53) (110/47 - 151/85)  RR: 16 (03-07-18 @ 06:53) (16 - 18)  SpO2: 100% (03-07-18 @ 06:53) (98% - 100%)    PHYSICAL EXAM:  General: Comfortable, no apparent distress  HEENT: Atraumatic; EOMI, PERRLA, conjunctiva and sclera clear; no tonsillar erythema/exudates/enlargement  Neck: Supple; no JVD; thyroid normal without masses or enlargement  Chest/Lungs: Clear to auscultation B/L; no rales, rhonchi or wheezing  Heart: Regular rate and rhythm; normal S1/S2; no murmurs, rubs, or gallops  Abdomen: Soft, nontender, nondistended; bowel sounds present  Extremities: PT/DP pulses 2+ B/L; no LE edema  Back: sacral decubitus ulcer with bandage C/D/I no erythema or drainage  Skin: No rashes or lesions  Neurological: Alert and oriented x 3        LABS:    03-06    136  |  97<L>  |  28<H>  ----------------------------<  98  5.0   |  20<L>  |  3.22<H>    Ca    9.3      06 Mar 2018 06:30  Phos  2.7     03-06  Mg     2.2     03-06          CAPILLARY BLOOD GLUCOSE      POCT Blood Glucose.: 229 mg/dL (06 Mar 2018 22:29)  POCT Blood Glucose.: 163 mg/dL (06 Mar 2018 16:59)  POCT Blood Glucose.: 249 mg/dL (06 Mar 2018 12:10)  POCT Blood Glucose.: 208 mg/dL (06 Mar 2018 08:14)        RADIOLOGY & ADDITIONAL TESTS:    MEDICATIONS:  acetaminophen   Tablet 650 milliGRAM(s) Oral every 6 hours PRN For Temp greater than 38 C (100.4 F)  aspirin  chewable 81 milliGRAM(s) Oral daily  calcium carbonate 1250 mG (OsCal) 1 Tablet(s) Oral daily  clopidogrel Tablet 75 milliGRAM(s) Oral daily  collagenase Ointment 1 Application(s) Topical daily  Dakins Solution - 1/4 Strength 1 Application(s) Topical daily  dextrose 50% Injectable 12.5 Gram(s) IV Push once  dextrose 50% Injectable 25 Gram(s) IV Push once  dextrose 50% Injectable 25 Gram(s) IV Push once  heparin  Injectable 5000 Unit(s) SubCutaneous every 12 hours  insulin glargine Injectable (LANTUS) 15 Unit(s) SubCutaneous at bedtime  insulin lispro (HumaLOG) corrective regimen sliding scale   SubCutaneous three times a day before meals  insulin lispro (HumaLOG) corrective regimen sliding scale   SubCutaneous at bedtime  insulin lispro Injectable (HumaLOG) 3 Unit(s) SubCutaneous three times a day before meals  latanoprost 0.005% Ophthalmic Solution 1 Drop(s) Both EYES at bedtime  levothyroxine 25 MICROGram(s) Oral daily  meropenem  IVPB 500 milliGRAM(s) IV Intermittent every 24 hours  metoprolol succinate ER 25 milliGRAM(s) Oral daily  predniSONE   Tablet 40 milliGRAM(s) Oral daily      ALLERGIES:  penicillin (Rash)      Labs and imaging personally reviewed and interpreted [  ]  Consult notes reviewed   Case discussed with consultants For Night coverage 7pm-7am: NS- page [phone] Team1-3, page [phone] Team4 & Care Model  Sat/Howell Cross Coverage 12pm-7pm: NS- page [phone] for Team1-4, JER- pager forwarded to covering Resident    CONTACT INFO:  PETER Lemos MD  Internal Medicine PGY1  Pager: [phone]    WILLARD BOB  57y  Female    Patient is a 57y old  Female who presents with a chief complaint of Altered Mental Status (04 Mar 2018 22:28)    INTERVAL HPI / OVERNIGHT EVENTS: Cried last night wanting to leave AMA. Family at bedside. Questions answered and risk of leaving AMA explained including risk of death. Patient agreed to stay for MRI and surgical evaluation of sacral wound. Patient givenativan overnight.  Patient seen and evaluated at bedside. Family at bedside. Resting comfortably. No fever/chills, nausea/vomiting, chest pain, SOB at rest      OBJECTIVE:  Vitals Signs (24 Hrs):  T(C): 36.8 (03-07-18 @ 06:53), Max: 36.8 (03-06-18 @ 20:57)  HR: 82 (03-07-18 @ 06:53) (77 - 86)  BP: 151/85 (03-07-18 @ 06:53) (110/47 - 151/85)  RR: 16 (03-07-18 @ 06:53) (16 - 18)  SpO2: 100% (03-07-18 @ 06:53) (98% - 100%)    PHYSICAL EXAM:  General: Comfortable, no apparent distress  HEENT: Atraumatic; EOMI, PERRLA, conjunctiva and sclera clear; no tonsillar erythema/exudates/enlargement  Neck: Supple; no JVD; thyroid normal without masses or enlargement  Chest/Lungs: Clear to auscultation B/L; no rales, rhonchi or wheezing  Heart: Regular rate and rhythm; normal S1/S2; no murmurs, rubs, or gallops  Abdomen: Soft, nontender, nondistended; bowel sounds present  Extremities: PT/DP pulses 2+ B/L; no LE edema  Back: sacral decubitus ulcer with bandage C/D/I no erythema or drainage  Skin: No rashes or lesions  Neurological: Alert and oriented x 3        LABS:    03-06    136  |  97<L>  |  28<H>  ----------------------------<  98  5.0   |  20<L>  |  3.22<H>    Ca    9.3      06 Mar 2018 06:30  Phos  2.7     03-06  Mg     2.2     03-06          CAPILLARY BLOOD GLUCOSE      POCT Blood Glucose.: 229 mg/dL (06 Mar 2018 22:29)  POCT Blood Glucose.: 163 mg/dL (06 Mar 2018 16:59)  POCT Blood Glucose.: 249 mg/dL (06 Mar 2018 12:10)  POCT Blood Glucose.: 208 mg/dL (06 Mar 2018 08:14)        RADIOLOGY & ADDITIONAL TESTS:    MEDICATIONS:  acetaminophen   Tablet 650 milliGRAM(s) Oral every 6 hours PRN For Temp greater than 38 C (100.4 F)  aspirin  chewable 81 milliGRAM(s) Oral daily  calcium carbonate 1250 mG (OsCal) 1 Tablet(s) Oral daily  clopidogrel Tablet 75 milliGRAM(s) Oral daily  collagenase Ointment 1 Application(s) Topical daily  Dakins Solution - 1/4 Strength 1 Application(s) Topical daily  dextrose 50% Injectable 12.5 Gram(s) IV Push once  dextrose 50% Injectable 25 Gram(s) IV Push once  dextrose 50% Injectable 25 Gram(s) IV Push once  heparin  Injectable 5000 Unit(s) SubCutaneous every 12 hours  insulin glargine Injectable (LANTUS) 15 Unit(s) SubCutaneous at bedtime  insulin lispro (HumaLOG) corrective regimen sliding scale   SubCutaneous three times a day before meals  insulin lispro (HumaLOG) corrective regimen sliding scale   SubCutaneous at bedtime  insulin lispro Injectable (HumaLOG) 3 Unit(s) SubCutaneous three times a day before meals  latanoprost 0.005% Ophthalmic Solution 1 Drop(s) Both EYES at bedtime  levothyroxine 25 MICROGram(s) Oral daily  meropenem  IVPB 500 milliGRAM(s) IV Intermittent every 24 hours  metoprolol succinate ER 25 milliGRAM(s) Oral daily  predniSONE   Tablet 40 milliGRAM(s) Oral daily      ALLERGIES:  penicillin (Rash)      Labs and imaging personally reviewed and interpreted [  ]  Consult notes reviewed   Case discussed with consultants

## 2018-03-07 NOTE — PROGRESS NOTE ADULT - SUBJECTIVE AND OBJECTIVE BOX
No pain, no shortness of breath      VITAL:  T(C): , Max: 36.8 (03-06-18 @ 20:57)  T(F): , Max: 98.2 (03-06-18 @ 20:57)  HR: 82 (03-07-18 @ 06:53)  BP: 151/85 (03-07-18 @ 06:53)  BP(mean): --  RR: 16 (03-07-18 @ 06:53)  SpO2: 100% (03-07-18 @ 06:53)      PHYSICAL EXAM:  Constitutional: NAD; Malay-speaking  HEENT: NCAT, DMM  Neck: Supple, No JVD  Respiratory: CTA-b/l  Cardiovascular: RRR s1s2, no m/r/g  Gastrointestinal: BS+, soft, NT/ND  Extremities: No peripheral edema b/l  Neurological: no focal deficits; strength grossly intact  Psychiatric: Normal mood, normal affect  Back: no CVAT b/l  Skin: No rashes, no nevi  LUE AVF (+)thrill/immature; RIJ permacath(+)      LABS:    Na(136)/K(5.0)/Cl(97)/HCO3(20)/BUN(28)/Cr(3.22)Glu(98)/Ca(9.3)/Mg(2.2)/PO4(2.7)    03-06 @ 06:30  Na(135)/K(4.7)/Cl(95)/HCO3(21)/BUN(55)/Cr(5.36)Glu(76)/Ca(9.4)/Mg(--)/PO4(--)    03-05 @ 07:00      IMPRESSION: 57F w/ HTN, DM2, CAD, SLE, and 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 Due for HD today.    (2)AMS - unclear exact etiology; due to sacral infection? Now improved    (3)ID - infected sacral ulcer - on Meropenem    (4)Vascular - immature AVF      RECOMMEND:  (1)Next HD today  (2)Abx for GFR<10/HD              Orlando Hernandez MD  [facility],   [phone]

## 2018-03-07 NOTE — PROGRESS NOTE ADULT - PROBLEM SELECTOR PLAN 1
- Resolved, likely metabolic due to sacral wound  - c/w Vanc/Meropenem for broad coverage  - Appreciate ID recs. Pt currently not a candidate for bedside LP due to Plavix which cannot be held safely 2/2 recent BMS (1/2018). Will consider consulting [facility] for LP this week, however patient mental status is improved.   - ESR slightly elevated, C3 decreased, C4 wnl. More consistent with infection as opposed to lupus flare. Low suspicion for autoimmune process. Appreciate Rheum recs

## 2018-03-08 DIAGNOSIS — F43.21 ADJUSTMENT DISORDER WITH DEPRESSED MOOD: ICD-10-CM

## 2018-03-08 LAB
BUN SERPL-MCNC: 26 MG/DL — HIGH (ref 7–23)
CALCIUM SERPL-MCNC: 8.6 MG/DL — SIGNIFICANT CHANGE UP (ref 8.4–10.5)
CHLORIDE SERPL-SCNC: 96 MMOL/L — LOW (ref 98–107)
CO2 SERPL-SCNC: 26 MMOL/L — SIGNIFICANT CHANGE UP (ref 22–31)
CREAT SERPL-MCNC: 3.18 MG/DL — HIGH (ref 0.5–1.3)
CRP SERPL-MCNC: 30.5 MG/L — HIGH
GLUCOSE SERPL-MCNC: 79 MG/DL — SIGNIFICANT CHANGE UP (ref 70–99)
HAV IGM SER-ACNC: NONREACTIVE — SIGNIFICANT CHANGE UP
HBV CORE IGM SER-ACNC: NONREACTIVE — SIGNIFICANT CHANGE UP
HBV SURFACE AG SER-ACNC: NONREACTIVE — SIGNIFICANT CHANGE UP
HCT VFR BLD CALC: 33.9 % — LOW (ref 34.5–45)
HCV AB S/CO SERPL IA: 0.16 S/CO — SIGNIFICANT CHANGE UP
HCV AB SERPL-IMP: SIGNIFICANT CHANGE UP
HGB BLD-MCNC: 10.7 G/DL — LOW (ref 11.5–15.5)
MAGNESIUM SERPL-MCNC: 2.1 MG/DL — SIGNIFICANT CHANGE UP (ref 1.6–2.6)
MCHC RBC-ENTMCNC: 31 PG — SIGNIFICANT CHANGE UP (ref 27–34)
MCHC RBC-ENTMCNC: 31.6 % — LOW (ref 32–36)
MCV RBC AUTO: 98.3 FL — SIGNIFICANT CHANGE UP (ref 80–100)
NRBC # FLD: 0 — SIGNIFICANT CHANGE UP
PHOSPHATE SERPL-MCNC: 4 MG/DL — SIGNIFICANT CHANGE UP (ref 2.5–4.5)
PLATELET # BLD AUTO: 184 K/UL — SIGNIFICANT CHANGE UP (ref 150–400)
PMV BLD: 9.2 FL — SIGNIFICANT CHANGE UP (ref 7–13)
POTASSIUM SERPL-MCNC: 3.9 MMOL/L — SIGNIFICANT CHANGE UP (ref 3.5–5.3)
POTASSIUM SERPL-SCNC: 3.9 MMOL/L — SIGNIFICANT CHANGE UP (ref 3.5–5.3)
RBC # BLD: 3.45 M/UL — LOW (ref 3.8–5.2)
RBC # FLD: 18.6 % — HIGH (ref 10.3–14.5)
SODIUM SERPL-SCNC: 138 MMOL/L — SIGNIFICANT CHANGE UP (ref 135–145)
WBC # BLD: 9.54 K/UL — SIGNIFICANT CHANGE UP (ref 3.8–10.5)
WBC # FLD AUTO: 9.54 K/UL — SIGNIFICANT CHANGE UP (ref 3.8–10.5)

## 2018-03-08 PROCEDURE — 99233 SBSQ HOSP IP/OBS HIGH 50: CPT | Mod: GC

## 2018-03-08 PROCEDURE — 99233 SBSQ HOSP IP/OBS HIGH 50: CPT

## 2018-03-08 PROCEDURE — 93926 LOWER EXTREMITY STUDY: CPT | Mod: LT

## 2018-03-08 RX ORDER — IMMUNE GLOBULIN,GAMMA(IGG) 5 %
30 VIAL (ML) INTRAVENOUS DAILY
Qty: 0 | Refills: 0 | Status: DISCONTINUED | OUTPATIENT
Start: 2018-03-08 | End: 2018-03-09

## 2018-03-08 RX ORDER — SENNA PLUS 8.6 MG/1
2 TABLET ORAL AT BEDTIME
Qty: 0 | Refills: 0 | Status: DISCONTINUED | OUTPATIENT
Start: 2018-03-08 | End: 2018-03-08

## 2018-03-08 RX ORDER — IMMUNE GLOBULIN,GAMMA(IGG) 5 %
33 VIAL (ML) INTRAVENOUS DAILY
Qty: 0 | Refills: 0 | Status: DISCONTINUED | OUTPATIENT
Start: 2018-03-08 | End: 2018-03-08

## 2018-03-08 RX ORDER — ALPRAZOLAM 0.25 MG
0.25 TABLET ORAL ONCE
Qty: 0 | Refills: 0 | Status: DISCONTINUED | OUTPATIENT
Start: 2018-03-08 | End: 2018-03-08

## 2018-03-08 RX ORDER — POLYETHYLENE GLYCOL 3350 17 G/17G
17 POWDER, FOR SOLUTION ORAL ONCE
Qty: 0 | Refills: 0 | Status: COMPLETED | OUTPATIENT
Start: 2018-03-08 | End: 2018-03-08

## 2018-03-08 RX ORDER — HALOPERIDOL DECANOATE 100 MG/ML
0.5 INJECTION INTRAMUSCULAR EVERY 6 HOURS
Qty: 0 | Refills: 0 | Status: DISCONTINUED | OUTPATIENT
Start: 2018-03-08 | End: 2018-03-12

## 2018-03-08 RX ORDER — MIRTAZAPINE 45 MG/1
7.5 TABLET, ORALLY DISINTEGRATING ORAL AT BEDTIME
Qty: 0 | Refills: 0 | Status: DISCONTINUED | OUTPATIENT
Start: 2018-03-08 | End: 2018-03-12

## 2018-03-08 RX ORDER — QUETIAPINE FUMARATE 200 MG/1
12.5 TABLET, FILM COATED ORAL EVERY 6 HOURS
Qty: 0 | Refills: 0 | Status: DISCONTINUED | OUTPATIENT
Start: 2018-03-08 | End: 2018-03-12

## 2018-03-08 RX ORDER — IMMUNE GLOBULIN,GAMMA(IGG) 5 %
30 VIAL (ML) INTRAVENOUS DAILY
Qty: 0 | Refills: 0 | Status: DISCONTINUED | OUTPATIENT
Start: 2018-03-08 | End: 2018-03-08

## 2018-03-08 RX ORDER — VANCOMYCIN HCL 1 G
750 VIAL (EA) INTRAVENOUS ONCE
Qty: 0 | Refills: 0 | Status: COMPLETED | OUTPATIENT
Start: 2018-03-08 | End: 2018-03-08

## 2018-03-08 RX ADMIN — Medication 2: at 17:17

## 2018-03-08 RX ADMIN — Medication 40 MILLIGRAM(S): at 05:26

## 2018-03-08 RX ADMIN — Medication 3 UNIT(S): at 09:16

## 2018-03-08 RX ADMIN — INSULIN GLARGINE 15 UNIT(S): 100 INJECTION, SOLUTION SUBCUTANEOUS at 00:17

## 2018-03-08 RX ADMIN — CLOPIDOGREL BISULFATE 75 MILLIGRAM(S): 75 TABLET, FILM COATED ORAL at 12:23

## 2018-03-08 RX ADMIN — Medication 1 TABLET(S): at 12:23

## 2018-03-08 RX ADMIN — POLYETHYLENE GLYCOL 3350 17 GRAM(S): 17 POWDER, FOR SOLUTION ORAL at 13:15

## 2018-03-08 RX ADMIN — Medication 25 MICROGRAM(S): at 05:26

## 2018-03-08 RX ADMIN — Medication 1 TABLET(S): at 09:16

## 2018-03-08 RX ADMIN — LATANOPROST 1 DROP(S): 0.05 SOLUTION/ DROPS OPHTHALMIC; TOPICAL at 22:48

## 2018-03-08 RX ADMIN — HEPARIN SODIUM 5000 UNIT(S): 5000 INJECTION INTRAVENOUS; SUBCUTANEOUS at 17:17

## 2018-03-08 RX ADMIN — Medication 25 MILLIGRAM(S): at 05:27

## 2018-03-08 RX ADMIN — Medication 3 UNIT(S): at 17:17

## 2018-03-08 RX ADMIN — MIRTAZAPINE 7.5 MILLIGRAM(S): 45 TABLET, ORALLY DISINTEGRATING ORAL at 22:47

## 2018-03-08 RX ADMIN — Medication 150 MILLIGRAM(S): at 09:15

## 2018-03-08 RX ADMIN — Medication 81 MILLIGRAM(S): at 12:23

## 2018-03-08 RX ADMIN — LATANOPROST 1 DROP(S): 0.05 SOLUTION/ DROPS OPHTHALMIC; TOPICAL at 00:18

## 2018-03-08 RX ADMIN — INSULIN GLARGINE 15 UNIT(S): 100 INJECTION, SOLUTION SUBCUTANEOUS at 22:47

## 2018-03-08 RX ADMIN — Medication 1 TABLET(S): at 17:17

## 2018-03-08 RX ADMIN — Medication 6: at 12:23

## 2018-03-08 RX ADMIN — HEPARIN SODIUM 5000 UNIT(S): 5000 INJECTION INTRAVENOUS; SUBCUTANEOUS at 05:27

## 2018-03-08 RX ADMIN — MEROPENEM 100 MILLIGRAM(S): 1 INJECTION INTRAVENOUS at 00:18

## 2018-03-08 RX ADMIN — Medication 3 UNIT(S): at 12:23

## 2018-03-08 NOTE — BEHAVIORAL HEALTH ASSESSMENT NOTE - NSBHCHARTREVIEWLAB_PSY_A_CORE FT
10.7   9.54  )-----------( 184      ( 08 Mar 2018 07:55 )             33.9     03-08    138  |  96<L>  |  26<H>  ----------------------------<  79  3.9   |  26  |  3.18<H>    Ca    8.6      08 Mar 2018 07:55  Phos  4.0     03-08  Mg     2.1     03-08

## 2018-03-08 NOTE — BEHAVIORAL HEALTH ASSESSMENT NOTE - AXIS III
Encephalopathy acute,  Sacral wound, Lupus , Type 2 diabetes mellitus without complication, ESRD , Coronary artery disease , Hypercholesteremia, Glaucoma

## 2018-03-08 NOTE — PROGRESS NOTE ADULT - ASSESSMENT
RENAL ATTENDING  Seen/examined with NP. I agree with NP assessment as above.    General: NAD, A+Ox3  Pulm: CTA-b/l  Card: RRR s1s2  Ext: no c/c/e      ASSESSMENT:  (1)Renal - ESRD-HD MWF  (2)AMS - etiology remains unclear    RECOMMEND:  (1)Next HD tomorrow  (2)LP as planned  (3)IVIG per Rheum      Orlando Hernandez MD  [facility]  [phone]

## 2018-03-08 NOTE — PROGRESS NOTE ADULT - ASSESSMENT
58 yo W, with SLE ( diagnosed April 2017, + SULLY, DsDNA, +Sm, +aPLs, cytopenias , lupus nephritis with ESRD, recent diagnosis of vasculitic neuropathy s/p RTX on 1/14/18 and on chronic steroids(prednisone 50mg qd)), now with acute change in mental status improved since admission    Plan:  Differential for AMS is broad from infectious (pt. has severe sacral wound) , neurological ( including seizures vs CVA which is a concern in this patient with significant comorbidities and +aPL) or CNS lupus ( which is less likely as patient is on steroids and received recently RTX)    Initial workup shows dsDNA (43 - lower then before) and increased C3 has increased since last visit which goes against SLE flare and in favor of infection, patient mentation has dramatically improved since start of antibiotics  - MRI/MRA done but patient was moving (suboptimal study) , MRI Lumbar spine shows no signs of osteomyelitis - sacral MRI shows acute osteomyelitis  -F/U blood cxs - thus far no growth to date  -F/U wound care consult for left buttock decubitus ulcer  - Please continue treat infectious etiology per ID reccs     once cleared of infection plan for 2nd 1 gm IV of Rituxan that she was due for last month so she can get it before leaving   Given that we don't think this is SLE related at this time, would start to taper steroids - Please plan to taper weekly by 10mg every WEEK - currently lowered to 40mg, plan to lower to 30mg 3/9/18  Will monitor for evidence of SLE activity when she is tapered.    Plan to be discussed with attending, please follow up with Attendings attestation for final/ complete recommendations.    Neelima Donnelly MD  Internal Medicine PGY1  Pager: [phone]/ JER [phone] 58 yo W, with SLE ( diagnosed April 2017, + SULLY, DsDNA, +Sm, +aPLs, cytopenias , lupus nephritis with ESRD, recent diagnosis of vasculitic neuropathy s/p RTX on 1/14/18 and on chronic steroids(prednisone 50mg qd)), now with acute change in mental status improved since admission    Plan:  Differential for AMS is broad from infectious (pt. has severe sacral wound) , neurological ( including seizures vs CVA which is a concern in this patient with significant comorbidities and +aPL) or CNS lupus ( which is less likely as patient is on steroids and received recently RTX)    Initial workup shows dsDNA (43 - lower then before) and increased C3 has increased since last visit which goes against SLE flare and in favor of infection, patient mentation has dramatically improved since start of antibiotics; due to concern of immunosuppression and the active infection will hold off on RTX and see patient clinical improvement with IVIG   - MRI/MRA done but patient was moving (suboptimal study) , MRI Lumbar spine shows no signs of osteomyelitis - sacral MRI shows acute osteomyelitis   -F/U blood cxs - thus far no growth to date  -F/U wound care consult for left buttock decubitus ulcer  - Please continue treat infectious etiology per ID reccs   - will dose IVIG over three days post- HD     once cleared of infection plan for 2nd 1 gm IV of Rituxan that she was due for last month so she can get it before leaving   Given that we don't think this is SLE related at this time, would start to taper steroids - Please plan to taper weekly by 10mg every WEEK - currently lowered to 40mg, plan to lower to 30mg 3/9/18  Will monitor for evidence of SLE activity when she is tapered.    Plan to be discussed with attending, please follow up with Attendings attestation for final/ complete recommendations.    Neelima Donnelly MD  Internal Medicine PGY1  Pager: [phone]/ LILUZ MARIA [phone] 58 yo W, with SLE ( diagnosed April 2017, + SULLY, DsDNA, +Sm, +aPLs, cytopenias , lupus nephritis with ESRD, recent diagnosis of vasculitic neuropathy s/p RTX on 1/14/18 and on chronic steroids(prednisone 50mg qd)), now with acute change in mental status improved since admission    Plan:  Differential for AMS is broad from infectious (pt. has severe sacral wound) , neurological ( including seizures vs CVA which is a concern in this patient with significant comorbidities and +aPL) or CNS lupus ( which is less likely as patient is on steroids and received recently RTX)    Initial workup shows dsDNA (43 - lower then before) and increased C3 has increased since last visit which goes against SLE flare and in favor of infection, patient mentation has dramatically improved since start of antibiotics; due to concern of immunosuppression and the active infection will hold off on RTX and see patient clinical improvement with IVIG   - MRI/MRA done but patient was moving (suboptimal study) , MRI Lumbar spine shows no signs of osteomyelitis - sacral MRI shows acute osteomyelitis   -F/U blood cxs - thus far no growth to date  -F/U wound care consult for left buttock decubitus ulcer  - Please continue treat infectious etiology per ID recs   - will dose IVIG over three days post- HD   - continue to taper steroids slowly - 30 mg daily starting 3/9/18 for 1 week then 20 mg daily for 1 week then 15 mg daily for 1 week then 10 mg daily till her appointment with rheumatology outpatient.   - will plan 2nd dose of Rituxan once she has completed her antibiotic course for osteomyelitiss    Neelima Donnelly MD  Internal Medicine PGY1  Pager: [phone]/ JER [phone]

## 2018-03-08 NOTE — PROGRESS NOTE ADULT - PROBLEM SELECTOR PLAN 1
- Resolved, likely metabolic due to sacral wound  - c/w Vanc/Meropenem for broad coverage  - Appreciate ID recs. Pt currently not a candidate for bedside LP due to Plavix which cannot be held safely 2/2 recent BMS (1/2018). Considering consulting [facility] for LP this week, however patient mental status is improved.   - ESR slightly elevated, C3 decreased, C4 wnl. More consistent with infection as opposed to lupus flare. Low suspicion for autoimmune process. Appreciate Rheum recs

## 2018-03-08 NOTE — BEHAVIORAL HEALTH ASSESSMENT NOTE - SUMMARY
Pt is a 58yo Serbian speaking female, , domiciled with  and 2 adult children, with no significant past psychiatric history, no past psychiatric hospitalizations, no past suicide attempts, no substance use/abuse, PMH lupus (dx April 2017), ESRD on HD (M/W/F), NSTEMI (1/2018), DMII, HTN, HLD, hypothyroidism, who presents from dialysis with altered mental status, likely 2/2 sacral wound infection; AMS now resolved.  Pt has been emotional and tearful over the last 2 days, asking to leave AMA.  Psych consulted for anxiety/depression and capacity to leave AMA.  Note, pt was recently hospitalized from 12/19/2017 - 1/16/2018 for LE numbness and weakness found to have polyneuropathy and sural nerve biopsy c/w vasculitis with hospital course c/b permacath placement on 1/3/18 and NSTEMI s/p cath and ANABELA placed on 1/5/18. Pt was discharged to rehab.    On exam, pt AAOx2-3, depressed and tearful at times.  She reports depressed mood in the context of continued hospitalization.  Endorses passive SI, denies active SI/I/P.  She agrees to stay in hospital and cooperate with treatment.  Recommend Remeron 7.5mg qHS for sleep (and mood).  PRNs as below.  Avoid benzos and anticholinergics since pt had AMS on admission.  Monitor EKG for QTC.  Will follow.

## 2018-03-08 NOTE — PROGRESS NOTE ADULT - SUBJECTIVE AND OBJECTIVE BOX
Patient seen and examined in bed with no new complaints.  Post HD tx yesterday; tolerated well.      REVIEW OF SYSTEMS:  As per HPI, otherwise 8 full 10 ROS were unremarkable    MEDICATIONS  (STANDING):  aspirin  chewable 81 milliGRAM(s) Oral daily  calcium carbonate 1250 mG (OsCal) 1 Tablet(s) Oral daily  clopidogrel Tablet 75 milliGRAM(s) Oral daily  collagenase Ointment 1 Application(s) Topical daily  Dakins Solution - 1/4 Strength 1 Application(s) Topical daily  dextrose 50% Injectable 12.5 Gram(s) IV Push once  dextrose 50% Injectable 25 Gram(s) IV Push once  dextrose 50% Injectable 25 Gram(s) IV Push once  heparin  Injectable 5000 Unit(s) SubCutaneous every 12 hours  insulin glargine Injectable (LANTUS) 15 Unit(s) SubCutaneous at bedtime  insulin lispro (HumaLOG) corrective regimen sliding scale   SubCutaneous three times a day before meals  insulin lispro (HumaLOG) corrective regimen sliding scale   SubCutaneous at bedtime  insulin lispro Injectable (HumaLOG) 3 Unit(s) SubCutaneous three times a day before meals  lactobacillus acidophilus 1 Tablet(s) Oral three times a day with meals  latanoprost 0.005% Ophthalmic Solution 1 Drop(s) Both EYES at bedtime  levothyroxine 25 MICROGram(s) Oral daily  meropenem  IVPB 500 milliGRAM(s) IV Intermittent every 24 hours  metoprolol succinate ER 25 milliGRAM(s) Oral daily  predniSONE   Tablet 40 milliGRAM(s) Oral daily      VITAL:  T(C): , Max: 36.6 (03-07-18 @ 14:55)  T(F): , Max: 97.8 (03-07-18 @ 14:55)  HR: 72 (03-08-18 @ 13:33)  BP: 128/67 (03-08-18 @ 13:33)  BP(mean): --  RR: 17 (03-08-18 @ 13:33)  SpO2: 100% (03-08-18 @ 13:33)  Wt(kg): --    I and O's:    03-07 @ 07:01  -  03-08 @ 07:00  --------------------------------------------------------  IN: 500 mL / OUT: 1500 mL / NET: -1000 mL          PHYSICAL EXAM:    Constitutional: NAD  HEENT: PERRLA, EOMI,  MMM  Neck: No LAD, No JVD  Respiratory: CTAB  Cardiovascular: S1 and S2  Gastrointestinal: BS+, soft, NT/ND  Extremities: No peripheral edema  Neurological: A/O x 3, no focal deficits  Psychiatric: Normal mood, normal affect  : No Johnson  Skin: No rashes  LUE AVF (+)thrill/immature; RIJ permacath(+)    LABS:                        10.7   9.54  )-----------( 184      ( 08 Mar 2018 07:55 )             33.9     03-08    138  |  96<L>  |  26<H>  ----------------------------<  79  3.9   |  26  |  3.18<H>    Ca    8.6      08 Mar 2018 07:55  Phos  4.0     03-08  Mg     2.1     03-08      IMPRESSION: 57F w/ HTN, DM2, CAD, SLE, and ESRD-HD MWF due to lupus nephritis, 2/28/18 a/w AMS    (1)Renal - ESRD-HD MWF; s/p HD yesterday; tolerated well.    (2)AMS - unclear exact etiology; due to sacral infection? Now improved    (3)ID - infected sacral ulcer - on Meropenem    (4)Vascular - immature AVF; permacath in place      RECOMMEND:  (1)Subsequent HD tomorrow        -labs to be drawn at HD  (2)?LP this week per primary team; management per primary team  (3)Abx for GFR<10/HD    Shane Welsh N.P.  [facility]  [phone]

## 2018-03-08 NOTE — BEHAVIORAL HEALTH ASSESSMENT NOTE - NSBHCHARTREVIEWINVESTIGATE_PSY_A_CORE FT
< from: 12 Lead ECG (03.01.18 @ 02:29) >    QTC Calculation(Bezet) 459 ms    P Axis 88 degrees    R Axis -66 degrees    T Axis 84 degrees    Diagnosis Line Normal sinus rhythm  Left axis deviation  Pulmonary disease pattern  Abnormal ECG    < end of copied text >

## 2018-03-08 NOTE — PROGRESS NOTE ADULT - SUBJECTIVE AND OBJECTIVE BOX
For Night coverage 7pm-7am: NS- page [phone] Team1-3, page [phone] Team4 & Care Model  Sat/[place] Coverage 12pm-7pm: NS- page [phone] for Team1-4, JER- pager forwarded to covering Resident    CONTACT INFO:  PETER Lemos MD  Internal Medicine PGY1  Pager: [phone]    WILLARD BOB  57y  Female    Patient is a 57y old  Female who presents with a chief complaint of Altered Mental Status (04 Mar 2018 22:28)    INTERVAL HPI / OVERNIGHT EVENTS: Tolerated MRI after pain control with Dilaudid and ativan for anxiety. Otherwise, no acute events overnight. Patient seen and evaluated at bedside. Denies SOB at rest, chest pain, palpitations, abdominal pain, nausea/vomiting      OBJECTIVE:  Vitals Signs (24 Hrs):  T(C): 36.5 (03-08-18 @ 05:01), Max: 37.2 (03-07-18 @ 13:44)  HR: 63 (03-08-18 @ 05:01) (63 - 90)  BP: 123/62 (03-08-18 @ 05:01) (116/65 - 159/91)  RR: 18 (03-08-18 @ 05:01) (18 - 18)  SpO2: 99% (03-08-18 @ 05:01) (99% - 100%)    PHYSICAL EXAM:  General: Comfortable, no apparent distress  HEENT: Atraumatic; EOMI, PERRLA, conjunctiva and sclera clear; no tonsillar erythema/exudates/enlargement  Neck: Supple; no JVD; thyroid normal without masses or enlargement  Chest/Lungs: Clear to auscultation B/L; no rales, rhonchi or wheezing  Heart: Regular rate and rhythm; normal S1/S2; no murmurs, rubs, or gallops  Abdomen: Soft, nontender, nondistended; bowel sounds present  Extremities: PT/DP pulses 2+ B/L; no LE edema  Back: sacral decubitus with would vac in place.  Skin: No rashes or lesions  Neurological: Alert and oriented x 3    LABS:                        9.4    6.53  )-----------( 171      ( 07 Mar 2018 15:05 )             29.4     03-07    138  |  97<L>  |  47<H>  ----------------------------<  193<H>  4.4   |  25  |  5.03<H>    Ca    8.7      07 Mar 2018 15:05  Phos  3.2     03-07  Mg     2.1     03-07          CAPILLARY BLOOD GLUCOSE      POCT Blood Glucose.: 119 mg/dL (07 Mar 2018 23:52)  POCT Blood Glucose.: 113 mg/dL (07 Mar 2018 16:02)  POCT Blood Glucose.: 219 mg/dL (07 Mar 2018 12:07)  POCT Blood Glucose.: 140 mg/dL (07 Mar 2018 10:58)  POCT Blood Glucose.: 85 mg/dL (07 Mar 2018 08:24)        RADIOLOGY & ADDITIONAL TESTS:    MEDICATIONS:  acetaminophen   Tablet 650 milliGRAM(s) Oral every 6 hours PRN For Temp greater than 38 C (100.4 F)  aspirin  chewable 81 milliGRAM(s) Oral daily  calcium carbonate 1250 mG (OsCal) 1 Tablet(s) Oral daily  clopidogrel Tablet 75 milliGRAM(s) Oral daily  collagenase Ointment 1 Application(s) Topical daily  Dakins Solution - 1/4 Strength 1 Application(s) Topical daily  dextrose 50% Injectable 12.5 Gram(s) IV Push once  dextrose 50% Injectable 25 Gram(s) IV Push once  dextrose 50% Injectable 25 Gram(s) IV Push once  heparin  Injectable 5000 Unit(s) SubCutaneous every 12 hours  insulin glargine Injectable (LANTUS) 15 Unit(s) SubCutaneous at bedtime  insulin lispro (HumaLOG) corrective regimen sliding scale   SubCutaneous three times a day before meals  insulin lispro (HumaLOG) corrective regimen sliding scale   SubCutaneous at bedtime  insulin lispro Injectable (HumaLOG) 3 Unit(s) SubCutaneous three times a day before meals  lactobacillus acidophilus 1 Tablet(s) Oral three times a day with meals  latanoprost 0.005% Ophthalmic Solution 1 Drop(s) Both EYES at bedtime  levothyroxine 25 MICROGram(s) Oral daily  LORazepam   Injectable 0.5 milliGRAM(s) IntraMuscular daily PRN Anxiety  meropenem  IVPB 500 milliGRAM(s) IV Intermittent every 24 hours  metoprolol succinate ER 25 milliGRAM(s) Oral daily  predniSONE   Tablet 40 milliGRAM(s) Oral daily      ALLERGIES:  penicillin (Rash)      Labs and imaging personally reviewed and interpreted [  ]  Consult notes reviewed   Case discussed with consultants

## 2018-03-08 NOTE — BEHAVIORAL HEALTH ASSESSMENT NOTE - NSBHCHARTREVIEWVS_PSY_A_CORE FT
Vital Signs Last 24 Hrs  T(C): 36.5 (08 Mar 2018 13:33), Max: 36.5 (08 Mar 2018 05:01)  T(F): 97.7 (08 Mar 2018 13:33), Max: 97.7 (08 Mar 2018 05:01)  HR: 72 (08 Mar 2018 13:33) (63 - 90)  BP: 128/67 (08 Mar 2018 13:33) (123/62 - 159/91)  BP(mean): --  RR: 17 (08 Mar 2018 13:33) (17 - 18)  SpO2: 100% (08 Mar 2018 13:33) (99% - 100%)

## 2018-03-08 NOTE — BEHAVIORAL HEALTH ASSESSMENT NOTE - NSBHCHARTREVIEWIMAGING_PSY_A_CORE FT
< from: CT Head No Cont (03.01.18 @ 01:05) >    IMPRESSION:   No acute territorial infarct, hemorrhage or mass effect    < end of copied text >    < from: MR Head No Cont (03.02.18 @ 09:40) >    IMPRESSION:    Brain MRI:    Motion limited incomplete examination without gross evidence for large   acute infarct or large acute hemorrhage. If the patient remains   symptomatic, consider repeat attempt at exam.    Brain MRA:    Severely motion limited study without gross evidence for large major   vessel occlusion about the [place].    < end of copied text >

## 2018-03-08 NOTE — PROGRESS NOTE ADULT - SUBJECTIVE AND OBJECTIVE BOX
56 yo W, with SLE ( diagnosed April 2017, + SULLY, DsDNA, +Sm, +aPLs, cytopenias , lupus nephritis with ESRD, recent diagnosis of vasculitic neuropathy s/p RTX on 1/14/18 and remains on prednisone 50 mg od), now with acute change in mental status     INTERVAL HPI/OVERNIGHT EVENTS: NAEON, patient afebrile with VSS. Patient mentation significantly improved. Patient resting in bed in NAD. Patient denies chest pain, sob , vision changes, new rashes, fevers, chills or nausea/ vomitting.    Reviewed ongoing consultations and labs    MEDICATIONS  (STANDING):  aspirin  chewable 81 milliGRAM(s) Oral daily  calcium carbonate 1250 mG (OsCal) 1 Tablet(s) Oral daily  clopidogrel Tablet 75 milliGRAM(s) Oral daily  collagenase Ointment 1 Application(s) Topical daily  Dakins Solution - 1/4 Strength 1 Application(s) Topical daily  dextrose 50% Injectable 12.5 Gram(s) IV Push once  dextrose 50% Injectable 25 Gram(s) IV Push once  dextrose 50% Injectable 25 Gram(s) IV Push once  heparin  Injectable 5000 Unit(s) SubCutaneous every 12 hours  insulin glargine Injectable (LANTUS) 15 Unit(s) SubCutaneous at bedtime  insulin lispro (HumaLOG) corrective regimen sliding scale   SubCutaneous three times a day before meals  insulin lispro (HumaLOG) corrective regimen sliding scale   SubCutaneous at bedtime  insulin lispro Injectable (HumaLOG) 3 Unit(s) SubCutaneous three times a day before meals  lactobacillus acidophilus 1 Tablet(s) Oral three times a day with meals  latanoprost 0.005% Ophthalmic Solution 1 Drop(s) Both EYES at bedtime  levothyroxine 25 MICROGram(s) Oral daily  meropenem  IVPB 500 milliGRAM(s) IV Intermittent every 24 hours  metoprolol succinate ER 25 milliGRAM(s) Oral daily  polyethylene glycol 3350 17 Gram(s) Oral once  predniSONE   Tablet 40 milliGRAM(s) Oral daily    MEDICATIONS  (PRN):  acetaminophen   Tablet 650 milliGRAM(s) Oral every 6 hours PRN For Temp greater than 38 C (100.4 F)  LORazepam   Injectable 0.5 milliGRAM(s) IntraMuscular daily PRN Anxiety      Allergies    penicillin (Rash)    Intolerances      Vital Signs Last 24 Hrs  T(C): 36.5 (08 Mar 2018 05:01), Max: 37.2 (07 Mar 2018 13:44)  T(F): 97.7 (08 Mar 2018 05:01), Max: 98.9 (07 Mar 2018 13:44)  HR: 63 (08 Mar 2018 05:01) (63 - 90)  BP: 123/62 (08 Mar 2018 05:01) (116/65 - 159/91)  BP(mean): --  RR: 18 (08 Mar 2018 05:01) (18 - 18)  SpO2: 99% (08 Mar 2018 05:01) (99% - 100%)    PHYSICAL EXAM:      Constitutional: NAD; Nepali-speaking    Eyes: EOMI, PERRLA, conjunctiva and sclera clear    ENMT: no tonsillar erythema/exudates/enlargement    Neck: Supple; no JVD; thyroid normal without masses or enlargement    Back: no CVAT b/l    Respiratory: Clear to auscultation B/L; no rales, rhonchi or wheezing    Cardiovascular: Regular rate and rhythm; normal S1/S2; no murmurs, rubs, or gallops    Gastrointestinal: Soft, nontender, nondistended; bowel sounds present    Extremities: Bilateral LE grossly 3+/5    Vascular: LUE AVF (+)thrill/immature; RIJ permacath(+)    Neurological: Alert and oriented to person/place/time, patient has difficulties recalling details for example which hospital she is admitted to, pt has sensation intact grossly, follows commands with son translating    Skin: no visible rashes, 2 x 3 cm round sacral decub ulcer with packing w/ wound vac suction    Musculoskeletal: no evidence of synovitis    Psychiatric: Normal mood, normal affect      LABS:                        10.7   9.54  )-----------( 184      ( 08 Mar 2018 07:55 )             33.9     03-08    138  |  96<L>  |  26<H>  ----------------------------<  79  3.9   |  26  |  3.18<H>    Ca    8.6      08 Mar 2018 07:55  Phos  4.0     03-08  Mg     2.1     03-08              RADIOLOGY & ADDITIONAL TESTS:  < from: MR Pelvis No Cont (03.07.18 @ 23:04) >  Impression:    Large sacral decubitus ulcer at the level of the coccyx to the left of   midline. Acute osteomyelitis involving the distal most coccygeal segments.    Intramuscular and subcutaneous edema which is nonspecific but may be   related to anasarca.    Nonspecific edema within the posterior subcutaneous fat at the level of   the right intratrochanteric femur. There is no continuity with the   cutaneous surface. This may be related to a developing decubitus ulcer.

## 2018-03-08 NOTE — PROGRESS NOTE ADULT - ASSESSMENT
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     s/p RTX on 1/14/18 and was on prednisone 50 mg daily on admission.    Mental status improving, however, was on antibiotic coverage for meningitis.   Has deep left buttock sacral ulcer now with osteomyelitis of the left coccyx.    Recommend:  -Blood cxs thus far no growth to date  -F/U Wound care consult for left buttock decubitus ulcer now with osteo - will need bone cx to tailor abx.  -Continue vanco by level and meropenem pending workup.      Silvio Lora MD  Pager [phone]  After 5pm/weekends call [phone] 57 year old female with Lupus diagnosed in April 2017, Lupus nephritis with ESRD on HD (M/W/F), NSTEMI in 1/2018, DM2, HTN, HLD, Hypothyroidism presented from dialysis center with AMS.     s/p RTX on 1/14/18 and was on prednisone 50 mg daily on admission.    Mental status improving, however, was on antibiotic coverage for meningitis.   Has deep left buttock sacral ulcer now with osteomyelitis of the left coccyx.  Afebrile. Nontoxic appearing. WBC WNL.    Recommend:  -Blood cxs thus far no growth to date  -F/U Wound care consult for left buttock decubitus ulcer now with osteo - will need bone cx to tailor abx.  -Continue vanco by level and meropenem pending workup.    Discussed with Rheumatology resident that patient is clinically improved. Afebrile. WBC WNL. Mental status improving. If patient requires Rituxan, no objection from an ID standpoint. Patient will require long term antibiotics for osteomyelitis of the left coccyx.     Spoke also with medicine resident and patient not able to have a bone culture of the infected area due to patient recently with cardiac stents and on plavix which cannot be discontinued. Will treat empirically - plan for vancomycin 500 mg IV post HD and cefepime 2 grams IV post HD both on days of HD only - should be arranged with her dialysis center to complete 4/14/2018. Would also add flagyl 500mg PO BID.    Silvio Lora MD  Pager [phone]  After 5pm/weekends call [phone] 57 year old female with Lupus diagnosed in April 2017, Lupus nephritis with ESRD on HD (M/W/F), NSTEMI in 1/2018, DM2, HTN, HLD, Hypothyroidism presented from dialysis center with AMS.     s/p RTX on 1/14/18 and was on prednisone 50 mg daily on admission.    Mental status improving, however, was on antibiotic coverage for meningitis.   Has deep left buttock sacral ulcer now with osteomyelitis of the left coccyx.  Afebrile. Nontoxic appearing. WBC WNL.    Recommend:  -Blood cxs thus far no growth to date  -F/U Wound care consult for left buttock decubitus ulcer now with osteo  -Continue vanco by level and meropenem pending workup.    Discussed with Rheumatology resident that patient is clinically improved. Afebrile. WBC WNL. Mental status improving. If patient requires Rituxan, no objection from an ID standpoint. Patient will require long term antibiotics for osteomyelitis of the left coccyx.     Spoke also with medicine resident and patient not able to have a bone culture of the infected area due to patient recently with cardiac stents and on plavix which cannot be discontinued. Will treat empirically - plan for vancomycin 500 mg IV post HD and cefepime 2 grams IV post HD both on days of HD only - should be arranged with her dialysis center to complete 4/14/2018. Would also add flagyl 500mg PO BID.    Silvio Lora MD  Pager [phone]  After 5pm/weekends call [phone]

## 2018-03-08 NOTE — PROGRESS NOTE ADULT - PROBLEM SELECTOR PLAN 2
- Concern for primary source of infection and osteomyelitis   - c/w Vanc/Meropenem given recent prolonged hospitalizations\  - Appreciate Wound Care recs: collagenase and Dakins ordered  - Surgery consult. Reached out to Wound Care MD (Yosvany Flor)- recommend wound vac.  - S/p MRI spine, hip/pelvis to evaluate for OM - awaiting official report - MRI pelvis: Acute osteomyelitis involving the distal most coccygeal segments.  - c/w Vanc/Meropenem given recent prolonged hospitalizations\  - Appreciate Wound Care recs: collagenase and Dakins ordered  - Surgery consult. Reached out to Wound Care MD (Yosvany Flor)- recommend wound vac.  - F/u ID for duration of antibiotics and ortho for possible biopsy/debridement

## 2018-03-08 NOTE — PROGRESS NOTE ADULT - ATTENDING COMMENTS
MRI showing acute osteomyelitis of the coccyx. Explained to patient and son and daughter at the bedside.    Will need surgical evaluation - orthopedics vs neurosurgery. Will need bone biopsy per ID. F/u surgical recommendations regarding need for surgical debridement.    Patient constipated x 4 days. Needs laxatives. Would like to try to use commode for BM.

## 2018-03-08 NOTE — PROGRESS NOTE ADULT - SUBJECTIVE AND OBJECTIVE BOX
CC: F/U ams, fever, now with OM left coccyx    Interval History/ROS: Patient has no complaints today. Remains AAOX2. Denies fever, chills. MRI now with OM at the left coccyx.    Allergies  penicillin (Rash)    ANTIMICROBIALS:  meropenem  IVPB 500 every 24 hours, vancomycin by level    PE:    Vital Signs Last 24 Hrs  T(C): 36.5 (08 Mar 2018 05:01), Max: 37.2 (07 Mar 2018 13:44)  T(F): 97.7 (08 Mar 2018 05:01), Max: 98.9 (07 Mar 2018 13:44)  HR: 63 (08 Mar 2018 05:01) (63 - 90)  BP: 123/62 (08 Mar 2018 05:01) (116/65 - 159/91)  BP(mean): --  RR: 18 (08 Mar 2018 05:01) (18 - 18)  SpO2: 99% (08 Mar 2018 05:01) (99% - 100%)    Gen: AOx2, NAD, non-toxic, pleasant  CV: S1+S2 normal, no murmurs  Resp: Clear bilat, no resp distress  Abd: Soft, nontender, +BS  Ext: No LE edema, no wounds  : No Johnson  IV/Skin: No thrombophlebitis, right chest permacath, stage 4 left buttock ulcer  Neuro: no focal deficits    LABS:                          10.7   9.54  )-----------( 184      ( 08 Mar 2018 07:55 )             33.9       03-08    138  |  96<L>  |  26<H>  ----------------------------<  79  3.9   |  26  |  3.18<H>    Ca    8.6      08 Mar 2018 07:55  Phos  4.0     03-08  Mg     2.1     03-08    MICROBIOLOGY:  Vancomycin Level, Trough: 9.7 ug/mL (03-07-18 @ 18:00)  v  PORT DEVICE  03-02-18 --  --  --      BLOOD VENOUS  03-02-18 --  --  --      URINE CATHETER  03-01-18 --  --  --      BLOOD PERIPHERAL  03-01-18 --  --  --    RADIOLOGY:    < from: MR Pelvis No Cont (03.07.18 @ 23:04) >  Impression:    Large sacral decubitus ulcer at the level of the coccyx to the left of   midline. Acute osteomyelitis involving the distal most coccygeal segments.    Intramuscular and subcutaneous edema which is nonspecific but may be   related to anasarca.    Nonspecific edema within the posterior subcutaneous fat at the level of   the right intratrochanteric femur. There is no continuity with the   cutaneous surface. This may be related to a developing decubitus ulcer.        < end of copied text >

## 2018-03-08 NOTE — BEHAVIORAL HEALTH ASSESSMENT NOTE - CASE SUMMARY
Pt is a 56yo [place]serratian speaking female, , domiciled with  and 2 adult children, with no significant past psychiatric history, no past psychiatric hospitalizations, no past suicide attempts, no substance use/abuse, PMH lupus (dx April 2017), ESRD on HD (M/W/F), NSTEMI (1/2018), DMII, HTN, HLD, hypothyroidism, who presents from dialysis with altered mental status, likely 2/2 sacral wound infection; AMS now resolved.  Pt has been emotional and tearful over the last 2 days, asking to leave AMA.  Psych consulted for anxiety/depression and capacity to leave AMA.  Note, pt was recently hospitalized from 12/19/2017 - 1/16/2018 for LE numbness and weakness found to have polyneuropathy and sural nerve biopsy c/w vasculitis with hospital course c/b permacath placement on 1/3/18 and NSTEMI s/p cath and ANABELA placed on 1/5/18. Pt is feeling better now and is hopeful about going home. She does have a [place]serratian speaking psychologist and would like to continue treatment.

## 2018-03-08 NOTE — BEHAVIORAL HEALTH ASSESSMENT NOTE - RISK ASSESSMENT
Risk factors include depressed mood, insomnia, acute and chronic medical issues.  Protective factors include stable domicile, social support, no past psych hosp or SA, no substance use.  Pt denies SI/HI/AH/VH, manic or psychotic symptoms.  Pt does not present an acute risk of harm to self or others.  Pt does not require inpatient psychiatric hospitalization.

## 2018-03-08 NOTE — BEHAVIORAL HEALTH ASSESSMENT NOTE - HPI (INCLUDE ILLNESS QUALITY, SEVERITY, DURATION, TIMING, CONTEXT, MODIFYING FACTORS, ASSOCIATED SIGNS AND SYMPTOMS)
Pt is a 58yo Wolof speaking female, , domiciled with  and 2 adult children, with no significant past psychiatric history, no past psychiatric hospitalizations, no past suicide attempts, no substance use/abuse, PMH lupus (dx April 2017), ESRD on HD (M/W/F), NSTEMI (1/2018), DMII, HTN, HLD, hypothyroidism, who presents from dialysis with altered mental status, likely 2/2 sacral wound infection; AMS now resolved.  Pt has been emotional and tearful over the last 2 days, asking to leave AMA.  Psych consulted for anxiety/depression and capacity to leave AMA.  Note, pt was recently hospitalized from 12/19/2017 - 1/16/2018 for LE numbness and weakness found to have polyneuropathy and sural nerve biopsy c/w vasculitis with hospital course c/b permacath placement on 1/3/18 and NSTEMI s/p cath and AANBELA placed on 1/5/18. Pt was discharged to rehab.    Seen and examined at bedside.  Pt's children are present.  Pt interview with  [id number].  Pt AAOx2-3.  States it Thursday, March 2010.  Pt states she has not seen her  in 5 months and wants to go home tonight.  States she has been dealing with "this" for 5 months already and had multiple biopsies.  Pt reports feeling depressed in the context of continued hospitalization.  Denies feeling depressed in the past.  She states "Just give me a pill so I can go to sleep and not wake up".  Pt denies active SI/I/P.  States she would never do anything because of her children.  Denies past SA.  Reports difficulty sleeping and poor appetite over the last few days.  Pt report having therapy last year.  She does not recall what for.  She denies past psychiatric hospitalizations or substance use.    Spoke with pt's son.  He states pt is "frustrated".  Reports she called him crying on Monday.  States pt was cooperative before this.  Denies safety concerns.  States pt has been eating fine and has chronic difficulty sleeping.  States pt has a lot of support from her family.

## 2018-03-09 DIAGNOSIS — M86.9 OSTEOMYELITIS, UNSPECIFIED: ICD-10-CM

## 2018-03-09 LAB
BASOPHILS # BLD AUTO: 0.04 K/UL — SIGNIFICANT CHANGE UP (ref 0–0.2)
BASOPHILS NFR BLD AUTO: 0.5 % — SIGNIFICANT CHANGE UP (ref 0–2)
BUN SERPL-MCNC: 41 MG/DL — HIGH (ref 7–23)
CALCIUM SERPL-MCNC: 9 MG/DL — SIGNIFICANT CHANGE UP (ref 8.4–10.5)
CHLORIDE SERPL-SCNC: 98 MMOL/L — SIGNIFICANT CHANGE UP (ref 98–107)
CO2 SERPL-SCNC: 24 MMOL/L — SIGNIFICANT CHANGE UP (ref 22–31)
CREAT SERPL-MCNC: 4.49 MG/DL — HIGH (ref 0.5–1.3)
EOSINOPHIL # BLD AUTO: 0.08 K/UL — SIGNIFICANT CHANGE UP (ref 0–0.5)
EOSINOPHIL NFR BLD AUTO: 1 % — SIGNIFICANT CHANGE UP (ref 0–6)
GLUCOSE SERPL-MCNC: 91 MG/DL — SIGNIFICANT CHANGE UP (ref 70–99)
HCT VFR BLD CALC: 35.1 % — SIGNIFICANT CHANGE UP (ref 34.5–45)
HGB BLD-MCNC: 11 G/DL — LOW (ref 11.5–15.5)
IMM GRANULOCYTES # BLD AUTO: 0.12 # — SIGNIFICANT CHANGE UP
IMM GRANULOCYTES NFR BLD AUTO: 1.5 % — SIGNIFICANT CHANGE UP (ref 0–1.5)
LYMPHOCYTES # BLD AUTO: 1.37 K/UL — SIGNIFICANT CHANGE UP (ref 1–3.3)
LYMPHOCYTES # BLD AUTO: 17.1 % — SIGNIFICANT CHANGE UP (ref 13–44)
MAGNESIUM SERPL-MCNC: 2.2 MG/DL — SIGNIFICANT CHANGE UP (ref 1.6–2.6)
MCHC RBC-ENTMCNC: 30.5 PG — SIGNIFICANT CHANGE UP (ref 27–34)
MCHC RBC-ENTMCNC: 31.3 % — LOW (ref 32–36)
MCV RBC AUTO: 97.2 FL — SIGNIFICANT CHANGE UP (ref 80–100)
MONOCYTES # BLD AUTO: 0.67 K/UL — SIGNIFICANT CHANGE UP (ref 0–0.9)
MONOCYTES NFR BLD AUTO: 8.4 % — SIGNIFICANT CHANGE UP (ref 2–14)
NEUTROPHILS # BLD AUTO: 5.72 K/UL — SIGNIFICANT CHANGE UP (ref 1.8–7.4)
NEUTROPHILS NFR BLD AUTO: 71.5 % — SIGNIFICANT CHANGE UP (ref 43–77)
NRBC # FLD: 0 — SIGNIFICANT CHANGE UP
PHOSPHATE SERPL-MCNC: 4.8 MG/DL — HIGH (ref 2.5–4.5)
PLATELET # BLD AUTO: 196 K/UL — SIGNIFICANT CHANGE UP (ref 150–400)
PMV BLD: 9.2 FL — SIGNIFICANT CHANGE UP (ref 7–13)
POTASSIUM SERPL-MCNC: 4 MMOL/L — SIGNIFICANT CHANGE UP (ref 3.5–5.3)
POTASSIUM SERPL-SCNC: 4 MMOL/L — SIGNIFICANT CHANGE UP (ref 3.5–5.3)
RBC # BLD: 3.61 M/UL — LOW (ref 3.8–5.2)
RBC # FLD: 18.1 % — HIGH (ref 10.3–14.5)
SODIUM SERPL-SCNC: 140 MMOL/L — SIGNIFICANT CHANGE UP (ref 135–145)
VANCOMYCIN TROUGH SERPL-MCNC: 25.4 UG/ML — CRITICAL HIGH (ref 10–20)
WBC # BLD: 8 K/UL — SIGNIFICANT CHANGE UP (ref 3.8–10.5)
WBC # FLD AUTO: 8 K/UL — SIGNIFICANT CHANGE UP (ref 3.8–10.5)

## 2018-03-09 PROCEDURE — 99233 SBSQ HOSP IP/OBS HIGH 50: CPT | Mod: GC

## 2018-03-09 PROCEDURE — 99233 SBSQ HOSP IP/OBS HIGH 50: CPT

## 2018-03-09 PROCEDURE — 90792 PSYCH DIAG EVAL W/MED SRVCS: CPT

## 2018-03-09 RX ORDER — CEFEPIME 1 G/1
1000 INJECTION, POWDER, FOR SOLUTION INTRAMUSCULAR; INTRAVENOUS
Qty: 0 | Refills: 0 | Status: DISCONTINUED | OUTPATIENT
Start: 2018-03-09 | End: 2018-03-09

## 2018-03-09 RX ORDER — CEFEPIME 1 G/1
2000 INJECTION, POWDER, FOR SOLUTION INTRAMUSCULAR; INTRAVENOUS
Qty: 0 | Refills: 0 | Status: DISCONTINUED | OUTPATIENT
Start: 2018-03-09 | End: 2018-03-12

## 2018-03-09 RX ORDER — IMMUNE GLOBULIN,GAMMA(IGG) 5 %
45 VIAL (ML) INTRAVENOUS DAILY
Qty: 0 | Refills: 0 | Status: DISCONTINUED | OUTPATIENT
Start: 2018-03-09 | End: 2018-03-10

## 2018-03-09 RX ORDER — VANCOMYCIN HCL 1 G
500 VIAL (EA) INTRAVENOUS
Qty: 0 | Refills: 0 | Status: DISCONTINUED | OUTPATIENT
Start: 2018-03-09 | End: 2018-03-09

## 2018-03-09 RX ORDER — CLOPIDOGREL BISULFATE 75 MG/1
75 TABLET, FILM COATED ORAL DAILY
Qty: 0 | Refills: 0 | Status: DISCONTINUED | OUTPATIENT
Start: 2018-03-09 | End: 2018-03-12

## 2018-03-09 RX ORDER — METRONIDAZOLE 500 MG
500 TABLET ORAL
Qty: 0 | Refills: 0 | Status: DISCONTINUED | OUTPATIENT
Start: 2018-03-09 | End: 2018-03-12

## 2018-03-09 RX ORDER — VANCOMYCIN HCL 1 G
500 VIAL (EA) INTRAVENOUS
Qty: 0 | Refills: 0 | Status: DISCONTINUED | OUTPATIENT
Start: 2018-03-09 | End: 2018-03-12

## 2018-03-09 RX ADMIN — Medication 1 TABLET(S): at 12:36

## 2018-03-09 RX ADMIN — Medication 1 TABLET(S): at 08:39

## 2018-03-09 RX ADMIN — Medication 1 TABLET(S): at 17:21

## 2018-03-09 RX ADMIN — HEPARIN SODIUM 5000 UNIT(S): 5000 INJECTION INTRAVENOUS; SUBCUTANEOUS at 05:59

## 2018-03-09 RX ADMIN — Medication 25 MICROGRAM(S): at 06:00

## 2018-03-09 RX ADMIN — Medication 500 MILLIGRAM(S): at 17:24

## 2018-03-09 RX ADMIN — Medication 2: at 12:35

## 2018-03-09 RX ADMIN — Medication 3 UNIT(S): at 17:20

## 2018-03-09 RX ADMIN — CEFEPIME 100 MILLIGRAM(S): 1 INJECTION, POWDER, FOR SOLUTION INTRAMUSCULAR; INTRAVENOUS at 23:53

## 2018-03-09 RX ADMIN — Medication 1 APPLICATION(S): at 10:38

## 2018-03-09 RX ADMIN — Medication 1 TABLET(S): at 12:37

## 2018-03-09 RX ADMIN — Medication 2: at 08:44

## 2018-03-09 RX ADMIN — INSULIN GLARGINE 15 UNIT(S): 100 INJECTION, SOLUTION SUBCUTANEOUS at 23:36

## 2018-03-09 RX ADMIN — Medication 3 UNIT(S): at 12:36

## 2018-03-09 RX ADMIN — Medication 81 MILLIGRAM(S): at 12:36

## 2018-03-09 RX ADMIN — CLOPIDOGREL BISULFATE 75 MILLIGRAM(S): 75 TABLET, FILM COATED ORAL at 12:46

## 2018-03-09 RX ADMIN — Medication 6: at 17:20

## 2018-03-09 RX ADMIN — Medication 3 UNIT(S): at 08:44

## 2018-03-09 RX ADMIN — Medication 30 MILLIGRAM(S): at 12:46

## 2018-03-09 RX ADMIN — Medication 40 MILLIGRAM(S): at 06:00

## 2018-03-09 NOTE — PROGRESS NOTE ADULT - ATTENDING COMMENTS
Appreciate consultant input.    Per house staff discussion with wound care Dr. Goddard, no debridement planned currently. Plan for continued antibiotics at HD - regimen outlined in ID note from today. To f/u with Dr. Goddard for wound care as outpatient. Also will need outpatient f/u with ID Dr. Lora in 3-4 weeks.    Per discussion with rheumatology Dr. De Luna, plan for two doses of IVIG, today, and tomorrow. After IVIG patient will be medically stable for discharge as long as she can have home care and antibiotics arranged at HD. Discussed this at IDR with case management and social work today. Appreciate consultant input.    Per house staff discussion with wound care Dr. Goddard, no debridement planned currently. Plan for continued antibiotics at HD - regimen outlined in ID note from today. To f/u with Dr. Goddard for wound care as outpatient. Also will need outpatient f/u with ID Dr. Lora in 3-4 weeks.    Per discussion with rheumatology Dr. De Luna, plan for two doses of IVIG, today, and tomorrow. 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 After IVIG patient will be medically stable for discharge as long as she can have home care and antibiotics arranged at HD. Discussed this at IDR with case management and social work today.    Will also need outpatient f/u with rheumatology for continued treatment of SLE c/b nephritis as well as vasculitic neuropathy.

## 2018-03-09 NOTE — PROGRESS NOTE ADULT - SUBJECTIVE AND OBJECTIVE BOX
CC: Patient is a 57y old  Female who presents with a chief complaint of Altered Mental Status     Interval History/ROS: Patient has no complaints today. Requesting to go home. Denies fever, chills. Remains with wound vac.    Allergies  penicillin (Rash)      ANTIMICROBIALS:  meropenem  IVPB 500 every 24 hours, vancomycin by level    PE:    Vital Signs Last 24 Hrs  T(C): 36.8 (09 Mar 2018 05:10), Max: 36.8 (09 Mar 2018 05:10)  T(F): 98.2 (09 Mar 2018 05:10), Max: 98.2 (09 Mar 2018 05:10)  HR: 72 (09 Mar 2018 05:10) (72 - 83)  BP: 105/56 (09 Mar 2018 05:10) (105/56 - 128/67)  BP(mean): --  RR: 18 (09 Mar 2018 05:10) (17 - 18)  SpO2: 100% (09 Mar 2018 05:10) (100% - 100%)    Gen: AOx2, NAD, non-toxic, pleasant  CV: S1+S2 normal, no murmurs  Resp: Clear bilat, no resp distress  Abd: Soft, nontender, +BS  Ext: No LE edema  : No Johnson  IV/Skin: No thrombophlebitis, right chest permacath - site intact no erythema, wound vac on left buttock area  Neuro: no focal deficits    LABS:                          11.0   8.00  )-----------( 196      ( 09 Mar 2018 05:55 )             35.1       03-09    140  |  98  |  41<H>  ----------------------------<  91  4.0   |  24  |  4.49<H>    Ca    9.0      09 Mar 2018 05:55  Phos  4.8     03-09  Mg     2.2     03-09    MICROBIOLOGY:  Vancomycin Level, Trough: 25.4 ug/mL (03-09-18 @ 05:55)  v  PORT DEVICE  03-02-18 --  --  --      BLOOD VENOUS  03-02-18 --  --  --      URINE CATHETER  03-01-18 --  --  --      BLOOD PERIPHERAL  03-01-18 --  --  --    RADIOLOGY:    No new images.

## 2018-03-09 NOTE — PROGRESS NOTE ADULT - PROBLEM SELECTOR PLAN 4
- Dx 4/2017, rapidly progressing end-organ damage (Lupus Nephritis)  - Labs do not support Lupus flare  - Rheum following: c/w Prednisone 40mg daily with plan to taper by decreasing dose by 10mg every week (on Fridays). Rituximab  prior to d/c if infection is controlled. - Dx 4/2017, rapidly progressing end-organ damage (Lupus Nephritis)  - Labs do not support Lupus flare  - Rheum following: Recommendations below:  - IVIG over three days post- HD   - Taper steroids slowly - 30 mg daily starting 3/9/18 for 1 week then 20 mg daily for 1 week then 15 mg daily for 1 week then 10 mg daily till her appointment with rheumatology outpatient.   - 2nd dose of Rituxan once she has completed her antibiotic course for osteomyelitiss

## 2018-03-09 NOTE — PROGRESS NOTE ADULT - PROBLEM SELECTOR PLAN 7
- S/p BMS  to mLAD on 1/1/18  - C/w ASA//Statin. Called cardiology about patient being on Plavix for BMS. Recommendation was to hold Plavix given she has been on Plavix for more than 6 weeks. Holding Plavix for possible debridement.

## 2018-03-09 NOTE — PROGRESS NOTE ADULT - SUBJECTIVE AND OBJECTIVE BOX
No pain, no shortness of breath      VITAL:  T(C): , Max: 36.8 (03-09-18 @ 05:10)  T(F): , Max: 98.2 (03-09-18 @ 05:10)  HR: 72 (03-09-18 @ 05:10)  BP: 105/56 (03-09-18 @ 05:10)  BP(mean): --  RR: 18 (03-09-18 @ 05:10)  SpO2: 100% (03-09-18 @ 05:10)      PHYSICAL EXAM:  Constitutional: NAD; Yakut-speaking  HEENT: NCAT, DMM  Neck: Supple, No JVD  Respiratory: CTA-b/l  Cardiovascular: RRR s1s2, no m/r/g  Gastrointestinal: BS+, soft, NT/ND  Extremities: No peripheral edema b/l  Neurological: no focal deficits; strength grossly intact  Psychiatric: Normal mood, normal affect  Back: no CVAT b/l  Skin: No rashes, no nevi  LUE AVF (+)thrill/immature; RIJ permacath(+)      LABS:                        11.0   8.00  )-----------( 196      ( 09 Mar 2018 05:55 )             35.1     Na(140)/K(4.0)/Cl(98)/HCO3(24)/BUN(41)/Cr(4.49)Glu(91)/Ca(9.0)/Mg(2.2)/PO4(4.8)    03-09 @ 05:55  Na(138)/K(3.9)/Cl(96)/HCO3(26)/BUN(26)/Cr(3.18)Glu(79)/Ca(8.6)/Mg(2.1)/PO4(4.0)    03-08 @ 07:55  Na(138)/K(4.4)/Cl(97)/HCO3(25)/BUN(47)/Cr(5.03)Glu(193)/Ca(8.7)/Mg(2.1)/PO4(3.2)    03-07 @ 15:05      IMPRESSION: 57F w/ HTN, DM2, CAD, SLE, and 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 Due for HD today.    (2)AMS - unclear exact etiology; now improved    (3)ID - infected sacral ulcer - on Meropenem and IVIG    (4)Vascular - immature AVF      RECOMMEND:  (1)Next HD today  (2)No objection to IVIG 45gm iv qd x 2, as ordered by Rheum  (3)D/C planning per primary team with outpatient Vanco and Cefepime TIW at HD      Orlando Hernandez MD  [facility]  [phone] Upset that she cannot go home today.      VITAL:  T(C): , Max: 36.8 (03-09-18 @ 05:10)  T(F): , Max: 98.2 (03-09-18 @ 05:10)  HR: 72 (03-09-18 @ 05:10)  BP: 105/56 (03-09-18 @ 05:10)  BP(mean): --  RR: 18 (03-09-18 @ 05:10)  SpO2: 100% (03-09-18 @ 05:10)      PHYSICAL EXAM:  Constitutional: NAD; Georgian-speaking  HEENT: NCAT, DMM  Neck: Supple, No JVD  Respiratory: CTA-b/l  Cardiovascular: RRR s1s2, no m/r/g  Gastrointestinal: BS+, soft, NT/ND  Extremities: No peripheral edema b/l  Neurological: no focal deficits; strength grossly intact  Psychiatric: Normal mood, normal affect  Back: no CVAT b/l  Skin: No rashes, no nevi  LUE AVF (+)thrill/immature; RIJ permacath(+)      LABS:                        11.0   8.00  )-----------( 196      ( 09 Mar 2018 05:55 )             35.1     Na(140)/K(4.0)/Cl(98)/HCO3(24)/BUN(41)/Cr(4.49)Glu(91)/Ca(9.0)/Mg(2.2)/PO4(4.8)    03-09 @ 05:55  Na(138)/K(3.9)/Cl(96)/HCO3(26)/BUN(26)/Cr(3.18)Glu(79)/Ca(8.6)/Mg(2.1)/PO4(4.0)    03-08 @ 07:55  Na(138)/K(4.4)/Cl(97)/HCO3(25)/BUN(47)/Cr(5.03)Glu(193)/Ca(8.7)/Mg(2.1)/PO4(3.2)    03-07 @ 15:05      IMPRESSION: 57F w/ HTN, DM2, CAD, SLE, and 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 Due for HD today.    (2)AMS - unclear exact etiology; now improved    (3)ID - infected sacral ulcer - on Meropenem and IVIG    (4)Vascular - immature AVF      RECOMMEND:  (1)Next HD today  (2)No objection to IVIG 45gm iv qd x 2, as ordered by Rheum  (3)D/C planning per primary team with outpatient Vanco and Cefepime TIW at HD      Orlando Hernandez MD  [facility]  [phone]

## 2018-03-09 NOTE — PROGRESS NOTE ADULT - PROBLEM SELECTOR PLAN 2
- MRI pelvis: Acute osteomyelitis involving the distal most coccygeal segments  - Will need debridement/biopsy  - Would care called. Awaiting response.  - c/w Vanc/Meropenem - MRI pelvis: Acute osteomyelitis involving the distal most coccygeal segments  - Will need debridement/biopsy  - Would care called. Awaiting response.  - c/w IV antibiotics. ID recs highly appreciated. ID recs below:  -Start vancomycin 500 mg IV post HD on days of HD only  -Start cefepime 2 grams IV post HD on days of HD only  -Start Flagyl 500 mg PO BID.   -Antibiotics to complete 4/14/2018  -Antibiotics should be arranged to be given at HD center  -Check weekly CBC/ CMP/ ESR/ CRP - please fax results to my office at [phone].  -Patient to followup with me in ID clinic in 3-4 weeks - for appointments can call [phone]. - MRI pelvis: Acute osteomyelitis involving the distal most coccygeal segments  - No need for debridement at this time per Wound Care.   - c/w IV antibiotics. ID recs highly appreciated. ID recs below:  - Vancomycin 500 mg IV post HD on days of HD only. Cefepime 2 grams IV post HD on days of HD only. Flagyl 500 mg PO BID.   - Antibiotics to complete 4/14/2018  -Antibiotics should be arranged to be given at HD center  -Check weekly CBC/ CMP/ ESR/ CRP - please fax results to my office at [phone].  -Patient to followup with me in ID clinic in 3-4 weeks - for appointments can call [phone].

## 2018-03-09 NOTE — PROGRESS NOTE ADULT - ATTENDING COMMENTS
Patient seen and examined at bedside. Agree with plan as above. Discussed with team. Please call [phone] for any questions or concerns

## 2018-03-09 NOTE — PROGRESS NOTE ADULT - SUBJECTIVE AND OBJECTIVE BOX
For Night coverage 7pm-7am: NS- page [phone] Team1-3, page [phone] Team4 & Care Model  Sat/Howell Cross Coverage 12pm-7pm: NS- page [phone] for Team1-4, JER- pager forwarded to covering Resident    CONTACT INFO:  PETER Lemos MD  Internal Medicine PGY1  Pager: [phone]    WILLARD BOB  57y  Female    Patient is a 57y old  Female who presents with a chief complaint of Altered Mental Status (04 Mar 2018 22:28)    INTERVAL HPI / OVERNIGHT EVENTS: No acute events overnight. Patient seen and evaluated at bedside. Denies SOB at rest, chest pain, palpitations, abdominal pain, nausea/vomiting      OBJECTIVE:  Vitals Signs (24 Hrs):  T(C): 36.8 (03-09-18 @ 05:10), Max: 36.8 (03-09-18 @ 05:10)  HR: 72 (03-09-18 @ 05:10) (72 - 83)  BP: 105/56 (03-09-18 @ 05:10) (105/56 - 128/67)  RR: 18 (03-09-18 @ 05:10) (17 - 18)  SpO2: 100% (03-09-18 @ 05:10) (100% - 100%)    PHYSICAL EXAM:  General: Comfortable, no apparent distress  HEENT: Atraumatic; EOMI, PERRLA, conjunctiva and sclera clear; no tonsillar erythema/exudates/enlargement  Neck: Supple; no JVD; thyroid normal without masses or enlargement  Chest/Lungs: Clear to auscultation B/L; no rales, rhonchi or wheezing  Heart: Regular rate and rhythm; normal S1/S2; no murmurs, rubs, or gallops  Abdomen: Soft, nontender, nondistended; bowel sounds present  Extremities: PT/DP pulses 2+ B/L; no LE edema  Skin: No rashes or lesions  Neurological: Alert and oriented to person/place/time    LABS:                        11.0   8.00  )-----------( 196      ( 09 Mar 2018 05:55 )             35.1     03-09    140  |  98  |  41<H>  ----------------------------<  91  4.0   |  24  |  4.49<H>    Ca    9.0      09 Mar 2018 05:55  Phos  4.8     03-09  Mg     2.2     03-09          CAPILLARY BLOOD GLUCOSE      POCT Blood Glucose.: 96 mg/dL (08 Mar 2018 22:19)  POCT Blood Glucose.: 186 mg/dL (08 Mar 2018 17:03)  POCT Blood Glucose.: 290 mg/dL (08 Mar 2018 11:43)  POCT Blood Glucose.: 105 mg/dL (08 Mar 2018 09:06)        RADIOLOGY & ADDITIONAL TESTS:    MEDICATIONS:  acetaminophen   Tablet 650 milliGRAM(s) Oral every 6 hours PRN For Temp greater than 38 C (100.4 F)  aspirin  chewable 81 milliGRAM(s) Oral daily  calcium carbonate 1250 mG (OsCal) 1 Tablet(s) Oral daily  collagenase Ointment 1 Application(s) Topical daily  Dakins Solution - 1/4 Strength 1 Application(s) Topical daily  dextrose 50% Injectable 12.5 Gram(s) IV Push once  dextrose 50% Injectable 25 Gram(s) IV Push once  dextrose 50% Injectable 25 Gram(s) IV Push once  haloperidol     Tablet 0.5 milliGRAM(s) Oral every 6 hours PRN Agitation  haloperidol    Injectable 0.5 milliGRAM(s) IV Push every 6 hours PRN Agitation  haloperidol    Injectable 0.5 milliGRAM(s) IntraMuscular every 6 hours PRN Agitation  heparin  Injectable 5000 Unit(s) SubCutaneous every 12 hours  immune globulin gamma IVPB 30 Gram(s) IV Intermittent daily  insulin glargine Injectable (LANTUS) 15 Unit(s) SubCutaneous at bedtime  insulin lispro (HumaLOG) corrective regimen sliding scale   SubCutaneous three times a day before meals  insulin lispro (HumaLOG) corrective regimen sliding scale   SubCutaneous at bedtime  insulin lispro Injectable (HumaLOG) 3 Unit(s) SubCutaneous three times a day before meals  lactobacillus acidophilus 1 Tablet(s) Oral three times a day with meals  latanoprost 0.005% Ophthalmic Solution 1 Drop(s) Both EYES at bedtime  levothyroxine 25 MICROGram(s) Oral daily  meropenem  IVPB 500 milliGRAM(s) IV Intermittent every 24 hours  metoprolol succinate ER 25 milliGRAM(s) Oral daily  mirtazapine 7.5 milliGRAM(s) Oral at bedtime  predniSONE   Tablet 30 milliGRAM(s) Oral daily  QUEtiapine 12.5 milliGRAM(s) Oral every 6 hours PRN Anxiety      ALLERGIES:  penicillin (Rash)      Labs and imaging personally reviewed and interpreted [  ]  Consult notes reviewed   Case discussed with consultants For Night coverage 7pm-7am: NS- page 1443 Team1-3, page [phone] Team4 & Care Model  Sat/Howell Cross Coverage 12pm-7pm: NS- page [phone] for Team1-4, JER- pager forwarded to covering Resident    CONTACT INFO:  PETER Lemos MD  Internal Medicine PGY1  Pager: [phone]    WILLARD BOB  57y  Female    Patient is a 57y old  Female who presents with a chief complaint of Altered Mental Status (04 Mar 2018 22:28)    INTERVAL HPI / OVERNIGHT EVENTS: Metoprolol held overnight for soft SBP of 105 given she'll have dialysis today. No acute events overnight. Patient seen and evaluated at bedside. Denies SOB at rest, chest pain, palpitations, abdominal pain, nausea/vomiting      OBJECTIVE:  Vitals Signs (24 Hrs):  T(C): 36.8 (03-09-18 @ 05:10), Max: 36.8 (03-09-18 @ 05:10)  HR: 72 (03-09-18 @ 05:10) (72 - 83)  BP: 105/56 (03-09-18 @ 05:10) (105/56 - 128/67)  RR: 18 (03-09-18 @ 05:10) (17 - 18)  SpO2: 100% (03-09-18 @ 05:10) (100% - 100%)    PHYSICAL EXAM:  General: Comfortable, no apparent distress  HEENT: Atraumatic; EOMI, PERRLA, conjunctiva and sclera clear; no tonsillar erythema/exudates/enlargement  Neck: Supple; no JVD; thyroid normal without masses or enlargement  Chest/Lungs: Clear to auscultation B/L; no rales, rhonchi or wheezing  Heart: Regular rate and rhythm; normal S1/S2; no murmurs, rubs, or gallops  Abdomen: Soft, nontender, nondistended; bowel sounds present  Extremities: PT/DP pulses 2+ B/L; no LE edema  Back: sacral decubitus with would vac in place.  Skin: No rashes or lesions  Neurological: Alert and oriented x 3    LABS:                        11.0   8.00  )-----------( 196      ( 09 Mar 2018 05:55 )             35.1     03-09    140  |  98  |  41<H>  ----------------------------<  91  4.0   |  24  |  4.49<H>    Ca    9.0      09 Mar 2018 05:55  Phos  4.8     03-09  Mg     2.2     03-09          CAPILLARY BLOOD GLUCOSE      POCT Blood Glucose.: 96 mg/dL (08 Mar 2018 22:19)  POCT Blood Glucose.: 186 mg/dL (08 Mar 2018 17:03)  POCT Blood Glucose.: 290 mg/dL (08 Mar 2018 11:43)  POCT Blood Glucose.: 105 mg/dL (08 Mar 2018 09:06)        RADIOLOGY & ADDITIONAL TESTS:    MEDICATIONS:  acetaminophen   Tablet 650 milliGRAM(s) Oral every 6 hours PRN For Temp greater than 38 C (100.4 F)  aspirin  chewable 81 milliGRAM(s) Oral daily  calcium carbonate 1250 mG (OsCal) 1 Tablet(s) Oral daily  collagenase Ointment 1 Application(s) Topical daily  Dakins Solution - 1/4 Strength 1 Application(s) Topical daily  dextrose 50% Injectable 12.5 Gram(s) IV Push once  dextrose 50% Injectable 25 Gram(s) IV Push once  dextrose 50% Injectable 25 Gram(s) IV Push once  haloperidol     Tablet 0.5 milliGRAM(s) Oral every 6 hours PRN Agitation  haloperidol    Injectable 0.5 milliGRAM(s) IV Push every 6 hours PRN Agitation  haloperidol    Injectable 0.5 milliGRAM(s) IntraMuscular every 6 hours PRN Agitation  heparin  Injectable 5000 Unit(s) SubCutaneous every 12 hours  immune globulin gamma IVPB 30 Gram(s) IV Intermittent daily  insulin glargine Injectable (LANTUS) 15 Unit(s) SubCutaneous at bedtime  insulin lispro (HumaLOG) corrective regimen sliding scale   SubCutaneous three times a day before meals  insulin lispro (HumaLOG) corrective regimen sliding scale   SubCutaneous at bedtime  insulin lispro Injectable (HumaLOG) 3 Unit(s) SubCutaneous three times a day before meals  lactobacillus acidophilus 1 Tablet(s) Oral three times a day with meals  latanoprost 0.005% Ophthalmic Solution 1 Drop(s) Both EYES at bedtime  levothyroxine 25 MICROGram(s) Oral daily  meropenem  IVPB 500 milliGRAM(s) IV Intermittent every 24 hours  metoprolol succinate ER 25 milliGRAM(s) Oral daily  mirtazapine 7.5 milliGRAM(s) Oral at bedtime  predniSONE   Tablet 30 milliGRAM(s) Oral daily  QUEtiapine 12.5 milliGRAM(s) Oral every 6 hours PRN Anxiety      ALLERGIES:  penicillin (Rash)      Labs and imaging personally reviewed and interpreted [  ]  Consult notes reviewed   Case discussed with consultants

## 2018-03-09 NOTE — PROGRESS NOTE ADULT - PROBLEM SELECTOR PLAN 10
- Diet: CC  - DVT PPX: HSQ - Diet: CC  - DVT PPX: HSQ  - Dispo: Discharge planning- will need wound vac.

## 2018-03-09 NOTE — PROGRESS NOTE ADULT - PROBLEM SELECTOR PLAN 1
- Resolved, likely metabolic due to sacral wound  - c/w Vanc/Meropenem for broad coverage  - Appreciate ID recs. Pt currently not a candidate for bedside LP due to Plavix which cannot be held safely 2/2 recent BMS (1/2018). Considering consulting [facility] for LP this week, however patient mental status is improved.   - ESR slightly elevated, C3 decreased, C4 wnl. More consistent with infection as opposed to lupus flare. Low suspicion for autoimmune process. Appreciate Rheum recs - Resolved, likely metabolic due to sacral wound  - Appreciate ID recs. Pt currently not a candidate for bedside LP due to Plavix which cannot be held safely 2/2 recent BMS (1/2018). Considering consulting [facility] for LP this week, however patient mental status is improved.   - ESR slightly elevated, C3 decreased, C4 wnl. More consistent with infection as opposed to lupus flare. Low suspicion for autoimmune process. Appreciate Rheum recs

## 2018-03-09 NOTE — PROGRESS NOTE ADULT - PROBLEM SELECTOR PLAN 3
- MRI pelvis: Acute osteomyelitis involving the distal most coccygeal segments.  - c/w Vanc/Meropenem given recent prolonged hospitalizations\  - Appreciate Wound Care recs: collagenase and Dakins ordered  - Surgery consult. Reached out to Wound Care MD (Yosvany Flor)- recommend wound vac.  - F/u ID for duration of antibiotics and ortho for possible biopsy/debridement - MRI pelvis: Acute osteomyelitis involving the distal most coccygeal segments.  - c/w Vanc/Meropenem given recent prolonged hospitalizations\  - Appreciate Wound Care recs: collagenase and Dakins ordered  - Surgery consult. Reached out to Wound Care MD (Yosvany Flor)- recommend wound vac- no need for debridement at this time.   - Follow up with ID and wound care- Dr. Goddard outpatient

## 2018-03-09 NOTE — PROGRESS NOTE ADULT - SUBJECTIVE AND OBJECTIVE BOX
INTERVAL HPI/OVERNIGHT EVENTS: NAEON, patient afebrile with VSS. Patient mentation significantly improved. Patient resting in bed in NAD. Patient family at bedside relayed patient would like to go Patient denies chest pain, sob , vision changes, new rashes, fevers, chills or nausea/ vomiting.    Reviewed ongoing consultations and labs    MEDICATIONS  (STANDING):  aspirin  chewable 81 milliGRAM(s) Oral daily  calcium carbonate 1250 mG (OsCal) 1 Tablet(s) Oral daily  cefepime  IVPB 1000 milliGRAM(s) IV Intermittent <User Schedule>  clopidogrel Tablet 75 milliGRAM(s) Oral daily  collagenase Ointment 1 Application(s) Topical daily  Dakins Solution - 1/4 Strength 1 Application(s) Topical daily  dextrose 50% Injectable 12.5 Gram(s) IV Push once  dextrose 50% Injectable 25 Gram(s) IV Push once  dextrose 50% Injectable 25 Gram(s) IV Push once  heparin  Injectable 5000 Unit(s) SubCutaneous every 12 hours  immune globulin gamma IVPB 30 Gram(s) IV Intermittent daily  insulin glargine Injectable (LANTUS) 15 Unit(s) SubCutaneous at bedtime  insulin lispro (HumaLOG) corrective regimen sliding scale   SubCutaneous three times a day before meals  insulin lispro (HumaLOG) corrective regimen sliding scale   SubCutaneous at bedtime  insulin lispro Injectable (HumaLOG) 3 Unit(s) SubCutaneous three times a day before meals  lactobacillus acidophilus 1 Tablet(s) Oral three times a day with meals  latanoprost 0.005% Ophthalmic Solution 1 Drop(s) Both EYES at bedtime  levothyroxine 25 MICROGram(s) Oral daily  meropenem  IVPB 500 milliGRAM(s) IV Intermittent every 24 hours  metoprolol succinate ER 25 milliGRAM(s) Oral daily  metroNIDAZOLE    Tablet 500 milliGRAM(s) Oral two times a day  mirtazapine 7.5 milliGRAM(s) Oral at bedtime  predniSONE   Tablet 30 milliGRAM(s) Oral daily  vancomycin  IVPB 500 milliGRAM(s) IV Intermittent <User Schedule>    MEDICATIONS  (PRN):  acetaminophen   Tablet 650 milliGRAM(s) Oral every 6 hours PRN For Temp greater than 38 C (100.4 F)  haloperidol     Tablet 0.5 milliGRAM(s) Oral every 6 hours PRN Agitation  haloperidol    Injectable 0.5 milliGRAM(s) IV Push every 6 hours PRN Agitation  haloperidol    Injectable 0.5 milliGRAM(s) IntraMuscular every 6 hours PRN Agitation  QUEtiapine 12.5 milliGRAM(s) Oral every 6 hours PRN Anxiety      Allergies    penicillin (Rash)    Intolerances            Vital Signs Last 24 Hrs  T(C): 36.8 (09 Mar 2018 05:10), Max: 36.8 (09 Mar 2018 05:10)  T(F): 98.2 (09 Mar 2018 05:10), Max: 98.2 (09 Mar 2018 05:10)  HR: 72 (09 Mar 2018 05:10) (72 - 83)  BP: 105/56 (09 Mar 2018 05:10) (105/56 - 114/70)  BP(mean): --  RR: 18 (09 Mar 2018 05:10) (17 - 18)  SpO2: 100% (09 Mar 2018 05:10) (100% - 100%)    PHYSICAL EXAM:    Constitutional: NAD; [place]-speaking  Eyes: EOMI, PERRLA, conjunctiva and sclera clear  ENMT: no tonsillar erythema/exudates/enlargement  Neck: Supple; no JVD; thyroid normal without masses or enlargement  Back: no CVAT b/l  Respiratory: Clear to auscultation B/L; no rales, rhonchi or wheezing  Cardiovascular: Regular rate and rhythm; normal S1/S2; no murmurs, rubs, or gallops  Gastrointestinal: Soft, nontender, nondistended; bowel sounds present  Extremities: Bilateral LE grossly 3+/5  Vascular: LUE AVF (+)thrill/immature; RIJ permacath(+)  Neurological: Alert and oriented to person/place/time, patient has difficulties recalling details for example which hospital she is admitted to, pt has sensation intact grossly, follows commands with son translating  Skin: no visible rashes, 2 x 3 cm round sacral decub ulcer with packing w/ wound vac suction  Musculoskeletal: no evidence of synovitis  Psychiatric: Normal mood, normal affect        LABS:                        11.0   8.00  )-----------( 196      ( 09 Mar 2018 05:55 )             35.1     03-09    140  |  98  |  41<H>  ----------------------------<  91  4.0   |  24  |  4.49<H>    Ca    9.0      09 Mar 2018 05:55  Phos  4.8     03-09  Mg     2.2     03-09              RADIOLOGY & ADDITIONAL TESTS:

## 2018-03-09 NOTE — PROGRESS NOTE ADULT - ASSESSMENT
58 yo W, with SLE ( diagnosed April 2017, + SULLY, DsDNA, +Sm, +aPLs, cytopenias , lupus nephritis with ESRD, recent diagnosis of vasculitic neuropathy s/p RTX on 1/14/18 and on chronic steroids(prednisone 50mg qd)), now with acute change in mental status improved since admission    Plan:  Differential for AMS is broad from infectious (pt. has severe sacral wound) , neurological ( including seizures vs CVA which is a concern in this patient with significant comorbidities and +aPL) or CNS lupus ( which is less likely as patient is on steroids and received recently RTX)    Initial workup shows dsDNA (43 - lower then before) and increased C3 has increased since last visit which goes against SLE flare and in favor of infection, patient mentation has dramatically improved since start of antibiotics; due to concern of immunosuppression and the active infection will hold off on RTX and see patient clinical improvement with IVIG   - MRI/MRA done but patient was moving (suboptimal study) , MRI Lumbar spine shows no signs of osteomyelitis - sacral MRI shows acute osteomyelitis   -F/U blood cxs - thus far no growth to date  -F/U wound care consult for left buttock decubitus ulcer  - Please continue treat infectious etiology per ID recs   - will dose IVIG today post HD (45mg) and tomorrow (45mg) prior to discharge home - nephrology made aware  - continue to taper steroids slowly - 30 mg daily starting 3/9/18 for 1 week then 20 mg daily for 1 week then 15 mg daily for 1 week then 10 mg daily till her appointment with rheumatology outpatient.   - will plan 2nd dose of Rituxan once she has completed her antibiotic course for osteomyelitis  - patient may follow up with Dr. Gaming [phone] once clear from infection stand point to receive second dose of rituxin    Neelima Donnelly MD  Internal Medicine PGY1  Pager: [phone]/ [place] [phone] 56 yo W, with SLE ( diagnosed April 2017, + SULLY, DsDNA, +Sm, +aPLs, cytopenias , lupus nephritis with ESRD, recent diagnosis of vasculitic neuropathy s/p RTX on 1/14/18 and on chronic steroids(prednisone 50mg qd)), now with acute change in mental status improved since admission    Plan:  Differential for AMS is broad from infectious (pt. has severe sacral wound) , neurological ( including seizures vs CVA which is a concern in this patient with significant comorbidities and +aPL) or CNS lupus ( which is less likely as patient is on steroids and received recently RTX)    Initial workup shows dsDNA (43 - lower then before) and increased C3 has increased since last visit which goes against SLE flare and in favor of infection, patient mentation has dramatically improved since start of antibiotics; due to concern of immunosuppression and the active infection will hold off on RTX and see patient clinical improvement with IVIG   - MRI/MRA done but patient was moving (suboptimal study) , MRI Lumbar spine shows no signs of osteomyelitis - sacral MRI shows acute osteomyelitis   -F/U blood cxs - thus far no growth to date  -F/U wound care consult for left buttock decubitus ulcer  - Please continue treat infectious etiology per ID recs   - will dose IVIG today post HD (45g) and tomorrow (45g) prior to discharge home - nephrology made aware  - continue to taper steroids slowly - 30 mg daily starting 3/9/18 for 1 week then 20 mg daily for 1 week then 15 mg daily for 1 week then 10 mg daily till her appointment with rheumatology outpatient.   - will plan 2nd dose of Rituxan once she has completed her antibiotic course for osteomyelitis  - patient may follow up with Dr. Gaming [phone] once clear from infection stand point to receive second dose of rituxin    Neelima Donnelly MD  Internal Medicine PGY1  Pager: [phone]/ [place] [phone] 56 yo W, with SLE ( diagnosed April 2017, + SULLY, DsDNA, +Sm, +aPLs, cytopenias , lupus nephritis with ESRD, recent diagnosis of vasculitic neuropathy s/p RTX on 1/14/18 and on chronic steroids(prednisone 50mg qd)), now with acute change in mental status improved since admission    Plan:  Differential for AMS is broad from infectious (pt. has severe sacral wound) , neurological ( including seizures vs CVA which is a concern in this patient with significant comorbidities and +aPL) or CNS lupus ( which is less likely as patient is on steroids and received recently RTX)    Initial workup shows dsDNA (43 - lower then before) and increased C3 has increased since last visit which goes against SLE flare and in favor of infection, patient mentation has dramatically improved since start of antibiotics; due to concern of immunosuppression and the active infection will hold off on RTX and see patient clinical improvement with IVIG   - MRI/MRA done but patient was moving (suboptimal study) , MRI Lumbar spine shows no signs of osteomyelitis - sacral MRI shows acute osteomyelitis   -F/U blood cxs - thus far no growth to date  -F/U wound care consult for left buttock decubitus ulcer  - Please continue treat infectious etiology per ID recs   - will dose IVIG today post HD (45g) and tomorrow (45g) prior to discharge home - nephrology made aware  - continue to taper steroids slowly - 30 mg daily starting 3/9/18 for 1 week then 20 mg daily for 1 week then 15 mg daily for 1 week then 10 mg daily till her appointment with rheumatology outpatient.   - will plan 2nd dose of Rituxan depending on her symptoms and disease progression once she has completed her antibiotic course for osteomyelitis  - patient may follow up with Dr. Gaming [phone] upon discharge    Neelima Donnelly MD  Internal Medicine PGY1  Pager: [phone]/ [place] [phone]

## 2018-03-09 NOTE — PROGRESS NOTE ADULT - ASSESSMENT
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     s/p RTX on 1/14/18 and was on prednisone 50 mg daily on admission.    Mental status improving, however, was on antibiotic coverage for meningitis.   Has deep left buttock sacral ulcer now with osteomyelitis of the left coccyx.  Afebrile. Nontoxic appearing. WBC WNL. Requesting to go home.   Unable to obtain bone cx from left coccyx osteomyelitis per primary team because patient is on plavix for recent cardiac stents and cannot be discontinued.     Recommend:  -Start vancomycin 500 mg IV post HD on days of HD only  -Start cefepime 2 grams IV post HD on days of HD only  -Start Flagyl 500 mg PO BID.   -Antibiotics to complete 4/14/2018  -Antibiotics should be arranged to be given at HD center  -Check weekly CBC/ CMP/ ESR/ CRP - please fax results to my office at [phone].  -Patient to followup with me in ID clinic in 3-4 weeks - for appointments can call [phone].    Silvio Lora MD  Pager [phone]  After 5pm/weekends call [phone]

## 2018-03-10 LAB
ALBUMIN SERPL ELPH-MCNC: 2.4 G/DL — LOW (ref 3.3–5)
ALP SERPL-CCNC: 229 U/L — HIGH (ref 40–120)
ALT FLD-CCNC: 11 U/L — SIGNIFICANT CHANGE UP (ref 4–33)
AST SERPL-CCNC: 20 U/L — SIGNIFICANT CHANGE UP (ref 4–32)
BASOPHILS # BLD AUTO: 0.03 K/UL — SIGNIFICANT CHANGE UP (ref 0–0.2)
BASOPHILS NFR BLD AUTO: 0.4 % — SIGNIFICANT CHANGE UP (ref 0–2)
BILIRUB SERPL-MCNC: 0.4 MG/DL — SIGNIFICANT CHANGE UP (ref 0.2–1.2)
BUN SERPL-MCNC: 27 MG/DL — HIGH (ref 7–23)
CALCIUM SERPL-MCNC: 8.2 MG/DL — LOW (ref 8.4–10.5)
CHLORIDE SERPL-SCNC: 93 MMOL/L — LOW (ref 98–107)
CO2 SERPL-SCNC: 22 MMOL/L — SIGNIFICANT CHANGE UP (ref 22–31)
CREAT SERPL-MCNC: 2.86 MG/DL — HIGH (ref 0.5–1.3)
EOSINOPHIL # BLD AUTO: 0.04 K/UL — SIGNIFICANT CHANGE UP (ref 0–0.5)
EOSINOPHIL NFR BLD AUTO: 0.5 % — SIGNIFICANT CHANGE UP (ref 0–6)
GLUCOSE SERPL-MCNC: 298 MG/DL — HIGH (ref 70–99)
HCT VFR BLD CALC: 33.2 % — LOW (ref 34.5–45)
HGB BLD-MCNC: 10.4 G/DL — LOW (ref 11.5–15.5)
IMM GRANULOCYTES # BLD AUTO: 0.1 # — SIGNIFICANT CHANGE UP
IMM GRANULOCYTES NFR BLD AUTO: 1.2 % — SIGNIFICANT CHANGE UP (ref 0–1.5)
LYMPHOCYTES # BLD AUTO: 0.8 K/UL — LOW (ref 1–3.3)
LYMPHOCYTES # BLD AUTO: 9.5 % — LOW (ref 13–44)
MAGNESIUM SERPL-MCNC: 1.8 MG/DL — SIGNIFICANT CHANGE UP (ref 1.6–2.6)
MCHC RBC-ENTMCNC: 30.6 PG — SIGNIFICANT CHANGE UP (ref 27–34)
MCHC RBC-ENTMCNC: 31.3 % — LOW (ref 32–36)
MCV RBC AUTO: 97.6 FL — SIGNIFICANT CHANGE UP (ref 80–100)
MONOCYTES # BLD AUTO: 0.73 K/UL — SIGNIFICANT CHANGE UP (ref 0–0.9)
MONOCYTES NFR BLD AUTO: 8.7 % — SIGNIFICANT CHANGE UP (ref 2–14)
NEUTROPHILS # BLD AUTO: 6.72 K/UL — SIGNIFICANT CHANGE UP (ref 1.8–7.4)
NEUTROPHILS NFR BLD AUTO: 79.7 % — HIGH (ref 43–77)
NRBC # FLD: 0 — SIGNIFICANT CHANGE UP
PHOSPHATE SERPL-MCNC: 2.4 MG/DL — LOW (ref 2.5–4.5)
PLATELET # BLD AUTO: 177 K/UL — SIGNIFICANT CHANGE UP (ref 150–400)
PMV BLD: 9.7 FL — SIGNIFICANT CHANGE UP (ref 7–13)
POTASSIUM SERPL-MCNC: 4 MMOL/L — SIGNIFICANT CHANGE UP (ref 3.5–5.3)
POTASSIUM SERPL-SCNC: 4 MMOL/L — SIGNIFICANT CHANGE UP (ref 3.5–5.3)
PROT SERPL-MCNC: 6.5 G/DL — SIGNIFICANT CHANGE UP (ref 6–8.3)
RBC # BLD: 3.4 M/UL — LOW (ref 3.8–5.2)
RBC # FLD: 17.5 % — HIGH (ref 10.3–14.5)
SODIUM SERPL-SCNC: 131 MMOL/L — LOW (ref 135–145)
WBC # BLD: 8.42 K/UL — SIGNIFICANT CHANGE UP (ref 3.8–10.5)
WBC # FLD AUTO: 8.42 K/UL — SIGNIFICANT CHANGE UP (ref 3.8–10.5)

## 2018-03-10 PROCEDURE — 99233 SBSQ HOSP IP/OBS HIGH 50: CPT | Mod: GC

## 2018-03-10 RX ORDER — INSULIN LISPRO 100/ML
6 VIAL (ML) SUBCUTANEOUS
Qty: 0 | Refills: 0 | Status: DISCONTINUED | OUTPATIENT
Start: 2018-03-10 | End: 2018-03-12

## 2018-03-10 RX ORDER — VANCOMYCIN HCL 1 G
500 VIAL (EA) INTRAVENOUS ONCE
Qty: 0 | Refills: 0 | Status: COMPLETED | OUTPATIENT
Start: 2018-03-10 | End: 2018-03-10

## 2018-03-10 RX ORDER — IMMUNE GLOBULIN,GAMMA(IGG) 5 %
45 VIAL (ML) INTRAVENOUS ONCE
Qty: 0 | Refills: 0 | Status: COMPLETED | OUTPATIENT
Start: 2018-03-11 | End: 2018-03-11

## 2018-03-10 RX ORDER — INSULIN GLARGINE 100 [IU]/ML
15 INJECTION, SOLUTION SUBCUTANEOUS AT BEDTIME
Qty: 0 | Refills: 0 | Status: DISCONTINUED | OUTPATIENT
Start: 2018-03-10 | End: 2018-03-12

## 2018-03-10 RX ORDER — IMMUNE GLOBULIN,GAMMA(IGG) 5 %
45 VIAL (ML) INTRAVENOUS ONCE
Qty: 0 | Refills: 0 | Status: COMPLETED | OUTPATIENT
Start: 2018-03-10 | End: 2018-03-10

## 2018-03-10 RX ADMIN — Medication 3 UNIT(S): at 08:33

## 2018-03-10 RX ADMIN — Medication 6 UNIT(S): at 12:20

## 2018-03-10 RX ADMIN — CLOPIDOGREL BISULFATE 75 MILLIGRAM(S): 75 TABLET, FILM COATED ORAL at 12:22

## 2018-03-10 RX ADMIN — Medication 81 MILLIGRAM(S): at 12:22

## 2018-03-10 RX ADMIN — Medication 500 MILLIGRAM(S): at 05:18

## 2018-03-10 RX ADMIN — HEPARIN SODIUM 5000 UNIT(S): 5000 INJECTION INTRAVENOUS; SUBCUTANEOUS at 05:18

## 2018-03-10 RX ADMIN — Medication 12: at 08:32

## 2018-03-10 RX ADMIN — Medication 1 TABLET(S): at 12:20

## 2018-03-10 RX ADMIN — Medication 1 TABLET(S): at 12:22

## 2018-03-10 RX ADMIN — Medication 25 MICROGRAM(S): at 05:18

## 2018-03-10 RX ADMIN — Medication 6 UNIT(S): at 17:34

## 2018-03-10 RX ADMIN — Medication 100 MILLIGRAM(S): at 02:05

## 2018-03-10 RX ADMIN — Medication 1 TABLET(S): at 17:35

## 2018-03-10 RX ADMIN — Medication 75 GRAM(S): at 03:17

## 2018-03-10 RX ADMIN — MIRTAZAPINE 7.5 MILLIGRAM(S): 45 TABLET, ORALLY DISINTEGRATING ORAL at 21:55

## 2018-03-10 RX ADMIN — Medication 1 TABLET(S): at 08:33

## 2018-03-10 RX ADMIN — Medication 25 MILLIGRAM(S): at 05:17

## 2018-03-10 RX ADMIN — Medication 500 MILLIGRAM(S): at 17:35

## 2018-03-10 RX ADMIN — INSULIN GLARGINE 15 UNIT(S): 100 INJECTION, SOLUTION SUBCUTANEOUS at 21:54

## 2018-03-10 RX ADMIN — LATANOPROST 1 DROP(S): 0.05 SOLUTION/ DROPS OPHTHALMIC; TOPICAL at 02:07

## 2018-03-10 RX ADMIN — Medication 6: at 17:34

## 2018-03-10 RX ADMIN — MIRTAZAPINE 7.5 MILLIGRAM(S): 45 TABLET, ORALLY DISINTEGRATING ORAL at 02:07

## 2018-03-10 RX ADMIN — Medication 30 MILLIGRAM(S): at 05:17

## 2018-03-10 RX ADMIN — HEPARIN SODIUM 5000 UNIT(S): 5000 INJECTION INTRAVENOUS; SUBCUTANEOUS at 17:39

## 2018-03-10 RX ADMIN — LATANOPROST 1 DROP(S): 0.05 SOLUTION/ DROPS OPHTHALMIC; TOPICAL at 21:54

## 2018-03-10 RX ADMIN — Medication 6: at 12:19

## 2018-03-10 NOTE — PROGRESS NOTE ADULT - ATTENDING COMMENTS
Patient seen and examined. Chart/lab reviewed. Agree with above with modifications.  Pt with acute coccx osteomyelitis on abx and wound VAC.  Patient had dialysis and IVIG yesterday, sacral decub with wound VAC.   PE: lung clear, left arm AVF not mature, has permacath, no leg edema.  Plan: IVIG on Sunday for vasculitic neuropathy, d/c plan Monday after ensuring arrangements are made for wound VAC and pt can get abx (vanco/cefepime) with dialysis tiw at outpt dialysis center.  Pt is on iv vanco/cefepime and po flaygl. Per ID, she will need abx through 4/14/2018.  f/u rheum for tx of SLE/vasculitis neuropathy.   Pt w/ hyperglycemia after IVIG likely due to maltose containing IVIG, will increase humalog to 6 u ac for now and monitor FS closely. Patient seen and examined. Chart/lab reviewed. Agree with above with modifications.  Pt with acute coccx osteomyelitis on abx and wound VAC.  Patient had dialysis and IVIG yesterday, sacral decub with wound VAC.   PE: lung clear, left arm AVF not mature, has permacath, no leg edema.  Plan: IVIG on Sunday for vasculitic neuropathy, d/c plan Monday after ensuring arrangements are made for wound VAC and pt can get abx (vanco/cefepime) with dialysis tiw at outpt dialysis center.  Pt is on iv vanco/cefepime and po flaygl. Per ID, she will need abx through 4/14/2018.  f/u rheum for tx of SLE/vasculitis neuropathy.   Pt w/ hyperglycemia after IVIG , will increase humalog to 6 u ac for now and monitor FS closely.

## 2018-03-10 NOTE — PROVIDER CONTACT NOTE (OTHER) - SITUATION
Blood pressure of 105/56. Metoprolol due.
Blood pressure admitted for 164/76.
Fingerstick 417mg/dl
Fingerstick of 96.
Patient not alert enough to swallow metoprolol whole.
Rectal temperature of 101.7 F.
Temperature of 100.4 F.
patient fingerstick 464

## 2018-03-10 NOTE — PROVIDER CONTACT NOTE (OTHER) - ACTION/TREATMENT ORDERED:
Humalog coverage given as ordered. Humalog coverage given as ordered. Will repeat FS in one hour. Humalog coverage given as ordered. Will repeat FS in one hour. 11:13 repeat FS 261mg/dl. Will continue to monitor patient's blood glucose level.

## 2018-03-10 NOTE — PROVIDER CONTACT NOTE (OTHER) - NAME OF MD/NP/PA/DO NOTIFIED:
Abby Reich
Dr. Reich
MD Donnelly
MD Obi notified
Tre Henderson
Tre Henderson

## 2018-03-10 NOTE — PROGRESS NOTE ADULT - SUBJECTIVE AND OBJECTIVE BOX
For Night coverage 7pm-7am: NS- page [phone] Team1-3, page [phone] Team4 & Care Model  Sat/[place] Coverage 12pm-7pm: NS- page [phone] for Team1-4, JER- pager forwarded to covering Resident    CONTACT INFO:  PETER Lemos MD  Internal Medicine PGY1  Pager: [phone]    WILLARD BOB  57y  Female    Patient is a 57y old  Female who presents with a chief complaint of Altered Mental Status (04 Mar 2018 22:28)    INTERVAL HPI / OVERNIGHT EVENTS: S/p IVIG. Hyperglycemic overnight. Patient seen and evaluated at bedside. Denies SOB at rest, chest pain, palpitations, abdominal pain, nausea/vomiting      OBJECTIVE:  Vitals Signs (24 Hrs):  T(C): 36.6 (03-10-18 @ 05:08), Max: 36.9 (03-09-18 @ 15:21)  HR: 85 (03-10-18 @ 05:08) (71 - 89)  BP: 135/77 (03-10-18 @ 05:08) (111/59 - 148/84)  RR: 18 (03-10-18 @ 05:08) (17 - 18)  SpO2: 100% (03-10-18 @ 05:08) (98% - 100%)    PHYSICAL EXAM:  General: Comfortable, no apparent distress  HEENT: Atraumatic; EOMI, PERRLA, conjunctiva and sclera clear; no tonsillar erythema/exudates/enlargement  Neck: Supple; no JVD; thyroid normal without masses or enlargement  Chest/Lungs: Clear to auscultation B/L; no rales, rhonchi or wheezing  Heart: Regular rate and rhythm; normal S1/S2; no murmurs, rubs, or gallops  Abdomen: Soft, nontender, nondistended; bowel sounds present  Back: sacral decubitus with would vac in place.  Extremities: PT/DP pulses 2+ B/L; no LE edema  Skin: No rashes or lesions  Neurological: Alert and oriented to person/place/time    LABS:                        10.4   8.42  )-----------( 177      ( 10 Mar 2018 06:40 )             33.2     03-10    131<L>  |  93<L>  |  27<H>  ----------------------------<  298<H>  4.0   |  22  |  2.86<H>    Ca    8.2<L>      10 Mar 2018 06:40  Phos  2.4     03-10  Mg     1.8     03-10    TPro  6.5  /  Alb  2.4<L>  /  TBili  0.4  /  DBili  x   /  AST  20  /  ALT  11  /  AlkPhos  229<H>  03-10        CAPILLARY BLOOD GLUCOSE      POCT Blood Glucose.: 417 mg/dL (10 Mar 2018 08:19)  POCT Blood Glucose.: 424 mg/dL (10 Mar 2018 08:15)  POCT Blood Glucose.: 150 mg/dL (09 Mar 2018 23:28)  POCT Blood Glucose.: 262 mg/dL (09 Mar 2018 17:08)  POCT Blood Glucose.: 195 mg/dL (09 Mar 2018 12:09)        RADIOLOGY & ADDITIONAL TESTS:    MEDICATIONS:  acetaminophen   Tablet 650 milliGRAM(s) Oral every 6 hours PRN For Temp greater than 38 C (100.4 F)  aspirin  chewable 81 milliGRAM(s) Oral daily  calcium carbonate 1250 mG (OsCal) 1 Tablet(s) Oral daily  cefepime  IVPB 2000 milliGRAM(s) IV Intermittent <User Schedule>  clopidogrel Tablet 75 milliGRAM(s) Oral daily  collagenase Ointment 1 Application(s) Topical daily  Dakins Solution - 1/4 Strength 1 Application(s) Topical daily  dextrose 50% Injectable 12.5 Gram(s) IV Push once  dextrose 50% Injectable 25 Gram(s) IV Push once  dextrose 50% Injectable 25 Gram(s) IV Push once  haloperidol     Tablet 0.5 milliGRAM(s) Oral every 6 hours PRN Agitation  haloperidol    Injectable 0.5 milliGRAM(s) IV Push every 6 hours PRN Agitation  haloperidol    Injectable 0.5 milliGRAM(s) IntraMuscular every 6 hours PRN Agitation  heparin  Injectable 5000 Unit(s) SubCutaneous every 12 hours  immune globulin gamma IVPB 45 Gram(s) IV Intermittent once  insulin glargine Injectable (LANTUS) 15 Unit(s) SubCutaneous at bedtime  insulin lispro (HumaLOG) corrective regimen sliding scale   SubCutaneous three times a day before meals  insulin lispro (HumaLOG) corrective regimen sliding scale   SubCutaneous at bedtime  insulin lispro Injectable (HumaLOG) 3 Unit(s) SubCutaneous three times a day before meals  lactobacillus acidophilus 1 Tablet(s) Oral three times a day with meals  latanoprost 0.005% Ophthalmic Solution 1 Drop(s) Both EYES at bedtime  levothyroxine 25 MICROGram(s) Oral daily  metoprolol succinate ER 25 milliGRAM(s) Oral daily  metroNIDAZOLE    Tablet 500 milliGRAM(s) Oral two times a day  mirtazapine 7.5 milliGRAM(s) Oral at bedtime  predniSONE   Tablet 30 milliGRAM(s) Oral daily  QUEtiapine 12.5 milliGRAM(s) Oral every 6 hours PRN Anxiety  vancomycin  IVPB 500 milliGRAM(s) IV Intermittent <User Schedule>      ALLERGIES:  penicillin (Rash)      Labs and imaging personally reviewed and interpreted [  ]  Consult notes reviewed   Case discussed with consultants

## 2018-03-10 NOTE — PROVIDER CONTACT NOTE (OTHER) - ASSESSMENT
Blood pressure of 105/56. Metoprolol due.
Blood pressure admitted for 164/76.
Fingerstick 417mg/dl
Fingerstick of 96.
Patient not alert enough to swallow metoprolol whole.
Rectal temperature of 101.7 F.
Temperature of 100.4 F.
patient is alert, in no apparent distress, without complaint of heat or dryness.

## 2018-03-10 NOTE — PROVIDER CONTACT NOTE (OTHER) - BACKGROUND
Patient admitted for altered mental status.
57 year old female admitted for altered mental status with history of DM II
Patient admitted for altered mental status.
Pt admitted with Lupus, ESRD, Leroy 2 DM, HTN, GLaucoma

## 2018-03-10 NOTE — PROGRESS NOTE ADULT - PROBLEM SELECTOR PLAN 1
- Resolved, likely metabolic due to sacral wound  - Appreciate ID recs. Pt currently not a candidate for bedside LP due to Plavix which cannot be held safely 2/2 recent BMS (1/2018). Considering consulting [facility] for LP this week, however patient mental status is improved.   - ESR slightly elevated, C3 decreased, C4 wnl. More consistent with infection as opposed to lupus flare. Low suspicion for autoimmune process. Appreciate Rheum recs

## 2018-03-10 NOTE — PROGRESS NOTE ADULT - PROBLEM SELECTOR PLAN 2
- MRI pelvis: Acute osteomyelitis involving the distal most coccygeal segments  - No need for debridement at this time per Wound Care.   - c/w IV antibiotics. ID recs highly appreciated. ID recs below:  - Vancomycin 500 mg IV post HD on days of HD only. Cefepime 2 grams IV post HD on days of HD only. Flagyl 500 mg PO BID.   - Antibiotics to complete 4/14/2018  -Antibiotics should be arranged to be given at HD center  -Check weekly CBC/ CMP/ ESR/ CRP - please fax results to my office at [phone].  -Patient to followup with me in ID clinic in 3-4 weeks - for appointments can call [phone].

## 2018-03-10 NOTE — PROVIDER CONTACT NOTE (OTHER) - RECOMMENDATIONS
Provider notified.
Give pt 6units and adjust sliding scale
Notify provider for further intervention
Provider notified.

## 2018-03-10 NOTE — PROVIDER CONTACT NOTE (OTHER) - DATE AND TIME:
09-Mar-2018 05:52
01-Mar-2018 22:32
02-Mar-2018 06:39
02-Mar-2018 06:40
02-Mar-2018 06:45
02-Mar-2018 17:20
02-Mar-2018 19:29
08-Mar-2018 22:28
10-Mar-2018 08:30

## 2018-03-10 NOTE — PROVIDER CONTACT NOTE (OTHER) - REASON
Blood pressure admitted for 164/76.
Fingerstick 417mg/dl
Fingerstick of 96.
Patient not alert enough to swallow metoprolol whole.
Rectal temperature of 101.7 F.
Temperature of 100.4 F.
patient fingerstick 463
Blood pressure of 105/56. Metoprolol due.

## 2018-03-10 NOTE — PROGRESS NOTE ADULT - PROBLEM SELECTOR PLAN 3
- MRI pelvis: Acute osteomyelitis involving the distal most coccygeal segments.  - c/w Vanc/Meropenem given recent prolonged hospitalizations\  - Appreciate Wound Care recs: collagenase and Dakins ordered  - Surgery consult. Reached out to Wound Care MD (Yosvany Flor)- recommend wound vac- no need for debridement at this time.   - Follow up with ID and wound care- Dr. Goddard outpatient

## 2018-03-10 NOTE — PROGRESS NOTE ADULT - PROBLEM SELECTOR PLAN 4
- Dx 4/2017, rapidly progressing end-organ damage (Lupus Nephritis)  - Rheum following: Recommendations below:  -  IVIG overnight post- HD. Next dose tomorrow  - Taper steroids slowly - 30 mg daily starting 3/9/18 for 1 week then 20 mg daily for 1 week then 15 mg daily for 1 week then 10 mg daily till her appointment with rheumatology outpatient.   - 2nd dose of Rituxan to be given once she has completed her antibiotic course for osteomyelitis

## 2018-03-11 LAB
BASOPHILS # BLD AUTO: 0.04 K/UL — SIGNIFICANT CHANGE UP (ref 0–0.2)
BASOPHILS NFR BLD AUTO: 0.7 % — SIGNIFICANT CHANGE UP (ref 0–2)
EOSINOPHIL # BLD AUTO: 0.1 K/UL — SIGNIFICANT CHANGE UP (ref 0–0.5)
EOSINOPHIL NFR BLD AUTO: 1.7 % — SIGNIFICANT CHANGE UP (ref 0–6)
HCT VFR BLD CALC: 31.8 % — LOW (ref 34.5–45)
HGB BLD-MCNC: 9.9 G/DL — LOW (ref 11.5–15.5)
IMM GRANULOCYTES # BLD AUTO: 0.05 # — SIGNIFICANT CHANGE UP
IMM GRANULOCYTES NFR BLD AUTO: 0.9 % — SIGNIFICANT CHANGE UP (ref 0–1.5)
LYMPHOCYTES # BLD AUTO: 1.16 K/UL — SIGNIFICANT CHANGE UP (ref 1–3.3)
LYMPHOCYTES # BLD AUTO: 19.9 % — SIGNIFICANT CHANGE UP (ref 13–44)
MCHC RBC-ENTMCNC: 30.5 PG — SIGNIFICANT CHANGE UP (ref 27–34)
MCHC RBC-ENTMCNC: 31.1 % — LOW (ref 32–36)
MCV RBC AUTO: 97.8 FL — SIGNIFICANT CHANGE UP (ref 80–100)
MONOCYTES # BLD AUTO: 0.56 K/UL — SIGNIFICANT CHANGE UP (ref 0–0.9)
MONOCYTES NFR BLD AUTO: 9.6 % — SIGNIFICANT CHANGE UP (ref 2–14)
NEUTROPHILS # BLD AUTO: 3.93 K/UL — SIGNIFICANT CHANGE UP (ref 1.8–7.4)
NEUTROPHILS NFR BLD AUTO: 67.2 % — SIGNIFICANT CHANGE UP (ref 43–77)
NRBC # FLD: 0 — SIGNIFICANT CHANGE UP
PLATELET # BLD AUTO: 156 K/UL — SIGNIFICANT CHANGE UP (ref 150–400)
PMV BLD: 9.6 FL — SIGNIFICANT CHANGE UP (ref 7–13)
RBC # BLD: 3.25 M/UL — LOW (ref 3.8–5.2)
RBC # FLD: 17.6 % — HIGH (ref 10.3–14.5)
WBC # BLD: 5.84 K/UL — SIGNIFICANT CHANGE UP (ref 3.8–10.5)
WBC # FLD AUTO: 5.84 K/UL — SIGNIFICANT CHANGE UP (ref 3.8–10.5)

## 2018-03-11 PROCEDURE — 99233 SBSQ HOSP IP/OBS HIGH 50: CPT | Mod: GC

## 2018-03-11 RX ORDER — OXYCODONE AND ACETAMINOPHEN 5; 325 MG/1; MG/1
1 TABLET ORAL ONCE
Qty: 0 | Refills: 0 | Status: DISCONTINUED | OUTPATIENT
Start: 2018-03-11 | End: 2018-03-11

## 2018-03-11 RX ADMIN — Medication 6 UNIT(S): at 14:01

## 2018-03-11 RX ADMIN — Medication 500 MILLIGRAM(S): at 06:37

## 2018-03-11 RX ADMIN — HEPARIN SODIUM 5000 UNIT(S): 5000 INJECTION INTRAVENOUS; SUBCUTANEOUS at 06:37

## 2018-03-11 RX ADMIN — CLOPIDOGREL BISULFATE 75 MILLIGRAM(S): 75 TABLET, FILM COATED ORAL at 12:13

## 2018-03-11 RX ADMIN — Medication 2: at 17:35

## 2018-03-11 RX ADMIN — Medication 30 MILLIGRAM(S): at 06:37

## 2018-03-11 RX ADMIN — Medication 500 MILLIGRAM(S): at 17:35

## 2018-03-11 RX ADMIN — Medication 75 GRAM(S): at 11:51

## 2018-03-11 RX ADMIN — MIRTAZAPINE 7.5 MILLIGRAM(S): 45 TABLET, ORALLY DISINTEGRATING ORAL at 21:37

## 2018-03-11 RX ADMIN — INSULIN GLARGINE 15 UNIT(S): 100 INJECTION, SOLUTION SUBCUTANEOUS at 21:36

## 2018-03-11 RX ADMIN — Medication 6 UNIT(S): at 17:35

## 2018-03-11 RX ADMIN — Medication 10: at 14:01

## 2018-03-11 RX ADMIN — LATANOPROST 1 DROP(S): 0.05 SOLUTION/ DROPS OPHTHALMIC; TOPICAL at 21:36

## 2018-03-11 RX ADMIN — HEPARIN SODIUM 5000 UNIT(S): 5000 INJECTION INTRAVENOUS; SUBCUTANEOUS at 17:36

## 2018-03-11 RX ADMIN — Medication 1 TABLET(S): at 08:55

## 2018-03-11 RX ADMIN — OXYCODONE AND ACETAMINOPHEN 1 TABLET(S): 5; 325 TABLET ORAL at 01:46

## 2018-03-11 RX ADMIN — Medication 25 MICROGRAM(S): at 06:37

## 2018-03-11 RX ADMIN — Medication 1 TABLET(S): at 12:13

## 2018-03-11 RX ADMIN — Medication 1 TABLET(S): at 17:35

## 2018-03-11 RX ADMIN — Medication 81 MILLIGRAM(S): at 12:13

## 2018-03-11 RX ADMIN — OXYCODONE AND ACETAMINOPHEN 1 TABLET(S): 5; 325 TABLET ORAL at 03:20

## 2018-03-11 RX ADMIN — Medication 25 MILLIGRAM(S): at 06:37

## 2018-03-11 NOTE — PROGRESS NOTE ADULT - ATTENDING COMMENTS
Patient seen and examined. Chart/lab reviewed. Agree with above with modifications.  Pt with acute coccx osteomyelitis on abx and wound VAC.  Patient had dialysis on Sat and scheduled for IVIG #2 today, sacral decub with wound VAC.   PE: lung clear, left arm AVF not mature, has permacath, no leg edema.  Plan: IVIG on Sunday for vasculitic neuropathy, d/c plan Monday after ensuring arrangements are made for wound VAC and outpt vanco/cefepime with dialysis.  Pt is on iv vanco/cefepime and po flaygl. Per ID, she will need abx through 4/14/2018.  f/u rheum for tx of SLE/vasculitis neuropathy.   Hyperglycemic now controlled on lantus 15 u hs and humalog 6 u ac, cont to monitor FS closely. Patient seen and examined. Chart/lab reviewed. Agree with above with modifications.  Pt with acute coccx osteomyelitis on abx and wound VAC.  Patient had dialysis on Sat and scheduled for IVIG #2 today, sacral decub with wound VAC.   PE: lung clear, left arm AVF not mature, has permacath, no leg edema.  Plan: HD on Monday, IVIG #2 today for vasculitic neuropathy, d/c plan Monday after ensuring arrangements are made for wound VAC and outpt vanco/cefepime with dialysis.  Pt is on iv vanco/cefepime and po flaygl. Per ID, she will need abx through 4/14/2018.  f/u rheum for tx of SLE/vasculitis neuropathy.   Hyperglycemic now controlled on lantus 15 u hs and humalog 6 u ac, cont to monitor FS closely.

## 2018-03-11 NOTE — PROGRESS NOTE ADULT - PROBLEM SELECTOR PLAN 9
- Diet: CC.  - HSQ.    Dispo: Discharge planning- will need wound vac.    Deisy Dobbins MD  PGY-2 | Internal Medicine  [phone] / [phone]

## 2018-03-11 NOTE — PROGRESS NOTE ADULT - PROBLEM SELECTOR PLAN 5
- Exacerbated with current steroid use.  - A1c 7.3, increased from 4.7% in 12/2017  - Lantus 15U, Humalog 3U qac.  - C/w FS/ISS qac/qhs.

## 2018-03-11 NOTE — PROGRESS NOTE ADULT - SUBJECTIVE AND OBJECTIVE BOX
Patient is a 57y old  Female who presents with a chief complaint of Altered Mental Status (04 Mar 2018 22:28)      SUBJECTIVE / OVERNIGHT EVENTS: Patient had leg pain, given percocet x1, which provided relief.    MEDICATIONS  (STANDING):  aspirin  chewable 81 milliGRAM(s) Oral daily  calcium carbonate 1250 mG (OsCal) 1 Tablet(s) Oral daily  cefepime  IVPB 2000 milliGRAM(s) IV Intermittent <User Schedule>  clopidogrel Tablet 75 milliGRAM(s) Oral daily  collagenase Ointment 1 Application(s) Topical daily  Dakins Solution - 1/4 Strength 1 Application(s) Topical daily  dextrose 50% Injectable 12.5 Gram(s) IV Push once  dextrose 50% Injectable 25 Gram(s) IV Push once  dextrose 50% Injectable 25 Gram(s) IV Push once  heparin  Injectable 5000 Unit(s) SubCutaneous every 12 hours  immune globulin gamma IVPB 45 Gram(s) IV Intermittent once  insulin glargine Injectable (LANTUS) 15 Unit(s) SubCutaneous at bedtime  insulin lispro (HumaLOG) corrective regimen sliding scale   SubCutaneous three times a day before meals  insulin lispro (HumaLOG) corrective regimen sliding scale   SubCutaneous at bedtime  insulin lispro Injectable (HumaLOG) 6 Unit(s) SubCutaneous three times a day before meals  lactobacillus acidophilus 1 Tablet(s) Oral three times a day with meals  latanoprost 0.005% Ophthalmic Solution 1 Drop(s) Both EYES at bedtime  levothyroxine 25 MICROGram(s) Oral daily  metoprolol succinate ER 25 milliGRAM(s) Oral daily  metroNIDAZOLE    Tablet 500 milliGRAM(s) Oral two times a day  mirtazapine 7.5 milliGRAM(s) Oral at bedtime  predniSONE   Tablet 30 milliGRAM(s) Oral daily  vancomycin  IVPB 500 milliGRAM(s) IV Intermittent <User Schedule>    MEDICATIONS  (PRN):  acetaminophen   Tablet 650 milliGRAM(s) Oral every 6 hours PRN For Temp greater than 38 C (100.4 F)  haloperidol     Tablet 0.5 milliGRAM(s) Oral every 6 hours PRN Agitation  haloperidol    Injectable 0.5 milliGRAM(s) IV Push every 6 hours PRN Agitation  haloperidol    Injectable 0.5 milliGRAM(s) IntraMuscular every 6 hours PRN Agitation  QUEtiapine 12.5 milliGRAM(s) Oral every 6 hours PRN Anxiety      T(C): 36.8 (03-11-18 @ 06:23), Max: 37.2 (03-10-18 @ 22:05)  HR: 86 (03-11-18 @ 06:23) (86 - 95)  BP: 136/81 (03-11-18 @ 06:23) (125/75 - 136/81)  RR: 18 (03-11-18 @ 06:23) (17 - 18)  SpO2: 100% (03-11-18 @ 06:23) (100% - 100%)    CAPILLARY BLOOD GLUCOSE      POCT Blood Glucose.: 142 mg/dL (10 Mar 2018 21:50)  POCT Blood Glucose.: 276 mg/dL (10 Mar 2018 17:14)  POCT Blood Glucose.: 255 mg/dL (10 Mar 2018 12:10)  POCT Blood Glucose.: 261 mg/dL (10 Mar 2018 11:08)  POCT Blood Glucose.: 417 mg/dL (10 Mar 2018 08:19)  POCT Blood Glucose.: 424 mg/dL (10 Mar 2018 08:15)    I&O's Summary      PHYSICAL EXAM:  GENERAL:  HEAD:  EYES:  NECK:  CHEST/LUNG:  HEART:  ABDOMEN:  EXTREMITIES:  PSYCH:  NEUROLOGY:  SKIN:    LABS:                        10.4   8.42  )-----------( 177      ( 10 Mar 2018 06:40 )             33.2     03-10    131<L>  |  93<L>  |  27<H>  ----------------------------<  298<H>  4.0   |  22  |  2.86<H>    Ca    8.2<L>      10 Mar 2018 06:40  Phos  2.4     03-10  Mg     1.8     03-10    TPro  6.5  /  Alb  2.4<L>  /  TBili  0.4  /  DBili  x   /  AST  20  /  ALT  11  /  AlkPhos  229<H>  03-10              RADIOLOGY & ADDITIONAL TESTS:    Imaging Personally Reviewed:     Consultant(s) Notes Reviewed:     Care Discussed with Consultants/Other Providers: Patient is a 57y old  Female who presents with a chief complaint of Altered Mental Status (04 Mar 2018 22:28)      SUBJECTIVE / OVERNIGHT EVENTS: Patient had leg pain, given percocet x1, which provided relief.  Patient states she has no further leg pain.  She denies SOB, CP, abd pain.    MEDICATIONS  (STANDING):  aspirin  chewable 81 milliGRAM(s) Oral daily  calcium carbonate 1250 mG (OsCal) 1 Tablet(s) Oral daily  cefepime  IVPB 2000 milliGRAM(s) IV Intermittent <User Schedule>  clopidogrel Tablet 75 milliGRAM(s) Oral daily  collagenase Ointment 1 Application(s) Topical daily  Dakins Solution - 1/4 Strength 1 Application(s) Topical daily  dextrose 50% Injectable 12.5 Gram(s) IV Push once  dextrose 50% Injectable 25 Gram(s) IV Push once  dextrose 50% Injectable 25 Gram(s) IV Push once  heparin  Injectable 5000 Unit(s) SubCutaneous every 12 hours  immune globulin gamma IVPB 45 Gram(s) IV Intermittent once  insulin glargine Injectable (LANTUS) 15 Unit(s) SubCutaneous at bedtime  insulin lispro (HumaLOG) corrective regimen sliding scale   SubCutaneous three times a day before meals  insulin lispro (HumaLOG) corrective regimen sliding scale   SubCutaneous at bedtime  insulin lispro Injectable (HumaLOG) 6 Unit(s) SubCutaneous three times a day before meals  lactobacillus acidophilus 1 Tablet(s) Oral three times a day with meals  latanoprost 0.005% Ophthalmic Solution 1 Drop(s) Both EYES at bedtime  levothyroxine 25 MICROGram(s) Oral daily  metoprolol succinate ER 25 milliGRAM(s) Oral daily  metroNIDAZOLE    Tablet 500 milliGRAM(s) Oral two times a day  mirtazapine 7.5 milliGRAM(s) Oral at bedtime  predniSONE   Tablet 30 milliGRAM(s) Oral daily  vancomycin  IVPB 500 milliGRAM(s) IV Intermittent <User Schedule>    MEDICATIONS  (PRN):  acetaminophen   Tablet 650 milliGRAM(s) Oral every 6 hours PRN For Temp greater than 38 C (100.4 F)  haloperidol     Tablet 0.5 milliGRAM(s) Oral every 6 hours PRN Agitation  haloperidol    Injectable 0.5 milliGRAM(s) IV Push every 6 hours PRN Agitation  haloperidol    Injectable 0.5 milliGRAM(s) IntraMuscular every 6 hours PRN Agitation  QUEtiapine 12.5 milliGRAM(s) Oral every 6 hours PRN Anxiety      T(C): 36.8 (03-11-18 @ 06:23), Max: 37.2 (03-10-18 @ 22:05)  HR: 86 (03-11-18 @ 06:23) (86 - 95)  BP: 136/81 (03-11-18 @ 06:23) (125/75 - 136/81)  RR: 18 (03-11-18 @ 06:23) (17 - 18)  SpO2: 100% (03-11-18 @ 06:23) (100% - 100%)    CAPILLARY BLOOD GLUCOSE      POCT Blood Glucose.: 142 mg/dL (10 Mar 2018 21:50)  POCT Blood Glucose.: 276 mg/dL (10 Mar 2018 17:14)  POCT Blood Glucose.: 255 mg/dL (10 Mar 2018 12:10)  POCT Blood Glucose.: 261 mg/dL (10 Mar 2018 11:08)  POCT Blood Glucose.: 417 mg/dL (10 Mar 2018 08:19)  POCT Blood Glucose.: 424 mg/dL (10 Mar 2018 08:15)    I&O's Summary    Gen: awake, alert  HENT: neck soft / supple; MMM  Lymph: no LAD noted in neck  Eye: PERRL, sclerae anicteric  CV: normal rate, regular rhythm  Pulm: CTAB, no w/r/r auscultated  Abd: +BS, soft, NT, ND  Skin: warm, dry  Ext: no LE edema  Neuro: answering questions appropriately, following commands appropriately, recent and remote memory intact  Psych: normal mood / affect    LABS:                        10.4   8.42  )-----------( 177      ( 10 Mar 2018 06:40 )             33.2     03-10    131<L>  |  93<L>  |  27<H>  ----------------------------<  298<H>  4.0   |  22  |  2.86<H>    Ca    8.2<L>      10 Mar 2018 06:40  Phos  2.4     03-10  Mg     1.8     03-10    TPro  6.5  /  Alb  2.4<L>  /  TBili  0.4  /  DBili  x   /  AST  20  /  ALT  11  /  AlkPhos  229<H>  03-10              RADIOLOGY & ADDITIONAL TESTS:    Imaging Personally Reviewed: no new imaging.    Consultant(s) Notes Reviewed: no notes.    Care Discussed with Consultants/Other Providers:

## 2018-03-11 NOTE — PROGRESS NOTE ADULT - ASSESSMENT
57F h/o lupus (dx 04/2017), ESRD (HD MWF), NSTEMI (BMS 01/2018), DM2, HTN, HLD, hypothyroidism adm 3/1 from dialysis with encephalopathy found to have infection of sacral wound with OM of coccyx.

## 2018-03-11 NOTE — PROGRESS NOTE ADULT - PROBLEM SELECTOR PLAN 4
- Dx 04/2017, rapidly progressing end-organ damage (Lupus Nephritis).  - Rheum following: Recommendations below:  - IVIG yesterday and today.  - Taper steroids slowly - 30 mg daily starting 3/9/18 for 1 week then 20 mg daily for 1 week then 15 mg daily for 1 week then 10 mg daily till her appointment with Rheumatology outpatient.   - Second dose of Rituxan to be given once she has completed her antibiotic course for osteomyelitis.

## 2018-03-11 NOTE — PROGRESS NOTE ADULT - PROBLEM SELECTOR PLAN 2
- MRI pelvis: Acute osteomyelitis involving the distal most coccygeal segments.  - No need for debridement at this time per Wound Care.   - ID recs highly appreciated.  - Vancomycin 500 mg IV post HD on days of HD only. Cefepime 2 grams IV post HD on days of HD only. Flagyl 500 mg PO BID.   - Antibiotics to complete 4/14/2018  -Antibiotics should be arranged to be given at HD center  -Check weekly CBC/ CMP/ ESR/ CRP - please fax results to my office at [phone].  -Patient to followup with me in ID clinic in 3-4 weeks - for appointments can call [phone].

## 2018-03-11 NOTE — CHART NOTE - NSCHARTNOTEFT_GEN_A_CORE
Called by RN for patient c/o severe leg pain. On arrival patient crying, family member at bedside states patient is experiencing severe pain which the patient also endorses. Pain is bilateral and extends throughout entire lower extremity, has been present for several hours without relief.     Exam:  Gen: Crying, cachectic  Card: RRR  Pulm: CTAB, no distress noted  Ext: Significant tenderness to light touch over entire lower extremity bilaterally, bandage noted on LLE  Neuro: A+Ox3, follows commands, withdraws extremities bilaterally    Assessment: Patient with severe LE pain, unclear eitiology; no e/o trauma, unlikely PE given bilateral and significant TTP over entirety of legs. Patient has received 5 mg oxycodone and .5 mg IV dilaudid during this admission without reported complication. Not currently altered or with decreased respirations; will administer 1 time dose of 5 mg percocet. Called by RN for patient c/o severe leg pain. On arrival patient crying, family member at bedside states patient is experiencing severe pain which the patient also endorses. Pain is bilateral and extends throughout entire lower extremity, has been present for several hours without relief.     Exam:  Gen: Crying, cachectic  Card: RRR  Pulm: CTAB, no distress noted  Ext: Significant tenderness to light touch over entire lower extremity bilaterally, bandage noted on LLE  Neuro: A+Ox3, follows commands, withdraws extremities bilaterally    Assessment: Patient with severe LE pain, unclear eitiology; no e/o trauma, unlikely DVT given bilateral and significant TTP over entirety of legs. Patient has received 5 mg oxycodone and .5 mg IV dilaudid during this admission without reported complication. Not currently altered or with decreased respirations; will administer 1 time dose of 5 mg percocet.

## 2018-03-11 NOTE — PROGRESS NOTE ADULT - PROBLEM SELECTOR PLAN 1
- Resolved, likely metabolic due to infected sacral wound.  - Appreciate ID recs.  Pt currently not a candidate for bedside LP due to clopidogrel, which cannot be held safely 2/2 recent BMS (01/2018).  Given improved mental status, will continue to monitor.  - ESR slightly elevated, C3 decreased, C4 wnl. More consistent with infection as opposed to lupus flare.  Rheum on board, currently treating for poss SLE flare.  On IVIG, last day today.

## 2018-03-12 VITALS
OXYGEN SATURATION: 100 % | RESPIRATION RATE: 18 BRPM | SYSTOLIC BLOOD PRESSURE: 148 MMHG | DIASTOLIC BLOOD PRESSURE: 79 MMHG | HEART RATE: 88 BPM

## 2018-03-12 LAB
ALBUMIN SERPL ELPH-MCNC: 2.2 G/DL — LOW (ref 3.3–5)
ALP SERPL-CCNC: 200 U/L — HIGH (ref 40–120)
ALT FLD-CCNC: 12 U/L — SIGNIFICANT CHANGE UP (ref 4–33)
AST SERPL-CCNC: 39 U/L — HIGH (ref 4–32)
BILIRUB SERPL-MCNC: 0.4 MG/DL — SIGNIFICANT CHANGE UP (ref 0.2–1.2)
BUN SERPL-MCNC: 81 MG/DL — HIGH (ref 7–23)
CALCIUM SERPL-MCNC: 8.4 MG/DL — SIGNIFICANT CHANGE UP (ref 8.4–10.5)
CHLORIDE SERPL-SCNC: 97 MMOL/L — LOW (ref 98–107)
CO2 SERPL-SCNC: 20 MMOL/L — LOW (ref 22–31)
CREAT SERPL-MCNC: 6.21 MG/DL — HIGH (ref 0.5–1.3)
GLUCOSE SERPL-MCNC: 78 MG/DL — SIGNIFICANT CHANGE UP (ref 70–99)
MAGNESIUM SERPL-MCNC: 3 MG/DL — HIGH (ref 1.6–2.6)
PHOSPHATE SERPL-MCNC: 3.4 MG/DL — SIGNIFICANT CHANGE UP (ref 2.5–4.5)
POTASSIUM SERPL-MCNC: 4.2 MMOL/L — SIGNIFICANT CHANGE UP (ref 3.5–5.3)
POTASSIUM SERPL-SCNC: 4.2 MMOL/L — SIGNIFICANT CHANGE UP (ref 3.5–5.3)
PROT SERPL-MCNC: 7.7 G/DL — SIGNIFICANT CHANGE UP (ref 6–8.3)
SODIUM SERPL-SCNC: 137 MMOL/L — SIGNIFICANT CHANGE UP (ref 135–145)
VANCOMYCIN TROUGH SERPL-MCNC: 23.2 UG/ML — HIGH (ref 10–20)

## 2018-03-12 PROCEDURE — 99239 HOSP IP/OBS DSCHRG MGMT >30: CPT

## 2018-03-12 RX ORDER — METRONIDAZOLE 500 MG
1 TABLET ORAL
Qty: 0 | Refills: 0 | DISCHARGE
Start: 2018-03-12

## 2018-03-12 RX ORDER — CEFEPIME 1 G/1
2 INJECTION, POWDER, FOR SOLUTION INTRAMUSCULAR; INTRAVENOUS
Qty: 24 | Refills: 0 | OUTPATIENT
Start: 2018-03-12 | End: 2018-03-23

## 2018-03-12 RX ORDER — VANCOMYCIN HCL 1 G
500 VIAL (EA) INTRAVENOUS
Qty: 0 | Refills: 0 | DISCHARGE
Start: 2018-03-12

## 2018-03-12 RX ORDER — CEFEPIME 1 G/1
2 INJECTION, POWDER, FOR SOLUTION INTRAMUSCULAR; INTRAVENOUS
Qty: 0 | Refills: 0 | DISCHARGE
Start: 2018-03-12

## 2018-03-12 RX ORDER — VANCOMYCIN HCL 1 G
500 VIAL (EA) INTRAVENOUS
Qty: 6000 | Refills: 0 | OUTPATIENT
Start: 2018-03-12 | End: 2018-03-23

## 2018-03-12 RX ADMIN — CLOPIDOGREL BISULFATE 75 MILLIGRAM(S): 75 TABLET, FILM COATED ORAL at 16:15

## 2018-03-12 RX ADMIN — Medication 6 UNIT(S): at 08:25

## 2018-03-12 RX ADMIN — Medication 81 MILLIGRAM(S): at 16:12

## 2018-03-12 RX ADMIN — Medication 1 TABLET(S): at 08:25

## 2018-03-12 RX ADMIN — Medication 25 MICROGRAM(S): at 05:32

## 2018-03-12 RX ADMIN — Medication 6 UNIT(S): at 17:38

## 2018-03-12 RX ADMIN — Medication 1 TABLET(S): at 16:14

## 2018-03-12 RX ADMIN — Medication 1 TABLET(S): at 16:13

## 2018-03-12 RX ADMIN — Medication 500 MILLIGRAM(S): at 05:32

## 2018-03-12 RX ADMIN — CEFEPIME 100 MILLIGRAM(S): 1 INJECTION, POWDER, FOR SOLUTION INTRAMUSCULAR; INTRAVENOUS at 16:11

## 2018-03-12 RX ADMIN — Medication 6 UNIT(S): at 12:11

## 2018-03-12 RX ADMIN — HEPARIN SODIUM 5000 UNIT(S): 5000 INJECTION INTRAVENOUS; SUBCUTANEOUS at 05:31

## 2018-03-12 RX ADMIN — Medication 8: at 17:36

## 2018-03-12 RX ADMIN — Medication 25 MILLIGRAM(S): at 05:32

## 2018-03-12 RX ADMIN — Medication 500 MILLIGRAM(S): at 17:37

## 2018-03-12 RX ADMIN — Medication 30 MILLIGRAM(S): at 05:31

## 2018-03-12 NOTE — PROGRESS NOTE ADULT - PROBLEM SELECTOR PLAN 4
- Dx 04/2017, rapidly progressing end-organ damage (Lupus Nephritis).  - Rheum following: Recommendations below:  - IVIG x 2 over the weekend.  - Taper steroids slowly - 30 mg daily starting 3/9/18 for 1 week then 20 mg daily for 1 week then 15 mg daily for 1 week then 10 mg daily till her appointment with Rheumatology outpatient.   - Second dose of Rituxan to be given once she has completed her antibiotic course for osteomyelitis.

## 2018-03-12 NOTE — PROGRESS NOTE ADULT - PROBLEM SELECTOR PROBLEM 3
Sacral wound
Lupus (systemic lupus erythematosus)
Sacral wound

## 2018-03-12 NOTE — PROGRESS NOTE ADULT - ATTENDING COMMENTS
No complaints today. Medically stable for d/c home today. Will need antibiotics at HD. Will need close f/u with wound care, also ID, for sacral decubitus wound with osteomyelitis of the coccyx.      35 min spent in coordinating d/c of this patient

## 2018-03-12 NOTE — PROGRESS NOTE ADULT - PROBLEM SELECTOR PROBLEM 1
Encephalopathy acute

## 2018-03-12 NOTE — PROGRESS NOTE ADULT - PROVIDER SPECIALTY LIST ADULT
Infectious Disease
Internal Medicine
Nephrology
Rheumatology
Internal Medicine

## 2018-03-12 NOTE — PROGRESS NOTE ADULT - PROBLEM SELECTOR PLAN 2
- MRI pelvis: Acute osteomyelitis involving the distal most coccygeal segments.  - No need for debridement at this time per Wound Care.   - Vancomycin 500 mg IV post HD on days of HD only. Cefepime 2 grams IV post HD on days of HD only. Flagyl 500 mg PO BID.   - Antibiotics to complete 4/14/2018  -Antibiotics should be arranged to be given at HD center

## 2018-03-12 NOTE — PROGRESS NOTE ADULT - SUBJECTIVE AND OBJECTIVE BOX
Patient is a 57y old  Female who presents with a chief complaint of Altered Mental Status (04 Mar 2018 22:28)      SUBJECTIVE / OVERNIGHT EVENTS: No acute events overnight. Received IVIG x 2 doses over the weekend. Plan for dialysis today.    MEDICATIONS  (STANDING):  aspirin  chewable 81 milliGRAM(s) Oral daily  calcium carbonate 1250 mG (OsCal) 1 Tablet(s) Oral daily  cefepime  IVPB 2000 milliGRAM(s) IV Intermittent <User Schedule>  clopidogrel Tablet 75 milliGRAM(s) Oral daily  collagenase Ointment 1 Application(s) Topical daily  Dakins Solution - 1/4 Strength 1 Application(s) Topical daily  dextrose 50% Injectable 12.5 Gram(s) IV Push once  dextrose 50% Injectable 25 Gram(s) IV Push once  dextrose 50% Injectable 25 Gram(s) IV Push once  heparin  Injectable 5000 Unit(s) SubCutaneous every 12 hours  insulin glargine Injectable (LANTUS) 15 Unit(s) SubCutaneous at bedtime  insulin lispro (HumaLOG) corrective regimen sliding scale   SubCutaneous three times a day before meals  insulin lispro (HumaLOG) corrective regimen sliding scale   SubCutaneous at bedtime  insulin lispro Injectable (HumaLOG) 6 Unit(s) SubCutaneous three times a day before meals  lactobacillus acidophilus 1 Tablet(s) Oral three times a day with meals  latanoprost 0.005% Ophthalmic Solution 1 Drop(s) Both EYES at bedtime  levothyroxine 25 MICROGram(s) Oral daily  metoprolol succinate ER 25 milliGRAM(s) Oral daily  metroNIDAZOLE    Tablet 500 milliGRAM(s) Oral two times a day  mirtazapine 7.5 milliGRAM(s) Oral at bedtime  predniSONE   Tablet 30 milliGRAM(s) Oral daily  vancomycin  IVPB 500 milliGRAM(s) IV Intermittent <User Schedule>    MEDICATIONS  (PRN):  acetaminophen   Tablet 650 milliGRAM(s) Oral every 6 hours PRN For Temp greater than 38 C (100.4 F)  haloperidol     Tablet 0.5 milliGRAM(s) Oral every 6 hours PRN Agitation  haloperidol    Injectable 0.5 milliGRAM(s) IV Push every 6 hours PRN Agitation  haloperidol    Injectable 0.5 milliGRAM(s) IntraMuscular every 6 hours PRN Agitation  QUEtiapine 12.5 milliGRAM(s) Oral every 6 hours PRN Anxiety      CAPILLARY BLOOD GLUCOSE      POCT Blood Glucose.: 169 mg/dL (11 Mar 2018 21:27)  POCT Blood Glucose.: 168 mg/dL (11 Mar 2018 17:15)  POCT Blood Glucose.: 370 mg/dL (11 Mar 2018 13:52)  POCT Blood Glucose.: 88 mg/dL (11 Mar 2018 08:16)    I&O's Summary      T(C): 36.8 (03-12-18 @ 05:01), Max: 37.4 (03-11-18 @ 21:42)  HR: 88 (03-12-18 @ 05:01) (78 - 88)  BP: 130/51 (03-12-18 @ 05:01) (126/78 - 146/90)  RR: 16 (03-12-18 @ 05:01) (16 - 17)  SpO2: 100% (03-12-18 @ 05:01) (100% - 100%)  Gen: awake, alert  HENT: neck soft / supple; MMM  Lymph: no LAD noted in neck  Eye: PERRL, sclerae anicteric  CV: normal rate, regular rhythm  Pulm: CTAB, no w/r/r auscultated  Abd: +BS, soft, NT, ND  Skin: warm, dry. Decub ulcer left buttock. Wound vac in place with SS drainage, no leak.  Ext: no LE edema  Neuro: answering questions appropriately, following commands appropriately, recent and remote memory intact  Psych: normal mood / affect    LABS:                        9.9    5.84  )-----------( 156      ( 11 Mar 2018 06:30 )             31.8     WBC Trend: 5.84<--, 8.42<--, 8.00<--        Creatinine Trend: 2.86<--, 4.49<--, 3.18<--, 5.03<--, 3.22<--, 5.36<--

## 2018-03-12 NOTE — CHART NOTE - NSCHARTNOTEFT_GEN_A_CORE
Pt stable for discharge after HD.  Spoke to [facility]. Pt has rheumatology appointment with Dr Gaming on 3/21/2-18 at 3pm.  Please reconsult as needed.  Dr Bronson Pt stable for discharge after HD.  Spoke to [facility]. Pt has rheumatology appointment with Dr Eve Ceballos on 3/21/2018 at 3pm. Spoke to primary team to put info into dc summary.  Please reconsult as needed.  Dr Bronson

## 2018-03-12 NOTE — PROGRESS NOTE ADULT - PROBLEM SELECTOR PROBLEM 9
Need for prophylactic measure
Need for prophylactic measure
Glaucoma
Need for prophylactic measure
Glaucoma
Need for prophylactic measure

## 2018-03-12 NOTE — PROGRESS NOTE ADULT - SUBJECTIVE AND OBJECTIVE BOX
No pain, no shortness of breath      VITAL:  T(C): , Max: 37.4 (03-11-18 @ 21:42)  T(F): , Max: 99.3 (03-11-18 @ 21:42)  HR: 88 (03-12-18 @ 05:01)  BP: 130/51 (03-12-18 @ 05:01)  BP(mean): --  RR: 16 (03-12-18 @ 05:01)  SpO2: 100% (03-12-18 @ 05:01)      PHYSICAL EXAM:  Constitutional: NAD; Kyrgyz-speaking  HEENT: NCAT, DMM  Neck: Supple, No JVD  Respiratory: CTA-b/l  Cardiovascular: RRR s1s2, no m/r/g  Gastrointestinal: BS+, soft, NT/ND  Extremities: No peripheral edema b/l  Neurological: no focal deficits; strength grossly intact  Psychiatric: Normal mood, normal affect  Back: no CVAT b/l  Skin: No rashes, no nevi  LUE AVF (+)thrill/immature; RIJ permacath(+)      LABS:                        9.9    5.84  )-----------( 156      ( 11 Mar 2018 06:30 )             31.8     Na(137)/K(4.2)/Cl(97)/HCO3(20)/BUN(81)/Cr(6.21)Glu(78)/Ca(8.4)/Mg(3.0)/PO4(3.4)    03-12 @ 05:46  Na(131)/K(4.0)/Cl(93)/HCO3(22)/BUN(27)/Cr(2.86)Glu(298)/Ca(8.2)/Mg(1.8)/PO4(2.4)    03-10 @ 06:40      IMPRESSION: 57F w/ HTN, DM2, CAD, SLE, and 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 Due for HD today.    (2)AMS - unclear exact etiology; now improved    (3)ID - infected sacral ulcer - on Meropenem and s/p multiple doses of IVIG    (4)Vascular - immature AVF      RECOMMEND:  (1)Next HD today  (2)D/C planning per primary team with outpatient Vanco and Cefepime TIW at HD                Orlando Hernandez MD  [facility]  [phone]

## 2018-03-12 NOTE — PROGRESS NOTE ADULT - PROBLEM SELECTOR PLAN 9
- Diet: CC.  - HSQ.  - DC planning.  Dispo: Discharge planning- will need wound vac.    Deisy Dobbins MD  PGY-2 | Internal Medicine  [phone] / [phone] - Diet: CC.  - HSQ.  - DC planning.  Dispo: Discharge planning- will need wound vac.

## 2018-03-12 NOTE — PROGRESS NOTE ADULT - PROBLEM SELECTOR PLAN 1
- Resolved, likely metabolic due to infected sacral wound.  - ESR slightly elevated, C3 decreased, C4 wnl. More consistent with infection as opposed to lupus flare.  Rheum on board, currently treating for poss SLE flare.  S/p IVIG x 2.

## 2018-03-13 NOTE — ED ADULT NURSE NOTE - CINV DISCH MEDS REVIEWED YN
I have yet to receive records  Spoke to FLORENTIN QUIROZ  Let her know of tentative appt on Cori's schedule  Yes

## 2018-04-20 ENCOUNTER — APPOINTMENT (OUTPATIENT)
Age: 58
End: 2018-04-20

## 2018-05-04 ENCOUNTER — APPOINTMENT (OUTPATIENT)
Dept: ENDOVASCULAR SURGERY | Facility: CLINIC | Age: 58
End: 2018-05-04
Payer: MEDICAID

## 2018-05-04 PROCEDURE — 36901 INTRO CATH DIALYSIS CIRCUIT: CPT | Mod: 59

## 2018-05-04 PROCEDURE — 36909Z: CUSTOM

## 2018-05-04 PROCEDURE — 36902Z: CUSTOM | Mod: 59

## 2018-05-23 PROCEDURE — 86900 BLOOD TYPING SEROLOGIC ABO: CPT

## 2018-05-23 PROCEDURE — 83516 IMMUNOASSAY NONANTIBODY: CPT

## 2018-05-23 PROCEDURE — 36580 REPLACE CVAD CATH: CPT

## 2018-05-23 PROCEDURE — 87476 LYME DIS DNA AMP PROBE: CPT

## 2018-05-23 PROCEDURE — 84132 ASSAY OF SERUM POTASSIUM: CPT

## 2018-05-23 PROCEDURE — 93990 DOPPLER FLOW TESTING: CPT

## 2018-05-23 PROCEDURE — 82550 ASSAY OF CK (CPK): CPT

## 2018-05-23 PROCEDURE — 86225 DNA ANTIBODY NATIVE: CPT

## 2018-05-23 PROCEDURE — 88342 IMHCHEM/IMCYTCHM 1ST ANTB: CPT

## 2018-05-23 PROCEDURE — 77001 FLUOROGUIDE FOR VEIN DEVICE: CPT

## 2018-05-23 PROCEDURE — 82607 VITAMIN B-12: CPT

## 2018-05-23 PROCEDURE — 85610 PROTHROMBIN TIME: CPT

## 2018-05-23 PROCEDURE — 84155 ASSAY OF PROTEIN SERUM: CPT

## 2018-05-23 PROCEDURE — 83655 ASSAY OF LEAD: CPT

## 2018-05-23 PROCEDURE — C1750: CPT

## 2018-05-23 PROCEDURE — 82310 ASSAY OF CALCIUM: CPT

## 2018-05-23 PROCEDURE — 71045 X-RAY EXAM CHEST 1 VIEW: CPT

## 2018-05-23 PROCEDURE — 82553 CREATINE MB FRACTION: CPT

## 2018-05-23 PROCEDURE — 87102 FUNGUS ISOLATION CULTURE: CPT

## 2018-05-23 PROCEDURE — 87350 HEPATITIS BE AG IA: CPT

## 2018-05-23 PROCEDURE — 86789 WEST NILE VIRUS ANTIBODY: CPT

## 2018-05-23 PROCEDURE — 86341 ISLET CELL ANTIBODY: CPT

## 2018-05-23 PROCEDURE — 81001 URINALYSIS AUTO W/SCOPE: CPT

## 2018-05-23 PROCEDURE — 87340 HEPATITIS B SURFACE AG IA: CPT

## 2018-05-23 PROCEDURE — 99152 MOD SED SAME PHYS/QHP 5/>YRS: CPT

## 2018-05-23 PROCEDURE — 85027 COMPLETE CBC AUTOMATED: CPT

## 2018-05-23 PROCEDURE — 96374 THER/PROPH/DIAG INJ IV PUSH: CPT

## 2018-05-23 PROCEDURE — 86706 HEP B SURFACE ANTIBODY: CPT

## 2018-05-23 PROCEDURE — 83970 ASSAY OF PARATHORMONE: CPT

## 2018-05-23 PROCEDURE — 72141 MRI NECK SPINE W/O DYE: CPT

## 2018-05-23 PROCEDURE — C1769: CPT

## 2018-05-23 PROCEDURE — 93005 ELECTROCARDIOGRAM TRACING: CPT

## 2018-05-23 PROCEDURE — 86334 IMMUNOFIX E-PHORESIS SERUM: CPT

## 2018-05-23 PROCEDURE — 86618 LYME DISEASE ANTIBODY: CPT

## 2018-05-23 PROCEDURE — 86160 COMPLEMENT ANTIGEN: CPT

## 2018-05-23 PROCEDURE — 85652 RBC SED RATE AUTOMATED: CPT

## 2018-05-23 PROCEDURE — 70450 CT HEAD/BRAIN W/O DYE: CPT

## 2018-05-23 PROCEDURE — 85045 AUTOMATED RETICULOCYTE COUNT: CPT

## 2018-05-23 PROCEDURE — 80053 COMPREHEN METABOLIC PANEL: CPT

## 2018-05-23 PROCEDURE — 97530 THERAPEUTIC ACTIVITIES: CPT

## 2018-05-23 PROCEDURE — 86038 ANTINUCLEAR ANTIBODIES: CPT

## 2018-05-23 PROCEDURE — 87205 SMEAR GRAM STAIN: CPT

## 2018-05-23 PROCEDURE — 83615 LACTATE (LD) (LDH) ENZYME: CPT

## 2018-05-23 PROCEDURE — 93454 CORONARY ARTERY ANGIO S&I: CPT | Mod: 59

## 2018-05-23 PROCEDURE — 84484 ASSAY OF TROPONIN QUANT: CPT

## 2018-05-23 PROCEDURE — 97116 GAIT TRAINING THERAPY: CPT

## 2018-05-23 PROCEDURE — 36430 TRANSFUSION BLD/BLD COMPNT: CPT

## 2018-05-23 PROCEDURE — C1887: CPT

## 2018-05-23 PROCEDURE — 87186 SC STD MICRODIL/AGAR DIL: CPT

## 2018-05-23 PROCEDURE — 83010 ASSAY OF HAPTOGLOBIN QUANT: CPT

## 2018-05-23 PROCEDURE — 86480 TB TEST CELL IMMUN MEASURE: CPT

## 2018-05-23 PROCEDURE — 86901 BLOOD TYPING SEROLOGIC RH(D): CPT

## 2018-05-23 PROCEDURE — 85384 FIBRINOGEN ACTIVITY: CPT

## 2018-05-23 PROCEDURE — 89051 BODY FLUID CELL COUNT: CPT

## 2018-05-23 PROCEDURE — 88341 IMHCHEM/IMCYTCHM EA ADD ANTB: CPT

## 2018-05-23 PROCEDURE — 83520 IMMUNOASSAY QUANT NOS NONAB: CPT

## 2018-05-23 PROCEDURE — 82140 ASSAY OF AMMONIA: CPT

## 2018-05-23 PROCEDURE — 84181 WESTERN BLOT TEST: CPT

## 2018-05-23 PROCEDURE — 83036 HEMOGLOBIN GLYCOSYLATED A1C: CPT

## 2018-05-23 PROCEDURE — C1876: CPT

## 2018-05-23 PROCEDURE — 87086 URINE CULTURE/COLONY COUNT: CPT

## 2018-05-23 PROCEDURE — 84443 ASSAY THYROID STIM HORMONE: CPT

## 2018-05-23 PROCEDURE — 83735 ASSAY OF MAGNESIUM: CPT

## 2018-05-23 PROCEDURE — 86850 RBC ANTIBODY SCREEN: CPT

## 2018-05-23 PROCEDURE — 82533 TOTAL CORTISOL: CPT

## 2018-05-23 PROCEDURE — 86036 ANCA SCREEN EACH ANTIBODY: CPT

## 2018-05-23 PROCEDURE — 82945 GLUCOSE OTHER FLUID: CPT

## 2018-05-23 PROCEDURE — C1894: CPT

## 2018-05-23 PROCEDURE — 88313 SPECIAL STAINS GROUP 2: CPT

## 2018-05-23 PROCEDURE — 86235 NUCLEAR ANTIGEN ANTIBODY: CPT

## 2018-05-23 PROCEDURE — C9600: CPT | Mod: RC

## 2018-05-23 PROCEDURE — 82300 ASSAY OF CADMIUM: CPT

## 2018-05-23 PROCEDURE — 97161 PT EVAL LOW COMPLEX 20 MIN: CPT

## 2018-05-23 PROCEDURE — 76830 TRANSVAGINAL US NON-OB: CPT

## 2018-05-23 PROCEDURE — 86803 HEPATITIS C AB TEST: CPT

## 2018-05-23 PROCEDURE — 83825 ASSAY OF MERCURY: CPT

## 2018-05-23 PROCEDURE — 72192 CT PELVIS W/O DYE: CPT

## 2018-05-23 PROCEDURE — 70551 MRI BRAIN STEM W/O DYE: CPT

## 2018-05-23 PROCEDURE — 84165 PROTEIN E-PHORESIS SERUM: CPT

## 2018-05-23 PROCEDURE — 76856 US EXAM PELVIC COMPLETE: CPT

## 2018-05-23 PROCEDURE — 86592 SYPHILIS TEST NON-TREP QUAL: CPT

## 2018-05-23 PROCEDURE — 99261: CPT

## 2018-05-23 PROCEDURE — 86707 HEPATITIS BE ANTIBODY: CPT

## 2018-05-23 PROCEDURE — 87116 MYCOBACTERIA CULTURE: CPT

## 2018-05-23 PROCEDURE — 84100 ASSAY OF PHOSPHORUS: CPT

## 2018-05-23 PROCEDURE — 97110 THERAPEUTIC EXERCISES: CPT

## 2018-05-23 PROCEDURE — 86140 C-REACTIVE PROTEIN: CPT

## 2018-05-23 PROCEDURE — 86705 HEP B CORE ANTIBODY IGM: CPT

## 2018-05-23 PROCEDURE — 82164 ANGIOTENSIN I ENZYME TEST: CPT

## 2018-05-23 PROCEDURE — 87517 HEPATITIS B DNA QUANT: CPT

## 2018-05-23 PROCEDURE — 72148 MRI LUMBAR SPINE W/O DYE: CPT

## 2018-05-23 PROCEDURE — 99285 EMERGENCY DEPT VISIT HI MDM: CPT | Mod: 25

## 2018-05-23 PROCEDURE — 88305 TISSUE EXAM BY PATHOLOGIST: CPT

## 2018-05-23 PROCEDURE — 85730 THROMBOPLASTIN TIME PARTIAL: CPT

## 2018-05-23 PROCEDURE — 80048 BASIC METABOLIC PNL TOTAL CA: CPT

## 2018-05-23 PROCEDURE — C1725: CPT

## 2018-05-23 PROCEDURE — 86403 PARTICLE AGGLUT ANTBDY SCRN: CPT

## 2018-05-23 PROCEDURE — P9037: CPT

## 2018-05-23 PROCEDURE — 86788 WEST NILE VIRUS AB IGM: CPT

## 2018-05-23 PROCEDURE — 82175 ASSAY OF ARSENIC: CPT

## 2018-05-23 PROCEDURE — 82962 GLUCOSE BLOOD TEST: CPT

## 2018-05-23 PROCEDURE — 87070 CULTURE OTHR SPECIMN AEROBIC: CPT

## 2018-05-23 PROCEDURE — 86704 HEP B CORE ANTIBODY TOTAL: CPT

## 2018-05-23 PROCEDURE — 87040 BLOOD CULTURE FOR BACTERIA: CPT

## 2018-05-23 PROCEDURE — 84157 ASSAY OF PROTEIN OTHER: CPT

## 2018-06-26 ENCOUNTER — APPOINTMENT (OUTPATIENT)
Dept: ENDOVASCULAR SURGERY | Facility: CLINIC | Age: 58
End: 2018-06-26
Payer: MEDICAID

## 2018-06-26 PROCEDURE — 36903Z: CUSTOM

## 2018-07-13 ENCOUNTER — APPOINTMENT (OUTPATIENT)
Dept: ENDOVASCULAR SURGERY | Facility: CLINIC | Age: 58
End: 2018-07-13
Payer: MEDICAID

## 2018-07-13 PROCEDURE — 36589 REMOVAL TUNNELED CV CATH: CPT

## 2018-07-16 ENCOUNTER — APPOINTMENT (OUTPATIENT)
Dept: ENDOVASCULAR SURGERY | Facility: CLINIC | Age: 58
End: 2018-07-16
Payer: MEDICAID

## 2018-07-16 PROBLEM — H40.9 UNSPECIFIED GLAUCOMA: Chronic | Status: ACTIVE | Noted: 2017-02-20

## 2018-07-16 PROBLEM — M32.9 SYSTEMIC LUPUS ERYTHEMATOSUS, UNSPECIFIED: Chronic | Status: ACTIVE | Noted: 2017-09-28

## 2018-07-16 PROBLEM — M32.14 GLOMERULAR DISEASE IN SYSTEMIC LUPUS ERYTHEMATOSUS: Chronic | Status: ACTIVE | Noted: 2017-09-28

## 2018-07-16 PROBLEM — E78.4 OTHER HYPERLIPIDEMIA: Chronic | Status: ACTIVE | Noted: 2017-02-20

## 2018-07-16 PROCEDURE — 36902Z: CUSTOM

## 2018-09-05 ENCOUNTER — APPOINTMENT (OUTPATIENT)
Dept: ENDOVASCULAR SURGERY | Facility: CLINIC | Age: 58
End: 2018-09-05
Payer: MEDICAID

## 2018-09-05 PROCEDURE — 36902Z: CUSTOM

## 2018-09-05 PROCEDURE — 36215Z: CUSTOM | Mod: 59

## 2018-09-10 ENCOUNTER — APPOINTMENT (OUTPATIENT)
Dept: VASCULAR SURGERY | Facility: CLINIC | Age: 58
End: 2018-09-10
Payer: MEDICAID

## 2018-09-10 PROCEDURE — 93990 DOPPLER FLOW TESTING: CPT

## 2018-09-12 ENCOUNTER — APPOINTMENT (OUTPATIENT)
Dept: ENDOVASCULAR SURGERY | Facility: CLINIC | Age: 58
End: 2018-09-12
Payer: MEDICAID

## 2018-09-12 PROCEDURE — 36215Z: CUSTOM | Mod: 59

## 2018-09-12 PROCEDURE — 36903Z: CUSTOM

## 2018-09-18 ENCOUNTER — APPOINTMENT (OUTPATIENT)
Dept: VASCULAR SURGERY | Facility: CLINIC | Age: 58
End: 2018-09-18

## 2018-10-18 ENCOUNTER — APPOINTMENT (OUTPATIENT)
Dept: VASCULAR SURGERY | Facility: CLINIC | Age: 58
End: 2018-10-18
Payer: MEDICAID

## 2018-10-18 PROCEDURE — 99213 OFFICE O/P EST LOW 20 MIN: CPT

## 2018-10-18 PROCEDURE — 93990 DOPPLER FLOW TESTING: CPT

## 2018-11-07 ENCOUNTER — APPOINTMENT (OUTPATIENT)
Dept: ENDOVASCULAR SURGERY | Facility: CLINIC | Age: 58
End: 2018-11-07
Payer: MEDICAID

## 2018-11-07 PROCEDURE — 36215Z: CUSTOM | Mod: 59

## 2018-11-07 PROCEDURE — 36902Z: CUSTOM

## 2018-11-07 PROCEDURE — 36907Z: CUSTOM | Mod: 59

## 2018-11-12 ENCOUNTER — APPOINTMENT (OUTPATIENT)
Dept: VASCULAR SURGERY | Facility: CLINIC | Age: 58
End: 2018-11-12

## 2018-12-03 ENCOUNTER — APPOINTMENT (OUTPATIENT)
Dept: VASCULAR SURGERY | Facility: CLINIC | Age: 58
End: 2018-12-03

## 2019-01-07 NOTE — ED ADULT NURSE REASSESSMENT NOTE - NS ED NURSE REASSESS COMMENT FT1
Patient had a BM, changed, sacral ulcer dressed, cleaned with sterile water and a pink foam dressing applied. Subcu tissue and bone seen, sanguinous exudate at site. Patient nonverbal, responds to painful stimuli
Patient turned and positioned multiple times, now on her left side, multiple pillows in the bed to keep pressure off of her backside/sacral area. CCM in place. Pending bed assignment uptairs. Patient remains non-verbal and responsive to tactile stimuli. Safety maintained.
Report given to receiving RN laury Crawford waiting for transport.
Report received from CORNELIA Clayton. Pt. received non-verbal at present with stage 4 pressure ulcer to sacrum, packed by prior RN and 22 gauge IV in right hand. Respirations are even and unlabored on room air. Pt. is admitted to Tele, awaiting bed assignment. Will continue to monitor.
Name band;

## 2019-01-29 ENCOUNTER — APPOINTMENT (OUTPATIENT)
Dept: VASCULAR SURGERY | Facility: CLINIC | Age: 59
End: 2019-01-29

## 2019-05-21 NOTE — PATIENT PROFILE ADULT. - HEALTH/HEALTHCARE ANXIETIES, PROFILE
Progress Note    Patient: Declan Medley Date: 5/21/2019   male, 54 year old  Admit Date: 5/17/2019   Attending: Jono Wiggins DO                Active Hospital Problems    Diagnosis Date Noted   • Acute hypernatremia 05/17/2019     Priority: Low   • Hyperlipidemia, mixed 12/19/2016     Priority: Low   • History of seizure 04/26/2016     Priority: Low   • Acquired hypothyroidism 12/17/2015     Priority: Low   • Chronic anemia 08/31/2015     Priority: Low   • Schizoaffective disorder (CMS/HCC) 06/09/2014     Priority: Low   • Autism 09/18/2013     Priority: Low   • Mental retardation 09/18/2013     Priority: Low   • Vitamin D deficiency 09/18/2013     Priority: Low        SUBJECTIVE:   Following patient for fever, hypernatremia.    No fever for 36 hours, Vital signs stable. He opened his eyes, awake, resting comfortable in bed. Sometimes sitting up. He has  Autism/ mentally retarded; does not follow the commands.     MEDICATIONS: Personally reviewed today in this patient's active orders section of Epic.  ALLERGIES: Personally reviewed today in Epic.  ROS: Pertinent systems negative except as above.    PHYSICAL EXAM:      Vital 24 Hour Range Most Recent Value   Temperature Temp  Min: 96.3 °F (35.7 °C)  Max: 98.6 °F (37 °C) 98.6 °F (37 °C)   Pulse Pulse  Min: 65  Max: 98 98   Respiratory Resp  Min: 12  Max: 20 14   Blood Pressure BP  Min: 102/61  Max: 190/106 121/68   Pulse Oximetry SpO2  Min: 93 %  Max: 97 % 94 %   Arterial BP No data recorded     O2 No data recorded       Vital Most Recent Value First Value   Weight 78 kg Weight: 78.1 kg   Height 5' 10\" (177.8 cm) Height: 5' 10\" (177.8 cm)   BMI 24.67 N/A       General- eyes, alert, does not follow the commands.  Cardiovascular - RRR, no murmurs, rubs, gallops, or clicks. Nondisplaced PMI.  Pulmonary - Good respiratory effort. No accessory muscle use.  sleeping apnea at time   Lungs - Clear to auscultation bilaterally. No wheezes, rales, or rhonchi.   Abdomen -  Soft, nontender,nondistended. Bowel sounds are normoactive. No guarding or rebound tenderness. No Hepatosplenomegaly.  Extremities - bilateral upper and lower extremities nonpitting edema.   Skin- No rashes or lesions.   Neurologic -spine films, not responding to       LABS:  Recent Labs   Lab 05/21/19  0628 05/20/19  0620 05/18/19  0618   WBC 7.2 6.3 6.4   RBC 2.91* 2.26* 2.66*   HGB 9.9* 7.9* 9.1*   HCT 31.7* 24.6* 29.4*    149 154   LYMP 37 44 56     Recent Labs   Lab 05/21/19  0628 05/20/19  0620 05/18/19  0618  05/17/19  1006 05/16/19  1046   SODIUM 149* 142 143   < > 150* 144   POTASSIUM 3.8 4.0 3.7   < > 4.2 4.1   CHLORIDE 115* 111* 111*   < > 118* 110*   CO2 27 23 22   < > 24 25   BUN 8 9 10   < > 11 16   CREATININE 1.11 1.19* 1.08   < > 1.14 1.35*   GFRNA 75 69 77   < > 73 59   GLUCOSE 85 94 126*   < > 109* 89   CALCIUM 8.9 8.1* 8.4   < > 8.8 8.8   ALBUMIN  --   --   --   --  2.2* 2.4*   AST  --   --   --   --  127* 110*   GPT  --   --   --   --  63 56   BILIRUBIN  --   --   --   --  0.5 0.5   ALKPT  --   --   --   --  76 79    < > = values in this interval not displayed.       ASSESSMENT AND PLAN:     · Acute hypernatremia - unclear etiology likely secondary to decreased oral intake. Patient was also seen sleeping with multiple I suspect there is a component of insensible losses. Sodium 142 after hydration, then back to 149 after IVF D/Eloy. Will continue with D5/0.45 at 75 cc an hour. Encourage oral water intact.   · Fever- Patient does not have leukocytosis. Chest x-ray shows no evidence of pneumonia. Urine analysis completely unremarkable. Tylenol and ibuprofen prn. Monotest positive. D/C the antibiotic. Patient is fever free now . Blood culture negative to date. Supportive care.   · Hypotensive- medication related versus infection. Responded to bolus of fluid. We'll decrease Haldol dose, encourage oral hydration, hold propanolol if systolic blood pressure less than 100.  · Non responsiveness-  episode of non-responsive on 5/20. Repeated head  CT, no acute abnormality. Blood gas unremarkable. Lithium, Depakote level within the normal range. Patient eventually woke up and back to his baseline menatation. We believed this is due to bedtime large dose Haldol. Hold bed time Haldol. Reume other psych meds.   · Schizoaffective disorder- continue home medications. Hold night dose of haldol.   · Acquired hypothyroidism-Continue Synthroid  · Anemia- Hb 7.9 which is dropped from baseline 11-12. Obtain stool occult blood, monitoring hemoglobin closely. PPI prophylaxis. Obtain B12, folic acid. iron level OK. Hb back to 9.9. May be is lab error.   · Hyperlipidemia- We'll continue with fenofibrate.  · CODE STATUS- Patient is full code per his mother who is medical power of   · DVT Prophylaxis - SCDs, Lovenoox      Disposition: Observation.    I personally spent 32 minutes today in the care and management of this patient.    ANASTASIIA Oneill  5/21/2019  10:19 AM     Patient not very responsive but is awake and sitting in a chair. Will not answer questions. Sodium is up to 147 today. Patient has tested positive for mononucleosis. He is off antibiotics. Patient resuming all home medications. Plan is to get him back to his assisted living and environment he has used to being in.    Exam: Heart is regular without murmur lungs are clear. Abdomen is soft nontender nondistended bowel sounds positive extremities somewhat taut but remains soft. No erythema or pus like drainage.    Plan: Continue to monitor. Change IV fluid to D5W. Repeat BMP and a.mCalvin Wiggins D.O.  5/21/19  3 PM   none

## 2019-06-20 ENCOUNTER — FORM ENCOUNTER (OUTPATIENT)
Age: 59
End: 2019-06-20

## 2019-06-21 ENCOUNTER — APPOINTMENT (OUTPATIENT)
Dept: ENDOVASCULAR SURGERY | Facility: CLINIC | Age: 59
End: 2019-06-21
Payer: MEDICAID

## 2019-06-21 VITALS
HEIGHT: 61 IN | OXYGEN SATURATION: 100 % | RESPIRATION RATE: 16 BRPM | WEIGHT: 136 LBS | DIASTOLIC BLOOD PRESSURE: 91 MMHG | SYSTOLIC BLOOD PRESSURE: 208 MMHG | BODY MASS INDEX: 25.68 KG/M2 | TEMPERATURE: 98.7 F | HEART RATE: 82 BPM

## 2019-06-21 DIAGNOSIS — M32.9 SYSTEMIC LUPUS ERYTHEMATOSUS, UNSPECIFIED: ICD-10-CM

## 2019-06-21 DIAGNOSIS — I77.6 ARTERITIS, UNSPECIFIED: ICD-10-CM

## 2019-06-21 PROCEDURE — 36902Z: CUSTOM

## 2019-06-21 NOTE — HISTORY OF PRESENT ILLNESS
[] : left brachiocephalic fistula [FreeTextEntry1] : 10/2/2017 Dr. Deluca [FreeTextEntry4] : yesterday [FreeTextEntry5] : 6 am [FreeTextEntry6] : Dr. Conte

## 2019-06-21 NOTE — PAST MEDICAL HISTORY
[Increasing age ( >40 years old)] : Increasing age ( >40 years old) [No therapy indicated for cases scheduled for less than one hour] : No therapy indicated for cases scheduled for less than one hour. [FreeTextEntry1] : Malignant Hyperthermia Screening Tool and Risk of Bleeding Assessment\par \par Ms. WILLARD BOB denies family history of unexpected death following Anesthesia or Exercise.\par Denies Family history of Malignant Hyperthermia, Muscle or Neuromuscular disorder and High Temperature following exercise.\par \par Ms. WILLARD BOB denies history of Muscle Spasm, Dark or Chocolate - Colored urine and Unanticipated fever immediately following anesthesia or serious exercise. \par Ms. BOB also denies bleeding tendencies/ Risks of Bleeding.\par

## 2019-08-29 NOTE — PROGRESS NOTE ADULT - SUBJECTIVE AND OBJECTIVE BOX
[facility] DIVISION OF KIDNEY DISEASES AND HYPERTENSION -- [phone]   FOLLOW UP NOTE  --------------------------------------------------------------------------------  HPI:  57 year old woman with ESRD on HD presenting with limb weakness, now s/p sural nerve biopsy Sural Nerve bx results are concerning for vasculitis currently on steroids.   Pt also found to have NSVT on telemetry, now s/p [facility] with PCI on 01/05/2018 (ANABELA placed to mRCA lesion with 90% stenosis).  Pt seen and examined at bedside.  PAST HISTORY  --------------------------------------------------------------------------------  No significant changes to PMH, PSH, FHx, SHx, unless otherwise noted    ALLERGIES & MEDICATIONS  --------------------------------------------------------------------------------  Allergies    penicillin (Rash)    Intolerances      Standing Inpatient Medications  acetaminophen   Tablet. 650 milliGRAM(s) Oral once  aspirin enteric coated 81 milliGRAM(s) Oral daily  atorvastatin 40 milliGRAM(s) Oral at bedtime  bisacodyl 5 milliGRAM(s) Oral at bedtime  calcium carbonate 1250 mG (OsCal) 1 Tablet(s) Oral daily  clopidogrel Tablet 75 milliGRAM(s) Oral daily  dextrose 5%. 1000 milliLiter(s) IV Continuous <Continuous>  dextrose 50% Injectable 12.5 Gram(s) IV Push once  dextrose 50% Injectable 25 Gram(s) IV Push once  dextrose 50% Injectable 25 Gram(s) IV Push once  dextrose 50% Injectable 12.5 Gram(s) IV Push once  diphenhydrAMINE   Injectable 25 milliGRAM(s) IV Push once  docusate sodium 100 milliGRAM(s) Oral two times a day  furosemide    Tablet 80 milliGRAM(s) Oral <User Schedule>  insulin lispro (HumaLOG) corrective regimen sliding scale   SubCutaneous three times a day before meals  insulin lispro (HumaLOG) corrective regimen sliding scale   SubCutaneous at bedtime  latanoprost 0.005% Ophthalmic Solution 1 Drop(s) Both EYES at bedtime  levoFLOXacin  Tablet 500 milliGRAM(s) Oral every 24 hours  levothyroxine 25 MICROGram(s) Oral daily  methylPREDNISolone sodium succinate Injectable 100 milliGRAM(s) IV Push once  metoprolol succinate ER 25 milliGRAM(s) Oral daily  Nephro-madiha 1 Tablet(s) Oral daily  ondansetron Injectable 4 milliGRAM(s) IV Push once  PARoxetine 20 milliGRAM(s) Oral daily  predniSONE   Tablet 50 milliGRAM(s) Oral daily  riTUXimab IVPB (Non - oncologic) 1000 milliGRAM(s) IV Intermittent once  senna 2 Tablet(s) Oral at bedtime  timolol 0.5% Solution 1 Drop(s) Both EYES daily    PRN Inpatient Medications  acetaminophen   Tablet 650 milliGRAM(s) Oral every 6 hours PRN  acetaminophen   Tablet. 650 milliGRAM(s) Oral every 6 hours PRN  dextrose Gel 1 Dose(s) Oral once PRN  glucagon  Injectable 1 milliGRAM(s) IntraMuscular once PRN  oxyCODONE    IR 5 milliGRAM(s) Oral every 6 hours PRN      REVIEW OF SYSTEMS  --------------------------------------------------------------------------------  General: no fever  CVS: no chest pain  RESP: no sob, no cough  ABD: no abdominal pain  : no dysuria,  MSK: no edema     VITALS/PHYSICAL EXAM  --------------------------------------------------------------------------------  T(C): 36.5 (01-12-18 @ 04:43), Max: 36.8 (01-11-18 @ 14:10)  HR: 74 (01-12-18 @ 04:43) (70 - 92)  BP: 120/69 (01-12-18 @ 04:43) (120/69 - 146/81)  RR: 17 (01-12-18 @ 04:43) (16 - 17)  SpO2: 99% (01-12-18 @ 04:43) (98% - 100%)  Wt(kg): --        01-11-18 @ 07:01  -  01-12-18 @ 07:00  --------------------------------------------------------  IN: 260 mL / OUT: 0 mL / NET: 260 mL      Physical Exam:  	Gen: NAD, well-appearing  	HEENT: on room air  	Pulm: CTA B/L  	CV: normal S1S2; no rub  	Abd: soft                      Back : No sacral edema  	: No vance  	LE: no edema  	Skin: Warm, without rashes  	Vascular access: RIJ tunnelled dialysis catheter, no bleed/ swelling/ discharge around catheter. LUE AVF with palpable thrill and audible bruit.    LABS/STUDIES  --------------------------------------------------------------------------------              8.5    7.8   >-----------<  145      [01-12-18 @ 06:39]              24.3     135  |  95  |  49  ----------------------------<  60      [01-12-18 @ 06:39]  4.4   |  26  |  4.91        Ca     9.6     [01-12-18 @ 06:39]            Creatinine Trend:  SCr 4.91 [01-12 @ 06:39]  SCr 3.38 [01-11 @ 06:35]  SCr 4.66 [01-10 @ 05:46]  SCr 3.29 [01-09 @ 07:10]  SCr 4.70 [01-08 @ 06:28]    Urinalysis - [01-05-18 @ 13:29]      Color Brown / Appearance Turbid / SG 1.022 / pH 7.0      Gluc Negative / Ketone Negative  / Bili Negative / Urobili Negative       Blood Small / Protein 150 / Leuk Est Large / Nitrite Negative      RBC >50 / WBC >50 / Hyaline  / Gran  / Sq Epi  / Non Sq Epi  / Bacteria Many      Iron 77, TIBC 90, %sat 86      [05-29-17 @ 09:39]  Ferritin 781.0      [05-29-17 @ 09:39]  PTH -- (Ca 8.1)      [01-04-18 @ 12:57]   65  PTH -- (Ca 7.5)      [06-01-17 @ 08:12]   253  Vitamin D (25OH) <4.0      [05-31-17 @ 08:52]  HbA1c 4.7      [12-20-17 @ 07:23]  TSH 8.03      [12-23-17 @ 08:00]  Lipid: chol 227, , HDL 47,       [05-30-17 @ 07:28]    HBsAb 637.4      [01-06-18 @ 12:57]  HBsAb Reactive      [12-23-17 @ 08:00]  HBsAg Nonreact      [01-06-18 @ 12:57]  HBcAb Reactive      [01-11-18 @ 09:12]  HCV 0.22, Nonreact      [01-06-18 @ 12:57]    SULLY: titer 1:1280, pattern Homogeneous      [12-23-17 @ 08:00]  dsDNA 82      [01-06-18 @ 08:55]  C3 Complement 41      [01-06-18 @ 08:24]  C4 Complement 14      [01-06-18 @ 08:24]  ANCA: cANCA Negative, pANCA Negative, atypical ANCA Indeterminate Method interference due to SULLY fluorescence      [12-23-17 @ 08:00]  Immunofixation Serum:   Weak  IgG Kappa Band Identified    Reference Range: None Detected      [12-23-17 @ 08:00]  SPEP Interpretation: Weak Gamma-Migrating Paraprotein Identified      [12-23-17 @ 08:00] - - -

## 2019-09-11 NOTE — PATIENT PROFILE ADULT. - NSTOBACCONEVERSMOKERY/N_GEN_A
[de-identified] : XR urgent care uploaded: No specific fracture noted.  Radial head well aligned, no fracture seen.  No soft tissue swelling. 
No

## 2019-09-19 NOTE — PROGRESS NOTE ADULT - PROBLEM/PLAN-8
DISPLAY PLAN FREE TEXT
Airway patent, Nasal mucosa clear. Mouth with normal mucosa. Throat has no vesicles, no oropharyngeal exudates and uvula is midline.
DISPLAY PLAN FREE TEXT

## 2019-09-23 ENCOUNTER — APPOINTMENT (OUTPATIENT)
Dept: VASCULAR SURGERY | Facility: CLINIC | Age: 59
End: 2019-09-23

## 2019-09-24 ENCOUNTER — OTHER (OUTPATIENT)
Age: 59
End: 2019-09-24

## 2019-09-30 NOTE — PHYSICAL THERAPY INITIAL EVALUATION ADULT - CRITERIA FOR SKILLED THERAPEUTIC INTERVENTIONS
General risk reduction/prevention/rehab potential/functional limitations in following categories/predicted duration of therapy intervention/therapy frequency/anticipated discharge recommendation/impairments found

## 2019-10-07 NOTE — PROGRESS NOTE ADULT - PROBLEM SELECTOR PLAN 5
- C/w HD on M/W/F  - Appreciate Renal input Quality 226: Preventive Care And Screening: Tobacco Use: Screening And Cessation Intervention: Patient screened for tobacco use and is an ex/non-smoker Detail Level: Simple Quality 131: Pain Assessment And Follow-Up: Pain assessment using a standardized tool is documented as negative, no follow-up plan required Quality 130: Documentation Of Current Medications In The Medical Record: Current Medications Documented Quality 110: Preventive Care And Screening: Influenza Immunization: Influenza Immunization previously received during influenza season

## 2019-11-15 NOTE — H&P ADULT. - PROBLEM SELECTOR PROBLEM 4
Type 2 diabetes mellitus without complication, without long-term current use of insulin Nephrotic syndrome Repair Type: No repair - secondary intention

## 2020-01-11 NOTE — CONSULT NOTE ADULT - PROBLEM/RECOMMENDATION-3
DISPLAY PLAN FREE TEXT
DISPLAY PLAN FREE TEXT
Acute myocardial infarction  Patient denies MI  Essential hypertension  Hypertension  Hyperlipidemia  Hyperlipidemia  Malignant neoplasm of prostate  Prostate cancer  Type 2 diabetes mellitus  Diabetes  Type 2 diabetes mellitus  DM (diabetes mellitus), type 2

## 2020-07-21 NOTE — H&P ADULT. - DOES THIS PATIENT HAVE A HISTORY OF OR HAS BEEN DX WITH HEART FAILURE?
yes Mercedes Flap Text: The defect edges were debeveled with a #15 scalpel blade.  Given the location of the defect, shape of the defect and the proximity to free margins a Mercedes flap was deemed most appropriate.  Using a sterile surgical marker, an appropriate advancement flap was drawn incorporating the defect and placing the expected incisions within the relaxed skin tension lines where possible. The area thus outlined was incised deep to adipose tissue with a #15 scalpel blade.  The skin margins were undermined to an appropriate distance in all directions utilizing iris scissors. unknown no

## 2020-07-27 NOTE — PROGRESS NOTE ADULT - SUBJECTIVE AND OBJECTIVE BOX
Patient is a 57y old  Female who presents with a chief complaint of LE pain and inability to walk (19 Dec 2017 10:27)      SUBJECTIVE / OVERNIGHT EVENTS:  no acute events overnight. vss, afebrile. H/H low yesterday but likely dilutional. Hgb 9 this morning. Pt has remained hemodynamically stable, no complaints. Plan for Rituxan in 2 weeks.       MEDICATIONS  (STANDING):  aspirin enteric coated 81 milliGRAM(s) Oral daily  atorvastatin 40 milliGRAM(s) Oral at bedtime  bisacodyl 5 milliGRAM(s) Oral at bedtime  calcium carbonate 1250 mG (OsCal) 1 Tablet(s) Oral daily  clopidogrel Tablet 75 milliGRAM(s) Oral daily  dextrose 5%. 1000 milliLiter(s) (50 mL/Hr) IV Continuous <Continuous>  dextrose 50% Injectable 12.5 Gram(s) IV Push once  dextrose 50% Injectable 25 Gram(s) IV Push once  dextrose 50% Injectable 25 Gram(s) IV Push once  dextrose 50% Injectable 12.5 Gram(s) IV Push once  docusate sodium 100 milliGRAM(s) Oral two times a day  furosemide    Tablet 80 milliGRAM(s) Oral <User Schedule>  insulin lispro (HumaLOG) corrective regimen sliding scale   SubCutaneous three times a day before meals  insulin lispro (HumaLOG) corrective regimen sliding scale   SubCutaneous at bedtime  latanoprost 0.005% Ophthalmic Solution 1 Drop(s) Both EYES at bedtime  levothyroxine 25 MICROGram(s) Oral daily  metoclopramide 5 milliGRAM(s) Oral three times a day  metoprolol succinate ER 25 milliGRAM(s) Oral daily  Nephro-madiha 1 Tablet(s) Oral daily  Nephro-madiha 1 Tablet(s) Oral daily  ondansetron Injectable 4 milliGRAM(s) IV Push once  PARoxetine 20 milliGRAM(s) Oral daily  predniSONE   Tablet 50 milliGRAM(s) Oral daily  senna 2 Tablet(s) Oral at bedtime  timolol 0.5% Solution 1 Drop(s) Both EYES daily    MEDICATIONS  (PRN):  acetaminophen   Tablet 650 milliGRAM(s) Oral every 6 hours PRN For Temp greater than 38 C (100.4 F)  acetaminophen   Tablet. 650 milliGRAM(s) Oral every 6 hours PRN Mild Pain (1 - 3)  dextrose Gel 1 Dose(s) Oral once PRN Blood Glucose LESS THAN 70 milliGRAM(s)/deciliter  diphenhydrAMINE   Capsule 25 milliGRAM(s) Oral once PRN Rash and/or Itching  glucagon  Injectable 1 milliGRAM(s) IntraMuscular once PRN Glucose LESS THAN 70 milligrams/deciliter  oxyCODONE    IR 5 milliGRAM(s) Oral every 6 hours PRN Moderate Pain (4 - 6)      CAPILLARY BLOOD GLUCOSE      POCT Blood Glucose.: 129 mg/dL (16 Jan 2018 08:52)  POCT Blood Glucose.: 113 mg/dL (15 Dre 2018 22:21)  POCT Blood Glucose.: 118 mg/dL (15 Dre 2018 18:05)  POCT Blood Glucose.: 180 mg/dL (15 Dre 2018 12:55)    I&O's Summary    15 Dre 2018 07:01  -  16 Jan 2018 07:00  --------------------------------------------------------  IN: 480 mL / OUT: 1900 mL / NET: -1420 mL    16 Jan 2018 07:01  -  16 Jan 2018 10:31  --------------------------------------------------------  IN: 240 mL / OUT: 0 mL / NET: 240 mL        PHYSICAL EXAM:  Vital Signs Last 24 Hrs  T(C): 36.8 (16 Jan 2018 06:18), Max: 36.8 (15 Dre 2018 11:45)  T(F): 98.2 (16 Jan 2018 06:18), Max: 98.3 (15 Dre 2018 21:04)  HR: 74 (16 Jan 2018 06:18) (71 - 74)  BP: 122/67 (16 Jan 2018 06:18) (122/67 - 143/83)  BP(mean): --  RR: 17 (16 Jan 2018 06:18) (17 - 18)  SpO2: 100% (16 Jan 2018 06:18) (100% - 100%)    GENERAL: NAD, well-developed  HEAD:  Atraumatic, Normocephalic  EYES: EOMI, PERRLA, conjunctiva and sclera clear  NECK: Supple, No JVD  CHEST/LUNG: Clear to auscultation bilaterally; No wheeze  HEART: Regular rate and rhythm; No murmurs, rubs, or gallops  ABDOMEN: Soft, Nontender, Nondistended; Bowel sounds present  : vance draining thick/yellow/green drainage  EXTREMITIES:  2+ Peripheral Pulses, No clubbing, cyanosis, or edema  VASCULAR: Left brachiocephalic AVF with thrill and bruit,  NEUROLOGY: AAOX3; non-focal  SKIN: No rashes or lesions    LABS:  personally reviewed                        9.4    6.3   )-----------( 141      ( 16 Jan 2018 07:08 )             26.9     01-16    138  |  98  |  36<H>  ----------------------------<  93  4.2   |  27  |  4.22<H>    Ca    9.4      16 Jan 2018 07:08            RADIOLOGY & ADDITIONAL TESTS:    Imaging Personally Reviewed:    Consultant(s) Notes Reviewed:  rheum, nephro    Care Discussed with Consultants/Other Providers: Dr. Puga with patient

## 2020-08-10 NOTE — ED PROVIDER NOTE - CROS ED ROS STATEMENT
that took all weekend. Has some medial knee pain when walking 2/10, but up to 5/10. Less pain down the front of the entire leg. Swelling is bet    OBJECTIVE:    Observation: entire LLE swelling has improved since  8/7    Test measurements:  Knee flexion 125 AROM, 132 AAROM, knee ext 0   + SLR L    RESTRICTIONS/PRECAUTIONS: A-fib, previous L,R knee arthroscopies; chronic LBP; hx prostate cancer  PROCEDURE PERFORMED 7/17:  Left knee diagnostic video arthroscopy,  arthroscopic partial medial meniscectomy, partial lateral meniscectomy,  medial plica excision, and anterior synovectomy and fat pad excision.     Exercises/Interventions:     Therapeutic Ex (96447) Sets/sec Notes/CUES HEP   Pt ed; findings of exam and POC; compression stocking; elevation above level of heart, ice; bruising from surgery  Sitting on wedge, call MD regarding varicose veins              Standing HS stretch cage 30\"x2 R,LCore act VC with transitions X   Standing gastroc stretch on wedge 30\"x3 X   Standing quad stretch oncage 30\"x2 R,LCore act VC with transitions X   Supine HL TA act 5\"x10     TA act + log roll 1x Cues not to perf a sit-up    Heel slide supine c strap 5\"x10  X   Quad set  X   SLR  X   Sciatic nerve glide- supine, ankle PF with knee f/e 20x  X   Seated SWB s lumbar flex, RSB, LSB a           Mini squat 2x10 2UE support    Step up fwd,   Up and over 15x ea 4\", one UE support    Standing [address]  2#- increase NV- has 5# at home X   TKE  [facility] [place] hip ABD  Hip ext   30# 2x10 R,L  30#x10 R,L     LP 0-90 deg            bike 3'  seat 17    Manual Intervention (35700)      IASTM quad, IT band, superior only, bevel 30 deg, patellar framing 10'     Patellar mobs sup and inf, med, lat 20xea     Don compression stocking      Patellar \"tent\" x 2 + medial glide using leukotape     L hip joint mobs- inf and lateral distraction 15\"x6 ea           NMR re-education (80958)   CUES NEEDED                                                         Therapeutic swelling/inflammation/restriction, improving soft tissue extensibility and allowing for proper ROM for normal function with self care, mobility, lifting and ambulation. Modalities:     [] GAME READY (VASO)- for significant edema, swelling, pain control. - knee x 15', CP ant knee. Long sit  - declined  Charges:  Timed Code Treatment Minutes: 50   Total Treatment Minutes: 53 (bike)     BWC time in/time out:   (and requires time in and out for each CPT code)    [] EVAL (LOW) 67098 (typically 20 minutes face-to-face)  [] EVAL (MOD) 83388 (typically 30 minutes face-to-face)  [] EVAL (HIGH) 39982 (typically 45 minutes face-to-face)  [] RE-EVAL     [x] RU(56803) x 2    [] IONTO  [] NMR (66444) x     [] VASO  [x] Manual (43706) x  1   [] Other:  [] TA x      [] Mech Traction (00676)  [] ES(attended) (52903)      [] ES (un) (81391):       GOALS: Patient stated goal: no pain and get back to baseball  []? Progressing: []? Met: []? Not Met: []? Adjusted     Therapist goals for Patient:   Short Term Goals: To be achieved in: 2 weeks  1. Independent in HEP and progression per patient tolerance, in order to prevent re-injury. []? Progressing: []? Met: []? Not Met: []? Adjusted  2. Patient will have a decrease in pain to facilitate improvement in movement, function, and ADLs as indicated by Functional Deficits. []? Progressing: []? Met: []? Not Met: []? Adjusted     Long Term Goals: To be achieved in: 8 weeks  1. Disability index score of 35% or less for the LEFS to assist with reaching prior level of function. []? Progressing: []? Met: []? Not Met: []? Adjusted  2. Patient will demonstrate increased left knee AROM to 0-140 deg to allow for proper joint functioning for deeper squatting, kneeling, and stair ambulation. []? Progressing: []? Met: []? Not Met: []? Adjusted  3.  Patient will demonstrate an increase in Strength to 5/5 for the left hip and knee musculature to return to normal CKC activities with good knee control, such as stairs, squatting, and light jogging. []? Progressing: []? Met: []? Not Met: []? Adjusted  4. Patient will return to at least 30 minutes of moderate physical activity (patient specific goal). []? Progressing: []? Met: []? Not Met: []? Adjusted  5. Patient will have improved ankle DF to 15 deg to promote normal tibial advancement and HS flexibility in the 90-90 position to 65 deg to reduce posterior pelvic tilt. []? Progressing: []? Met: []? Not Met: []? Adjusted        Progression Towards Functional goals:  [] Patient is progressing as expected towards functional goals listed. [] Progression is slowed due to complexities listed. [] Progression has been slowed due to co-morbidities. [x] Plan just implemented, too soon to assess goals progression  [] Other:         Overall Progression Towards Functional goals/ Treatment Progress Update:  [] Patient is progressing as expected towards functional goals listed. [] Progression is slowed due to complexities/Impairments listed. [] Progression has been slowed due to co-morbidities. [x] Plan just implemented, too soon to assess goals progression <30days   [] Goals require adjustment due to lack of progress  [] Patient is not progressing as expected and requires additional follow up with physician  [] Other    Prognosis for POC: [x] Good [] Fair  [] Poor      Patient requires continued skilled intervention: [x] Yes  [] No    Treatment/Activity Tolerance:  [x] Patient able to complete treatment  [] Patient limited by fatigue  [] Patient limited by pain    [] Patient limited by other medical complications  [] Other:     ASSESSMENT: Pt's knee pain has become more focal to his medial knee and distal IT band instead of the entire anterior thigh and leg. He still struggles with LBP, so worked more on TA activation (with verbal cues) with all exercises and transitions today. He reported increased LBP with hip extension on [facility], but no worse afterward. Return to Play: (if applicable)   []  Stage 1: Intro to Strength   []  Stage 2: Return to Run and Strength   []  Stage 3: Return to Jump and Strength   []  Stage 4: Dynamic Strength and Agility   []  Stage 5: Sport Specific Training     []  Ready to Return to Play, Meets All Above Stages   []  Not Ready for Return to Sports   Comments:                               PLAN: Pt to bring compression stocking NV, may need to swap for a smaller size  [x] Continue per plan of care [] Alter current plan (see comments above)  [] Plan of care initiated [] Hold pending MD visit [] Discharge      Electronically signed by:  May Lopez, PT, DPT    Note: If patient does not return for scheduled/ recommended follow up visits, this note will serve as a discharge from care along with most recent update on progress. Access Code: [email or web address]   URL: [email or web address]   Date: 08/03/2020   Prepared by: May Lopez     Exercises   Standing Hamstring Stretch with Step - 3 sets - 30s hold - 2x daily - 7x weekly   Standing Gastroc Stretch - 3 sets - 30s hold - 2x daily - 7x weekly   Standing Quad Stretch with Table and Chair Support - 3 sets - 30s hold - 2x daily - 7x weekly   Sitting Heel Slide with Towel - 10 reps - 5s hold - 2x daily - 7x weekly   Long Sitting Quad Set - 10 reps - 10s hold - 1x daily - 7x weekly   Active Straight Leg Raise with Quad Set - 10 reps - 2 sets - 1x daily - 7x weekly   Supine Sciatic Nerve Glide - 20 reps - 2x daily - 7x weekly   Standing Hamstring Curl with Chair Support - 10 reps - 2 sets - 1x daily - 7x weekly all other ROS negative except as per HPI

## 2020-11-02 NOTE — H&P PST ADULT - NS PRO FEM REPRO HEALTH SCREEN
What Type Of Note Output Would You Prefer (Optional)?: Standard Output
How Severe Are Your Spot(S)?: mild
Have Your Spot(S) Been Treated In The Past?: has not been treated
Hpi Title: Evaluation of Skin Lesions
mammogram

## 2021-01-26 NOTE — H&P PST ADULT - BACK
Patient arrived to ER via walk in with complaints of an allergic reaction to [address] vaccine part 2. Patient had a mild reaction to the 1st dose with swelling of eyes. Patient audibly wheezing. Patient medicated with Epinephrine on arrival per Dr Bruce's orders. SOB and complaints of throat closing. Uluva swelling noted.
No deformity or limitation of movement

## 2021-03-01 ENCOUNTER — APPOINTMENT (OUTPATIENT)
Dept: VASCULAR SURGERY | Facility: CLINIC | Age: 61
End: 2021-03-01
Payer: MEDICAID

## 2021-03-01 VITALS
DIASTOLIC BLOOD PRESSURE: 85 MMHG | SYSTOLIC BLOOD PRESSURE: 207 MMHG | HEART RATE: 95 BPM | BODY MASS INDEX: 25.68 KG/M2 | HEIGHT: 61 IN | WEIGHT: 136 LBS

## 2021-03-01 PROCEDURE — 99072 ADDL SUPL MATRL&STAF TM PHE: CPT

## 2021-03-01 PROCEDURE — 93990 DOPPLER FLOW TESTING: CPT

## 2021-03-01 PROCEDURE — 99212 OFFICE O/P EST SF 10 MIN: CPT

## 2021-03-01 NOTE — DISCUSSION/SUMMARY
[FreeTextEntry1] : 59 yo female with history of esrd on hd via left upper extremity brachial cephalic avf.\par \par jae/pvr shows no evidence of significant arterial disease \par duplex of left upper extremity avf shows 75% in stent stenosis proximal to and at the cephalic subclavian junction with flow rate of 572 \par \par will arrange for fistulagram of the left upper extremity

## 2021-03-01 NOTE — HISTORY OF PRESENT ILLNESS
[] : left brachiocephalic fistula [FreeTextEntry1] : 61 yo female with history of esrd on hd via left upper extremity brachial cephalic avf.  pt without any complaints denies any difficulty with avf during hd denies any bleeding or elevated pressures during hd \par pt reports weakness in b/l lower extremities when she walks \par

## 2021-03-01 NOTE — PHYSICAL EXAM
[Thrill] : thrill [Pulsatile Thrill] : pulsatile thrill [Hand well perfused] : hand well perfused [2+] : left 2+ [0] : left 0 [1+] : left 1+ [Normal] : coordination grossly intact, no focal deficits [Aneurysm] : no aneurysm [Bleeding] : no bleeding [Ulcer] : no ulcer [de-identified] : intact

## 2021-03-11 NOTE — REASON FOR VISIT
[Other ___] : a [unfilled] visit for [Inadequate Flow within AVF] : inadequate flow within AVF [Other: ___] : [unfilled]

## 2021-03-12 ENCOUNTER — RESULT REVIEW (OUTPATIENT)
Age: 61
End: 2021-03-12

## 2021-03-12 ENCOUNTER — APPOINTMENT (OUTPATIENT)
Dept: ENDOVASCULAR SURGERY | Facility: CLINIC | Age: 61
End: 2021-03-12
Payer: MEDICAID

## 2021-03-12 VITALS
HEIGHT: 61 IN | TEMPERATURE: 97.7 F | WEIGHT: 140 LBS | HEART RATE: 81 BPM | SYSTOLIC BLOOD PRESSURE: 181 MMHG | OXYGEN SATURATION: 100 % | DIASTOLIC BLOOD PRESSURE: 79 MMHG | BODY MASS INDEX: 26.43 KG/M2 | RESPIRATION RATE: 20 BRPM

## 2021-03-12 PROCEDURE — 99072 ADDL SUPL MATRL&STAF TM PHE: CPT

## 2021-03-12 PROCEDURE — 36902Z: CUSTOM

## 2021-03-12 RX ORDER — METOLAZONE 2.5 MG/1
2.5 TABLET ORAL
Refills: 0 | Status: DISCONTINUED | COMMUNITY
End: 2021-03-12

## 2021-03-12 RX ORDER — FUROSEMIDE 40 MG/1
40 TABLET ORAL
Refills: 0 | Status: DISCONTINUED | COMMUNITY
End: 2021-03-12

## 2021-03-12 RX ORDER — HYDROCHLOROTHIAZIDE 12.5 MG/1
TABLET ORAL
Refills: 0 | Status: ACTIVE | COMMUNITY

## 2021-03-12 RX ORDER — LEVOTHYROXINE SODIUM 0.03 MG/1
25 TABLET ORAL
Refills: 0 | Status: DISCONTINUED | COMMUNITY
End: 2021-03-12

## 2021-03-12 RX ORDER — AMLODIPINE BESYLATE 5 MG/1
5 TABLET ORAL
Refills: 0 | Status: ACTIVE | COMMUNITY

## 2021-04-24 ENCOUNTER — INPATIENT (INPATIENT)
Facility: HOSPITAL | Age: 61
LOS: 12 days | Discharge: HOME | DRG: 545 | End: 2021-05-07
Attending: INTERNAL MEDICINE | Admitting: INTERNAL MEDICINE
Payer: MEDICAID

## 2021-04-24 VITALS
HEART RATE: 85 BPM | SYSTOLIC BLOOD PRESSURE: 181 MMHG | RESPIRATION RATE: 19 BRPM | WEIGHT: 139.99 LBS | DIASTOLIC BLOOD PRESSURE: 82 MMHG | OXYGEN SATURATION: 98 % | TEMPERATURE: 98 F | HEIGHT: 62 IN

## 2021-04-24 DIAGNOSIS — Z90.49 ACQUIRED ABSENCE OF OTHER SPECIFIED PARTS OF DIGESTIVE TRACT: Chronic | ICD-10-CM

## 2021-04-24 DIAGNOSIS — Z98.891 HISTORY OF UTERINE SCAR FROM PREVIOUS SURGERY: Chronic | ICD-10-CM

## 2021-04-24 DIAGNOSIS — Z98.890 OTHER SPECIFIED POSTPROCEDURAL STATES: Chronic | ICD-10-CM

## 2021-04-24 DIAGNOSIS — M32.14 GLOMERULAR DISEASE IN SYSTEMIC LUPUS ERYTHEMATOSUS: ICD-10-CM

## 2021-04-24 LAB
ALBUMIN SERPL ELPH-MCNC: 4 G/DL — SIGNIFICANT CHANGE UP (ref 3.3–5)
ALP SERPL-CCNC: 169 U/L — HIGH (ref 40–120)
ALT FLD-CCNC: 24 U/L — SIGNIFICANT CHANGE UP (ref 10–45)
ANION GAP SERPL CALC-SCNC: 11 MMOL/L — SIGNIFICANT CHANGE UP (ref 5–17)
ANION GAP SERPL CALC-SCNC: 14 MMOL/L — SIGNIFICANT CHANGE UP (ref 5–17)
ANION GAP SERPL CALC-SCNC: 17 MMOL/L — SIGNIFICANT CHANGE UP (ref 5–17)
APTT BLD: 37 SEC — HIGH (ref 27.5–35.5)
AST SERPL-CCNC: 108 U/L — HIGH (ref 10–40)
BASE EXCESS BLDV CALC-SCNC: 7 MMOL/L — HIGH (ref -2–2)
BASOPHILS # BLD AUTO: 0.04 K/UL — SIGNIFICANT CHANGE UP (ref 0–0.2)
BASOPHILS NFR BLD AUTO: 0.8 % — SIGNIFICANT CHANGE UP (ref 0–2)
BILIRUB SERPL-MCNC: 0.6 MG/DL — SIGNIFICANT CHANGE UP (ref 0.2–1.2)
BUN SERPL-MCNC: 16 MG/DL — SIGNIFICANT CHANGE UP (ref 7–23)
BUN SERPL-MCNC: 16 MG/DL — SIGNIFICANT CHANGE UP (ref 7–23)
BUN SERPL-MCNC: 18 MG/DL — SIGNIFICANT CHANGE UP (ref 7–23)
CALCIUM SERPL-MCNC: 10.1 MG/DL — SIGNIFICANT CHANGE UP (ref 8.4–10.5)
CALCIUM SERPL-MCNC: 9.1 MG/DL — SIGNIFICANT CHANGE UP (ref 8.4–10.5)
CALCIUM SERPL-MCNC: 9.5 MG/DL — SIGNIFICANT CHANGE UP (ref 8.4–10.5)
CHLORIDE SERPL-SCNC: 96 MMOL/L — SIGNIFICANT CHANGE UP (ref 96–108)
CHLORIDE SERPL-SCNC: 97 MMOL/L — SIGNIFICANT CHANGE UP (ref 96–108)
CHLORIDE SERPL-SCNC: 98 MMOL/L — SIGNIFICANT CHANGE UP (ref 96–108)
CO2 BLDV-SCNC: 33 MMOL/L — HIGH (ref 22–30)
CO2 SERPL-SCNC: 23 MMOL/L — SIGNIFICANT CHANGE UP (ref 22–31)
CO2 SERPL-SCNC: 24 MMOL/L — SIGNIFICANT CHANGE UP (ref 22–31)
CO2 SERPL-SCNC: 27 MMOL/L — SIGNIFICANT CHANGE UP (ref 22–31)
CREAT SERPL-MCNC: 3.87 MG/DL — HIGH (ref 0.5–1.3)
CREAT SERPL-MCNC: 4.05 MG/DL — HIGH (ref 0.5–1.3)
CREAT SERPL-MCNC: 4.47 MG/DL — HIGH (ref 0.5–1.3)
EOSINOPHIL # BLD AUTO: 0.04 K/UL — SIGNIFICANT CHANGE UP (ref 0–0.5)
EOSINOPHIL NFR BLD AUTO: 0.8 % — SIGNIFICANT CHANGE UP (ref 0–6)
GAS PNL BLDV: SIGNIFICANT CHANGE UP
GLUCOSE SERPL-MCNC: 106 MG/DL — HIGH (ref 70–99)
GLUCOSE SERPL-MCNC: 114 MG/DL — HIGH (ref 70–99)
GLUCOSE SERPL-MCNC: 136 MG/DL — HIGH (ref 70–99)
HCO3 BLDV-SCNC: 32 MMOL/L — HIGH (ref 21–29)
HCT VFR BLD CALC: 37.1 % — SIGNIFICANT CHANGE UP (ref 34.5–45)
HGB BLD-MCNC: 12.3 G/DL — SIGNIFICANT CHANGE UP (ref 11.5–15.5)
IMM GRANULOCYTES NFR BLD AUTO: 0.2 % — SIGNIFICANT CHANGE UP (ref 0–1.5)
INR BLD: 1.01 RATIO — SIGNIFICANT CHANGE UP (ref 0.88–1.16)
LYMPHOCYTES # BLD AUTO: 1.07 K/UL — SIGNIFICANT CHANGE UP (ref 1–3.3)
LYMPHOCYTES # BLD AUTO: 22.7 % — SIGNIFICANT CHANGE UP (ref 13–44)
MAGNESIUM SERPL-MCNC: 2.7 MG/DL — HIGH (ref 1.6–2.6)
MCHC RBC-ENTMCNC: 32.2 PG — SIGNIFICANT CHANGE UP (ref 27–34)
MCHC RBC-ENTMCNC: 33.2 GM/DL — SIGNIFICANT CHANGE UP (ref 32–36)
MCV RBC AUTO: 97.1 FL — SIGNIFICANT CHANGE UP (ref 80–100)
MONOCYTES # BLD AUTO: 0.3 K/UL — SIGNIFICANT CHANGE UP (ref 0–0.9)
MONOCYTES NFR BLD AUTO: 6.4 % — SIGNIFICANT CHANGE UP (ref 2–14)
NEUTROPHILS # BLD AUTO: 3.25 K/UL — SIGNIFICANT CHANGE UP (ref 1.8–7.4)
NEUTROPHILS NFR BLD AUTO: 69.1 % — SIGNIFICANT CHANGE UP (ref 43–77)
NRBC # BLD: 0 /100 WBCS — SIGNIFICANT CHANGE UP (ref 0–0)
PCO2 BLDV: 47 MMHG — HIGH (ref 39–42)
PH BLDV: 7.45 — SIGNIFICANT CHANGE UP (ref 7.35–7.45)
PHOSPHATE SERPL-MCNC: 4.6 MG/DL — HIGH (ref 2.5–4.5)
PLATELET # BLD AUTO: 108 K/UL — LOW (ref 150–400)
PO2 BLDV: 38 MMHG — SIGNIFICANT CHANGE UP (ref 25–45)
POTASSIUM SERPL-MCNC: 4.4 MMOL/L — SIGNIFICANT CHANGE UP (ref 3.5–5.3)
POTASSIUM SERPL-MCNC: 7.9 MMOL/L — CRITICAL HIGH (ref 3.5–5.3)
POTASSIUM SERPL-MCNC: >9 MMOL/L — CRITICAL HIGH (ref 3.5–5.3)
POTASSIUM SERPL-SCNC: 4.4 MMOL/L — SIGNIFICANT CHANGE UP (ref 3.5–5.3)
POTASSIUM SERPL-SCNC: 7.9 MMOL/L — CRITICAL HIGH (ref 3.5–5.3)
POTASSIUM SERPL-SCNC: >9 MMOL/L — CRITICAL HIGH (ref 3.5–5.3)
PROT SERPL-MCNC: 8.2 G/DL — SIGNIFICANT CHANGE UP (ref 6–8.3)
PROTHROM AB SERPL-ACNC: 12.1 SEC — SIGNIFICANT CHANGE UP (ref 10.6–13.6)
RBC # BLD: 3.82 M/UL — SIGNIFICANT CHANGE UP (ref 3.8–5.2)
RBC # FLD: 13.2 % — SIGNIFICANT CHANGE UP (ref 10.3–14.5)
SAO2 % BLDV: 72 % — SIGNIFICANT CHANGE UP (ref 67–88)
SARS-COV-2 RNA SPEC QL NAA+PROBE: SIGNIFICANT CHANGE UP
SODIUM SERPL-SCNC: 133 MMOL/L — LOW (ref 135–145)
SODIUM SERPL-SCNC: 134 MMOL/L — LOW (ref 135–145)
SODIUM SERPL-SCNC: 140 MMOL/L — SIGNIFICANT CHANGE UP (ref 135–145)
TROPONIN T, HIGH SENSITIVITY RESULT: 177 NG/L — HIGH (ref 0–51)
WBC # BLD: 4.71 K/UL — SIGNIFICANT CHANGE UP (ref 3.8–10.5)
WBC # FLD AUTO: 4.71 K/UL — SIGNIFICANT CHANGE UP (ref 3.8–10.5)

## 2021-04-24 PROCEDURE — 93010 ELECTROCARDIOGRAM REPORT: CPT

## 2021-04-24 PROCEDURE — 70450 CT HEAD/BRAIN W/O DYE: CPT | Mod: 26,MA,59

## 2021-04-24 PROCEDURE — 70498 CT ANGIOGRAPHY NECK: CPT | Mod: 26,MA

## 2021-04-24 PROCEDURE — 70496 CT ANGIOGRAPHY HEAD: CPT | Mod: 26,MA

## 2021-04-24 PROCEDURE — 99291 CRITICAL CARE FIRST HOUR: CPT

## 2021-04-24 RX ORDER — OXYCODONE AND ACETAMINOPHEN 5; 325 MG/1; MG/1
1 TABLET ORAL EVERY 6 HOURS
Refills: 0 | Status: DISCONTINUED | OUTPATIENT
Start: 2021-04-24 | End: 2021-04-24

## 2021-04-24 RX ORDER — LATANOPROST 0.05 MG/ML
1 SOLUTION/ DROPS OPHTHALMIC; TOPICAL AT BEDTIME
Refills: 0 | Status: DISCONTINUED | OUTPATIENT
Start: 2021-04-24 | End: 2021-05-07

## 2021-04-24 RX ORDER — GABAPENTIN 400 MG/1
200 CAPSULE ORAL
Refills: 0 | Status: DISCONTINUED | OUTPATIENT
Start: 2021-04-24 | End: 2021-05-07

## 2021-04-24 RX ORDER — CALCIUM ACETATE 667 MG
667 TABLET ORAL
Refills: 0 | Status: DISCONTINUED | OUTPATIENT
Start: 2021-04-24 | End: 2021-05-07

## 2021-04-24 RX ORDER — ASPIRIN/CALCIUM CARB/MAGNESIUM 324 MG
81 TABLET ORAL DAILY
Refills: 0 | Status: DISCONTINUED | OUTPATIENT
Start: 2021-04-24 | End: 2021-05-07

## 2021-04-24 RX ORDER — POLYETHYLENE GLYCOL 3350 17 G/17G
17 POWDER, FOR SOLUTION ORAL DAILY
Refills: 0 | Status: DISCONTINUED | OUTPATIENT
Start: 2021-04-24 | End: 2021-05-07

## 2021-04-24 RX ORDER — LEVOTHYROXINE SODIUM 125 MCG
25 TABLET ORAL DAILY
Refills: 0 | Status: DISCONTINUED | OUTPATIENT
Start: 2021-04-24 | End: 2021-05-07

## 2021-04-24 RX ORDER — TIMOLOL 0.5 %
1 DROPS OPHTHALMIC (EYE) DAILY
Refills: 0 | Status: DISCONTINUED | OUTPATIENT
Start: 2021-04-24 | End: 2021-05-07

## 2021-04-24 RX ORDER — METOPROLOL TARTRATE 50 MG
25 TABLET ORAL DAILY
Refills: 0 | Status: DISCONTINUED | OUTPATIENT
Start: 2021-04-24 | End: 2021-05-07

## 2021-04-24 RX ORDER — ATORVASTATIN CALCIUM 80 MG/1
40 TABLET, FILM COATED ORAL AT BEDTIME
Refills: 0 | Status: DISCONTINUED | OUTPATIENT
Start: 2021-04-24 | End: 2021-05-07

## 2021-04-24 RX ORDER — CLOPIDOGREL BISULFATE 75 MG/1
75 TABLET, FILM COATED ORAL DAILY
Refills: 0 | Status: DISCONTINUED | OUTPATIENT
Start: 2021-04-24 | End: 2021-05-07

## 2021-04-24 NOTE — ED PROVIDER NOTE - PROGRESS NOTE DETAILS
Carlos Enrique Robison MD, FACEP patient staffed at this time with the resident, RN had not been able to see patient, based on hx of lkn of 23:00, 03:00 patient with dizziness, pt states acutely at 0800 she started to have visual deficits and eye deviation. Elly Kolb PGY-1: Patient TBA for dialysis after contrast load. Elly Kolb PGY-1: BMP still hemolyzed, however no EKG findings concerning for hyperkalemia. Attending note (Hunter): patient condition remains stable; repeat bmp hemolyzed, but ecg without evidence of hyperacute t-waves; will repeat bmp again but no emergent need fro hyperkalemic therapy.  Admitted to medicine.

## 2021-04-24 NOTE — ED PROVIDER NOTE - ATTENDING CONTRIBUTION TO CARE
Carlos Enrique Robison MD, FACEP: In this physician's medical judgement based on clinical history and physical exam, patient with hx of diabetes mellitus, esrd, Hypertension, and now with signs and symptoms of left third nerve palsy with failure to track across midline. Code stroke called upon evaluation of patient as reported occurrence was 0800 and within 24 hours we may be able to perform intervention, however, likely secondary to peripheral nerve palsy.   will get iv, cbc, cmp, cth, cta head/neck, ct perfusion and monitor closely with neuro checks.  Will follow up on labs, analgesia, imaging, reassess and disposition to the inpatient team as clinically indicated.

## 2021-04-24 NOTE — H&P ADULT - NSICDXFAMILYHX_GEN_ALL_CORE_FT
FAMILY HISTORY:  DM (diabetes mellitus), mother and father  History of hypertension, father and mother    Sibling  Still living? Yes, Estimated age: Age Unknown  DM (diabetes mellitus), Age at diagnosis: Age Unknown  History of hypertension, Age at diagnosis: Age Unknown

## 2021-04-24 NOTE — ED PROVIDER NOTE - NS ED ROS FT
CONST: no fevers, no chills  EYES: no pain, no vision changes, +R gaze deviation  ENT: no sore throat, no ear pain, no change in hearing  CV: no chest pain, no leg swelling  RESP: no shortness of breath, no cough  ABD: no abdominal pain, no nausea, no vomiting, no diarrhea  : no dysuria, no flank pain, no hematuria  MSK: no back pain, no extremity pain  NEURO: no headache or additional neurologic complaints, +dizzy  HEME: no easy bleeding  SKIN:  no rash

## 2021-04-24 NOTE — H&P ADULT - NSHPPOADEEPVENOUSTHROMB_GEN_A_CORE
10/09/19 1530   Quick Adds   Type of Visit Initial PT Evaluation   Living Environment   Lives With alone   Living Arrangements apartment   Home Accessibility no concerns   Self-Care   Usual Activity Tolerance good   Current Activity Tolerance fair   Regular Exercise No   Equipment Currently Used at Home cane, straight;walker, rolling   Functional Level Prior   Ambulation 1-->assistive equipment   Transferring 1-->assistive equipment   Fall history within last six months no   General Information   Onset of Illness/Injury or Date of Surgery - Date 10/09/19   Referring Physician Valentino Vizcarra MD   Patient/Family Goals Statement To TCU Saturday (MD suggested it)   Pertinent History of Current Problem (include personal factors and/or comorbidities that impact the POC) Pt is POD 0 R MIKAYLA, no precautions. PMH: DM2, HTN, afib, pulmonary HTN, venous insufficiency, gout, essential tremor, bladder sling surgery with incontinence   Precautions/Limitations fall precautions   Weight-Bearing Status - LLE full weight-bearing   Weight-Bearing Status - RLE weight-bearing as tolerated   General Observations Daughter present   Cognitive Status Examination   Orientation orientation to person, place and time   Level of Consciousness alert   Follows Commands and Answers Questions 100% of the time;able to follow single-step instructions   Personal Safety and Judgment intact   Pain Assessment   Patient Currently in Pain Yes, see Vital Sign flowsheet  (3/10 at rest)   Posture    Posture Forward head position;Protracted shoulders   Range of Motion (ROM)   ROM Comment Dec R hip motion due to pain and surgery, L LE WFL   Strength   Strength Comments R LE functionally weak, L LE WFL   Bed Mobility   Bed Mobility Comments Supine to sit with min A for LEs   Transfer Skills   Transfer Comments Sit to stand with FWW and CGA   Gait   Gait Comments Pt amb 10 ft with FWW and CGA, step-to pattern   Balance   Balance Comments Requires FWW for steady  "balance   Sensory Examination   Sensory Perception Comments Baseline neuropathy in B feet, sock high   General Therapy Interventions   Planned Therapy Interventions bed mobility training;gait training;strengthening;transfer training   Clinical Impression   Criteria for Skilled Therapeutic Intervention yes, treatment indicated   PT Diagnosis Difficulty ambulating   Influenced by the following impairments Pain, dec strength, balance, ROM, activity tolerance   Functional limitations due to impairments Difficulty ambulating and transferring   Clinical Presentation Stable/Uncomplicated   Clinical Presentation Rationale medically stable post-op   Clinical Decision Making (Complexity) Low complexity   Therapy Frequency 2x/day   Predicted Duration of Therapy Intervention (days/wks) 4 days   Anticipated Discharge Disposition Other (see comments)  (per ortho surgeon)   Risk & Benefits of therapy have been explained Yes   Patient, Family & other staff in agreement with plan of care Yes   [facility] TM \"6 Clicks\"   2016, Trustees of [facility], under license to [employer].  All rights reserved.   6 Clicks Short Forms Basic Mobility Inpatient Short Form   [facility] AM-PAC  \"6 Clicks\" V.2 Basic Mobility Inpatient Short Form   1. Turning from your back to your side while in a flat bed without using bedrails? 3 - A Little   2. Moving from lying on your back to sitting on the side of a flat bed without using bedrails? 2 - A Lot   3. Moving to and from a bed to a chair (including a wheelchair)? 3 - A Little   4. Standing up from a chair using your arms (e.g., wheelchair, or bedside chair)? 3 - A Little   5. To walk in hospital room? 3 - A Little   6. Climbing 3-5 steps with a railing? 2 - A Lot   Basic Mobility Raw Score (Score out of 24.Lower scores equate to lower levels of function) 16   Total Evaluation Time   Total Evaluation Time (Minutes) 15     " no

## 2021-04-24 NOTE — ED PROVIDER NOTE - PHYSICAL EXAMINATION
GENERAL: Awake, alert, NAD  HEENT: NC/AT, moist mucous membranes, PERRL, EOMI. L On primary gaze, with R eye exotropia. L eye with incomplete adduction upon R gaze.  LUNGS: CTAB, no wheezes or crackles   CARDIAC: RRR, grade III systolic crescendo-decrescendo murmur, bilateral carotid bruits.  ABDOMEN: Soft, normal BS, non tender, non distended, no rebound, no guarding  BACK: No midline spinal tenderness, no CVA tenderness  EXT: No edema, no calf tenderness, 2+ DP pulses bilaterally, no deformities. LUE fistula, pulsatile, bruits auscultated.   NEURO: A&Ox3. Moving all extremities.  SKIN: Warm and dry. No rash. 5/5 strength UE and LE bilaterally. Sensation intact. CN II-XII grossly intact.   PSYCH: Normal affect. GENERAL: Awake, alert, NAD  HEENT: NC/AT, moist mucous membranes, PERRL, EOMI. L On primary gaze, with R eye exotropia. L eye with incomplete adduction upon R gaze.  LUNGS: CTAB, no wheezes or crackles   CARDIAC: RRR, grade III systolic crescendo-decrescendo murmur, bilateral carotid bruits.  ABDOMEN: Soft, normal BS, non tender, non distended, no rebound, no guarding  BACK: No midline spinal tenderness, no CVA tenderness  EXT: No edema, no calf tenderness, 2+ DP pulses bilaterally, no deformities. LUE fistula, pulsatile, bruits auscultated.   NEURO: A&Ox3. Moving all extremities. left eye does not tract across midline on rightward gaze  SKIN: Warm and dry. No rash. 5/5 strength UE and LE bilaterally. Sensation intact. CN II-XII grossly intact.   PSYCH: Normal affect.

## 2021-04-24 NOTE — ED PROVIDER NOTE - OBJECTIVE STATEMENT
57F h/o lupus (dx 04/2017), ESRD (HD T/Th/Sat), NSTEMI (BMS 01/2018), DM2, HTN, HLD (not on medication), hypothyroidism p/w dizziness. 57F h/o lupus (dx 04/2017), ESRD (HD T/Th/Sat), NSTEMI (BMS 01/2018), DM2, HTN, HLD (not on medication), glaucoma, hypothyroidism p/w dizziness. Patient awoke from sleep at 3am with dizziness. Went to dialysis at 5am and continued to be dizzy before and after dialysis. Dizziness is intermittent, worse when standing up quickly, lasts for several minutes.  At 8am patient's daughter noticed her mother's R eye was deviating to the right and rolling back in her head. Last known normal was 11pm last night. Patient denies blurry vision or other vision changes, N/V, CP, SOB, LE edema, syncope, gait abnormalities. 57F h/o lupus (dx 04/2017), ESRD (HD T/Th/Sat), NSTEMI (BMS 01/2018), DM2, HTN, HLD (not on medication), glaucoma, hypothyroidism p/w dizziness. Patient awoke from sleep at 3am with dizziness. Went to dialysis at 5am and continued to be dizzy before and after dialysis. Dizziness is intermittent, worse when standing up quickly, lasts for several minutes.  At 8am patient's daughter noticed her mother's R eye was deviating to the right and rolling back/to the side in her head. Last known normal was 11pm last night. Patient denies blurry vision or other vision changes, N/V, CP, SOB, LE edema, syncope, gait abnormalities.

## 2021-04-24 NOTE — H&P ADULT - HISTORY OF PRESENT ILLNESS
57F h/o lupus (dx 04/2017), ESRD (HD T/Th/Sat), NSTEMI (BMS 01/2018), DM2, HTN, HLD (not on medication), glaucoma, hypothyroidism p/w dizziness. Patient awoke from sleep at 3am with dizziness. Went to dialysis at 5am and continued to be dizzy before and after dialysis. Dizziness is intermittent, worse when standing up quickly, lasts for several minutes.  At 8am patient's daughter noticed her mother's R eye was deviating to the right and rolling back in her head. Last known normal was 11pm last night. Patient denies blurry vision or other vision changes, N/V, CP, SOB, LE edema, syncope, gait abnormalities.

## 2021-04-24 NOTE — STROKE CODE NOTE - NSSTROKEABCD2BP_GEN_A_CORE
Telephone Encounter by Beba Diaz NCMA at 12/18/17 01:33 PM     Author:  Beba Diaz NCMA Service:  (none) Author Type:  Certified Medical Assistant     Filed:  12/18/17 01:41 PM Encounter Date:  12/18/2017 Status:  Signed     :  Beba Diaz NCMA (Certified Medical Assistant)            Rx sent to pharmacy.[MB1.1M]      Revision History        User Key Date/Time User Provider Type Action    > MB1.1 12/18/17 01:41 PM Beba Diaz NCMA Certified Medical Assistant Sign    M - Manual             Yes

## 2021-04-24 NOTE — CONSULT NOTE ADULT - SUBJECTIVE AND OBJECTIVE BOX
HPI:  Nathaly Paniagua is a 60 year old female with a past medical history of ESRD on dialysis, DM, HTN, glaucoma, HLD, lupus who presents with episodes of dizziness and gaze deviation. The patient was last seen normal at 2300 on  by her daughter. At baseline, the patient ambulates without any assistive devices and is alert and oriented to all modalities. The patient stated that overnight at 0300 she had an episode of dizziness where the room was spinning around her. Notes that dizziness appears when she stand up very quickly. She had this episode for several minutes and then it disappeared. This morning at 0800 the patient was noted by her daughter to have a dysconjugate gaze. Patient states that she has never had this before. Currently denies any symptoms of headaches, dizziness, diplopia, lightheadedness, nausea, vomiting, numbness, tingling, weakness, gait instability. Denies any recent illnesses or sick contacts.     (Stroke only)  NIHSS: 0  MRS: 0    REVIEW OF SYSTEMS    A 10-system ROS was performed and is negative except for those items noted above and/or in the HPI.    PAST MEDICAL & SURGICAL HISTORY:  Diabetes    Hypertension    Hypercholesteremia    Essential hypertension    Type 2 diabetes mellitus without complication, without long-term current use of insulin    Other hyperlipidemia    Glaucoma    ESRD (end-stage renal disease) due to SLE    Lupus (systemic lupus erythematosus)    S/P  section  times two    H/O gastric bypass  2016    S/P appendectomy      FAMILY HISTORY:  DM (diabetes mellitus)  mother and father    History of hypertension  father and mother    DM (diabetes mellitus) (Sibling)  siblings    History of hypertension (Sibling)  sibling    MEDICATIONS (HOME):  Home Medications:  acetaminophen 325 mg oral tablet: 2 tab(s) orally every 6 hours, As needed, For Temp greater than 38 C (100.4 F) (01 Mar 2018 11:17)  acetaminophen 325 mg oral tablet: 2 tab(s) orally every 6 hours, As needed, Mild Pain (1 - 3) (01 Mar 2018 11:17)  aspirin 81 mg oral delayed release tablet: 1 tab(s) orally once a day (01 Mar 2018 11:17)  atorvastatin 40 mg oral tablet: 1 tab(s) orally once a day (at bedtime) (01 Mar 2018 11:17)  Betimol 0.5% ophthalmic solution: 1 drop(s) to each affected eye 2 times a day (01 Mar 2018 11:17)  bisacodyl 5 mg oral delayed release tablet: 1 tab(s) orally once a day (01 Mar 2018 11:17)  calcium acetate 667 mg oral tablet: 1 tab(s) orally once a day (01 Mar 2018 11:17)  cefepime 2 g intravenous injection: 2 gram(s) intravenous  (12 Mar 2018 14:11)  clopidogrel 75 mg oral tablet: 1 tab(s) orally once a day (01 Mar 2018 11:17)  gabapentin 100 mg oral capsule: 200 milligram(s) orally 2 times a day (01 Mar 2018 11:17)  levothyroxine 25 mcg (0.025 mg) oral tablet: 1 tab(s) orally once a day (01 Mar 2018 11:17)  metoprolol succinate 25 mg oral tablet, extended release: 1 tab(s) orally once a day (01 Mar 2018 11:17)  metroNIDAZOLE 500 mg oral tablet: 1 tab(s) orally 2 times a day (12 Mar 2018 14:11)  Nephro-Bravo oral tablet: 1 tab(s) orally once a day (01 Mar 2018 11:17)  Travatan Z 0.004% ophthalmic solution: 1 drop(s) to each affected eye once a day (at bedtime) (01 Mar 2018 11:17)  vancomycin 500 mg intravenous injection: 500 milligram(s) intravenous  (12 Mar 2018 14:11)    MEDICATIONS  (STANDING):    MEDICATIONS  (PRN):    ALLERGIES/INTOLERANCES:  Allergies  penicillin (Rash)    Intolerances    VITALS & EXAMINATION:  Vital Signs Last 24 Hrs  T(C): 36.7 (2021 13:56), Max: 36.7 (2021 13:56)  T(F): 98.1 (2021 13:56), Max: 98.1 (2021 13:56)  HR: 85 (2021 13:56) (85 - 85)  BP: 181/82 (2021 13:56) (181/82 - 181/82)  BP(mean): --  RR: 19 (2021 13:56) (19 - 19)  SpO2: 98% (2021 13:56) (98% - 98%)    General:  Constitutional: Appears stated age, in no apparent distress including pain  Head: Normocephalic & atraumatic.    Neurological (>12):  MS: Awake, alert, oriented to person, place, situation, time. Normal affect. Follows all commands.    Language: Speech is clear, fluent with good repetition & comprehension.    CNs: PERRLA (R = 3mm, L = 3mm) very sluggish. VF intact in all 4 quadrants. On primary gaze, with R eye exotropia. EOMI however L eye with incomplete adduction upon R gaze. No diplopia. V1-3 intact to LT, well developed masseter muscles b/l. No facial asymmetry b/l, full eye closure strength b/l. Symmetric palate elevation in midline.  Shoulder shrug intact b/l. Tongue midline, normal movements, no atrophy.    Motor: Normal muscle bulk & tone. No noticeable tremor or seizure. No pronator drift.              Deltoid	Biceps	Triceps	   R	5	5	5	5		  L	5	5	5	5	    	H-Flex	H-Ext	K-Flex	K-Ext	D-Flex	P-Flex  R	5	5	5	5	5	5		   L	5	5	5	5	5	5	     Sensation: Intact to LT b/l throughout.     Reflexes:              Biceps(C5)       BR(C6)     Triceps(C7)               Patellar(L4)    Achilles(S1)    Plantar Resp  R	2	          2	             2		        2		    2		Down   L	2	          2	             2		        2		    2		Down     Coordination:  No dysmetria to FTN.    Gait: Cautious gait but without ataxia.     LABORATORY:  CBC                       12.3   4.71  )-----------( 108      ( 2021 16:33 )             37.1     Chem       LFTs   Coagulopathy PT/INR - ( 2021 16:33 )   PT: 12.1 sec;   INR: 1.01 ratio         PTT - ( 2021 16:33 )  PTT:37.0 sec    STUDIES & IMAGING:    Radiology (XR, CT, MR, U/S, TTE/NAILA):    < from: CT Angio Neck w/ IV Cont (21 @ 17:01) >  IMPRESSION:    CTA neck and brain: No large vessel occlusion or high-grade significant stenosis.    < end of copied text >    < from: CT Brain Stroke Protocol (21 @ 16:42) >  IMPRESSION:    No CT evidence of acute intracranial hemorrhage, brain edema, or mass effect.    < end of copied text >   HPI:  Nathaly Paniagua is a 60 year old female with a past medical history of ESRD on dialysis, DM, HTN, glaucoma, HLD, lupus who presents with episodes of dizziness and gaze deviation. The patient was last seen normal at 2300 on  by her daughter. At baseline, the patient ambulates without any assistive devices and is alert and oriented to all modalities. The patient stated that overnight at 0300 she had an episode of dizziness where the room was spinning around her. Notes that dizziness appears when she stand up very quickly. She had this episode for several minutes and then it disappeared. This morning at 0800 the patient was noted by her daughter to have a dysconjugate gaze. Patient states that she has never had this before. Currently denies any symptoms of headaches, dizziness, diplopia, lightheadedness, nausea, vomiting, numbness, tingling, weakness, gait instability. Denies any recent illnesses or sick contacts.   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 Has been treated with IVIG and steroids in the past.    (Stroke only)  NIHSS: 0  MRS: 0    REVIEW OF SYSTEMS    A 10-system ROS was performed and is negative except for those items noted above and/or in the HPI.    PAST MEDICAL & SURGICAL HISTORY:  Diabetes    Hypertension    Hypercholesteremia    Essential hypertension    Type 2 diabetes mellitus without complication, without long-term current use of insulin    Other hyperlipidemia    Glaucoma    ESRD (end-stage renal disease) due to SLE    Lupus (systemic lupus erythematosus)    S/P  section  times two    H/O gastric bypass  2016    S/P appendectomy      FAMILY HISTORY:  DM (diabetes mellitus)  mother and father    History of hypertension  father and mother    DM (diabetes mellitus) (Sibling)  siblings    History of hypertension (Sibling)  sibling    MEDICATIONS (HOME):  Home Medications:  acetaminophen 325 mg oral tablet: 2 tab(s) orally every 6 hours, As needed, For Temp greater than 38 C (100.4 F) (01 Mar 2018 11:17)  acetaminophen 325 mg oral tablet: 2 tab(s) orally every 6 hours, As needed, Mild Pain (1 - 3) (01 Mar 2018 11:17)  aspirin 81 mg oral delayed release tablet: 1 tab(s) orally once a day (01 Mar 2018 11:17)  atorvastatin 40 mg oral tablet: 1 tab(s) orally once a day (at bedtime) (01 Mar 2018 11:17)  Betimol 0.5% ophthalmic solution: 1 drop(s) to each affected eye 2 times a day (01 Mar 2018 11:17)  bisacodyl 5 mg oral delayed release tablet: 1 tab(s) orally once a day (01 Mar 2018 11:17)  calcium acetate 667 mg oral tablet: 1 tab(s) orally once a day (01 Mar 2018 11:17)  cefepime 2 g intravenous injection: 2 gram(s) intravenous  (12 Mar 2018 14:11)  clopidogrel 75 mg oral tablet: 1 tab(s) orally once a day (01 Mar 2018 11:17)  gabapentin 100 mg oral capsule: 200 milligram(s) orally 2 times a day (01 Mar 2018 11:17)  levothyroxine 25 mcg (0.025 mg) oral tablet: 1 tab(s) orally once a day (01 Mar 2018 11:17)  metoprolol succinate 25 mg oral tablet, extended release: 1 tab(s) orally once a day (01 Mar 2018 11:17)  metroNIDAZOLE 500 mg oral tablet: 1 tab(s) orally 2 times a day (12 Mar 2018 14:11)  Nephro-Bravo oral tablet: 1 tab(s) orally once a day (01 Mar 2018 11:17)  Travatan Z 0.004% ophthalmic solution: 1 drop(s) to each affected eye once a day (at bedtime) (01 Mar 2018 11:17)  vancomycin 500 mg intravenous injection: 500 milligram(s) intravenous  (12 Mar 2018 14:11)    MEDICATIONS  (STANDING):    MEDICATIONS  (PRN):    ALLERGIES/INTOLERANCES:  Allergies  penicillin (Rash)    Intolerances    VITALS & EXAMINATION:  Vital Signs Last 24 Hrs  T(C): 36.7 (2021 13:56), Max: 36.7 (2021 13:56)  T(F): 98.1 (2021 13:56), Max: 98.1 (2021 13:56)  HR: 85 (2021 13:56) (85 - 85)  BP: 181/82 (2021 13:56) (181/82 - 181/82)  BP(mean): --  RR: 19 (2021 13:56) (19 - 19)  SpO2: 98% (2021 13:56) (98% - 98%)    General:  Constitutional: Appears stated age, in no apparent distress including pain  Head: Normocephalic & atraumatic.    Neurological (>12):  MS: Awake, alert, oriented to person, place, situation, time. Normal affect. Follows all commands.    Language: Speech is clear, fluent with good repetition & comprehension.    CNs: PERRLA (R = 3mm, L = 3mm) very sluggish. VF intact in all 4 quadrants. On primary gaze, with R eye exotropia. EOMI however L eye with incomplete adduction upon R gaze. No diplopia. V1-3 intact to LT, well developed masseter muscles b/l. No facial asymmetry b/l, full eye closure strength b/l. Symmetric palate elevation in midline.  Shoulder shrug intact b/l. Tongue midline, normal movements, no atrophy.    Motor: Normal muscle bulk & tone. No noticeable tremor or seizure. No pronator drift.              Deltoid	Biceps	Triceps	   R	5	5	5	5		  L	5	5	5	5	    	H-Flex	H-Ext	K-Flex	K-Ext	D-Flex	P-Flex  R	5	5	5	5	5	5		   L	5	5	5	5	5	5	     Sensation: Intact to LT b/l throughout.     Reflexes:              Biceps(C5)       BR(C6)     Triceps(C7)               Patellar(L4)    Achilles(S1)    Plantar Resp  R	2	          2	             2		        2		    2		Down   L	2	          2	             2		        2		    2		Down     Coordination:  No dysmetria to FTN.    Gait: Cautious gait but without ataxia.     LABORATORY:  CBC                       12.3   4.71  )-----------( 108      ( 2021 16:33 )             37.1     Chem       LFTs   Coagulopathy PT/INR - ( 2021 16:33 )   PT: 12.1 sec;   INR: 1.01 ratio         PTT - ( 2021 16:33 )  PTT:37.0 sec    STUDIES & IMAGING:    Radiology (XR, CT, MR, U/S, TTE/NAILA):    < from: CT Angio Neck w/ IV Cont (21 @ 17:01) >  IMPRESSION:    CTA neck and brain: No large vessel occlusion or high-grade significant stenosis.    < end of copied text >    < from: CT Brain Stroke Protocol (21 @ 16:42) >  IMPRESSION:    No CT evidence of acute intracranial hemorrhage, brain edema, or mass effect.    < end of copied text >

## 2021-04-24 NOTE — H&P ADULT - PROBLEM SELECTOR PLAN 5
had wound vac and also was on abx during HD   turn position frequently in bed ,   house ID consult to see if abx at home need to be restarted

## 2021-04-24 NOTE — H&P ADULT - NSHPLABSRESULTS_GEN_ALL_CORE
12.3   4.71  )-----------( 108      ( 24 Apr 2021 16:33 )             37.1   04-24    140  |  96  |  18  ----------------------------<  106<H>  4.4   |  27  |  4.47<H>    Ca    10.1      24 Apr 2021 22:02  Phos  4.6     04-24  Mg     2.7     04-24    TPro  8.2  /  Alb  4.0  /  TBili  0.6  /  DBili  x   /  AST  108<H>  /  ALT  24  /  AlkPhos  169<H>  04-24

## 2021-04-24 NOTE — H&P ADULT - NSICDXPASTMEDICALHX_GEN_ALL_CORE_FT
PAST MEDICAL HISTORY:  Diabetes     ESRD (end-stage renal disease) due to SLE     Essential hypertension     Glaucoma     Hypercholesteremia     Hypertension     Lupus (systemic lupus erythematosus)     Other hyperlipidemia     Type 2 diabetes mellitus without complication, without long-term current use of insulin

## 2021-04-24 NOTE — CONSULT NOTE ADULT - ASSESSMENT
Nathaly Paniagua is a 60 year old female with a past medical history of ESRD on dialysis, DM, HTN, glaucoma, HLD, lupus who presents with episodes of dizziness and gaze deviation.    Impression: L partial third cranial nerve palsy likely a peripheral nerve palsy in the setting of DM and HTN. Lower likelihood of ischemic stroke as no other focal deficits noted. Dizziness likely in the setting of her third nerve palsy of peripheral origin.    Recs:  [] check A1c, ESR, CRP  [] nephrology consult for dialysis in setting of contrast  [] if diplopia is a complaint may need to patch eye  [] can consider opthalmology consult  [] will need MRI brain and orbits, likely to be done as outpatient  [] may trial meclizine 25mg q8h prn for dizziness if symptomatic  [] may follow up upon discharge with neurology at [address] [place] [address]. ([phone])    Case to be discussed with neurology attending, Dr. Sherman. Nathaly Paniagua is a 60 year old female with a past medical history of ESRD on dialysis, DM, HTN, glaucoma, HLD, lupus who presents with episodes of dizziness and gaze deviation.    Impression: L partial third cranial nerve palsy likely a peripheral nerve palsy in the setting of DM and HTN. Lower likelihood of ischemic stroke as no other focal deficits noted. Dizziness likely in the setting of her third nerve palsy of peripheral origin.    No tpa as LKN>4.5 hours  No endovascular as no LVO noted  Low suspicion of stroke, no aspirin    Recs:  [] check A1c, ESR, CRP  [] nephrology consult for dialysis in setting of contrast  [] if diplopia is a complaint may need to patch eye  [] can consider opthalmology consult  [] will need MRI brain and orbits, likely to be done as outpatient  [] may trial meclizine 25mg q8h prn for dizziness if symptomatic  [] may follow up upon discharge with neurology at [address] [place] [address]. ([phone])    Case to be discussed with neurology attending, Dr. Sherman. Nathaly Paniagua is a 60 year old female with a past medical history of ESRD on dialysis, DM, HTN, glaucoma, HLD, lupus who presents with episodes of dizziness and gaze deviation.    Impression: L partial third cranial nerve palsy likely a peripheral nerve palsy in the setting of DM and HTN. Lower likelihood of ischemic stroke as no other focal deficits noted. Dizziness likely in the setting of her third nerve palsy of peripheral origin.    No tpa as LKN>4.5 hours  No endovascular as no LVO noted  Low suspicion of stroke, no aspirin    Recs:  [] check A1c, ESR, CRP  [] nephrology consult for dialysis in setting of contrast  [] if diplopia is a complaint may need to patch eye  [] can consider opthalmology consult  [] will need MRI brain and orbits, likely to be done as outpatient  [] may trial meclizine 25mg q8h prn for dizziness if symptomatic  [] may follow up upon discharge with neurology at [address] [place] [address]. ([phone]), can also follow up with neuro-opthalmology Dr. Mistry or Dr. Mena    Case to be discussed with neurology attending, Dr. Sherman. Nathaly Paniagua is a 60 year old female with a past medical history of ESRD on dialysis, DM, HTN, glaucoma, HLD, lupus who presents with episodes of dizziness and gaze deviation.    Impression: L partial third cranial nerve palsy likely a peripheral nerve palsy in the setting of DM and HTN. Also of high consideration is lupus induced vasculitis affecting the third nerve. Lower likelihood of ischemic stroke as no other focal deficits noted. Dizziness likely in the setting of her third nerve palsy of peripheral origin.    No tpa as LKN>4.5 hours  No endovascular as no LVO noted  Low suspicion of stroke, no aspirin    Recs:  [] check A1c, ESR, CRP, heavy metal screen (patient has history of elevated cadmium levels), copper, ceruloplasmin, Lyme screen, TSH, T3/T4, Vitamin B1, B6, B12, folate, Vit E, methylmalonic acid, homocysteine, SPEP, UPEP  [] nephrology consult for dialysis in setting of contrast  [] would recommend rheumatology consult to determine if there is acute lupus flare  [] if diplopia is a complaint may need to patch eye  [] can consider opthalmology consult  [] will need MRI brain and orbits, likely to be done as outpatient or if admitted can be done as inpatient  [] may trial meclizine 25mg q8h prn for dizziness if symptomatic  [] neurochecks as allowed on floor  [] may follow up upon discharge with neurology at [address] [place] [address]. ([phone]), can also follow up with neuro-opthalmology Dr. Mistry or Dr. Mena    Case to be discussed with neurology attending, Dr. Sherman. Nathaly Paniagua is a 60 year old female with a past medical history of ESRD on dialysis, DM, HTN, glaucoma, HLD, lupus who presents with episodes of dizziness and gaze deviation.    Impression: L partial third cranial nerve palsy likely a peripheral nerve palsy in the setting of DM and HTN. Also of high consideration is lupus induced vasculitis affecting the third nerve. Lower likelihood of ischemic stroke as no other focal deficits noted. Dizziness likely in the setting of her third nerve palsy of peripheral origin.    No tpa as LKN>4.5 hours  No endovascular as no LVO noted  Low suspicion of stroke, no aspirin    Recs:  [] check A1c, ESR, CRP, heavy metal screen (patient has history of elevated cadmium levels), copper, ceruloplasmin, Lyme screen, TSH, T3/T4, Vitamin B1, B6, B12, folate, Vit E, methylmalonic acid, homocysteine, SPEP, UPEP  [] Nephrology consult for dialysis in setting of contrast  [] would recommend rheumatology consult to determine if there is acute lupus flare  [] if diplopia is a complaint may need to patch eye  [] Opthalmology consult  [] will need MRI brain and orbits, likely to be done as outpatient or if admitted can be done as inpatient without contrast   [] may trial meclizine 25mg q8h prn for dizziness if symptomatic  [] neurochecks as allowed on floor  [] may follow up upon discharge with neurology at [address] [place] [address]. ([phone]), can also follow up with neuro-opthalmology Dr. Mistry or Dr. Mena    Case to be discussed with neurology attending, Dr. Sherman.

## 2021-04-24 NOTE — ED ADULT NURSE NOTE - OBJECTIVE STATEMENT
Pt presented to ED for eval of dizziness which started @ 0300 today, then she noted that her right eye was deviated to the right lateral side, but she denies any visual changes.  Code Stroke called after pt arrived in tx area.  Pt's is clear, no facial droop present, no pronator drift, strength equal bilaterally.  She is able to follow all commands, a& o x 4.

## 2021-04-24 NOTE — H&P ADULT - NSHPPHYSICALEXAM_GEN_ALL_CORE
pt. seen and examined     Vital Signs Last 24 Hrs  T(C): 36.4 (25 Apr 2021 04:51), Max: 36.7 (24 Apr 2021 13:56)  T(F): 97.5 (25 Apr 2021 04:51), Max: 98.1 (24 Apr 2021 13:56)  HR: 63 (25 Apr 2021 04:51) (63 - 85)  BP: 131/69 (25 Apr 2021 04:51) (131/69 - 190/73)  BP(mean): --  RR: 18 (25 Apr 2021 04:51) (18 - 19)  SpO2: 99% (25 Apr 2021 04:51) (98% - 100%)    heent : nc/at   neck: supple, no JVD  lungs : b/l clear   heart: s1s2 nml  abd : soft, NABS  ext : no c/c , pulses 1+  neuro: aaox3 , no focal deficit

## 2021-04-24 NOTE — ED ADULT TRIAGE NOTE - CHIEF COMPLAINT QUOTE
dizziness noted today before and after dialysis. woke up 1 hour ago with right eye deviation to the right.   denies blurry vision, HA, slurred speech, facial droop   PMH HTN, kidney disease, lupus, stents

## 2021-04-25 DIAGNOSIS — M32.9 SYSTEMIC LUPUS ERYTHEMATOSUS, UNSPECIFIED: ICD-10-CM

## 2021-04-25 DIAGNOSIS — E11.9 TYPE 2 DIABETES MELLITUS WITHOUT COMPLICATIONS: ICD-10-CM

## 2021-04-25 DIAGNOSIS — I10 ESSENTIAL (PRIMARY) HYPERTENSION: ICD-10-CM

## 2021-04-25 DIAGNOSIS — M32.14 GLOMERULAR DISEASE IN SYSTEMIC LUPUS ERYTHEMATOSUS: ICD-10-CM

## 2021-04-25 DIAGNOSIS — L89.90 PRESSURE ULCER OF UNSPECIFIED SITE, UNSPECIFIED STAGE: ICD-10-CM

## 2021-04-25 LAB
ALBUMIN SERPL ELPH-MCNC: 3.5 G/DL — SIGNIFICANT CHANGE UP (ref 3.3–5)
ALP SERPL-CCNC: 165 U/L — HIGH (ref 40–120)
ALT FLD-CCNC: 13 U/L — SIGNIFICANT CHANGE UP (ref 10–45)
ANION GAP SERPL CALC-SCNC: 17 MMOL/L — SIGNIFICANT CHANGE UP (ref 5–17)
AST SERPL-CCNC: 25 U/L — SIGNIFICANT CHANGE UP (ref 10–40)
BILIRUB SERPL-MCNC: 0.6 MG/DL — SIGNIFICANT CHANGE UP (ref 0.2–1.2)
BUN SERPL-MCNC: 23 MG/DL — SIGNIFICANT CHANGE UP (ref 7–23)
CALCIUM SERPL-MCNC: 9.1 MG/DL — SIGNIFICANT CHANGE UP (ref 8.4–10.5)
CHLORIDE SERPL-SCNC: 96 MMOL/L — SIGNIFICANT CHANGE UP (ref 96–108)
CO2 SERPL-SCNC: 25 MMOL/L — SIGNIFICANT CHANGE UP (ref 22–31)
COVID-19 SPIKE DOMAIN AB INTERP: POSITIVE
COVID-19 SPIKE DOMAIN ANTIBODY RESULT: >250 U/ML — HIGH
CREAT SERPL-MCNC: 5.13 MG/DL — HIGH (ref 0.5–1.3)
GLUCOSE SERPL-MCNC: 82 MG/DL — SIGNIFICANT CHANGE UP (ref 70–99)
HCT VFR BLD CALC: 34.6 % — SIGNIFICANT CHANGE UP (ref 34.5–45)
HGB BLD-MCNC: 11.5 G/DL — SIGNIFICANT CHANGE UP (ref 11.5–15.5)
MCHC RBC-ENTMCNC: 32.2 PG — SIGNIFICANT CHANGE UP (ref 27–34)
MCHC RBC-ENTMCNC: 33.2 GM/DL — SIGNIFICANT CHANGE UP (ref 32–36)
MCV RBC AUTO: 96.9 FL — SIGNIFICANT CHANGE UP (ref 80–100)
NRBC # BLD: 0 /100 WBCS — SIGNIFICANT CHANGE UP (ref 0–0)
PLATELET # BLD AUTO: 114 K/UL — LOW (ref 150–400)
POTASSIUM SERPL-MCNC: 4.3 MMOL/L — SIGNIFICANT CHANGE UP (ref 3.5–5.3)
POTASSIUM SERPL-SCNC: 4.3 MMOL/L — SIGNIFICANT CHANGE UP (ref 3.5–5.3)
PROT SERPL-MCNC: 6.7 G/DL — SIGNIFICANT CHANGE UP (ref 6–8.3)
RBC # BLD: 3.57 M/UL — LOW (ref 3.8–5.2)
RBC # FLD: 13.1 % — SIGNIFICANT CHANGE UP (ref 10.3–14.5)
SARS-COV-2 IGG+IGM SERPL QL IA: >250 U/ML — HIGH
SARS-COV-2 IGG+IGM SERPL QL IA: POSITIVE
SODIUM SERPL-SCNC: 138 MMOL/L — SIGNIFICANT CHANGE UP (ref 135–145)
TROPONIN T, HIGH SENSITIVITY RESULT: 224 NG/L — HIGH (ref 0–51)
TROPONIN T, HIGH SENSITIVITY RESULT: 225 NG/L — HIGH (ref 0–51)
WBC # BLD: 4.98 K/UL — SIGNIFICANT CHANGE UP (ref 3.8–10.5)
WBC # FLD AUTO: 4.98 K/UL — SIGNIFICANT CHANGE UP (ref 3.8–10.5)

## 2021-04-25 PROCEDURE — 99223 1ST HOSP IP/OBS HIGH 75: CPT | Mod: GC

## 2021-04-25 RX ORDER — HEPARIN SODIUM 5000 [USP'U]/ML
5000 INJECTION INTRAVENOUS; SUBCUTANEOUS EVERY 12 HOURS
Refills: 0 | Status: DISCONTINUED | OUTPATIENT
Start: 2021-04-25 | End: 2021-05-07

## 2021-04-25 RX ADMIN — GABAPENTIN 200 MILLIGRAM(S): 400 CAPSULE ORAL at 21:07

## 2021-04-25 RX ADMIN — GABAPENTIN 200 MILLIGRAM(S): 400 CAPSULE ORAL at 10:08

## 2021-04-25 RX ADMIN — Medication 25 MILLIGRAM(S): at 01:42

## 2021-04-25 RX ADMIN — HEPARIN SODIUM 5000 UNIT(S): 5000 INJECTION INTRAVENOUS; SUBCUTANEOUS at 05:24

## 2021-04-25 RX ADMIN — Medication 81 MILLIGRAM(S): at 13:13

## 2021-04-25 RX ADMIN — Medication 667 MILLIGRAM(S): at 13:13

## 2021-04-25 RX ADMIN — Medication 667 MILLIGRAM(S): at 18:28

## 2021-04-25 RX ADMIN — ATORVASTATIN CALCIUM 40 MILLIGRAM(S): 80 TABLET, FILM COATED ORAL at 01:42

## 2021-04-25 RX ADMIN — CLOPIDOGREL BISULFATE 75 MILLIGRAM(S): 75 TABLET, FILM COATED ORAL at 13:13

## 2021-04-25 RX ADMIN — Medication 25 MILLIGRAM(S): at 13:14

## 2021-04-25 RX ADMIN — ATORVASTATIN CALCIUM 40 MILLIGRAM(S): 80 TABLET, FILM COATED ORAL at 21:07

## 2021-04-25 RX ADMIN — Medication 25 MICROGRAM(S): at 05:24

## 2021-04-25 RX ADMIN — LATANOPROST 1 DROP(S): 0.05 SOLUTION/ DROPS OPHTHALMIC; TOPICAL at 21:06

## 2021-04-25 RX ADMIN — HEPARIN SODIUM 5000 UNIT(S): 5000 INJECTION INTRAVENOUS; SUBCUTANEOUS at 18:29

## 2021-04-25 RX ADMIN — Medication 667 MILLIGRAM(S): at 10:08

## 2021-04-25 RX ADMIN — Medication 1 DROP(S): at 13:14

## 2021-04-25 RX ADMIN — CLOPIDOGREL BISULFATE 75 MILLIGRAM(S): 75 TABLET, FILM COATED ORAL at 01:42

## 2021-04-25 RX ADMIN — Medication 81 MILLIGRAM(S): at 01:41

## 2021-04-25 RX ADMIN — GABAPENTIN 200 MILLIGRAM(S): 400 CAPSULE ORAL at 01:42

## 2021-04-25 NOTE — PROGRESS NOTE ADULT - SUBJECTIVE AND OBJECTIVE BOX
Patient is a 60y old  Female who presents with a chief complaint of dizziness / code stroke (2021 11:29)      INTERVAL HPI/OVERNIGHT EVENTS: doing fair , no c/o , no focal deficit  T(C): 36.7 (21 @ 21:13), Max: 36.7 (21 @ 01:01)  HR: 65 (21 @ 21:13) (63 - 80)  BP: 145/73 (21 @ 21:13) (128/73 - 179/87)  RR: 18 (21 @ 21:13) (18 - 20)  SpO2: 98% (21 @ 21:13) (98% - 100%)  Wt(kg): --  I&O's Summary      PAST MEDICAL & SURGICAL HISTORY:  Diabetes    Hypertension    Hypercholesteremia    Essential hypertension    Type 2 diabetes mellitus without complication, without long-term current use of insulin    Other hyperlipidemia    Glaucoma    ESRD (end-stage renal disease) due to SLE    Lupus (systemic lupus erythematosus)    S/P  section  times two    H/O gastric bypass  2016    S/P appendectomy        SOCIAL HISTORY  Alcohol:  Tobacco:  Illicit substance use:    FAMILY HISTORY:    REVIEW OF SYSTEMS:  CONSTITUTIONAL: No fever, weight loss, or fatigue  EYES: No eye pain, visual disturbances, or discharge  ENMT:  No difficulty hearing, tinnitus, vertigo; No sinus or throat pain  NECK: No pain or stiffness  RESPIRATORY: No cough, wheezing, chills or hemoptysis; No shortness of breath  CARDIOVASCULAR: No chest pain, palpitations, dizziness, or leg swelling  GASTROINTESTINAL: No abdominal or epigastric pain. No nausea, vomiting, or hematemesis; No diarrhea or constipation. No melena or hematochezia.  GENITOURINARY: No dysuria, frequency, hematuria, or incontinence  NEUROLOGICAL: No headaches, memory loss, loss of strength, numbness, or tremors  SKIN: No itching, burning, rashes, or lesions   LYMPH NODES: No enlarged glands  ENDOCRINE: No heat or cold intolerance; No hair loss  MUSCULOSKELETAL: No joint pain or swelling; No muscle, back, or extremity pain  PSYCHIATRIC: No depression, anxiety, mood swings, or difficulty sleeping  HEME/LYMPH: No easy bruising, or bleeding gums  ALLERY AND IMMUNOLOGIC: No hives or eczema    RADIOLOGY & ADDITIONAL TESTS:    Imaging Personally Reviewed:  [ ] YES  [ ] NO    Consultant(s) Notes Reviewed:  [ ] YES  [ ] NO    PHYSICAL EXAM:  GENERAL: NAD, well-groomed, well-developed  HEAD:  Atraumatic, Normocephalic  EYES: EOMI, PERRLA, conjunctiva and sclera clear  ENMT: No tonsillar erythema, exudates, or enlargement; Moist mucous membranes, Good dentition, No lesions  NECK: Supple, No JVD, Normal thyroid  NERVOUS SYSTEM:  Alert & Oriented X3, Good concentration; Motor Strength 5/5 B/L upper and lower extremities; DTRs 2+ intact and symmetric  CHEST/LUNG: Clear to percussion bilaterally; No rales, rhonchi, wheezing, or rubs  HEART: Regular rate and rhythm; No murmurs, rubs, or gallops  ABDOMEN: Soft, Nontender, Nondistended; Bowel sounds present  EXTREMITIES:  2+ Peripheral Pulses, No clubbing, cyanosis, or edema  LYMPH: No lymphadenopathy noted  SKIN: No rashes or lesions    LABS:                        11.5   4.98  )-----------( 114      ( 2021 06:25 )             34.6     04-25    138  |  96  |  23  ----------------------------<  82  4.3   |  25  |  5.13<H>    Ca    9.1      2021 06:25  Phos  4.6     04-24  Mg     2.7     04-24    TPro  6.7  /  Alb  3.5  /  TBili  0.6  /  DBili  x   /  AST  25  /  ALT  13  /  AlkPhos  165<H>  04-25    PT/INR - ( 2021 16:33 )   PT: 12.1 sec;   INR: 1.01 ratio         PTT - ( 2021 16:33 )  PTT:37.0 sec    CAPILLARY BLOOD GLUCOSE                MEDICATIONS  (STANDING):  aspirin enteric coated 81 milliGRAM(s) Oral daily  atorvastatin 40 milliGRAM(s) Oral at bedtime  calcium acetate 667 milliGRAM(s) Oral three times a day with meals  clopidogrel Tablet 75 milliGRAM(s) Oral daily  gabapentin 200 milliGRAM(s) Oral two times a day  heparin   Injectable 5000 Unit(s) SubCutaneous every 12 hours  latanoprost 0.005% Ophthalmic Solution 1 Drop(s) Both EYES at bedtime  levothyroxine 25 MICROGram(s) Oral daily  metoprolol succinate ER 25 milliGRAM(s) Oral daily  polyethylene glycol 3350 17 Gram(s) Oral daily  timolol 0.5% Solution 1 Drop(s) Both EYES daily    MEDICATIONS  (PRN):  oxycodone    5 mG/acetaminophen 325 mG 1 Tablet(s) Oral every 6 hours PRN Moderate Pain (4 - 6)      Care Discussed with Consultants/Other Providers [ ] YES  [ ] NO

## 2021-04-25 NOTE — CONSULT NOTE ADULT - SUBJECTIVE AND OBJECTIVE BOX
DATE OF SERVICE: 21 @ 22:44    CHIEF COMPLAINT:Patient is a 60y old  Female who presents with a chief complaint of dizziness / code stroke (2021 21:57)      HISTORY OF PRESENT ILLNESS:HPI:  57F h/o lupus (dx 2017), ESRD (HD T//Sat), NSTEMI (BMS 2018), DM2, HTN, HLD (not on medication), glaucoma, hypothyroidism p/w dizziness. Patient awoke from sleep at 3am with dizziness. Went to dialysis at 5am and continued to be dizzy before and after dialysis. Dizziness is intermittent, worse when standing up quickly, lasts for several minutes.  At 8am patient's daughter noticed her mother's R eye was deviating to the right and rolling back in her head. Last known normal was 11pm last night. Patient denies blurry vision or other vision changes, N/V, CP, SOB, LE edema, syncope, gait abnormalities. (2021 20:34)      PAST MEDICAL & SURGICAL HISTORY:  Diabetes    Hypertension    Hypercholesteremia    Essential hypertension    Type 2 diabetes mellitus without complication, without long-term current use of insulin    Other hyperlipidemia    Glaucoma    ESRD (end-stage renal disease) due to SLE    Lupus (systemic lupus erythematosus)    S/P  section  times two    H/O gastric bypass  2016    S/P appendectomy            MEDICATIONS:  aspirin enteric coated 81 milliGRAM(s) Oral daily  clopidogrel Tablet 75 milliGRAM(s) Oral daily  heparin   Injectable 5000 Unit(s) SubCutaneous every 12 hours  metoprolol succinate ER 25 milliGRAM(s) Oral daily        gabapentin 200 milliGRAM(s) Oral two times a day  oxycodone    5 mG/acetaminophen 325 mG 1 Tablet(s) Oral every 6 hours PRN    polyethylene glycol 3350 17 Gram(s) Oral daily    atorvastatin 40 milliGRAM(s) Oral at bedtime  levothyroxine 25 MICROGram(s) Oral daily    calcium acetate 667 milliGRAM(s) Oral three times a day with meals  latanoprost 0.005% Ophthalmic Solution 1 Drop(s) Both EYES at bedtime  timolol 0.5% Solution 1 Drop(s) Both EYES daily      FAMILY HISTORY:  DM (diabetes mellitus)  mother and father    History of hypertension  father and mother    DM (diabetes mellitus) (Sibling)  siblings    History of hypertension (Sibling)  sibling        Non-contributory    SOCIAL HISTORY:    [ ] Tobacco  [ ] Drugs  [ ] Alcohol    Allergies    penicillin (Rash)    Intolerances    	    REVIEW OF SYSTEMS:  CONSTITUTIONAL: No fever  EYES: No eye pain, visual disturbances, or discharge  ENMT:  No difficulty hearing, tinnitus  NECK: No pain or stiffness  RESPIRATORY: No cough, wheezing,  CARDIOVASCULAR: No chest pain, palpitations, passing out, dizziness, or leg swelling  GASTROINTESTINAL:  No nausea, vomiting, diarrhea or constipation. No melena.  GENITOURINARY: No dysuria, hematuria  NEUROLOGICAL: No stroke like symptoms  SKIN: No burning or lesions   ENDOCRINE: No heat or cold intolerance  MUSCULOSKELETAL: No joint pain or swelling  PSYCHIATRIC: No  anxiety, mood swings  HEME/LYMPH: No bleeding gums  ALLERGY AND IMMUNOLOGIC: No hives or eczema	    All other ROS negative    PHYSICAL EXAM:  T(C): 36.7 (21 @ 21:13), Max: 36.7 (21 @ 01:01)  HR: 65 (21 @ 21:13) (63 - 80)  BP: 145/73 (21 @ 21:13) (128/73 - 179/87)  RR: 18 (21 @ 21:13) (18 - 20)  SpO2: 98% (21 @ 21:13) (98% - 100%)  Wt(kg): --  I&O's Summary      Appearance: Normal	  HEENT:   Normal oral mucosa, EOMI	  Cardiovascular:  S1 S2, No JVD,    Respiratory: Lungs clear to auscultation	  Psychiatry: Alert  Gastrointestinal:  Soft, Non-tender, + BS	  Skin: No rashes   Neurologic: Non-focal  Extremities:  No edema  Vascular: Peripheral pulses palpable    	    	  	  CARDIAC MARKERS:  Labs personally reviewed by me                                  11.5   4.98  )-----------( 114      ( 2021 06:25 )             34.6     -    138  |  96  |  23  ----------------------------<  82  4.3   |  25  |  5.13<H>    Ca    9.1      2021 06:25  Phos  4.6     04-24  Mg     2.7     04-24    TPro  6.7  /  Alb  3.5  /  TBili  0.6  /  DBili  x   /  AST  25  /  ALT  13  /  AlkPhos  165<H>  04-25          EKG: Personally reviewed by me - NSR      Assessment and Plan:   Problem/Plan - 1:  ·  Problem: Dizziness Plan: Doubt cardiac in origin    Problem/Plan - 2:  ·  Problem: Lupus (systemic lupus erythematosus).  Plan: stable   f/u with neuro as out pt.     Problem/Plan - 3:  ·  Problem: ESRD (end-stage renal disease) due to SLE.  Plan: nephro consult for HD   -c/w home meds.     ·  Problem: Dizziness Plan: Doubt cardiac in origin    Problem/Plan - 4:  ·  Problem: Hypertension.  Plan: c/w home emds   controlled.          Differential diagnosis and plan of care discussed with patient after the evaluation. Counseling on diet, nutritional counseling, weight management, exercise and medication compliance was done.   Advanced care planning/advanced directives discussed with patient/family. DNR status including forceful chest compressions to attempt to restart the heart, ventilator support/artificial breathing, electric shock, artificial nutrition, health care proxy, Molst form all discussed with pt. Pt wishes to consider. OMT on six regions for acute somatic dysfunctions done at the bedside. More than fifteen minutes spent on discussing advanced directives.           Salvador Mckeon DO [facility]  Cardiovascular Medicine  [address], [address]  Office [phone]  Cell [phone]

## 2021-04-25 NOTE — CONSULT NOTE ADULT - SUBJECTIVE AND OBJECTIVE BOX
[facility] Consult Note Nephrology - CONSULTATION NOTE    57F h/o lupus (dx 2017), ESRD (HD T//Sat), NSTEMI (BMS 2018), DM2, HTN, HLD (not on medication), glaucoma, hypothyroidism p/w dizziness. Patient awoke from sleep at 3am with dizziness. Went to dialysis at 5am and continued to be dizzy before and after dialysis. Dizziness is intermittent, worse when standing up quickly, lasts for several minutes.  At 8am patient's daughter noticed her mother's R eye was deviating to the right and rolling back in her head. Last known normal was 11pm last night. Patient denies blurry vision or other vision changes, N/V, CP, SOB, LE edema, syncope, gait abnormalities.     Renal Consult for ESRD on HD  routine HD TTS  HD via left upper ext avf  last HD yesterday  Receives HD in [place]  Denies any sob or cp    PAST MEDICAL & SURGICAL HISTORY:  Diabetes    Hypertension    Hypercholesteremia    Essential hypertension    Type 2 diabetes mellitus without complication, without long-term current use of insulin    Other hyperlipidemia    Glaucoma    ESRD (end-stage renal disease) due to SLE    Lupus (systemic lupus erythematosus)    S/P  section  times two    H/O gastric bypass  2016    S/P appendectomy      penicillin (Rash)    Home Medications Reviewed  Hospital Medications:   MEDICATIONS  (STANDING):  aspirin enteric coated 81 milliGRAM(s) Oral daily  atorvastatin 40 milliGRAM(s) Oral at bedtime  calcium acetate 667 milliGRAM(s) Oral three times a day with meals  clopidogrel Tablet 75 milliGRAM(s) Oral daily  gabapentin 200 milliGRAM(s) Oral two times a day  heparin   Injectable 5000 Unit(s) SubCutaneous every 12 hours  latanoprost 0.005% Ophthalmic Solution 1 Drop(s) Both EYES at bedtime  levothyroxine 25 MICROGram(s) Oral daily  metoprolol succinate ER 25 milliGRAM(s) Oral daily  polyethylene glycol 3350 17 Gram(s) Oral daily  timolol 0.5% Solution 1 Drop(s) Both EYES daily    SOCIAL HISTORY:  Denies ETOh,Smoking,   FAMILY HISTORY:  DM (diabetes mellitus)  mother and father    History of hypertension  father and mother    DM (diabetes mellitus) (Sibling)  siblings    History of hypertension (Sibling)  sibling      REVIEW OF SYSTEMS:  CONSTITUTIONAL: No weakness, fevers or chills  EYES/ENT: No visual changes;  No vertigo or throat pain   NECK: No pain or stiffness  RESPIRATORY: No cough, wheezing, hemoptysis; No shortness of breath  CARDIOVASCULAR: No chest pain or palpitations.  GASTROINTESTINAL: No abdominal or epigastric pain. No nausea, vomiting, or hematemesis; No diarrhea or constipation. No melena or hematochezia.  GENITOURINARY: No dysuria, frequency, foamy urine, urinary urgency, incontinence or hematuria  NEUROLOGICAL: No numbness or weakness  SKIN: No itching, burning, rashes, or lesions   VASCULAR: No bilateral lower extremity edema.   All other review of systems is negative unless indicated above.    VITALS:  T(F): 97.5 (21 @ 04:51), Max: 98.1 (21 @ 13:56)  HR: 63 (21 @ 04:51)  BP: 131/69 (21 @ 04:51)  RR: 18 (21 @ 04:51)  SpO2: 99% (21 @ 04:51)  Wt(kg): --    Height (cm): 157.5 ( @ 13:56)  Weight (kg): 63 ( @ 16:47)  BMI (kg/m2): 25.4 ( @ 16:47)  BSA (m2): 1.64 ( @ 16:47)  PHYSICAL EXAM:  Constitutional: NAD  HEENT: anicteric sclera, oropharynx clear, MMM  Neck: No JVD  Respiratory: CTAB, no wheezes, rales or rhonchi  Cardiovascular: S1, S2, RRR  Gastrointestinal: BS+, soft, NT/ND  Extremities: No cyanosis or clubbing. No peripheral edema  Neurological: A/O x 3, no focal deficits  Psychiatric: Normal mood, normal affect  : No CVA tenderness. No vance.   Skin: No rashes  Vascular Access:left upper ext avf + thrill    LABS:      138  |  96  |  23  ----------------------------<  82  4.3   |  25  |  5.13<H>    Ca    9.1      2021 06:25  Phos  4.6     -24  Mg     2.7     -24    TPro  6.7  /  Alb  3.5  /  TBili  0.6  /  DBili      /  AST  25  /  ALT  13  /  AlkPhos  165<H>  04-25    Creatinine Trend: 5.13 <--, 4.47 <--, 4.05 <--, 3.87 <--                        11.5   4.98  )-----------( 114      ( 2021 06:25 )             34.6     Urine Studies:      RADIOLOGY & ADDITIONAL STUDIES:

## 2021-04-25 NOTE — CONSULT NOTE ADULT - ASSESSMENT
57F h/o lupus (dx 04/2017), ESRD (HD T/Th/Sat), NSTEMI (BMS 01/2018), DM2, HTN, HLD (not on medication), glaucoma, hypothyroidism p/w dizziness.    1) ESRD on HD   Routine HD TTS via left upper ext AVF  Last HD yesterday  Plan for next HD tuesday  Trend bmp  Consent in chart      2) Anemia In CKd-  Hgb at goal  trend hgb    3) HTN  bp high- on metoprolol 25mg   would add amlodipine 5mg po qd  trend bp    For Questions  Call.Dr Agnes Kincaid -> [phone]

## 2021-04-26 LAB
ANION GAP SERPL CALC-SCNC: 20 MMOL/L — HIGH (ref 5–17)
BUN SERPL-MCNC: 46 MG/DL — HIGH (ref 7–23)
CALCIUM SERPL-MCNC: 8.8 MG/DL — SIGNIFICANT CHANGE UP (ref 8.4–10.5)
CHLORIDE SERPL-SCNC: 98 MMOL/L — SIGNIFICANT CHANGE UP (ref 96–108)
CO2 SERPL-SCNC: 23 MMOL/L — SIGNIFICANT CHANGE UP (ref 22–31)
CREAT SERPL-MCNC: 7.4 MG/DL — HIGH (ref 0.5–1.3)
GLUCOSE SERPL-MCNC: 74 MG/DL — SIGNIFICANT CHANGE UP (ref 70–99)
HCT VFR BLD CALC: 34 % — LOW (ref 34.5–45)
HGB BLD-MCNC: 11.2 G/DL — LOW (ref 11.5–15.5)
MCHC RBC-ENTMCNC: 32.2 PG — SIGNIFICANT CHANGE UP (ref 27–34)
MCHC RBC-ENTMCNC: 32.9 GM/DL — SIGNIFICANT CHANGE UP (ref 32–36)
MCV RBC AUTO: 97.7 FL — SIGNIFICANT CHANGE UP (ref 80–100)
NRBC # BLD: 0 /100 WBCS — SIGNIFICANT CHANGE UP (ref 0–0)
PLATELET # BLD AUTO: 120 K/UL — LOW (ref 150–400)
POTASSIUM SERPL-MCNC: 4.3 MMOL/L — SIGNIFICANT CHANGE UP (ref 3.5–5.3)
POTASSIUM SERPL-SCNC: 4.3 MMOL/L — SIGNIFICANT CHANGE UP (ref 3.5–5.3)
RBC # BLD: 3.48 M/UL — LOW (ref 3.8–5.2)
RBC # FLD: 13.7 % — SIGNIFICANT CHANGE UP (ref 10.3–14.5)
SODIUM SERPL-SCNC: 141 MMOL/L — SIGNIFICANT CHANGE UP (ref 135–145)
WBC # BLD: 5.85 K/UL — SIGNIFICANT CHANGE UP (ref 3.8–10.5)
WBC # FLD AUTO: 5.85 K/UL — SIGNIFICANT CHANGE UP (ref 3.8–10.5)

## 2021-04-26 RX ORDER — CHLORHEXIDINE GLUCONATE 213 G/1000ML
1 SOLUTION TOPICAL DAILY
Refills: 0 | Status: DISCONTINUED | OUTPATIENT
Start: 2021-04-26 | End: 2021-05-07

## 2021-04-26 RX ADMIN — Medication 25 MICROGRAM(S): at 05:21

## 2021-04-26 RX ADMIN — Medication 1 DROP(S): at 11:58

## 2021-04-26 RX ADMIN — LATANOPROST 1 DROP(S): 0.05 SOLUTION/ DROPS OPHTHALMIC; TOPICAL at 21:32

## 2021-04-26 RX ADMIN — Medication 667 MILLIGRAM(S): at 09:41

## 2021-04-26 RX ADMIN — HEPARIN SODIUM 5000 UNIT(S): 5000 INJECTION INTRAVENOUS; SUBCUTANEOUS at 05:22

## 2021-04-26 RX ADMIN — Medication 81 MILLIGRAM(S): at 11:57

## 2021-04-26 RX ADMIN — ATORVASTATIN CALCIUM 40 MILLIGRAM(S): 80 TABLET, FILM COATED ORAL at 21:32

## 2021-04-26 RX ADMIN — GABAPENTIN 200 MILLIGRAM(S): 400 CAPSULE ORAL at 05:21

## 2021-04-26 RX ADMIN — HEPARIN SODIUM 5000 UNIT(S): 5000 INJECTION INTRAVENOUS; SUBCUTANEOUS at 20:10

## 2021-04-26 RX ADMIN — Medication 667 MILLIGRAM(S): at 20:09

## 2021-04-26 RX ADMIN — CLOPIDOGREL BISULFATE 75 MILLIGRAM(S): 75 TABLET, FILM COATED ORAL at 11:58

## 2021-04-26 RX ADMIN — GABAPENTIN 200 MILLIGRAM(S): 400 CAPSULE ORAL at 20:09

## 2021-04-26 RX ADMIN — Medication 25 MILLIGRAM(S): at 05:24

## 2021-04-26 RX ADMIN — CHLORHEXIDINE GLUCONATE 1 APPLICATION(S): 213 SOLUTION TOPICAL at 12:00

## 2021-04-26 NOTE — PHYSICAL THERAPY INITIAL EVALUATION ADULT - PATIENT/FAMILY AGREES WITH PLAN
Per daughter (Amber [phone]), agreeable to [facility] however needs to discuss with pt's son first

## 2021-04-26 NOTE — PROGRESS NOTE ADULT - SUBJECTIVE AND OBJECTIVE BOX
Patient is a 60y old  Female who presents with a chief complaint of dizziness / code stroke (2021 14:35)      INTERVAL HPI/OVERNIGHT EVENTS: seen and examined, NAD  T(C): 37 (21 @ 21:15), Max: 37 (21 @ 21:15)  HR: 67 (21 @ 21:15) (56 - 67)  BP: 113/66 (21 @ 21:15) (113/66 - 153/73)  RR: 18 (21 @ 21:15) (18 - 20)  SpO2: 98% (21 @ 21:15) (96% - 100%)  Wt(kg): --  I&O's Summary    2021 07:01  -  2021 07:00  --------------------------------------------------------  IN: 240 mL / OUT: 0 mL / NET: 240 mL    2021 07:01  -  2021 02:10  --------------------------------------------------------  IN: 240 mL / OUT: 1000 mL / NET: -760 mL        PAST MEDICAL & SURGICAL HISTORY:  Diabetes    Hypertension    Hypercholesteremia    Essential hypertension    Type 2 diabetes mellitus without complication, without long-term current use of insulin    Other hyperlipidemia    Glaucoma    ESRD (end-stage renal disease) due to SLE    Lupus (systemic lupus erythematosus)    S/P  section  times two    H/O gastric bypass  2016    S/P appendectomy        SOCIAL HISTORY  Alcohol:  Tobacco:  Illicit substance use:    FAMILY HISTORY:    REVIEW OF SYSTEMS:  CONSTITUTIONAL: No fever, weight loss, or fatigue  EYES: No eye pain, visual disturbances, or discharge  ENMT:  No difficulty hearing, tinnitus, vertigo; No sinus or throat pain  NECK: No pain or stiffness  RESPIRATORY: No cough, wheezing, chills or hemoptysis; No shortness of breath  CARDIOVASCULAR: No chest pain, palpitations, dizziness, or leg swelling  GASTROINTESTINAL: No abdominal or epigastric pain. No nausea, vomiting, or hematemesis; No diarrhea or constipation. No melena or hematochezia.  GENITOURINARY: No dysuria, frequency, hematuria, or incontinence  NEUROLOGICAL: No headaches, memory loss, loss of strength, numbness, or tremors  SKIN: No itching, burning, rashes, or lesions   LYMPH NODES: No enlarged glands  ENDOCRINE: No heat or cold intolerance; No hair loss  MUSCULOSKELETAL: No joint pain or swelling; No muscle, back, or extremity pain  PSYCHIATRIC: No depression, anxiety, mood swings, or difficulty sleeping  HEME/LYMPH: No easy bruising, or bleeding gums  ALLERY AND IMMUNOLOGIC: No hives or eczema    RADIOLOGY & ADDITIONAL TESTS:    Imaging Personally Reviewed:  [ ] YES  [ ] NO    Consultant(s) Notes Reviewed:  [ ] YES  [ ] NO    PHYSICAL EXAM:  GENERAL: NAD, well-groomed, well-developed  HEAD:  Atraumatic, Normocephalic  EYES: EOMI, PERRLA, conjunctiva and sclera clear  ENMT: No tonsillar erythema, exudates, or enlargement; Moist mucous membranes, Good dentition, No lesions  NECK: Supple, No JVD, Normal thyroid  NERVOUS SYSTEM:  Alert & Oriented X3, Good concentration; Motor Strength 5/5 B/L upper and lower extremities; DTRs 2+ intact and symmetric  CHEST/LUNG: Clear to percussion bilaterally; No rales, rhonchi, wheezing, or rubs  HEART: Regular rate and rhythm; No murmurs, rubs, or gallops  ABDOMEN: Soft, Nontender, Nondistended; Bowel sounds present  EXTREMITIES:  2+ Peripheral Pulses, No clubbing, cyanosis, or edema  LYMPH: No lymphadenopathy noted  SKIN: No rashes or lesions    LABS:                        11.2   5.85  )-----------( 120      ( 2021 06:57 )             34.0     -    141  |  98  |  46<H>  ----------------------------<  74  4.3   |  23  |  7.40<H>    Ca    8.8      2021 06:57    TPro  6.7  /  Alb  3.5  /  TBili  0.6  /  DBili  x   /  AST  25  /  ALT  13  /  AlkPhos  165<H>  04-25        CAPILLARY BLOOD GLUCOSE                MEDICATIONS  (STANDING):  aspirin enteric coated 81 milliGRAM(s) Oral daily  atorvastatin 40 milliGRAM(s) Oral at bedtime  calcium acetate 667 milliGRAM(s) Oral three times a day with meals  chlorhexidine 2% Cloths 1 Application(s) Topical daily  clopidogrel Tablet 75 milliGRAM(s) Oral daily  gabapentin 200 milliGRAM(s) Oral two times a day  heparin   Injectable 5000 Unit(s) SubCutaneous every 12 hours  latanoprost 0.005% Ophthalmic Solution 1 Drop(s) Both EYES at bedtime  levothyroxine 25 MICROGram(s) Oral daily  metoprolol succinate ER 25 milliGRAM(s) Oral daily  polyethylene glycol 3350 17 Gram(s) Oral daily  timolol 0.5% Solution 1 Drop(s) Both EYES daily    MEDICATIONS  (PRN):  oxycodone    5 mG/acetaminophen 325 mG 1 Tablet(s) Oral every 6 hours PRN Moderate Pain (4 - 6)      Care Discussed with Consultants/Other Providers [ ] YES  [ ] NO

## 2021-04-26 NOTE — PROGRESS NOTE ADULT - SUBJECTIVE AND OBJECTIVE BOX
[facility] - SALIMA Bah / SALIMA Lujan / AYESHA Whitmore/ SALIMA Kincaid/ SALIMA Seth/ DEAN Enriquez / SHELBY Stanley / NATHAN Ferrell  ---------------------------------------------------------------------------------------------------------------  seen and examined today for ESRD  Interval : NAd  VITALS:  T(F): 97.3 (04-26-21 @ 04:58), Max: 98 (04-25-21 @ 21:13)  HR: 60 (04-26-21 @ 04:58)  BP: 135/70 (04-26-21 @ 04:58)  RR: 18 (04-26-21 @ 04:58)  SpO2: 99% (04-26-21 @ 04:58)  Wt(kg): --    04-25 @ 07:01  -  04-26 @ 07:00  --------------------------------------------------------  IN: 240 mL / OUT: 0 mL / NET: 240 mL      Physical Exam :-  Constitutional: NAD  Neck: Supple.  Respiratory: Bilateral equal breath sounds,  Cardiovascular: S1, S2 normal,  Gastrointestinal: Bowel Sounds present, soft, non tender.  Extremities: No edema  Neurological: Alert and Oriented   Psychiatric: Normal mood, normal affect  Data:-  Allergies :   penicillin (Rash)    Hospital Medications:   MEDICATIONS  (STANDING):  aspirin enteric coated 81 milliGRAM(s) Oral daily  atorvastatin 40 milliGRAM(s) Oral at bedtime  calcium acetate 667 milliGRAM(s) Oral three times a day with meals  chlorhexidine 2% Cloths 1 Application(s) Topical daily  clopidogrel Tablet 75 milliGRAM(s) Oral daily  gabapentin 200 milliGRAM(s) Oral two times a day  heparin   Injectable 5000 Unit(s) SubCutaneous every 12 hours  latanoprost 0.005% Ophthalmic Solution 1 Drop(s) Both EYES at bedtime  levothyroxine 25 MICROGram(s) Oral daily  metoprolol succinate ER 25 milliGRAM(s) Oral daily  polyethylene glycol 3350 17 Gram(s) Oral daily  timolol 0.5% Solution 1 Drop(s) Both EYES daily    04-26    141  |  98  |  46<H>  ----------------------------<  74  4.3   |  23  |  7.40<H>    Ca    8.8      26 Apr 2021 06:57  Phos  4.6     04-24  Mg     2.7     04-24    TPro  6.7  /  Alb  3.5  /  TBili  0.6  /  DBili      /  AST  25  /  ALT  13  /  AlkPhos  165<H>  04-25    Creatinine Trend: 7.40 <--, 5.13 <--, 4.47 <--, 4.05 <--, 3.87 <--                        11.2   5.85  )-----------( 120      ( 26 Apr 2021 06:57 )             34.0

## 2021-04-26 NOTE — PROGRESS NOTE ADULT - SUBJECTIVE AND OBJECTIVE BOX
Patient is a 60y old  Female who presents with a chief complaint of dizziness / code stroke (26 Apr 2021 10:23)      INTERVAL HISTORY: feels ok denies cp,sob    REVIEW OF SYSTEMS:   CONSTITUTIONAL: No weakness  EYES/ENT: No visual changes; No throat pain  Neck: No pain or stiffness  Respiratory: No cough, wheezing, No shortness of breath  CARDIOVASCULAR: no chest pain or palpitations  GASTROINTESTINAL: No abdominal pain, no nausea, vomiting or hematemesis  GENITOURINARY: No dysuria, frequency or hematuria  NEUROLOGICAL: No stroke like symptoms  SKIN: No rashes    	  MEDICATIONS:  metoprolol succinate ER 25 milliGRAM(s) Oral daily        PHYSICAL EXAM:  T(C): 36.3 (04-26-21 @ 12:26), Max: 36.7 (04-25-21 @ 21:13)  HR: 60 (04-26-21 @ 12:26) (60 - 65)  BP: 153/73 (04-26-21 @ 12:26) (135/70 - 153/73)  RR: 20 (04-26-21 @ 12:26) (18 - 20)  SpO2: 96% (04-26-21 @ 12:26) (96% - 99%)  Wt(kg): --  I&O's Summary    25 Apr 2021 07:01  -  26 Apr 2021 07:00  --------------------------------------------------------  IN: 240 mL / OUT: 0 mL / NET: 240 mL      Appearance: In no distress	  HEENT:    PERRL, EOMI	  Cardiovascular:  S1 S2, No JVD  Respiratory: Lungs clear to auscultation	  Gastrointestinal:  Soft, Non-tender, + BS	  Vascularature:  No edema of LE  Psychiatric: Appropriate affect   Neuro: no acute focal deficits                         11.2   5.85  )-----------( 120      ( 26 Apr 2021 06:57 )             34.0     04-26    141  |  98  |  46<H>  ----------------------------<  74  4.3   |  23  |  7.40<H>    Ca    8.8      26 Apr 2021 06:57  Phos  4.6     04-24  Mg     2.7     04-24    TPro  6.7  /  Alb  3.5  /  TBili  0.6  /  DBili  x   /  AST  25  /  ALT  13  /  AlkPhos  165<H>  04-25  Labs personally reviewed  < from: Transthoracic Echocardiogram (05.31.17 @ 16:49) >  EF (Visual Estimate): 75 %    < end of copied text >  < from: Transthoracic Echocardiogram (05.31.17 @ 16:49) >  Conclusions:  1. Normal left ventricular internal dimensions and wall  thicknesses. There is assymetrical basalhypertrophy up to  1.5cm.  2. Hyperdynamic left ventricular systolic function. No  segmental wall motion abnormalities.  3. Mild diastolic dysfunction (Stage I).  4. Normal right ventricular size and function.  *** No previous Echo exam.    < end of copied text >    < from: CT Angio Neck w/ IV Cont (04.24.21 @ 17:01) >    IMPRESSION:    CTA neck and brain: No large vessel occlusion or high-grade significant stenosis.      < end of copied text >  < from: CT Brain Stroke Protocol (04.24.21 @ 16:42) >  IMPRESSION:    No CT evidence of acute intracranial hemorrhage, brain edema, or mass effect.      < end of copied text >    ASSESSMENT/PLAN: 	  Problem/Plan - 1:  ·  Problem: Dizziness Plan:had episode of dizzyness and right eye blurryness. now resolved.  denies chest pain, shortness of breath, palpitations  CTA H: no significant stenosis   would recommend MRI   workup per primary   will f/u TTE     Problem/Plan - 2:  ·  Problem: Coronary Artery Disease: s./p PCI mLAD 1/2018, ANABELA to RCA 1/2019 and ANABELA to diag 5/2019   given stents greather than 1 year ago no indication to continue with Plavix, unless indicated for her SLE, f/u with rheum   patient denies chest pain, shortness of breath, denies GARCIA, can ambulate 2 blocks without chest pain.  pBNP 5325, although appears relatively euvolemic on exam,denies use of diuretics at home   f/u with TTE  c/w with Toprol XL 25mg daily, asa, plaix,statin     Problem/Plan - 3:  ·  Problem: Lupus (systemic lupus erythematosus).  Plan: stable   f/u with neuro as out pt.     Problem/Plan - 4:  ·  Problem: ESR\ (end-stage renal disease) due to SLE.  Plan: nephro consult for HD   -c/w home meds.     Problem/Plan - 5:  ·  Problem: Hypertension.  Plan: c/w home emds   controlled.              Mariana Page ANP-C  Salvador Mckeon DO [facility]  Cardiovascular Medicine  [address] [place] [address], [address]  Office: [phone]  Cell: [phone] Patient is a 60y old  Female who presents with a chief complaint of dizziness / code stroke (26 Apr 2021 10:23)      INTERVAL HISTORY: feels ok denies cp,sob    REVIEW OF SYSTEMS:   CONSTITUTIONAL: No weakness  EYES/ENT: No visual changes; No throat pain  Neck: No pain or stiffness  Respiratory: No cough, wheezing, No shortness of breath  CARDIOVASCULAR: no chest pain or palpitations  GASTROINTESTINAL: No abdominal pain, no nausea, vomiting or hematemesis  GENITOURINARY: No dysuria, frequency or hematuria  NEUROLOGICAL: No stroke like symptoms  SKIN: No rashes    	  MEDICATIONS:  metoprolol succinate ER 25 milliGRAM(s) Oral daily        PHYSICAL EXAM:  T(C): 36.3 (04-26-21 @ 12:26), Max: 36.7 (04-25-21 @ 21:13)  HR: 60 (04-26-21 @ 12:26) (60 - 65)  BP: 153/73 (04-26-21 @ 12:26) (135/70 - 153/73)  RR: 20 (04-26-21 @ 12:26) (18 - 20)  SpO2: 96% (04-26-21 @ 12:26) (96% - 99%)  Wt(kg): --  I&O's Summary    25 Apr 2021 07:01  -  26 Apr 2021 07:00  --------------------------------------------------------  IN: 240 mL / OUT: 0 mL / NET: 240 mL      Appearance: In no distress	  HEENT:    PERRL, EOMI	  Cardiovascular:  S1 S2, No JVD  Respiratory: Lungs clear to auscultation	  Gastrointestinal:  Soft, Non-tender, + BS	  Vascularature:  No edema of LE  Psychiatric: Appropriate affect   Neuro: no acute focal deficits                         11.2   5.85  )-----------( 120      ( 26 Apr 2021 06:57 )             34.0     04-26    141  |  98  |  46<H>  ----------------------------<  74  4.3   |  23  |  7.40<H>    Ca    8.8      26 Apr 2021 06:57  Phos  4.6     04-24  Mg     2.7     04-24    TPro  6.7  /  Alb  3.5  /  TBili  0.6  /  DBili  x   /  AST  25  /  ALT  13  /  AlkPhos  165<H>  04-25  Labs personally reviewed  < from: Transthoracic Echocardiogram (05.31.17 @ 16:49) >  EF (Visual Estimate): 75 %    < end of copied text >  < from: Transthoracic Echocardiogram (05.31.17 @ 16:49) >  Conclusions:  1. Normal left ventricular internal dimensions and wall  thicknesses. There is assymetrical basalhypertrophy up to  1.5cm.  2. Hyperdynamic left ventricular systolic function. No  segmental wall motion abnormalities.  3. Mild diastolic dysfunction (Stage I).  4. Normal right ventricular size and function.  *** No previous Echo exam.    < end of copied text >    < from: CT Angio Neck w/ IV Cont (04.24.21 @ 17:01) >    IMPRESSION:    CTA neck and brain: No large vessel occlusion or high-grade significant stenosis.      < end of copied text >  < from: CT Brain Stroke Protocol (04.24.21 @ 16:42) >  IMPRESSION:    No CT evidence of acute intracranial hemorrhage, brain edema, or mass effect.      < end of copied text >    ASSESSMENT/PLAN: 	  Problem/Plan - 1:  ·  Problem: Dizziness Plan:had episode of dizzyness and right eye blurryness. now resolved.  denies chest pain, shortness of breath, palpitations  CTA H: no significant stenosis   consider  MRI H  workup per primary   will f/u TTE     Problem/Plan - 2:  ·  Problem: Coronary Artery Disease: s./p PCI mLAD 1/2018, ANABELA to RCA 1/2019 and ANABELA to diag 5/2019   given stents greather than 1 year ago no indication to continue with Plavix, unless indicated for her SLE, f/u with rheum   patient denies chest pain, shortness of breath, denies GARCIA, can ambulate 2 blocks without chest pain.  pBNP 5325, although appears relatively euvolemic on exam,denies use of diuretics at home   f/u with TTE  c/w with Toprol XL 25mg daily, asa, plaix,statin     Problem/Plan - 3:  ·  Problem: Lupus (systemic lupus erythematosus).  Plan: stable   f/u with neuro as out pt.     Problem/Plan - 4:  ·  Problem: ESR\ (end-stage renal disease) due to SLE.  Plan: nephro consult for HD   -c/w home meds.     Problem/Plan - 5:  ·  Problem: Hypertension.  Plan: c/w home emds   controlled.              Mariana Page ANP-C  Salvador Mckeon DO [facility]  Cardiovascular Medicine  [address] [place] [address], [address]  Office: [phone]  Cell: [phone]

## 2021-04-26 NOTE — PHYSICAL THERAPY INITIAL EVALUATION ADULT - PERTINENT HX OF CURRENT PROBLEM, REHAB EVAL
61 y/o F with h/o ESRD on dialysis, DM, HTN, glaucoma, HLD, lupus p/w episodes of dizziness and gaze deviations. pt s/p code stroke, low likelihood per neuro, concern for lupus induced vasculitis vs DM and HTN affecting the third nerve. CT BRAIN: (-). CTA HEAD/NECK: no significant findings. 60F with h/o ESRD on dialysis, DM, HTN, glaucoma, HLD, lupus p/w episodes of dizziness & gaze deviations. pt s/p code stroke, low likelihood per neuro, concern for lupus induced vasculitis vs DM and HTN affecting the third nerve. CT BRAIN: (-). CTA HEAD/NECK: no significant findings.

## 2021-04-26 NOTE — PROGRESS NOTE ADULT - ASSESSMENT
57F h/o lupus (dx 04/2017), ESRD (HD T/Th/Sat), NSTEMI (BMS 01/2018), DM2, HTN, HLD (not on medication), glaucoma, hypothyroidism p/w dizziness.    1) ESRD on HD   Routine HD TTS via left upper ext AVF at HD center in [place] (patient is poor historian and cannot provide further detail)  Last HD on Sat  Plan for HD today  Trend bmp  Consent in chart    2) Anemia In CKd-  Hgb at goal  trend hgb    3) HTN  bp high- on metoprolol 25mg   would add amlodipine 5mg po qd  trend bp      For any question, call:  Cell # [phone]  Pager # [phone]  Callback # [phone]

## 2021-04-26 NOTE — PHYSICAL THERAPY INITIAL EVALUATION ADULT - ADDITIONAL COMMENTS
Pt lives in 6th floor apartment with ramp & elevator access. Pt household ambulator using rollator with supervision from rudolph. Pt uses wheelchair for outdoor mobility. Requires assist in some ADLs (tub transfers, shower) & iADLs. Pt owns rollator, wheelchair

## 2021-04-27 LAB
ANION GAP SERPL CALC-SCNC: 19 MMOL/L — HIGH (ref 5–17)
BUN SERPL-MCNC: 29 MG/DL — HIGH (ref 7–23)
CALCIUM SERPL-MCNC: 9.4 MG/DL — SIGNIFICANT CHANGE UP (ref 8.4–10.5)
CHLORIDE SERPL-SCNC: 97 MMOL/L — SIGNIFICANT CHANGE UP (ref 96–108)
CO2 SERPL-SCNC: 23 MMOL/L — SIGNIFICANT CHANGE UP (ref 22–31)
CREAT SERPL-MCNC: 5.02 MG/DL — HIGH (ref 0.5–1.3)
GLUCOSE BLDC GLUCOMTR-MCNC: 131 MG/DL — HIGH (ref 70–99)
GLUCOSE SERPL-MCNC: 79 MG/DL — SIGNIFICANT CHANGE UP (ref 70–99)
HBV CORE AB SER-ACNC: SIGNIFICANT CHANGE UP
HBV SURFACE AB SER-ACNC: SIGNIFICANT CHANGE UP MIU/ML
HBV SURFACE AG SER-ACNC: SIGNIFICANT CHANGE UP
HCT VFR BLD CALC: 35.9 % — SIGNIFICANT CHANGE UP (ref 34.5–45)
HCV AB S/CO SERPL IA: 0.19 S/CO — SIGNIFICANT CHANGE UP (ref 0–0.99)
HCV AB SERPL-IMP: SIGNIFICANT CHANGE UP
HGB BLD-MCNC: 12.3 G/DL — SIGNIFICANT CHANGE UP (ref 11.5–15.5)
MCHC RBC-ENTMCNC: 33 PG — SIGNIFICANT CHANGE UP (ref 27–34)
MCHC RBC-ENTMCNC: 34.3 GM/DL — SIGNIFICANT CHANGE UP (ref 32–36)
MCV RBC AUTO: 96.2 FL — SIGNIFICANT CHANGE UP (ref 80–100)
NRBC # BLD: 0 /100 WBCS — SIGNIFICANT CHANGE UP (ref 0–0)
PLATELET # BLD AUTO: 135 K/UL — LOW (ref 150–400)
POTASSIUM SERPL-MCNC: 4.1 MMOL/L — SIGNIFICANT CHANGE UP (ref 3.5–5.3)
POTASSIUM SERPL-SCNC: 4.1 MMOL/L — SIGNIFICANT CHANGE UP (ref 3.5–5.3)
RBC # BLD: 3.73 M/UL — LOW (ref 3.8–5.2)
RBC # FLD: 13.4 % — SIGNIFICANT CHANGE UP (ref 10.3–14.5)
SODIUM SERPL-SCNC: 139 MMOL/L — SIGNIFICANT CHANGE UP (ref 135–145)
WBC # BLD: 6.86 K/UL — SIGNIFICANT CHANGE UP (ref 3.8–10.5)
WBC # FLD AUTO: 6.86 K/UL — SIGNIFICANT CHANGE UP (ref 3.8–10.5)

## 2021-04-27 PROCEDURE — 99223 1ST HOSP IP/OBS HIGH 75: CPT | Mod: GC

## 2021-04-27 PROCEDURE — 93306 TTE W/DOPPLER COMPLETE: CPT | Mod: 26

## 2021-04-27 RX ADMIN — CLOPIDOGREL BISULFATE 75 MILLIGRAM(S): 75 TABLET, FILM COATED ORAL at 11:26

## 2021-04-27 RX ADMIN — Medication 1 DROP(S): at 11:28

## 2021-04-27 RX ADMIN — Medication 667 MILLIGRAM(S): at 22:16

## 2021-04-27 RX ADMIN — HEPARIN SODIUM 5000 UNIT(S): 5000 INJECTION INTRAVENOUS; SUBCUTANEOUS at 05:55

## 2021-04-27 RX ADMIN — Medication 667 MILLIGRAM(S): at 08:50

## 2021-04-27 RX ADMIN — HEPARIN SODIUM 5000 UNIT(S): 5000 INJECTION INTRAVENOUS; SUBCUTANEOUS at 22:15

## 2021-04-27 RX ADMIN — Medication 25 MICROGRAM(S): at 05:55

## 2021-04-27 RX ADMIN — Medication 25 MILLIGRAM(S): at 05:55

## 2021-04-27 RX ADMIN — GABAPENTIN 200 MILLIGRAM(S): 400 CAPSULE ORAL at 05:55

## 2021-04-27 RX ADMIN — Medication 81 MILLIGRAM(S): at 11:26

## 2021-04-27 RX ADMIN — CHLORHEXIDINE GLUCONATE 1 APPLICATION(S): 213 SOLUTION TOPICAL at 11:27

## 2021-04-27 RX ADMIN — Medication 60 MILLIGRAM(S): at 22:15

## 2021-04-27 RX ADMIN — GABAPENTIN 200 MILLIGRAM(S): 400 CAPSULE ORAL at 22:16

## 2021-04-27 RX ADMIN — ATORVASTATIN CALCIUM 40 MILLIGRAM(S): 80 TABLET, FILM COATED ORAL at 22:16

## 2021-04-27 RX ADMIN — Medication 667 MILLIGRAM(S): at 11:28

## 2021-04-27 RX ADMIN — LATANOPROST 1 DROP(S): 0.05 SOLUTION/ DROPS OPHTHALMIC; TOPICAL at 22:16

## 2021-04-27 NOTE — PROGRESS NOTE ADULT - SUBJECTIVE AND OBJECTIVE BOX
Patient is a 60y old  Female who presents with a chief complaint of dizziness / code stroke (2021 17:51)      INTERVAL HPI/OVERNIGHT EVENTS: still c/o dizziness and diplopia   T(C): 36.6 (21 @ 21:56), Max: 36.6 (21 @ 05:00)  HR: 67 (21 @ 21:56) (58 - 68)  BP: 104/61 (21 @ 21:56) (104/61 - 123/69)  RR: 18 (21 @ 21:56) (18 - 20)  SpO2: 100% (21 @ 21:56) (95% - 100%)  Wt(kg): --  I&O's Summary    2021 07:01  -  2021 07:00  --------------------------------------------------------  IN: 480 mL / OUT: 1000 mL / NET: -520 mL    2021 07:01  -  2021 02:27  --------------------------------------------------------  IN: 1960 mL / OUT: 1312 mL / NET: 648 mL        PAST MEDICAL & SURGICAL HISTORY:  Diabetes    Hypertension    Hypercholesteremia    Essential hypertension    Type 2 diabetes mellitus without complication, without long-term current use of insulin    Other hyperlipidemia    Glaucoma    ESRD (end-stage renal disease) due to SLE    Lupus (systemic lupus erythematosus)    S/P  section  times two    H/O gastric bypass  2016    S/P appendectomy        SOCIAL HISTORY  Alcohol:  Tobacco:  Illicit substance use:    FAMILY HISTORY:    REVIEW OF SYSTEMS:  CONSTITUTIONAL: No fever, weight loss, or fatigue  EYES: No eye pain, visual disturbances, or discharge  ENMT:  No difficulty hearing, tinnitus, vertigo; No sinus or throat pain  NECK: No pain or stiffness  RESPIRATORY: No cough, wheezing, chills or hemoptysis; No shortness of breath  CARDIOVASCULAR: No chest pain, palpitations, dizziness, or leg swelling  GASTROINTESTINAL: No abdominal or epigastric pain. No nausea, vomiting, or hematemesis; No diarrhea or constipation. No melena or hematochezia.  GENITOURINARY: No dysuria, frequency, hematuria, or incontinence  NEUROLOGICAL: No headaches, memory loss, loss of strength, numbness, or tremors  SKIN: No itching, burning, rashes, or lesions   LYMPH NODES: No enlarged glands  ENDOCRINE: No heat or cold intolerance; No hair loss  MUSCULOSKELETAL: No joint pain or swelling; No muscle, back, or extremity pain  PSYCHIATRIC: No depression, anxiety, mood swings, or difficulty sleeping  HEME/LYMPH: No easy bruising, or bleeding gums  ALLERY AND IMMUNOLOGIC: No hives or eczema    RADIOLOGY & ADDITIONAL TESTS:    Imaging Personally Reviewed:  [ ] YES  [ ] NO    Consultant(s) Notes Reviewed:  [ ] YES  [ ] NO    PHYSICAL EXAM:  GENERAL: NAD, well-groomed, well-developed  HEAD:  Atraumatic, Normocephalic  EYES: EOMI, PERRLA, conjunctiva and sclera clear  ENMT: No tonsillar erythema, exudates, or enlargement; Moist mucous membranes, Good dentition, No lesions  NECK: Supple, No JVD, Normal thyroid  NERVOUS SYSTEM:  Alert & Oriented X3, Good concentration; Motor Strength 5/5 B/L upper and lower extremities; DTRs 2+ intact and symmetric  CHEST/LUNG: Clear to percussion bilaterally; No rales, rhonchi, wheezing, or rubs  HEART: Regular rate and rhythm; No murmurs, rubs, or gallops  ABDOMEN: Soft, Nontender, Nondistended; Bowel sounds present  EXTREMITIES:  2+ Peripheral Pulses, No clubbing, cyanosis, or edema  LYMPH: No lymphadenopathy noted  SKIN: No rashes or lesions    LABS:                        12.3   6.86  )-----------( 135      ( 2021 06:15 )             35.9     04-    139  |  97  |  29<H>  ----------------------------<  79  4.1   |  23  |  5.02<H>    Ca    9.4      2021 06:14          CAPILLARY BLOOD GLUCOSE      POCT Blood Glucose.: 131 mg/dL (2021 14:23)            MEDICATIONS  (STANDING):  aspirin enteric coated 81 milliGRAM(s) Oral daily  atorvastatin 40 milliGRAM(s) Oral at bedtime  calcium acetate 667 milliGRAM(s) Oral three times a day with meals  chlorhexidine 2% Cloths 1 Application(s) Topical daily  clopidogrel Tablet 75 milliGRAM(s) Oral daily  gabapentin 200 milliGRAM(s) Oral two times a day  heparin   Injectable 5000 Unit(s) SubCutaneous every 12 hours  latanoprost 0.005% Ophthalmic Solution 1 Drop(s) Both EYES at bedtime  levothyroxine 25 MICROGram(s) Oral daily  methylPREDNISolone sodium succinate Injectable 60 milliGRAM(s) IV Push daily  metoprolol succinate ER 25 milliGRAM(s) Oral daily  polyethylene glycol 3350 17 Gram(s) Oral daily  timolol 0.5% Solution 1 Drop(s) Both EYES daily    MEDICATIONS  (PRN):  oxycodone    5 mG/acetaminophen 325 mG 1 Tablet(s) Oral every 6 hours PRN Moderate Pain (4 - 6)      Care Discussed with Consultants/Other Providers [ ] YES  [ ] NO

## 2021-04-27 NOTE — CONSULT NOTE ADULT - ATTENDING COMMENTS
60 Y F with a PMhx of SLE, LN (Class 5), ESRD, SLE vasculitis (involving peripheral nerves) presents for dizziness and diplopia.   Concern for SLE activity and concern for SLE related vasculitis or cerebritis.   Will start empiric steroids as above  Workup with blood work and MRI/MRA brain and orbital MRI/MRA   Will closely follow with you.   Etta Osullivan MD  [phone]
Patient seen and examined and above note reviewed in detail and I agree with assessment and plan as outlined. Patient hx as noted and she presented yesterday after a family member had noted her right eye did not look straight. She denied any headaches, visual changes or nausea or vomiting or numbness or weakness.  She was at dialysis yesterday as well.    On exam mental status in normal  On cranial nerve exam she on primary gaze her right eye is laterally deviated. When looking to the right her left eye does not fully adduct and when looking to the left no abnormalities seen  No nystagmus noted, no facial droop, no motor weakness  She has bilaterally asterixis at wrists on testing   No pathological reflexes or sensory loss to light touch in arms or legs    CTA of head and neck were reviewed and no stenosis or large vessel occlusion.    Impression and Plan: Abnormal eye movements/dysconjugate gaze and unclear if they may be a left BERNARDO    1. Obtain MRI brain and orbits without contrast due to poor kidney function    2. Neuro ophthalmology consult     3. Obtain ESR, CRP, 24 hour heavy metals, SPEP, UPEP, copper, TFTS, vitamin levels, Lyme , AMMONIA levels    4. Continue medical mgt and supportive care and all questions answered and she understood plan.

## 2021-04-27 NOTE — PROGRESS NOTE ADULT - SUBJECTIVE AND OBJECTIVE BOX
Patient seen and examined  no complaints    penicillin (Rash)    Hospital Medications:   MEDICATIONS  (STANDING):  aspirin enteric coated 81 milliGRAM(s) Oral daily  atorvastatin 40 milliGRAM(s) Oral at bedtime  calcium acetate 667 milliGRAM(s) Oral three times a day with meals  chlorhexidine 2% Cloths 1 Application(s) Topical daily  clopidogrel Tablet 75 milliGRAM(s) Oral daily  gabapentin 200 milliGRAM(s) Oral two times a day  heparin   Injectable 5000 Unit(s) SubCutaneous every 12 hours  latanoprost 0.005% Ophthalmic Solution 1 Drop(s) Both EYES at bedtime  levothyroxine 25 MICROGram(s) Oral daily  metoprolol succinate ER 25 milliGRAM(s) Oral daily  polyethylene glycol 3350 17 Gram(s) Oral daily  timolol 0.5% Solution 1 Drop(s) Both EYES daily      VITALS:  T(F): 97.9 (04-27-21 @ 14:30), Max: 98.6 (04-26-21 @ 21:15)  HR: 67 (04-27-21 @ 14:30)  BP: 105/64 (04-27-21 @ 14:30)  RR: 20 (04-27-21 @ 14:30)  SpO2: 98% (04-27-21 @ 14:30)  Wt(kg): --    04-26 @ 07:01  -  04-27 @ 07:00  --------------------------------------------------------  IN: 480 mL / OUT: 1000 mL / NET: -520 mL    04-27 @ 07:01  -  04-27 @ 15:00  --------------------------------------------------------  IN: 480 mL / OUT: 0 mL / NET: 480 mL        Physical Exam :-  Constitutional: NAD  Neck: Supple.  Respiratory: Bilateral equal breath sounds,  Cardiovascular: S1, S2 normal,  Gastrointestinal: Bowel Sounds present, soft, non tender.  Extremities: No edema  Neurological: Alert and Oriented   Psychiatric: Normal mood, normal affect    LABS:  04-27    139  |  97  |  29<H>  ----------------------------<  79  4.1   |  23  |  5.02<H>    Ca    9.4      27 Apr 2021 06:14      Creatinine Trend: 5.02 <--, 7.40 <--, 5.13 <--, 4.47 <--, 4.05 <--, 3.87 <--                        12.3   6.86  )-----------( 135      ( 27 Apr 2021 06:15 )             35.9     Urine Studies:      RADIOLOGY & ADDITIONAL STUDIES:

## 2021-04-27 NOTE — CONSULT NOTE ADULT - SUBJECTIVE AND OBJECTIVE BOX
WILLARD BOB  [id number]    HISTORY OF PRESENT ILLNESS:  60 Y F with a PMhx of SLE, LN (Class 5), ESRD, SLE vasculitis (involving peripheral nerves) presents for dizziness and diplopia.   Rheumatology consulted for SLE flare.  Per the patient she started to have dizziness and blurred vision after her last HD session.  Daughter noticed the patient's right eye was wavering, got concerened and took pt to ER.  No headache or any other new muscle weakness (has b/l LE weakness from neuropathy (from SLE vasculitis in 2017).   No recent fever, chills, n/v/d, abdominal pain, cough, SOB.   SLE Hx:   Was dx in 2017 with Class 5 LN and had positive SULLY, dsDNA, sky and ACL IGM.   In late  had b/l LE weakness, was dx with SLE vasculitis of sural nerve, got high dose steroids and RTX.  Further treatment course is not available, her Rheumatologist is Dr Duron in [place] (we will contact).   No other organ system has been affected from SLE other than PNS and Kidney of yet.       PAST MEDICAL & SURGICAL HISTORY:  Diabetes    Hypertension    Hypercholesteremia    Essential hypertension    Type 2 diabetes mellitus without complication, without long-term current use of insulin    Other hyperlipidemia    Glaucoma    ESRD (end-stage renal disease) due to SLE    Lupus (systemic lupus erythematosus)    S/P  section  times two    H/O gastric bypass  2016    S/P appendectomy    Review of Systems:  As per HPI.     MEDICATIONS  (STANDING):  aspirin enteric coated 81 milliGRAM(s) Oral daily  atorvastatin 40 milliGRAM(s) Oral at bedtime  calcium acetate 667 milliGRAM(s) Oral three times a day with meals  chlorhexidine 2% Cloths 1 Application(s) Topical daily  clopidogrel Tablet 75 milliGRAM(s) Oral daily  gabapentin 200 milliGRAM(s) Oral two times a day  heparin   Injectable 5000 Unit(s) SubCutaneous every 12 hours  latanoprost 0.005% Ophthalmic Solution 1 Drop(s) Both EYES at bedtime  levothyroxine 25 MICROGram(s) Oral daily  metoprolol succinate ER 25 milliGRAM(s) Oral daily  polyethylene glycol 3350 17 Gram(s) Oral daily  timolol 0.5% Solution 1 Drop(s) Both EYES daily    MEDICATIONS  (PRN):  oxycodone    5 mG/acetaminophen 325 mG 1 Tablet(s) Oral every 6 hours PRN Moderate Pain (4 - 6)    Pertinent Medication history:  Home Medications:  acetaminophen 325 mg oral tablet: 2 tab(s) orally every 6 hours, As needed, For Temp greater than 38 C (100.4 F) (01 Mar 2018 11:17)  acetaminophen 325 mg oral tablet: 2 tab(s) orally every 6 hours, As needed, Mild Pain (1 - 3) (01 Mar 2018 11:17)  aspirin 81 mg oral delayed release tablet: 1 tab(s) orally once a day (01 Mar 2018 11:17)  atorvastatin 40 mg oral tablet: 1 tab(s) orally once a day (at bedtime) (01 Mar 2018 11:17)  Betimol 0.5% ophthalmic solution: 1 drop(s) to each affected eye 2 times a day (01 Mar 2018 11:17)  bisacodyl 5 mg oral delayed release tablet: 1 tab(s) orally once a day (01 Mar 2018 11:17)  calcium acetate 667 mg oral tablet: 1 tab(s) orally once a day (01 Mar 2018 11:17)  cefepime 2 g intravenous injection: 2 gram(s) intravenous  (12 Mar 2018 14:11)  clopidogrel 75 mg oral tablet: 1 tab(s) orally once a day (01 Mar 2018 11:17)  gabapentin 100 mg oral capsule: 200 milligram(s) orally 2 times a day (01 Mar 2018 11:17)  levothyroxine 25 mcg (0.025 mg) oral tablet: 1 tab(s) orally once a day (01 Mar 2018 11:17)  metoprolol succinate 25 mg oral tablet, extended release: 1 tab(s) orally once a day (01 Mar 2018 11:17)  metroNIDAZOLE 500 mg oral tablet: 1 tab(s) orally 2 times a day (12 Mar 2018 14:11)  Nephro-Bravo oral tablet: 1 tab(s) orally once a day (01 Mar 2018 11:17)  Travatan Z 0.004% ophthalmic solution: 1 drop(s) to each affected eye once a day (at bedtime) (01 Mar 2018 11:17)  vancomycin 500 mg intravenous injection: 500 milligram(s) intravenous  (12 Mar 2018 14:11)    Allergies    penicillin (Rash)    Intolerances    SOCIAL HISTORY:  No smoking, alcohol or drugs.     FAMILY HISTORY:  DM (diabetes mellitus)  mother and father    History of hypertension  father and mother    DM (diabetes mellitus) (Sibling)  siblings    History of hypertension (Sibling)  sibling    Vital Signs Last 24 Hrs  T(C): 36.6 (2021 14:30), Max: 37 (2021 21:15)  T(F): 97.9 (2021 14:30), Max: 98.6 (2021 21:15)  HR: 67 (2021 14:30) (56 - 67)  BP: 105/64 (2021 14:30) (105/64 - 128/62)  BP(mean): --  RR: 20 (2021 14:30) (18 - 20)  SpO2: 98% (2021 14:30) (95% - 100%)    Daily     Daily     Physical Exam:  General: No apparent distress  HEENT: EOMI, MMM  CVS: +S1/S2, RRR  Resp: CTA b/l  GI: Soft, NT/ND  MSK: No signs of synovitis, limitation of ROM in any joint.   Neuro: AAOx3. Extra occular eye movements intact but occasional deviation of left eye. Power 4/5 in LLE and 3/5 in RLE.   Skin: No rashes    LABS:                        12.3   6.86  )-----------( 135      ( 2021 06:15 )             35.9     04-27    139  |  97  |  29<H>  ----------------------------<  79  4.1   |  23  |  5.02<H>    Ca    9.4      2021 06:14      RADIOLOGY & ADDITIONAL STUDIES:  EXAM:  CT ANGIO NECK (W)AW IC                          EXAM:  CT ANGIO BRAIN (W) IC                            PROCEDURE DATE:  2021            INTERPRETATION:  CLINICAL INFORMATION: Code stroke. 3rd nerve palsy.    TECHNIQUE:  CT angiography of the neck and brain.  Two-dimensional sagittal and coronal reformats and maximum intensity projection reformats were created.  Intravenous Contrast: 70 mL Omnipaque-300 was administered. 30 mL was discarded.    COMPARISON: Noncontrast head CT from earlier same day.    FINDINGS:    CTA NECK:  ARCH: Three-vessel aortic arch. No evidence of aortic dissection or hemodynamically significant stenosis. Left subclavian venous stent.  COMMON CAROTIDS: Normal in caliber without significant stenosis.  CAROTID BIFURCATION/INTERNAL: Normal in caliber without significant stenosis.  VERTEBRAL ARTERIES: Left vertebral artery dominant.. Normal in caliber without significant stenosis.  UPPER NECK/CHEST: Unremarkable.  OSSEUS STRUCTURES: Unremarkable.  Leftsubclavian artery stents are noted. Vascular stents are noted.    CTA BRAIN: Moderate calcific plaque in bilateral cavernous carotid artery causing moderate stenosis on the right side and mild stenosis on the left side. Mild calcific plaque in the left vertebral artery V4 segment  [place]: No other evidence of significant stenosis, major vessel occlusion, or aneurysm.  VERTEBROBASILAR SYSTEM: No evidence of significant stenosis, major vessel occlusion, or aneurysm.  DEEP VENOUS SYSTEMS: Unremarkable.      IMPRESSION:    CTA neck and brain: No large vessel occlusion or high-grade significant stenosis.              GAVIOTA LAND MD; Resident Radiology  This document has been electronically signed.  DEMOND GARBER MD; Attending Radiologist  This document has been electronically signed. 2021  4:58PM

## 2021-04-27 NOTE — PROGRESS NOTE ADULT - ASSESSMENT
57F h/o lupus (dx 04/2017), ESRD (HD T/Th/Sat), NSTEMI (BMS 01/2018), DM2, HTN, HLD (not on medication), glaucoma, hypothyroidism p/w dizziness.    1) ESRD on HD   Routine HD TTS via left upper ext AVF at HD center in [place] (patient is poor historian and cannot provide further detail)  s/p HD yesterday-->tolerated   Plan for HD today to place on routine schedule  Trend bmp  Consent in chart    2) Anemia In CKd-  Hgb at goal  trend hgb    3) HTN  metoprolol 25mg    amlodipine 5mg po qd  improved bp  trend bp      For any question, call:  Cell # [phone]   57F h/o lupus (dx 04/2017), ESRD (HD T/Th/Sat), NSTEMI (BMS 01/2018), DM2, HTN, HLD (not on medication), glaucoma, hypothyroidism p/w dizziness.    1) ESRD on HD   Routine HD TTS via left upper ext AVF at HD center in [place] (patient is poor historian and cannot provide further detail)  s/p HD yesterday-->tolerated   Plan for HD today to place on routine schedule  Trend bmp  Consent in chart    2) Anemia In CKd-  Hgb at goal  trend hgb    3) HTN  metoprolol 25mg   improved bp  trend bp      For any question, call:  Cell # [phone]

## 2021-04-27 NOTE — CONSULT NOTE ADULT - ASSESSMENT
60 Y F with a PMhx of SLE, LN (Class 5), ESRD, SLE vasculitis (involving peripheral nerves) presents for dizziness and diplopia.   Rheumatology consulted for SLE flare.    Impression:  # Diplopia, dizziness.  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   - Relevant DDx: SLE flare causing vasculitis of cranial nerves (controlling occular movements) vs Lupus cerebritis (unlikely) vs other intracranial process.     Recs:   - Send SLE disease activity markers (ordered).  - Start 1 mg Kg IV Solumedrol STAT for possible SLE flare  - Obtain brain MRI/MRA, orbital MRI and MRA.   - Get records from her Rheumatologist.   - Continued neurology evaluation.     Rheum will follow  Case was seen and discussed with Dr. Shi Rojas MD  Rheum fellow PGY 4  Pager [phone]

## 2021-04-27 NOTE — PROGRESS NOTE ADULT - SUBJECTIVE AND OBJECTIVE BOX
Patient is a 60y old  Female who presents with a chief complaint of dizziness / code stroke (26 Apr 2021 21:09)      INTERVAL HISTORY: feels ok     REVIEW OF SYSTEMS:   CONSTITUTIONAL: No weakness  EYES/ENT: No visual changes; No throat pain  Neck: No pain or stiffness  Respiratory: No cough, wheezing, No shortness of breath  CARDIOVASCULAR: no chest pain or palpitations  GASTROINTESTINAL: No abdominal pain, no nausea, vomiting or hematemesis  GENITOURINARY: No dysuria, frequency or hematuria  NEUROLOGICAL: No stroke like symptoms  SKIN: No rashes    	  MEDICATIONS:  metoprolol succinate ER 25 milliGRAM(s) Oral daily        PHYSICAL EXAM:  T(C): 36.6 (04-27-21 @ 05:00), Max: 37 (04-26-21 @ 21:15)  HR: 58 (04-27-21 @ 05:00) (56 - 67)  BP: 117/70 (04-27-21 @ 05:00) (113/66 - 153/73)  RR: 18 (04-27-21 @ 05:00) (18 - 20)  SpO2: 99% (04-27-21 @ 05:00) (96% - 100%)  Wt(kg): --  I&O's Summary    26 Apr 2021 07:01  -  27 Apr 2021 07:00  --------------------------------------------------------  IN: 480 mL / OUT: 1000 mL / NET: -520 mL    27 Apr 2021 07:01  -  27 Apr 2021 11:45  --------------------------------------------------------  IN: 240 mL / OUT: 0 mL / NET: 240 mL      Appearance: In no distress	  HEENT:    PERRL, EOMI	  Cardiovascular:  S1 S2, No JVD  Respiratory: Lungs clear to auscultation	  Gastrointestinal:  Soft, Non-tender, + BS	  Vascularature:  No edema of LE  Psychiatric: Appropriate affect   Neuro: no acute focal deficits                           12.3   6.86  )-----------( 135      ( 27 Apr 2021 06:15 )             35.9     04-27    139  |  97  |  29<H>  ----------------------------<  79  4.1   |  23  |  5.02<H>    Ca    9.4      27 Apr 2021 06:14    Labs personally reviewed  < from: Transthoracic Echocardiogram (04.27.21 @ 06:08) >  Fractional short: 40 %  EF (Visual Estimate): 70-75 %    < end of copied text >  < from: Transthoracic Echocardiogram (04.27.21 @ 06:08) >  1. Normal mitral valve. Minimal mitral regurgitation.  2. Aortic valve not well visualized; appears trileaflet  with normal opening. No aortic valve regurgitation seen.  3. Normal left ventricular systolic function. No segmental  wall motion abnormalities.  4. Normal right ventricular size and function.  *** Compared with echocardiogram of 5/31/2017, results are  similar on today's study.    < end of copied text >    ASSESSMENT/PLAN: 	  Problem/Plan - 1:  ·  Problem: Dizziness Plan:had episode of dizzyness and right eye blurryness. now resolved.  denies chest pain, shortness of breath, palpitations  CTA H: no significant stenosis   consider  MRI H  workup per primary   TTE with nml LV function, no WMA    Problem/Plan - 2:  ·  Problem: Coronary Artery Disease: s./p PCI mLAD 1/2018, ANABELA to RCA 1/2019 and ANABELA to diag 5/2019   given stents greather than 1 year ago no indication to continue with Plavix, unless indicated for her SLE, f/u with rheum   patient denies chest pain, shortness of breath, denies GARCIA, can ambulate 2 blocks without chest pain.  pBNP 5325, although appears relatively euvolemic on exam,denies use of diuretics at home   TTE as noted above   c/w with Toprol XL 25mg daily, asa, plaix,statin     Problem/Plan - 3:  ·  Problem: Lupus (systemic lupus erythematosus).  Plan: stable   f/u with neuro as out pt.     Problem/Plan - 4:  ·  Problem: ESR\ (end-stage renal disease) due to SLE.  Plan: nephro consult for HD   -c/w home meds.     Problem/Plan - 5:  ·  Problem: Hypertension.  Plan: c/w home meds   controlled.       Mariana Mckeon DO [facility]  Cardiovascular Medicine  [address] [place] [address], [address]  Office: [phone]  Cell: [phone] Patient is a 60y old  Female who presents with a chief complaint of dizziness / code stroke (26 Apr 2021 21:09)      INTERVAL HISTORY: feels ok     REVIEW OF SYSTEMS:   CONSTITUTIONAL: No weakness  EYES/ENT: No visual changes; No throat pain  Neck: No pain or stiffness  Respiratory: No cough, wheezing, No shortness of breath  CARDIOVASCULAR: no chest pain or palpitations  GASTROINTESTINAL: No abdominal pain, no nausea, vomiting or hematemesis  GENITOURINARY: No dysuria, frequency or hematuria  NEUROLOGICAL: No stroke like symptoms  SKIN: No rashes    	  MEDICATIONS:  metoprolol succinate ER 25 milliGRAM(s) Oral daily        PHYSICAL EXAM:  T(C): 36.6 (04-27-21 @ 05:00), Max: 37 (04-26-21 @ 21:15)  HR: 58 (04-27-21 @ 05:00) (56 - 67)  BP: 117/70 (04-27-21 @ 05:00) (113/66 - 153/73)  RR: 18 (04-27-21 @ 05:00) (18 - 20)  SpO2: 99% (04-27-21 @ 05:00) (96% - 100%)  Wt(kg): --  I&O's Summary    26 Apr 2021 07:01  -  27 Apr 2021 07:00  --------------------------------------------------------  IN: 480 mL / OUT: 1000 mL / NET: -520 mL    27 Apr 2021 07:01  -  27 Apr 2021 11:45  --------------------------------------------------------  IN: 240 mL / OUT: 0 mL / NET: 240 mL      Appearance: In no distress	  HEENT:    PERRL, EOMI	  Cardiovascular:  S1 S2, No JVD  Respiratory: Lungs clear to auscultation	  Gastrointestinal:  Soft, Non-tender, + BS	  Vascularature:  No edema of LE  Psychiatric: Appropriate affect   Neuro: no acute focal deficits                           12.3   6.86  )-----------( 135      ( 27 Apr 2021 06:15 )             35.9     04-27    139  |  97  |  29<H>  ----------------------------<  79  4.1   |  23  |  5.02<H>    Ca    9.4      27 Apr 2021 06:14    Labs personally reviewed  < from: Transthoracic Echocardiogram (04.27.21 @ 06:08) >  Fractional short: 40 %  EF (Visual Estimate): 70-75 %    < end of copied text >  < from: Transthoracic Echocardiogram (04.27.21 @ 06:08) >  1. Normal mitral valve. Minimal mitral regurgitation.  2. Aortic valve not well visualized; appears trileaflet  with normal opening. No aortic valve regurgitation seen.  3. Normal left ventricular systolic function. No segmental  wall motion abnormalities.  4. Normal right ventricular size and function.  *** Compared with echocardiogram of 5/31/2017, results are  similar on today's study.    < end of copied text >    ASSESSMENT/PLAN: 	  Problem/Plan - 1:  ·  Problem: Dizziness Plan:had episode of dizzyness and right eye blurryness. now resolved.  denies chest pain, shortness of breath, palpitations  CTA H: no significant stenosis   consider MRI Head  workup per primary   TTE with nml LV function, no WMA    Problem/Plan - 2:  ·  Problem: Coronary Artery Disease: s./p PCI mLAD 1/2018, ANABELA to RCA 1/2019 and ANABELA to diag 5/2019   given stents greather than 1 year ago no indication to continue with Plavix, unless indicated for her SLE, f/u with rheum   patient denies chest pain, shortness of breath, denies GARCIA, can ambulate 2 blocks without chest pain.  pBNP 5325, although appears relatively euvolemic on exam,denies use of diuretics at home   TTE as noted above   c/w with Toprol XL 25mg daily, asa, plaix,statin     Problem/Plan - 3:  ·  Problem: Lupus (systemic lupus erythematosus).  Plan: stable   f/u with neuro as out pt.     Problem/Plan - 4:  ·  Problem: ESR\ (end-stage renal disease) due to SLE.  Plan: nephro consult for HD   -c/w home meds.     Problem/Plan - 5:  ·  Problem: Hypertension.  Plan: c/w home meds   controlled.       Mariana Mckeon DO [facility]  Cardiovascular Medicine  [address] [place] [address], [address]  Office: [phone]  Cell: [phone]

## 2021-04-28 LAB
AMMONIA BLD-MCNC: 77 UMOL/L — HIGH (ref 11–55)
ANION GAP SERPL CALC-SCNC: 15 MMOL/L — SIGNIFICANT CHANGE UP (ref 5–17)
BASOPHILS # BLD AUTO: 0.01 K/UL — SIGNIFICANT CHANGE UP (ref 0–0.2)
BASOPHILS NFR BLD AUTO: 0.3 % — SIGNIFICANT CHANGE UP (ref 0–2)
BUN SERPL-MCNC: 19 MG/DL — SIGNIFICANT CHANGE UP (ref 7–23)
C3 SERPL-MCNC: 71 MG/DL — LOW (ref 81–157)
C4 SERPL-MCNC: 18 MG/DL — SIGNIFICANT CHANGE UP (ref 13–39)
CALCIUM SERPL-MCNC: 9.9 MG/DL — SIGNIFICANT CHANGE UP (ref 8.4–10.5)
CHLORIDE SERPL-SCNC: 96 MMOL/L — SIGNIFICANT CHANGE UP (ref 96–108)
CO2 SERPL-SCNC: 25 MMOL/L — SIGNIFICANT CHANGE UP (ref 22–31)
CREAT SERPL-MCNC: 3.66 MG/DL — HIGH (ref 0.5–1.3)
CRP SERPL-MCNC: <3 MG/L — SIGNIFICANT CHANGE UP (ref 0–4)
DSDNA AB SER-ACNC: 13 IU/ML — SIGNIFICANT CHANGE UP
EOSINOPHIL # BLD AUTO: 0 K/UL — SIGNIFICANT CHANGE UP (ref 0–0.5)
EOSINOPHIL NFR BLD AUTO: 0 % — SIGNIFICANT CHANGE UP (ref 0–6)
ERYTHROCYTE [SEDIMENTATION RATE] IN BLOOD: 47 MM/HR — HIGH (ref 0–20)
FOLATE SERPL-MCNC: 17.9 NG/ML — SIGNIFICANT CHANGE UP
GLUCOSE SERPL-MCNC: 203 MG/DL — HIGH (ref 70–99)
HCT VFR BLD CALC: 38 % — SIGNIFICANT CHANGE UP (ref 34.5–45)
HGB BLD-MCNC: 12.7 G/DL — SIGNIFICANT CHANGE UP (ref 11.5–15.5)
IMM GRANULOCYTES NFR BLD AUTO: 0.3 % — SIGNIFICANT CHANGE UP (ref 0–1.5)
LYMPHOCYTES # BLD AUTO: 1.27 K/UL — SIGNIFICANT CHANGE UP (ref 1–3.3)
LYMPHOCYTES # BLD AUTO: 34.1 % — SIGNIFICANT CHANGE UP (ref 13–44)
MCHC RBC-ENTMCNC: 32.2 PG — SIGNIFICANT CHANGE UP (ref 27–34)
MCHC RBC-ENTMCNC: 33.4 GM/DL — SIGNIFICANT CHANGE UP (ref 32–36)
MCV RBC AUTO: 96.2 FL — SIGNIFICANT CHANGE UP (ref 80–100)
MONOCYTES # BLD AUTO: 0.08 K/UL — SIGNIFICANT CHANGE UP (ref 0–0.9)
MONOCYTES NFR BLD AUTO: 2.2 % — SIGNIFICANT CHANGE UP (ref 2–14)
NEUTROPHILS # BLD AUTO: 2.35 K/UL — SIGNIFICANT CHANGE UP (ref 1.8–7.4)
NEUTROPHILS NFR BLD AUTO: 63.1 % — SIGNIFICANT CHANGE UP (ref 43–77)
NRBC # BLD: 0 /100 WBCS — SIGNIFICANT CHANGE UP (ref 0–0)
PLATELET # BLD AUTO: 133 K/UL — LOW (ref 150–400)
POTASSIUM SERPL-MCNC: 4.4 MMOL/L — SIGNIFICANT CHANGE UP (ref 3.5–5.3)
POTASSIUM SERPL-SCNC: 4.4 MMOL/L — SIGNIFICANT CHANGE UP (ref 3.5–5.3)
RBC # BLD: 3.95 M/UL — SIGNIFICANT CHANGE UP (ref 3.8–5.2)
RBC # FLD: 13.3 % — SIGNIFICANT CHANGE UP (ref 10.3–14.5)
SODIUM SERPL-SCNC: 136 MMOL/L — SIGNIFICANT CHANGE UP (ref 135–145)
VIT B12 SERPL-MCNC: 1008 PG/ML — SIGNIFICANT CHANGE UP (ref 232–1245)
WBC # BLD: 3.72 K/UL — LOW (ref 3.8–10.5)
WBC # FLD AUTO: 3.72 K/UL — LOW (ref 3.8–10.5)

## 2021-04-28 RX ADMIN — Medication 25 MICROGRAM(S): at 06:35

## 2021-04-28 RX ADMIN — Medication 667 MILLIGRAM(S): at 09:25

## 2021-04-28 RX ADMIN — HEPARIN SODIUM 5000 UNIT(S): 5000 INJECTION INTRAVENOUS; SUBCUTANEOUS at 17:17

## 2021-04-28 RX ADMIN — Medication 81 MILLIGRAM(S): at 11:34

## 2021-04-28 RX ADMIN — ATORVASTATIN CALCIUM 40 MILLIGRAM(S): 80 TABLET, FILM COATED ORAL at 21:46

## 2021-04-28 RX ADMIN — HEPARIN SODIUM 5000 UNIT(S): 5000 INJECTION INTRAVENOUS; SUBCUTANEOUS at 06:40

## 2021-04-28 RX ADMIN — Medication 60 MILLIGRAM(S): at 06:40

## 2021-04-28 RX ADMIN — Medication 667 MILLIGRAM(S): at 17:16

## 2021-04-28 RX ADMIN — CHLORHEXIDINE GLUCONATE 1 APPLICATION(S): 213 SOLUTION TOPICAL at 11:34

## 2021-04-28 RX ADMIN — Medication 25 MILLIGRAM(S): at 06:41

## 2021-04-28 RX ADMIN — GABAPENTIN 200 MILLIGRAM(S): 400 CAPSULE ORAL at 06:35

## 2021-04-28 RX ADMIN — Medication 1 DROP(S): at 11:35

## 2021-04-28 RX ADMIN — POLYETHYLENE GLYCOL 3350 17 GRAM(S): 17 POWDER, FOR SOLUTION ORAL at 11:34

## 2021-04-28 RX ADMIN — LATANOPROST 1 DROP(S): 0.05 SOLUTION/ DROPS OPHTHALMIC; TOPICAL at 21:46

## 2021-04-28 RX ADMIN — GABAPENTIN 200 MILLIGRAM(S): 400 CAPSULE ORAL at 17:16

## 2021-04-28 RX ADMIN — CLOPIDOGREL BISULFATE 75 MILLIGRAM(S): 75 TABLET, FILM COATED ORAL at 11:34

## 2021-04-28 RX ADMIN — Medication 667 MILLIGRAM(S): at 11:35

## 2021-04-28 NOTE — PROGRESS NOTE ADULT - ASSESSMENT
57F h/o lupus (dx 04/2017), ESRD (HD T/Th/Sat), NSTEMI (BMS 01/2018), DM2, HTN, HLD (not on medication), glaucoma, hypothyroidism p/w dizziness.    1) ESRD on HD   Routine HD TTS via left upper ext AVF  s/p HD yesterday-flowsheet reviewed- 300 2/2 asympotmatic hypotension ( was dialyzed for 2 days in a row)  plan for next hd tomm  Trend bmp      2) Anemia In CKD-  Hgb at goal  trend hgb    3) HTN  metoprolol 25mg   improved bp  trend bp      For any question, call:  Cell # [phone]

## 2021-04-28 NOTE — PROGRESS NOTE ADULT - SUBJECTIVE AND OBJECTIVE BOX
DATE OF SERVICE: 04-28-21 @ 22:48    Patient is a 60y old  Female who presents with a chief complaint of dizziness / code stroke (28 Apr 2021 21:59)      INTERVAL HISTORY: feels ok     	  MEDICATIONS:  metoprolol succinate ER 25 milliGRAM(s) Oral daily        PHYSICAL EXAM:  T(C): 36.7 (04-28-21 @ 22:10), Max: 38.1 (04-28-21 @ 21:17)  HR: 76 (04-28-21 @ 21:17) (65 - 76)  BP: 139/70 (04-28-21 @ 21:17) (121/70 - 139/70)  RR: 18 (04-28-21 @ 21:17) (18 - 20)  SpO2: 100% (04-28-21 @ 21:17) (98% - 100%)  Wt(kg): --  I&O's Summary    27 Apr 2021 07:01  -  28 Apr 2021 07:00  --------------------------------------------------------  IN: 2200 mL / OUT: 1312 mL / NET: 888 mL    28 Apr 2021 07:01  -  28 Apr 2021 22:48  --------------------------------------------------------  IN: 560 mL / OUT: 0 mL / NET: 560 mL          Appearance: In no distress	  HEENT:    PERRL, EOMI	  Cardiovascular:  S1 S2, No JVD  Respiratory: Lungs clear to auscultation	  Gastrointestinal:  Soft, Non-tender, + BS	  Vascularature:  No edema of LE  Psychiatric: Appropriate affect   Neuro: no acute focal deficits                               12.7   3.72  )-----------( 133      ( 28 Apr 2021 07:03 )             38.0     04-28    136  |  96  |  19  ----------------------------<  203<H>  4.4   |  25  |  3.66<H>    Ca    9.9      28 Apr 2021 07:06          Labs personally reviewed      Labs personally reviewed  < from: Transthoracic Echocardiogram (04.27.21 @ 06:08) >  Fractional short: 40 %  EF (Visual Estimate): 70-75 %    < end of copied text >  < from: Transthoracic Echocardiogram (04.27.21 @ 06:08) >  1. Normal mitral valve. Minimal mitral regurgitation.  2. Aortic valve not well visualized; appears trileaflet  with normal opening. No aortic valve regurgitation seen.  3. Normal left ventricular systolic function. No segmental  wall motion abnormalities.  4. Normal right ventricular size and function.  *** Compared with echocardiogram of 5/31/2017, results are  similar on today's study.    < end of copied text >    ASSESSMENT/PLAN: 	  Problem/Plan - 1:  ·  Problem: Dizziness Plan:had episode of dizzyness and right eye blurryness. now resolved.  denies chest pain, shortness of breath, palpitations  CTA H: no significant stenosis   consider MRI Head  workup per primary   TTE with nml LV function, no WMA    Problem/Plan - 2:  ·  Problem: Coronary Artery Disease: s./p PCI mLAD 1/2018, ANABELA to RCA 1/2019 and ANABELA to diag 5/2019   given stents greather than 1 year ago no indication to continue with Plavix, unless indicated for her SLE, f/u with rheum   patient denies chest pain, shortness of breath, denies GARCIA, can ambulate 2 blocks without chest pain.  pBNP 5325, although appears relatively euvolemic on exam,denies use of diuretics at home   TTE as noted above   c/w with Toprol XL 25mg daily, asa, plaix,statin     Problem/Plan - 3:  ·  Problem: Lupus (systemic lupus erythematosus).  Plan: stable   f/u with neuro as out pt.     Problem/Plan - 4:  ·  Problem: ESR\ (end-stage renal disease) due to SLE.  Plan: nephro consult for HD   -c/w home meds.     Problem/Plan - 5:  ·  Problem: Hypertension.  Plan: c/w home meds   controlled.          Salvador Mckeon DO [facility]  Cardiovascular Medicine  [address] [facility] [facility], [address]  Office: [phone]  Cell: [phone]

## 2021-04-28 NOTE — PROGRESS NOTE ADULT - SUBJECTIVE AND OBJECTIVE BOX
Patient seen and examined  no complaints    penicillin (Rash)    Hospital Medications:   MEDICATIONS  (STANDING):  aspirin enteric coated 81 milliGRAM(s) Oral daily  atorvastatin 40 milliGRAM(s) Oral at bedtime  calcium acetate 667 milliGRAM(s) Oral three times a day with meals  chlorhexidine 2% Cloths 1 Application(s) Topical daily  clopidogrel Tablet 75 milliGRAM(s) Oral daily  gabapentin 200 milliGRAM(s) Oral two times a day  heparin   Injectable 5000 Unit(s) SubCutaneous every 12 hours  latanoprost 0.005% Ophthalmic Solution 1 Drop(s) Both EYES at bedtime  levothyroxine 25 MICROGram(s) Oral daily  methylPREDNISolone sodium succinate Injectable 60 milliGRAM(s) IV Push daily  metoprolol succinate ER 25 milliGRAM(s) Oral daily  polyethylene glycol 3350 17 Gram(s) Oral daily  timolol 0.5% Solution 1 Drop(s) Both EYES daily        VITALS:  T(F): 97.5 (04-28-21 @ 11:51), Max: 97.9 (04-27-21 @ 14:30)  HR: 70 (04-28-21 @ 11:51)  BP: 136/73 (04-28-21 @ 11:51)  RR: 20 (04-28-21 @ 11:51)  SpO2: 98% (04-28-21 @ 11:51)  Wt(kg): --    04-27 @ 07:01  -  04-28 @ 07:00  --------------------------------------------------------  IN: 2200 mL / OUT: 1312 mL / NET: 888 mL    04-28 @ 07:01  -  04-28 @ 14:08  --------------------------------------------------------  IN: 120 mL / OUT: 0 mL / NET: 120 mL      Physical Exam :-  Constitutional: NAD  Neck: Supple.  Respiratory: Bilateral equal breath sounds,  Cardiovascular: S1, S2 normal,  Gastrointestinal: Bowel Sounds present, soft, non tender.  Extremities: No edema  Neurological: Alert and Oriented   Psychiatric: Normal mood, normal affect    LABS:  04-28    136  |  96  |  19  ----------------------------<  203<H>  4.4   |  25  |  3.66<H>    Ca    9.9      28 Apr 2021 07:06      Creatinine Trend: 3.66 <--, 5.02 <--, 7.40 <--, 5.13 <--, 4.47 <--, 4.05 <--, 3.87 <--                        12.7   3.72  )-----------( 133      ( 28 Apr 2021 07:03 )             38.0     Urine Studies:      RADIOLOGY & ADDITIONAL STUDIES:

## 2021-04-28 NOTE — PROGRESS NOTE ADULT - SUBJECTIVE AND OBJECTIVE BOX
Patient is a 60y old  Female who presents with a chief complaint of dizziness / code stroke (2021 14:08)      INTERVAL HPI/OVERNIGHT EVENTS:  T(C): 38.1 (21 @ 21:17), Max: 38.1 (21 @ 21:17)  HR: 76 (21 @ 21:17) (65 - 76)  BP: 139/70 (21 @ 21:17) (121/70 - 139/70)  RR: 18 (21 @ 21:17) (18 - 20)  SpO2: 100% (21 @ 21:17) (98% - 100%)  Wt(kg): --  I&O's Summary    2021 07:  -  2021 07:00  --------------------------------------------------------  IN: 2200 mL / OUT: 1312 mL / NET: 888 mL    2021 07:01  -  2021 21:59  --------------------------------------------------------  IN: 560 mL / OUT: 0 mL / NET: 560 mL        PAST MEDICAL & SURGICAL HISTORY:  Diabetes    Hypertension    Hypercholesteremia    Essential hypertension    Type 2 diabetes mellitus without complication, without long-term current use of insulin    Other hyperlipidemia    Glaucoma    ESRD (end-stage renal disease) due to SLE    Lupus (systemic lupus erythematosus)    S/P  section  times two    H/O gastric bypass  2016    S/P appendectomy        SOCIAL HISTORY  Alcohol:  Tobacco:  Illicit substance use:    FAMILY HISTORY:    REVIEW OF SYSTEMS:  CONSTITUTIONAL: No fever, weight loss, or fatigue  EYES: No eye pain, visual disturbances, or discharge  ENMT:  No difficulty hearing, tinnitus, vertigo; No sinus or throat pain  NECK: No pain or stiffness  RESPIRATORY: No cough, wheezing, chills or hemoptysis; No shortness of breath  CARDIOVASCULAR: No chest pain, palpitations, dizziness, or leg swelling  GASTROINTESTINAL: No abdominal or epigastric pain. No nausea, vomiting, or hematemesis; No diarrhea or constipation. No melena or hematochezia.  GENITOURINARY: No dysuria, frequency, hematuria, or incontinence  NEUROLOGICAL: No headaches, memory loss, loss of strength, numbness, or tremors  SKIN: No itching, burning, rashes, or lesions   LYMPH NODES: No enlarged glands  ENDOCRINE: No heat or cold intolerance; No hair loss  MUSCULOSKELETAL: No joint pain or swelling; No muscle, back, or extremity pain  PSYCHIATRIC: No depression, anxiety, mood swings, or difficulty sleeping  HEME/LYMPH: No easy bruising, or bleeding gums  ALLERY AND IMMUNOLOGIC: No hives or eczema    RADIOLOGY & ADDITIONAL TESTS:    Imaging Personally Reviewed:  [ ] YES  [ ] NO    Consultant(s) Notes Reviewed:  [ ] YES  [ ] NO    PHYSICAL EXAM:  GENERAL: NAD, well-groomed, well-developed  HEAD:  Atraumatic, Normocephalic  EYES: EOMI, PERRLA, conjunctiva and sclera clear  ENMT: No tonsillar erythema, exudates, or enlargement; Moist mucous membranes, Good dentition, No lesions  NECK: Supple, No JVD, Normal thyroid  NERVOUS SYSTEM:  Alert & Oriented X3, Good concentration; Motor Strength 5/5 B/L upper and lower extremities; DTRs 2+ intact and symmetric  CHEST/LUNG: Clear to percussion bilaterally; No rales, rhonchi, wheezing, or rubs  HEART: Regular rate and rhythm; No murmurs, rubs, or gallops  ABDOMEN: Soft, Nontender, Nondistended; Bowel sounds present  EXTREMITIES:  2+ Peripheral Pulses, No clubbing, cyanosis, or edema  LYMPH: No lymphadenopathy noted  SKIN: No rashes or lesions    LABS:                        12.7   3.72  )-----------( 133      ( 2021 07:03 )             38.0     -    136  |  96  |  19  ----------------------------<  203<H>  4.4   |  25  |  3.66<H>    Ca    9.9      2021 07:06          CAPILLARY BLOOD GLUCOSE                MEDICATIONS  (STANDING):  aspirin enteric coated 81 milliGRAM(s) Oral daily  atorvastatin 40 milliGRAM(s) Oral at bedtime  calcium acetate 667 milliGRAM(s) Oral three times a day with meals  chlorhexidine 2% Cloths 1 Application(s) Topical daily  clopidogrel Tablet 75 milliGRAM(s) Oral daily  gabapentin 200 milliGRAM(s) Oral two times a day  heparin   Injectable 5000 Unit(s) SubCutaneous every 12 hours  latanoprost 0.005% Ophthalmic Solution 1 Drop(s) Both EYES at bedtime  levothyroxine 25 MICROGram(s) Oral daily  methylPREDNISolone sodium succinate Injectable 60 milliGRAM(s) IV Push daily  metoprolol succinate ER 25 milliGRAM(s) Oral daily  polyethylene glycol 3350 17 Gram(s) Oral daily  timolol 0.5% Solution 1 Drop(s) Both EYES daily    MEDICATIONS  (PRN):  oxycodone    5 mG/acetaminophen 325 mG 1 Tablet(s) Oral every 6 hours PRN Moderate Pain (4 - 6)      Care Discussed with Consultants/Other Providers [ ] YES  [ ] NO Patient is a 60y old  Female who presents with a chief complaint of dizziness / code stroke (2021 14:08)      INTERVAL HPI/OVERNIGHT EVENTS: doing fair   T(C): 38.1 (21 @ 21:17), Max: 38.1 (21 @ 21:17)  HR: 76 (21 @ 21:17) (65 - 76)  BP: 139/70 (21 @ 21:17) (121/70 - 139/70)  RR: 18 (21 @ 21:17) (18 - 20)  SpO2: 100% (21 @ 21:17) (98% - 100%)  Wt(kg): --  I&O's Summary    2021 07:  -  2021 07:00  --------------------------------------------------------  IN: 2200 mL / OUT: 1312 mL / NET: 888 mL    2021 07:  -  2021 21:59  --------------------------------------------------------  IN: 560 mL / OUT: 0 mL / NET: 560 mL        PAST MEDICAL & SURGICAL HISTORY:  Diabetes    Hypertension    Hypercholesteremia    Essential hypertension    Type 2 diabetes mellitus without complication, without long-term current use of insulin    Other hyperlipidemia    Glaucoma    ESRD (end-stage renal disease) due to SLE    Lupus (systemic lupus erythematosus)    S/P  section  times two    H/O gastric bypass  2016    S/P appendectomy        SOCIAL HISTORY  Alcohol:  Tobacco:  Illicit substance use:    FAMILY HISTORY:    REVIEW OF SYSTEMS:  CONSTITUTIONAL: No fever, weight loss, or fatigue  EYES: No eye pain, visual disturbances, or discharge  ENMT:  No difficulty hearing, tinnitus, vertigo; No sinus or throat pain  NECK: No pain or stiffness  RESPIRATORY: No cough, wheezing, chills or hemoptysis; No shortness of breath  CARDIOVASCULAR: No chest pain, palpitations, dizziness, or leg swelling  GASTROINTESTINAL: No abdominal or epigastric pain. No nausea, vomiting, or hematemesis; No diarrhea or constipation. No melena or hematochezia.  GENITOURINARY: No dysuria, frequency, hematuria, or incontinence  NEUROLOGICAL: No headaches, memory loss, loss of strength, numbness, or tremors  SKIN: No itching, burning, rashes, or lesions   LYMPH NODES: No enlarged glands  ENDOCRINE: No heat or cold intolerance; No hair loss  MUSCULOSKELETAL: No joint pain or swelling; No muscle, back, or extremity pain  PSYCHIATRIC: No depression, anxiety, mood swings, or difficulty sleeping  HEME/LYMPH: No easy bruising, or bleeding gums  ALLERY AND IMMUNOLOGIC: No hives or eczema    RADIOLOGY & ADDITIONAL TESTS:    Imaging Personally Reviewed:  [ ] YES  [ ] NO    Consultant(s) Notes Reviewed:  [ ] YES  [ ] NO    PHYSICAL EXAM:  GENERAL: NAD, well-groomed, well-developed  HEAD:  Atraumatic, Normocephalic  EYES: EOMI, PERRLA, conjunctiva and sclera clear  ENMT: No tonsillar erythema, exudates, or enlargement; Moist mucous membranes, Good dentition, No lesions  NECK: Supple, No JVD, Normal thyroid  NERVOUS SYSTEM:  Alert & Oriented X3, Good concentration; Motor Strength 5/5 B/L upper and lower extremities; DTRs 2+ intact and symmetric  CHEST/LUNG: Clear to percussion bilaterally; No rales, rhonchi, wheezing, or rubs  HEART: Regular rate and rhythm; No murmurs, rubs, or gallops  ABDOMEN: Soft, Nontender, Nondistended; Bowel sounds present  EXTREMITIES:  2+ Peripheral Pulses, No clubbing, cyanosis, or edema  LYMPH: No lymphadenopathy noted  SKIN: No rashes or lesions    LABS:                        12.7   3.72  )-----------( 133      ( 2021 07:03 )             38.0     -    136  |  96  |  19  ----------------------------<  203<H>  4.4   |  25  |  3.66<H>    Ca    9.9      2021 07:06          CAPILLARY BLOOD GLUCOSE                MEDICATIONS  (STANDING):  aspirin enteric coated 81 milliGRAM(s) Oral daily  atorvastatin 40 milliGRAM(s) Oral at bedtime  calcium acetate 667 milliGRAM(s) Oral three times a day with meals  chlorhexidine 2% Cloths 1 Application(s) Topical daily  clopidogrel Tablet 75 milliGRAM(s) Oral daily  gabapentin 200 milliGRAM(s) Oral two times a day  heparin   Injectable 5000 Unit(s) SubCutaneous every 12 hours  latanoprost 0.005% Ophthalmic Solution 1 Drop(s) Both EYES at bedtime  levothyroxine 25 MICROGram(s) Oral daily  methylPREDNISolone sodium succinate Injectable 60 milliGRAM(s) IV Push daily  metoprolol succinate ER 25 milliGRAM(s) Oral daily  polyethylene glycol 3350 17 Gram(s) Oral daily  timolol 0.5% Solution 1 Drop(s) Both EYES daily    MEDICATIONS  (PRN):  oxycodone    5 mG/acetaminophen 325 mG 1 Tablet(s) Oral every 6 hours PRN Moderate Pain (4 - 6)      Care Discussed with Consultants/Other Providers [ ] YES  [ ] NO

## 2021-04-29 LAB
% ALBUMIN: 56 % — SIGNIFICANT CHANGE UP
% ALPHA 1: 3.5 % — SIGNIFICANT CHANGE UP
% ALPHA 2: 10.2 % — SIGNIFICANT CHANGE UP
% BETA: 11.9 % — SIGNIFICANT CHANGE UP
% GAMMA: 18.4 % — SIGNIFICANT CHANGE UP
ALBUMIN SERPL ELPH-MCNC: 4.1 G/DL — SIGNIFICANT CHANGE UP (ref 3.6–5.5)
ALBUMIN/GLOB SERPL ELPH: 1.2 RATIO — SIGNIFICANT CHANGE UP
ALPHA1 GLOB SERPL ELPH-MCNC: 0.3 G/DL — SIGNIFICANT CHANGE UP (ref 0.1–0.4)
ALPHA2 GLOB SERPL ELPH-MCNC: 0.8 G/DL — SIGNIFICANT CHANGE UP (ref 0.5–1)
B-GLOBULIN SERPL ELPH-MCNC: 0.9 G/DL — SIGNIFICANT CHANGE UP (ref 0.5–1)
GAMMA GLOBULIN: 1.4 G/DL — SIGNIFICANT CHANGE UP (ref 0.6–1.6)
GLUCOSE BLDC GLUCOMTR-MCNC: 225 MG/DL — HIGH (ref 70–99)
GLUCOSE BLDC GLUCOMTR-MCNC: 308 MG/DL — HIGH (ref 70–99)
INTERPRETATION SERPL IFE-IMP: SIGNIFICANT CHANGE UP
PROT PATTERN SERPL ELPH-IMP: SIGNIFICANT CHANGE UP
PROT SERPL-MCNC: 7.4 G/DL — SIGNIFICANT CHANGE UP (ref 6–8.3)
PROT SERPL-MCNC: 7.4 G/DL — SIGNIFICANT CHANGE UP (ref 6–8.3)

## 2021-04-29 RX ORDER — DEXTROSE 50 % IN WATER 50 %
25 SYRINGE (ML) INTRAVENOUS ONCE
Refills: 0 | Status: DISCONTINUED | OUTPATIENT
Start: 2021-04-29 | End: 2021-05-07

## 2021-04-29 RX ORDER — INSULIN LISPRO 100/ML
VIAL (ML) SUBCUTANEOUS
Refills: 0 | Status: DISCONTINUED | OUTPATIENT
Start: 2021-04-29 | End: 2021-05-07

## 2021-04-29 RX ORDER — DEXTROSE 50 % IN WATER 50 %
15 SYRINGE (ML) INTRAVENOUS ONCE
Refills: 0 | Status: DISCONTINUED | OUTPATIENT
Start: 2021-04-29 | End: 2021-05-07

## 2021-04-29 RX ORDER — SODIUM CHLORIDE 9 MG/ML
1000 INJECTION, SOLUTION INTRAVENOUS
Refills: 0 | Status: DISCONTINUED | OUTPATIENT
Start: 2021-04-29 | End: 2021-05-07

## 2021-04-29 RX ORDER — DEXTROSE 50 % IN WATER 50 %
12.5 SYRINGE (ML) INTRAVENOUS ONCE
Refills: 0 | Status: DISCONTINUED | OUTPATIENT
Start: 2021-04-29 | End: 2021-05-07

## 2021-04-29 RX ORDER — INSULIN LISPRO 100/ML
VIAL (ML) SUBCUTANEOUS AT BEDTIME
Refills: 0 | Status: DISCONTINUED | OUTPATIENT
Start: 2021-04-29 | End: 2021-05-07

## 2021-04-29 RX ORDER — GLUCAGON INJECTION, SOLUTION 0.5 MG/.1ML
1 INJECTION, SOLUTION SUBCUTANEOUS ONCE
Refills: 0 | Status: DISCONTINUED | OUTPATIENT
Start: 2021-04-29 | End: 2021-05-07

## 2021-04-29 RX ADMIN — GABAPENTIN 200 MILLIGRAM(S): 400 CAPSULE ORAL at 17:36

## 2021-04-29 RX ADMIN — Medication 2: at 18:28

## 2021-04-29 RX ADMIN — GABAPENTIN 200 MILLIGRAM(S): 400 CAPSULE ORAL at 05:59

## 2021-04-29 RX ADMIN — Medication 25 MICROGRAM(S): at 05:59

## 2021-04-29 RX ADMIN — Medication 667 MILLIGRAM(S): at 17:35

## 2021-04-29 RX ADMIN — Medication 1 DROP(S): at 13:26

## 2021-04-29 RX ADMIN — CHLORHEXIDINE GLUCONATE 1 APPLICATION(S): 213 SOLUTION TOPICAL at 13:26

## 2021-04-29 RX ADMIN — Medication 2: at 21:20

## 2021-04-29 RX ADMIN — Medication 25 MILLIGRAM(S): at 05:59

## 2021-04-29 RX ADMIN — Medication 667 MILLIGRAM(S): at 13:25

## 2021-04-29 RX ADMIN — Medication 60 MILLIGRAM(S): at 06:05

## 2021-04-29 RX ADMIN — HEPARIN SODIUM 5000 UNIT(S): 5000 INJECTION INTRAVENOUS; SUBCUTANEOUS at 17:35

## 2021-04-29 RX ADMIN — Medication 81 MILLIGRAM(S): at 13:26

## 2021-04-29 RX ADMIN — ATORVASTATIN CALCIUM 40 MILLIGRAM(S): 80 TABLET, FILM COATED ORAL at 21:19

## 2021-04-29 RX ADMIN — HEPARIN SODIUM 5000 UNIT(S): 5000 INJECTION INTRAVENOUS; SUBCUTANEOUS at 05:59

## 2021-04-29 RX ADMIN — Medication 667 MILLIGRAM(S): at 08:40

## 2021-04-29 RX ADMIN — CLOPIDOGREL BISULFATE 75 MILLIGRAM(S): 75 TABLET, FILM COATED ORAL at 13:26

## 2021-04-29 RX ADMIN — LATANOPROST 1 DROP(S): 0.05 SOLUTION/ DROPS OPHTHALMIC; TOPICAL at 21:19

## 2021-04-29 NOTE — PROGRESS NOTE ADULT - ASSESSMENT
57F h/o lupus (dx 04/2017), ESRD (HD T/Th/Sat), NSTEMI (BMS 01/2018), DM2, HTN, HLD (not on medication), glaucoma, hypothyroidism p/w dizziness.    1) ESRD on HD   Routine HD TTS via left upper ext AVF  s/p HD today-flowsheet reviewed- asympotmatic hypotension--> will limit uf-- likely near edw  plan for next hd saturday  Trend bmp      2) Anemia In CKD-  Hgb at goal  trend hgb    3) HTN  metoprolol 25mg   improved bp  trend bp      For any question, call:  Cell # [phone]

## 2021-04-29 NOTE — PROGRESS NOTE ADULT - SUBJECTIVE AND OBJECTIVE BOX
DATE OF SERVICE: 04-29-21 @ 22:39    Patient is a 60y old  Female who presents with a chief complaint of dizziness / code stroke (29 Apr 2021 14:11)      INTERVAL HISTORY: feels ok  	  MEDICATIONS:  metoprolol succinate ER 25 milliGRAM(s) Oral daily        PHYSICAL EXAM:  T(C): 36.9 (04-29-21 @ 21:20), Max: 36.9 (04-29-21 @ 21:20)  HR: 64 (04-29-21 @ 21:20) (59 - 65)  BP: 125/69 (04-29-21 @ 21:20) (111/67 - 150/71)  RR: 20 (04-29-21 @ 21:20) (17 - 20)  SpO2: 97% (04-29-21 @ 21:20) (97% - 99%)  Wt(kg): --  I&O's Summary    28 Apr 2021 07:01  -  29 Apr 2021 07:00  --------------------------------------------------------  IN: 800 mL / OUT: 0 mL / NET: 800 mL    29 Apr 2021 07:01  -  29 Apr 2021 22:39  --------------------------------------------------------  IN: 420 mL / OUT: 400 mL / NET: 20 mL          Appearance: In no distress	  HEENT:    PERRL, EOMI	  Cardiovascular:  S1 S2, No JVD  Respiratory: Lungs clear to auscultation	  Gastrointestinal:  Soft, Non-tender, + BS	  Vascularature:  No edema of LE  Psychiatric: Appropriate affect   Neuro: no acute focal deficits                               12.7   3.72  )-----------( 133      ( 28 Apr 2021 07:03 )             38.0     04-28    136  |  96  |  19  ----------------------------<  203<H>  4.4   |  25  |  3.66<H>    Ca    9.9      28 Apr 2021 07:06    TPro  7.4  /  Alb  4.1  /  TBili  x   /  DBili  x   /  AST  x   /  ALT  x   /  AlkPhos  x   04-28        Labs personally reviewed      Labs personally reviewed  < from: Transthoracic Echocardiogram (04.27.21 @ 06:08) >  Fractional short: 40 %  EF (Visual Estimate): 70-75 %    < end of copied text >  < from: Transthoracic Echocardiogram (04.27.21 @ 06:08) >  1. Normal mitral valve. Minimal mitral regurgitation.  2. Aortic valve not well visualized; appears trileaflet  with normal opening. No aortic valve regurgitation seen.  3. Normal left ventricular systolic function. No segmental  wall motion abnormalities.  4. Normal right ventricular size and function.  *** Compared with echocardiogram of 5/31/2017, results are  similar on today's study.    < end of copied text >    ASSESSMENT/PLAN: 	  Problem/Plan - 1:  ·  Problem: Dizziness Plan:had episode of dizzyness and right eye blurryness. now resolved.  denies chest pain, shortness of breath, palpitations  CTA H: no significant stenosis   workup per primary   TTE with nml LV function, no WMA    Problem/Plan - 2:  ·  Problem: Coronary Artery Disease: s./p PCI mLAD 1/2018, ANABELA to RCA 1/2019 and ANABELA to diag 5/2019   given stents greather than 1 year ago no indication to continue with Plavix, unless indicated for her SLE, f/u with rheum   patient denies chest pain, shortness of breath, denies GARCIA, can ambulate 2 blocks without chest pain.  pBNP 5325, although appears relatively euvolemic on exam,denies use of diuretics at home   TTE as noted above   c/w with Toprol XL 25mg daily, asa, plaix,statin     Problem/Plan - 3:  ·  Problem: Lupus (systemic lupus erythematosus).  Plan: stable   f/u with neuro as out pt.     Problem/Plan - 4:  ·  Problem: ESR\ (end-stage renal disease) due to SLE.  Plan: nephro consult for HD   -c/w home meds.     Problem/Plan - 5:  ·  Problem: Hypertension.  Plan: c/w home meds   controlled.          Salvador Mckeon DO [facility]  Cardiovascular Medicine  [address] [place] [address], [address]  Office: [phone]  Cell: [phone]          Salvador Mckeon DO [facility]  Cardiovascular Medicine  [address] [place] [address], [address]  Office: [phone]  Cell: [phone]

## 2021-04-29 NOTE — PROGRESS NOTE ADULT - SUBJECTIVE AND OBJECTIVE BOX
Patient is a 60y old  Female who presents with a chief complaint of dizziness / code stroke (2021 18:39)      INTERVAL HPI/OVERNIGHT EVENTS: doing fair , still some dizziness   T(C): 36.9 (21 @ 21:20), Max: 36.9 (21 @ 21:20)  HR: 64 (21 @ 21:20) (59 - 65)  BP: 125/69 (21 @ 21:20) (111/67 - 150/71)  RR: 20 (21 @ 21:20) (17 - 20)  SpO2: 97% (21 @ 21:20) (97% - 99%)  Wt(kg): --  I&O's Summary    2021 07:  -  2021 07:00  --------------------------------------------------------  IN: 800 mL / OUT: 0 mL / NET: 800 mL    2021 07:01  -  2021 23:03  --------------------------------------------------------  IN: 420 mL / OUT: 400 mL / NET: 20 mL        PAST MEDICAL & SURGICAL HISTORY:  Diabetes    Hypertension    Hypercholesteremia    Essential hypertension    Type 2 diabetes mellitus without complication, without long-term current use of insulin    Other hyperlipidemia    Glaucoma    ESRD (end-stage renal disease) due to SLE    Lupus (systemic lupus erythematosus)    S/P  section  times two    H/O gastric bypass  2016    S/P appendectomy        SOCIAL HISTORY  Alcohol:  Tobacco:  Illicit substance use:    FAMILY HISTORY:    REVIEW OF SYSTEMS:  CONSTITUTIONAL: No fever, weight loss, or fatigue  EYES: No eye pain, visual disturbances, or discharge  ENMT:  No difficulty hearing, tinnitus, vertigo; No sinus or throat pain  NECK: No pain or stiffness  RESPIRATORY: No cough, wheezing, chills or hemoptysis; No shortness of breath  CARDIOVASCULAR: No chest pain, palpitations, dizziness, or leg swelling  GASTROINTESTINAL: No abdominal or epigastric pain. No nausea, vomiting, or hematemesis; No diarrhea or constipation. No melena or hematochezia.  GENITOURINARY: No dysuria, frequency, hematuria, or incontinence  NEUROLOGICAL: No headaches, memory loss, loss of strength, numbness, or tremors  SKIN: No itching, burning, rashes, or lesions   LYMPH NODES: No enlarged glands  ENDOCRINE: No heat or cold intolerance; No hair loss  MUSCULOSKELETAL: No joint pain or swelling; No muscle, back, or extremity pain  PSYCHIATRIC: No depression, anxiety, mood swings, or difficulty sleeping  HEME/LYMPH: No easy bruising, or bleeding gums  ALLERY AND IMMUNOLOGIC: No hives or eczema    RADIOLOGY & ADDITIONAL TESTS:    Imaging Personally Reviewed:  [ ] YES  [ ] NO    Consultant(s) Notes Reviewed:  [ ] YES  [ ] NO    PHYSICAL EXAM:  GENERAL: NAD, well-groomed, well-developed  HEAD:  Atraumatic, Normocephalic  EYES: EOMI, PERRLA, conjunctiva and sclera clear  ENMT: No tonsillar erythema, exudates, or enlargement; Moist mucous membranes, Good dentition, No lesions  NECK: Supple, No JVD, Normal thyroid  NERVOUS SYSTEM:  Alert & Oriented X3, Good concentration; Motor Strength 5/5 B/L upper and lower extremities; DTRs 2+ intact and symmetric  CHEST/LUNG: Clear to percussion bilaterally; No rales, rhonchi, wheezing, or rubs  HEART: Regular rate and rhythm; No murmurs, rubs, or gallops  ABDOMEN: Soft, Nontender, Nondistended; Bowel sounds present  EXTREMITIES:  2+ Peripheral Pulses, No clubbing, cyanosis, or edema  LYMPH: No lymphadenopathy noted  SKIN: No rashes or lesions    LABS:                        12.7   3.72  )-----------( 133      ( 2021 07:03 )             38.0         136  |  96  |  19  ----------------------------<  203<H>  4.4   |  25  |  3.66<H>    Ca    9.9      2021 07:06    TPro  7.4  /  Alb  4.1  /  TBili  x   /  DBili  x   /  AST  x   /  ALT  x   /  AlkPhos  x           CAPILLARY BLOOD GLUCOSE      POCT Blood Glucose.: 308 mg/dL (2021 21:18)  POCT Blood Glucose.: 225 mg/dL (2021 18:24)            MEDICATIONS  (STANDING):  aspirin enteric coated 81 milliGRAM(s) Oral daily  atorvastatin 40 milliGRAM(s) Oral at bedtime  calcium acetate 667 milliGRAM(s) Oral three times a day with meals  chlorhexidine 2% Cloths 1 Application(s) Topical daily  clopidogrel Tablet 75 milliGRAM(s) Oral daily  dextrose 40% Gel 15 Gram(s) Oral once  dextrose 5%. 1000 milliLiter(s) (50 mL/Hr) IV Continuous <Continuous>  dextrose 5%. 1000 milliLiter(s) (100 mL/Hr) IV Continuous <Continuous>  dextrose 50% Injectable 25 Gram(s) IV Push once  dextrose 50% Injectable 12.5 Gram(s) IV Push once  dextrose 50% Injectable 25 Gram(s) IV Push once  gabapentin 200 milliGRAM(s) Oral two times a day  glucagon  Injectable 1 milliGRAM(s) IntraMuscular once  heparin   Injectable 5000 Unit(s) SubCutaneous every 12 hours  insulin lispro (ADMELOG) corrective regimen sliding scale   SubCutaneous three times a day before meals  insulin lispro (ADMELOG) corrective regimen sliding scale   SubCutaneous at bedtime  latanoprost 0.005% Ophthalmic Solution 1 Drop(s) Both EYES at bedtime  levothyroxine 25 MICROGram(s) Oral daily  methylPREDNISolone sodium succinate Injectable 60 milliGRAM(s) IV Push daily  metoprolol succinate ER 25 milliGRAM(s) Oral daily  polyethylene glycol 3350 17 Gram(s) Oral daily  timolol 0.5% Solution 1 Drop(s) Both EYES daily    MEDICATIONS  (PRN):  oxycodone    5 mG/acetaminophen 325 mG 1 Tablet(s) Oral every 6 hours PRN Moderate Pain (4 - 6)      Care Discussed with Consultants/Other Providers [ ] YES  [ ] NO

## 2021-04-29 NOTE — PROGRESS NOTE ADULT - SUBJECTIVE AND OBJECTIVE BOX
Patient seen and examined  no complaints    penicillin (Rash)    Hospital Medications:   MEDICATIONS  (STANDING):  aspirin enteric coated 81 milliGRAM(s) Oral daily  atorvastatin 40 milliGRAM(s) Oral at bedtime  calcium acetate 667 milliGRAM(s) Oral three times a day with meals  chlorhexidine 2% Cloths 1 Application(s) Topical daily  clopidogrel Tablet 75 milliGRAM(s) Oral daily  gabapentin 200 milliGRAM(s) Oral two times a day  heparin   Injectable 5000 Unit(s) SubCutaneous every 12 hours  latanoprost 0.005% Ophthalmic Solution 1 Drop(s) Both EYES at bedtime  levothyroxine 25 MICROGram(s) Oral daily  methylPREDNISolone sodium succinate Injectable 60 milliGRAM(s) IV Push daily  metoprolol succinate ER 25 milliGRAM(s) Oral daily  polyethylene glycol 3350 17 Gram(s) Oral daily  timolol 0.5% Solution 1 Drop(s) Both EYES daily    VITALS:  T(F): 97.7 (04-29-21 @ 13:20), Max: 100.6 (04-28-21 @ 21:17)  HR: 60 (04-29-21 @ 13:20)  BP: 111/67 (04-29-21 @ 13:20)  RR: 18 (04-29-21 @ 13:20)  SpO2: 98% (04-29-21 @ 13:20)  Wt(kg): --    04-28 @ 07:01  -  04-29 @ 07:00  --------------------------------------------------------  IN: 800 mL / OUT: 0 mL / NET: 800 mL    04-29 @ 07:01  -  04-29 @ 14:11  --------------------------------------------------------  IN: 0 mL / OUT: 400 mL / NET: -400 mL        Physical Exam :-  Constitutional: NAD  Neck: Supple.  Respiratory: Bilateral equal breath sounds,  Cardiovascular: S1, S2 normal,  Gastrointestinal: Bowel Sounds present, soft, non tender.  Extremities: No edema  Neurological: Alert and Oriented   Psychiatric: Normal mood, normal affect    LABS:  04-28    136  |  96  |  19  ----------------------------<  203<H>  4.4   |  25  |  3.66<H>    Ca    9.9      28 Apr 2021 07:06      Creatinine Trend: 3.66 <--, 5.02 <--, 7.40 <--, 5.13 <--, 4.47 <--, 4.05 <--, 3.87 <--                        12.7   3.72  )-----------( 133      ( 28 Apr 2021 07:03 )             38.0     Urine Studies:      RADIOLOGY & ADDITIONAL STUDIES:

## 2021-04-30 LAB
A1C WITH ESTIMATED AVERAGE GLUCOSE RESULT: 5.6 % — SIGNIFICANT CHANGE UP (ref 4–5.6)
ANION GAP SERPL CALC-SCNC: 20 MMOL/L — HIGH (ref 5–17)
BUN SERPL-MCNC: 53 MG/DL — HIGH (ref 7–23)
CALCIUM SERPL-MCNC: 9.8 MG/DL — SIGNIFICANT CHANGE UP (ref 8.4–10.5)
CHLORIDE SERPL-SCNC: 96 MMOL/L — SIGNIFICANT CHANGE UP (ref 96–108)
CO2 SERPL-SCNC: 23 MMOL/L — SIGNIFICANT CHANGE UP (ref 22–31)
CREAT SERPL-MCNC: 5.15 MG/DL — HIGH (ref 0.5–1.3)
ESTIMATED AVERAGE GLUCOSE: 114 MG/DL — SIGNIFICANT CHANGE UP (ref 68–114)
GLUCOSE BLDC GLUCOMTR-MCNC: 159 MG/DL — HIGH (ref 70–99)
GLUCOSE BLDC GLUCOMTR-MCNC: 245 MG/DL — HIGH (ref 70–99)
GLUCOSE BLDC GLUCOMTR-MCNC: 266 MG/DL — HIGH (ref 70–99)
GLUCOSE BLDC GLUCOMTR-MCNC: 278 MG/DL — HIGH (ref 70–99)
GLUCOSE BLDC GLUCOMTR-MCNC: 366 MG/DL — HIGH (ref 70–99)
GLUCOSE SERPL-MCNC: 122 MG/DL — HIGH (ref 70–99)
POTASSIUM SERPL-MCNC: 4.2 MMOL/L — SIGNIFICANT CHANGE UP (ref 3.5–5.3)
POTASSIUM SERPL-SCNC: 4.2 MMOL/L — SIGNIFICANT CHANGE UP (ref 3.5–5.3)
SODIUM SERPL-SCNC: 139 MMOL/L — SIGNIFICANT CHANGE UP (ref 135–145)

## 2021-04-30 PROCEDURE — 70551 MRI BRAIN STEM W/O DYE: CPT | Mod: 26

## 2021-04-30 PROCEDURE — 70540 MRI ORBIT/FACE/NECK W/O DYE: CPT | Mod: 26

## 2021-04-30 PROCEDURE — 99232 SBSQ HOSP IP/OBS MODERATE 35: CPT | Mod: GC

## 2021-04-30 RX ADMIN — HEPARIN SODIUM 5000 UNIT(S): 5000 INJECTION INTRAVENOUS; SUBCUTANEOUS at 05:21

## 2021-04-30 RX ADMIN — Medication 1 DROP(S): at 11:50

## 2021-04-30 RX ADMIN — CHLORHEXIDINE GLUCONATE 1 APPLICATION(S): 213 SOLUTION TOPICAL at 11:50

## 2021-04-30 RX ADMIN — GABAPENTIN 200 MILLIGRAM(S): 400 CAPSULE ORAL at 17:25

## 2021-04-30 RX ADMIN — LATANOPROST 1 DROP(S): 0.05 SOLUTION/ DROPS OPHTHALMIC; TOPICAL at 23:12

## 2021-04-30 RX ADMIN — GABAPENTIN 200 MILLIGRAM(S): 400 CAPSULE ORAL at 05:20

## 2021-04-30 RX ADMIN — Medication 667 MILLIGRAM(S): at 17:25

## 2021-04-30 RX ADMIN — Medication 5: at 09:17

## 2021-04-30 RX ADMIN — Medication 60 MILLIGRAM(S): at 05:20

## 2021-04-30 RX ADMIN — HEPARIN SODIUM 5000 UNIT(S): 5000 INJECTION INTRAVENOUS; SUBCUTANEOUS at 17:26

## 2021-04-30 RX ADMIN — CLOPIDOGREL BISULFATE 75 MILLIGRAM(S): 75 TABLET, FILM COATED ORAL at 11:49

## 2021-04-30 RX ADMIN — ATORVASTATIN CALCIUM 40 MILLIGRAM(S): 80 TABLET, FILM COATED ORAL at 23:12

## 2021-04-30 RX ADMIN — Medication 3: at 14:08

## 2021-04-30 RX ADMIN — Medication 25 MILLIGRAM(S): at 05:21

## 2021-04-30 RX ADMIN — Medication 81 MILLIGRAM(S): at 11:49

## 2021-04-30 RX ADMIN — Medication 667 MILLIGRAM(S): at 08:54

## 2021-04-30 RX ADMIN — Medication 25 MICROGRAM(S): at 05:21

## 2021-04-30 RX ADMIN — Medication 667 MILLIGRAM(S): at 11:49

## 2021-04-30 RX ADMIN — Medication 2: at 17:50

## 2021-04-30 NOTE — PROGRESS NOTE ADULT - ASSESSMENT
60 Y F with a PMhx of SLE, LN (Class 5), ESRD, SLE vasculitis (involving peripheral nerves) presents for dizziness and diplopia.   Rheumatology consulted for SLE flare.    Impression:  # Diplopia, dizziness.  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   - Relevant DDx: SLE flare causing vasculitis of cranial nerves (controlling occular movements) vs Lupus cerebritis (unlikely) vs other intracranial process.     Recs:   - SLE disease activity markers not high, her dsDNA in 2018 when she was flaring was higher than now and her complement levels in 2018 were lower than now, indicating that SLE flare might not be at play to explain current symptoms; also if SLE vasculitis was responsible, her diplopia would not improve so rapidly (was improving since admission even before steroids; we however do request completion of w/u with brain MRI/MRA, orbital MRI and MRA.   - Keep 1 mg Kg IV Solumedrol for now.   - Continued neurology evaluation.     Rheum will follow  Case was seen and discussed with Dr. Shi Rojas MD  Rheum fellow PGY 4  Pager [phone]

## 2021-04-30 NOTE — DIETITIAN INITIAL EVALUATION ADULT. - OTHER CALCULATIONS
estimated energy & protein needs based on IBW of 110 pounds; defer fluid needs to team or 1000 ml + UOP

## 2021-04-30 NOTE — DIETITIAN INITIAL EVALUATION ADULT. - REASON INDICATOR FOR ASSESSMENT
Initial Nutrition Assessment for Length Of Stay on 6MON  Source: patient, medical record, pt Amharic speaking; [place]  Services offered & accepted: Colette, ID#: [id number]

## 2021-04-30 NOTE — DIETITIAN INITIAL EVALUATION ADULT. - ORAL INTAKE PTA/DIET HISTORY
Pt reports consuming small meals throughout the day at home; reports appetite was intact PTA. Pt reports following low salt, low fat diet PTA. Does not state how many meals she typically consumes/ if she does most of cooking or not. Confirms no known food allergies. Denies Hx of chewing or swallowing issues. Reports taking Vit C supplement daily.

## 2021-04-30 NOTE — DIETITIAN INITIAL EVALUATION ADULT. - PHYSCIAL ASSESSMENT
127% IBW  Skin: per flow sheets, pt with gluteal cleft wound; dry and intact per flow sheets overweight

## 2021-04-30 NOTE — PROGRESS NOTE ADULT - SUBJECTIVE AND OBJECTIVE BOX
Patient is a 60y old  Female who presents with a chief complaint of dizziness / code stroke (2021 19:17)      INTERVAL HPI/OVERNIGHT EVENTS: seen and examined, her diplopia is improved   T(C): 36.5 (21 @ 12:04), Max: 36.5 (21 @ 12:04)  HR: 65 (21 @ 14:36) (63 - 70)  BP: 134/70 (21 @ 14:36) (118/80 - 151/75)  RR: 18 (21 @ 14:36) (18 - 20)  SpO2: 99% (21 @ 14:36) (99% - 99%)  Wt(kg): --  I&O's Summary    2021 07:  -  2021 07:00  --------------------------------------------------------  IN: 900 mL / OUT: 400 mL / NET: 500 mL    2021 07:  -  2021 22:06  --------------------------------------------------------  IN: 760 mL / OUT: 0 mL / NET: 760 mL        PAST MEDICAL & SURGICAL HISTORY:  Diabetes    Hypertension    Hypercholesteremia    Essential hypertension    Type 2 diabetes mellitus without complication, without long-term current use of insulin    Other hyperlipidemia    Glaucoma    ESRD (end-stage renal disease) due to SLE    Lupus (systemic lupus erythematosus)    S/P  section  times two    H/O gastric bypass  2016    S/P appendectomy        SOCIAL HISTORY  Alcohol:  Tobacco:  Illicit substance use:    FAMILY HISTORY:    REVIEW OF SYSTEMS:  CONSTITUTIONAL: No fever, weight loss, or fatigue  EYES: No eye pain, visual disturbances, or discharge  ENMT:  No difficulty hearing, tinnitus, vertigo; No sinus or throat pain  NECK: No pain or stiffness  RESPIRATORY: No cough, wheezing, chills or hemoptysis; No shortness of breath  CARDIOVASCULAR: No chest pain, palpitations, dizziness, or leg swelling  GASTROINTESTINAL: No abdominal or epigastric pain. No nausea, vomiting, or hematemesis; No diarrhea or constipation. No melena or hematochezia.  GENITOURINARY: No dysuria, frequency, hematuria, or incontinence  NEUROLOGICAL: No headaches, memory loss, loss of strength, numbness, or tremors  SKIN: No itching, burning, rashes, or lesions   LYMPH NODES: No enlarged glands  ENDOCRINE: No heat or cold intolerance; No hair loss  MUSCULOSKELETAL: No joint pain or swelling; No muscle, back, or extremity pain  PSYCHIATRIC: No depression, anxiety, mood swings, or difficulty sleeping  HEME/LYMPH: No easy bruising, or bleeding gums  ALLERY AND IMMUNOLOGIC: No hives or eczema    RADIOLOGY & ADDITIONAL TESTS:    Imaging Personally Reviewed:  [ ] YES  [ ] NO    Consultant(s) Notes Reviewed:  [ ] YES  [ ] NO    PHYSICAL EXAM:  GENERAL: NAD, well-groomed, well-developed  HEAD:  Atraumatic, Normocephalic  EYES: EOMI, PERRLA, conjunctiva and sclera clear  ENMT: No tonsillar erythema, exudates, or enlargement; Moist mucous membranes, Good dentition, No lesions  NECK: Supple, No JVD, Normal thyroid  NERVOUS SYSTEM:  Alert & Oriented X3, Good concentration; Motor Strength 5/5 B/L upper and lower extremities; DTRs 2+ intact and symmetric  CHEST/LUNG: Clear to percussion bilaterally; No rales, rhonchi, wheezing, or rubs  HEART: Regular rate and rhythm; No murmurs, rubs, or gallops  ABDOMEN: Soft, Nontender, Nondistended; Bowel sounds present  EXTREMITIES:  2+ Peripheral Pulses, No clubbing, cyanosis, or edema  LYMPH: No lymphadenopathy noted  SKIN: No rashes or lesions    LABS:        139  |  96  |  53<H>  ----------------------------<  122<H>  4.2   |  23  |  5.15<H>    Ca    9.8      2021 06:04          CAPILLARY BLOOD GLUCOSE      POCT Blood Glucose.: 245 mg/dL (2021 17:44)  POCT Blood Glucose.: 266 mg/dL (2021 14:07)  POCT Blood Glucose.: 278 mg/dL (2021 12:53)  POCT Blood Glucose.: 366 mg/dL (2021 09:15)            MEDICATIONS  (STANDING):  aspirin enteric coated 81 milliGRAM(s) Oral daily  atorvastatin 40 milliGRAM(s) Oral at bedtime  calcium acetate 667 milliGRAM(s) Oral three times a day with meals  chlorhexidine 2% Cloths 1 Application(s) Topical daily  clopidogrel Tablet 75 milliGRAM(s) Oral daily  dextrose 40% Gel 15 Gram(s) Oral once  dextrose 5%. 1000 milliLiter(s) (50 mL/Hr) IV Continuous <Continuous>  dextrose 5%. 1000 milliLiter(s) (100 mL/Hr) IV Continuous <Continuous>  dextrose 50% Injectable 25 Gram(s) IV Push once  dextrose 50% Injectable 12.5 Gram(s) IV Push once  dextrose 50% Injectable 25 Gram(s) IV Push once  gabapentin 200 milliGRAM(s) Oral two times a day  glucagon  Injectable 1 milliGRAM(s) IntraMuscular once  heparin   Injectable 5000 Unit(s) SubCutaneous every 12 hours  insulin lispro (ADMELOG) corrective regimen sliding scale   SubCutaneous three times a day before meals  insulin lispro (ADMELOG) corrective regimen sliding scale   SubCutaneous at bedtime  latanoprost 0.005% Ophthalmic Solution 1 Drop(s) Both EYES at bedtime  levothyroxine 25 MICROGram(s) Oral daily  methylPREDNISolone sodium succinate Injectable 60 milliGRAM(s) IV Push daily  metoprolol succinate ER 25 milliGRAM(s) Oral daily  polyethylene glycol 3350 17 Gram(s) Oral daily  timolol 0.5% Solution 1 Drop(s) Both EYES daily    MEDICATIONS  (PRN):  oxycodone    5 mG/acetaminophen 325 mG 1 Tablet(s) Oral every 6 hours PRN Moderate Pain (4 - 6)      Care Discussed with Consultants/Other Providers [ ] YES  [ ] NO

## 2021-04-30 NOTE — PROGRESS NOTE ADULT - ASSESSMENT
57F h/o lupus (dx 04/2017), ESRD (HD T/Th/Sat), NSTEMI (BMS 01/2018), DM2, HTN, HLD (not on medication), glaucoma, hypothyroidism p/w dizziness.    1) ESRD on HD   Routine HD TTS via left upper ext AVF  s/p HD yesterday-flowsheet reviewed- asympotmatic hypotension--> will limit uf-- likely near edw  plan for next hd tomm  Trend bmp      2) Anemia In CKD-  Hgb at goal  trend hgb    3) HTN  metoprolol 25mg   improved bp  trend bp      For any question, call:  Cell # [phone]

## 2021-04-30 NOTE — DIETITIAN INITIAL EVALUATION ADULT. - CHIEF COMPLAINT
"56 y/o female with h/o lupus (dx 04/2017), ESRD (HD T/Th/Sat), NSTEMI (BMS 01/2018), DM2, HTN, HLD (not on medication), glaucoma, hypothyroidism p/w dizziness"

## 2021-04-30 NOTE — DIETITIAN INITIAL EVALUATION ADULT. - OTHER INFO
Upon RD visit, pt reports appetite remains intact. Pt reports typically consuming smaller meals because if she eats too heavy in one setting she has reflux. Pt denies nausea, vomiting, constipation or diarrhea at this time. Last bowel movement 4/28 per flow sheets.    Pt reports UBW is ~140 pounds and denies any significant weight changes PTA which is consistent with dosing weight of 139 pounds. Noted most recent bed weight of 157.8 lbs (likely related to fluid shifts at this time).  Will continue to monitor trends as able.    Pt denies need for additional HD nutrition education at this time. Made pt aware RD remains available as needed. RD emphasized importance of adequate protein intake during HD; pt declines need for oral nutrition supplementation at this time.

## 2021-04-30 NOTE — DIETITIAN INITIAL EVALUATION ADULT. - ADD RECOMMEND
1. Monitor diet, PO intake, GI status, weight, labs, skin integrity 2. Continue current diet as tolerated 3. Honor pt food preferences to promote adequate oral intake while in-house 4. Reinforce diet education as accepted 1. Monitor diet, PO intake, GI status, weight, labs, skin integrity 2. Continue current diet as tolerated 3. Honor pt food preferences to promote adequate oral intake while in-house 4. Reinforce diet education as accepted 5. Consider adding Nephro-Bravo daily if no contraindications

## 2021-04-30 NOTE — PROGRESS NOTE ADULT - SUBJECTIVE AND OBJECTIVE BOX
Patient seen and examined  no complaints    penicillin (Rash)    Hospital Medications:   MEDICATIONS  (STANDING):  aspirin enteric coated 81 milliGRAM(s) Oral daily  atorvastatin 40 milliGRAM(s) Oral at bedtime  calcium acetate 667 milliGRAM(s) Oral three times a day with meals  chlorhexidine 2% Cloths 1 Application(s) Topical daily  clopidogrel Tablet 75 milliGRAM(s) Oral daily  dextrose 40% Gel 15 Gram(s) Oral once  dextrose 5%. 1000 milliLiter(s) (50 mL/Hr) IV Continuous <Continuous>  dextrose 5%. 1000 milliLiter(s) (100 mL/Hr) IV Continuous <Continuous>  dextrose 50% Injectable 25 Gram(s) IV Push once  dextrose 50% Injectable 12.5 Gram(s) IV Push once  dextrose 50% Injectable 25 Gram(s) IV Push once  gabapentin 200 milliGRAM(s) Oral two times a day  glucagon  Injectable 1 milliGRAM(s) IntraMuscular once  heparin   Injectable 5000 Unit(s) SubCutaneous every 12 hours  insulin lispro (ADMELOG) corrective regimen sliding scale   SubCutaneous three times a day before meals  insulin lispro (ADMELOG) corrective regimen sliding scale   SubCutaneous at bedtime  latanoprost 0.005% Ophthalmic Solution 1 Drop(s) Both EYES at bedtime  levothyroxine 25 MICROGram(s) Oral daily  methylPREDNISolone sodium succinate Injectable 60 milliGRAM(s) IV Push daily  metoprolol succinate ER 25 milliGRAM(s) Oral daily  polyethylene glycol 3350 17 Gram(s) Oral daily  timolol 0.5% Solution 1 Drop(s) Both EYES daily        VITALS:  T(F): 97.7 (04-30-21 @ 12:04), Max: 98.4 (04-29-21 @ 21:20)  HR: 63 (04-30-21 @ 12:04)  BP: 151/75 (04-30-21 @ 12:04)  RR: 20 (04-30-21 @ 12:04)  SpO2: 99% (04-30-21 @ 12:04)  Wt(kg): --    04-29 @ 07:01  -  04-30 @ 07:00  --------------------------------------------------------  IN: 900 mL / OUT: 400 mL / NET: 500 mL        Physical Exam :-  Constitutional: NAD  Neck: Supple.  Respiratory: Bilateral equal breath sounds,  Cardiovascular: S1, S2 normal,  Gastrointestinal: Bowel Sounds present, soft, non tender.  Extremities: No edema  Neurological: Alert and Oriented   Psychiatric: Normal mood, normal affect  Vascular Access: left upper ext avf +thrill    LABS:  04-30    139  |  96  |  53<H>  ----------------------------<  122<H>  4.2   |  23  |  5.15<H>    Ca    9.8      30 Apr 2021 06:04      Creatinine Trend: 5.15 <--, 3.66 <--, 5.02 <--, 7.40 <--, 5.13 <--, 4.47 <--, 4.05 <--, 3.87 <--    Urine Studies:      RADIOLOGY & ADDITIONAL STUDIES:

## 2021-04-30 NOTE — DIETITIAN INITIAL EVALUATION ADULT. - PERTINENT MEDS FT
MEDICATIONS  (STANDING):  aspirin enteric coated 81 milliGRAM(s) Oral daily  atorvastatin 40 milliGRAM(s) Oral at bedtime  calcium acetate 667 milliGRAM(s) Oral three times a day with meals  chlorhexidine 2% Cloths 1 Application(s) Topical daily  clopidogrel Tablet 75 milliGRAM(s) Oral daily  dextrose 40% Gel 15 Gram(s) Oral once  dextrose 5%. 1000 milliLiter(s) (50 mL/Hr) IV Continuous <Continuous>  dextrose 5%. 1000 milliLiter(s) (100 mL/Hr) IV Continuous <Continuous>  dextrose 50% Injectable 25 Gram(s) IV Push once  dextrose 50% Injectable 12.5 Gram(s) IV Push once  dextrose 50% Injectable 25 Gram(s) IV Push once  gabapentin 200 milliGRAM(s) Oral two times a day  glucagon  Injectable 1 milliGRAM(s) IntraMuscular once  heparin   Injectable 5000 Unit(s) SubCutaneous every 12 hours  insulin lispro (ADMELOG) corrective regimen sliding scale   SubCutaneous three times a day before meals  insulin lispro (ADMELOG) corrective regimen sliding scale   SubCutaneous at bedtime  latanoprost 0.005% Ophthalmic Solution 1 Drop(s) Both EYES at bedtime  levothyroxine 25 MICROGram(s) Oral daily  methylPREDNISolone sodium succinate Injectable 60 milliGRAM(s) IV Push daily  metoprolol succinate ER 25 milliGRAM(s) Oral daily  polyethylene glycol 3350 17 Gram(s) Oral daily  timolol 0.5% Solution 1 Drop(s) Both EYES daily    MEDICATIONS  (PRN):  oxycodone    5 mG/acetaminophen 325 mG 1 Tablet(s) Oral every 6 hours PRN Moderate Pain (4 - 6)

## 2021-04-30 NOTE — DIETITIAN INITIAL EVALUATION ADULT. - REASON
Nutrition Focused Physical exam deferred at this time; pt with no overt signs of muscle/fat loss noted

## 2021-04-30 NOTE — DIETITIAN INITIAL EVALUATION ADULT. - PERTINENT LABORATORY DATA
4/30: POCT Gluc: 366, 308  BUN: 53 H  Cr: 5.15 H  Gluc: 122 H  eGFR: 8 L  4/30: HbA1c: within normal limits - though pt on HD and could be falsely depressed (pt blood glucose being managed with admelog and lantus inpatient)

## 2021-04-30 NOTE — PROGRESS NOTE ADULT - SUBJECTIVE AND OBJECTIVE BOX
WILLARD BOB  [id number]    INTERVAL HPI/OVERNIGHT EVENTS:    The patient is lying in bed, says she feels well.    Denies CP, SOB, n/v/d, fever or chills.     No events over night.     MEDICATIONS  (STANDING):  aspirin enteric coated 81 milliGRAM(s) Oral daily  atorvastatin 40 milliGRAM(s) Oral at bedtime  calcium acetate 667 milliGRAM(s) Oral three times a day with meals  chlorhexidine 2% Cloths 1 Application(s) Topical daily  clopidogrel Tablet 75 milliGRAM(s) Oral daily  dextrose 40% Gel 15 Gram(s) Oral once  dextrose 5%. 1000 milliLiter(s) (50 mL/Hr) IV Continuous <Continuous>  dextrose 5%. 1000 milliLiter(s) (100 mL/Hr) IV Continuous <Continuous>  dextrose 50% Injectable 25 Gram(s) IV Push once  dextrose 50% Injectable 12.5 Gram(s) IV Push once  dextrose 50% Injectable 25 Gram(s) IV Push once  gabapentin 200 milliGRAM(s) Oral two times a day  glucagon  Injectable 1 milliGRAM(s) IntraMuscular once  heparin   Injectable 5000 Unit(s) SubCutaneous every 12 hours  insulin lispro (ADMELOG) corrective regimen sliding scale   SubCutaneous three times a day before meals  insulin lispro (ADMELOG) corrective regimen sliding scale   SubCutaneous at bedtime  latanoprost 0.005% Ophthalmic Solution 1 Drop(s) Both EYES at bedtime  levothyroxine 25 MICROGram(s) Oral daily  methylPREDNISolone sodium succinate Injectable 60 milliGRAM(s) IV Push daily  metoprolol succinate ER 25 milliGRAM(s) Oral daily  polyethylene glycol 3350 17 Gram(s) Oral daily  timolol 0.5% Solution 1 Drop(s) Both EYES daily    MEDICATIONS  (PRN):  oxycodone    5 mG/acetaminophen 325 mG 1 Tablet(s) Oral every 6 hours PRN Moderate Pain (4 - 6)    Vital Signs Last 24 Hrs  T(C): 36.5 (30 Apr 2021 12:04), Max: 36.9 (29 Apr 2021 21:20)  T(F): 97.7 (30 Apr 2021 12:04), Max: 98.4 (29 Apr 2021 21:20)  HR: 65 (30 Apr 2021 14:36) (63 - 70)  BP: 134/70 (30 Apr 2021 14:36) (118/80 - 151/75)  BP(mean): --  RR: 18 (30 Apr 2021 14:36) (18 - 20)  SpO2: 99% (30 Apr 2021 14:36) (97% - 99%)    Physical Exam:  General: No apparent distress  HEENT: EOMI, MMM  CVS: +S1/S2, RRR  Resp: CTA b/l  GI: Soft, NT/ND  MSK: No signs of synovitis, limitation of ROM in any joint.   Neuro: AAOx3. Extra occular eye movements intact but occasional deviation of left eye. Power 4/5 in LLE and 3/5 in RLE.   Skin: No rashes    LABS:    04-30    139  |  96  |  53<H>  ----------------------------<  122<H>  4.2   |  23  |  5.15<H>    Ca    9.8      30 Apr 2021 06:04    RADIOLOGY & ADDITIONAL TESTS:    RADIOLOGY & ADDITIONAL STUDIES:  EXAM:  CT ANGIO NECK (W)AW IC                          EXAM:  CT ANGIO BRAIN (W)AW IC                            PROCEDURE DATE:  04/24/2021            INTERPRETATION:  CLINICAL INFORMATION: Code stroke. 3rd nerve palsy.    TECHNIQUE:  CT angiography of the neck and brain.  Two-dimensional sagittal and coronal reformats and maximum intensity projection reformats were created.  Intravenous Contrast: 70 mL Omnipaque-300 was administered. 30 mL was discarded.    COMPARISON: Noncontrast head CT from earlier same day.    FINDINGS:    CTA NECK:  ARCH: Three-vessel aortic arch. No evidence of aortic dissection or hemodynamically significant stenosis. Left subclavian venous stent.  COMMON CAROTIDS: Normal in caliber without significant stenosis.  CAROTID BIFURCATION/INTERNAL: Normal in caliber without significant stenosis.  VERTEBRAL ARTERIES: Left vertebral artery dominant.. Normal in caliber without significant stenosis.  UPPER NECK/CHEST: Unremarkable.  OSSEUS STRUCTURES: Unremarkable.  Leftsubclavian artery stents are noted. Vascular stents are noted.    CTA BRAIN: Moderate calcific plaque in bilateral cavernous carotid artery causing moderate stenosis on the right side and mild stenosis on the left side. Mild calcific plaque in the left vertebral artery V4 segment  [place]: No other evidence of significant stenosis, major vessel occlusion, or aneurysm.  VERTEBROBASILAR SYSTEM: No evidence of significant stenosis, major vessel occlusion, or aneurysm.  DEEP VENOUS SYSTEMS: Unremarkable.      IMPRESSION:    CTA neck and brain: No large vessel occlusion or high-grade significant stenosis.              GAVIOTA LAND MD; Resident Radiology  This document has been electronically signed.  DEMOND GARBER MD; Attending Radiologist  This document has been electronically signed. Apr 24 2021  4:58PM

## 2021-05-01 LAB
B BURGDOR DNA SPEC QL NAA+PROBE: NEGATIVE — SIGNIFICANT CHANGE UP
COPPER SERPL-MCNC: 96 UG/DL — SIGNIFICANT CHANGE UP (ref 80–158)
GLUCOSE BLDC GLUCOMTR-MCNC: 105 MG/DL — HIGH (ref 70–99)
GLUCOSE BLDC GLUCOMTR-MCNC: 140 MG/DL — HIGH (ref 70–99)
GLUCOSE BLDC GLUCOMTR-MCNC: 214 MG/DL — HIGH (ref 70–99)
GLUCOSE BLDC GLUCOMTR-MCNC: 218 MG/DL — HIGH (ref 70–99)
HCT VFR BLD CALC: 37.7 % — SIGNIFICANT CHANGE UP (ref 34.5–45)
HGB BLD-MCNC: 12.8 G/DL — SIGNIFICANT CHANGE UP (ref 11.5–15.5)
MCHC RBC-ENTMCNC: 32.6 PG — SIGNIFICANT CHANGE UP (ref 27–34)
MCHC RBC-ENTMCNC: 34 GM/DL — SIGNIFICANT CHANGE UP (ref 32–36)
MCV RBC AUTO: 95.9 FL — SIGNIFICANT CHANGE UP (ref 80–100)
NRBC # BLD: 0 /100 WBCS — SIGNIFICANT CHANGE UP (ref 0–0)
PLATELET # BLD AUTO: 168 K/UL — SIGNIFICANT CHANGE UP (ref 150–400)
RBC # BLD: 3.93 M/UL — SIGNIFICANT CHANGE UP (ref 3.8–5.2)
RBC # FLD: 13.5 % — SIGNIFICANT CHANGE UP (ref 10.3–14.5)
SARS-COV-2 RNA SPEC QL NAA+PROBE: SIGNIFICANT CHANGE UP
WBC # BLD: 10.43 K/UL — SIGNIFICANT CHANGE UP (ref 3.8–10.5)
WBC # FLD AUTO: 10.43 K/UL — SIGNIFICANT CHANGE UP (ref 3.8–10.5)

## 2021-05-01 RX ADMIN — Medication 667 MILLIGRAM(S): at 11:31

## 2021-05-01 RX ADMIN — Medication 25 MICROGRAM(S): at 05:19

## 2021-05-01 RX ADMIN — Medication 667 MILLIGRAM(S): at 07:46

## 2021-05-01 RX ADMIN — GABAPENTIN 200 MILLIGRAM(S): 400 CAPSULE ORAL at 05:19

## 2021-05-01 RX ADMIN — Medication 667 MILLIGRAM(S): at 18:10

## 2021-05-01 RX ADMIN — CHLORHEXIDINE GLUCONATE 1 APPLICATION(S): 213 SOLUTION TOPICAL at 11:32

## 2021-05-01 RX ADMIN — Medication 81 MILLIGRAM(S): at 11:31

## 2021-05-01 RX ADMIN — HEPARIN SODIUM 5000 UNIT(S): 5000 INJECTION INTRAVENOUS; SUBCUTANEOUS at 18:10

## 2021-05-01 RX ADMIN — Medication 60 MILLIGRAM(S): at 05:18

## 2021-05-01 RX ADMIN — Medication 2: at 13:04

## 2021-05-01 RX ADMIN — LATANOPROST 1 DROP(S): 0.05 SOLUTION/ DROPS OPHTHALMIC; TOPICAL at 21:53

## 2021-05-01 RX ADMIN — HEPARIN SODIUM 5000 UNIT(S): 5000 INJECTION INTRAVENOUS; SUBCUTANEOUS at 05:18

## 2021-05-01 RX ADMIN — Medication 1 DROP(S): at 11:32

## 2021-05-01 RX ADMIN — GABAPENTIN 200 MILLIGRAM(S): 400 CAPSULE ORAL at 18:10

## 2021-05-01 RX ADMIN — ATORVASTATIN CALCIUM 40 MILLIGRAM(S): 80 TABLET, FILM COATED ORAL at 21:51

## 2021-05-01 RX ADMIN — CLOPIDOGREL BISULFATE 75 MILLIGRAM(S): 75 TABLET, FILM COATED ORAL at 11:31

## 2021-05-01 RX ADMIN — Medication 25 MILLIGRAM(S): at 05:19

## 2021-05-01 NOTE — PROGRESS NOTE ADULT - ASSESSMENT
57F h/o lupus (dx 04/2017), ESRD (HD T/Th/Sat), NSTEMI (BMS 01/2018), DM2, HTN, HLD (not on medication), glaucoma, hypothyroidism p/w dizziness.    1) ESRD on HD : Tuesday, Thursday and Saturday   Routine HD TTS via left upper ext AVF  hemodialysis today   no cramps last treatment       2) Anemia In CKD-  Hgb at goal  trend hgb    3) HTN  metoprolol 25mg   improved bp  trend bp      contact with question   cell [phone]  Ans Serv- [phone]

## 2021-05-01 NOTE — PROGRESS NOTE ADULT - SUBJECTIVE AND OBJECTIVE BOX
Patient is a 60y old  Female who presents with a chief complaint of dizziness / code stroke (01 May 2021 10:51)      INTERVAL HPI/OVERNIGHT EVENTS: doing better , no diplopia   MRI done : result noted     T(C): 37.1 (21 @ 21:03), Max: 37.1 (21 @ 21:03)  HR: 66 (21 @ 21:03) (56 - 66)  BP: 135/62 (21 @ 21:03) (128/70 - 153/65)  RR: 18 (21 @ 21:03) (17 - 18)  SpO2: 100% (21 @ 21:03) (99% - 100%)  Wt(kg): --  I&O's Summary    2021 07:  -  01 May 2021 07:00  --------------------------------------------------------  IN: 1240 mL / OUT: 0 mL / NET: 1240 mL    01 May 2021 07:01  -  01 May 2021 22:05  --------------------------------------------------------  IN: 600 mL / OUT: 500 mL / NET: 100 mL        PAST MEDICAL & SURGICAL HISTORY:  Diabetes    Hypertension    Hypercholesteremia    Essential hypertension    Type 2 diabetes mellitus without complication, without long-term current use of insulin    Other hyperlipidemia    Glaucoma    ESRD (end-stage renal disease) due to SLE    Lupus (systemic lupus erythematosus)    S/P  section  times two    H/O gastric bypass  2016    S/P appendectomy        SOCIAL HISTORY  Alcohol:  Tobacco:  Illicit substance use:    FAMILY HISTORY:    REVIEW OF SYSTEMS:  CONSTITUTIONAL: No fever, weight loss, or fatigue  EYES: No eye pain, visual disturbances, or discharge  ENMT:  No difficulty hearing, tinnitus, vertigo; No sinus or throat pain  NECK: No pain or stiffness  RESPIRATORY: No cough, wheezing, chills or hemoptysis; No shortness of breath  CARDIOVASCULAR: No chest pain, palpitations, dizziness, or leg swelling  GASTROINTESTINAL: No abdominal or epigastric pain. No nausea, vomiting, or hematemesis; No diarrhea or constipation. No melena or hematochezia.  GENITOURINARY: No dysuria, frequency, hematuria, or incontinence  NEUROLOGICAL: No headaches, memory loss, loss of strength, numbness, or tremors  SKIN: No itching, burning, rashes, or lesions   LYMPH NODES: No enlarged glands  ENDOCRINE: No heat or cold intolerance; No hair loss  MUSCULOSKELETAL: No joint pain or swelling; No muscle, back, or extremity pain  PSYCHIATRIC: No depression, anxiety, mood swings, or difficulty sleeping  HEME/LYMPH: No easy bruising, or bleeding gums  ALLERY AND IMMUNOLOGIC: No hives or eczema    RADIOLOGY & ADDITIONAL TESTS:    Imaging Personally Reviewed:  [ ] YES  [ ] NO    Consultant(s) Notes Reviewed:  [ ] YES  [ ] NO    PHYSICAL EXAM:  GENERAL: NAD, well-groomed, well-developed  HEAD:  Atraumatic, Normocephalic  EYES: EOMI, PERRLA, conjunctiva and sclera clear  ENMT: No tonsillar erythema, exudates, or enlargement; Moist mucous membranes, Good dentition, No lesions  NECK: Supple, No JVD, Normal thyroid  NERVOUS SYSTEM:  Alert & Oriented X3, Good concentration; Motor Strength 5/5 B/L upper and lower extremities; DTRs 2+ intact and symmetric  CHEST/LUNG: Clear to percussion bilaterally; No rales, rhonchi, wheezing, or rubs  HEART: Regular rate and rhythm; No murmurs, rubs, or gallops  ABDOMEN: Soft, Nontender, Nondistended; Bowel sounds present  EXTREMITIES:  2+ Peripheral Pulses, No clubbing, cyanosis, or edema  LYMPH: No lymphadenopathy noted  SKIN: No rashes or lesions    LABS:                        12.8   10.43 )-----------( 168      ( 01 May 2021 06:35 )             37.7     04-30    139  |  96  |  53<H>  ----------------------------<  122<H>  4.2   |  23  |  5.15<H>    Ca    9.8      2021 06:04          CAPILLARY BLOOD GLUCOSE      POCT Blood Glucose.: 214 mg/dL (01 May 2021 21:38)  POCT Blood Glucose.: 105 mg/dL (01 May 2021 18:09)  POCT Blood Glucose.: 218 mg/dL (01 May 2021 13:02)  POCT Blood Glucose.: 140 mg/dL (01 May 2021 07:38)  POCT Blood Glucose.: 159 mg/dL (2021 22:56)            MEDICATIONS  (STANDING):  aspirin enteric coated 81 milliGRAM(s) Oral daily  atorvastatin 40 milliGRAM(s) Oral at bedtime  calcium acetate 667 milliGRAM(s) Oral three times a day with meals  chlorhexidine 2% Cloths 1 Application(s) Topical daily  clopidogrel Tablet 75 milliGRAM(s) Oral daily  dextrose 40% Gel 15 Gram(s) Oral once  dextrose 5%. 1000 milliLiter(s) (50 mL/Hr) IV Continuous <Continuous>  dextrose 5%. 1000 milliLiter(s) (100 mL/Hr) IV Continuous <Continuous>  dextrose 50% Injectable 25 Gram(s) IV Push once  dextrose 50% Injectable 12.5 Gram(s) IV Push once  dextrose 50% Injectable 25 Gram(s) IV Push once  gabapentin 200 milliGRAM(s) Oral two times a day  glucagon  Injectable 1 milliGRAM(s) IntraMuscular once  heparin   Injectable 5000 Unit(s) SubCutaneous every 12 hours  insulin lispro (ADMELOG) corrective regimen sliding scale   SubCutaneous three times a day before meals  insulin lispro (ADMELOG) corrective regimen sliding scale   SubCutaneous at bedtime  latanoprost 0.005% Ophthalmic Solution 1 Drop(s) Both EYES at bedtime  levothyroxine 25 MICROGram(s) Oral daily  methylPREDNISolone sodium succinate Injectable 60 milliGRAM(s) IV Push daily  metoprolol succinate ER 25 milliGRAM(s) Oral daily  polyethylene glycol 3350 17 Gram(s) Oral daily  timolol 0.5% Solution 1 Drop(s) Both EYES daily    MEDICATIONS  (PRN):  oxycodone    5 mG/acetaminophen 325 mG 1 Tablet(s) Oral every 6 hours PRN Moderate Pain (4 - 6)      Care Discussed with Consultants/Other Providers [ ] YES  [ ] NO

## 2021-05-01 NOTE — PROGRESS NOTE ADULT - SUBJECTIVE AND OBJECTIVE BOX
[facility] : Ans Serv [phone], Office [phone]  Dr Whitmore/Dr Bah / Dr Tai JULIAN /Dr Demetrius lara /Dr ELIEL Kincaid/Dr Berny Seth/Dr Jaylon Enriquez /Dr STEPH Stanley  _______________________________________________________________________________________________    seen and examined today for End Stage Renal Disease on Dialysis   VITALS:  T(F): 97.5 (05-01-21 @ 05:20), Max: 98 (04-30-21 @ 23:20)  HR: 65 (05-01-21 @ 05:20)  BP: 147/76 (05-01-21 @ 05:20)  RR: 18 (05-01-21 @ 05:20)  SpO2: 100% (05-01-21 @ 05:20)  04-30 @ 07:01  -  05-01 @ 07:00  --------------------------------------------------------  IN: 1240 mL / OUT: 0 mL / NET: 1240 mL    05-01 @ 07:01 - 05-01 @ 10:51  --------------------------------------------------------  IN: 240 mL / OUT: 0 mL / NET: 240 mL    Physical Exam :-  Constitutional: NAD  Neck: Supple.  Respiratory: Bilateral equal breath sounds, few Crackles present.  Cardiovascular: S1, S2 normal, positive Murmur  Gastrointestinal: Bowel Sounds present, soft, non tender.  Extremities: no Edema Feet    Data:-  Allergies :   penicillin (Rash)    Hospital Medications:   MEDICATIONS  (STANDING):  aspirin enteric coated 81 milliGRAM(s) Oral daily  atorvastatin 40 milliGRAM(s) Oral at bedtime  calcium acetate 667 milliGRAM(s) Oral three times a day with meals  chlorhexidine 2% Cloths 1 Application(s) Topical daily  clopidogrel Tablet 75 milliGRAM(s) Oral daily  dextrose 40% Gel 15 Gram(s) Oral once  dextrose 5%. 1000 milliLiter(s) (50 mL/Hr) IV Continuous <Continuous>  dextrose 5%. 1000 milliLiter(s) (100 mL/Hr) IV Continuous <Continuous>  dextrose 50% Injectable 25 Gram(s) IV Push once  dextrose 50% Injectable 12.5 Gram(s) IV Push once  dextrose 50% Injectable 25 Gram(s) IV Push once  gabapentin 200 milliGRAM(s) Oral two times a day  glucagon  Injectable 1 milliGRAM(s) IntraMuscular once  heparin   Injectable 5000 Unit(s) SubCutaneous every 12 hours  insulin lispro (ADMELOG) corrective regimen sliding scale   SubCutaneous three times a day before meals  insulin lispro (ADMELOG) corrective regimen sliding scale   SubCutaneous at bedtime  latanoprost 0.005% Ophthalmic Solution 1 Drop(s) Both EYES at bedtime  levothyroxine 25 MICROGram(s) Oral daily  methylPREDNISolone sodium succinate Injectable 60 milliGRAM(s) IV Push daily  metoprolol succinate ER 25 milliGRAM(s) Oral daily  polyethylene glycol 3350 17 Gram(s) Oral daily  timolol 0.5% Solution 1 Drop(s) Both EYES daily    04-30    139  |  96  |  53<H>  ----------------------------<  122<H>  4.2   |  23  |  5.15<H>    Ca    9.8      30 Apr 2021 06:04      Creatinine Trend: 5.15 <--, 3.66 <--, 5.02 <--, 7.40 <--, 5.13 <--, 4.47 <--, 4.05 <--, 3.87 <--                        12.8   10.43 )-----------( 168      ( 01 May 2021 06:35 )             37.7

## 2021-05-02 LAB
ANION GAP SERPL CALC-SCNC: 18 MMOL/L — HIGH (ref 5–17)
BUN SERPL-MCNC: 46 MG/DL — HIGH (ref 7–23)
CALCIUM SERPL-MCNC: 9.2 MG/DL — SIGNIFICANT CHANGE UP (ref 8.4–10.5)
CHLORIDE SERPL-SCNC: 94 MMOL/L — LOW (ref 96–108)
CO2 SERPL-SCNC: 24 MMOL/L — SIGNIFICANT CHANGE UP (ref 22–31)
CREAT SERPL-MCNC: 4.82 MG/DL — HIGH (ref 0.5–1.3)
GLUCOSE BLDC GLUCOMTR-MCNC: 177 MG/DL — HIGH (ref 70–99)
GLUCOSE BLDC GLUCOMTR-MCNC: 232 MG/DL — HIGH (ref 70–99)
GLUCOSE BLDC GLUCOMTR-MCNC: 249 MG/DL — HIGH (ref 70–99)
GLUCOSE BLDC GLUCOMTR-MCNC: 289 MG/DL — HIGH (ref 70–99)
GLUCOSE BLDC GLUCOMTR-MCNC: 414 MG/DL — HIGH (ref 70–99)
GLUCOSE BLDC GLUCOMTR-MCNC: 418 MG/DL — HIGH (ref 70–99)
GLUCOSE BLDC GLUCOMTR-MCNC: 450 MG/DL — CRITICAL HIGH (ref 70–99)
GLUCOSE SERPL-MCNC: 107 MG/DL — HIGH (ref 70–99)
MAGNESIUM SERPL-MCNC: 2.3 MG/DL — SIGNIFICANT CHANGE UP (ref 1.6–2.6)
PHOSPHATE SERPL-MCNC: 4.3 MG/DL — SIGNIFICANT CHANGE UP (ref 2.5–4.5)
POTASSIUM SERPL-MCNC: 4 MMOL/L — SIGNIFICANT CHANGE UP (ref 3.5–5.3)
POTASSIUM SERPL-SCNC: 4 MMOL/L — SIGNIFICANT CHANGE UP (ref 3.5–5.3)
SODIUM SERPL-SCNC: 136 MMOL/L — SIGNIFICANT CHANGE UP (ref 135–145)

## 2021-05-02 RX ADMIN — Medication 60 MILLIGRAM(S): at 05:41

## 2021-05-02 RX ADMIN — Medication 81 MILLIGRAM(S): at 11:24

## 2021-05-02 RX ADMIN — Medication 25 MILLIGRAM(S): at 09:13

## 2021-05-02 RX ADMIN — CLOPIDOGREL BISULFATE 75 MILLIGRAM(S): 75 TABLET, FILM COATED ORAL at 11:25

## 2021-05-02 RX ADMIN — LATANOPROST 1 DROP(S): 0.05 SOLUTION/ DROPS OPHTHALMIC; TOPICAL at 21:57

## 2021-05-02 RX ADMIN — Medication 25 MICROGRAM(S): at 05:40

## 2021-05-02 RX ADMIN — CHLORHEXIDINE GLUCONATE 1 APPLICATION(S): 213 SOLUTION TOPICAL at 11:25

## 2021-05-02 RX ADMIN — Medication 2: at 17:04

## 2021-05-02 RX ADMIN — GABAPENTIN 200 MILLIGRAM(S): 400 CAPSULE ORAL at 17:03

## 2021-05-02 RX ADMIN — Medication 6: at 12:39

## 2021-05-02 RX ADMIN — Medication 667 MILLIGRAM(S): at 12:38

## 2021-05-02 RX ADMIN — HEPARIN SODIUM 5000 UNIT(S): 5000 INJECTION INTRAVENOUS; SUBCUTANEOUS at 05:41

## 2021-05-02 RX ADMIN — Medication 1: at 09:08

## 2021-05-02 RX ADMIN — Medication 1: at 22:22

## 2021-05-02 RX ADMIN — POLYETHYLENE GLYCOL 3350 17 GRAM(S): 17 POWDER, FOR SOLUTION ORAL at 11:25

## 2021-05-02 RX ADMIN — ATORVASTATIN CALCIUM 40 MILLIGRAM(S): 80 TABLET, FILM COATED ORAL at 21:57

## 2021-05-02 RX ADMIN — Medication 1 DROP(S): at 11:27

## 2021-05-02 RX ADMIN — Medication 667 MILLIGRAM(S): at 09:09

## 2021-05-02 RX ADMIN — GABAPENTIN 200 MILLIGRAM(S): 400 CAPSULE ORAL at 05:39

## 2021-05-02 RX ADMIN — Medication 667 MILLIGRAM(S): at 17:03

## 2021-05-02 RX ADMIN — HEPARIN SODIUM 5000 UNIT(S): 5000 INJECTION INTRAVENOUS; SUBCUTANEOUS at 17:03

## 2021-05-02 NOTE — PROGRESS NOTE ADULT - ASSESSMENT
57F h/o lupus (dx 04/2017), ESRD (HD T/Th/Sat), NSTEMI (BMS 01/2018), DM2, HTN, HLD (not on medication), glaucoma, hypothyroidism p/w dizziness.    1) ESRD on HD : Tuesday, Thursday and Saturday   Routine HD TTS via left upper ext AVF  hemodialysis- next tuesday  no cramps last treatment       2) Anemia In CKD-  Hgb at goal  trend hgb    3) HTN  metoprolol 25mg   improved bp  trend bp      contact with question   cell [phone]  Ans Serv- [phone]

## 2021-05-02 NOTE — PROGRESS NOTE ADULT - SUBJECTIVE AND OBJECTIVE BOX
[facility] : Ans Serv [phone], Office [phone]  Dr Whitmore/Dr Bah / Dr Tai JULIAN /Dr Demetrius lara /Dr ELIEL Kincaid/Dr Berny Seth/Dr Jaylon Enriquez /Dr STEPH Stanley  _______________________________________________________________________________________________    seen and examined today for End Stage Renal Disease on Dialysis   Interval : s/p hemodialysis yesterday  VITALS:  T(F): 97.9 (05-02-21 @ 05:00), Max: 98.7 (05-01-21 @ 21:03)  HR: 68 (05-02-21 @ 09:00)  BP: 138/75 (05-02-21 @ 09:00)  RR: 18 (05-02-21 @ 09:00)  SpO2: 99% (05-02-21 @ 09:00)  05-01 @ 07:01  -  05-02 @ 07:00  --------------------------------------------------------  IN: 800 mL / OUT: 500 mL / NET: 300 mL    05-02 @ 07:01  -  05-02 @ 11:58  --------------------------------------------------------  IN: 240 mL / OUT: 0 mL / NET: 240 mL      Physical Exam :-  Constitutional: NAD  Neck: Supple.  Respiratory: Bilateral equal breath sounds, few Crackles present.  Cardiovascular: S1, S2 normal, positive Murmur  Gastrointestinal: Bowel Sounds present, soft, non tender.  Extremities: no Edema Feet  Arterio-venous Fistula in dressing- left upper ext    Data:-  Allergies :   penicillin (Rash)    Hospital Medications:   MEDICATIONS  (STANDING):  aspirin enteric coated 81 milliGRAM(s) Oral daily  atorvastatin 40 milliGRAM(s) Oral at bedtime  calcium acetate 667 milliGRAM(s) Oral three times a day with meals  chlorhexidine 2% Cloths 1 Application(s) Topical daily  clopidogrel Tablet 75 milliGRAM(s) Oral daily  dextrose 40% Gel 15 Gram(s) Oral once  dextrose 5%. 1000 milliLiter(s) (50 mL/Hr) IV Continuous <Continuous>  dextrose 5%. 1000 milliLiter(s) (100 mL/Hr) IV Continuous <Continuous>  dextrose 50% Injectable 25 Gram(s) IV Push once  dextrose 50% Injectable 12.5 Gram(s) IV Push once  dextrose 50% Injectable 25 Gram(s) IV Push once  gabapentin 200 milliGRAM(s) Oral two times a day  glucagon  Injectable 1 milliGRAM(s) IntraMuscular once  heparin   Injectable 5000 Unit(s) SubCutaneous every 12 hours  insulin lispro (ADMELOG) corrective regimen sliding scale   SubCutaneous three times a day before meals  insulin lispro (ADMELOG) corrective regimen sliding scale   SubCutaneous at bedtime  latanoprost 0.005% Ophthalmic Solution 1 Drop(s) Both EYES at bedtime  levothyroxine 25 MICROGram(s) Oral daily  methylPREDNISolone sodium succinate Injectable 60 milliGRAM(s) IV Push daily  metoprolol succinate ER 25 milliGRAM(s) Oral daily  polyethylene glycol 3350 17 Gram(s) Oral daily  timolol 0.5% Solution 1 Drop(s) Both EYES daily    05-02    136  |  94<L>  |  46<H>  ----------------------------<  107<H>  4.0   |  24  |  4.82<H>    Ca    9.2      02 May 2021 08:58  Phos  4.3     05-02  Mg     2.3     05-02      Creatinine Trend: 4.82 <--, 5.15 <--, 3.66 <--, 5.02 <--, 7.40 <--                        12.8   10.43 )-----------( 168      ( 01 May 2021 06:35 )             37.7

## 2021-05-02 NOTE — PROGRESS NOTE ADULT - SUBJECTIVE AND OBJECTIVE BOX
Patient is a 60y old  Female who presents with a chief complaint of dizziness / code stroke (02 May 2021 11:58)      INTERVAL HPI/OVERNIGHT EVENTS: seen and examined   T(C): 36.9 (21 @ 12:08), Max: 37.1 (21 @ 21:03)  HR: 69 (21 @ 12:08) (59 - 69)  BP: 137/75 (21 @ 12:08) (101/61 - 138/75)  RR: 18 (21 @ 12:08) (18 - 18)  SpO2: 100% (21 @ 12:08) (98% - 100%)  Wt(kg): --  I&O's Summary    01 May 2021 07:  -  02 May 2021 07:00  --------------------------------------------------------  IN: 800 mL / OUT: 500 mL / NET: 300 mL    02 May 2021 07:01  -  02 May 2021 19:24  --------------------------------------------------------  IN: 580 mL / OUT: 0 mL / NET: 580 mL        PAST MEDICAL & SURGICAL HISTORY:  Diabetes    Hypertension    Hypercholesteremia    Essential hypertension    Type 2 diabetes mellitus without complication, without long-term current use of insulin    Other hyperlipidemia    Glaucoma    ESRD (end-stage renal disease) due to SLE    Lupus (systemic lupus erythematosus)    S/P  section  times two    H/O gastric bypass  2016    S/P appendectomy        SOCIAL HISTORY  Alcohol:  Tobacco:  Illicit substance use:    FAMILY HISTORY:    REVIEW OF SYSTEMS:  CONSTITUTIONAL: No fever, weight loss, or fatigue  EYES: No eye pain, visual disturbances, or discharge  ENMT:  No difficulty hearing, tinnitus, vertigo; No sinus or throat pain  NECK: No pain or stiffness  RESPIRATORY: No cough, wheezing, chills or hemoptysis; No shortness of breath  CARDIOVASCULAR: No chest pain, palpitations, dizziness, or leg swelling  GASTROINTESTINAL: No abdominal or epigastric pain. No nausea, vomiting, or hematemesis; No diarrhea or constipation. No melena or hematochezia.  GENITOURINARY: No dysuria, frequency, hematuria, or incontinence  NEUROLOGICAL: No headaches, memory loss, loss of strength, numbness, or tremors  SKIN: No itching, burning, rashes, or lesions   LYMPH NODES: No enlarged glands  ENDOCRINE: No heat or cold intolerance; No hair loss  MUSCULOSKELETAL: No joint pain or swelling; No muscle, back, or extremity pain  PSYCHIATRIC: No depression, anxiety, mood swings, or difficulty sleeping  HEME/LYMPH: No easy bruising, or bleeding gums  ALLERY AND IMMUNOLOGIC: No hives or eczema    RADIOLOGY & ADDITIONAL TESTS:    Imaging Personally Reviewed:  [ ] YES  [ ] NO    Consultant(s) Notes Reviewed:  [ ] YES  [ ] NO    PHYSICAL EXAM:  GENERAL: NAD, well-groomed, well-developed  HEAD:  Atraumatic, Normocephalic  EYES: EOMI, PERRLA, conjunctiva and sclera clear  ENMT: No tonsillar erythema, exudates, or enlargement; Moist mucous membranes, Good dentition, No lesions  NECK: Supple, No JVD, Normal thyroid  NERVOUS SYSTEM:  Alert & Oriented X3, Good concentration; Motor Strength 5/5 B/L upper and lower extremities; DTRs 2+ intact and symmetric  CHEST/LUNG: Clear to percussion bilaterally; No rales, rhonchi, wheezing, or rubs  HEART: Regular rate and rhythm; No murmurs, rubs, or gallops  ABDOMEN: Soft, Nontender, Nondistended; Bowel sounds present  EXTREMITIES:  2+ Peripheral Pulses, No clubbing, cyanosis, or edema  LYMPH: No lymphadenopathy noted  SKIN: No rashes or lesions    LABS:                        12.8   10.43 )-----------( 168      ( 01 May 2021 06:35 )             37.7     05-02    136  |  94<L>  |  46<H>  ----------------------------<  107<H>  4.0   |  24  |  4.82<H>    Ca    9.2      02 May 2021 08:58  Phos  4.3     05-02  Mg     2.3     05-02          CAPILLARY BLOOD GLUCOSE      POCT Blood Glucose.: 232 mg/dL (02 May 2021 16:54)  POCT Blood Glucose.: 249 mg/dL (02 May 2021 15:28)  POCT Blood Glucose.: 418 mg/dL (02 May 2021 12:31)  POCT Blood Glucose.: 414 mg/dL (02 May 2021 12:12)  POCT Blood Glucose.: 450 mg/dL (02 May 2021 12:10)  POCT Blood Glucose.: 177 mg/dL (02 May 2021 08:39)  POCT Blood Glucose.: 214 mg/dL (01 May 2021 21:38)            MEDICATIONS  (STANDING):  aspirin enteric coated 81 milliGRAM(s) Oral daily  atorvastatin 40 milliGRAM(s) Oral at bedtime  calcium acetate 667 milliGRAM(s) Oral three times a day with meals  chlorhexidine 2% Cloths 1 Application(s) Topical daily  clopidogrel Tablet 75 milliGRAM(s) Oral daily  dextrose 40% Gel 15 Gram(s) Oral once  dextrose 5%. 1000 milliLiter(s) (50 mL/Hr) IV Continuous <Continuous>  dextrose 5%. 1000 milliLiter(s) (100 mL/Hr) IV Continuous <Continuous>  dextrose 50% Injectable 25 Gram(s) IV Push once  dextrose 50% Injectable 12.5 Gram(s) IV Push once  dextrose 50% Injectable 25 Gram(s) IV Push once  gabapentin 200 milliGRAM(s) Oral two times a day  glucagon  Injectable 1 milliGRAM(s) IntraMuscular once  heparin   Injectable 5000 Unit(s) SubCutaneous every 12 hours  insulin lispro (ADMELOG) corrective regimen sliding scale   SubCutaneous three times a day before meals  insulin lispro (ADMELOG) corrective regimen sliding scale   SubCutaneous at bedtime  latanoprost 0.005% Ophthalmic Solution 1 Drop(s) Both EYES at bedtime  levothyroxine 25 MICROGram(s) Oral daily  methylPREDNISolone sodium succinate Injectable 60 milliGRAM(s) IV Push daily  metoprolol succinate ER 25 milliGRAM(s) Oral daily  polyethylene glycol 3350 17 Gram(s) Oral daily  timolol 0.5% Solution 1 Drop(s) Both EYES daily    MEDICATIONS  (PRN):      Care Discussed with Consultants/Other Providers [ ] YES  [ ] NO

## 2021-05-03 LAB
ANION GAP SERPL CALC-SCNC: 25 MMOL/L — HIGH (ref 5–17)
BUN SERPL-MCNC: 86 MG/DL — HIGH (ref 7–23)
CALCIUM SERPL-MCNC: 9.2 MG/DL — SIGNIFICANT CHANGE UP (ref 8.4–10.5)
CHLORIDE SERPL-SCNC: 95 MMOL/L — LOW (ref 96–108)
CO2 SERPL-SCNC: 19 MMOL/L — LOW (ref 22–31)
CREAT SERPL-MCNC: 6.9 MG/DL — HIGH (ref 0.5–1.3)
DRVVT SCREEN TO CONFIRM RATIO: SIGNIFICANT CHANGE UP
GLUCOSE BLDC GLUCOMTR-MCNC: 210 MG/DL — HIGH (ref 70–99)
GLUCOSE BLDC GLUCOMTR-MCNC: 222 MG/DL — HIGH (ref 70–99)
GLUCOSE BLDC GLUCOMTR-MCNC: 315 MG/DL — HIGH (ref 70–99)
GLUCOSE BLDC GLUCOMTR-MCNC: 360 MG/DL — HIGH (ref 70–99)
GLUCOSE SERPL-MCNC: 138 MG/DL — HIGH (ref 70–99)
HCT VFR BLD CALC: 33 % — LOW (ref 34.5–45)
HGB BLD-MCNC: 11.5 G/DL — SIGNIFICANT CHANGE UP (ref 11.5–15.5)
LA NT DPL PPP QL: 33.2 SEC — SIGNIFICANT CHANGE UP
MCHC RBC-ENTMCNC: 33.3 PG — SIGNIFICANT CHANGE UP (ref 27–34)
MCHC RBC-ENTMCNC: 34.8 GM/DL — SIGNIFICANT CHANGE UP (ref 32–36)
MCV RBC AUTO: 95.7 FL — SIGNIFICANT CHANGE UP (ref 80–100)
NORMALIZED SCT PPP-RTO: 1.08 RATIO — SIGNIFICANT CHANGE UP (ref 0–1.16)
NORMALIZED SCT PPP-RTO: SIGNIFICANT CHANGE UP
NRBC # BLD: 0 /100 WBCS — SIGNIFICANT CHANGE UP (ref 0–0)
PLATELET # BLD AUTO: 157 K/UL — SIGNIFICANT CHANGE UP (ref 150–400)
POTASSIUM SERPL-MCNC: 4.7 MMOL/L — SIGNIFICANT CHANGE UP (ref 3.5–5.3)
POTASSIUM SERPL-SCNC: 4.7 MMOL/L — SIGNIFICANT CHANGE UP (ref 3.5–5.3)
RBC # BLD: 3.45 M/UL — LOW (ref 3.8–5.2)
RBC # FLD: 13.8 % — SIGNIFICANT CHANGE UP (ref 10.3–14.5)
SODIUM SERPL-SCNC: 139 MMOL/L — SIGNIFICANT CHANGE UP (ref 135–145)
WBC # BLD: 8.58 K/UL — SIGNIFICANT CHANGE UP (ref 3.8–10.5)
WBC # FLD AUTO: 8.58 K/UL — SIGNIFICANT CHANGE UP (ref 3.8–10.5)

## 2021-05-03 PROCEDURE — 99232 SBSQ HOSP IP/OBS MODERATE 35: CPT | Mod: GC

## 2021-05-03 RX ORDER — HYDRALAZINE HCL 50 MG
5 TABLET ORAL ONCE
Refills: 0 | Status: COMPLETED | OUTPATIENT
Start: 2021-05-03 | End: 2021-05-03

## 2021-05-03 RX ADMIN — POLYETHYLENE GLYCOL 3350 17 GRAM(S): 17 POWDER, FOR SOLUTION ORAL at 11:35

## 2021-05-03 RX ADMIN — Medication 81 MILLIGRAM(S): at 11:36

## 2021-05-03 RX ADMIN — Medication 667 MILLIGRAM(S): at 17:54

## 2021-05-03 RX ADMIN — Medication 5 MILLIGRAM(S): at 22:59

## 2021-05-03 RX ADMIN — Medication 25 MILLIGRAM(S): at 09:01

## 2021-05-03 RX ADMIN — HEPARIN SODIUM 5000 UNIT(S): 5000 INJECTION INTRAVENOUS; SUBCUTANEOUS at 17:54

## 2021-05-03 RX ADMIN — CLOPIDOGREL BISULFATE 75 MILLIGRAM(S): 75 TABLET, FILM COATED ORAL at 11:36

## 2021-05-03 RX ADMIN — Medication 2: at 09:01

## 2021-05-03 RX ADMIN — HEPARIN SODIUM 5000 UNIT(S): 5000 INJECTION INTRAVENOUS; SUBCUTANEOUS at 05:53

## 2021-05-03 RX ADMIN — Medication 25 MICROGRAM(S): at 05:54

## 2021-05-03 RX ADMIN — Medication 60 MILLIGRAM(S): at 05:52

## 2021-05-03 RX ADMIN — Medication 2: at 22:17

## 2021-05-03 RX ADMIN — Medication 667 MILLIGRAM(S): at 09:00

## 2021-05-03 RX ADMIN — CHLORHEXIDINE GLUCONATE 1 APPLICATION(S): 213 SOLUTION TOPICAL at 11:36

## 2021-05-03 RX ADMIN — ATORVASTATIN CALCIUM 40 MILLIGRAM(S): 80 TABLET, FILM COATED ORAL at 22:16

## 2021-05-03 RX ADMIN — Medication 1 DROP(S): at 11:37

## 2021-05-03 RX ADMIN — LATANOPROST 1 DROP(S): 0.05 SOLUTION/ DROPS OPHTHALMIC; TOPICAL at 22:17

## 2021-05-03 RX ADMIN — Medication 2: at 17:54

## 2021-05-03 RX ADMIN — GABAPENTIN 200 MILLIGRAM(S): 400 CAPSULE ORAL at 17:55

## 2021-05-03 RX ADMIN — Medication 5: at 12:32

## 2021-05-03 RX ADMIN — Medication 667 MILLIGRAM(S): at 11:35

## 2021-05-03 RX ADMIN — GABAPENTIN 200 MILLIGRAM(S): 400 CAPSULE ORAL at 05:52

## 2021-05-03 NOTE — PROGRESS NOTE ADULT - SUBJECTIVE AND OBJECTIVE BOX
Patient is a 60y old  Female who presents with a chief complaint of dizziness / code stroke (03 May 2021 22:46)      INTERVAL HPI/OVERNIGHT EVENTS:  T(C): 36.3 (21 @ 21:20), Max: 36.9 (21 @ 01:00)  HR: 73 (21 @ 23:20) (65 - 74)  BP: 175/83 (21 @ 23:20) (152/74 - 190/90)  RR: 16 (21 @ 21:20) (16 - 18)  SpO2: 100% (21 @ 21:20) (97% - 100%)  Wt(kg): --  I&O's Summary    02 May 2021 07:  -  03 May 2021 07:00  --------------------------------------------------------  IN: 880 mL / OUT: 0 mL / NET: 880 mL    03 May 2021 07:01  -  03 May 2021 23:59  --------------------------------------------------------  IN: 1000 mL / OUT: 0 mL / NET: 1000 mL        PAST MEDICAL & SURGICAL HISTORY:  Diabetes    Hypertension    Hypercholesteremia    Essential hypertension    Type 2 diabetes mellitus without complication, without long-term current use of insulin    Other hyperlipidemia    Glaucoma    ESRD (end-stage renal disease) due to SLE    Lupus (systemic lupus erythematosus)    S/P  section  times two    H/O gastric bypass  2016    S/P appendectomy        SOCIAL HISTORY  Alcohol:  Tobacco:  Illicit substance use:    FAMILY HISTORY:    REVIEW OF SYSTEMS:  CONSTITUTIONAL: No fever, weight loss, or fatigue  EYES: No eye pain, visual disturbances, or discharge  ENMT:  No difficulty hearing, tinnitus, vertigo; No sinus or throat pain  NECK: No pain or stiffness  RESPIRATORY: No cough, wheezing, chills or hemoptysis; No shortness of breath  CARDIOVASCULAR: No chest pain, palpitations, dizziness, or leg swelling  GASTROINTESTINAL: No abdominal or epigastric pain. No nausea, vomiting, or hematemesis; No diarrhea or constipation. No melena or hematochezia.  GENITOURINARY: No dysuria, frequency, hematuria, or incontinence  NEUROLOGICAL: No headaches, memory loss, loss of strength, numbness, or tremors  SKIN: No itching, burning, rashes, or lesions   LYMPH NODES: No enlarged glands  ENDOCRINE: No heat or cold intolerance; No hair loss  MUSCULOSKELETAL: No joint pain or swelling; No muscle, back, or extremity pain  PSYCHIATRIC: No depression, anxiety, mood swings, or difficulty sleeping  HEME/LYMPH: No easy bruising, or bleeding gums  ALLERY AND IMMUNOLOGIC: No hives or eczema    RADIOLOGY & ADDITIONAL TESTS:    Imaging Personally Reviewed:  [ ] YES  [ ] NO    Consultant(s) Notes Reviewed:  [ ] YES  [ ] NO    PHYSICAL EXAM:  GENERAL: NAD, well-groomed, well-developed  HEAD:  Atraumatic, Normocephalic  EYES: EOMI, PERRLA, conjunctiva and sclera clear  ENMT: No tonsillar erythema, exudates, or enlargement; Moist mucous membranes, Good dentition, No lesions  NECK: Supple, No JVD, Normal thyroid  NERVOUS SYSTEM:  Alert & Oriented X3, Good concentration; Motor Strength 5/5 B/L upper and lower extremities; DTRs 2+ intact and symmetric  CHEST/LUNG: Clear to percussion bilaterally; No rales, rhonchi, wheezing, or rubs  HEART: Regular rate and rhythm; No murmurs, rubs, or gallops  ABDOMEN: Soft, Nontender, Nondistended; Bowel sounds present  EXTREMITIES:  2+ Peripheral Pulses, No clubbing, cyanosis, or edema  LYMPH: No lymphadenopathy noted  SKIN: No rashes or lesions    LABS:                        11.5   8.58  )-----------( 157      ( 03 May 2021 08:23 )             33.0     05-03    139  |  95<L>  |  86<H>  ----------------------------<  138<H>  4.7   |  19<L>  |  6.90<H>    Ca    9.2      03 May 2021 08:23  Phos  4.3     05-02  Mg     2.3     05-02          CAPILLARY BLOOD GLUCOSE      POCT Blood Glucose.: 315 mg/dL (03 May 2021 22:01)  POCT Blood Glucose.: 210 mg/dL (03 May 2021 17:49)  POCT Blood Glucose.: 360 mg/dL (03 May 2021 12:14)  POCT Blood Glucose.: 222 mg/dL (03 May 2021 08:38)            MEDICATIONS  (STANDING):  aspirin enteric coated 81 milliGRAM(s) Oral daily  atorvastatin 40 milliGRAM(s) Oral at bedtime  calcium acetate 667 milliGRAM(s) Oral three times a day with meals  chlorhexidine 2% Cloths 1 Application(s) Topical daily  clopidogrel Tablet 75 milliGRAM(s) Oral daily  dextrose 40% Gel 15 Gram(s) Oral once  dextrose 5%. 1000 milliLiter(s) (50 mL/Hr) IV Continuous <Continuous>  dextrose 5%. 1000 milliLiter(s) (100 mL/Hr) IV Continuous <Continuous>  dextrose 50% Injectable 25 Gram(s) IV Push once  dextrose 50% Injectable 12.5 Gram(s) IV Push once  dextrose 50% Injectable 25 Gram(s) IV Push once  gabapentin 200 milliGRAM(s) Oral two times a day  glucagon  Injectable 1 milliGRAM(s) IntraMuscular once  heparin   Injectable 5000 Unit(s) SubCutaneous every 12 hours  insulin lispro (ADMELOG) corrective regimen sliding scale   SubCutaneous three times a day before meals  insulin lispro (ADMELOG) corrective regimen sliding scale   SubCutaneous at bedtime  latanoprost 0.005% Ophthalmic Solution 1 Drop(s) Both EYES at bedtime  levothyroxine 25 MICROGram(s) Oral daily  methylPREDNISolone sodium succinate Injectable 60 milliGRAM(s) IV Push daily  metoprolol succinate ER 25 milliGRAM(s) Oral daily  polyethylene glycol 3350 17 Gram(s) Oral daily  timolol 0.5% Solution 1 Drop(s) Both EYES daily    MEDICATIONS  (PRN):      Care Discussed with Consultants/Other Providers [ ] YES  [ ] NO Patient is a 60y old  Female who presents with a chief complaint of dizziness / code stroke (03 May 2021 22:46)      INTERVAL HPI/OVERNIGHT EVENTS: doing fair , steroids changed to PO  T(C): 36.3 (21 @ 21:20), Max: 36.9 (21 @ 01:00)  HR: 73 (21 @ 23:20) (65 - 74)  BP: 175/83 (21 @ 23:20) (152/74 - 190/90)  RR: 16 (21 @ 21:20) (16 - 18)  SpO2: 100% (21 @ 21:20) (97% - 100%)  Wt(kg): --  I&O's Summary    02 May 2021 07:01  -  03 May 2021 07:00  --------------------------------------------------------  IN: 880 mL / OUT: 0 mL / NET: 880 mL    03 May 2021 07:01  -  03 May 2021 23:59  --------------------------------------------------------  IN: 1000 mL / OUT: 0 mL / NET: 1000 mL        PAST MEDICAL & SURGICAL HISTORY:  Diabetes    Hypertension    Hypercholesteremia    Essential hypertension    Type 2 diabetes mellitus without complication, without long-term current use of insulin    Other hyperlipidemia    Glaucoma    ESRD (end-stage renal disease) due to SLE    Lupus (systemic lupus erythematosus)    S/P  section  times two    H/O gastric bypass  2016    S/P appendectomy        SOCIAL HISTORY  Alcohol:  Tobacco:  Illicit substance use:    FAMILY HISTORY:    REVIEW OF SYSTEMS:  CONSTITUTIONAL: No fever, weight loss, or fatigue  EYES: No eye pain, visual disturbances, or discharge  ENMT:  No difficulty hearing, tinnitus, vertigo; No sinus or throat pain  NECK: No pain or stiffness  RESPIRATORY: No cough, wheezing, chills or hemoptysis; No shortness of breath  CARDIOVASCULAR: No chest pain, palpitations, dizziness, or leg swelling  GASTROINTESTINAL: No abdominal or epigastric pain. No nausea, vomiting, or hematemesis; No diarrhea or constipation. No melena or hematochezia.  GENITOURINARY: No dysuria, frequency, hematuria, or incontinence  NEUROLOGICAL: No headaches, memory loss, loss of strength, numbness, or tremors  SKIN: No itching, burning, rashes, or lesions   LYMPH NODES: No enlarged glands  ENDOCRINE: No heat or cold intolerance; No hair loss  MUSCULOSKELETAL: No joint pain or swelling; No muscle, back, or extremity pain  PSYCHIATRIC: No depression, anxiety, mood swings, or difficulty sleeping  HEME/LYMPH: No easy bruising, or bleeding gums  ALLERY AND IMMUNOLOGIC: No hives or eczema    RADIOLOGY & ADDITIONAL TESTS:    Imaging Personally Reviewed:  [ ] YES  [ ] NO    Consultant(s) Notes Reviewed:  [ ] YES  [ ] NO    PHYSICAL EXAM:  GENERAL: NAD, well-groomed, well-developed  HEAD:  Atraumatic, Normocephalic  EYES: EOMI, PERRLA, conjunctiva and sclera clear  ENMT: No tonsillar erythema, exudates, or enlargement; Moist mucous membranes, Good dentition, No lesions  NECK: Supple, No JVD, Normal thyroid  NERVOUS SYSTEM:  Alert & Oriented X3, Good concentration; Motor Strength 5/5 B/L upper and lower extremities; DTRs 2+ intact and symmetric  CHEST/LUNG: Clear to percussion bilaterally; No rales, rhonchi, wheezing, or rubs  HEART: Regular rate and rhythm; No murmurs, rubs, or gallops  ABDOMEN: Soft, Nontender, Nondistended; Bowel sounds present  EXTREMITIES:  2+ Peripheral Pulses, No clubbing, cyanosis, or edema  LYMPH: No lymphadenopathy noted  SKIN: No rashes or lesions    LABS:                        11.5   8.58  )-----------( 157      ( 03 May 2021 08:23 )             33.0     05-03    139  |  95<L>  |  86<H>  ----------------------------<  138<H>  4.7   |  19<L>  |  6.90<H>    Ca    9.2      03 May 2021 08:23  Phos  4.3     05-02  Mg     2.3     05-02          CAPILLARY BLOOD GLUCOSE      POCT Blood Glucose.: 315 mg/dL (03 May 2021 22:01)  POCT Blood Glucose.: 210 mg/dL (03 May 2021 17:49)  POCT Blood Glucose.: 360 mg/dL (03 May 2021 12:14)  POCT Blood Glucose.: 222 mg/dL (03 May 2021 08:38)            MEDICATIONS  (STANDING):  aspirin enteric coated 81 milliGRAM(s) Oral daily  atorvastatin 40 milliGRAM(s) Oral at bedtime  calcium acetate 667 milliGRAM(s) Oral three times a day with meals  chlorhexidine 2% Cloths 1 Application(s) Topical daily  clopidogrel Tablet 75 milliGRAM(s) Oral daily  dextrose 40% Gel 15 Gram(s) Oral once  dextrose 5%. 1000 milliLiter(s) (50 mL/Hr) IV Continuous <Continuous>  dextrose 5%. 1000 milliLiter(s) (100 mL/Hr) IV Continuous <Continuous>  dextrose 50% Injectable 25 Gram(s) IV Push once  dextrose 50% Injectable 12.5 Gram(s) IV Push once  dextrose 50% Injectable 25 Gram(s) IV Push once  gabapentin 200 milliGRAM(s) Oral two times a day  glucagon  Injectable 1 milliGRAM(s) IntraMuscular once  heparin   Injectable 5000 Unit(s) SubCutaneous every 12 hours  insulin lispro (ADMELOG) corrective regimen sliding scale   SubCutaneous three times a day before meals  insulin lispro (ADMELOG) corrective regimen sliding scale   SubCutaneous at bedtime  latanoprost 0.005% Ophthalmic Solution 1 Drop(s) Both EYES at bedtime  levothyroxine 25 MICROGram(s) Oral daily  methylPREDNISolone sodium succinate Injectable 60 milliGRAM(s) IV Push daily  metoprolol succinate ER 25 milliGRAM(s) Oral daily  polyethylene glycol 3350 17 Gram(s) Oral daily  timolol 0.5% Solution 1 Drop(s) Both EYES daily    MEDICATIONS  (PRN):      Care Discussed with Consultants/Other Providers [ ] YES  [ ] NO

## 2021-05-03 NOTE — PROGRESS NOTE ADULT - ASSESSMENT
Patient seen and examined with fellow  MRI Brain/Orbits reviewed with patient and son at bedside  Given lack of acute findings and resolution of transient diplopia, will recommend to taper steroids: lower to prednisone 40 mg daily for 2 weeks then 30 mg daily with the plan to follow up with outpatient Rheumatologist in 2 weeks. Patient's son will contact the office tomorrow  Given the subacute lacunar infarcts, would send APS labs and have neuro comment on further stroke work up    discussed with primary team

## 2021-05-03 NOTE — PROGRESS NOTE ADULT - SUBJECTIVE AND OBJECTIVE BOX
WILLARD BOB  [id number]    INTERVAL HPI/OVERNIGHT EVENTS:  patient reports feeling well overall  no new complaints     MEDICATIONS  (STANDING):  aspirin enteric coated 81 milliGRAM(s) Oral daily  atorvastatin 40 milliGRAM(s) Oral at bedtime  calcium acetate 667 milliGRAM(s) Oral three times a day with meals  chlorhexidine 2% Cloths 1 Application(s) Topical daily  clopidogrel Tablet 75 milliGRAM(s) Oral daily  dextrose 40% Gel 15 Gram(s) Oral once  dextrose 5%. 1000 milliLiter(s) (50 mL/Hr) IV Continuous <Continuous>  dextrose 5%. 1000 milliLiter(s) (100 mL/Hr) IV Continuous <Continuous>  dextrose 50% Injectable 25 Gram(s) IV Push once  dextrose 50% Injectable 12.5 Gram(s) IV Push once  dextrose 50% Injectable 25 Gram(s) IV Push once  gabapentin 200 milliGRAM(s) Oral two times a day  glucagon  Injectable 1 milliGRAM(s) IntraMuscular once  heparin   Injectable 5000 Unit(s) SubCutaneous every 12 hours  hydrALAZINE Injectable 5 milliGRAM(s) IV Push once  insulin lispro (ADMELOG) corrective regimen sliding scale   SubCutaneous three times a day before meals  insulin lispro (ADMELOG) corrective regimen sliding scale   SubCutaneous at bedtime  latanoprost 0.005% Ophthalmic Solution 1 Drop(s) Both EYES at bedtime  levothyroxine 25 MICROGram(s) Oral daily  methylPREDNISolone sodium succinate Injectable 60 milliGRAM(s) IV Push daily  metoprolol succinate ER 25 milliGRAM(s) Oral daily  polyethylene glycol 3350 17 Gram(s) Oral daily  timolol 0.5% Solution 1 Drop(s) Both EYES daily    MEDICATIONS  (PRN):      Allergies    penicillin (Rash)    Intolerances        Review of Systems:   unremarkable     Vital Signs Last 24 Hrs  T(C): 36.3 (03 May 2021 21:20), Max: 36.9 (03 May 2021 01:00)  T(F): 97.3 (03 May 2021 21:20), Max: 98.4 (03 May 2021 01:00)  HR: 74 (03 May 2021 22:22) (65 - 74)  BP: 190/90 (03 May 2021 22:22) (152/74 - 190/90)  BP(mean): --  RR: 16 (03 May 2021 21:20) (16 - 18)  SpO2: 100% (03 May 2021 21:20) (97% - 100%)    Physical Exam:  General: NAD  HEENT: EOMI, MMM  Cardio: +S1/S2, RRR  Resp: CTA b/l  GI: +BS, soft, NT/ND  MSK: no synovitis   Neuro: AAOx3  muscle strength RUE LUE; RLE LLE     LABS:                        11.5   8.58  )-----------( 157      ( 03 May 2021 08:23 )             33.0     05-03    139  |  95<L>  |  86<H>  ----------------------------<  138<H>  4.7   |  19<L>  |  6.90<H>    Ca    9.2      03 May 2021 08:23  Phos  4.3     05-02  Mg     2.3     05-02          RADIOLOGY & ADDITIONAL TESTS:  < from: MR Head No Cont (04.30.21 @ 22:51) >  IMPRESSION:  Small focus of abnormal signal within the dorsal julián with mild diffusion restriction which may represent a subacute lacunar infarct or area of demyelination.    Scattered additional nonspecific foci of white matter T2 hyperintensity, likely microvascular in nature.    Hemosiderin deposition within the left basal ganglia from prior hemorrhage.    No orbital abnormality seen on this noncontrast exam.        < end of copied text >

## 2021-05-03 NOTE — PROGRESS NOTE ADULT - SUBJECTIVE AND OBJECTIVE BOX
Patient is a 60y old  Female who presents with a chief complaint of dizziness / code stroke (03 May 2021 13:25)      INTERVAL HISTORY: feels ok    REVIEW OF SYSTEMS:   CONSTITUTIONAL: No weakness  EYES/ENT: No visual changes; No throat pain  Neck: No pain or stiffness  Respiratory: No cough, wheezing, No shortness of breath  CARDIOVASCULAR: no chest pain or palpitations  GASTROINTESTINAL: No abdominal pain, no nausea, vomiting or hematemesis  GENITOURINARY: No dysuria, frequency or hematuria  NEUROLOGICAL: No stroke like symptoms  SKIN: No rashes    	  MEDICATIONS:  metoprolol succinate ER 25 milliGRAM(s) Oral daily        PHYSICAL EXAM:  T(C): 36.9 (05-03-21 @ 14:32), Max: 36.9 (05-03-21 @ 01:00)  HR: 67 (05-03-21 @ 14:32) (65 - 76)  BP: 163/74 (05-03-21 @ 14:32) (150/78 - 164/75)  RR: 18 (05-03-21 @ 14:32) (18 - 18)  SpO2: 97% (05-03-21 @ 14:32) (97% - 100%)  Wt(kg): --  I&O's Summary    02 May 2021 07:01  -  03 May 2021 07:00  --------------------------------------------------------  IN: 880 mL / OUT: 0 mL / NET: 880 mL    03 May 2021 07:01  -  03 May 2021 15:42  --------------------------------------------------------  IN: 480 mL / OUT: 0 mL / NET: 480 mL    Appearance: In no distress	  HEENT:    PERRL, EOMI	  Cardiovascular:  S1 S2, No JVD  Respiratory: Lungs clear to auscultation	  Gastrointestinal:  Soft, Non-tender, + BS	  Vascularature:  No edema of LE  Psychiatric: Appropriate affect   Neuro: no acute focal deficits                         11.5   8.58  )-----------( 157      ( 03 May 2021 08:23 )             33.0     05-03    139  |  95<L>  |  86<H>  ----------------------------<  138<H>  4.7   |  19<L>  |  6.90<H>    Ca    9.2      03 May 2021 08:23  Phos  4.3     05-02  Mg     2.3     05-02  Labs personally reviewed    ASSESSMENT/PLAN: 	  Problem/Plan - 1:  ·  Problem: Dizziness Plan:had episode of dizzyness and right eye blurryness. now resolved.  denies chest pain, shortness of breath, palpitations  CTA H: no significant stenosis   MRI noted- per neuro no further workup is needed   TTE with nml LV function, no WMA    Problem/Plan - 2:  ·  Problem: Coronary Artery Disease: s./p PCI mLAD 1/2018, ANABELA to RCA 1/2019 and ANABELA to diag 5/2019   given stents greather than 1 year ago no indication to continue with Plavix, unless indicated for her SLE, f/u with rheum   patient denies chest pain, shortness of breath, denies GARCIA, can ambulate 2 blocks without chest pain.  pBNP 5325, although appears relatively euvolemic on exam,denies use of diuretics at home   TTE as noted above   c/w with Toprol XL 25mg daily, asa, plaix,statin     Problem/Plan - 3:  ·  Problem: Lupus (systemic lupus erythematosus).  Plan: stable   f/u with neuro as out pt.     Problem/Plan - 4:  ·  Problem: ESR\ (end-stage renal disease) due to SLE.  Plan: nephro consult for HD   -c/w home meds.     Problem/Plan - 5:  ·  Problem: Hypertension.  Plan: c/w home meds   controlled.             Mariana Mckeon DO [facility]  Cardiovascular Medicine  [address] [place] [address], [address]  Office: [phone]  Cell: [phone]

## 2021-05-03 NOTE — PROGRESS NOTE ADULT - SUBJECTIVE AND OBJECTIVE BOX
Patient seen and examined  no complaints    penicillin (Rash)    Hospital Medications:   MEDICATIONS  (STANDING):  aspirin enteric coated 81 milliGRAM(s) Oral daily  atorvastatin 40 milliGRAM(s) Oral at bedtime  calcium acetate 667 milliGRAM(s) Oral three times a day with meals  chlorhexidine 2% Cloths 1 Application(s) Topical daily  clopidogrel Tablet 75 milliGRAM(s) Oral daily  dextrose 40% Gel 15 Gram(s) Oral once  dextrose 5%. 1000 milliLiter(s) (50 mL/Hr) IV Continuous <Continuous>  dextrose 5%. 1000 milliLiter(s) (100 mL/Hr) IV Continuous <Continuous>  dextrose 50% Injectable 25 Gram(s) IV Push once  dextrose 50% Injectable 12.5 Gram(s) IV Push once  dextrose 50% Injectable 25 Gram(s) IV Push once  gabapentin 200 milliGRAM(s) Oral two times a day  glucagon  Injectable 1 milliGRAM(s) IntraMuscular once  heparin   Injectable 5000 Unit(s) SubCutaneous every 12 hours  insulin lispro (ADMELOG) corrective regimen sliding scale   SubCutaneous three times a day before meals  insulin lispro (ADMELOG) corrective regimen sliding scale   SubCutaneous at bedtime  latanoprost 0.005% Ophthalmic Solution 1 Drop(s) Both EYES at bedtime  levothyroxine 25 MICROGram(s) Oral daily  methylPREDNISolone sodium succinate Injectable 60 milliGRAM(s) IV Push daily  metoprolol succinate ER 25 milliGRAM(s) Oral daily  polyethylene glycol 3350 17 Gram(s) Oral daily  timolol 0.5% Solution 1 Drop(s) Both EYES daily        VITALS:  T(F): 97.6 (05-03-21 @ 08:45), Max: 98.4 (05-03-21 @ 01:00)  HR: 72 (05-03-21 @ 08:45)  BP: 152/74 (05-03-21 @ 08:45)  RR: 18 (05-03-21 @ 08:45)  SpO2: 98% (05-03-21 @ 08:45)  Wt(kg): --    05-02 @ 07:01  -  05-03 @ 07:00  --------------------------------------------------------  IN: 880 mL / OUT: 0 mL / NET: 880 mL    05-03 @ 07:01  -  05-03 @ 13:26  --------------------------------------------------------  IN: 480 mL / OUT: 0 mL / NET: 480 mL        Physical Exam :-  Constitutional: NAD  Neck: Supple.  Respiratory: Bilateral equal breath sounds, few Crackles present.  Cardiovascular: S1, S2 normal, positive Murmur  Gastrointestinal: Bowel Sounds present, soft, non tender.  Extremities: no Edema Feet  Arterio-venous Fistula  left upper ext + thrill  LABS:  05-03    139  |  95<L>  |  86<H>  ----------------------------<  138<H>  4.7   |  19<L>  |  6.90<H>    Ca    9.2      03 May 2021 08:23  Phos  4.3     05-02  Mg     2.3     05-02      Creatinine Trend: 6.90 <--, 4.82 <--, 5.15 <--, 3.66 <--, 5.02 <--                        11.5   8.58  )-----------( 157      ( 03 May 2021 08:23 )             33.0     Urine Studies:      RADIOLOGY & ADDITIONAL STUDIES:

## 2021-05-03 NOTE — PROGRESS NOTE ADULT - ASSESSMENT
57F h/o lupus (dx 04/2017), ESRD (HD T/Th/Sat), NSTEMI (BMS 01/2018), DM2, HTN, HLD (not on medication), glaucoma, hypothyroidism p/w dizziness.    1) ESRD on HD : Tuesday, Thursday and Saturday   Routine HD TTS via left upper ext AVF  hemodialysis- next tomm  no cramps last treatment   trend bmp    2) Anemia In CKD-  Hgb at goal  trend hgb    3) HTN  metoprolol 25mg   improved bp  trend bp      contact with question   cell   Ans [facility]- [phone]

## 2021-05-04 ENCOUNTER — TRANSCRIPTION ENCOUNTER (OUTPATIENT)
Age: 61
End: 2021-05-04

## 2021-05-04 LAB
ALBUMIN SERPL ELPH-MCNC: 3.6 G/DL — SIGNIFICANT CHANGE UP (ref 3.3–5)
ALP SERPL-CCNC: 177 U/L — HIGH (ref 40–120)
ALT FLD-CCNC: 17 U/L — SIGNIFICANT CHANGE UP (ref 10–45)
ANION GAP SERPL CALC-SCNC: 18 MMOL/L — HIGH (ref 5–17)
ANION GAP SERPL CALC-SCNC: 25 MMOL/L — HIGH (ref 5–17)
ANISOCYTOSIS BLD QL: SLIGHT — SIGNIFICANT CHANGE UP
AST SERPL-CCNC: 21 U/L — SIGNIFICANT CHANGE UP (ref 10–40)
B2 GLYCOPROT1 AB SER QL: NEGATIVE — SIGNIFICANT CHANGE UP
BASE EXCESS BLDV CALC-SCNC: 5.4 MMOL/L — HIGH (ref -2–2)
BASOPHILS # BLD AUTO: 0 K/UL — SIGNIFICANT CHANGE UP (ref 0–0.2)
BASOPHILS NFR BLD AUTO: 0 % — SIGNIFICANT CHANGE UP (ref 0–2)
BILIRUB SERPL-MCNC: 0.4 MG/DL — SIGNIFICANT CHANGE UP (ref 0.2–1.2)
BUN SERPL-MCNC: 107 MG/DL — HIGH (ref 7–23)
BUN SERPL-MCNC: 36 MG/DL — HIGH (ref 7–23)
CA-I SERPL-SCNC: 1.11 MMOL/L — LOW (ref 1.12–1.3)
CALCIUM SERPL-MCNC: 9.1 MG/DL — SIGNIFICANT CHANGE UP (ref 8.4–10.5)
CALCIUM SERPL-MCNC: 9.8 MG/DL — SIGNIFICANT CHANGE UP (ref 8.4–10.5)
CARDIOLIPIN AB SER-ACNC: POSITIVE
CHLORIDE BLDV-SCNC: 97 MMOL/L — SIGNIFICANT CHANGE UP (ref 96–108)
CHLORIDE SERPL-SCNC: 94 MMOL/L — LOW (ref 96–108)
CHLORIDE SERPL-SCNC: 95 MMOL/L — LOW (ref 96–108)
CO2 BLDV-SCNC: 28 MMOL/L — SIGNIFICANT CHANGE UP (ref 22–30)
CO2 SERPL-SCNC: 17 MMOL/L — LOW (ref 22–31)
CO2 SERPL-SCNC: 24 MMOL/L — SIGNIFICANT CHANGE UP (ref 22–31)
CREAT SERPL-MCNC: 3.28 MG/DL — HIGH (ref 0.5–1.3)
CREAT SERPL-MCNC: 9.09 MG/DL — HIGH (ref 0.5–1.3)
ELLIPTOCYTES BLD QL SMEAR: SLIGHT — SIGNIFICANT CHANGE UP
EOSINOPHIL # BLD AUTO: 0 K/UL — SIGNIFICANT CHANGE UP (ref 0–0.5)
EOSINOPHIL NFR BLD AUTO: 0 % — SIGNIFICANT CHANGE UP (ref 0–6)
GAS PNL BLDV: 136 MMOL/L — SIGNIFICANT CHANGE UP (ref 135–145)
GAS PNL BLDV: SIGNIFICANT CHANGE UP
GAS PNL BLDV: SIGNIFICANT CHANGE UP
GLUCOSE BLDC GLUCOMTR-MCNC: 176 MG/DL — HIGH (ref 70–99)
GLUCOSE BLDC GLUCOMTR-MCNC: 177 MG/DL — HIGH (ref 70–99)
GLUCOSE BLDC GLUCOMTR-MCNC: 191 MG/DL — HIGH (ref 70–99)
GLUCOSE BLDC GLUCOMTR-MCNC: 192 MG/DL — HIGH (ref 70–99)
GLUCOSE BLDV-MCNC: 200 MG/DL — HIGH (ref 70–99)
GLUCOSE SERPL-MCNC: 169 MG/DL — HIGH (ref 70–99)
GLUCOSE SERPL-MCNC: 193 MG/DL — HIGH (ref 70–99)
HCO3 BLDV-SCNC: 27 MMOL/L — SIGNIFICANT CHANGE UP (ref 21–29)
HCT VFR BLD CALC: 35.5 % — SIGNIFICANT CHANGE UP (ref 34.5–45)
HCT VFR BLD CALC: 35.6 % — SIGNIFICANT CHANGE UP (ref 34.5–45)
HCT VFR BLDA CALC: 40 % — SIGNIFICANT CHANGE UP (ref 39–50)
HGB BLD CALC-MCNC: 12.9 G/DL — SIGNIFICANT CHANGE UP (ref 11.5–15.5)
HGB BLD-MCNC: 12.4 G/DL — SIGNIFICANT CHANGE UP (ref 11.5–15.5)
HGB BLD-MCNC: 12.6 G/DL — SIGNIFICANT CHANGE UP (ref 11.5–15.5)
LACTATE BLDV-MCNC: 1.8 MMOL/L — SIGNIFICANT CHANGE UP (ref 0.7–2)
LYMPHOCYTES # BLD AUTO: 1.02 K/UL — SIGNIFICANT CHANGE UP (ref 1–3.3)
LYMPHOCYTES # BLD AUTO: 11.6 % — LOW (ref 13–44)
MACROCYTES BLD QL: SLIGHT — SIGNIFICANT CHANGE UP
MAGNESIUM SERPL-MCNC: 2.2 MG/DL — SIGNIFICANT CHANGE UP (ref 1.6–2.6)
MANUAL SMEAR VERIFICATION: SIGNIFICANT CHANGE UP
MCHC RBC-ENTMCNC: 33.2 PG — SIGNIFICANT CHANGE UP (ref 27–34)
MCHC RBC-ENTMCNC: 33.3 PG — SIGNIFICANT CHANGE UP (ref 27–34)
MCHC RBC-ENTMCNC: 34.9 GM/DL — SIGNIFICANT CHANGE UP (ref 32–36)
MCHC RBC-ENTMCNC: 35.4 GM/DL — SIGNIFICANT CHANGE UP (ref 32–36)
MCV RBC AUTO: 93.7 FL — SIGNIFICANT CHANGE UP (ref 80–100)
MCV RBC AUTO: 95.4 FL — SIGNIFICANT CHANGE UP (ref 80–100)
MONOCYTES # BLD AUTO: 0.08 K/UL — SIGNIFICANT CHANGE UP (ref 0–0.9)
MONOCYTES NFR BLD AUTO: 0.9 % — LOW (ref 2–14)
NEUTROPHILS # BLD AUTO: 7.68 K/UL — HIGH (ref 1.8–7.4)
NEUTROPHILS NFR BLD AUTO: 87.5 % — HIGH (ref 43–77)
NRBC # BLD: 0 /100 WBCS — SIGNIFICANT CHANGE UP (ref 0–0)
OTHER CELLS CSF MANUAL: 18 ML/DL — SIGNIFICANT CHANGE UP (ref 18–22)
PCO2 BLDV: 29 MMHG — LOW (ref 35–50)
PH BLDV: 7.57 — HIGH (ref 7.35–7.45)
PHOSPHATE SERPL-MCNC: 2.1 MG/DL — LOW (ref 2.5–4.5)
PLAT MORPH BLD: NORMAL — SIGNIFICANT CHANGE UP
PLATELET # BLD AUTO: 156 K/UL — SIGNIFICANT CHANGE UP (ref 150–400)
PLATELET # BLD AUTO: 165 K/UL — SIGNIFICANT CHANGE UP (ref 150–400)
PO2 BLDV: 106 MMHG — HIGH (ref 25–45)
POIKILOCYTOSIS BLD QL AUTO: SLIGHT — SIGNIFICANT CHANGE UP
POLYCHROMASIA BLD QL SMEAR: SLIGHT — SIGNIFICANT CHANGE UP
POTASSIUM BLDV-SCNC: 3.4 MMOL/L — LOW (ref 3.5–5.3)
POTASSIUM SERPL-MCNC: 3.7 MMOL/L — SIGNIFICANT CHANGE UP (ref 3.5–5.3)
POTASSIUM SERPL-MCNC: 4.7 MMOL/L — SIGNIFICANT CHANGE UP (ref 3.5–5.3)
POTASSIUM SERPL-SCNC: 3.7 MMOL/L — SIGNIFICANT CHANGE UP (ref 3.5–5.3)
POTASSIUM SERPL-SCNC: 4.7 MMOL/L — SIGNIFICANT CHANGE UP (ref 3.5–5.3)
PROT SERPL-MCNC: 6.9 G/DL — SIGNIFICANT CHANGE UP (ref 6–8.3)
RBC # BLD: 3.72 M/UL — LOW (ref 3.8–5.2)
RBC # BLD: 3.8 M/UL — SIGNIFICANT CHANGE UP (ref 3.8–5.2)
RBC # FLD: 13.9 % — SIGNIFICANT CHANGE UP (ref 10.3–14.5)
RBC # FLD: 14.1 % — SIGNIFICANT CHANGE UP (ref 10.3–14.5)
RBC BLD AUTO: ABNORMAL
SAO2 % BLDV: 97 % — HIGH (ref 67–88)
SODIUM SERPL-SCNC: 136 MMOL/L — SIGNIFICANT CHANGE UP (ref 135–145)
SODIUM SERPL-SCNC: 137 MMOL/L — SIGNIFICANT CHANGE UP (ref 135–145)
WBC # BLD: 10.43 K/UL — SIGNIFICANT CHANGE UP (ref 3.8–10.5)
WBC # BLD: 8.78 K/UL — SIGNIFICANT CHANGE UP (ref 3.8–10.5)
WBC # FLD AUTO: 10.43 K/UL — SIGNIFICANT CHANGE UP (ref 3.8–10.5)
WBC # FLD AUTO: 8.78 K/UL — SIGNIFICANT CHANGE UP (ref 3.8–10.5)

## 2021-05-04 PROCEDURE — 70450 CT HEAD/BRAIN W/O DYE: CPT | Mod: 26

## 2021-05-04 RX ORDER — POLYETHYLENE GLYCOL 3350 17 G/17G
17 POWDER, FOR SOLUTION ORAL
Qty: 0 | Refills: 0 | DISCHARGE
Start: 2021-05-04

## 2021-05-04 RX ORDER — CALCIUM ACETATE 667 MG
1 TABLET ORAL
Qty: 0 | Refills: 0 | DISCHARGE
Start: 2021-05-04

## 2021-05-04 RX ORDER — CALCIUM ACETATE 667 MG
1 TABLET ORAL
Qty: 0 | Refills: 0 | DISCHARGE

## 2021-05-04 RX ADMIN — Medication 25 MILLIGRAM(S): at 07:20

## 2021-05-04 RX ADMIN — Medication 81 MILLIGRAM(S): at 17:24

## 2021-05-04 RX ADMIN — CLOPIDOGREL BISULFATE 75 MILLIGRAM(S): 75 TABLET, FILM COATED ORAL at 17:25

## 2021-05-04 RX ADMIN — Medication 1: at 07:40

## 2021-05-04 RX ADMIN — HEPARIN SODIUM 5000 UNIT(S): 5000 INJECTION INTRAVENOUS; SUBCUTANEOUS at 06:42

## 2021-05-04 RX ADMIN — Medication 1: at 18:07

## 2021-05-04 RX ADMIN — CHLORHEXIDINE GLUCONATE 1 APPLICATION(S): 213 SOLUTION TOPICAL at 09:00

## 2021-05-04 RX ADMIN — LATANOPROST 1 DROP(S): 0.05 SOLUTION/ DROPS OPHTHALMIC; TOPICAL at 21:05

## 2021-05-04 RX ADMIN — Medication 667 MILLIGRAM(S): at 07:41

## 2021-05-04 RX ADMIN — GABAPENTIN 200 MILLIGRAM(S): 400 CAPSULE ORAL at 06:41

## 2021-05-04 RX ADMIN — Medication 667 MILLIGRAM(S): at 18:07

## 2021-05-04 RX ADMIN — Medication 1 DROP(S): at 15:00

## 2021-05-04 RX ADMIN — Medication 60 MILLIGRAM(S): at 06:42

## 2021-05-04 RX ADMIN — HEPARIN SODIUM 5000 UNIT(S): 5000 INJECTION INTRAVENOUS; SUBCUTANEOUS at 17:25

## 2021-05-04 RX ADMIN — GABAPENTIN 200 MILLIGRAM(S): 400 CAPSULE ORAL at 17:25

## 2021-05-04 RX ADMIN — Medication 25 MICROGRAM(S): at 06:42

## 2021-05-04 RX ADMIN — ATORVASTATIN CALCIUM 40 MILLIGRAM(S): 80 TABLET, FILM COATED ORAL at 21:04

## 2021-05-04 NOTE — STROKE CODE NOTE - NIH STROKE SCALE: 1C. LOC COMMANDS, QM
(2) Performs neither task correctly
(0) Performs both tasks correctly
(2) Performs neither task correctly

## 2021-05-04 NOTE — RAPID RESPONSE TEAM SUMMARY - NSSITUATIONBACKGROUNDRRT_GEN_ALL_CORE
57F h/o lupus (dx 04/2017), ESRD (HD T/Th/Sat), NSTEMI (BMS 01/2018), DM2, HTN, HLD (not on medication), glaucoma, hypothyroidism p/w dizziness. RRT called for acute change in mental status after dialysis. Patient became obtunded after dialysis. On arrival, patient hypertensive with SBP in the 180s, satting 100% on room air. Patient lethargic but arouseable, intermittently following some commands, and withdrawing from pain. CBC, CMP, VBG ordered. noncon CT head ordered. Code stroke called given acute change in mental status, no obvious lab abnormalities. Neuro agreed with non con CTH. Patient was transported to CT scan. Discussed with neuro, no obvious findings on CTH, however patient had previous imaging which will be addressed by neuro team. Discussed plan with primary team.

## 2021-05-04 NOTE — PROGRESS NOTE ADULT - SUBJECTIVE AND OBJECTIVE BOX
Patient is a 60y old  Female who presents with a chief complaint of dizziness / code stroke (04 May 2021 15:22)      INTERVAL HPI/OVERNIGHT EVENTS: s/p code stroke / RRT   T(C): 36.6 (21 @ 17:35), Max: 36.8 (21 @ 14:30)  HR: 75 (21 @ 17:35) (66 - 78)  BP: 159/87 (21 @ 17:35) (158/74 - 192/88)  RR: 18 (21 @ 17:35) (16 - 18)  SpO2: 98% (21 @ 17:35) (98% - 100%)  Wt(kg): --  I&O's Summary    03 May 2021 07:01  -  04 May 2021 07:00  --------------------------------------------------------  IN: 1200 mL / OUT: 0 mL / NET: 1200 mL    04 May 2021 07:01  -  04 May 2021 18:41  --------------------------------------------------------  IN: 480 mL / OUT: 500 mL / NET: -20 mL        PAST MEDICAL & SURGICAL HISTORY:  Diabetes    Hypertension    Hypercholesteremia    Essential hypertension    Type 2 diabetes mellitus without complication, without long-term current use of insulin    Other hyperlipidemia    Glaucoma    ESRD (end-stage renal disease) due to SLE    Lupus (systemic lupus erythematosus)    S/P  section  times two    H/O gastric bypass  2016    S/P appendectomy        SOCIAL HISTORY  Alcohol:  Tobacco:  Illicit substance use:    FAMILY HISTORY:    REVIEW OF SYSTEMS:  CONSTITUTIONAL: No fever, weight loss, or fatigue  EYES: No eye pain, visual disturbances, or discharge  ENMT:  No difficulty hearing, tinnitus, vertigo; No sinus or throat pain  NECK: No pain or stiffness  RESPIRATORY: No cough, wheezing, chills or hemoptysis; No shortness of breath  CARDIOVASCULAR: No chest pain, palpitations, dizziness, or leg swelling  GASTROINTESTINAL: No abdominal or epigastric pain. No nausea, vomiting, or hematemesis; No diarrhea or constipation. No melena or hematochezia.  GENITOURINARY: No dysuria, frequency, hematuria, or incontinence  NEUROLOGICAL: No headaches, memory loss, loss of strength, numbness, or tremors  SKIN: No itching, burning, rashes, or lesions   LYMPH NODES: No enlarged glands  ENDOCRINE: No heat or cold intolerance; No hair loss  MUSCULOSKELETAL: No joint pain or swelling; No muscle, back, or extremity pain  PSYCHIATRIC: No depression, anxiety, mood swings, or difficulty sleeping  HEME/LYMPH: No easy bruising, or bleeding gums  ALLERY AND IMMUNOLOGIC: No hives or eczema    RADIOLOGY & ADDITIONAL TESTS:    Imaging Personally Reviewed:  [ ] YES  [ ] NO    Consultant(s) Notes Reviewed:  [ ] YES  [ ] NO    PHYSICAL EXAM:  GENERAL: NAD, well-groomed, well-developed  HEAD:  Atraumatic, Normocephalic  EYES: EOMI, PERRLA, conjunctiva and sclera clear  ENMT: No tonsillar erythema, exudates, or enlargement; Moist mucous membranes, Good dentition, No lesions  NECK: Supple, No JVD, Normal thyroid  NERVOUS SYSTEM:  Alert & Oriented X3, Good concentration; Motor Strength 5/5 B/L upper and lower extremities; DTRs 2+ intact and symmetric  CHEST/LUNG: Clear to percussion bilaterally; No rales, rhonchi, wheezing, or rubs  HEART: Regular rate and rhythm; No murmurs, rubs, or gallops  ABDOMEN: Soft, Nontender, Nondistended; Bowel sounds present  EXTREMITIES:  2+ Peripheral Pulses, No clubbing, cyanosis, or edema  LYMPH: No lymphadenopathy noted  SKIN: No rashes or lesions    LABS:                        12.6   8.78  )-----------( 165      ( 04 May 2021 13:31 )             35.6     05-04    137  |  95<L>  |  36<H>  ----------------------------<  193<H>  3.7   |  24  |  3.28<H>    Ca    9.1      04 May 2021 13:31  Phos  2.1     05-04  Mg     2.2     05-04    TPro  6.9  /  Alb  3.6  /  TBili  0.4  /  DBili  x   /  AST  21  /  ALT  17  /  AlkPhos  177<H>  05-04        CAPILLARY BLOOD GLUCOSE      POCT Blood Glucose.: 192 mg/dL (04 May 2021 17:22)  POCT Blood Glucose.: 176 mg/dL (04 May 2021 13:05)  POCT Blood Glucose.: 191 mg/dL (04 May 2021 07:32)  POCT Blood Glucose.: 315 mg/dL (03 May 2021 22:01)            MEDICATIONS  (STANDING):  aspirin enteric coated 81 milliGRAM(s) Oral daily  atorvastatin 40 milliGRAM(s) Oral at bedtime  calcium acetate 667 milliGRAM(s) Oral three times a day with meals  chlorhexidine 2% Cloths 1 Application(s) Topical daily  clopidogrel Tablet 75 milliGRAM(s) Oral daily  dextrose 40% Gel 15 Gram(s) Oral once  dextrose 5%. 1000 milliLiter(s) (50 mL/Hr) IV Continuous <Continuous>  dextrose 5%. 1000 milliLiter(s) (100 mL/Hr) IV Continuous <Continuous>  dextrose 50% Injectable 25 Gram(s) IV Push once  dextrose 50% Injectable 12.5 Gram(s) IV Push once  dextrose 50% Injectable 25 Gram(s) IV Push once  gabapentin 200 milliGRAM(s) Oral two times a day  glucagon  Injectable 1 milliGRAM(s) IntraMuscular once  heparin   Injectable 5000 Unit(s) SubCutaneous every 12 hours  insulin lispro (ADMELOG) corrective regimen sliding scale   SubCutaneous three times a day before meals  insulin lispro (ADMELOG) corrective regimen sliding scale   SubCutaneous at bedtime  latanoprost 0.005% Ophthalmic Solution 1 Drop(s) Both EYES at bedtime  levothyroxine 25 MICROGram(s) Oral daily  metoprolol succinate ER 25 milliGRAM(s) Oral daily  polyethylene glycol 3350 17 Gram(s) Oral daily  timolol 0.5% Solution 1 Drop(s) Both EYES daily    MEDICATIONS  (PRN):      Care Discussed with Consultants/Other Providers [ ] YES  [ ] NO

## 2021-05-04 NOTE — STROKE CODE NOTE - NIH STROKE SCALE: 5A. MOTOR ARM, LEFT, QM
(2) Some effort against gravity; limb cannot get to or maintain (if cued) 90 (or 45) degrees, drifts down to bed, but has some effort against gravity
(2) Some effort against gravity; limb cannot get to or maintain (if cued) 90 (or 45) degrees, drifts down to bed, but has some effort against gravity
(0) No drift; limb holds 90 (or 45) degrees for full 10 secs

## 2021-05-04 NOTE — PROGRESS NOTE ADULT - ASSESSMENT
Nathaly Paniagua is a 60 year old female with a past medical history of ESRD on dialysis, DM, HTN, glaucoma, HLD, lupus who presents with episodes of dizziness and gaze deviation.    Impression:     1. 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 Mechanism .     2. Encephelopathy - likely toxic/metabolic related to ESRD on HD    Plan:   [] Continue ASA 81mg for secondary stroke prevention; From neurovascular perspective, patient does not need to be on both ASA and Plavix but can be on ASA alone  [] Continue Atorvastatin 40mg   [] No need for further vessel imaging   [] Normotension     I called the nurse back later in the day who stated patient is waxing and waning and oftentimes attentive and oriented x2-3. This supports an encephelopathy picture.   Please call back if mental status worsens.

## 2021-05-04 NOTE — STROKE CODE NOTE - NIH STROKE SCALE: 9. BEST LANGUAGE, QM
(0) No aphasia; normal
(0) No aphasia; normal
(2) Severe aphasia; all communication is through fragmentary expression; great need for inference, questioning, and guessing by the listener. Range of information that can be exchanged is limited; listener carries burden of communication. Examiner cannot identify materials provided from patient response.

## 2021-05-04 NOTE — PROGRESS NOTE ADULT - SUBJECTIVE AND OBJECTIVE BOX
WILLARD BOB  Female  MRN-[id number]    Interval:    57F w/ PMH of SLE dx on 4/2017, ESRD on HD, NSTEMI, DM, HTN, HLD, presented w/ acute change in mental status after dialysis.     Neurology initially     VITAL SIGNS:  T(F): 98.2  HR: 71  BP: 162/88  RR: 18  SpO2: 98%    Exam:   MS: Oriented x3. Fluent. Follows crossed commands.   CN: VFF. EOMI. V1-V3 intact. Face symmetric. T/u midline.   Motor: Full strength throughout.   Sensory: Intact to LT throughout   Reflexes: 2+ throughout. Babinski absent bilaterally.   Coordination: No dysmetria on FNF or ataxia on HTS bilaterally   Gait: Deferred    LABS:                          12.6   8.78  )-----------( 165      ( 04 May 2021 13:31 )             35.6     05-04    137  |  95<L>  |  36<H>  ----------------------------<  193<H>  3.7   |  24  |  3.28<H>    Ca    9.1      04 May 2021 13:31  Phos  2.1     05-04  Mg     2.2     05-04    TPro  6.9  /  Alb  3.6  /  TBili  0.4  /  DBili  x   /  AST  21  /  ALT  17  /  AlkPhos  177<H>  05-04        MEDICATIONS:   aspirin enteric coated 81 milliGRAM(s) Oral daily  atorvastatin 40 milliGRAM(s) Oral at bedtime  calcium acetate 667 milliGRAM(s) Oral three times a day with meals  chlorhexidine 2% Cloths 1 Application(s) Topical daily  clopidogrel Tablet 75 milliGRAM(s) Oral daily  dextrose 40% Gel 15 Gram(s) Oral once  dextrose 5%. 1000 milliLiter(s) IV Continuous <Continuous>  dextrose 5%. 1000 milliLiter(s) IV Continuous <Continuous>  dextrose 50% Injectable 25 Gram(s) IV Push once  dextrose 50% Injectable 12.5 Gram(s) IV Push once  dextrose 50% Injectable 25 Gram(s) IV Push once  gabapentin 200 milliGRAM(s) Oral two times a day  glucagon  Injectable 1 milliGRAM(s) IntraMuscular once  heparin   Injectable 5000 Unit(s) SubCutaneous every 12 hours  insulin lispro (ADMELOG) corrective regimen sliding scale   SubCutaneous three times a day before meals  insulin lispro (ADMELOG) corrective regimen sliding scale   SubCutaneous at bedtime  latanoprost 0.005% Ophthalmic Solution 1 Drop(s) Both EYES at bedtime  levothyroxine 25 MICROGram(s) Oral daily  metoprolol succinate ER 25 milliGRAM(s) Oral daily  polyethylene glycol 3350 17 Gram(s) Oral daily  timolol 0.5% Solution 1 Drop(s) Both EYES daily          RADIOLOGY & ADDITIONAL STUDIES:             WILLARD BOB  Female  MRN-[id number]    Interval:    57F w/ PMH of SLE dx on 4/2017, ESRD on HD, NSTEMI, DM, HTN, HLD, presented w/ acute change in mental status after dialysis.     Neurology initially consulted for L. 3N palsy found to have dorsal medullary infarct.     Code stroke called on 5/4 due to waxing and waning mental status after HD. Patient examined at bedside. LKN 1300. NIHSS ***. Exam as below. CTH negative. Did not do CTA h/n as patient just got back from HD and the suspicion for stroke was low.         VITAL SIGNS:  T(F): 98.2  HR: 71  BP: 162/88  RR: 18  SpO2: 98%    Exam:   MS: Oriented x3. Fluent. Follows crossed commands.   CN: VFF. EOMI. V1-V3 intact. Face symmetric. T/u midline.   Motor: Full strength throughout.   Sensory: Intact to LT throughout   Reflexes: 2+ throughout. Babinski absent bilaterally.   Coordination: No dysmetria on FNF or ataxia on HTS bilaterally   Gait: Deferred    LABS:                          12.6   8.78  )-----------( 165      ( 04 May 2021 13:31 )             35.6     05-04    137  |  95<L>  |  36<H>  ----------------------------<  193<H>  3.7   |  24  |  3.28<H>    Ca    9.1      04 May 2021 13:31  Phos  2.1     05-04  Mg     2.2     05-04    TPro  6.9  /  Alb  3.6  /  TBili  0.4  /  DBili  x   /  AST  21  /  ALT  17  /  AlkPhos  177<H>  05-04        MEDICATIONS:   aspirin enteric coated 81 milliGRAM(s) Oral daily  atorvastatin 40 milliGRAM(s) Oral at bedtime  calcium acetate 667 milliGRAM(s) Oral three times a day with meals  chlorhexidine 2% Cloths 1 Application(s) Topical daily  clopidogrel Tablet 75 milliGRAM(s) Oral daily  dextrose 40% Gel 15 Gram(s) Oral once  dextrose 5%. 1000 milliLiter(s) IV Continuous <Continuous>  dextrose 5%. 1000 milliLiter(s) IV Continuous <Continuous>  dextrose 50% Injectable 25 Gram(s) IV Push once  dextrose 50% Injectable 12.5 Gram(s) IV Push once  dextrose 50% Injectable 25 Gram(s) IV Push once  gabapentin 200 milliGRAM(s) Oral two times a day  glucagon  Injectable 1 milliGRAM(s) IntraMuscular once  heparin   Injectable 5000 Unit(s) SubCutaneous every 12 hours  insulin lispro (ADMELOG) corrective regimen sliding scale   SubCutaneous three times a day before meals  insulin lispro (ADMELOG) corrective regimen sliding scale   SubCutaneous at bedtime  latanoprost 0.005% Ophthalmic Solution 1 Drop(s) Both EYES at bedtime  levothyroxine 25 MICROGram(s) Oral daily  metoprolol succinate ER 25 milliGRAM(s) Oral daily  polyethylene glycol 3350 17 Gram(s) Oral daily  timolol 0.5% Solution 1 Drop(s) Both EYES daily          RADIOLOGY & ADDITIONAL STUDIES:             WILLARD BOB  Female  MRN-[id number]    Interval:    57F w/ PMH of SLE dx on 4/2017, ESRD on HD, NSTEMI, DM, HTN, HLD, presented w/ acute change in mental status after dialysis.     Neurology initially consulted for L. 3N palsy found to have dorsal medullary infarct.     Code stroke called on 5/4 due to waxing and waning mental status after HD. Patient examined at bedside. LKN 1300. NIHSS ***. Exam as below. CTH negative. Did not do CTA h/n as patient just got back from HD and the suspicion for stroke was low.     VITAL SIGNS:  T(F): 98.2  HR: 71  BP: 162/88  RR: 18  SpO2: 98%    Exam:   MS: Eyes closed. Opens to noxious stimuli. Will occasionally track examiner. Will intermittently make spontaneous fluent speech.   CN: BTT bilaterally; EOMI; Face symmetric  Motor: At least antigravity in all 4 limbs     LABS:                          12.6   8.78  )-----------( 165      ( 04 May 2021 13:31 )             35.6     05-04    137  |  95<L>  |  36<H>  ----------------------------<  193<H>  3.7   |  24  |  3.28<H>    Ca    9.1      04 May 2021 13:31  Phos  2.1     05-04  Mg     2.2     05-04    TPro  6.9  /  Alb  3.6  /  TBili  0.4  /  DBili  x   /  AST  21  /  ALT  17  /  AlkPhos  177<H>  05-04        MEDICATIONS:   aspirin enteric coated 81 milliGRAM(s) Oral daily  atorvastatin 40 milliGRAM(s) Oral at bedtime  calcium acetate 667 milliGRAM(s) Oral three times a day with meals  chlorhexidine 2% Cloths 1 Application(s) Topical daily  clopidogrel Tablet 75 milliGRAM(s) Oral daily  dextrose 40% Gel 15 Gram(s) Oral once  dextrose 5%. 1000 milliLiter(s) IV Continuous <Continuous>  dextrose 5%. 1000 milliLiter(s) IV Continuous <Continuous>  dextrose 50% Injectable 25 Gram(s) IV Push once  dextrose 50% Injectable 12.5 Gram(s) IV Push once  dextrose 50% Injectable 25 Gram(s) IV Push once  gabapentin 200 milliGRAM(s) Oral two times a day  glucagon  Injectable 1 milliGRAM(s) IntraMuscular once  heparin   Injectable 5000 Unit(s) SubCutaneous every 12 hours  insulin lispro (ADMELOG) corrective regimen sliding scale   SubCutaneous three times a day before meals  insulin lispro (ADMELOG) corrective regimen sliding scale   SubCutaneous at bedtime  latanoprost 0.005% Ophthalmic Solution 1 Drop(s) Both EYES at bedtime  levothyroxine 25 MICROGram(s) Oral daily  metoprolol succinate ER 25 milliGRAM(s) Oral daily  polyethylene glycol 3350 17 Gram(s) Oral daily  timolol 0.5% Solution 1 Drop(s) Both EYES daily          RADIOLOGY & ADDITIONAL STUDIES:

## 2021-05-04 NOTE — STROKE CODE NOTE - NIH STROKE SCALE: 1A. LEVEL OF CONSCIOUSNESS, QM
(2) Not alert; requires repeated stimulation to attend, or is obtunded and requires strong or painful stimulation to make movements (not stereotyped)
(0) Alert; keenly responsive
(2) Not alert; requires repeated stimulation to attend, or is obtunded and requires strong or painful stimulation to make movements (not stereotyped)

## 2021-05-04 NOTE — DISCHARGE NOTE PROVIDER - NSDCCPCAREPLAN_GEN_ALL_CORE_FT
PRINCIPAL DISCHARGE DIAGNOSIS  Diagnosis: ESRD (end-stage renal disease) due to SLE  Assessment and Plan of Treatment: follow up with nephrology   continue medications as prescribed      SECONDARY DISCHARGE DIAGNOSES  Diagnosis: AMS (altered mental status)  Assessment and Plan of Treatment: follow up with neurology  stroke ruled out    Diagnosis: Lupus  Assessment and Plan of Treatment: steroid taper 40 mg for 14 days starting tomorrow  follow up with rheumatology prior to the two week birgit as they need to continue to taper your prednisone     PRINCIPAL DISCHARGE DIAGNOSIS  Diagnosis: ESRD (end-stage renal disease) due to SLE  Assessment and Plan of Treatment: follow up with nephrology   continue medications as prescribed      SECONDARY DISCHARGE DIAGNOSES  Diagnosis: Decubital ulcer  Assessment and Plan of Treatment: Decubital ulcer  cleanse with normal saline and cover with dry gauze daily    Diagnosis: AMS (altered mental status)  Assessment and Plan of Treatment: follow up with neurology  stroke ruled out    Diagnosis: Lupus  Assessment and Plan of Treatment: steroid taper 40 mg for 14 days starting tomorrow  follow up with rheumatology prior to the two week birgit as they need to continue to taper your prednisone     PRINCIPAL DISCHARGE DIAGNOSIS  Diagnosis: ESRD (end-stage renal disease) due to SLE  Assessment and Plan of Treatment: follow up with nephrology   continue medications as prescribed.   Hemodialysis is the most common method used to treat advanced and permanent kidney failure. But even with better procedures and equipment, hemodialysis is still a complicated and inconvenient therapy that requires a coordinated effort from your whole health care team, including your nephrologist, dialysis nurse, dialysis technician, dietitian, and . The most important members of your health care team are you and your family.   Healthy kidneys clean your blood by removing excess fluid, minerals, and wastes. When your kidneys fail, harmful wastes build up in your body, your blood pressure may rise, and your body may retain excess fluid and not make enough red blood cells. When this happens, you need treatment to replace the work of your failed kidneys. In hemodialysis, your blood is allowed to flow, a few ounces at a time, through a special filter that removes wastes and extra fluids. The clean blood is then returned to your body. One of the biggest adjustments you must make when you start hemodialysis treatments is following a strict schedule. Most patients go to a dialysis center three times a week for 3 to 5 or more hours each visit.   Some of the more common conditions caused by kidney failure are extreme tiredness, bone problems, joint problems, itching, and "restless legs”.  Many people treated with hemodialysis complain of itchy skin, which is often worse during or just after treatment.  Patients on dialysis often have insomnia, and some people have a specific problem called the sleep apnea syndrome.  Over time, these sleep disturbances can lead to "day-night reversal" (insomnia at night, sleepiness during the day), headache, depression, and decreased alertness.   Sleep disorders may seem unimportant, but they can impair your quality of life. Don't hesitate to raise these problems with your nurse, doctor, or         SECONDARY DISCHARGE DIAGNOSES  Diagnosis: AMS (altered mental status)  Assessment and Plan of Treatment: improved.   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  continue with aspirin, plavix, and lipitor as directed.  follow up with neurology.    Diagnosis: Lupus  Assessment and Plan of Treatment: stable.   continue prednisone 15mg twice a day.   follow up with rheumatology prior to the two week birgit as they need to continue to taper your prednisone    Diagnosis: Decubital ulcer  Assessment and Plan of Treatment: stable.   frequent reposition and skin care.   cleanse with normal saline and cover with dry gauze daily

## 2021-05-04 NOTE — DISCHARGE NOTE PROVIDER - NSDCFUSCHEDAPPT_GEN_ALL_CORE_FT
WILLARD BOB ; 06/15/2021 ; [facility] [address] WILLARD Alcocer ; 06/15/2021 ; [facility] [address]

## 2021-05-04 NOTE — PROVIDER CONTACT NOTE (CRITICAL VALUE NOTIFICATION) - BACKGROUND
57F h/o lupus (dx 04/2017), ESRD (HD T/Th/Sat), NSTEMI (BMS 01/2018) (s./p PCI mLAD 1/2018, ANABELA to RCA 1/2019 and ANABELA to diag 5/2019), DM2, HTN, HLD . PT S/P CODE STROKE

## 2021-05-04 NOTE — DISCHARGE NOTE PROVIDER - HOSPITAL COURSE
57F h/o lupus (dx 04/2017), ESRD (HD T/Th/Sat), NSTEMI (BMS 01/2018), DM2, HTN, HLD (not on medication), glaucoma, hypothyroidism p/w dizziness. Patient awoke from sleep at 3am with dizziness. Went to dialysis at 5am and continued to be dizzy before and after dialysis. Dizziness is intermittent, worse when standing up quickly, lasts for several minutes.  At 8am patient's daughter noticed her mother's R eye was deviating to the right and rolling back in her head. Last known normal was 11pm last night. Patient denies blurry vision or other vision changes, N/V, CP, SOB, LE edema, syncope, gait abnormalities.   Impression: L partial third cranial nerve palsy likely a peripheral nerve palsy in the setting of DM and HTN. Also of high consideration is lupus induced vasculitis affecting the third nerve. Lower likelihood of ischemic stroke as no other focal deficits noted. Dizziness likely in the setting of her third nerve palsy of peripheral origin.    Spoke with neurology resident (Pager #[phone]), who reviewed MRI of head and orbit. No further workup or new treatment recommended.     MRI of head and orbit - Small focus of abnormal signal within the dorsal julián with mild diffusion restriction which may represent a subacute lacunar infarct or area of demyelination, Scattered additional nonspecific foci of white matter T2 hyperintensity, likely microvascular in nature, Hemosiderin deposition within the left basal ganglia from prior hemorrhage, No orbital abnormality seen on this noncontrast exam. 60F h/o lupus (dx 04/2017), ESRD (HD T/Th/Sat), NSTEMI (BMS 01/2018), DM2, HTN, HLD (not on medication), glaucoma, hypothyroidism p/w dizziness. Patient awoke from sleep at 3am with dizziness. Went to dialysis at 5am and continued to be dizzy before and after dialysis. Dizziness is intermittent, worse when standing up quickly, lasts for several minutes.  At 8am patient's daughter noticed her mother's R eye was deviating to the right and rolling back in her head. Last known normal was 11pm last night. Patient denies blurry vision or other vision changes, N/V, CP, SOB, LE edema, syncope, gait abnormalities.  L partial third cranial nerve palsy likely a peripheral nerve palsy in the setting of DM and HTN. Also of high consideration is lupus induced vasculitis affecting the third nerve. Lower likelihood of ischemic stroke as no other focal deficits noted. Dizziness likely in the setting of her third nerve palsy of peripheral origin.  MRI of head and orbit showed mall focus of abnormal signal within the dorsal julián with mild diffusion restriction which may represent a subacute lacunar infarct or area of demyelination. seen by neuro and rheumatology, No further workup or new treatment recommended. s/p solumedrol iv. ESRD on HD. pt has Decubital ulcer. s/p  wound vac and also was on abx during HD. turn position frequently in bed.   pt remains stable for DC. will follow up with PCP, renal neuro and rheumatology.

## 2021-05-04 NOTE — PROGRESS NOTE ADULT - ASSESSMENT
57F h/o lupus (dx 04/2017), ESRD (HD T/Th/Sat), NSTEMI (BMS 01/2018), DM2, HTN, HLD (not on medication), glaucoma, hypothyroidism p/w dizziness.    1) ESRD on HD : Tuesday, Thursday and Saturday   Routine HD TTS via left upper ext AVF  s/p HD today- bp stable; flowsheet reviewed- was feeling "tired" at the end of tx--> so tx cut short by 15 min   trend bmp    2) Anemia In CKD-  Hgb at goal  trend hgb    3) HTN  metoprolol 25mg   improved bp  trend bp      contact with question   cell   Ans Serv- [phone]

## 2021-05-04 NOTE — STROKE CODE NOTE - MRS SCORE
(0) No symptoms
(2) Slight disablitiy.  Able to look after own affairs without assistance, but unable to carry out all previous activities.
(0) No symptoms

## 2021-05-04 NOTE — STROKE CODE NOTE - NIH STROKE SCALE: 6A. MOTOR LEG, LEFT, QM
(2) Some effort against gravity; leg falls to bed by 5 secs, but has some effort against gravity
(2) Some effort against gravity; leg falls to bed by 5 secs, but has some effort against gravity
(0) No drift; leg holds 30 degree position for full 5 secs

## 2021-05-04 NOTE — DISCHARGE NOTE NURSING/CASE MANAGEMENT/SOCIAL WORK - PATIENT PORTAL LINK FT
You can access the FollowMyHealth Patient Portal offered by [facility] by registering at the following website: [email or web address]. By joining [employer]’s FollowMyHealth portal, you will also be able to view your health information using other applications (apps) compatible with our system.

## 2021-05-04 NOTE — DISCHARGE NOTE PROVIDER - CARE PROVIDER_API CALL
Kelsie Jain)  Internal Medicine; Rheumatology  [address], [address]  [place] [address]  Phone: [phone]  Fax: [phone]  Follow Up Time:     Basil Alaniz  Phone: [phone]  Fax: (   )    -  Follow Up Time:     Khanh Todd  INTERNAL MEDICINE  [address] [place] [address]  Phone: [phone]  Fax: [phone]  Follow Up Time:

## 2021-05-04 NOTE — DISCHARGE NOTE PROVIDER - CARE PROVIDERS DIRECT ADDRESSES
[email or web address],DirectAddress_Unknown

## 2021-05-04 NOTE — DISCHARGE NOTE PROVIDER - NSDCMRMEDTOKEN_GEN_ALL_CORE_FT
aspirin 81 mg oral delayed release tablet: 1 tab(s) orally once a day  atorvastatin 40 mg oral tablet: 1 tab(s) orally once a day (at bedtime)  Betimol 0.5% ophthalmic solution: 1 drop(s) to each affected eye 2 times a day  bisacodyl 5 mg oral delayed release tablet: 1 tab(s) orally once a day  calcium acetate 667 mg oral tablet: 1 tab(s) orally 3 times a day (with meals)  calcium carbonate 1250 mg (500 mg elemental calcium) oral tablet: 1 tab(s) orally once a day  clopidogrel 75 mg oral tablet: 1 tab(s) orally once a day  gabapentin 100 mg oral capsule: 200 milligram(s) orally 2 times a day  levothyroxine 25 mcg (0.025 mg) oral tablet: 1 tab(s) orally once a day  metoprolol succinate 25 mg oral tablet, extended release: 1 tab(s) orally once a day  Nephro-Bravo oral tablet: 1 tab(s) orally once a day  polyethylene glycol 3350 oral powder for reconstitution: 17 gram(s) orally once a day  predniSONE 20 mg oral tablet: 2 tab(s) orally once a day   Travatan Z 0.004% ophthalmic solution: 1 drop(s) to each affected eye once a day (at bedtime)   amLODIPine 5 mg oral tablet: 1 tab(s) orally once a day    Dr. NILESH CUEAV NPI #[id number]  aspirin 81 mg oral delayed release tablet: 1 tab(s) orally once a day  atorvastatin 40 mg oral tablet: 1 tab(s) orally once a day (at bedtime)  Betimol 0.5% ophthalmic solution: 1 drop(s) to each affected eye 2 times a day  bisacodyl 5 mg oral delayed release tablet: 1 tab(s) orally once a day  calcium acetate 667 mg oral tablet: 1 tab(s) orally 3 times a day (with meals)  calcium carbonate 1250 mg (500 mg elemental calcium) oral tablet: 1 tab(s) orally once a day  clopidogrel 75 mg oral tablet: 1 tab(s) orally once a day  gabapentin 100 mg oral capsule: 200 milligram(s) orally 2 times a day  levothyroxine 25 mcg (0.025 mg) oral tablet: 1 tab(s) orally once a day  metoprolol succinate 25 mg oral tablet, extended release: 1 tab(s) orally once a day  Nephro-Bravo oral tablet: 1 tab(s) orally once a day  polyethylene glycol 3350 oral powder for reconstitution: 17 gram(s) orally once a day  predniSONE 5 mg oral tablet: 3 tab(s) orally 2 times a day    Dr. NILESH CUEVA NPI #[id number]  Travatan Z 0.004% ophthalmic solution: 1 drop(s) to each affected eye once a day (at bedtime)

## 2021-05-04 NOTE — PROGRESS NOTE ADULT - SUBJECTIVE AND OBJECTIVE BOX
Patient seen and examined  no complaints    penicillin (Rash)    Hospital Medications:   MEDICATIONS  (STANDING):  aspirin enteric coated 81 milliGRAM(s) Oral daily  atorvastatin 40 milliGRAM(s) Oral at bedtime  calcium acetate 667 milliGRAM(s) Oral three times a day with meals  chlorhexidine 2% Cloths 1 Application(s) Topical daily  clopidogrel Tablet 75 milliGRAM(s) Oral daily  dextrose 40% Gel 15 Gram(s) Oral once  dextrose 5%. 1000 milliLiter(s) (50 mL/Hr) IV Continuous <Continuous>  dextrose 5%. 1000 milliLiter(s) (100 mL/Hr) IV Continuous <Continuous>  dextrose 50% Injectable 25 Gram(s) IV Push once  dextrose 50% Injectable 12.5 Gram(s) IV Push once  dextrose 50% Injectable 25 Gram(s) IV Push once  gabapentin 200 milliGRAM(s) Oral two times a day  glucagon  Injectable 1 milliGRAM(s) IntraMuscular once  heparin   Injectable 5000 Unit(s) SubCutaneous every 12 hours  insulin lispro (ADMELOG) corrective regimen sliding scale   SubCutaneous three times a day before meals  insulin lispro (ADMELOG) corrective regimen sliding scale   SubCutaneous at bedtime  latanoprost 0.005% Ophthalmic Solution 1 Drop(s) Both EYES at bedtime  levothyroxine 25 MICROGram(s) Oral daily  metoprolol succinate ER 25 milliGRAM(s) Oral daily  polyethylene glycol 3350 17 Gram(s) Oral daily  timolol 0.5% Solution 1 Drop(s) Both EYES daily    VITALS:  T(F): 98.2 (05-04-21 @ 14:30), Max: 98.2 (05-04-21 @ 14:30)  HR: 71 (05-04-21 @ 14:30)  BP: 162/88 (05-04-21 @ 14:30)  RR: 18 (05-04-21 @ 14:30)  SpO2: 98% (05-04-21 @ 14:30)  Wt(kg): --    05-03 @ 07:01  -  05-04 @ 07:00  --------------------------------------------------------  IN: 1200 mL / OUT: 0 mL / NET: 1200 mL    05-04 @ 07:01  -  05-04 @ 15:21  --------------------------------------------------------  IN: 240 mL / OUT: 500 mL / NET: -260 mL          Physical Exam :-  Constitutional: NAD  Neck: Supple.  Respiratory: Bilateral equal breath sounds, few Crackles present.  Cardiovascular: S1, S2 normal, positive Murmur  Gastrointestinal: Bowel Sounds present, soft, non tender.  Extremities: no Edema Feet  Arterio-venous Fistula  left upper ext + thrill    LABS:  05-04    137  |  95<L>  |  36<H>  ----------------------------<  193<H>  3.7   |  24  |  3.28<H>    Ca    9.1      04 May 2021 13:31  Phos  2.1     05-04  Mg     2.2     05-04    TPro  6.9  /  Alb  3.6  /  TBili  0.4  /  DBili      /  AST  21  /  ALT  17  /  AlkPhos  177<H>  05-04    Creatinine Trend: 3.28 <--, 9.09 <--, 6.90 <--, 4.82 <--, 5.15 <--, 3.66 <--                        12.6   8.78  )-----------( 165      ( 04 May 2021 13:31 )             35.6     Urine Studies:      RADIOLOGY & ADDITIONAL STUDIES:

## 2021-05-04 NOTE — DISCHARGE NOTE PROVIDER - PROVIDER TOKENS
PROVIDER:[TOKEN:[[id number]]],FREE:[LAST:[Corbin],FIRST:[Basil],PHONE:[phone]],FAX:[(   )    -]],PROVIDER:[TOKEN:[[id number]]]

## 2021-05-04 NOTE — STROKE CODE NOTE - NIH STROKE SCALE: 5B. MOTOR ARM, RIGHT, QM
(2) Some effort against gravity; limb cannot get to or maintain (if cued) 90 (or 45) degrees, drifts down to bed, but has some effort against gravity
(0) No drift; limb holds 90 (or 45) degrees for full 10 secs
(2) Some effort against gravity; limb cannot get to or maintain (if cued) 90 (or 45) degrees, drifts down to bed, but has some effort against gravity

## 2021-05-05 LAB
CARDIOLIPIN AB SER-ACNC: POSITIVE
CARDIOLIPIN IGM SER-MCNC: 12.8 MPL — HIGH (ref 0–12.5)
CARDIOLIPIN IGM SER-MCNC: <5 GPL — SIGNIFICANT CHANGE UP (ref 0–12.5)
GLUCOSE BLDC GLUCOMTR-MCNC: 189 MG/DL — HIGH (ref 70–99)
GLUCOSE BLDC GLUCOMTR-MCNC: 282 MG/DL — HIGH (ref 70–99)
GLUCOSE BLDC GLUCOMTR-MCNC: 302 MG/DL — HIGH (ref 70–99)
GLUCOSE BLDC GLUCOMTR-MCNC: 357 MG/DL — HIGH (ref 70–99)

## 2021-05-05 PROCEDURE — 99231 SBSQ HOSP IP/OBS SF/LOW 25: CPT | Mod: GC

## 2021-05-05 RX ORDER — AMLODIPINE BESYLATE 2.5 MG/1
2.5 TABLET ORAL DAILY
Refills: 0 | Status: DISCONTINUED | OUTPATIENT
Start: 2021-05-05 | End: 2021-05-07

## 2021-05-05 RX ADMIN — POLYETHYLENE GLYCOL 3350 17 GRAM(S): 17 POWDER, FOR SOLUTION ORAL at 12:47

## 2021-05-05 RX ADMIN — CHLORHEXIDINE GLUCONATE 1 APPLICATION(S): 213 SOLUTION TOPICAL at 12:47

## 2021-05-05 RX ADMIN — GABAPENTIN 200 MILLIGRAM(S): 400 CAPSULE ORAL at 05:27

## 2021-05-05 RX ADMIN — Medication 1: at 08:36

## 2021-05-05 RX ADMIN — HEPARIN SODIUM 5000 UNIT(S): 5000 INJECTION INTRAVENOUS; SUBCUTANEOUS at 05:27

## 2021-05-05 RX ADMIN — Medication 667 MILLIGRAM(S): at 08:36

## 2021-05-05 RX ADMIN — LATANOPROST 1 DROP(S): 0.05 SOLUTION/ DROPS OPHTHALMIC; TOPICAL at 21:38

## 2021-05-05 RX ADMIN — Medication 81 MILLIGRAM(S): at 12:47

## 2021-05-05 RX ADMIN — Medication 2: at 21:38

## 2021-05-05 RX ADMIN — GABAPENTIN 200 MILLIGRAM(S): 400 CAPSULE ORAL at 17:42

## 2021-05-05 RX ADMIN — ATORVASTATIN CALCIUM 40 MILLIGRAM(S): 80 TABLET, FILM COATED ORAL at 21:38

## 2021-05-05 RX ADMIN — Medication 25 MICROGRAM(S): at 05:26

## 2021-05-05 RX ADMIN — Medication 1 DROP(S): at 12:47

## 2021-05-05 RX ADMIN — Medication 25 MILLIGRAM(S): at 10:12

## 2021-05-05 RX ADMIN — Medication 3: at 12:47

## 2021-05-05 RX ADMIN — HEPARIN SODIUM 5000 UNIT(S): 5000 INJECTION INTRAVENOUS; SUBCUTANEOUS at 17:43

## 2021-05-05 RX ADMIN — Medication 667 MILLIGRAM(S): at 12:46

## 2021-05-05 RX ADMIN — Medication 667 MILLIGRAM(S): at 17:42

## 2021-05-05 RX ADMIN — Medication 5: at 17:43

## 2021-05-05 RX ADMIN — CLOPIDOGREL BISULFATE 75 MILLIGRAM(S): 75 TABLET, FILM COATED ORAL at 12:47

## 2021-05-05 RX ADMIN — Medication 40 MILLIGRAM(S): at 05:26

## 2021-05-05 NOTE — PROGRESS NOTE ADULT - SUBJECTIVE AND OBJECTIVE BOX
Patient is a 60y old  Female who presents with a chief complaint of dizziness / code stroke (05 May 2021 16:45)      INTERVAL HPI/OVERNIGHT EVENTS: doing fair , no c/o  T(C): 36.7 (21 @ 21:09), Max: 36.7 (21 @ 21:09)  HR: 63 (21 @ 21:09) (57 - 63)  BP: 145/79 (21 @ 21:09) (145/79 - 169/82)  RR: 18 (21 @ 21:09) (16 - 18)  SpO2: 100% (21 @ 21:09) (98% - 100%)  Wt(kg): --  I&O's Summary    04 May 2021 07:01  -  05 May 2021 07:00  --------------------------------------------------------  IN: 760 mL / OUT: 500 mL / NET: 260 mL    05 May 2021 07:01  -  05 May 2021 23:50  --------------------------------------------------------  IN: 420 mL / OUT: 0 mL / NET: 420 mL        PAST MEDICAL & SURGICAL HISTORY:  Diabetes    Hypertension    Hypercholesteremia    Essential hypertension    Type 2 diabetes mellitus without complication, without long-term current use of insulin    Other hyperlipidemia    Glaucoma    ESRD (end-stage renal disease) due to SLE    Lupus (systemic lupus erythematosus)    S/P  section  times two    H/O gastric bypass  2016    S/P appendectomy        SOCIAL HISTORY  Alcohol:  Tobacco:  Illicit substance use:    FAMILY HISTORY:    REVIEW OF SYSTEMS:  CONSTITUTIONAL: No fever, weight loss, or fatigue  EYES: No eye pain, visual disturbances, or discharge  ENMT:  No difficulty hearing, tinnitus, vertigo; No sinus or throat pain  NECK: No pain or stiffness  RESPIRATORY: No cough, wheezing, chills or hemoptysis; No shortness of breath  CARDIOVASCULAR: No chest pain, palpitations, dizziness, or leg swelling  GASTROINTESTINAL: No abdominal or epigastric pain. No nausea, vomiting, or hematemesis; No diarrhea or constipation. No melena or hematochezia.  GENITOURINARY: No dysuria, frequency, hematuria, or incontinence  NEUROLOGICAL: No headaches, memory loss, loss of strength, numbness, or tremors  SKIN: No itching, burning, rashes, or lesions   LYMPH NODES: No enlarged glands  ENDOCRINE: No heat or cold intolerance; No hair loss  MUSCULOSKELETAL: No joint pain or swelling; No muscle, back, or extremity pain  PSYCHIATRIC: No depression, anxiety, mood swings, or difficulty sleeping  HEME/LYMPH: No easy bruising, or bleeding gums  ALLERY AND IMMUNOLOGIC: No hives or eczema    RADIOLOGY & ADDITIONAL TESTS:    Imaging Personally Reviewed:  [ ] YES  [ ] NO    Consultant(s) Notes Reviewed:  [ ] YES  [ ] NO    PHYSICAL EXAM:  GENERAL: NAD, well-groomed, well-developed  HEAD:  Atraumatic, Normocephalic  EYES: EOMI, PERRLA, conjunctiva and sclera clear  ENMT: No tonsillar erythema, exudates, or enlargement; Moist mucous membranes, Good dentition, No lesions  NECK: Supple, No JVD, Normal thyroid  NERVOUS SYSTEM:  Alert & Oriented X3, Good concentration; Motor Strength 5/5 B/L upper and lower extremities; DTRs 2+ intact and symmetric  CHEST/LUNG: Clear to percussion bilaterally; No rales, rhonchi, wheezing, or rubs  HEART: Regular rate and rhythm; No murmurs, rubs, or gallops  ABDOMEN: Soft, Nontender, Nondistended; Bowel sounds present  EXTREMITIES:  2+ Peripheral Pulses, No clubbing, cyanosis, or edema  LYMPH: No lymphadenopathy noted  SKIN: No rashes or lesions    LABS:                        12.6   8.78  )-----------( 165      ( 04 May 2021 13:31 )             35.6     05-04    137  |  95<L>  |  36<H>  ----------------------------<  193<H>  3.7   |  24  |  3.28<H>    Ca    9.1      04 May 2021 13:31  Phos  2.1     05-04  Mg     2.2     05-04    TPro  6.9  /  Alb  3.6  /  TBili  0.4  /  DBili  x   /  AST  21  /  ALT  17  /  AlkPhos  177<H>          CAPILLARY BLOOD GLUCOSE      POCT Blood Glucose.: 302 mg/dL (05 May 2021 21:31)  POCT Blood Glucose.: 357 mg/dL (05 May 2021 17:22)  POCT Blood Glucose.: 282 mg/dL (05 May 2021 12:42)  POCT Blood Glucose.: 189 mg/dL (05 May 2021 08:16)            MEDICATIONS  (STANDING):  amLODIPine   Tablet 2.5 milliGRAM(s) Oral daily  aspirin enteric coated 81 milliGRAM(s) Oral daily  atorvastatin 40 milliGRAM(s) Oral at bedtime  calcium acetate 667 milliGRAM(s) Oral three times a day with meals  chlorhexidine 2% Cloths 1 Application(s) Topical daily  clopidogrel Tablet 75 milliGRAM(s) Oral daily  dextrose 40% Gel 15 Gram(s) Oral once  dextrose 5%. 1000 milliLiter(s) (50 mL/Hr) IV Continuous <Continuous>  dextrose 5%. 1000 milliLiter(s) (100 mL/Hr) IV Continuous <Continuous>  dextrose 50% Injectable 25 Gram(s) IV Push once  dextrose 50% Injectable 12.5 Gram(s) IV Push once  dextrose 50% Injectable 25 Gram(s) IV Push once  gabapentin 200 milliGRAM(s) Oral two times a day  glucagon  Injectable 1 milliGRAM(s) IntraMuscular once  heparin   Injectable 5000 Unit(s) SubCutaneous every 12 hours  insulin lispro (ADMELOG) corrective regimen sliding scale   SubCutaneous three times a day before meals  insulin lispro (ADMELOG) corrective regimen sliding scale   SubCutaneous at bedtime  latanoprost 0.005% Ophthalmic Solution 1 Drop(s) Both EYES at bedtime  levothyroxine 25 MICROGram(s) Oral daily  metoprolol succinate ER 25 milliGRAM(s) Oral daily  polyethylene glycol 3350 17 Gram(s) Oral daily  timolol 0.5% Solution 1 Drop(s) Both EYES daily    MEDICATIONS  (PRN):      Care Discussed with Consultants/Other Providers [ ] YES  [ ] NO

## 2021-05-05 NOTE — PROGRESS NOTE ADULT - SUBJECTIVE AND OBJECTIVE BOX
Patient is a 60y old  Female who presents with a chief complaint of dizziness / code stroke (05 May 2021 13:14)      INTERVAL HISTORY: feels ok     REVIEW OF SYSTEMS:   CONSTITUTIONAL: No weakness  EYES/ENT: No visual changes; No throat pain  Neck: No pain or stiffness  Respiratory: No cough, wheezing, No shortness of breath  CARDIOVASCULAR: no chest pain or palpitations  GASTROINTESTINAL: No abdominal pain, no nausea, vomiting or hematemesis  GENITOURINARY: No dysuria, frequency or hematuria  NEUROLOGICAL: No stroke like symptoms  SKIN: No rashes    	  MEDICATIONS:  metoprolol succinate ER 25 milliGRAM(s) Oral daily        PHYSICAL EXAM:  T(C): 36.6 (05-05-21 @ 12:33), Max: 36.6 (05-04-21 @ 17:35)  HR: 60 (05-05-21 @ 12:33) (57 - 75)  BP: 169/82 (05-05-21 @ 10:00) (148/72 - 169/82)  RR: 18 (05-05-21 @ 12:33) (16 - 18)  SpO2: 98% (05-05-21 @ 12:33) (98% - 100%)  Wt(kg): --  I&O's Summary    04 May 2021 07:01  -  05 May 2021 07:00  --------------------------------------------------------  IN: 760 mL / OUT: 500 mL / NET: 260 mL    05 May 2021 07:01  -  05 May 2021 15:03  --------------------------------------------------------  IN: 240 mL / OUT: 0 mL / NET: 240 mL    Appearance: In no distress	  HEENT:    PERRL, EOMI	  Cardiovascular:  S1 S2, No JVD  Respiratory: Lungs clear to auscultation	  Gastrointestinal:  Soft, Non-tender, + BS	  Vascularature:  No edema of LE  Psychiatric: Appropriate affect   Neuro: no acute focal deficits                         12.6   8.78  )-----------( 165      ( 04 May 2021 13:31 )             35.6     05-04    137  |  95<L>  |  36<H>  ----------------------------<  193<H>  3.7   |  24  |  3.28<H>    Ca    9.1      04 May 2021 13:31  Phos  2.1     05-04  Mg     2.2     05-04    TPro  6.9  /  Alb  3.6  /  TBili  0.4  /  DBili  x   /  AST  21  /  ALT  17  /  AlkPhos  177<H>  05-0    Labs personally reviewed  ASSESSMENT/PLAN: 	  Problem/Plan - 1:  ·  Problem: Dizziness Plan:had episode of dizzyness and right eye blurryness. now resolved.  denies chest pain, shortness of breath, palpitations  CTA H: no significant stenosis   MRI noted- per neuro no further workup is needed   TTE with nml LV function, no WMA    Problem/Plan - 2:  ·  Problem: Coronary Artery Disease: s./p PCI mLAD 1/2018, ANABELA to RCA 1/2019 and ANABELA to diag 5/2019   given stents greater than 1 year ago no indication to continue with Plavix, unless indicated for her SLE, f/u with rheum   patient denies chest pain, shortness of breath, denies GARCIA, can ambulate 2 blocks without chest pain.  pBNP 5325, although appears relatively euvolemic on exam,denies use of diuretics at home   TTE as noted above   c/w with Toprol XL 25mg daily, asa, statin, plavix?       Problem/Plan - 3:  ·  Problem: Lupus (systemic lupus erythematosus).  Plan: stable   f/u with neuro as out pt.     Problem/Plan - 4:  ·  Problem: ESR\ (end-stage renal disease) due to SLE.  Plan: nephro consult for HD   -c/w home meds.     Problem/Plan - 5:  ·  Problem: Hypertension.  Plan: c/w home meds   Toprol 25 '    Problem/Plan - 6:  ·  Problem: CODE stroke called 5/4 for waxing and waning after HD   neuro suspicions low for stroke   CTH negative   symptoms resolved   per patient she gets really sleepy after HD sometimes.           Mariana Page ANP-C  Salvadorcollins Mckeon DO [facility]  Cardiovascular Medicine  [address] [place] [address], [address]  Office: [phone]  Cell: [phone]

## 2021-05-05 NOTE — PROGRESS NOTE ADULT - SUBJECTIVE AND OBJECTIVE BOX
WILLARD BOB  [id number]    INTERVAL HPI/OVERNIGHT EVENTS: Patient had stroke code yesterday s/p HD. CT head showed no acute findings. Today, the patient reports no headache, dizziness or diplopia and otherwise feels well. She was hypertensive at dialysis yesterday.     MEDICATIONS  (STANDING):  aspirin enteric coated 81 milliGRAM(s) Oral daily  atorvastatin 40 milliGRAM(s) Oral at bedtime  calcium acetate 667 milliGRAM(s) Oral three times a day with meals  chlorhexidine 2% Cloths 1 Application(s) Topical daily  clopidogrel Tablet 75 milliGRAM(s) Oral daily  dextrose 40% Gel 15 Gram(s) Oral once  dextrose 5%. 1000 milliLiter(s) (50 mL/Hr) IV Continuous <Continuous>  dextrose 5%. 1000 milliLiter(s) (100 mL/Hr) IV Continuous <Continuous>  dextrose 50% Injectable 25 Gram(s) IV Push once  dextrose 50% Injectable 12.5 Gram(s) IV Push once  dextrose 50% Injectable 25 Gram(s) IV Push once  gabapentin 200 milliGRAM(s) Oral two times a day  glucagon  Injectable 1 milliGRAM(s) IntraMuscular once  heparin   Injectable 5000 Unit(s) SubCutaneous every 12 hours  insulin lispro (ADMELOG) corrective regimen sliding scale   SubCutaneous three times a day before meals  insulin lispro (ADMELOG) corrective regimen sliding scale   SubCutaneous at bedtime  latanoprost 0.005% Ophthalmic Solution 1 Drop(s) Both EYES at bedtime  levothyroxine 25 MICROGram(s) Oral daily  metoprolol succinate ER 25 milliGRAM(s) Oral daily  polyethylene glycol 3350 17 Gram(s) Oral daily  predniSONE   Tablet 40 milliGRAM(s) Oral daily  timolol 0.5% Solution 1 Drop(s) Both EYES daily    MEDICATIONS  (PRN):      Allergies    penicillin (Rash)    Intolerances          Vital Signs Last 24 Hrs  T(C): 36.6 (05 May 2021 12:33), Max: 36.6 (04 May 2021 17:35)  T(F): 97.8 (05 May 2021 12:33), Max: 97.8 (04 May 2021 17:35)  HR: 60 (05 May 2021 12:33) (57 - 75)  BP: 168/67 (05 May 2021 12:33) (148/72 - 169/82)  BP(mean): --  RR: 18 (05 May 2021 12:33) (16 - 18)  SpO2: 98% (05 May 2021 12:33) (98% - 100%)    Physical Exam:  General: NAD  HEENT: EOMI, MMM  Cardio: +S1/S2, RRR  Resp: CTA b/l  GI: +BS, soft, NT/ND  MSK: No joint synovitis.  Neuro: AAOx3  Psych: wnl    LABS:                        12.6   8.78  )-----------( 165      ( 04 May 2021 13:31 )             35.6     05-04    137  |  95<L>  |  36<H>  ----------------------------<  193<H>  3.7   |  24  |  3.28<H>    Ca    9.1      04 May 2021 13:31  Phos  2.1     05-04  Mg     2.2     05-04    TPro  6.9  /  Alb  3.6  /  TBili  0.4  /  DBili  x   /  AST  21  /  ALT  17  /  AlkPhos  177<H>  05-04            RADIOLOGY & ADDITIONAL TESTS:

## 2021-05-05 NOTE — PROGRESS NOTE ADULT - SUBJECTIVE AND OBJECTIVE BOX
Patient seen and examined  no complaints    penicillin (Rash)    Hospital Medications:   MEDICATIONS  (STANDING):  aspirin enteric coated 81 milliGRAM(s) Oral daily  atorvastatin 40 milliGRAM(s) Oral at bedtime  calcium acetate 667 milliGRAM(s) Oral three times a day with meals  chlorhexidine 2% Cloths 1 Application(s) Topical daily  clopidogrel Tablet 75 milliGRAM(s) Oral daily  dextrose 40% Gel 15 Gram(s) Oral once  dextrose 5%. 1000 milliLiter(s) (50 mL/Hr) IV Continuous <Continuous>  dextrose 5%. 1000 milliLiter(s) (100 mL/Hr) IV Continuous <Continuous>  dextrose 50% Injectable 25 Gram(s) IV Push once  dextrose 50% Injectable 12.5 Gram(s) IV Push once  dextrose 50% Injectable 25 Gram(s) IV Push once  gabapentin 200 milliGRAM(s) Oral two times a day  glucagon  Injectable 1 milliGRAM(s) IntraMuscular once  heparin   Injectable 5000 Unit(s) SubCutaneous every 12 hours  insulin lispro (ADMELOG) corrective regimen sliding scale   SubCutaneous three times a day before meals  insulin lispro (ADMELOG) corrective regimen sliding scale   SubCutaneous at bedtime  latanoprost 0.005% Ophthalmic Solution 1 Drop(s) Both EYES at bedtime  levothyroxine 25 MICROGram(s) Oral daily  metoprolol succinate ER 25 milliGRAM(s) Oral daily  polyethylene glycol 3350 17 Gram(s) Oral daily  predniSONE   Tablet 40 milliGRAM(s) Oral daily  timolol 0.5% Solution 1 Drop(s) Both EYES daily        VITALS:  T(F): 97.8 (05-05-21 @ 12:33), Max: 98.2 (05-04-21 @ 14:30)  HR: 60 (05-05-21 @ 12:33)  BP: 169/82 (05-05-21 @ 10:00)  RR: 18 (05-05-21 @ 12:33)  SpO2: 98% (05-05-21 @ 12:33)  Wt(kg): --    05-04 @ 07:01  -  05-05 @ 07:00  --------------------------------------------------------  IN: 760 mL / OUT: 500 mL / NET: 260 mL    05-05 @ 07:01  -  05-05 @ 13:14  --------------------------------------------------------  IN: 240 mL / OUT: 0 mL / NET: 240 mL      Physical Exam :-  Constitutional: NAD  Neck: Supple.  Respiratory: Bilateral equal breath sounds, few Crackles present.  Cardiovascular: S1, S2 normal, positive Murmur  Gastrointestinal: Bowel Sounds present, soft, non tender.  Extremities: no Edema Feet  Arterio-venous Fistula  left upper ext + thrill    LABS:  05-04    137  |  95<L>  |  36<H>  ----------------------------<  193<H>  3.7   |  24  |  3.28<H>    Ca    9.1      04 May 2021 13:31  Phos  2.1     05-04  Mg     2.2     05-04    TPro  6.9  /  Alb  3.6  /  TBili  0.4  /  DBili      /  AST  21  /  ALT  17  /  AlkPhos  177<H>  05-04    Creatinine Trend: 3.28 <--, 9.09 <--, 6.90 <--, 4.82 <--, 5.15 <--                        12.6   8.78  )-----------( 165      ( 04 May 2021 13:31 )             35.6     Urine Studies:      RADIOLOGY & ADDITIONAL STUDIES:     Patient seen and examined  no complaints currently  s/p code stroke yesterday 2/2 lethargy post HD      penicillin (Rash)    Hospital Medications:   MEDICATIONS  (STANDING):  aspirin enteric coated 81 milliGRAM(s) Oral daily  atorvastatin 40 milliGRAM(s) Oral at bedtime  calcium acetate 667 milliGRAM(s) Oral three times a day with meals  chlorhexidine 2% Cloths 1 Application(s) Topical daily  clopidogrel Tablet 75 milliGRAM(s) Oral daily  dextrose 40% Gel 15 Gram(s) Oral once  dextrose 5%. 1000 milliLiter(s) (50 mL/Hr) IV Continuous <Continuous>  dextrose 5%. 1000 milliLiter(s) (100 mL/Hr) IV Continuous <Continuous>  dextrose 50% Injectable 25 Gram(s) IV Push once  dextrose 50% Injectable 12.5 Gram(s) IV Push once  dextrose 50% Injectable 25 Gram(s) IV Push once  gabapentin 200 milliGRAM(s) Oral two times a day  glucagon  Injectable 1 milliGRAM(s) IntraMuscular once  heparin   Injectable 5000 Unit(s) SubCutaneous every 12 hours  insulin lispro (ADMELOG) corrective regimen sliding scale   SubCutaneous three times a day before meals  insulin lispro (ADMELOG) corrective regimen sliding scale   SubCutaneous at bedtime  latanoprost 0.005% Ophthalmic Solution 1 Drop(s) Both EYES at bedtime  levothyroxine 25 MICROGram(s) Oral daily  metoprolol succinate ER 25 milliGRAM(s) Oral daily  polyethylene glycol 3350 17 Gram(s) Oral daily  predniSONE   Tablet 40 milliGRAM(s) Oral daily  timolol 0.5% Solution 1 Drop(s) Both EYES daily        VITALS:  T(F): 97.8 (05-05-21 @ 12:33), Max: 98.2 (05-04-21 @ 14:30)  HR: 60 (05-05-21 @ 12:33)  BP: 169/82 (05-05-21 @ 10:00)  RR: 18 (05-05-21 @ 12:33)  SpO2: 98% (05-05-21 @ 12:33)  Wt(kg): --    05-04 @ 07:01  -  05-05 @ 07:00  --------------------------------------------------------  IN: 760 mL / OUT: 500 mL / NET: 260 mL    05-05 @ 07:01  -  05-05 @ 13:14  --------------------------------------------------------  IN: 240 mL / OUT: 0 mL / NET: 240 mL      Physical Exam :-  Constitutional: NAD  Neck: Supple.  Respiratory: Bilateral equal breath sounds, few Crackles present.  Cardiovascular: S1, S2 normal, positive Murmur  Gastrointestinal: Bowel Sounds present, soft, non tender.  Extremities: no Edema Feet  Arterio-venous Fistula  left upper ext + thrill    LABS:  05-04    137  |  95<L>  |  36<H>  ----------------------------<  193<H>  3.7   |  24  |  3.28<H>    Ca    9.1      04 May 2021 13:31  Phos  2.1     05-04  Mg     2.2     05-04    TPro  6.9  /  Alb  3.6  /  TBili  0.4  /  DBili      /  AST  21  /  ALT  17  /  AlkPhos  177<H>  05-04    Creatinine Trend: 3.28 <--, 9.09 <--, 6.90 <--, 4.82 <--, 5.15 <--                        12.6   8.78  )-----------( 165      ( 04 May 2021 13:31 )             35.6     Urine Studies:      RADIOLOGY & ADDITIONAL STUDIES:

## 2021-05-05 NOTE — PROGRESS NOTE ADULT - ASSESSMENT
57F h/o lupus (dx 04/2017), ESRD (HD T/Th/Sat), NSTEMI (BMS 01/2018), DM2, HTN, HLD (not on medication), glaucoma, hypothyroidism p/w dizziness.    1) ESRD on HD : Tuesday, Thursday and Saturday   Routine HD TTS via left upper ext AVF  s/p HD yesterday- bp stable; flowsheet reviewed- was feeling "tired" at the end of tx--> so tx cut short by 15 min   next HD tomm  trend bmp    2) Anemia In CKD-  Hgb at goal  trend hgb    3) HTN  metoprolol 25mg   improved bp  trend bp      contact with question   cell   Ans Serv- [phone]    57F h/o lupus (dx 04/2017), ESRD (HD T/Th/Sat), NSTEMI (BMS 01/2018), DM2, HTN, HLD (not on medication), glaucoma, hypothyroidism p/w dizziness.    1) ESRD on HD : Tuesday, Thursday and Saturday   Routine HD TTS via left upper ext AVF  s/p HD yesterday- bp stable; flowsheet reviewed- was feeling "tired" at the end of tx--> so tx cut short by 15 min --> s/p code stroke 5/4/21-> no findings of acute cva  next HD tomm  trend bmp    2) Anemia In CKD-  Hgb at goal  trend hgb    3) HTN--> bp high  metoprolol 25mg   add amlodipine 2.5mg po qd  low na diet  monitor        contact with question   cell   Ans [facility]- [phone]

## 2021-05-05 NOTE — PROGRESS NOTE ADULT - ASSESSMENT
60 Y F with a PMhx of SLE, LN (Class 5), ESRD, SLE vasculitis (involving peripheral nerves) presents for dizziness and diplopia.   Rheumatology consulted for evaluation of SLE.     Impression:  # Diplopia, dizziness- now resolved.   - SLE disease activity markers not high, her dsDNA in 2018 when she was flaring was higher than now and her complement levels in 2018 were lower than now, indicating that SLE flare might not be at play to explain current symptoms; also if SLE vasculitis was responsible, her diplopia would not improve so rapidly (was improving since admission even before steroids;   APLS labs checked as part of stroke workup- unremarkable thus far.    Plan:  - Given uncontrolled hypertension, we would advise decreasing her prednisone dose to 15 mg BID and continue this dose as outpatient.   - f/u with neurology     Rheumatology to sign off at this time.  Can call back with further questions/concerns    Deysi Castro MD PGY4  Rheumatology Fellow  Pager [phone]

## 2021-05-06 LAB
ANION GAP SERPL CALC-SCNC: 23 MMOL/L — HIGH (ref 5–17)
BUN SERPL-MCNC: 91 MG/DL — HIGH (ref 7–23)
CALCIUM SERPL-MCNC: 7.9 MG/DL — LOW (ref 8.4–10.5)
CHLORIDE SERPL-SCNC: 95 MMOL/L — LOW (ref 96–108)
CHOLEST SERPL-MCNC: 222 MG/DL — HIGH
CO2 SERPL-SCNC: 19 MMOL/L — LOW (ref 22–31)
CREAT SERPL-MCNC: 7.65 MG/DL — HIGH (ref 0.5–1.3)
DEPRECATED CARDIOLIPIN IGA SER: <5 APL — SIGNIFICANT CHANGE UP (ref 0–12.5)
GLUCOSE BLDC GLUCOMTR-MCNC: 118 MG/DL — HIGH (ref 70–99)
GLUCOSE BLDC GLUCOMTR-MCNC: 176 MG/DL — HIGH (ref 70–99)
GLUCOSE BLDC GLUCOMTR-MCNC: 286 MG/DL — HIGH (ref 70–99)
GLUCOSE BLDC GLUCOMTR-MCNC: 292 MG/DL — HIGH (ref 70–99)
GLUCOSE BLDC GLUCOMTR-MCNC: 297 MG/DL — HIGH (ref 70–99)
GLUCOSE BLDC GLUCOMTR-MCNC: 400 MG/DL — HIGH (ref 70–99)
GLUCOSE BLDC GLUCOMTR-MCNC: 451 MG/DL — CRITICAL HIGH (ref 70–99)
GLUCOSE SERPL-MCNC: 191 MG/DL — HIGH (ref 70–99)
HDLC SERPL-MCNC: 59 MG/DL — SIGNIFICANT CHANGE UP
LIPID PNL WITH DIRECT LDL SERPL: 148 MG/DL — HIGH
NON HDL CHOLESTEROL: 163 MG/DL — HIGH
POTASSIUM SERPL-MCNC: 4.5 MMOL/L — SIGNIFICANT CHANGE UP (ref 3.5–5.3)
POTASSIUM SERPL-SCNC: 4.5 MMOL/L — SIGNIFICANT CHANGE UP (ref 3.5–5.3)
SODIUM SERPL-SCNC: 137 MMOL/L — SIGNIFICANT CHANGE UP (ref 135–145)
TRIGL SERPL-MCNC: 76 MG/DL — SIGNIFICANT CHANGE UP

## 2021-05-06 RX ORDER — INSULIN LISPRO 100/ML
3 VIAL (ML) SUBCUTANEOUS
Refills: 0 | Status: DISCONTINUED | OUTPATIENT
Start: 2021-05-06 | End: 2021-05-07

## 2021-05-06 RX ADMIN — Medication 15 MILLIGRAM(S): at 17:27

## 2021-05-06 RX ADMIN — Medication 1: at 21:24

## 2021-05-06 RX ADMIN — HEPARIN SODIUM 5000 UNIT(S): 5000 INJECTION INTRAVENOUS; SUBCUTANEOUS at 18:41

## 2021-05-06 RX ADMIN — GABAPENTIN 200 MILLIGRAM(S): 400 CAPSULE ORAL at 18:41

## 2021-05-06 RX ADMIN — Medication 1 DROP(S): at 12:25

## 2021-05-06 RX ADMIN — Medication 15 MILLIGRAM(S): at 05:14

## 2021-05-06 RX ADMIN — POLYETHYLENE GLYCOL 3350 17 GRAM(S): 17 POWDER, FOR SOLUTION ORAL at 12:25

## 2021-05-06 RX ADMIN — Medication 667 MILLIGRAM(S): at 17:27

## 2021-05-06 RX ADMIN — Medication 25 MILLIGRAM(S): at 09:53

## 2021-05-06 RX ADMIN — Medication 3 UNIT(S): at 19:43

## 2021-05-06 RX ADMIN — CHLORHEXIDINE GLUCONATE 1 APPLICATION(S): 213 SOLUTION TOPICAL at 12:27

## 2021-05-06 RX ADMIN — Medication 25 MICROGRAM(S): at 05:11

## 2021-05-06 RX ADMIN — Medication 667 MILLIGRAM(S): at 08:30

## 2021-05-06 RX ADMIN — GABAPENTIN 200 MILLIGRAM(S): 400 CAPSULE ORAL at 05:10

## 2021-05-06 RX ADMIN — CLOPIDOGREL BISULFATE 75 MILLIGRAM(S): 75 TABLET, FILM COATED ORAL at 12:25

## 2021-05-06 RX ADMIN — Medication 1: at 19:43

## 2021-05-06 RX ADMIN — LATANOPROST 1 DROP(S): 0.05 SOLUTION/ DROPS OPHTHALMIC; TOPICAL at 21:24

## 2021-05-06 RX ADMIN — Medication 3: at 09:49

## 2021-05-06 RX ADMIN — Medication 667 MILLIGRAM(S): at 12:25

## 2021-05-06 RX ADMIN — AMLODIPINE BESYLATE 2.5 MILLIGRAM(S): 2.5 TABLET ORAL at 05:10

## 2021-05-06 RX ADMIN — HEPARIN SODIUM 5000 UNIT(S): 5000 INJECTION INTRAVENOUS; SUBCUTANEOUS at 05:10

## 2021-05-06 RX ADMIN — Medication 81 MILLIGRAM(S): at 12:25

## 2021-05-06 RX ADMIN — ATORVASTATIN CALCIUM 40 MILLIGRAM(S): 80 TABLET, FILM COATED ORAL at 21:24

## 2021-05-06 RX ADMIN — Medication 6: at 12:43

## 2021-05-06 NOTE — PROGRESS NOTE ADULT - PROBLEM SELECTOR PLAN 5
had wound vac and also was on abx during HD   turn position frequently in bed ,    dispo; PT eval and d/c planning
had wound vac and also was on abx during HD   turn position frequently in bed ,   house ID consult to see if abx at home need to be restarted
had wound vac and also was on abx during HD   turn position frequently in bed ,
had wound vac and also was on abx during HD   turn position frequently in bed ,    dispo; PT eval and d/c planning
had wound vac and also was on abx during HD   turn position frequently in bed ,
had wound vac and also was on abx during HD   turn position frequently in bed ,   house ID consult to see if abx at home need to be restarted
had wound vac and also was on abx during HD   turn position frequently in bed ,   house ID consult to see if abx at home need to be restarted

## 2021-05-06 NOTE — PROGRESS NOTE ADULT - ATTENDING COMMENTS
Pt care and plan discussed and reviewed with NP. Plan as outlined above edited by me to reflect our discussion.
Pt care and plan discussed and reviewed with NP. Plan as outlined above edited by me to reflect our discussion.
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   - Relevant DDx: SLE flare causing vasculitis of cranial nerves (controlling occular movements) vs Lupus cerebritis (unlikely) vs other intracranial process.   Will follow up imaging   continue current steroid dose for now. Taper steroids after imaging is back.    Etta Osullivan MD  [phone]
Pt care and plan discussed and reviewed with NP. Plan as outlined above edited by me to reflect our discussion.
patient feels well overall, denies any complaints  exam non focal  given poorly controlled BP, will recommend lowering prednisone as above  to follow up with outpatient Rheumatologist  discussed with primary team

## 2021-05-06 NOTE — PROVIDER CONTACT NOTE (OTHER) - ACTION/TREATMENT ORDERED:
admelog insulin 6units given. will continue to monitor
PA stated to administer the 6 units of insulin per sliding scale. Admin as ordered. Pt was then taken immediately to HD after eating lunch. Notified HD RN of Pt's elevated blood sugar
PA stated to administer Pt scheduled metoprolol. Admin as ordered and BP came down to 130/70. Will monitor
stat labs done. VS AS per RRTsheet. CT head done. will continue to monitor

## 2021-05-06 NOTE — PROVIDER CONTACT NOTE (OTHER) - RECOMMENDATIONS
to follow corrective regimen sliding scale . and repeat fs in 2hrs
Pt go to HD, continue to monitor
see RRT sheet for details
Administer insulin and monitor for s/s of hyperglycemia

## 2021-05-06 NOTE — PROGRESS NOTE ADULT - PROBLEM SELECTOR PLAN 4
c/w home emds   controlled

## 2021-05-06 NOTE — PROGRESS NOTE ADULT - PROBLEM SELECTOR PLAN 1
nephro consult for HD   -c/w home meds
nephro consult for HD   -c/w home meds    today code stroke when came back from HD : lethargic   CT scan ordered and neuro evaluation
nephro consult for HD   -c/w home meds
nephro consult for HD   -c/w home meds
nephro consult for HD   -c/w home meds    today code stroke when came back from HD : lethargic   CT scan ordered and neuro evaluation
nephro consult for HD   -c/w home meds    today code stroke when came back from HD : lethargic   CT scan ordered and neuro evaluation
nephro consult for HD   -c/w home meds

## 2021-05-06 NOTE — PROGRESS NOTE ADULT - PROBLEM SELECTOR PLAN 3
c/w Insulin / FSBS ss

## 2021-05-06 NOTE — PROGRESS NOTE ADULT - SUBJECTIVE AND OBJECTIVE BOX
Patient is a 60y old  Female who presents with a chief complaint of dizziness / code stroke (06 May 2021 10:56)      INTERVAL HISTORY: feels ok    REVIEW OF SYSTEMS:   CONSTITUTIONAL: No weakness  EYES/ENT: No visual changes; No throat pain  Neck: No pain or stiffness  Respiratory: No cough, wheezing, No shortness of breath  CARDIOVASCULAR: no chest pain or palpitations  GASTROINTESTINAL: No abdominal pain, no nausea, vomiting or hematemesis  GENITOURINARY: No dysuria, frequency or hematuria  NEUROLOGICAL: No stroke like symptoms  SKIN: No rashes    	  MEDICATIONS:  amLODIPine   Tablet 2.5 milliGRAM(s) Oral daily  metoprolol succinate ER 25 milliGRAM(s) Oral daily        PHYSICAL EXAM:  T(C): 36.6 (05-06-21 @ 17:23), Max: 36.9 (05-06-21 @ 13:15)  HR: 67 (05-06-21 @ 17:23) (58 - 69)  BP: 164/73 (05-06-21 @ 17:23) (130/70 - 174/77)  RR: 20 (05-06-21 @ 17:23) (17 - 20)  SpO2: 97% (05-06-21 @ 17:23) (97% - 100%)  Wt(kg): --  I&O's Summary    05 May 2021 07:01  -  06 May 2021 07:00  --------------------------------------------------------  IN: 540 mL / OUT: 300 mL / NET: 240 mL    06 May 2021 07:01  -  06 May 2021 17:49  --------------------------------------------------------  IN: 0 mL / OUT: 1000 mL / NET: -1000 mL          Appearance: In no distress	  HEENT:    PERRL, EOMI	  Cardiovascular:  S1 S2, No JVD  Respiratory: Lungs clear to auscultation	  Gastrointestinal:  Soft, Non-tender, + BS	  Vascularature:  No edema of LE  Psychiatric: Appropriate affect   Neuro: no acute focal deficits       05-06    137  |  95<L>  |  91<H>  ----------------------------<  191<H>  4.5   |  19<L>  |  7.65<H>    Ca    7.9<L>      06 May 2021 06:00    Labs personally reviewed    ASSESSMENT/PLAN: 	  Problem/Plan - 1:  ·  Problem: Dizziness Plan:had episode of dizzyness and right eye blurryness. now resolved.  denies chest pain, shortness of breath, palpitations  CTA H: no significant stenosis   MRI noted- per neuro no further workup is needed   TTE with nml LV function, no WMA    Problem/Plan - 2:  ·  Problem: Coronary Artery Disease: s./p PCI mLAD 1/2018, ANABELA to RCA 1/2019 and ANABELA to diag 5/2019   given stents greater than 1 year ago no indication to continue with Plavix, unless indicated for her SLE, f/u with rheum   patient denies chest pain, shortness of breath, denies GARCIA, can ambulate 2 blocks without chest pain.  pBNP 5325, although appears relatively euvolemic on exam,denies use of diuretics at home   TTE as noted above   c/w with Toprol XL 25mg daily, asa, statin, plavix?       Problem/Plan - 3:  ·  Problem: Lupus (systemic lupus erythematosus).  Plan: stable   f/u with neuro as out pt.     Problem/Plan - 4:  ·  Problem: ESR\ (end-stage renal disease) due to SLE.  Plan: nephro consult for HD   -c/w home meds.     Problem/Plan - 5:  ·  Problem: Hypertension.  Plan: c/w home meds   Toprol 25 '    Problem/Plan - 6:  ·  Problem: CODE stroke called 5/4 for waxing and waning after HD   neuro suspicions low for stroke   CTH negative   symptoms resolved   per patient she gets really sleepy after HD sometimes.           Mariana Page ANP-C  Salvador Mckeon DO [facility]  Cardiovascular Medicine  [address], [address]  Office: [phone]  Cell: [phone]

## 2021-05-06 NOTE — PROVIDER CONTACT NOTE (OTHER) - ASSESSMENT
pt unresponsive upon arrival from HD. RRT called
Pt asymptomatic of elevated blood sugar.
pt alox3 able to make needs known vs stable . pt on tele monitor denies chest pain or sob
All other VSS. No events on tele monitor. Pt denies any distress and denies chest pain

## 2021-05-06 NOTE — PROGRESS NOTE ADULT - ASSESSMENT
57F h/o lupus (dx 04/2017), ESRD (HD T/Th/Sat), NSTEMI (BMS 01/2018), DM2, HTN, HLD (not on medication), glaucoma, hypothyroidism p/w dizziness.    1) ESRD on HD : Tuesday, Thursday and Saturday   Routine HD TTS via left upper ext AVF  Plan for HD today-> uf goal 1kg  trend bmp    2) Anemia In CKD-  Hgb at goal  trend hgb    3) HTN--> bp high  metoprolol 25mg   started amlodipine 2.5mg po qd on 5/5/21--> if bp remains high then inc amlodipine to 5mg.poqd  low na diet  monitor        contact with question   cell   Ans Serv- [phone]

## 2021-05-06 NOTE — PROVIDER CONTACT NOTE (OTHER) - BACKGROUND
Pt BP was 174/77 this morning. Pt asymptomatic of elevated BP. Pt has hx of HTN, DM, SLE, HLD, ESRD, Glaucoma
Pt has hx of DM and ESRD, SLE.
57F h/o lupus (dx 04/2017), ESRD (HD T/Th/Sat), NSTEMI (BMS 01/2018) (s./p PCI mLAD 1/2018, ANABELA to RCA 1/2019 and ANABELA to diag 5/2019), DM2, HTN, HLD
57F h/o lupus (dx 04/2017), ESRD (HD T/Th/Sat), NSTEMI (BMS 01/2018) (s./p PCI mLAD 1/2018, ANABELA to RCA 1/2019 and ANABELA to diag 5/2019), . admitted withdizziness and RT eye deviation

## 2021-05-06 NOTE — PROGRESS NOTE ADULT - SUBJECTIVE AND OBJECTIVE BOX
Patient seen and examined  no complaints    penicillin (Rash)    Hospital Medications:   MEDICATIONS  (STANDING):  amLODIPine   Tablet 2.5 milliGRAM(s) Oral daily  aspirin enteric coated 81 milliGRAM(s) Oral daily  atorvastatin 40 milliGRAM(s) Oral at bedtime  calcium acetate 667 milliGRAM(s) Oral three times a day with meals  chlorhexidine 2% Cloths 1 Application(s) Topical daily  clopidogrel Tablet 75 milliGRAM(s) Oral daily  dextrose 40% Gel 15 Gram(s) Oral once  dextrose 5%. 1000 milliLiter(s) (50 mL/Hr) IV Continuous <Continuous>  dextrose 5%. 1000 milliLiter(s) (100 mL/Hr) IV Continuous <Continuous>  dextrose 50% Injectable 25 Gram(s) IV Push once  dextrose 50% Injectable 12.5 Gram(s) IV Push once  dextrose 50% Injectable 25 Gram(s) IV Push once  gabapentin 200 milliGRAM(s) Oral two times a day  glucagon  Injectable 1 milliGRAM(s) IntraMuscular once  heparin   Injectable 5000 Unit(s) SubCutaneous every 12 hours  insulin lispro (ADMELOG) corrective regimen sliding scale   SubCutaneous three times a day before meals  insulin lispro (ADMELOG) corrective regimen sliding scale   SubCutaneous at bedtime  latanoprost 0.005% Ophthalmic Solution 1 Drop(s) Both EYES at bedtime  levothyroxine 25 MICROGram(s) Oral daily  metoprolol succinate ER 25 milliGRAM(s) Oral daily  polyethylene glycol 3350 17 Gram(s) Oral daily  predniSONE   Tablet 15 milliGRAM(s) Oral two times a day  timolol 0.5% Solution 1 Drop(s) Both EYES daily      VITALS:  T(F): 97.7 (05-06-21 @ 09:52), Max: 98.1 (05-05-21 @ 21:09)  HR: 66 (05-06-21 @ 09:52)  BP: 174/77 (05-06-21 @ 09:52)  RR: 18 (05-06-21 @ 09:52)  SpO2: 100% (05-06-21 @ 09:52)  Wt(kg): --    05-05 @ 07:01  -  05-06 @ 07:00  --------------------------------------------------------  IN: 540 mL / OUT: 300 mL / NET: 240 mL    Physical Exam :-  Constitutional: NAD  Neck: Supple.  Respiratory: Bilateral equal breath sounds, few Crackles present.  Cardiovascular: S1, S2 normal, positive Murmur  Gastrointestinal: Bowel Sounds present, soft, non tender.  Extremities: no Edema Feet  Arterio-venous Fistula  left upper ext + thrill      LABS:  05-06    137  |  95<L>  |  91<H>  ----------------------------<  191<H>  4.5   |  19<L>  |  7.65<H>    Ca    7.9<L>      06 May 2021 06:00  Phos  2.1     05-04  Mg     2.2     05-04    TPro  6.9  /  Alb  3.6  /  TBili  0.4  /  DBili      /  AST  21  /  ALT  17  /  AlkPhos  177<H>  05-04    Creatinine Trend: 7.65 <--, 3.28 <--, 9.09 <--, 6.90 <--, 4.82 <--, 5.15 <--                        12.6   8.78  )-----------( 165      ( 04 May 2021 13:31 )             35.6     Urine Studies:      RADIOLOGY & ADDITIONAL STUDIES:

## 2021-05-06 NOTE — PROGRESS NOTE ADULT - SUBJECTIVE AND OBJECTIVE BOX
Patient is a 60y old  Female who presents with a chief complaint of dizziness / code stroke (06 May 2021 17:49)      INTERVAL HPI/OVERNIGHT EVENTS: pt. seen and examined , doing fair   T(C): 37.1 (21 @ 21:00), Max: 37.1 (21 @ 21:00)  HR: 82 (21 @ 21:00) (58 - 82)  BP: 154/71 (21 @ 21:00) (130/70 - 174/77)  RR: 20 (21 @ 21:00) (17 - 20)  SpO2: 100% (21 @ 21:00) (97% - 100%)  Wt(kg): --  I&O's Summary    05 May 2021 07:01  -  06 May 2021 07:00  --------------------------------------------------------  IN: 540 mL / OUT: 300 mL / NET: 240 mL    06 May 2021 07:01  -  07 May 2021 00:25  --------------------------------------------------------  IN: 480 mL / OUT: 1600 mL / NET: -1120 mL        PAST MEDICAL & SURGICAL HISTORY:  Diabetes    Hypertension    Hypercholesteremia    Essential hypertension    Type 2 diabetes mellitus without complication, without long-term current use of insulin    Other hyperlipidemia    Glaucoma    ESRD (end-stage renal disease) due to SLE    Lupus (systemic lupus erythematosus)    S/P  section  times two    H/O gastric bypass  2016    S/P appendectomy        SOCIAL HISTORY  Alcohol:  Tobacco:  Illicit substance use:    FAMILY HISTORY:    REVIEW OF SYSTEMS:  CONSTITUTIONAL: No fever, weight loss, or fatigue  EYES: No eye pain, visual disturbances, or discharge  ENMT:  No difficulty hearing, tinnitus, vertigo; No sinus or throat pain  NECK: No pain or stiffness  RESPIRATORY: No cough, wheezing, chills or hemoptysis; No shortness of breath  CARDIOVASCULAR: No chest pain, palpitations, dizziness, or leg swelling  GASTROINTESTINAL: No abdominal or epigastric pain. No nausea, vomiting, or hematemesis; No diarrhea or constipation. No melena or hematochezia.  GENITOURINARY: No dysuria, frequency, hematuria, or incontinence  NEUROLOGICAL: No headaches, memory loss, loss of strength, numbness, or tremors  SKIN: No itching, burning, rashes, or lesions   LYMPH NODES: No enlarged glands  ENDOCRINE: No heat or cold intolerance; No hair loss  MUSCULOSKELETAL: No joint pain or swelling; No muscle, back, or extremity pain  PSYCHIATRIC: No depression, anxiety, mood swings, or difficulty sleeping  HEME/LYMPH: No easy bruising, or bleeding gums  ALLERY AND IMMUNOLOGIC: No hives or eczema    RADIOLOGY & ADDITIONAL TESTS:    Imaging Personally Reviewed:  [ ] YES  [ ] NO    Consultant(s) Notes Reviewed:  [ ] YES  [ ] NO    PHYSICAL EXAM:  GENERAL: NAD, well-groomed, well-developed  HEAD:  Atraumatic, Normocephalic  EYES: EOMI, PERRLA, conjunctiva and sclera clear  ENMT: No tonsillar erythema, exudates, or enlargement; Moist mucous membranes, Good dentition, No lesions  NECK: Supple, No JVD, Normal thyroid  NERVOUS SYSTEM:  Alert & Oriented X3, Good concentration; Motor Strength 5/5 B/L upper and lower extremities; DTRs 2+ intact and symmetric  CHEST/LUNG: Clear to percussion bilaterally; No rales, rhonchi, wheezing, or rubs  HEART: Regular rate and rhythm; No murmurs, rubs, or gallops  ABDOMEN: Soft, Nontender, Nondistended; Bowel sounds present  EXTREMITIES:  2+ Peripheral Pulses, No clubbing, cyanosis, or edema  LYMPH: No lymphadenopathy noted  SKIN: No rashes or lesions    LABS:        137  |  95<L>  |  91<H>  ----------------------------<  191<H>  4.5   |  19<L>  |  7.65<H>    Ca    7.9<L>      06 May 2021 06:00          CAPILLARY BLOOD GLUCOSE      POCT Blood Glucose.: 292 mg/dL (06 May 2021 21:21)  POCT Blood Glucose.: 176 mg/dL (06 May 2021 19:34)  POCT Blood Glucose.: 118 mg/dL (06 May 2021 17:53)  POCT Blood Glucose.: 451 mg/dL (06 May 2021 12:37)  POCT Blood Glucose.: 400 mg/dL (06 May 2021 12:36)  POCT Blood Glucose.: 297 mg/dL (06 May 2021 09:47)  POCT Blood Glucose.: 286 mg/dL (06 May 2021 08:36)            MEDICATIONS  (STANDING):  amLODIPine   Tablet 2.5 milliGRAM(s) Oral daily  aspirin enteric coated 81 milliGRAM(s) Oral daily  atorvastatin 40 milliGRAM(s) Oral at bedtime  calcium acetate 667 milliGRAM(s) Oral three times a day with meals  chlorhexidine 2% Cloths 1 Application(s) Topical daily  clopidogrel Tablet 75 milliGRAM(s) Oral daily  dextrose 40% Gel 15 Gram(s) Oral once  dextrose 5%. 1000 milliLiter(s) (50 mL/Hr) IV Continuous <Continuous>  dextrose 5%. 1000 milliLiter(s) (100 mL/Hr) IV Continuous <Continuous>  dextrose 50% Injectable 25 Gram(s) IV Push once  dextrose 50% Injectable 12.5 Gram(s) IV Push once  dextrose 50% Injectable 25 Gram(s) IV Push once  gabapentin 200 milliGRAM(s) Oral two times a day  glucagon  Injectable 1 milliGRAM(s) IntraMuscular once  heparin   Injectable 5000 Unit(s) SubCutaneous every 12 hours  insulin lispro (ADMELOG) corrective regimen sliding scale   SubCutaneous three times a day before meals  insulin lispro (ADMELOG) corrective regimen sliding scale   SubCutaneous at bedtime  insulin lispro Injectable (ADMELOG) 3 Unit(s) SubCutaneous three times a day before meals  latanoprost 0.005% Ophthalmic Solution 1 Drop(s) Both EYES at bedtime  levothyroxine 25 MICROGram(s) Oral daily  metoprolol succinate ER 25 milliGRAM(s) Oral daily  polyethylene glycol 3350 17 Gram(s) Oral daily  predniSONE   Tablet 15 milliGRAM(s) Oral two times a day  timolol 0.5% Solution 1 Drop(s) Both EYES daily    MEDICATIONS  (PRN):      Care Discussed with Consultants/Other Providers [ ] YES  [ ] NO

## 2021-05-06 NOTE — PROGRESS NOTE ADULT - PROBLEM SELECTOR PLAN 2
stable   f/u with neuro as out pt
seen by rheumatology   -c/o diplopia   started on solumedrol IV  awaiting MRI
seen by rheumatology   -c/o diplopia : resolved   MRI done : no ac flare of MS   decrease prednisone to 15 mg bid as per rheum   MRI shows sub ac stroke : neuro f/u
seen by rheumatology   -c/o diplopia : resolved   MRI done : no ac flare of MS   rheumatology f/u : needed , steroids can be d/c ?   started on solumedrol IV, now changed to Po steroids as per rheumatologist   MRI shows sub ac stroke : neuro f/u
seen by rheumatology   -c/o diplopia     started on solumedrol IV
seen by rheumatology   -c/o diplopia : rsolved   MRI done : no ac flare of MS   rheumatology f/u : needed , steroids can be d/c ?   started on solumedrol IV  MRI shows sub ac stroke : neuro f/u
seen by rheumatology   -c/o diplopia   MRI brain and orbit ordered   started on solumedrol IV
seen by rheumatology   -c/o diplopia   started on solumedrol IV  seen by rheum and neuro : neuro recommend today again regarding MRI , pt. has vascular clips and not sure MRI is contraindicated, need to check with radiology and if ok , then will order MRI
seen by rheumatology   -c/o diplopia : resolved   MRI done : no ac flare of MS   decrease prednisone to 15 mg bid as per rheum   MRI shows sub ac stroke : neuro f/u
seen by rheumatology   -c/o diplopia : resolved   MRI done : no ac flare of MS   rheumatology f/u : needed , steroids can be d/c ?   started on solumedrol IV, now changed to Po steroids as per rheumatologist   MRI shows sub ac stroke : neuro f/u
stable   f/u with neuro as out pt
seen by rheumatology   -c/o diplopia : rsolved   MRI done : no ac flare of MS   rheumatology f/u : needed , steroids can be d/c ?   started on solumedrol IV  MRI shows sub ac stroke : neuro f/u

## 2021-05-06 NOTE — CHART NOTE - NSCHARTNOTEFT_GEN_A_CORE
NEUROLOGY    Chart reviewed    MRI Brain and Orbits  and MR angio Head and Neck remain pending (would need to be w/o contrast given renal dysfunction)    Rheum onboard.    Please call [phone] once MRI done.
Notified by RN that with persistent hypertension BP-190/88. Pt seen and evaluated at bedside. Pt with no acute complaints reading comfortably in bed. Pt denies headache, changes in vision, chest pain, sob, n/v/d, weakness, dizziness.    Vital Signs Last 24 Hrs  T(C): 36.3 (03 May 2021 21:20), Max: 36.9 (03 May 2021 01:00)  T(F): 97.3 (03 May 2021 21:20), Max: 98.4 (03 May 2021 01:00)  HR: 74 (03 May 2021 22:22) (65 - 74)  BP: 190/90 (03 May 2021 22:22) (152/74 - 190/90)  BP(mean): --  RR: 16 (03 May 2021 21:20) (16 - 18)  SpO2: 100% (03 May 2021 21:20) (97% - 100%)      Physical Exam:  General: pleasant, reading in bed  Cardiac: S1, S2 auscultated, no murmurs, rubs or gallops  Respiratory: CTAB  Extremities: 2+ pulses, no edema, cyanosis noted      Assessment/Plan:  HTN urgency 2/2 essential htn  >VS hemodynamically stable aside from BP-190/88  >Pt asymptomatic, however SBP been uptrending since AM  >Will give Hydralazine 5mg IVP x1  >Will reassess BP in 1 hour  >C/w Toprol qd, consider increasing dose in AM  >Will endorse to AM team, attending to follow    Andie Carey PA-C  Department of Medicine
Nutrition Follow Up Note  Patient seen for follow up.     Pt 59 y/o F with PMH as per chart: lupus (dx 04/2017), ESRD (HD T/Th/Sat), NSTEMI, DM2, HTN, HLD, glaucoma, hypothyroidism, admitted with dizziness, code stroke.     Source: [x] Patient       [x] EMR        [] RN        [] Family at bedside       [] Other:    -If unable to interview patient: [] Trach/Vent/BiPAP  [] Disoriented/confused/inappropriate to interview as per chart     Pt Latvian-Speaking - dietitian fluent in Latvian. Pt reports fair appetite and PO intake due to "not feeling too hungry". However noted 100% PO intake as per flow sheets. Offered nutritional supplement - pt agreed to Nepro. Denies difficulty chewing/swallowing. Pt denies nausea, vomiting, diarrhea, or constipation, last BM yesterday (05/05). Reviewed the importance of adequate kcal and protein intake for HD with recommendations to optimize. Pt denies having further questions/concerns about diet and nutrition; made aware RD remains available.     Diet Order:   Diet, Renal Restrictions:   For patients receiving Renal Replacement - No Protein Restr, No Conc K, No Conc Phos, Low Sodium  Consistent Carbohydrate {No Snacks} (CSTCHO) (04-25-21)    Weights: (04/24) 138 pounds -> (05/01) 157.6 pounds -> (05/06) 159.1 pounds -?accuracy of weight fluctuations.   Pt reported  pounds in this and previous RD visit.     Nutritionally Pertinent MEDICATIONS  (STANDING):  amLODIPine   Tablet  atorvastatin  calcium acetate  dextrose 40% Gel  dextrose 5%.  dextrose 5%.  dextrose 50% Injectable  dextrose 50% Injectable  dextrose 50% Injectable  glucagon  Injectable  insulin lispro (ADMELOG) corrective regimen sliding scale  insulin lispro (ADMELOG) corrective regimen sliding scale  levothyroxine  metoprolol succinate ER  polyethylene glycol 3350  predniSONE   Tablet    Pertinent Labs: (05/06) Na 137, BUN 91<H>, Cr 7.65<H>, <H>, K+ 4.5 (05/04) Phos 2.1  (05/06) Cholesterol 222,    A1C with Estimated Average Glucose Result: 5.6 % (04/30)    Finger Sticks:  POCT Blood Glucose.: 451 mg/dL (05-06 @ 12:37)  POCT Blood Glucose.: 400 mg/dL (05-06 @ 12:36)  POCT Blood Glucose.: 297 mg/dL (05-06 @ 09:47)  POCT Blood Glucose.: 286 mg/dL (05-06 @ 08:36)  POCT Blood Glucose.: 302 mg/dL (05-05 @ 21:31)  POCT Blood Glucose.: 357 mg/dL (05-05 @ 17:22)    Skin per nursing documentation: no noted pressure injuries.   Edema as per flow sheets: none.     Estimated Needs:   [x] no change since previous assessment  [] recalculated:     Previous Nutrition Diagnosis: Increased Nutrient Needs   Nutrition Diagnosis is: [x] ongoing - being addressed with PO intake encouragement and recommendations for nutritional supplement.     New Nutrition Diagnosis: [x] Not applicable    Nutrition Care Plan:   [x] In Progress    Nutrition Interventions:     Education Provided:       [x] Yes  [] No    Recommendations:      1. Recommend Consistent Carbohydrate with snack + renal diet. Will continue to monitor as able and adjust as needed.   2. Recommend Nepro 1x daily (425 kcals, 19 g protein) to optimize kcal and protein intake. Spoke to PA.   3. Encourage PO intake and provide food preferences.   4. Consider Nephro-Bravo if medically feasible to optimize nutrient intake.   5. Reviewed the importance of adequate kcal and protein intake for HD with recommendations to optimize.   6. Continue to obtain weights to identify changes if any.     Monitoring and Evaluation:   Continue to monitor nutritional intake, tolerance to diet prescription, weights, labs, skin integrity    RD remains available upon request and will follow up per protocol  Kimmy Amos MS RDN CDN #[id number]
Spoke with neurology resident (Pager #[phone]), who reviewed MRI of head and orbit. No further workup or new treatment recommended.     MRI of head and orbit - Small focus of abnormal signal within the dorsal julián with mild diffusion restriction which may represent a subacute lacunar infarct or area of demyelination, Scattered additional nonspecific foci of white matter T2 hyperintensity, likely microvascular in nature, Hemosiderin deposition within the left basal ganglia from prior hemorrhage, No orbital abnormality seen on this noncontrast exam.    Perla Cramer NP-C  #[id number]
NP Note - informed by RN that pt lethargic after HD and not arousable, RRT - code stroke called. This writer was notified after the fact. In sign out from MAR the neuro team is going to review the CT (which had no emergent findings) with the previous scans. Neuro checks Q 4 hrs.  The son Alex was notified of above. He stated that this is her usual behavior after HD.  Dr. King made aware, discharge cancelled.  PETER Albert NP

## 2021-05-06 NOTE — PROGRESS NOTE ADULT - PROBLEM SELECTOR PROBLEM 2
Lupus (systemic lupus erythematosus)

## 2021-05-06 NOTE — PROGRESS NOTE ADULT - PROBLEM SELECTOR PROBLEM 5
Decubital ulcer

## 2021-05-06 NOTE — PROVIDER CONTACT NOTE (OTHER) - SITUATION
Pt has hx of DM and ESRD, SLE. Pt blood sugar checked before lunch to be 400 and then rechecked to be 451. Pt asymptomatic of elevated sugar.
pts fs blood sugar 418 before lunch
Pt BP was 174/77 this morning.
pt unresponsive upon arrival from HD. RRT called

## 2021-05-06 NOTE — PROGRESS NOTE ADULT - PROBLEM SELECTOR PROBLEM 1
ESRD (end-stage renal disease) due to SLE

## 2021-05-07 VITALS
TEMPERATURE: 98 F | RESPIRATION RATE: 18 BRPM | SYSTOLIC BLOOD PRESSURE: 155 MMHG | HEART RATE: 66 BPM | DIASTOLIC BLOOD PRESSURE: 77 MMHG | OXYGEN SATURATION: 97 %

## 2021-05-07 LAB
ANION GAP SERPL CALC-SCNC: 18 MMOL/L — HIGH (ref 5–17)
B2 GLYCOPROT1 AB SER QL: NEGATIVE — SIGNIFICANT CHANGE UP
BUN SERPL-MCNC: 54 MG/DL — HIGH (ref 7–23)
CALCIUM SERPL-MCNC: 8.2 MG/DL — LOW (ref 8.4–10.5)
CHLORIDE SERPL-SCNC: 95 MMOL/L — LOW (ref 96–108)
CO2 SERPL-SCNC: 22 MMOL/L — SIGNIFICANT CHANGE UP (ref 22–31)
CREAT SERPL-MCNC: 5.36 MG/DL — HIGH (ref 0.5–1.3)
GLUCOSE BLDC GLUCOMTR-MCNC: 198 MG/DL — HIGH (ref 70–99)
GLUCOSE BLDC GLUCOMTR-MCNC: 307 MG/DL — HIGH (ref 70–99)
GLUCOSE SERPL-MCNC: 172 MG/DL — HIGH (ref 70–99)
HCT VFR BLD CALC: 36.7 % — SIGNIFICANT CHANGE UP (ref 34.5–45)
HGB BLD-MCNC: 12.4 G/DL — SIGNIFICANT CHANGE UP (ref 11.5–15.5)
MCHC RBC-ENTMCNC: 32.5 PG — SIGNIFICANT CHANGE UP (ref 27–34)
MCHC RBC-ENTMCNC: 33.8 GM/DL — SIGNIFICANT CHANGE UP (ref 32–36)
MCV RBC AUTO: 96.3 FL — SIGNIFICANT CHANGE UP (ref 80–100)
NRBC # BLD: 0 /100 WBCS — SIGNIFICANT CHANGE UP (ref 0–0)
PLATELET # BLD AUTO: 141 K/UL — LOW (ref 150–400)
POTASSIUM SERPL-MCNC: 4.5 MMOL/L — SIGNIFICANT CHANGE UP (ref 3.5–5.3)
POTASSIUM SERPL-SCNC: 4.5 MMOL/L — SIGNIFICANT CHANGE UP (ref 3.5–5.3)
RBC # BLD: 3.81 M/UL — SIGNIFICANT CHANGE UP (ref 3.8–5.2)
RBC # FLD: 14.1 % — SIGNIFICANT CHANGE UP (ref 10.3–14.5)
SARS-COV-2 RNA SPEC QL NAA+PROBE: SIGNIFICANT CHANGE UP
SODIUM SERPL-SCNC: 135 MMOL/L — SIGNIFICANT CHANGE UP (ref 135–145)
WBC # BLD: 8.41 K/UL — SIGNIFICANT CHANGE UP (ref 3.8–10.5)
WBC # FLD AUTO: 8.41 K/UL — SIGNIFICANT CHANGE UP (ref 3.8–10.5)

## 2021-05-07 PROCEDURE — 86706 HEP B SURFACE ANTIBODY: CPT

## 2021-05-07 PROCEDURE — 87340 HEPATITIS B SURFACE AG IA: CPT

## 2021-05-07 PROCEDURE — 93306 TTE W/DOPPLER COMPLETE: CPT

## 2021-05-07 PROCEDURE — 84100 ASSAY OF PHOSPHORUS: CPT

## 2021-05-07 PROCEDURE — 97116 GAIT TRAINING THERAPY: CPT

## 2021-05-07 PROCEDURE — 86147 CARDIOLIPIN ANTIBODY EA IG: CPT

## 2021-05-07 PROCEDURE — 82435 ASSAY OF BLOOD CHLORIDE: CPT

## 2021-05-07 PROCEDURE — 70450 CT HEAD/BRAIN W/O DYE: CPT

## 2021-05-07 PROCEDURE — 80061 LIPID PANEL: CPT

## 2021-05-07 PROCEDURE — U0003: CPT

## 2021-05-07 PROCEDURE — 82607 VITAMIN B-12: CPT

## 2021-05-07 PROCEDURE — 86146 BETA-2 GLYCOPROTEIN ANTIBODY: CPT

## 2021-05-07 PROCEDURE — 70496 CT ANGIOGRAPHY HEAD: CPT

## 2021-05-07 PROCEDURE — 70498 CT ANGIOGRAPHY NECK: CPT

## 2021-05-07 PROCEDURE — 87635 SARS-COV-2 COVID-19 AMP PRB: CPT

## 2021-05-07 PROCEDURE — 82140 ASSAY OF AMMONIA: CPT

## 2021-05-07 PROCEDURE — 86704 HEP B CORE ANTIBODY TOTAL: CPT

## 2021-05-07 PROCEDURE — 82962 GLUCOSE BLOOD TEST: CPT

## 2021-05-07 PROCEDURE — 85730 THROMBOPLASTIN TIME PARTIAL: CPT

## 2021-05-07 PROCEDURE — 84484 ASSAY OF TROPONIN QUANT: CPT

## 2021-05-07 PROCEDURE — 85014 HEMATOCRIT: CPT

## 2021-05-07 PROCEDURE — 99261: CPT

## 2021-05-07 PROCEDURE — 84165 PROTEIN E-PHORESIS SERUM: CPT

## 2021-05-07 PROCEDURE — 82803 BLOOD GASES ANY COMBINATION: CPT

## 2021-05-07 PROCEDURE — 97161 PT EVAL LOW COMPLEX 20 MIN: CPT

## 2021-05-07 PROCEDURE — 86140 C-REACTIVE PROTEIN: CPT

## 2021-05-07 PROCEDURE — 86225 DNA ANTIBODY NATIVE: CPT

## 2021-05-07 PROCEDURE — 97530 THERAPEUTIC ACTIVITIES: CPT

## 2021-05-07 PROCEDURE — 85025 COMPLETE CBC W/AUTO DIFF WBC: CPT

## 2021-05-07 PROCEDURE — 82525 ASSAY OF COPPER: CPT

## 2021-05-07 PROCEDURE — 85652 RBC SED RATE AUTOMATED: CPT

## 2021-05-07 PROCEDURE — 85027 COMPLETE CBC AUTOMATED: CPT

## 2021-05-07 PROCEDURE — 87476 LYME DIS DNA AMP PROBE: CPT

## 2021-05-07 PROCEDURE — 82947 ASSAY GLUCOSE BLOOD QUANT: CPT

## 2021-05-07 PROCEDURE — 85610 PROTHROMBIN TIME: CPT

## 2021-05-07 PROCEDURE — 82330 ASSAY OF CALCIUM: CPT

## 2021-05-07 PROCEDURE — 83605 ASSAY OF LACTIC ACID: CPT

## 2021-05-07 PROCEDURE — 86803 HEPATITIS C AB TEST: CPT

## 2021-05-07 PROCEDURE — 82746 ASSAY OF FOLIC ACID SERUM: CPT

## 2021-05-07 PROCEDURE — U0005: CPT

## 2021-05-07 PROCEDURE — 80048 BASIC METABOLIC PNL TOTAL CA: CPT

## 2021-05-07 PROCEDURE — 84132 ASSAY OF SERUM POTASSIUM: CPT

## 2021-05-07 PROCEDURE — 83036 HEMOGLOBIN GLYCOSYLATED A1C: CPT

## 2021-05-07 PROCEDURE — 84155 ASSAY OF PROTEIN SERUM: CPT

## 2021-05-07 PROCEDURE — 86160 COMPLEMENT ANTIGEN: CPT

## 2021-05-07 PROCEDURE — 86769 SARS-COV-2 COVID-19 ANTIBODY: CPT

## 2021-05-07 PROCEDURE — 80053 COMPREHEN METABOLIC PANEL: CPT

## 2021-05-07 PROCEDURE — 70551 MRI BRAIN STEM W/O DYE: CPT

## 2021-05-07 PROCEDURE — 70540 MRI ORBIT/FACE/NECK W/O DYE: CPT

## 2021-05-07 PROCEDURE — 83880 ASSAY OF NATRIURETIC PEPTIDE: CPT

## 2021-05-07 PROCEDURE — 85018 HEMOGLOBIN: CPT

## 2021-05-07 PROCEDURE — 84207 ASSAY OF VITAMIN B-6: CPT

## 2021-05-07 PROCEDURE — 99285 EMERGENCY DEPT VISIT HI MDM: CPT | Mod: 25

## 2021-05-07 PROCEDURE — 83735 ASSAY OF MAGNESIUM: CPT

## 2021-05-07 PROCEDURE — 84295 ASSAY OF SERUM SODIUM: CPT

## 2021-05-07 PROCEDURE — 86334 IMMUNOFIX E-PHORESIS SERUM: CPT

## 2021-05-07 RX ORDER — AMLODIPINE BESYLATE 2.5 MG/1
1 TABLET ORAL
Qty: 30 | Refills: 0
Start: 2021-05-07 | End: 2021-06-05

## 2021-05-07 RX ORDER — AMLODIPINE BESYLATE 2.5 MG/1
5 TABLET ORAL DAILY
Refills: 0 | Status: DISCONTINUED | OUTPATIENT
Start: 2021-05-07 | End: 2021-05-07

## 2021-05-07 RX ADMIN — Medication 15 MILLIGRAM(S): at 05:30

## 2021-05-07 RX ADMIN — Medication 81 MILLIGRAM(S): at 11:31

## 2021-05-07 RX ADMIN — Medication 3 UNIT(S): at 14:29

## 2021-05-07 RX ADMIN — Medication 1: at 09:10

## 2021-05-07 RX ADMIN — Medication 667 MILLIGRAM(S): at 11:31

## 2021-05-07 RX ADMIN — Medication 667 MILLIGRAM(S): at 09:10

## 2021-05-07 RX ADMIN — GABAPENTIN 200 MILLIGRAM(S): 400 CAPSULE ORAL at 05:29

## 2021-05-07 RX ADMIN — Medication 25 MICROGRAM(S): at 05:30

## 2021-05-07 RX ADMIN — POLYETHYLENE GLYCOL 3350 17 GRAM(S): 17 POWDER, FOR SOLUTION ORAL at 11:31

## 2021-05-07 RX ADMIN — Medication 4: at 14:29

## 2021-05-07 RX ADMIN — Medication 25 MILLIGRAM(S): at 10:20

## 2021-05-07 RX ADMIN — Medication 1 DROP(S): at 11:32

## 2021-05-07 RX ADMIN — HEPARIN SODIUM 5000 UNIT(S): 5000 INJECTION INTRAVENOUS; SUBCUTANEOUS at 05:31

## 2021-05-07 RX ADMIN — CLOPIDOGREL BISULFATE 75 MILLIGRAM(S): 75 TABLET, FILM COATED ORAL at 11:31

## 2021-05-07 RX ADMIN — Medication 3 UNIT(S): at 09:10

## 2021-05-07 RX ADMIN — AMLODIPINE BESYLATE 2.5 MILLIGRAM(S): 2.5 TABLET ORAL at 05:30

## 2021-05-07 RX ADMIN — CHLORHEXIDINE GLUCONATE 1 APPLICATION(S): 213 SOLUTION TOPICAL at 11:32

## 2021-05-07 NOTE — PROGRESS NOTE ADULT - NSICDXPILOT_GEN_ALL_CORE
[place]

## 2021-05-07 NOTE — PROGRESS NOTE ADULT - SUBJECTIVE AND OBJECTIVE BOX
Patient seen and examined  no complaints    penicillin (Rash)    Hospital Medications:   MEDICATIONS  (STANDING):  amLODIPine   Tablet 2.5 milliGRAM(s) Oral daily  aspirin enteric coated 81 milliGRAM(s) Oral daily  atorvastatin 40 milliGRAM(s) Oral at bedtime  calcium acetate 667 milliGRAM(s) Oral three times a day with meals  chlorhexidine 2% Cloths 1 Application(s) Topical daily  clopidogrel Tablet 75 milliGRAM(s) Oral daily  dextrose 40% Gel 15 Gram(s) Oral once  dextrose 5%. 1000 milliLiter(s) (50 mL/Hr) IV Continuous <Continuous>  dextrose 5%. 1000 milliLiter(s) (100 mL/Hr) IV Continuous <Continuous>  dextrose 50% Injectable 25 Gram(s) IV Push once  dextrose 50% Injectable 12.5 Gram(s) IV Push once  dextrose 50% Injectable 25 Gram(s) IV Push once  gabapentin 200 milliGRAM(s) Oral two times a day  glucagon  Injectable 1 milliGRAM(s) IntraMuscular once  heparin   Injectable 5000 Unit(s) SubCutaneous every 12 hours  insulin lispro (ADMELOG) corrective regimen sliding scale   SubCutaneous three times a day before meals  insulin lispro (ADMELOG) corrective regimen sliding scale   SubCutaneous at bedtime  insulin lispro Injectable (ADMELOG) 3 Unit(s) SubCutaneous three times a day before meals  latanoprost 0.005% Ophthalmic Solution 1 Drop(s) Both EYES at bedtime  levothyroxine 25 MICROGram(s) Oral daily  metoprolol succinate ER 25 milliGRAM(s) Oral daily  polyethylene glycol 3350 17 Gram(s) Oral daily  predniSONE   Tablet 15 milliGRAM(s) Oral two times a day  timolol 0.5% Solution 1 Drop(s) Both EYES daily        VITALS:  T(F): 98.5 (05-07-21 @ 05:20), Max: 98.8 (05-06-21 @ 21:00)  HR: 64 (05-07-21 @ 10:16)  BP: 137/66 (05-07-21 @ 10:16)  RR: 18 (05-07-21 @ 05:20)  SpO2: 100% (05-07-21 @ 05:20)  Wt(kg): --    05-06 @ 07:01  -  05-07 @ 07:00  --------------------------------------------------------  IN: 600 mL / OUT: 1600 mL / NET: -1000 mL      Physical Exam :-  Constitutional: NAD  Neck: Supple.  Respiratory: Bilateral equal breath sounds, few Crackles present.  Cardiovascular: S1, S2 normal, positive Murmur  Gastrointestinal: Bowel Sounds present, soft, non tender.  Extremities: no Edema Feet  Arterio-venous Fistula  left upper ext + thrill    LABS:  05-07    135  |  95<L>  |  54<H>  ----------------------------<  172<H>  4.5   |  22  |  5.36<H>    Ca    8.2<L>      07 May 2021 05:57      Creatinine Trend: 5.36 <--, 7.65 <--, 3.28 <--, 9.09 <--, 6.90 <--, 4.82 <--                        12.4   8.41  )-----------( 141      ( 07 May 2021 05:57 )             36.7     Urine Studies:      RADIOLOGY & ADDITIONAL STUDIES:

## 2021-05-07 NOTE — PROGRESS NOTE ADULT - ASSESSMENT
57F h/o lupus (dx 04/2017), ESRD (HD T/Th/Sat), NSTEMI (BMS 01/2018), DM2, HTN, HLD (not on medication), glaucoma, hypothyroidism p/w dizziness.    1) ESRD on HD : Tuesday, Thursday and Saturday   Routine HD TTS via left upper ext AVF  s/p HD yesterday- flowsheet reviewed- tolerated HD well  Plan for next HD tomm  trend bmp    2) Anemia In CKD-  Hgb at goal  trend hgb    3) HTN--> bp high  metoprolol 25mg   started amlodipine 2.5mg po qd on 5/5/21--> inc amlodipine to 5mg.poqd  low na diet  monitor        contact with question   cell   Ans [facility] [phone]

## 2021-05-07 NOTE — PROGRESS NOTE ADULT - PROVIDER SPECIALTY LIST ADULT
Cardiology
Cardiology
Internal Medicine
Internal Medicine
Nephrology
Neurology
Cardiology
Nephrology
Rheumatology
Cardiology
Internal Medicine
Nephrology
Internal Medicine
Nephrology
Nephrology
Internal Medicine

## 2021-05-07 NOTE — PROGRESS NOTE ADULT - SUBJECTIVE AND OBJECTIVE BOX
Patient is a 60y old  Female who presents with a chief complaint of dizziness / code stroke (07 May 2021 12:12)      INTERVAL HISTORY: feels ok    REVIEW OF SYSTEMS:   CONSTITUTIONAL: No weakness  EYES/ENT: No visual changes; No throat pain  Neck: No pain or stiffness  Respiratory: No cough, wheezing, No shortness of breath  CARDIOVASCULAR: no chest pain or palpitations  GASTROINTESTINAL: No abdominal pain, no nausea, vomiting or hematemesis  GENITOURINARY: No dysuria, frequency or hematuria  NEUROLOGICAL: No stroke like symptoms  SKIN: No rashes    	  MEDICATIONS:  amLODIPine   Tablet 5 milliGRAM(s) Oral daily  metoprolol succinate ER 25 milliGRAM(s) Oral daily    PHYSICAL EXAM:  T(C): 36.8 (05-07-21 @ 13:32), Max: 37.1 (05-06-21 @ 21:00)  HR: 66 (05-07-21 @ 13:32) (61 - 82)  BP: 155/77 (05-07-21 @ 13:32) (137/66 - 164/73)  RR: 18 (05-07-21 @ 13:32) (17 - 20)  SpO2: 97% (05-07-21 @ 13:32) (97% - 100%)  Wt(kg): --  I&O's Summary    06 May 2021 07:01  -  07 May 2021 07:00  --------------------------------------------------------  IN: 600 mL / OUT: 1600 mL / NET: -1000 mL    Appearance: In no distress	  HEENT:    PERRL, EOMI	  Cardiovascular:  S1 S2, No JVD  Respiratory: Lungs clear to auscultation	  Gastrointestinal:  Soft, Non-tender, + BS	  Vascularature:  No edema of LE  Psychiatric: Appropriate affect   Neuro: no acute focal deficits                         12.4   8.41  )-----------( 141      ( 07 May 2021 05:57 )             36.7     05-07    135  |  95<L>  |  54<H>  ----------------------------<  172<H>  4.5   |  22  |  5.36<H>    Ca    8.2<L>      07 May 2021 05:57    Labs personally reviewed    ASSESSMENT/PLAN: 	    Problem/Plan - 1:  ·  Problem: Dizziness Plan:had episode of dizzyness and right eye blurryness. now resolved.  denies chest pain, shortness of breath, palpitations  CTA H: no significant stenosis   MRI noted- per neuro no further workup is needed   TTE with nml LV function, no WMA    Problem/Plan - 2:  ·  Problem: Coronary Artery Disease: s./p PCI mLAD 1/2018, ANABELA to RCA 1/2019 and ANABELA to diag 5/2019   given stents greater than 1 year ago no indication to continue with Plavix, unless indicated for her SLE, f/u with rheum   patient denies chest pain, shortness of breath, denies GARCIA, can ambulate 2 blocks without chest pain.  pBNP 5325, although appears relatively euvolemic on exam,denies use of diuretics at home   TTE as noted above   c/w with Toprol XL 25mg daily, asa, statin, plavix?       Problem/Plan - 3:  ·  Problem: Lupus (systemic lupus erythematosus).  Plan: stable   f/u with neuro as out pt.     Problem/Plan - 4:  ·  Problem: ESR\ (end-stage renal disease) due to SLE.  Plan: nephro consult for HD   -c/w home meds.     Problem/Plan - 5:  ·  Problem: Hypertension.  Plan: c/w home meds   Toprol 25 '    Problem/Plan - 6:  ·  Problem: CODE stroke called 5/4 for waxing and waning after HD   neuro suspicions low for stroke   CTH negative   symptoms resolved   per patient she gets really sleepy after HD sometimes.       Mariana Page ANP-C  Salvador Mckeon DO [facility]  Cardiovascular Medicine  [address], [address]  Office: [phone]  Cell: [phone]

## 2021-05-07 NOTE — PROGRESS NOTE ADULT - REASON FOR ADMISSION
dizziness / code stroke

## 2021-05-08 LAB — PYRIDOXAL PHOS SERPL-MCNC: 11.2 UG/L — SIGNIFICANT CHANGE UP (ref 2–32.8)

## 2021-06-15 ENCOUNTER — APPOINTMENT (OUTPATIENT)
Dept: VASCULAR SURGERY | Facility: CLINIC | Age: 61
End: 2021-06-15

## 2021-07-12 NOTE — ASU PREOP CHECKLIST - AS BP NONINV SITE
left upper arm
Recommend Initiate Glucerna 8oz PO Daily (Provides 220kcal-10grams of Protein) - Trail

## 2021-07-30 ENCOUNTER — APPOINTMENT (OUTPATIENT)
Dept: VASCULAR SURGERY | Facility: CLINIC | Age: 61
End: 2021-07-30
Payer: MEDICAID

## 2021-07-30 PROCEDURE — 99213 OFFICE O/P EST LOW 20 MIN: CPT

## 2021-07-30 PROCEDURE — 93990 DOPPLER FLOW TESTING: CPT

## 2021-07-30 NOTE — ASSESSMENT
[FreeTextEntry1] : Patient with renal failure on dialysis.  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  No difficulty on dialysis.  Continue conservative management with follow-up in 2 months.

## 2021-07-30 NOTE — PHYSICAL EXAM
[Thrill] : thrill [Aneurysm] : no aneurysm [Bleeding] : no bleeding [Normal] : normoactive bowel sounds, soft and nontender, no hepatosplenomegaly or masses appreciated

## 2021-10-29 ENCOUNTER — APPOINTMENT (OUTPATIENT)
Dept: VASCULAR SURGERY | Facility: CLINIC | Age: 61
End: 2021-10-29

## 2022-01-04 NOTE — PROGRESS NOTE ADULT - PROBLEM SELECTOR PLAN 6
ATTENDING ADDENDUM:   I have reviewed and discussed the following case and agree with the assessment and plan as documented above.     Elidia Don MD       in the setting of Lupus nephritis on dialysis, autonomic dysfunction cannot be ruled out.  Consider Midodrine if patient persistently orthostatic. .

## 2022-02-11 NOTE — PATIENT PROFILE ADULT. - PRESSURE ULCER(S)
Improved but not to goal. Increase doxazosin to 16 mg nightly. Encouraged him to continue monitor BP at home and call if hypotension or consistently greater than 130/80. Encouraged lifestyle modifications. Continue to monitor BP and record readings. Call if any side effects or unable to tolerate increased dose. I would like to decrease/discontinue clonidine in the future if possible due to side effects and rebound hypertension. Discussed risks of uncontrolled BP.   no

## 2022-02-25 ENCOUNTER — APPOINTMENT (OUTPATIENT)
Dept: VASCULAR SURGERY | Facility: CLINIC | Age: 62
End: 2022-02-25

## 2022-07-27 ENCOUNTER — APPOINTMENT (OUTPATIENT)
Dept: OBGYN | Facility: CLINIC | Age: 62
End: 2022-07-27

## 2022-07-27 VITALS
SYSTOLIC BLOOD PRESSURE: 178 MMHG | HEART RATE: 81 BPM | WEIGHT: 148 LBS | BODY MASS INDEX: 27.96 KG/M2 | DIASTOLIC BLOOD PRESSURE: 91 MMHG | OXYGEN SATURATION: 99 %

## 2022-07-27 DIAGNOSIS — E11.9 TYPE 2 DIABETES MELLITUS W/OUT COMPLICATIONS: ICD-10-CM

## 2022-07-27 DIAGNOSIS — M32.9 SYSTEMIC LUPUS ERYTHEMATOSUS, UNSPECIFIED: ICD-10-CM

## 2022-07-27 DIAGNOSIS — F32.A DEPRESSION, UNSPECIFIED: ICD-10-CM

## 2022-07-27 DIAGNOSIS — I10 ESSENTIAL (PRIMARY) HYPERTENSION: ICD-10-CM

## 2022-07-27 PROCEDURE — 99386 PREV VISIT NEW AGE 40-64: CPT

## 2022-07-27 NOTE — HISTORY OF PRESENT ILLNESS
[FreeTextEntry1] : Patient is a 61 year old female who presents for her annual exam.  Reports no postmenopausal bleeding, no abdominal or pelvic pain, change in discharge, change in bowel or bladder habits or any other concerns.  Due for pap and mammo.

## 2022-08-03 LAB
C TRACH RRNA SPEC QL NAA+PROBE: NOT DETECTED
CYTOLOGY CVX/VAG DOC THIN PREP: ABNORMAL
N GONORRHOEA RRNA SPEC QL NAA+PROBE: NOT DETECTED
SOURCE TP AMPLIFICATION: NORMAL

## 2022-08-18 NOTE — STROKE CODE NOTE - NSSTROKEABCD2TIADURATION_GEN_A_CORE
Mom calling to find out what to do & when to send daughter to regular activies  Pt tested pos for Covid on 8/12 >= 60 minutes

## 2022-09-09 NOTE — PATIENT PROFILE ADULT. - ABILITY TO HEAR (WITH HEARING AID OR HEARING APPLIANCE IF NORMALLY USED):
Colonoscopy Procedure note      Procedure Type:  Colonoscopy to cecum with cold biopsy removal of polyps    Indication for endoscopy:  Screening    Pre-endoscopic diagnosis:  Same    : Dr. Cordell Johnston MD    Medications used:  Monitored anesthesia per Anesthesia Service    Instrument used:  Olympus cf 190    Procedure Description/Findings:      Pre-procedure evaluation including cardiopulmonary auscultation was benign and patient was felt to be stable for the purposes of sedation and endoscopy.  After standard consent was obtained and anesthesia initiated, direct visualization of the perianal mucosa revealed no obvious abnormalities.  Digital examination revealed no palpable masses.  Anoscopy was unremarkable.  Direct introduction of the scope without difficulty demonstrated essentially normal appearing colonic mucosa and vascularity.  The colonoscope was easily advanced into the cecum.  The appendiceal orifice and IC valve was clearly identified.  Upon withdrawal of the scope careful evaluation of the colon mucosa which had a good prep for evaluation revealed a 3 mm ascending colon polyp and a 3 mm sigmoid polyp both removed by cold biopsy technique.  Retroflexed views of the rectum revealed minimal internal hemorrhoids.  No additional findings were noted.  Overall the patient tolerated the procedure well without undue discomfort desaturation or hypotension.  The patient was recovered in the GI Lab and discharge home in the company of an escort.           Biopsy:  Yes    Complications:  None    EBL:  Minimal      Post procedure diagnosis:  1.  Ascending colon polyp 3 mm and sigmoid polyp 3 mm both status post cold biopsy removal.      Post procedure recommendations:  1. Await biopsy results.        Signed,  Cordell Johnston MD     Adequate: hears normal conversation without difficulty

## 2022-09-16 NOTE — RAPID RESPONSE TEAM SUMMARY - NSREASONFORCALLINGRRT_GEN_ALL_CORE
OCCUPATIONAL THERAPY Eval, received via fax. Orders/Forms in your mailbox to be signed.    
Orders/Forms signed and faxed.     
Acute mental status changes

## 2022-09-18 NOTE — ED PROVIDER NOTE - GASTROINTESTINAL [+], MLM
DIARRHEA/VOMITING
You can access the FollowMyHealth Patient Portal offered by [facility] by registering at the following website: [email or web address]. By joining [employer]’s FollowMyHealth portal, you will also be able to view your health information using other applications (apps) compatible with our system.

## 2022-11-05 NOTE — PROGRESS NOTE ADULT - PROBLEM SELECTOR PLAN 1
0738 pt given meds as ordered pending ct scan Alexa 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   MRI lumbar, C spine show no cord compression.    Continue with Lyrica- renally dosed.   PT eval- recommend JESSICA placement.   Continue to hold Plaquenil.   Follow up anti YESICA, anti MAG antibodies.  Plan for nerve biopsy, patient is intermediate risk for intermediate risk procedure, may proceed with biopsy as planned

## 2022-12-06 ENCOUNTER — APPOINTMENT (OUTPATIENT)
Dept: ENDOVASCULAR SURGERY | Facility: CLINIC | Age: 62
End: 2022-12-06

## 2022-12-06 ENCOUNTER — RESULT REVIEW (OUTPATIENT)
Age: 62
End: 2022-12-06

## 2022-12-06 VITALS
WEIGHT: 144 LBS | HEART RATE: 76 BPM | BODY MASS INDEX: 27.19 KG/M2 | DIASTOLIC BLOOD PRESSURE: 84 MMHG | OXYGEN SATURATION: 100 % | SYSTOLIC BLOOD PRESSURE: 182 MMHG | HEIGHT: 61 IN | RESPIRATION RATE: 20 BRPM | TEMPERATURE: 98.1 F

## 2022-12-06 PROCEDURE — 36902Z: CUSTOM

## 2022-12-06 PROCEDURE — 36907Z: CUSTOM | Mod: 59

## 2022-12-06 RX ORDER — REPAGLINIDE 0.5 MG/1
0.5 TABLET ORAL
Refills: 0 | Status: ACTIVE | COMMUNITY

## 2022-12-06 RX ORDER — CALCIUM ACETATE 667 MG/1
667 CAPSULE ORAL
Qty: 90 | Refills: 0 | Status: DISCONTINUED | COMMUNITY
Start: 2017-06-21 | End: 2022-12-06

## 2022-12-06 RX ORDER — FERRIC CITRATE 210 MG/1
TABLET, COATED ORAL
Refills: 0 | Status: ACTIVE | COMMUNITY

## 2022-12-06 RX ORDER — CALCIUM ACETATE 667 MG/1
667 TABLET ORAL
Refills: 0 | Status: ACTIVE | COMMUNITY

## 2022-12-06 RX ORDER — CALCIUM CARBONATE 1250 MG/5ML
1250 (500 CA) SUSPENSION ORAL
Qty: 30 | Refills: 0 | Status: DISCONTINUED | COMMUNITY
Start: 2017-06-19 | End: 2022-12-06

## 2022-12-06 RX ORDER — LEVOTHYROXINE SODIUM 0.17 MG/1
TABLET ORAL
Refills: 0 | Status: ACTIVE | COMMUNITY

## 2022-12-06 RX ORDER — MEMANTINE HYDROCHLORIDE 10 MG/1
10 TABLET, FILM COATED ORAL
Refills: 0 | Status: ACTIVE | COMMUNITY

## 2022-12-06 RX ORDER — HYDROXYCHLOROQUINE SULFATE 200 MG/1
200 TABLET, FILM COATED ORAL
Refills: 0 | Status: ACTIVE | COMMUNITY

## 2022-12-06 RX ORDER — GABAPENTIN 300 MG
300 TABLET ORAL
Refills: 0 | Status: ACTIVE | COMMUNITY

## 2022-12-15 NOTE — ASSESSMENT
[Other: _____] : [unfilled] [FreeTextEntry1] : referral from dialysis for pulling clots, plan for left fistulogram and intervention

## 2022-12-15 NOTE — REASON FOR VISIT
[Other ___] : a [unfilled] visit for [Pulling Clots] : pulling clots [Other: ___] : [unfilled] [FreeTextEntry2] : referral from dialysis

## 2022-12-15 NOTE — HISTORY OF PRESENT ILLNESS
[] : left brachiocephalic fistula [FreeTextEntry1] : 10/2/2017 Dr. Deluca [FreeTextEntry4] : Saturday 12/3/22 [FreeTextEntry5] : today 4am  [FreeTextEntry6] : Dr. Osullivan

## 2022-12-15 NOTE — PAST MEDICAL HISTORY
[Increasing age ( >40 years old)] : Increasing age ( >40 years old) [No therapy indicated for cases scheduled for less than one hour] : No therapy indicated for cases scheduled for less than one hour. [Altered mobility] : Altered mobility [FreeTextEntry1] : Malignant Hyperthermia Screening Tool and Risk of Bleeding Assessment\par Ms. WILLARD BOB denies family history of unexpected death following Anesthesia or Exercise.\par Denies Family history of Malignant Hyperthermia, Muscle or Neuromuscular disorder and High Temperature following exercise.\par \par Ms. WILLARD BOB denies history of Muscle Spasm, Dark or Chocolate - Colored urine and Unanticipated fever immediately following anesthesia or serious exercise. \par Ms. BOB also denies bleeding tendencies/ Risks of Bleeding.\par

## 2022-12-23 NOTE — PROGRESS NOTE ADULT - SUBJECTIVE AND OBJECTIVE BOX
Spoke with patient regarding procedure scheduled on 12.30    Arrival time 1015    Has patient been sick with fever or on antibiotics within the last 7 days? No    Does the patient have any open wounds, sores or rashes? No    Does the patient have any recent fractures? no    Has patient received a vaccination within the last 7 days? No    Received the COVID vaccination? yes    Has the patient stopped all medications as directed? Hold dm meds am of procedure    Does patient have a pacemaker and or defibrillator? no    Does the patient have a ride to and from procedure and someone reliable to remain with patient? Son     Is the patient diabetic? yes    Does the patient have sleep apnea? Or use O2 at home? Fritz     Is the patient receiving sedation? yes    Is the patient instructed to remain NPO beginning at midnight the night before their procedure? yes    Procedure location confirmed with patient? Yes    Covid- Denies signs/symptoms. Instructed to notify PAT/MD if any changes.     Patient is a 57y old  Female who presents with a chief complaint of LE pain and inability to walk (19 Dec 2017 10:27)      SUBJECTIVE / OVERNIGHT EVENTS: Patient with no new complaints.   No hx fall/ injury/ fevers. Patient noted to have dislodgement of permacath, to have permacath placement by IR tomorrow. No other complaints.  ROS otherwise negative.   T(C): 36.8 (01-02-18 @ 15:22), Max: 36.8 (01-02-18 @ 15:22)  HR: 69 (01-02-18 @ 15:22) (69 - 69)  BP: 144/86 (01-02-18 @ 15:22) (144/86 - 144/86)  RR: 18 (01-02-18 @ 15:22) (18 - 18)  SpO2: 99% (01-02-18 @ 15:22) (99% - 99%)    MEDICATIONS  (STANDING):  aspirin enteric coated 81 milliGRAM(s) Oral daily  atorvastatin 40 milliGRAM(s) Oral at bedtime  bisacodyl 5 milliGRAM(s) Oral at bedtime  calcium acetate 667 milliGRAM(s) Oral three times a day with meals  calcium carbonate 1250 mG (OsCal) 1 Tablet(s) Oral daily  dextrose 5%. 1000 milliLiter(s) (50 mL/Hr) IV Continuous <Continuous>  dextrose 50% Injectable 12.5 Gram(s) IV Push once  dextrose 50% Injectable 25 Gram(s) IV Push once  dextrose 50% Injectable 25 Gram(s) IV Push once  docusate sodium 100 milliGRAM(s) Oral two times a day  doxercalciferol Injectable 2 MICROGram(s) IV Push <User Schedule>  furosemide    Tablet 80 milliGRAM(s) Oral <User Schedule>  insulin lispro (HumaLOG) corrective regimen sliding scale   SubCutaneous three times a day before meals  insulin lispro (HumaLOG) corrective regimen sliding scale   SubCutaneous at bedtime  latanoprost 0.005% Ophthalmic Solution 1 Drop(s) Both EYES at bedtime  levothyroxine 25 MICROGram(s) Oral daily  metolazone 2.5 milliGRAM(s) Oral daily  metoprolol succinate ER 25 milliGRAM(s) Oral daily  Nephro-madiha 1 Tablet(s) Oral daily  ondansetron Injectable 4 milliGRAM(s) IV Push once  PARoxetine 20 milliGRAM(s) Oral daily  pregabalin 75 milliGRAM(s) Oral daily  senna 2 Tablet(s) Oral at bedtime  timolol 0.5% Solution 1 Drop(s) Both EYES daily    MEDICATIONS  (PRN):  acetaminophen   Tablet 650 milliGRAM(s) Oral every 6 hours PRN For Temp greater than 38 C (100.4 F)  acetaminophen   Tablet. 650 milliGRAM(s) Oral every 6 hours PRN Mild Pain (1 - 3)  dextrose Gel 1 Dose(s) Oral once PRN Blood Glucose LESS THAN 70 milliGRAM(s)/deciliter  glucagon  Injectable 1 milliGRAM(s) IntraMuscular once PRN Glucose LESS THAN 70 milligrams/deciliter  oxyCODONE    IR 5 milliGRAM(s) Oral every 6 hours PRN Moderate Pain (4 - 6)      PHYSICAL EXAM:  GENERAL: NAD, well-developed  HEAD:  Atraumatic, Normocephalic  EYES: EOMI, conjunctiva and sclera clear  NECK: Supple, No JVD  CHEST/LUNG: Clear to auscultation bilaterally; No wheeze  HEART: Regular rate and rhythm; No murmurs, rubs, or gallops  ABDOMEN: Soft, Nontender, Nondistended; Bowel sounds present  EXTREMITIES:  2+ Peripheral Pulses, No clubbing, cyanosis, or edema  Lt AVF present   PSYCH: AAOx3  NEUROLOGY: CN's intact, strength 3/5 in UE's and 4/5 in LE's, decreased sensation in LE's.   Rt Lt lower extremities tender to palpate   SKIN: No rashes or lesions                           9.7    3.4   )-----------( 75       ( 02 Jan 2018 07:16 )             28.4       CARDIAC MARKERS ( 01 Jan 2018 20:34 )  x     / 0.09 ng/mL / 32 U/L / x     / 1.4 ng/mL      LIVER FUNCTIONS - ( 02 Jan 2018 15:22 )  Alb: 2.3 g/dL / Pro: 8.0 g/dL / ALK PHOS: 70 U/L / ALT: <5 U/L RC / AST: 19 U/L / GGT: x         CSF protein 114  Lt AVF dopplers-  flow-limiting stenosis of the dialysis access   fistula at the surgical anastomosis of the left brachial artery to the   egress cephalic vein in the distal upper arm.      126|92|74<84  4.9|24|8.21  8.8,--,--  01-02 @ 15:22  128|93|70<68  4.6|23|7.95  8.8,--,--  01-02 @ 07:16  127|92|65<110  4.9|24|7.54  8.6,2.4,2.4  01-01 @ 20:34              RADIOLOGY & ADDITIONAL TESTS:    Imaging Personally Reviewed:    Consultant(s) Notes Reviewed:  Rheum, Neuro     Care Discussed with Consultants/Other Providers:

## 2023-01-13 ENCOUNTER — APPOINTMENT (OUTPATIENT)
Dept: VASCULAR SURGERY | Facility: CLINIC | Age: 63
End: 2023-01-13

## 2023-06-09 ENCOUNTER — APPOINTMENT (OUTPATIENT)
Dept: VASCULAR SURGERY | Facility: CLINIC | Age: 63
End: 2023-06-09
Payer: MEDICAID

## 2023-06-09 PROCEDURE — 99214 OFFICE O/P EST MOD 30 MIN: CPT

## 2023-06-09 PROCEDURE — 93990 DOPPLER FLOW TESTING: CPT

## 2023-07-16 ENCOUNTER — EMERGENCY (EMERGENCY)
Facility: HOSPITAL | Age: 63
LOS: 1 days | Discharge: HOME | End: 2023-07-16
Attending: EMERGENCY MEDICINE
Payer: MEDICAID

## 2023-07-16 VITALS
TEMPERATURE: 98 F | WEIGHT: 139.99 LBS | HEIGHT: 61 IN | SYSTOLIC BLOOD PRESSURE: 190 MMHG | DIASTOLIC BLOOD PRESSURE: 82 MMHG | RESPIRATION RATE: 20 BRPM | HEART RATE: 83 BPM | OXYGEN SATURATION: 100 %

## 2023-07-16 DIAGNOSIS — Z98.891 HISTORY OF UTERINE SCAR FROM PREVIOUS SURGERY: Chronic | ICD-10-CM

## 2023-07-16 DIAGNOSIS — Z98.890 OTHER SPECIFIED POSTPROCEDURAL STATES: Chronic | ICD-10-CM

## 2023-07-16 DIAGNOSIS — Z90.49 ACQUIRED ABSENCE OF OTHER SPECIFIED PARTS OF DIGESTIVE TRACT: Chronic | ICD-10-CM

## 2023-07-16 PROCEDURE — 99285 EMERGENCY DEPT VISIT HI MDM: CPT

## 2023-07-16 NOTE — ED ADULT TRIAGE NOTE - CHIEF COMPLAINT QUOTE
R groin pain s/p sneezing "really hard" on Monday. went to urgent care for XR but does not have results. today pain is worsening with walking so came to ED.

## 2023-07-16 NOTE — ED ADULT NURSE NOTE - NSFALLRISKINTERV_ED_ALL_ED

## 2023-07-16 NOTE — ED ADULT NURSE NOTE - OBJECTIVE STATEMENT
61YO female with PMH of HTN, DM2, Lupus, ESRD-T/TH/SAT(full session on sat)-do not use LUE, Gastric by pass 2016, & Vasculitis B/L LE presenting with complaints of groin pain. Pt states having a sudden onset of right groin pain, pt requires walker @ baseline to ambulate, since Saturday pain has worsened with "cramping" sensation, making it difficult to walk/ bare weight on right leg feels more of "dragging" pt went to , xrays were preformed, results are unk. Pt c/o right groin pain with increase tingling on b/l legs. Pt Axox4, respirations even, & non-labored. radial pulses strong and equal bilaterally. Skin warm, & dry. Pt has generalized body tremors states it's baseline takes gabapentin @ home. Pt denies CP, SOB, falls, injuries, urinary symptoms, fevers or dizziness. Pt placed in position of comfort. Son at bedside. Bed in lowest position, wheels locked, appropriate side rails raised. Pt denies needs at this time.

## 2023-07-17 VITALS
RESPIRATION RATE: 19 BRPM | OXYGEN SATURATION: 99 % | HEART RATE: 81 BPM | TEMPERATURE: 98 F | DIASTOLIC BLOOD PRESSURE: 70 MMHG | SYSTOLIC BLOOD PRESSURE: 145 MMHG

## 2023-07-17 LAB
ALBUMIN SERPL ELPH-MCNC: 3.8 G/DL — SIGNIFICANT CHANGE UP (ref 3.3–5)
ALP SERPL-CCNC: 327 U/L — HIGH (ref 40–120)
ALT FLD-CCNC: 13 U/L — SIGNIFICANT CHANGE UP (ref 10–45)
ANION GAP SERPL CALC-SCNC: 21 MMOL/L — HIGH (ref 5–17)
AST SERPL-CCNC: 26 U/L — SIGNIFICANT CHANGE UP (ref 10–40)
BASOPHILS # BLD AUTO: 0.06 K/UL — SIGNIFICANT CHANGE UP (ref 0–0.2)
BASOPHILS NFR BLD AUTO: 0.9 % — SIGNIFICANT CHANGE UP (ref 0–2)
BILIRUB SERPL-MCNC: 0.4 MG/DL — SIGNIFICANT CHANGE UP (ref 0.2–1.2)
BUN SERPL-MCNC: 58 MG/DL — HIGH (ref 7–23)
CALCIUM SERPL-MCNC: 9.4 MG/DL — SIGNIFICANT CHANGE UP (ref 8.4–10.5)
CHLORIDE SERPL-SCNC: 98 MMOL/L — SIGNIFICANT CHANGE UP (ref 96–108)
CO2 SERPL-SCNC: 22 MMOL/L — SIGNIFICANT CHANGE UP (ref 22–31)
CREAT SERPL-MCNC: 7.45 MG/DL — HIGH (ref 0.5–1.3)
EGFR: 6 ML/MIN/1.73M2 — LOW
EOSINOPHIL # BLD AUTO: 0.11 K/UL — SIGNIFICANT CHANGE UP (ref 0–0.5)
EOSINOPHIL NFR BLD AUTO: 1.7 % — SIGNIFICANT CHANGE UP (ref 0–6)
GLUCOSE SERPL-MCNC: 130 MG/DL — HIGH (ref 70–99)
HCT VFR BLD CALC: 32 % — LOW (ref 34.5–45)
HGB BLD-MCNC: 10.9 G/DL — LOW (ref 11.5–15.5)
LYMPHOCYTES # BLD AUTO: 2.09 K/UL — SIGNIFICANT CHANGE UP (ref 1–3.3)
LYMPHOCYTES # BLD AUTO: 32.5 % — SIGNIFICANT CHANGE UP (ref 13–44)
MANUAL SMEAR VERIFICATION: SIGNIFICANT CHANGE UP
MCHC RBC-ENTMCNC: 32.9 PG — SIGNIFICANT CHANGE UP (ref 27–34)
MCHC RBC-ENTMCNC: 34.1 GM/DL — SIGNIFICANT CHANGE UP (ref 32–36)
MCV RBC AUTO: 96.7 FL — SIGNIFICANT CHANGE UP (ref 80–100)
MONOCYTES # BLD AUTO: 0.28 K/UL — SIGNIFICANT CHANGE UP (ref 0–0.9)
MONOCYTES NFR BLD AUTO: 4.4 % — SIGNIFICANT CHANGE UP (ref 2–14)
NEUTROPHILS # BLD AUTO: 3.9 K/UL — SIGNIFICANT CHANGE UP (ref 1.8–7.4)
NEUTROPHILS NFR BLD AUTO: 60.5 % — SIGNIFICANT CHANGE UP (ref 43–77)
PLAT MORPH BLD: NORMAL — SIGNIFICANT CHANGE UP
PLATELET # BLD AUTO: 118 K/UL — LOW (ref 150–400)
PLATELET COUNT - ESTIMATE: ABNORMAL
POTASSIUM SERPL-MCNC: 5.2 MMOL/L — SIGNIFICANT CHANGE UP (ref 3.5–5.3)
POTASSIUM SERPL-SCNC: 5.2 MMOL/L — SIGNIFICANT CHANGE UP (ref 3.5–5.3)
PROT SERPL-MCNC: 7.4 G/DL — SIGNIFICANT CHANGE UP (ref 6–8.3)
RBC # BLD: 3.31 M/UL — LOW (ref 3.8–5.2)
RBC # FLD: 13.8 % — SIGNIFICANT CHANGE UP (ref 10.3–14.5)
RBC BLD AUTO: SIGNIFICANT CHANGE UP
SODIUM SERPL-SCNC: 141 MMOL/L — SIGNIFICANT CHANGE UP (ref 135–145)
WBC # BLD: 6.44 K/UL — SIGNIFICANT CHANGE UP (ref 3.8–10.5)
WBC # FLD AUTO: 6.44 K/UL — SIGNIFICANT CHANGE UP (ref 3.8–10.5)

## 2023-07-17 PROCEDURE — 73552 X-RAY EXAM OF FEMUR 2/>: CPT | Mod: 26,RT

## 2023-07-17 PROCEDURE — 74176 CT ABD & PELVIS W/O CONTRAST: CPT | Mod: MA

## 2023-07-17 PROCEDURE — 73502 X-RAY EXAM HIP UNI 2-3 VIEWS: CPT

## 2023-07-17 PROCEDURE — 86140 C-REACTIVE PROTEIN: CPT

## 2023-07-17 PROCEDURE — 85025 COMPLETE CBC W/AUTO DIFF WBC: CPT

## 2023-07-17 PROCEDURE — 80053 COMPREHEN METABOLIC PANEL: CPT

## 2023-07-17 PROCEDURE — 73502 X-RAY EXAM HIP UNI 2-3 VIEWS: CPT | Mod: 26,RT

## 2023-07-17 PROCEDURE — 74176 CT ABD & PELVIS W/O CONTRAST: CPT | Mod: 26,MA

## 2023-07-17 PROCEDURE — 99284 EMERGENCY DEPT VISIT MOD MDM: CPT | Mod: 25

## 2023-07-17 PROCEDURE — 73552 X-RAY EXAM OF FEMUR 2/>: CPT

## 2023-07-17 PROCEDURE — 36415 COLL VENOUS BLD VENIPUNCTURE: CPT

## 2023-07-17 PROCEDURE — 85652 RBC SED RATE AUTOMATED: CPT

## 2023-07-17 RX ORDER — ACETAMINOPHEN 500 MG
975 TABLET ORAL ONCE
Refills: 0 | Status: COMPLETED | OUTPATIENT
Start: 2023-07-17 | End: 2023-07-17

## 2023-07-17 RX ORDER — METHOCARBAMOL 500 MG/1
1000 TABLET, FILM COATED ORAL ONCE
Refills: 0 | Status: COMPLETED | OUTPATIENT
Start: 2023-07-17 | End: 2023-07-17

## 2023-07-17 RX ADMIN — METHOCARBAMOL 1000 MILLIGRAM(S): 500 TABLET, FILM COATED ORAL at 01:57

## 2023-07-17 RX ADMIN — Medication 975 MILLIGRAM(S): at 01:59

## 2023-07-17 NOTE — ED PROVIDER NOTE - NSFOLLOWUPINSTRUCTIONS_ED_ALL_ED_FT
- Follow up with WOUND CARE CLINIC for sacral decubitus skin wound  - Follow up with SURGICAL CLINIC for CT findings of possible fluid collection around area.     - Monitor area for signs of infection including open skin, redness, pus, fevers.  - Can take Tylenol 1000mg every 6-8 hours as needed for pain.     Rest, drink plenty of fluids.  Advance activity as tolerated.  Continue all previously prescribed medications as directed.  Follow up with your primary care physician in 48-72 hours- bring copies of your results.  Return to the ER for worsening or persistent symptoms, and/or ANY NEW OR CONCERNING SYMPTOMS.

## 2023-07-17 NOTE — ED PROVIDER NOTE - ATTENDING CONTRIBUTION TO CARE
Merlin Howell MD:  I personally saw the patient and performed a substantive portion of the visit including all aspects of the medical decision making.    MDM: 62-year-old female with history as seen in HPI above, who presents with right hip/groin pain rating to the right anterior thigh that started acutely after she sneezed strongly, and has been gradually worsening, exacerbated with right hip flexion.  She states that her tremors and her paresthesia from her right knee to toe is unchanged and chronic.    ROS: Denies abdominal pain, flank pain, fevers, chills, numbness, weakness, trauma, fall    On examination, patient with elevated blood pressure, otherwise stable vitals.  ANO x3, cardiac RRR, lungs CTAB, abdomen soft nontender, no masses or bulging, no CVA tenderness, no midline spinal tenderness, normal range of motion of C/T/L-spine.  Mild tenderness to the right anterior hip, (+) pain reproduced with active and passive and resisted hip flexion, and internal rotation, (+) FADIR. Neurovascular intact distally with equal pulses, brisk capillary refill, 5 out of 5 strength, normal sensation. Soft compartments    Will obtain labs to evaluate for hematologic disorder, metabolic derangements, hepatic and renal function.  Will obtain x-ray to evaluate for acute bony pathology.  Will obtain CT abdomen/pelvis to assess for hernia, but patient without any obstructive type symptoms or bowel dysfunction.  Pain control reassess.    Differential includes but is not limited to: See above    Patient with new problems requiring additional work-up and treatment, following orders: see above  Obtained and reviewed external records: Discharge note from 5/4/2021  Additional history obtained from: Son at bedside who is also providing translation in Cameroonian  Chronic conditions and social determinants of health affecting care: see above Merlin Howell MD:  I personally saw the patient and performed a substantive portion of the visit including all aspects of the medical decision making.    MDM: 62-year-old female with history as seen in HPI above, who presents with right hip/groin pain rating to the right anterior thigh that started acutely after she sneezed strongly, and has been gradually worsening, exacerbated with right hip flexion.  She states that her tremors and her paresthesia from her right knee to toe is unchanged and chronic.    ROS: Denies abdominal pain, flank pain, fevers, chills, numbness, weakness, trauma, fall    On examination, patient with elevated blood pressure, otherwise stable vitals.  ANO x3, cardiac RRR, lungs CTAB, abdomen soft nontender, no masses or bulging, no CVA tenderness, no midline spinal tenderness, normal range of motion of C/T/L-spine.  Mild tenderness to the right anterior hip, (+) pain reproduced with active and passive and resisted hip flexion, and internal rotation, (+) FADIR. Neurovascular intact distally with equal pulses, brisk capillary refill, 5 out of 5 strength, normal sensation. Soft compartments    Will obtain labs to evaluate for hematologic disorder, metabolic derangements, hepatic and renal function.  Will obtain x-ray to evaluate for acute bony pathology.  Will obtain CT abdomen/pelvis to assess for hernia, but patient without any obstructive type symptoms or bowel dysfunction.  Pain control reassess.    Labs and imaging reviewed, patient with no leukocytosis, anemia noted with hemoglobin of 10.9, no report of any bleeding.  Creatinine elevated at 7.45 in the setting of ESRD, but normal potassium level.  Alk phos elevated to 327, but no other LFT abnormalities, and no symptoms consistent with hepatobiliary pathology.  CT imaging showed left inguinal hernia, but no right groin hernia to point towards etiology of patient's symptoms.  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    Surgery consulted.  Signed out at 0700 pending surgery consultation and close reassessments for further treatment and disposition decisions.    Differential includes but is not limited to: See above    Patient with new problems requiring additional work-up and treatment, following orders: see above  Obtained and reviewed external records: Discharge note from 5/4/2021  Additional history obtained from: Son at bedside who is also providing translation in Korean  Chronic conditions and social determinants of health affecting care: see above

## 2023-07-17 NOTE — ED PROVIDER NOTE - PROGRESS NOTE DETAILS
MD Caren (PGY-2) CT results reviewed patient with small fat-containing left inguinal and tiny fat-containing umbilical hernia.  Patient has collection of fluid with foci of air measuring 3.7 x 3.1 x 2.6 cm just distal coccygeal tip.  CAT scan read is a cystic tract extending from this to the rectum, suggestive of a rectocutaneous fistula.  Patient has erosion of the distal coccyx, compatible with chronic osteomyelitis that has been previously seen.  Will consult to surgery. Jakob PGY3: Patient endorsed to me at sign out. Spoke to surg resident who will eval pt, Jakob PGY3: Pt pain controlled, only mild soreness. Discussed results - patient and son already aware of need for wound care eval. Patient denies discharge, fever, or other concerns for infection. Reviewed CT imaging - no leukocytosis, CRP only 13. Jakob PGY3: spoke to wound care service, they will try to get someone down to see wound and give recs. Consults placed in computer per their request. Awaiting recs for dispo. Jakob PGY3: wound care came to eval. No large open skin wounds despite CT findings. No concern for acute infection - agrees that f/u in wound care clinic is most indicated. No abbx at this time based on exam and labs. DC home.

## 2023-07-17 NOTE — ED ADULT NURSE REASSESSMENT NOTE - NS ED NURSE REASSESS COMMENT FT1
Report received from Francesca LOCKE. Patient seen resting in bed, family remains at bedside and translating. Patient reports improved pain, but cannot lay on her back due to discomfort. Patient made aware that she is waiting for surgery consult at this time. VSS. Safety and comfort provided.

## 2023-07-17 NOTE — CONSULT NOTE ADULT - ASSESSMENT
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 fistula  Abx per Medicine/ ID  Moisturize intact skin w/ SWEEN cream BID  Nutrition optimization         encourage high quality protein, ricco/ prosource, MVI & Vit C to promote wound healing  Hyperglycemia -  ADA diet and  FS w/ ISS,  Continue turning and positioning w/ offloading assistive devices as per protocol  Buttocks/ Sacrum Jus BID  and prn soiling        Continue w/ attends under pads and Pericare as per protocol  Waffle Cushion to chair when oob to chair  Continue w/ low air loss pressure redistribution bed surface   Pt will need offloading cushion for wheelchairr upon discharge home  Care as per medicine remain available as requested  Upon discharge can f/u as outpatient at W[facility] [address] [facility] [phone]  D/w team & RN & attng  Serena Salas PA-C CWS [id number]  Nights/ Weekends/ Holidays please call:  General Surgery Consult pager ([phone]) for emergencies  Wound PT for multilayer leg wrapping or VAC issues (x [phone])   I spent 55minutes face to face w/ this pt of which more than 50% of the time was spent counseling & coordinating care of this pt.  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 fistula  Abx per ER/ CRS  Moisturize intact skin w/ SWEEN cream BID  Nutrition optimization         encourage high quality protein, ricco/ prosource, MVI & Vit C to promote wound healing  Hyperglycemia -  ADA diet and  FS w/ ISS,  Continue turning and positioning w/ offloading assistive devices as per protocol  Buttocks/ Sacrum Jus BID  and prn soiling        Continue w/ attends under pads and Pericare as per protocol  Waffle Cushion to chair when oob to chair  Continue w/ low air loss pressure redistribution bed surface   Pt will need offloading cushion for wheelchairr upon discharge home  Care as per ER remain available as requested  Upon discharge can f/u as outpatient at Wound Center [address] [facility] [phone]  D/w team & RN & attng  Serena Salas PA-C CWS [id number]  Nights/ Weekends/ Holidays please call:  General Surgery Consult pager ([phone]) for emergencies  Wound PT for multilayer leg wrapping or VAC issues (x [phone])   I spent 55minutes face to face w/ this pt of which more than 50% of the time was spent counseling & coordinating care of this pt.

## 2023-07-17 NOTE — CONSULT NOTE ADULT - SUBJECTIVE AND OBJECTIVE BOX
Wound SURGERY CONSULT NOTE    HPI:   62-year-old female with past medical history of ESRD (secondary to SLE, TTS, last session on Saturday, full session), lupus, diabetes, hypertension, presenting with acute onset of right groin pain that started a few days ago.  Patient states that she sneezed strongly prior to onset of symptoms patient reports pain as a spasm.  Pain is exacerbated with right leg movement.  Denies history of ovarian cysts.  Denies vaginal pain/discharge.  Patient denies fever, nausea vomiting, abdominal pain, dysuria.      N/V/D,  BM/ Flatus,   NGT,     palp/ sob/dyspnea/ cp,       F/C/S  Wound consult requested by team to assist w/ management of      wound/ pressure injury.   Pt (unable to)  c/o pain, drainage, odor, color change,  or worsening swelling. Offloading and pericare initiated upon admission as pt Increasingly sedentary 2/2 to illness. Pt is Incontinent of urine & stool. (+)vance/ ostomy.   No h/o bites, scratches, falls, trauma.  Pt seen by Wound RN  CAVILON Advance/  Jus,TRIAD/ Aquacell/ medihoney/ Allevyn foam/ dakins/ Adaptic/ DSD recommended used at home/ while awaiting consult.  Appetite good/ decreased.  weight loss.  S&S / RD consult appreciated All questions asked and answered to pt's and family's expressed understanding and satisfaction.    Current Diet:     PAST MEDICAL & SURGICAL HISTORY:  Diabetes    Hypertension    Hypercholesteremia    Essential hypertension    Type 2 diabetes mellitus without complication, without long-term current use of insulin    Glaucoma    ESRD (end-stage renal disease) due to SLE    Lupus (systemic lupus erythematosus)    S/P  section    s/p gastric bypass    S/P appendectomy      REVIEW OF SYSTEMS: General/ Skin/GI/ MSK: see HPI  All other systems negative    MEDICATIONS  (STANDING):  	amLODIPine 5 mg oral tablet: 1 tab(s) orally once a day  	  	Dr. NILESH CUEVA NPI #[id number]  · 	predniSONE 5 mg oral tablet: 3 tab(s) orally 2 times a day  	  	Dr. NILESH CUEVA NPI #[id number]  · 	polyethylene glycol 3350 oral powder for reconstitution: 17 gram(s) orally once a day  · 	calcium acetate 667 mg oral tablet: 1 tab(s) orally 3 times a day (with meals)  · 	metoprolol succinate 25 mg oral tablet, extended release: 1 tab(s) orally once a day  · 	clopidogrel 75 mg oral tablet: 1 tab(s) orally once a day  · 	aspirin 81 mg oral delayed release tablet: 1 tab(s) orally once a day  · 	atorvastatin 40 mg oral tablet: 1 tab(s) orally once a day (at bedtime)  · 	calcium carbonate 1250 mg (500 mg elemental calcium) oral tablet: 1 tab(s) orally once a day  · 	Nephro-Bravo oral tablet: 1 tab(s) orally once a day  · 	bisacodyl 5 mg oral delayed release tablet: 1 tab(s) orally once a day  · 	gabapentin 100 mg oral capsule: 200 milligram(s) orally 2 times a day  · 	levothyroxine 25 mcg (0.025 mg) oral tablet: 1 tab(s) orally once a day  · 	Travatan Z 0.004% ophthalmic solution: 1 drop(s) to each affected eye once a day (at bedtime)  · 	Betimol 0.5% ophthalmic solution: 1 drop(s) to each affected eye 2 times a day      Allergies  penicillin (Rash)      SOCIAL HISTORY:  / /single/ ; (+)HHA/ lives in [facility]; Former smoker, No current/ Denies smoking, ETOH, drugs    FAMILY HISTORY:  DM (diabetes mellitus)  mother and father    History of hypertension  father and mother    DM (diabetes mellitus) (Sibling)  siblings    History of hypertension (Sibling)  sibling     no h/o PVD or wound healing or skin/ significant problems    PHYSICAL EXAM:  Vital Signs Last 24 Hrs  T(C): 36.7 (2023 14:10), Max: 36.8 (2023 07:35)  T(F): 98 (2023 14:10), Max: 98.3 (2023 12:32)  HR: 81 (2023 14:10) (62 - 83)  BP: 145/70 (2023 14:10) (130/72 - 190/82)  BP(mean): 122 (2023 23:00) (122 - 122)  RR: 19 (2023 14:10) (15 - 20)  SpO2: 99% (2023 14:10) (99% - 100%)    Parameters below as of 2023 14:10  Patient On (Oxygen Delivery Method): room air    NAD, Guarded but stable,  A&Ox3/ Alert/ Confused  cachectic/ thin, MO/ Obese, frail,  WD/ WN/ WG,  Disheveled  Total Care Sport/ Versa Care P500 / Envella Progressa bed     HEENT:  NC/AT, PERRL, EOMI, sclera clear, mucosa moist, throat clear, trachea midline, neck supple, trach  Respiratory: nonlabored w/ equal chest rise  Gastrointestinal: soft NT/ND (+)BS  (+)PEG (+)ostomy (+)NGT  : (+)vance/ purewick/ condom cath  Neurology:  weakened strength & sensation grossly intact, paraesthesia  nonverbal, no follow commands, paraplegic  Psych: calm/ appropriate/ flat affect/ easily agitated/ restless/ anxious/ difficult to assess  Musculoskeletal:  limited stiff / p/FROM, no deformities/ contractures  Vascular: BLE equally warm/ cool,  no cyanosis, clubbing, edema nor acute ischemia           >LE //BLE edema equal           BLE DP/PT pulses palpable          BLE hemosiderin staining/ varicose veins  Skin:  moist w/ good turgor  thin, dry, pale, frail,  ecchymosis w/o hematoma  blistering  or serosanguinous drainage  No odor, erythema, increased warmth, tenderness, induration, fluctuance, nor crepitus    LABS/ CULTURES/ RADIOLOGY:                        10.9   6.44  )-----------( 118      ( 2023 01:18 )             32.0       141  |  98  |  58  ----------------------------<  130      [23 @ 00:42]  5.2   |  22  |  7.45        Ca     9.4     [23 @ 00:42]    TPro  7.4  /  Alb  3.8  /  TBili  0.4  /  DBili  x   /  AST  26  /  ALT  13  /  AlkPhos  327  [23 @ 00:42]        < from: CT Abdomen and Pelvis No Cont (23 @ 04:07) >    PROCEDURE DATE:  2023          INTERPRETATION:  CLINICAL INFORMATION: Groin pain status post eating.   Evaluate for hernia.    COMPARISON: 3/1/2018 CT, 317 MRI    CONTRAST/COMPLICATIONS:  IV Contrast: NONE  Oral Contrast: NONE  Complications: None reported at time of study completion    PROCEDURE:  CT of the Abdomen and Pelvis was performed.  Sagittal and coronal reformats were performed.    FINDINGS:  Evaluation of solid organs and vascular structures is limited without   intravenous contrast.    LOWER CHEST: Coronary calcifications.    LIVER: Within normal limits.  BILE DUCTS: Normal caliber.  GALLBLADDER: Cholelithiasis. No pericholecystic inflammation.  SPLEEN: Top normal in size. Peripheral calcifications.  PANCREAS: Within normal limits.  ADRENALS: Mild left adrenal gland thickening.  KIDNEYS/URETERS: Bilateral renal atrophy. No hydronephrosis or   obstructing stone. Nonobstructing 4 mm left renal calculus. 2.4 cm left   renal angiomyolipoma. 8 mm right renal angiomyolipoma. Left renal cyst   and right renal hypodensity too small to characterize.    BLADDER: Minimally distended.  REPRODUCTIVE ORGANS: Uterus and adnexa within normal limits.    BOWEL: Nobowel obstruction. Appendix surgically removed. Sleeve   gastrectomy. Mild rectal wall thickening.  PERITONEUM: No ascites.  VESSELS: Atherosclerotic changes.  RETROPERITONEUM/LYMPH NODES: No lymphadenopathy.  ABDOMINAL WALL: Tiny fat-containing umbilical hernia. Small   fat-containing left inguinal hernia. Collection of complex fluid with   foci of air measuring 3.7 x 3.1 x 2.6 cm (2, 1) just distal coccygeal tip   in area of previously noted. Suspected tract extending from this   collection to the rectum (example 2, 101 and 601, 68).  BONES: Degenerative changes. Renal osteodystrophy. Erosion of the distal   coccyx as seen previously compatible with chronic osteomyelitis.    IMPRESSION:    1. Small fat-containing left inguinal hernia. No right groin hernia   identified.  2. Collection of complex fluid and foci of air measuring up to 3.7 x 3.1   x 2.6 cm distal to the coccygeal tip in the area of previously noted   sacral decubitus ulcer. Suspected tract extending from this region to the  rectum, suggestive of rectocutaneous fistula. Erosion of the distal   coccyx compatible with chronic osteomyelitis as seen previously. Further   evaluation with MRI is recommended.  3. Mild rectal wall thickening may reflect underdistention or proctitis.   Clinical correlation recommended.      < end of copied text >                           Wound SURGERY CONSULT NOTE    HPI:   62-year-old female with past medical history of ESRD (secondary to SLE, TTS, last session on Saturday, full session), lupus, diabetes, hypertension, presenting with acute onset of right groin pain that started a few days ago.  Patient states that she sneezed strongly prior to onset of symptoms patient reports pain as a spasm.  Pain is exacerbated with right leg movement.  Denies history of ovarian cysts.  Denies vaginal pain/discharge.  Patient denies fever, nausea vomiting, abdominal pain, dysuria.  Wound consult requested by team to assist w/ management of sacral wound.  Pt had sacral pressure injury several years ago after long illness that healed in a divet.  Pt w/ c/o occasional drainage, no open skin, but no pain, odor, color change,  or worsening swelling.  [facility]  CRS Consult and CT appreciated.  Offloading and pericare initiated upon admission. Pt is continent of urine & stool.  No h/o bites, scratches, falls, trauma.  Appetite good w/o  weight loss.  All questions asked and answered to pt's and family's expressed understanding and satisfaction.      PAST MEDICAL & SURGICAL HISTORY:  Diabetes    Hypertension    Hypercholesteremia    Essential hypertension    Type 2 diabetes mellitus without complication, without long-term current use of insulin    Glaucoma    ESRD (end-stage renal disease) due to SLE    Lupus (systemic lupus erythematosus)    S/P  section    s/p gastric bypass    S/P appendectomy      REVIEW OF SYSTEMS: General/ Skin/GI/ MSK: see HPI  All other systems negative    MEDICATIONS  (STANDING):  	amLODIPine 5 mg oral tablet: 1 tab(s) orally once a day  	  	Dr. NILESH CUEVA NPI #[id number]  · 	predniSONE 5 mg oral tablet: 3 tab(s) orally 2 times a day  	  	Dr. NILESH CUEVA NPI #[id number]  · 	polyethylene glycol 3350 oral powder for reconstitution: 17 gram(s) orally once a day  · 	calcium acetate 667 mg oral tablet: 1 tab(s) orally 3 times a day (with meals)  · 	metoprolol succinate 25 mg oral tablet, extended release: 1 tab(s) orally once a day  · 	clopidogrel 75 mg oral tablet: 1 tab(s) orally once a day  · 	aspirin 81 mg oral delayed release tablet: 1 tab(s) orally once a day  · 	atorvastatin 40 mg oral tablet: 1 tab(s) orally once a day (at bedtime)  · 	calcium carbonate 1250 mg (500 mg elemental calcium) oral tablet: 1 tab(s) orally once a day  · 	Nephro-Bravo oral tablet: 1 tab(s) orally once a day  · 	bisacodyl 5 mg oral delayed release tablet: 1 tab(s) orally once a day  · 	gabapentin 100 mg oral capsule: 200 milligram(s) orally 2 times a day  · 	levothyroxine 25 mcg (0.025 mg) oral tablet: 1 tab(s) orally once a day  · 	Travatan Z 0.004% ophthalmic solution: 1 drop(s) to each affected eye once a day (at bedtime)  · 	Betimol 0.5% ophthalmic solution: 1 drop(s) to each affected eye 2 times a day      Allergies  penicillin (Rash)    SOCIAL HISTORY:   lives w/ family; Denies smoking, ETOH, drugs    FAMILY HISTORY: DM (mother and father, sibling),  hypertension (father and mother, sibling)       no h/o PVD or wound healing or skin problems    PHYSICAL EXAM:  Vital Signs Last 24 Hrs  T(C): 36.7 (2023 14:10), Max: 36.8 (2023 07:35)  T(F): 98 (2023 14:10), Max: 98.3 (2023 12:32)  HR: 81 (2023 14:10) (62 - 83)  BP: 145/70 (2023 14:10) (130/72 - 190/82)  BP(mean): 122 (2023 23:00) (122 - 122)  RR: 19 (2023 14:10) (15 - 20)  SpO2: 99% (2023 14:10) (99% - 100%)    Parameters below as of 2023 14:10  Patient On (Oxygen Delivery Method): room air    NAD,  A&Ox3, WD/ WN/ WG  HEENT:  NC/AT, EOMI, sclera clear, mucosa moist, throat clear, trachea midline, neck supple  Respiratory: nonlabored w/ equal chest rise  Gastrointestinal: soft NT/ND   Neurology:  strength & sensation grossly intact  Psych: calm/ appropriate  Musculoskeletal: FROM, no deformities/ contractures  Vascular: BLE equally warm,  no cyanosis, clubbing, edema, nor acute ischemia       (+)AVF   Skin:  moist w/ good turgor  midsacrum w/ healed scared divet / cleft      healed intact skin  moist macerated skin   no thickened skin of fluid expressed palpating periwound skin  no blistering  or active drainage  No odor, erythema, increased warmth, tenderness, induration, fluctuance, nor crepitus    LABS/ CULTURES/ RADIOLOGY:                        10.9   6.44  )-----------( 118      ( 2023 01:18 )             32.0       141  |  98  |  58  ----------------------------<  130      [23 @ 00:42]  5.2   |  22  |  7.45        Ca     9.4     [23 @ 00:42]    TPro  7.4  /  Alb  3.8  /  TBili  0.4  /  DBili  x   /  AST  26  /  ALT  13  /  AlkPhos  327  [23 @ 00:42]        < from: CT Abdomen and Pelvis No Cont (23 @ 04:07) >    PROCEDURE DATE:  2023          INTERPRETATION:  CLINICAL INFORMATION: Groin pain status post eating.   Evaluate for hernia.    COMPARISON: 3/1/2018 CT, 317 MRI    CONTRAST/COMPLICATIONS:  IV Contrast: NONE  Oral Contrast: NONE  Complications: None reported at time of study completion    PROCEDURE:  CT of the Abdomen and Pelvis was performed.  Sagittal and coronal reformats were performed.    FINDINGS:  Evaluation of solid organs and vascular structures is limited without   intravenous contrast.    LOWER CHEST: Coronary calcifications.    LIVER: Within normal limits.  BILE DUCTS: Normal caliber.  GALLBLADDER: Cholelithiasis. No pericholecystic inflammation.  SPLEEN: Top normal in size. Peripheral calcifications.  PANCREAS: Within normal limits.  ADRENALS: Mild left adrenal gland thickening.  KIDNEYS/URETERS: Bilateral renal atrophy. No hydronephrosis or   obstructing stone. Nonobstructing 4 mm left renal calculus. 2.4 cm left   renal angiomyolipoma. 8 mm right renal angiomyolipoma. Left renal cyst   and right renal hypodensity too small to characterize.    BLADDER: Minimally distended.  REPRODUCTIVE ORGANS: Uterus and adnexa within normal limits.    BOWEL: Nobowel obstruction. Appendix surgically removed. Sleeve   gastrectomy. Mild rectal wall thickening.  PERITONEUM: No ascites.  VESSELS: Atherosclerotic changes.  RETROPERITONEUM/LYMPH NODES: No lymphadenopathy.  ABDOMINAL WALL: Tiny fat-containing umbilical hernia. Small   fat-containing left inguinal hernia. Collection of complex fluid with   foci of air measuring 3.7 x 3.1 x 2.6 cm (2, 1) just distal coccygeal tip   in area of previously noted. Suspected tract extending from this   collection to the rectum (example 2, 101 and 601, 68).  BONES: Degenerative changes. Renal osteodystrophy. Erosion of the distal   coccyx as seen previously compatible with chronic osteomyelitis.    IMPRESSION:    1. Small fat-containing left inguinal hernia. No right groin hernia   identified.  2. Collection of complex fluid and foci of air measuring up to 3.7 x 3.1   x 2.6 cm distal to the coccygeal tip in the area of previously noted   sacral decubitus ulcer. Suspected tract extending from this region to the  rectum, suggestive of rectocutaneous fistula. Erosion of the distal   coccyx compatible with chronic osteomyelitis as seen previously. Further   evaluation with MRI is recommended.  3. Mild rectal wall thickening may reflect underdistention or proctitis.   Clinical correlation recommended.      < end of copied text >

## 2023-07-17 NOTE — ED PROVIDER NOTE - OBJECTIVE STATEMENT
62-year-old female with past medical history of ESRD (secondary to SLE, TTS, last session on Saturday, full session), lupus, diabetes, hypertension, presenting with acute onset of right groin pain that started a few days ago.  Patient states that she sneezed strongly prior to onset of symptoms patient reports pain as a spasm.  Pain is exacerbated with right leg movement.  Denies history of ovarian cysts.  Denies vaginal pain/discharge.  Patient denies fever, nausea vomiting, abdominal pain, dysuria.

## 2023-07-17 NOTE — ED ADULT NURSE REASSESSMENT NOTE - NS ED NURSE REASSESS COMMENT FT1
Reminded pt on pending urine samples, pt states she doesn't urinate that often. MD BLOOM was made aware.

## 2023-07-17 NOTE — ED PROVIDER NOTE - NSFOLLOWUPCLINICS_GEN_ALL_ED_FT
[facility]  General Surgery  [address] [place] [address] - [address] [place] [address]  Phone: [phone]  Fax:

## 2023-07-17 NOTE — ED ADULT NURSE REASSESSMENT NOTE - NS ED NURSE REASSESS COMMENT FT1
Received repot from Jason LOCKE. Pt. A&Ox3, sitting up in stretcher, pt. does not appear to be in any acute distress. Pt. awaiting surgery recs. Safety and comfort provided. Family at bedside.

## 2023-07-17 NOTE — ED PROVIDER NOTE - CLINICAL SUMMARY MEDICAL DECISION MAKING FREE TEXT BOX
62-year-old female with past medical history of ESRD (secondary to SLE, TTS, last session on Saturday, full session), lupus, diabetes, hypertension, presenting with acute onset of right groin pain that started a few days ago. Vitals nonactionable.  Physical exam with discomfort over right inguinal region, no mass noted.   No adnexal tenderness.  The differential diagnosis includes but is not limited to inguinal hernia versus femoral hernia, urinary tract infection, MSK pain.  Will get labs, CT Noncon of the abdomen pelvis, UA/UC

## 2023-07-17 NOTE — ED PROVIDER NOTE - PATIENT PORTAL LINK FT
You can access the FollowMyHealth Patient Portal offered by [facility] by registering at the following website: [email or web address]. By joining [facility]’s FollowMyHealth portal, you will also be able to view your health information using other applications (apps) compatible with our system.

## 2023-07-17 NOTE — CONSULT NOTE ADULT - ASSESSMENT
PLAN  - Low concern for EC fistula based on exam and imaging  - No surgical intervention  - Please consult/have pt follow up outpatient with wound care    Discussed with Dr. Domitila Guevara Surgery  [id number]

## 2023-07-17 NOTE — ED PROVIDER NOTE - PHYSICAL EXAMINATION
Const: not in acute distress  Eyes: no conjunctival injection  HEENT: Head NCAT, Moist MM.  Neck: Trachea midline.   CVS: +S1/S2, Peripheral pulses 2+ and equal in all extremities.  RESP: Unlabored respiratory effort. Clear to auscultation bilaterally.  GI: Discomfort over right inguinal region./Nondistended, No CVA tenderness b/l.   : Chaperone: CORNELIA Kuhn, no adnexal tenderness.  No CMT tenderness  MSK: Normocephalic/Atraumatic, No Lower Extremities edema b/l.   Skin: Intact.   Neuro: Motor & Sensation grossly intact.  Psych: Awake, Alert, & Cooperative

## 2023-07-18 NOTE — ED POST DISCHARGE NOTE - DETAILS
Cory ID [id number] used, no answer, lvm using  for pt to callback admin line. - ANANYA MartínezC 7/19: lvm using [employer] translation services [id number]. - Reina Kirkland PA-C 7/20 Interperter [id number] - Spoke with pt, feeling much better.  Spoke about wound findings as well as elevated ESR with concern for infection, pt has appointment on Monday with wound care, will bring results from ED with her and go over with specialist.  [place]

## 2023-08-02 ENCOUNTER — APPOINTMENT (OUTPATIENT)
Dept: OBGYN | Facility: CLINIC | Age: 63
End: 2023-08-02
Payer: MEDICAID

## 2023-08-02 VITALS
BODY MASS INDEX: 26.83 KG/M2 | SYSTOLIC BLOOD PRESSURE: 153 MMHG | WEIGHT: 142 LBS | HEART RATE: 101 BPM | OXYGEN SATURATION: 98 % | DIASTOLIC BLOOD PRESSURE: 72 MMHG

## 2023-08-02 DIAGNOSIS — Z01.419 ENCOUNTER FOR GYNECOLOGICAL EXAMINATION (GENERAL) (ROUTINE) W/OUT ABNORMAL FINDINGS: ICD-10-CM

## 2023-08-02 PROCEDURE — 99396 PREV VISIT EST AGE 40-64: CPT

## 2023-08-02 NOTE — HISTORY OF PRESENT ILLNESS
[FreeTextEntry1] : Patient presents for her annual exam.  Reports no postmenopausal bleeding, no abdominal or pelvic pain, change in discharge, change in bowel or bladder habits or any other concerns.  Due for pap and up to date on mammo. Not sexually active.

## 2023-08-08 LAB
C TRACH RRNA SPEC QL NAA+PROBE: NOT DETECTED
CYTOLOGY CVX/VAG DOC THIN PREP: NORMAL
N GONORRHOEA RRNA SPEC QL NAA+PROBE: NOT DETECTED
SOURCE TP AMPLIFICATION: NORMAL

## 2023-08-18 ENCOUNTER — APPOINTMENT (OUTPATIENT)
Dept: VASCULAR SURGERY | Facility: CLINIC | Age: 63
End: 2023-08-18

## 2023-08-18 ENCOUNTER — APPOINTMENT (OUTPATIENT)
Age: 63
End: 2023-08-18
Payer: MEDICAID

## 2023-08-18 PROCEDURE — 93990 DOPPLER FLOW TESTING: CPT

## 2023-10-12 NOTE — DISCHARGE NOTE ADULT - MEDICATION SUMMARY - MEDICATIONS TO TAKE
New patient is here today presenting with bone spur.   Patient denies any other issues at this time.  Referred by ANN MARIE Correia    Medications reviewed and updated.  Allergies verified.   Tobacco verified.   Pt is diabetic: No  Pt is on blood thinners: No         I will START or STAY ON the medications listed below when I get home from the hospital:    sodium bicarbonate 650 mg oral tablet  -- 2 tab(s) by mouth 3 times a day  -- Indication: For Acidosis    calcium carbonate 1250 mg (500 mg elemental calcium) oral tablet  -- 1 tab(s) by mouth once a day  -- Indication: For Vitamin D deficiency    cloNIDine 0.2 mg/24 hr transdermal film, extended release  -- 1 patch by transdermal patch once a week  -- Indication: For Essential hypertension    Tradjenta  --  by mouth   -- Indication: For Diabetes    atorvastatin 20 mg oral tablet  -- 1 tab(s) by mouth once a day (at bedtime)  -- Indication: For Hypercholesteremia    NIFEdipine 30 mg oral tablet, extended release  -- 1 tab(s) by mouth once a day  -- Indication: For Essential hypertension    Betimol 0.5% ophthalmic solution  -- 1 drop(s) to each affected eye 2 times a day  -- Verified by [facility] [phone]  -- Indication: For Glaucoma    Travatan Z 0.004% ophthalmic solution  -- 1 drop(s) to each affected eye once a day (at bedtime)  -- Verified by [facility] [phone]  -- Indication: For Glaucoma    ergocalciferol 50,000 intl units oral capsule  -- 1 cap(s) by mouth once a week  -- Indication: For Vitamin D deficiency I will START or STAY ON the medications listed below when I get home from the hospital:    sodium bicarbonate 650 mg oral tablet  -- 2 tab(s) by mouth 3 times a day  -- Indication: For Acidosis    calcium carbonate 1250 mg (500 mg elemental calcium) oral tablet  -- 1 tab(s) by mouth once a day  -- Indication: For Vitamin D deficiency    cloNIDine 0.2 mg/24 hr transdermal film, extended release  -- 1 patch by transdermal patch once a week  -- Indication: For Essential hypertension    Tradjenta  --  by mouth   -- Indication: For Diabetes    atorvastatin 20 mg oral tablet  -- 1 tab(s) by mouth once a day (at bedtime)  -- Indication: For Hypercholesteremia    NIFEdipine 30 mg oral tablet, extended release  -- 1 tab(s) by mouth once a day  -- Indication: For Essential hypertension    Betimol 0.5% ophthalmic solution  -- 1 drop(s) to each affected eye 2 times a day  -- Verified by [facility] [phone]  -- Indication: For Glaucoma    Travatan Z 0.004% ophthalmic solution  -- 1 drop(s) to each affected eye once a day (at bedtime)  -- Verified by [facility] [phone]  -- Indication: For Glaucoma    ergocalciferol 50,000 intl units oral capsule  -- 1 cap(s) by mouth once a week  -- Indication: For Vitamin D deficiency

## 2023-10-27 ENCOUNTER — APPOINTMENT (OUTPATIENT)
Age: 63
End: 2023-10-27
Payer: MEDICAID

## 2023-10-27 VITALS
OXYGEN SATURATION: 100 % | SYSTOLIC BLOOD PRESSURE: 188 MMHG | WEIGHT: 140 LBS | DIASTOLIC BLOOD PRESSURE: 82 MMHG | BODY MASS INDEX: 26.43 KG/M2 | HEIGHT: 61 IN | HEART RATE: 81 BPM

## 2023-10-27 PROCEDURE — 93990 DOPPLER FLOW TESTING: CPT

## 2023-10-27 PROCEDURE — 99214 OFFICE O/P EST MOD 30 MIN: CPT

## 2023-10-30 ENCOUNTER — RESULT REVIEW (OUTPATIENT)
Age: 63
End: 2023-10-30

## 2023-10-30 ENCOUNTER — APPOINTMENT (OUTPATIENT)
Dept: VASCULAR SURGERY | Facility: CLINIC | Age: 63
End: 2023-10-30
Payer: MEDICAID

## 2023-10-30 ENCOUNTER — APPOINTMENT (OUTPATIENT)
Dept: ENDOVASCULAR SURGERY | Facility: CLINIC | Age: 63
End: 2023-10-30
Payer: MEDICAID

## 2023-10-30 VITALS
RESPIRATION RATE: 18 BRPM | WEIGHT: 140 LBS | HEART RATE: 74 BPM | HEIGHT: 61 IN | OXYGEN SATURATION: 98 % | SYSTOLIC BLOOD PRESSURE: 164 MMHG | TEMPERATURE: 98.3 F | BODY MASS INDEX: 26.43 KG/M2 | DIASTOLIC BLOOD PRESSURE: 74 MMHG

## 2023-10-30 PROCEDURE — 36907Z: CUSTOM | Mod: 59

## 2023-10-30 PROCEDURE — 36902Z: CUSTOM

## 2023-10-30 RX ORDER — ALOGLIPTIN 6.25 MG/1
6.25 TABLET, FILM COATED ORAL
Refills: 0 | Status: ACTIVE | COMMUNITY

## 2023-10-30 RX ORDER — LABETALOL HYDROCHLORIDE 100 MG/1
100 TABLET, FILM COATED ORAL
Refills: 0 | Status: ACTIVE | COMMUNITY

## 2023-10-30 RX ORDER — VALSARTAN AND HYDROCHLOROTHIAZIDE 320; 12.5 MG/1; MG/1
320-12.5 TABLET, FILM COATED ORAL
Refills: 0 | Status: ACTIVE | COMMUNITY

## 2023-10-30 RX ORDER — ASPIRIN 81 MG
81 TABLET, DELAYED RELEASE (ENTERIC COATED) ORAL
Refills: 0 | Status: DISCONTINUED | COMMUNITY
End: 2023-10-30

## 2024-02-05 NOTE — PROGRESS NOTE ADULT - PROBLEM SELECTOR PLAN 1
Pt with ESRD on HD. Pt s/p tunneled HD catheter 1/3/2018. Pt dialyzed on 1/3/2018.  Plan for HD today warm

## 2024-03-15 ENCOUNTER — APPOINTMENT (OUTPATIENT)
Dept: VASCULAR SURGERY | Facility: CLINIC | Age: 64
End: 2024-03-15
Payer: MEDICAID

## 2024-03-15 PROCEDURE — 99214 OFFICE O/P EST MOD 30 MIN: CPT

## 2024-03-15 PROCEDURE — 93990 DOPPLER FLOW TESTING: CPT

## 2024-03-15 NOTE — ASSESSMENT
[FreeTextEntry1] : Patient with renal failure on dialysis.  Left brachiocephalic fistula is patent with mild stenosis.  Fistula is not pulsatile.  Continue conservative management with follow-up in 3 months.  Patient with bilateral lower extremity cramps.  No ulceration.  No signs of limb threatening ischemia.  Will schedule the patient for PVRs with toe pressure in 3 months.

## 2024-03-15 NOTE — HISTORY OF PRESENT ILLNESS
[FreeTextEntry1] : Complaining of bilateral lower extremity cramps with intermittent ecchymosis and infiltration around the fistula [] : left brachiocephalic fistula

## 2024-03-16 ENCOUNTER — EMERGENCY (EMERGENCY)
Facility: HOSPITAL | Age: 64
LOS: 1 days | Discharge: HOME | End: 2024-03-16
Attending: EMERGENCY MEDICINE
Payer: MEDICAID

## 2024-03-16 VITALS
TEMPERATURE: 98 F | SYSTOLIC BLOOD PRESSURE: 151 MMHG | HEIGHT: 61 IN | OXYGEN SATURATION: 100 % | HEART RATE: 88 BPM | RESPIRATION RATE: 18 BRPM | WEIGHT: 139.99 LBS | DIASTOLIC BLOOD PRESSURE: 84 MMHG

## 2024-03-16 VITALS
OXYGEN SATURATION: 100 % | RESPIRATION RATE: 16 BRPM | DIASTOLIC BLOOD PRESSURE: 62 MMHG | SYSTOLIC BLOOD PRESSURE: 126 MMHG | TEMPERATURE: 98 F | HEART RATE: 86 BPM

## 2024-03-16 DIAGNOSIS — Z90.49 ACQUIRED ABSENCE OF OTHER SPECIFIED PARTS OF DIGESTIVE TRACT: Chronic | ICD-10-CM

## 2024-03-16 DIAGNOSIS — Z98.891 HISTORY OF UTERINE SCAR FROM PREVIOUS SURGERY: Chronic | ICD-10-CM

## 2024-03-16 DIAGNOSIS — Z98.890 OTHER SPECIFIED POSTPROCEDURAL STATES: Chronic | ICD-10-CM

## 2024-03-16 PROCEDURE — 99283 EMERGENCY DEPT VISIT LOW MDM: CPT

## 2024-03-16 PROCEDURE — 99282 EMERGENCY DEPT VISIT SF MDM: CPT

## 2024-03-16 NOTE — ED PROVIDER NOTE - PATIENT PORTAL LINK FT
You can access the FollowMyHealth Patient Portal offered by [facility] by registering at the following website: [email or web address]. By joining [facility]’s FollowMyHealth portal, you will also be able to view your health information using other applications (apps) compatible with our system.

## 2024-03-16 NOTE — ED PROVIDER NOTE - CLINICAL SUMMARY MEDICAL DECISION MAKING FREE TEXT BOX
63F w/ hx ESRD (secondary to SLE, TTS, last session on Saturday, full session), lupus, diabetes, hypertension presenting with congestion. Patient had URI 1 month ago, has had congestion and hearing loss since.  URI symptoms improved and right hearing loss has improved, however left hearing loss still persistent.  Went to PCP and urgent care where she was given fluticasone spray and azithromycin.  No infection noted when at PCP or urgent care.  Patient has no fever, ear pain, chest pain, shortness of breath, abdominal pain, nausea, vomiting. Exam shows L TM clear effusion. no signs of infection. likely persistent congestion from uri. will dc with ent f/u. 63F w/ hx ESRD (secondary to SLE, TTS, last session on Saturday, full session), lupus, diabetes, hypertension presenting with congestion. Patient had URI 1 month ago, has had congestion and hearing loss since.  URI symptoms improved and right hearing loss has improved, however left hearing loss still persistent.  Went to PCP and urgent care where she was given fluticasone spray and azithromycin.  No infection noted when at PCP or urgent care.  Patient has no fever, ear pain, chest pain, shortness of breath, abdominal pain, nausea, vomiting. Exam shows L TM clear effusion. no signs of infection. likely persistent congestion from uri. will dc with ent f/u.  Attending Ashleigh Hernandez: 64 yo female with multiple medical issues presenting with decreased heartig to left ear sing having a viral symptoms for last few weeks. also reports some congestion. denies any ingestions or any medication makingn polypharmacy as cause less likely. consider possible vasculitis as pt with h/o lupus. less likely acoustic neuroma. nonfocal neurologic exam and pt moving all her extremities. pt does have some fluid behind the left TM which could also be resposible. no rash on exam to sugges mcdonald garrett. consider possible dialysis as cause. no dizziness or unsteadiness. pt will need ENT evaluation as well as rheumatology evaluation. dw pt need to return for any worsening symptoms, any nymbness,rash, fevers or any woresning symptoms. will expedite follow up

## 2024-03-16 NOTE — ED PROVIDER NOTE - PHYSICAL EXAMINATION
General appearance: NAD, conversant, afebrile    Eyes: anicteric sclerae, JOSE ANTONIO, EOMI   HENT: clear R TM. L TM clear effusion. no erythema, vesicles, rash or other findings in canal or TM. Atraumatic; oropharynx clear, MMM and no ulcerations, no pharyngeal erythema or exudate   Neck: Trachea midline; Full range of motion, supple   Pulm: CTA bl, normal respiratory effort and no intercostal retractions, normal work of breathing   CV: RRR, No murmurs, rubs, or gallops.    Abdomen: Soft, non-tender, non-distended; no guarding or rebound   Extremities: No peripheral edema or extremity lymphadenopathy. 5/5 strength in all four extremities.   Psych: Appropriate affect, cooperative General appearance: NAD, conversant, afebrile    Eyes: anicteric sclerae, JOSE ANTONIO, EOMI   HENT: clear R TM. L TM clear effusion. no erythema, vesicles, rash or other findings in canal or TM. Atraumatic; oropharynx clear, MMM and no ulcerations, no pharyngeal erythema or exudate   Neck: Trachea midline; Full range of motion, supple   Pulm: CTA bl, normal respiratory effort and no intercostal retractions, normal work of breathing   CV: RRR, No murmurs, rubs, or gallops.    Abdomen: Soft, non-tender, non-distended; no guarding or rebound   Extremities: No peripheral edema or extremity lymphadenopathy. 5/5 strength in all four extremities.   Psych: Appropriate affect, cooperative  Attending Ashleigh Hernandez: gen nad, heent; atraumatic, mmm, op pink, no sinus ttp, TM clear bl, no mastoid ttp, pt with hearing bl report decreae sound to left ear, neck: nttp, supple, cv: rrr. lungs ;ctab, abd: soft, notnender,Neuro: awake and alert. stable gait, follwing commands, moving all extremities

## 2024-03-16 NOTE — ED PROVIDER NOTE - ATTENDING CONTRIBUTION TO CARE
Attending MD Ashleigh Hernandez:  I personally have seen and examined this patient.  Resident note reviewed and agree on plan of care and except where noted.  See HPI, PE, and MDM for details.

## 2024-03-16 NOTE — ED PROVIDER NOTE - NSFOLLOWUPINSTRUCTIONS_ED_ALL_ED_FT
You were seen in the ER for congestion. Your exam shows an ear effusion. Please follow up with ENT.    Please follow up with your primary care doctor.    Please return to the ER if you have worsening symptoms including fever, chest pain, shortness of breath, abdominal pain, nausea, vomiting, diarrhea, weakness or lightheadedness/fainting.

## 2024-03-16 NOTE — ED ADULT NURSE NOTE - OBJECTIVE STATEMENT
63y Female complaining of congestion pressure in ears pmhx HD Tues-thurs- sat Done today Complete  C/o worsening hearing loss x 1 month

## 2024-03-16 NOTE — ED PROVIDER NOTE - CARE PROVIDER_API CALL
Uday Hackett.  Otolaryngology  [address] [place] [address], [address]  [place] [address]  Phone: [phone]  Fax: [phone]  Follow Up Time: Routine

## 2024-03-16 NOTE — ED PROVIDER NOTE - OBJECTIVE STATEMENT
63F w/ hx ESRD (secondary to SLE, TTS, last session on Saturday, full session), lupus, diabetes, hypertension presenting with congestion. Patient had URI 1 month ago, has had congestion and hearing loss since.  URI symptoms improved and right hearing loss has improved, however left hearing loss still persistent.  Went to PCP and urgent care where she was given fluticasone spray and azithromycin.  No infection noted when at PCP or urgent care.  Patient has no fever, ear pain, chest pain, shortness of breath, abdominal pain, nausea, vomiting.

## 2024-03-16 NOTE — ED ADULT TRIAGE NOTE - CHIEF COMPLAINT QUOTE
congestion pressure in ears Dialysis Tues-thurs- sat Done today Complete  C/o worsening hearing loss x 1 month

## 2024-03-22 NOTE — ED ADULT NURSE REASSESSMENT NOTE - TEMPLATE LIST FOR HEAD TO TOE ASSESSMENT
Addendum  created 03/22/24 1419 by Jesenia Stark MD    Intraprocedure Meds edited, Orders acknowledged in Narrator       General

## 2024-04-10 ENCOUNTER — APPOINTMENT (OUTPATIENT)
Dept: OTOLARYNGOLOGY | Facility: CLINIC | Age: 64
End: 2024-04-10

## 2024-04-16 ENCOUNTER — APPOINTMENT (OUTPATIENT)
Dept: OTOLARYNGOLOGY | Facility: CLINIC | Age: 64
End: 2024-04-16
Payer: MEDICAID

## 2024-04-16 VITALS
OXYGEN SATURATION: 99 % | DIASTOLIC BLOOD PRESSURE: 78 MMHG | HEART RATE: 79 BPM | WEIGHT: 140 LBS | SYSTOLIC BLOOD PRESSURE: 180 MMHG | HEIGHT: 61 IN | BODY MASS INDEX: 26.43 KG/M2

## 2024-04-16 DIAGNOSIS — N28.9 DISORDER OF KIDNEY AND URETER, UNSPECIFIED: ICD-10-CM

## 2024-04-16 PROCEDURE — 99204 OFFICE O/P NEW MOD 45 MIN: CPT

## 2024-04-16 RX ORDER — MYCOPHENOLATE MOFETIL 500 MG/1
500 TABLET ORAL
Refills: 0 | Status: ACTIVE | COMMUNITY

## 2024-04-16 RX ORDER — DILTIAZEM HYDROCHLORIDE 120 MG/1
120 CAPSULE, COATED, EXTENDED RELEASE ORAL
Refills: 0 | Status: ACTIVE | COMMUNITY

## 2024-04-16 RX ORDER — ATORVASTATIN CALCIUM 20 MG/1
20 TABLET, FILM COATED ORAL
Refills: 0 | Status: ACTIVE | COMMUNITY

## 2024-04-16 RX ORDER — CALCIUM ACETATE 667 MG/5ML
667 SOLUTION ORAL
Refills: 0 | Status: ACTIVE | COMMUNITY

## 2024-04-16 RX ORDER — CINACALCET 30 MG/1
30 TABLET ORAL
Refills: 0 | Status: COMPLETED | COMMUNITY
End: 2024-04-16

## 2024-04-16 RX ORDER — ALBIGLUTIDE 30 MG/.5ML
30 INJECTION, POWDER, LYOPHILIZED, FOR SOLUTION SUBCUTANEOUS
Refills: 0 | Status: ACTIVE | COMMUNITY

## 2024-04-16 RX ORDER — GABAPENTIN 100 MG
100 TABLET ORAL
Refills: 0 | Status: ACTIVE | COMMUNITY

## 2024-04-16 RX ORDER — SITAGLIPTIN 100 MG/1
TABLET, FILM COATED ORAL
Refills: 0 | Status: ACTIVE | COMMUNITY

## 2024-04-16 RX ORDER — HYDROXYZINE HYDROCHLORIDE 10 MG/1
10 TABLET ORAL
Refills: 0 | Status: ACTIVE | COMMUNITY

## 2024-04-16 RX ORDER — CINACALCET 90 MG/1
TABLET ORAL
Refills: 0 | Status: ACTIVE | COMMUNITY

## 2024-04-16 RX ORDER — METFORMIN HYDROCHLORIDE 1000 MG/1
1000 TABLET, COATED ORAL
Refills: 0 | Status: ACTIVE | COMMUNITY

## 2024-04-16 RX ORDER — METOLAZONE 2.5 MG/1
2.5 TABLET ORAL
Refills: 0 | Status: ACTIVE | COMMUNITY

## 2024-04-16 RX ORDER — SODIUM BICARBONATE 650 MG/1
650 TABLET ORAL
Refills: 0 | Status: ACTIVE | COMMUNITY

## 2024-04-16 RX ORDER — ESCITALOPRAM OXALATE 5 MG/1
5 TABLET, FILM COATED ORAL
Refills: 0 | Status: ACTIVE | COMMUNITY

## 2024-04-16 RX ORDER — SEVELAMER CARBONATE 800 MG/1
800 TABLET, FILM COATED ORAL
Refills: 0 | Status: ACTIVE | COMMUNITY

## 2024-04-16 RX ORDER — SUCROFERRIC OXYHYDROXIDE 500 MG/1
TABLET, CHEWABLE ORAL
Refills: 0 | Status: ACTIVE | COMMUNITY

## 2024-04-16 RX ORDER — MYCOPHENOLIC ACID 360 MG/1
360 TABLET, DELAYED RELEASE ORAL
Refills: 0 | Status: ACTIVE | COMMUNITY

## 2024-04-16 RX ORDER — MECLIZINE HCL 25 MG/1
25 TABLET ORAL
Refills: 0 | Status: ACTIVE | COMMUNITY

## 2024-04-16 RX ORDER — LOSARTAN POTASSIUM AND HYDROCHLOROTHIAZIDE 25; 100 MG/1; MG/1
100-25 TABLET ORAL
Refills: 0 | Status: ACTIVE | COMMUNITY

## 2024-04-16 RX ORDER — CHLORTHALIDONE 25 MG/1
25 TABLET ORAL
Refills: 0 | Status: ACTIVE | COMMUNITY

## 2024-04-16 RX ORDER — TACROLIMUS 1 MG/1
1 CAPSULE, GELATIN COATED ORAL
Refills: 0 | Status: ACTIVE | COMMUNITY

## 2024-04-16 RX ORDER — ASPIRIN 81 MG
81 TABLET, DELAYED RELEASE (ENTERIC COATED) ORAL
Refills: 0 | Status: ACTIVE | COMMUNITY

## 2024-04-16 RX ORDER — SITAGLIPTIN 50 MG/1
50 TABLET, FILM COATED ORAL
Refills: 0 | Status: COMPLETED | COMMUNITY
End: 2024-04-16

## 2024-04-16 RX ORDER — METOPROLOL TARTRATE 25 MG/1
25 TABLET, FILM COATED ORAL
Refills: 0 | Status: ACTIVE | COMMUNITY

## 2024-04-16 RX ORDER — LOSARTAN POTASSIUM 100 MG/1
100 TABLET, FILM COATED ORAL
Refills: 0 | Status: ACTIVE | COMMUNITY

## 2024-04-16 RX ORDER — ERGOCALCIFEROL 1.25 MG/1
1.25 MG CAPSULE, LIQUID FILLED ORAL
Refills: 0 | Status: ACTIVE | COMMUNITY

## 2024-04-16 RX ORDER — PAROXETINE HYDROCHLORIDE 20 MG/1
20 TABLET, FILM COATED ORAL
Refills: 0 | Status: ACTIVE | COMMUNITY

## 2024-04-16 RX ORDER — CANAGLIFLOZIN AND METFORMIN HYDROCHLORIDE 50; 1000 MG/1; MG/1
50-1000 TABLET, FILM COATED ORAL
Refills: 0 | Status: ACTIVE | COMMUNITY

## 2024-04-16 RX ORDER — NIFEDIPINE 30 MG/1
30 TABLET, EXTENDED RELEASE ORAL
Refills: 0 | Status: ACTIVE | COMMUNITY

## 2024-04-16 RX ORDER — GLIPIZIDE 5 MG/1
5 TABLET, FILM COATED, EXTENDED RELEASE ORAL
Refills: 0 | Status: ACTIVE | COMMUNITY

## 2024-04-16 RX ORDER — FUROSEMIDE 20 MG/1
20 TABLET ORAL
Refills: 0 | Status: ACTIVE | COMMUNITY

## 2024-04-16 NOTE — REASON FOR VISIT
[Initial Evaluation] : an initial evaluation for [Other: _____] : [unfilled] [Patient Declined  Services] : - None: Patient declined  services [FreeTextEntry2] : parathyroid  [Interpreters_FullName] : Cole  [Interpreters_Relationshiptopatient] : son [FreeTextEntry3] : pt preferred to use family member for interpretation  [TWNoteComboBox1] : Beninese

## 2024-04-16 NOTE — HISTORY OF PRESENT ILLNESS
[de-identified] : 63 yro pt with ESRD on hemodialysis 3x/week referred by nephrologist Dr. Huong Jacobs for eval of secondary hyperparathyroidism. April 2024: calcium 8.6 (corrected 9.3) and iPTH 2380 3/12/24 : calcium 8.3 (corrected 8.9) and iPTH 3053 3/26/24: calcium 9.1 (corrected 9.7)  2/13/24 calcium 8.5 (corrected 9.1) and iPTH 2000 Reports occasional R elbow bone pain for the past 3 months.  Denies h/o kidney stones, abdominal pain. No dysphagia, dyspnea or dysphonia. No recent fever, chills, unintentional weight loss.  No neck imaging or biopsies done.  Denies smoking or alcohol use.

## 2024-04-16 NOTE — PLAN
[TextEntry] : - Secondary hyperparathyroidism. - PTH of 2000 after medical treatment. US in the office today showed enlarged R superior parathyroid. Was not able to see the other glands. - Discussed findings and recommendations considering surgery.  Will refer to endocrinology since she will need close follow up in the hospital. Will order a neck US.  - Return after seeing endocrinologist (in the afternoon).

## 2024-04-16 NOTE — CONSULT LETTER
[Dear  ___] : Dear  [unfilled], [Consult Letter:] : I had the pleasure of evaluating your patient, [unfilled]. [Please see my note below.] : Please see my note below. [Consult Closing:] : Thank you very much for allowing me to participate in the care of this patient.  If you have any questions, please do not hesitate to contact me. [Sincerely,] : Sincerely, [FreeTextEntry2] : Dr Huong Jacobs [FreeTextEntry3] :  Vijay Ferderick MD, FACS  Otolaryngology-Head and Neck Surgery [place] [facility] [employer] [facility]

## 2024-04-23 ENCOUNTER — APPOINTMENT (OUTPATIENT)
Dept: ENDOCRINOLOGY | Facility: CLINIC | Age: 64
End: 2024-04-23
Payer: MEDICAID

## 2024-04-23 VITALS
SYSTOLIC BLOOD PRESSURE: 130 MMHG | HEART RATE: 78 BPM | BODY MASS INDEX: 26.43 KG/M2 | DIASTOLIC BLOOD PRESSURE: 64 MMHG | HEIGHT: 61 IN | OXYGEN SATURATION: 99 % | WEIGHT: 140 LBS

## 2024-04-23 DIAGNOSIS — E07.9 DISORDER OF THYROID, UNSPECIFIED: ICD-10-CM

## 2024-04-23 DIAGNOSIS — E78.5 HYPERLIPIDEMIA, UNSPECIFIED: ICD-10-CM

## 2024-04-23 DIAGNOSIS — I10 ESSENTIAL (PRIMARY) HYPERTENSION: ICD-10-CM

## 2024-04-23 DIAGNOSIS — E21.3 HYPERPARATHYROIDISM, UNSPECIFIED: ICD-10-CM

## 2024-04-23 DIAGNOSIS — N25.81 SECONDARY HYPERPARATHYROIDISM OF RENAL ORIGIN: ICD-10-CM

## 2024-04-23 DIAGNOSIS — E11.9 TYPE 2 DIABETES MELLITUS W/OUT COMPLICATIONS: ICD-10-CM

## 2024-04-23 PROCEDURE — 99204 OFFICE O/P NEW MOD 45 MIN: CPT

## 2024-04-23 PROCEDURE — G2211 COMPLEX E/M VISIT ADD ON: CPT | Mod: NC,1L

## 2024-04-24 DIAGNOSIS — E55.9 VITAMIN D DEFICIENCY, UNSPECIFIED: ICD-10-CM

## 2024-04-24 RX ORDER — UBIDECARENONE/VIT E ACET 100MG-5
25 MCG CAPSULE ORAL
Qty: 90 | Refills: 3 | Status: ACTIVE | COMMUNITY
Start: 2024-04-24 | End: 1900-01-01

## 2024-04-24 RX ORDER — CALCITRIOL 0.25 UG/1
0.25 CAPSULE, LIQUID FILLED ORAL DAILY
Qty: 90 | Refills: 0 | Status: ACTIVE | COMMUNITY
Start: 2024-04-24 | End: 1900-01-01

## 2024-04-27 PROBLEM — E07.9 THYROID DISEASE: Status: ACTIVE | Noted: 2022-07-27

## 2024-04-27 PROBLEM — I10 HYPERTENSION: Status: ACTIVE | Noted: 2017-09-25

## 2024-04-27 PROBLEM — E78.5 HLD (HYPERLIPIDEMIA): Status: ACTIVE | Noted: 2019-06-21

## 2024-04-27 PROBLEM — E11.9 DIABETES: Status: ACTIVE | Noted: 2017-09-25

## 2024-04-27 PROBLEM — N25.81 HYPERPARATHYROIDISM, SECONDARY: Status: ACTIVE | Noted: 2024-04-24

## 2024-04-27 LAB
24R-OH-CALCIDIOL SERPL-MCNC: 6.5 PG/ML
25(OH)D3 SERPL-MCNC: 20.3 NG/ML
ALBUMIN SERPL ELPH-MCNC: 3.8 G/DL
ANION GAP SERPL CALC-SCNC: 18 MMOL/L
BUN SERPL-MCNC: 38 MG/DL
CALCIUM SERPL-MCNC: 9.5 MG/DL
CHLORIDE SERPL-SCNC: 97 MMOL/L
CO2 SERPL-SCNC: 25 MMOL/L
CREAT SERPL-MCNC: 4.27 MG/DL
EGFR: 11 ML/MIN/1.73M2
GLUCOSE SERPL-MCNC: 173 MG/DL
PHOSPHATE SERPL-MCNC: 4.3 MG/DL
POTASSIUM SERPL-SCNC: 4.6 MMOL/L
SODIUM SERPL-SCNC: 141 MMOL/L
TSH SERPL-ACNC: 3.29 UIU/ML

## 2024-04-27 NOTE — HISTORY OF PRESENT ILLNESS
[FreeTextEntry1] : 63 yro pt with hx of vasculitis, DM2, HTN, HLD, ESRD 2/2 Lupus on hemodialysis 3x/week referred by Dr. Frederick for eval of secondary hyperparathyroidism.  Patient accompanied by her son today. Patient Tanzanian speaking. Defers formal translation services and instead prefers for son to translate for her.  Reports labs/records from nephrologist given to Dr. Frederick.  Patient established care with Dr. Frederick earlier this month, 4/16/24. From ENT documentation,  April 2024: calcium 8.6 (corrected 9.3) and iPTH 2380 3/12/24 : calcium 8.3 (corrected 8.9) and iPTH 3053 3/26/24: calcium 9.1 (corrected 9.7) 2/13/24 calcium 8.5 (corrected 9.1) and iPTH 2000  US in office with ENT noted to show enlarged R superior parathyroid.  Plan is for surgical management as medical management has been unsuccessful, PTH ~ 2000. Son reports they diiscussed total parathyroidectomy with reimplantation of 1 gland in forearm with Dr. Frederick. No surgical date as of yet.  No prior DEXA scans seen.  Pending US, ordered by ENT.   Currently taking calcium acetate 2 pills three times with meals, cinacalcet 60mg BID and reports she also gets vitamin d with HD three times per week - unknown dose. Per [facility] HD center - calcitriol 2mcg 3x/weekly.   No hx of kidney stones.  Menopause at age 50.  Unsure if prior DEXA done.  No falls/fractures.  No joint pains/aches. Reports she had been feeling okay.   Reports hx of Hypothyroidism and in the past was on LT4 - was on a pink tablet in the past. Has not taken for a while now, maybe > 1 years. Feels well off. Unclear why stopped/started.   Also w/ > 28 year old hx of DM2, managed on Alogliptin 6.25mg daily only patient reports. Other meds prior to HD but now better glucose control.  A1c 5.8% in Feb 2024. Not checking sugars regularly at home.  Reports managed by PCP.  Dr. Manolo Morelos in [place].   ESRD 2/2 Lupus, not due to DM2. Always well controlled per son.  Has been on HD since 2017.  Nephrologist Dr. Huong Jacobs.  Has hx of CAD s/p PCI x 2 in the past. No CVAs.  Glaucoma.   HTN - labetalol 100mg, valsartan 80mg, amlodipine 5mg daily Other meds: gabapentin 400mg, hydroxychloroquine 200mg, memantine 10mg, Auryxia (ferric citrate) 210mg   HLD - Atorvastatin 20mg

## 2024-04-27 NOTE — ASSESSMENT
[FreeTextEntry1] : 63 yro pt with hx of vasculitis, DM2, HTN, HLD, ESRD 2/2 Lupus on hemodialysis 3x/week referred by Dr. Frederick for eval of secondary hyperparathyroidism.  Secondary Hyperparathyroidism 2/2 ESRD  April 2024: calcium 8.6 (corrected 9.3) and iPTH 2380 3/12/24 : calcium 8.3 (corrected 8.9) and iPTH 3053 3/26/24: calcium 9.1 (corrected 9.7) 2/13/24 calcium 8.5 (corrected 9.1) and iPTH 2000  US in office with ENT noted to show enlarged R superior parathyroid. Plan is for surgical management as medical management has been unsuccessful, PTH ~ 2000. Son reports they diiscussed total parathyroidectomy with reimplantation of 1 gland in forearm with Dr. Frederick. No surgical date as of yet. PLAN:  - f/u Check BMP, Albumin, PTH, Phos Vit D 25 OH, Vit D 1, 25 OH  - currently on calcium acetate 2 pills three times with meals, cinacalcet 60mg BID and reports she also gets vitamin d with HD three times per week - unknown dose. Per [facility] HD center - calcitriol 2mcg 3x/weekly - f/u ENT & nephrology  - obtain DEXA   Hx of Thyroid dx reports previously on LT4 but off > 1 year  - check TSH w/ reflex to FT4   DM2  reports managed by pcp  PLAN:  - continue Alogliptin 6.25mg daily  - f/u with PCP  - encouraged SMBG 3-4x per week, staggered manner to monitor BG trends at home  - A1c 5.8% in Feb 2024 but unreliable given esrd/anemia  - on statin   HLD  - LDL goal < 70  - on atorvastatin 20mg qhs  - managed by pcp  - annual fasting lipid panel   HTN  - BP goal < 130/80  - currently on labetalol 100mg, valsartan 80mg, amlodipine 5mg daily - managed by pcp/nephrology  - defer UACR given ESRD   RTC in 4 months - surgical date not yet decided. Son says he will inform us when this is finalized.

## 2024-04-27 NOTE — PHYSICAL EXAM
[Alert] : alert [Well Nourished] : well nourished [No Acute Distress] : no acute distress [Normal Voice/Communication] : normal voice communication [Normal Sclera/Conjunctiva] : normal sclera/conjunctiva [EOMI] : extra ocular movement intact [No Proptosis] : no proptosis [Normal Outer Ear/Nose] : the ears and nose were normal in appearance [Normal Oropharynx] : the oropharynx was normal [Thyroid Not Enlarged] : the thyroid was not enlarged [No Thyroid Nodules] : no palpable thyroid nodules [No Respiratory Distress] : no respiratory distress [No Accessory Muscle Use] : no accessory muscle use [Normal Rate and Effort] : normal respiratory rate and effort [Normal Rate] : heart rate was normal [Regular Rhythm] : with a regular rhythm [No Edema] : no peripheral edema [Not Tender] : non-tender [Not Distended] : not distended [Soft] : abdomen soft [No Spinal Tenderness] : no spinal tenderness [Spine Straight] : spine straight [No Stigmata of Cushings Syndrome] : no stigmata of Cushings Syndrome [Normal Gait] : normal gait [Normal Strength/Tone] : muscle strength and tone were normal [Acanthosis Nigricans] : no acanthosis nigricans [Acne] : no acne [Hirsutism] : no hirsutism [No Motor Deficits] : the motor exam was normal [No Tremors] : no tremors [Oriented x3] : oriented to person, place, and time [Normal Insight/Judgement] : insight and judgment were intact

## 2024-04-30 ENCOUNTER — APPOINTMENT (OUTPATIENT)
Dept: ULTRASOUND IMAGING | Facility: CLINIC | Age: 64
End: 2024-04-30
Payer: MEDICAID

## 2024-04-30 PROCEDURE — 76536 US EXAM OF HEAD AND NECK: CPT

## 2024-05-07 PROBLEM — T82.898A INADEQUATE FLOW OF DIALYSIS ARTERIOVENOUS FISTULA: Status: ACTIVE | Noted: 2019-06-21

## 2024-05-07 PROBLEM — N18.6 ESRD (END STAGE RENAL DISEASE) ON DIALYSIS: Status: ACTIVE | Noted: 2019-06-21

## 2024-05-08 ENCOUNTER — RESULT REVIEW (OUTPATIENT)
Age: 64
End: 2024-05-08

## 2024-05-08 ENCOUNTER — APPOINTMENT (OUTPATIENT)
Dept: ENDOVASCULAR SURGERY | Facility: CLINIC | Age: 64
End: 2024-05-08
Payer: MEDICAID

## 2024-05-08 VITALS
TEMPERATURE: 98.3 F | WEIGHT: 140 LBS | BODY MASS INDEX: 26.43 KG/M2 | DIASTOLIC BLOOD PRESSURE: 82 MMHG | HEART RATE: 81 BPM | HEIGHT: 61 IN | RESPIRATION RATE: 18 BRPM | OXYGEN SATURATION: 100 % | SYSTOLIC BLOOD PRESSURE: 212 MMHG

## 2024-05-08 DIAGNOSIS — T82.898A OTHER SPECIFIED COMPLICATION OF VASCULAR PROSTHETIC DEVICES, IMPLANTS AND GRAFTS, INITIAL ENCOUNTER: ICD-10-CM

## 2024-05-08 DIAGNOSIS — N18.6 END STAGE RENAL DISEASE: ICD-10-CM

## 2024-05-08 DIAGNOSIS — Z99.2 END STAGE RENAL DISEASE: ICD-10-CM

## 2024-05-08 PROCEDURE — 99213 OFFICE O/P EST LOW 20 MIN: CPT | Mod: 25

## 2024-05-08 PROCEDURE — 36907Z: CUSTOM | Mod: 59

## 2024-05-08 PROCEDURE — 36902Z: CUSTOM

## 2024-05-08 NOTE — REASON FOR VISIT
Carlie Newman was called and verbalized understanding on note below.    [Other ___] : a [unfilled] visit for [Prolonged Bleeding] : prolonged bleeding [FreeTextEntry2] : pain in arm [Difficult Cannulation] : difficult cannulation

## 2024-05-13 NOTE — ASSESSMENT
[Other: _____] : [unfilled] [FreeTextEntry1] : referred from dialysis for difficult cannulation. plan for left fistulogram and intervention.

## 2024-05-13 NOTE — HISTORY OF PRESENT ILLNESS
[] : left brachiocephalic fistula [FreeTextEntry1] : 10/2/2017 Dr. Deluca [FreeTextEntry4] : yesterday  [FreeTextEntry5] : today 10am  [FreeTextEntry6] : Dr. Goldstein

## 2024-05-13 NOTE — PAST MEDICAL HISTORY
[Increasing age ( >40 years old)] : Increasing age ( >40 years old) [Altered mobility] : Altered mobility [No therapy indicated for cases scheduled for less than one hour] : No therapy indicated for cases scheduled for less than one hour. [FreeTextEntry1] : Malignant Hyperthermia Screening Tool and Risk of Bleeding Assessment\par  Ms. WILLARD BOB denies family history of unexpected death following Anesthesia or Exercise.\par  Denies Family history of Malignant Hyperthermia, Muscle or Neuromuscular disorder and High Temperature following exercise.\par  \par  Ms. WILLARD BOB denies history of Muscle Spasm, Dark or Chocolate - Colored urine and Unanticipated fever immediately following anesthesia or serious exercise. \par  Ms. BOB also denies bleeding tendencies/ Risks of Bleeding.\par

## 2024-06-18 ENCOUNTER — INPATIENT (INPATIENT)
Facility: HOSPITAL | Age: 64
LOS: 0 days | Discharge: HOME | DRG: 440 | End: 2024-06-19
Attending: STUDENT IN AN ORGANIZED HEALTH CARE EDUCATION/TRAINING PROGRAM | Admitting: STUDENT IN AN ORGANIZED HEALTH CARE EDUCATION/TRAINING PROGRAM
Payer: MEDICAID

## 2024-06-18 VITALS
RESPIRATION RATE: 16 BRPM | OXYGEN SATURATION: 99 % | HEART RATE: 80 BPM | SYSTOLIC BLOOD PRESSURE: 175 MMHG | TEMPERATURE: 99 F | HEIGHT: 61 IN | WEIGHT: 145.06 LBS | DIASTOLIC BLOOD PRESSURE: 75 MMHG

## 2024-06-18 DIAGNOSIS — Z90.49 ACQUIRED ABSENCE OF OTHER SPECIFIED PARTS OF DIGESTIVE TRACT: Chronic | ICD-10-CM

## 2024-06-18 DIAGNOSIS — Z98.890 OTHER SPECIFIED POSTPROCEDURAL STATES: Chronic | ICD-10-CM

## 2024-06-18 DIAGNOSIS — Z98.891 HISTORY OF UTERINE SCAR FROM PREVIOUS SURGERY: Chronic | ICD-10-CM

## 2024-06-18 LAB
ALBUMIN SERPL ELPH-MCNC: 3 G/DL — LOW (ref 3.5–5)
ALP SERPL-CCNC: 696 U/L — HIGH (ref 40–120)
ALT FLD-CCNC: 15 U/L DA — SIGNIFICANT CHANGE UP (ref 10–60)
ANION GAP SERPL CALC-SCNC: 7 MMOL/L — SIGNIFICANT CHANGE UP (ref 5–17)
AST SERPL-CCNC: 21 U/L — SIGNIFICANT CHANGE UP (ref 10–40)
BASOPHILS # BLD AUTO: 0.04 K/UL — SIGNIFICANT CHANGE UP (ref 0–0.2)
BASOPHILS NFR BLD AUTO: 0.6 % — SIGNIFICANT CHANGE UP (ref 0–2)
BILIRUB SERPL-MCNC: 0.6 MG/DL — SIGNIFICANT CHANGE UP (ref 0.2–1.2)
BUN SERPL-MCNC: 47 MG/DL — HIGH (ref 7–18)
CALCIUM SERPL-MCNC: 9.5 MG/DL — SIGNIFICANT CHANGE UP (ref 8.4–10.5)
CHLORIDE SERPL-SCNC: 103 MMOL/L — SIGNIFICANT CHANGE UP (ref 96–108)
CO2 SERPL-SCNC: 29 MMOL/L — SIGNIFICANT CHANGE UP (ref 22–31)
CREAT SERPL-MCNC: 5.34 MG/DL — HIGH (ref 0.5–1.3)
EGFR: 8 ML/MIN/1.73M2 — LOW
EOSINOPHIL # BLD AUTO: 0.13 K/UL — SIGNIFICANT CHANGE UP (ref 0–0.5)
EOSINOPHIL NFR BLD AUTO: 2 % — SIGNIFICANT CHANGE UP (ref 0–6)
GLUCOSE SERPL-MCNC: 89 MG/DL — SIGNIFICANT CHANGE UP (ref 70–99)
HCT VFR BLD CALC: 39.9 % — SIGNIFICANT CHANGE UP (ref 34.5–45)
HGB BLD-MCNC: 13.5 G/DL — SIGNIFICANT CHANGE UP (ref 11.5–15.5)
HIV 1 & 2 AB SERPL IA.RAPID: SIGNIFICANT CHANGE UP
IMM GRANULOCYTES NFR BLD AUTO: 0.3 % — SIGNIFICANT CHANGE UP (ref 0–0.9)
LACTATE SERPL-SCNC: 2 MMOL/L — SIGNIFICANT CHANGE UP (ref 0.7–2)
LACTATE SERPL-SCNC: 2.4 MMOL/L — HIGH (ref 0.7–2)
LIDOCAIN IGE QN: 172 U/L — HIGH (ref 13–75)
LYMPHOCYTES # BLD AUTO: 2.46 K/UL — SIGNIFICANT CHANGE UP (ref 1–3.3)
LYMPHOCYTES # BLD AUTO: 37.8 % — SIGNIFICANT CHANGE UP (ref 13–44)
MAGNESIUM SERPL-MCNC: 2.7 MG/DL — HIGH (ref 1.6–2.6)
MCHC RBC-ENTMCNC: 31.8 PG — SIGNIFICANT CHANGE UP (ref 27–34)
MCHC RBC-ENTMCNC: 33.8 GM/DL — SIGNIFICANT CHANGE UP (ref 32–36)
MCV RBC AUTO: 94.1 FL — SIGNIFICANT CHANGE UP (ref 80–100)
MONOCYTES # BLD AUTO: 0.52 K/UL — SIGNIFICANT CHANGE UP (ref 0–0.9)
MONOCYTES NFR BLD AUTO: 8 % — SIGNIFICANT CHANGE UP (ref 2–14)
NEUTROPHILS # BLD AUTO: 3.33 K/UL — SIGNIFICANT CHANGE UP (ref 1.8–7.4)
NEUTROPHILS NFR BLD AUTO: 51.3 % — SIGNIFICANT CHANGE UP (ref 43–77)
NRBC # BLD: 0 /100 WBCS — SIGNIFICANT CHANGE UP (ref 0–0)
PHOSPHATE SERPL-MCNC: 4.5 MG/DL — SIGNIFICANT CHANGE UP (ref 2.5–4.5)
PLATELET # BLD AUTO: 131 K/UL — LOW (ref 150–400)
POTASSIUM SERPL-MCNC: 4.6 MMOL/L — SIGNIFICANT CHANGE UP (ref 3.5–5.3)
POTASSIUM SERPL-SCNC: 4.6 MMOL/L — SIGNIFICANT CHANGE UP (ref 3.5–5.3)
PROT SERPL-MCNC: 7.4 G/DL — SIGNIFICANT CHANGE UP (ref 6–8.3)
RBC # BLD: 4.24 M/UL — SIGNIFICANT CHANGE UP (ref 3.8–5.2)
RBC # FLD: 14.6 % — HIGH (ref 10.3–14.5)
SODIUM SERPL-SCNC: 139 MMOL/L — SIGNIFICANT CHANGE UP (ref 135–145)
WBC # BLD: 6.5 K/UL — SIGNIFICANT CHANGE UP (ref 3.8–10.5)
WBC # FLD AUTO: 6.5 K/UL — SIGNIFICANT CHANGE UP (ref 3.8–10.5)

## 2024-06-18 PROCEDURE — 99285 EMERGENCY DEPT VISIT HI MDM: CPT

## 2024-06-18 PROCEDURE — 71101 X-RAY EXAM UNILAT RIBS/CHEST: CPT | Mod: 26

## 2024-06-18 PROCEDURE — 76705 ECHO EXAM OF ABDOMEN: CPT | Mod: 26

## 2024-06-18 RX ORDER — KETOROLAC TROMETHAMINE 30 MG/ML
15 INJECTION, SOLUTION INTRAMUSCULAR ONCE
Refills: 0 | Status: DISCONTINUED | OUTPATIENT
Start: 2024-06-18 | End: 2024-06-18

## 2024-06-18 RX ORDER — SODIUM CHLORIDE 0.9 % (FLUSH) 0.9 %
500 SYRINGE (ML) INJECTION ONCE
Refills: 0 | Status: COMPLETED | OUTPATIENT
Start: 2024-06-18 | End: 2024-06-18

## 2024-06-18 RX ORDER — HYDROMORPHONE HCL 0.2 MG/ML
0.5 INJECTION, SOLUTION INTRAVENOUS EVERY 4 HOURS
Refills: 0 | Status: DISCONTINUED | OUTPATIENT
Start: 2024-06-18 | End: 2024-06-19

## 2024-06-18 RX ORDER — DEXTROSE MONOHYDRATE AND SODIUM CHLORIDE 5; .3 G/100ML; G/100ML
1000 INJECTION, SOLUTION INTRAVENOUS
Refills: 0 | Status: DISCONTINUED | OUTPATIENT
Start: 2024-06-18 | End: 2024-06-18

## 2024-06-18 RX ORDER — LIDOCAINE HCL 28 MG/G
1 GEL TOPICAL ONCE
Refills: 0 | Status: COMPLETED | OUTPATIENT
Start: 2024-06-18 | End: 2024-06-18

## 2024-06-18 RX ORDER — DEXTROSE MONOHYDRATE AND SODIUM CHLORIDE 5; .3 G/100ML; G/100ML
1000 INJECTION, SOLUTION INTRAVENOUS ONCE
Refills: 0 | Status: DISCONTINUED | OUTPATIENT
Start: 2024-06-18 | End: 2024-06-18

## 2024-06-18 RX ORDER — DEXTROSE MONOHYDRATE AND SODIUM CHLORIDE 5; .3 G/100ML; G/100ML
500 INJECTION, SOLUTION INTRAVENOUS ONCE
Refills: 0 | Status: COMPLETED | OUTPATIENT
Start: 2024-06-18 | End: 2024-06-18

## 2024-06-18 RX ORDER — ONDANSETRON HYDROCHLORIDE 2 MG/ML
4 INJECTION INTRAMUSCULAR; INTRAVENOUS EVERY 6 HOURS
Refills: 0 | Status: DISCONTINUED | OUTPATIENT
Start: 2024-06-18 | End: 2024-06-19

## 2024-06-18 RX ADMIN — Medication 500 MILLILITER(S): at 22:04

## 2024-06-18 RX ADMIN — LIDOCAINE HCL 1 PATCH: 28 GEL TOPICAL at 20:38

## 2024-06-18 RX ADMIN — KETOROLAC TROMETHAMINE 15 MILLIGRAM(S): 30 INJECTION, SOLUTION INTRAMUSCULAR at 20:39

## 2024-06-18 RX ADMIN — Medication 10 MILLIGRAM(S): at 20:39

## 2024-06-19 ENCOUNTER — TRANSCRIPTION ENCOUNTER (OUTPATIENT)
Age: 64
End: 2024-06-19

## 2024-06-19 VITALS
HEART RATE: 84 BPM | RESPIRATION RATE: 16 BRPM | OXYGEN SATURATION: 98 % | SYSTOLIC BLOOD PRESSURE: 128 MMHG | TEMPERATURE: 100 F | DIASTOLIC BLOOD PRESSURE: 68 MMHG

## 2024-06-19 DIAGNOSIS — K80.20 CALCULUS OF GALLBLADDER WITHOUT CHOLECYSTITIS WITHOUT OBSTRUCTION: ICD-10-CM

## 2024-06-19 DIAGNOSIS — S22.41XA MULTIPLE FRACTURES OF RIBS, RIGHT SIDE, INITIAL ENCOUNTER FOR CLOSED FRACTURE: ICD-10-CM

## 2024-06-19 DIAGNOSIS — R07.81 PLEURODYNIA: ICD-10-CM

## 2024-06-19 DIAGNOSIS — E11.9 TYPE 2 DIABETES MELLITUS WITHOUT COMPLICATIONS: ICD-10-CM

## 2024-06-19 DIAGNOSIS — R93.5 ABNORMAL FINDINGS ON DIAGNOSTIC IMAGING OF OTHER ABDOMINAL REGIONS, INCLUDING RETROPERITONEUM: ICD-10-CM

## 2024-06-19 DIAGNOSIS — M46.28 OSTEOMYELITIS OF VERTEBRA, SACRAL AND SACROCOCCYGEAL REGION: ICD-10-CM

## 2024-06-19 DIAGNOSIS — I10 ESSENTIAL (PRIMARY) HYPERTENSION: ICD-10-CM

## 2024-06-19 DIAGNOSIS — N18.6 END STAGE RENAL DISEASE: ICD-10-CM

## 2024-06-19 DIAGNOSIS — M32.9 SYSTEMIC LUPUS ERYTHEMATOSUS, UNSPECIFIED: ICD-10-CM

## 2024-06-19 DIAGNOSIS — E78.5 HYPERLIPIDEMIA, UNSPECIFIED: ICD-10-CM

## 2024-06-19 DIAGNOSIS — S22.000A WEDGE COMPRESSION FRACTURE OF UNSPECIFIED THORACIC VERTEBRA, INITIAL ENCOUNTER FOR CLOSED FRACTURE: ICD-10-CM

## 2024-06-19 DIAGNOSIS — N25.81 SECONDARY HYPERPARATHYROIDISM OF RENAL ORIGIN: ICD-10-CM

## 2024-06-19 DIAGNOSIS — Z29.9 ENCOUNTER FOR PROPHYLACTIC MEASURES, UNSPECIFIED: ICD-10-CM

## 2024-06-19 DIAGNOSIS — K85.90 ACUTE PANCREATITIS WITHOUT NECROSIS OR INFECTION, UNSPECIFIED: ICD-10-CM

## 2024-06-19 DIAGNOSIS — Z75.8 OTHER PROBLEMS RELATED TO MEDICAL FACILITIES AND OTHER HEALTH CARE: ICD-10-CM

## 2024-06-19 LAB
ALBUMIN SERPL ELPH-MCNC: 2.4 G/DL — LOW (ref 3.5–5)
ALP SERPL-CCNC: 593 U/L — HIGH (ref 40–120)
ALT FLD-CCNC: 12 U/L DA — SIGNIFICANT CHANGE UP (ref 10–60)
ANION GAP SERPL CALC-SCNC: 8 MMOL/L — SIGNIFICANT CHANGE UP (ref 5–17)
AST SERPL-CCNC: 15 U/L — SIGNIFICANT CHANGE UP (ref 10–40)
BASOPHILS # BLD AUTO: 0.04 K/UL — SIGNIFICANT CHANGE UP (ref 0–0.2)
BASOPHILS NFR BLD AUTO: 0.8 % — SIGNIFICANT CHANGE UP (ref 0–2)
BILIRUB SERPL-MCNC: 0.6 MG/DL — SIGNIFICANT CHANGE UP (ref 0.2–1.2)
BUN SERPL-MCNC: 48 MG/DL — HIGH (ref 7–18)
CALCIUM SERPL-MCNC: 8.6 MG/DL — SIGNIFICANT CHANGE UP (ref 8.4–10.5)
CALCIUM SERPL-MCNC: 8.7 MG/DL — SIGNIFICANT CHANGE UP (ref 8.4–10.5)
CHLORIDE SERPL-SCNC: 104 MMOL/L — SIGNIFICANT CHANGE UP (ref 96–108)
CO2 SERPL-SCNC: 27 MMOL/L — SIGNIFICANT CHANGE UP (ref 22–31)
CREAT SERPL-MCNC: 5.88 MG/DL — HIGH (ref 0.5–1.3)
EGFR: 8 ML/MIN/1.73M2 — LOW
EOSINOPHIL # BLD AUTO: 0.18 K/UL — SIGNIFICANT CHANGE UP (ref 0–0.5)
EOSINOPHIL NFR BLD AUTO: 3.4 % — SIGNIFICANT CHANGE UP (ref 0–6)
GLUCOSE BLDC GLUCOMTR-MCNC: 159 MG/DL — HIGH (ref 70–99)
GLUCOSE BLDC GLUCOMTR-MCNC: 98 MG/DL — SIGNIFICANT CHANGE UP (ref 70–99)
GLUCOSE BLDC GLUCOMTR-MCNC: 99 MG/DL — SIGNIFICANT CHANGE UP (ref 70–99)
GLUCOSE SERPL-MCNC: 139 MG/DL — HIGH (ref 70–99)
HCT VFR BLD CALC: 33.5 % — LOW (ref 34.5–45)
HGB BLD-MCNC: 10.9 G/DL — LOW (ref 11.5–15.5)
IMM GRANULOCYTES NFR BLD AUTO: 0.2 % — SIGNIFICANT CHANGE UP (ref 0–0.9)
LIDOCAIN IGE QN: 85 U/L — HIGH (ref 13–75)
LYMPHOCYTES # BLD AUTO: 2.46 K/UL — SIGNIFICANT CHANGE UP (ref 1–3.3)
LYMPHOCYTES # BLD AUTO: 46.5 % — HIGH (ref 13–44)
MAGNESIUM SERPL-MCNC: 2.5 MG/DL — SIGNIFICANT CHANGE UP (ref 1.6–2.6)
MCHC RBC-ENTMCNC: 30.6 PG — SIGNIFICANT CHANGE UP (ref 27–34)
MCHC RBC-ENTMCNC: 32.5 GM/DL — SIGNIFICANT CHANGE UP (ref 32–36)
MCV RBC AUTO: 94.1 FL — SIGNIFICANT CHANGE UP (ref 80–100)
MONOCYTES # BLD AUTO: 0.46 K/UL — SIGNIFICANT CHANGE UP (ref 0–0.9)
MONOCYTES NFR BLD AUTO: 8.7 % — SIGNIFICANT CHANGE UP (ref 2–14)
NEUTROPHILS # BLD AUTO: 2.14 K/UL — SIGNIFICANT CHANGE UP (ref 1.8–7.4)
NEUTROPHILS NFR BLD AUTO: 40.4 % — LOW (ref 43–77)
NRBC # BLD: 0 /100 WBCS — SIGNIFICANT CHANGE UP (ref 0–0)
PHOSPHATE SERPL-MCNC: 5 MG/DL — HIGH (ref 2.5–4.5)
PLATELET # BLD AUTO: 109 K/UL — LOW (ref 150–400)
POTASSIUM SERPL-MCNC: 4.2 MMOL/L — SIGNIFICANT CHANGE UP (ref 3.5–5.3)
POTASSIUM SERPL-SCNC: 4.2 MMOL/L — SIGNIFICANT CHANGE UP (ref 3.5–5.3)
PROT SERPL-MCNC: 5.9 G/DL — LOW (ref 6–8.3)
PTH-INTACT FLD-MCNC: 1965 PG/ML — HIGH (ref 15–65)
RBC # BLD: 3.56 M/UL — LOW (ref 3.8–5.2)
RBC # FLD: 14.8 % — HIGH (ref 10.3–14.5)
SODIUM SERPL-SCNC: 139 MMOL/L — SIGNIFICANT CHANGE UP (ref 135–145)
WBC # BLD: 5.29 K/UL — SIGNIFICANT CHANGE UP (ref 3.8–10.5)
WBC # FLD AUTO: 5.29 K/UL — SIGNIFICANT CHANGE UP (ref 3.8–10.5)

## 2024-06-19 PROCEDURE — 36415 COLL VENOUS BLD VENIPUNCTURE: CPT

## 2024-06-19 PROCEDURE — 84100 ASSAY OF PHOSPHORUS: CPT

## 2024-06-19 PROCEDURE — 83690 ASSAY OF LIPASE: CPT

## 2024-06-19 PROCEDURE — 99222 1ST HOSP IP/OBS MODERATE 55: CPT

## 2024-06-19 PROCEDURE — 93005 ELECTROCARDIOGRAM TRACING: CPT

## 2024-06-19 PROCEDURE — 83970 ASSAY OF PARATHORMONE: CPT

## 2024-06-19 PROCEDURE — 82962 GLUCOSE BLOOD TEST: CPT

## 2024-06-19 PROCEDURE — 71260 CT THORAX DX C+: CPT | Mod: MC

## 2024-06-19 PROCEDURE — 80053 COMPREHEN METABOLIC PANEL: CPT

## 2024-06-19 PROCEDURE — 85025 COMPLETE CBC W/AUTO DIFF WBC: CPT

## 2024-06-19 PROCEDURE — 71260 CT THORAX DX C+: CPT | Mod: 26,MC

## 2024-06-19 PROCEDURE — 83735 ASSAY OF MAGNESIUM: CPT

## 2024-06-19 PROCEDURE — 82310 ASSAY OF CALCIUM: CPT

## 2024-06-19 PROCEDURE — 76705 ECHO EXAM OF ABDOMEN: CPT

## 2024-06-19 PROCEDURE — 86703 HIV-1/HIV-2 1 RESULT ANTBDY: CPT

## 2024-06-19 PROCEDURE — T1013: CPT

## 2024-06-19 PROCEDURE — 99221 1ST HOSP IP/OBS SF/LOW 40: CPT

## 2024-06-19 PROCEDURE — 71101 X-RAY EXAM UNILAT RIBS/CHEST: CPT

## 2024-06-19 PROCEDURE — 83605 ASSAY OF LACTIC ACID: CPT

## 2024-06-19 PROCEDURE — 96372 THER/PROPH/DIAG INJ SC/IM: CPT

## 2024-06-19 PROCEDURE — 99285 EMERGENCY DEPT VISIT HI MDM: CPT

## 2024-06-19 PROCEDURE — 74177 CT ABD & PELVIS W/CONTRAST: CPT | Mod: MC

## 2024-06-19 PROCEDURE — 74177 CT ABD & PELVIS W/CONTRAST: CPT | Mod: 26,MC

## 2024-06-19 PROCEDURE — 87340 HEPATITIS B SURFACE AG IA: CPT

## 2024-06-19 RX ORDER — MEMANTINE HYDROCHLORIDE 7 MG/1
1 CAPSULE, EXTENDED RELEASE ORAL
Refills: 0 | DISCHARGE

## 2024-06-19 RX ORDER — MAGNESIUM, ALUMINUM HYDROXIDE 400-400
30 TABLET,CHEWABLE ORAL EVERY 4 HOURS
Refills: 0 | Status: DISCONTINUED | OUTPATIENT
Start: 2024-06-19 | End: 2024-06-19

## 2024-06-19 RX ORDER — CINACALCET HYDROCHLORIDE 60 MG/1
60 TABLET, COATED ORAL DAILY
Refills: 0 | Status: DISCONTINUED | OUTPATIENT
Start: 2024-06-19 | End: 2024-06-19

## 2024-06-19 RX ORDER — INSULIN LISPRO 100 [IU]/ML
INJECTION, SOLUTION SUBCUTANEOUS AT BEDTIME
Refills: 0 | Status: DISCONTINUED | OUTPATIENT
Start: 2024-06-19 | End: 2024-06-19

## 2024-06-19 RX ORDER — VALSARTAN 320 MG/1
80 TABLET ORAL DAILY
Refills: 0 | Status: DISCONTINUED | OUTPATIENT
Start: 2024-06-19 | End: 2024-06-19

## 2024-06-19 RX ORDER — ALOGLIPTIN 12.5 MG/1
1 TABLET, FILM COATED ORAL
Refills: 0 | DISCHARGE

## 2024-06-19 RX ORDER — LIDOCAINE HCL 28 MG/G
1 GEL TOPICAL
Qty: 1 | Refills: 0
Start: 2024-06-19 | End: 2024-06-25

## 2024-06-19 RX ORDER — OXYCODONE AND ACETAMINOPHEN 5; 325 MG/1; MG/1
1 TABLET ORAL EVERY 6 HOURS
Refills: 0 | Status: DISCONTINUED | OUTPATIENT
Start: 2024-06-19 | End: 2024-06-19

## 2024-06-19 RX ORDER — ACETAMINOPHEN 325 MG
1000 TABLET ORAL EVERY 8 HOURS
Refills: 0 | Status: DISCONTINUED | OUTPATIENT
Start: 2024-06-19 | End: 2024-06-19

## 2024-06-19 RX ORDER — DEXTROSE MONOHYDRATE AND SODIUM CHLORIDE 5; .3 G/100ML; G/100ML
1000 INJECTION, SOLUTION INTRAVENOUS
Refills: 0 | Status: DISCONTINUED | OUTPATIENT
Start: 2024-06-19 | End: 2024-06-19

## 2024-06-19 RX ORDER — VALSARTAN 320 MG/1
1 TABLET ORAL
Refills: 0 | DISCHARGE

## 2024-06-19 RX ORDER — HYDROXYCHLOROQUINE SULFATE 200 MG
1 TABLET ORAL
Refills: 0 | DISCHARGE

## 2024-06-19 RX ORDER — TRAVOPROST 0.04 MG/ML
1 SOLUTION/ DROPS OPHTHALMIC
Qty: 0 | Refills: 0 | DISCHARGE

## 2024-06-19 RX ORDER — HYDROXYCHLOROQUINE SULFATE 200 MG
200 TABLET ORAL DAILY
Refills: 0 | Status: DISCONTINUED | OUTPATIENT
Start: 2024-06-19 | End: 2024-06-19

## 2024-06-19 RX ORDER — NALOXONE HYDROCHLORIDE 1 MG/ML
1 INJECTION PARENTERAL
Qty: 1 | Refills: 0
Start: 2024-06-19 | End: 2024-06-19

## 2024-06-19 RX ORDER — TIMOLOL 0.5 %
1 DROPS OPHTHALMIC (EYE)
Qty: 0 | Refills: 0 | DISCHARGE

## 2024-06-19 RX ORDER — LABETALOL HYDROCHLORIDE 300 MG/1
1 TABLET ORAL
Refills: 0 | DISCHARGE

## 2024-06-19 RX ORDER — MEMANTINE HYDROCHLORIDE 7 MG/1
10 CAPSULE, EXTENDED RELEASE ORAL AT BEDTIME
Refills: 0 | Status: DISCONTINUED | OUTPATIENT
Start: 2024-06-19 | End: 2024-06-19

## 2024-06-19 RX ORDER — SENNOSIDES 8.6 MG
2 TABLET ORAL AT BEDTIME
Refills: 0 | Status: DISCONTINUED | OUTPATIENT
Start: 2024-06-19 | End: 2024-06-19

## 2024-06-19 RX ORDER — FERRIC CITRATE 210 MG/1
420 TABLET, COATED ORAL
Refills: 0 | DISCHARGE

## 2024-06-19 RX ORDER — POLYETHYLENE GLYCOL 3350 1 G/G
17 POWDER ORAL DAILY
Refills: 0 | Status: DISCONTINUED | OUTPATIENT
Start: 2024-06-19 | End: 2024-06-19

## 2024-06-19 RX ORDER — LIDOCAINE HCL 28 MG/G
1 GEL TOPICAL DAILY
Refills: 0 | Status: DISCONTINUED | OUTPATIENT
Start: 2024-06-19 | End: 2024-06-19

## 2024-06-19 RX ORDER — LABETALOL HYDROCHLORIDE 300 MG/1
100 TABLET ORAL
Refills: 0 | Status: DISCONTINUED | OUTPATIENT
Start: 2024-06-19 | End: 2024-06-19

## 2024-06-19 RX ORDER — CINACALCET HYDROCHLORIDE 60 MG/1
1 TABLET, COATED ORAL
Refills: 0 | DISCHARGE

## 2024-06-19 RX ORDER — OXYCODONE HYDROCHLORIDE 100 MG/5ML
5 SOLUTION ORAL EVERY 4 HOURS
Refills: 0 | Status: DISCONTINUED | OUTPATIENT
Start: 2024-06-19 | End: 2024-06-19

## 2024-06-19 RX ORDER — OXYCODONE HYDROCHLORIDE 100 MG/5ML
1 SOLUTION ORAL
Qty: 18 | Refills: 0
Start: 2024-06-19 | End: 2024-06-21

## 2024-06-19 RX ORDER — GABAPENTIN
400 POWDER (GRAM) MISCELLANEOUS DAILY
Refills: 0 | Status: DISCONTINUED | OUTPATIENT
Start: 2024-06-19 | End: 2024-06-19

## 2024-06-19 RX ORDER — CALCIUM ACETATE 667 MG/1
667 CAPSULE ORAL
Refills: 0 | Status: DISCONTINUED | OUTPATIENT
Start: 2024-06-19 | End: 2024-06-19

## 2024-06-19 RX ORDER — AMLODIPINE BESYLATE 2.5 MG/1
5 TABLET ORAL DAILY
Refills: 0 | Status: DISCONTINUED | OUTPATIENT
Start: 2024-06-19 | End: 2024-06-19

## 2024-06-19 RX ORDER — OXYCODONE HYDROCHLORIDE 100 MG/5ML
1 SOLUTION ORAL
Qty: 0 | Refills: 0 | DISCHARGE
Start: 2024-06-19

## 2024-06-19 RX ORDER — INSULIN LISPRO 100 [IU]/ML
INJECTION, SOLUTION SUBCUTANEOUS
Refills: 0 | Status: DISCONTINUED | OUTPATIENT
Start: 2024-06-19 | End: 2024-06-19

## 2024-06-19 RX ORDER — ACETAMINOPHEN 325 MG
650 TABLET ORAL EVERY 6 HOURS
Refills: 0 | Status: DISCONTINUED | OUTPATIENT
Start: 2024-06-19 | End: 2024-06-19

## 2024-06-19 RX ORDER — HEPARIN SODIUM 50 [USP'U]/ML
5000 INJECTION, SOLUTION INTRAVENOUS EVERY 12 HOURS
Refills: 0 | Status: DISCONTINUED | OUTPATIENT
Start: 2024-06-19 | End: 2024-06-19

## 2024-06-19 RX ORDER — GABAPENTIN 400 MG/1
200 CAPSULE ORAL
Qty: 0 | Refills: 0 | DISCHARGE

## 2024-06-19 RX ORDER — ONDANSETRON HYDROCHLORIDE 2 MG/ML
4 INJECTION INTRAMUSCULAR; INTRAVENOUS EVERY 8 HOURS
Refills: 0 | Status: DISCONTINUED | OUTPATIENT
Start: 2024-06-19 | End: 2024-06-19

## 2024-06-19 RX ORDER — LEVOTHYROXINE SODIUM 125 MCG
1 TABLET ORAL
Qty: 0 | Refills: 0 | DISCHARGE

## 2024-06-19 RX ADMIN — Medication 1000 MILLIGRAM(S): at 13:42

## 2024-06-19 RX ADMIN — POLYETHYLENE GLYCOL 3350 17 GRAM(S): 1 POWDER ORAL at 12:08

## 2024-06-19 RX ADMIN — Medication 1000 MILLIGRAM(S): at 13:12

## 2024-06-19 RX ADMIN — INSULIN LISPRO 1: 100 INJECTION, SOLUTION SUBCUTANEOUS at 12:08

## 2024-06-19 RX ADMIN — Medication 400 MILLIGRAM(S): at 12:08

## 2024-06-19 RX ADMIN — VALSARTAN 80 MILLIGRAM(S): 320 TABLET ORAL at 07:07

## 2024-06-19 RX ADMIN — LABETALOL HYDROCHLORIDE 100 MILLIGRAM(S): 300 TABLET ORAL at 07:07

## 2024-06-19 RX ADMIN — AMLODIPINE BESYLATE 5 MILLIGRAM(S): 2.5 TABLET ORAL at 07:06

## 2024-06-19 RX ADMIN — DEXTROSE MONOHYDRATE AND SODIUM CHLORIDE 500 MILLILITER(S): 5; .3 INJECTION, SOLUTION INTRAVENOUS at 02:02

## 2024-06-19 RX ADMIN — Medication 200 MILLIGRAM(S): at 12:10

## 2024-06-19 RX ADMIN — CALCIUM ACETATE 667 MILLIGRAM(S): 667 CAPSULE ORAL at 12:10

## 2024-06-19 RX ADMIN — HEPARIN SODIUM 5000 UNIT(S): 50 INJECTION, SOLUTION INTRAVENOUS at 07:06

## 2024-06-19 RX ADMIN — CINACALCET HYDROCHLORIDE 60 MILLIGRAM(S): 60 TABLET, COATED ORAL at 13:12

## 2024-06-19 RX ADMIN — LIDOCAINE HCL 1 PATCH: 28 GEL TOPICAL at 08:59

## 2024-06-19 RX ADMIN — LIDOCAINE HCL 1 PATCH: 28 GEL TOPICAL at 07:58

## 2024-06-20 LAB — HBV SURFACE AG SER-ACNC: SIGNIFICANT CHANGE UP

## 2024-06-21 ENCOUNTER — APPOINTMENT (OUTPATIENT)
Dept: VASCULAR SURGERY | Facility: CLINIC | Age: 64
End: 2024-06-21

## 2024-07-16 ENCOUNTER — APPOINTMENT (OUTPATIENT)
Dept: OTOLARYNGOLOGY | Facility: CLINIC | Age: 64
End: 2024-07-16

## 2024-07-23 PROBLEM — E21.3 HYPERPARATHYROIDISM, UNSPECIFIED: Chronic | Status: ACTIVE | Noted: 2024-06-18

## 2024-08-01 ENCOUNTER — APPOINTMENT (OUTPATIENT)
Dept: OTOLARYNGOLOGY | Facility: CLINIC | Age: 64
End: 2024-08-01
Payer: MEDICAID

## 2024-08-01 VITALS
DIASTOLIC BLOOD PRESSURE: 78 MMHG | WEIGHT: 136 LBS | HEART RATE: 77 BPM | SYSTOLIC BLOOD PRESSURE: 163 MMHG | HEIGHT: 61 IN | BODY MASS INDEX: 25.68 KG/M2

## 2024-08-01 DIAGNOSIS — N25.81 SECONDARY HYPERPARATHYROIDISM OF RENAL ORIGIN: ICD-10-CM

## 2024-08-01 PROCEDURE — 99214 OFFICE O/P EST MOD 30 MIN: CPT

## 2024-08-01 NOTE — HISTORY OF PRESENT ILLNESS
[de-identified] : 63 yro pt with ESRD on hemodialysis 3x/week referred by nephrologist Dr. Huong Jacobs for eval of secondary hyperparathyroidism. April 2024: calcium 8.6 (corrected 9.3) and iPTH 2380 3/12/24 : calcium 8.3 (corrected 8.9) and iPTH 3053 3/26/24: calcium 9.1 (corrected 9.7)  2/13/24 calcium 8.5 (corrected 9.1) and iPTH 2000  Here today for 4 month follow up Following Endo, started on Calcitriol and Vitamin D Reports being hospitalized in June 2024, for 3 fracture ribs - no falls or injury States continues to have occasional R elbow bone pain for the past 6 months - as well as back Continues to with HD Tues, Thurs and Sat Denies h/o kidney stones, abdominal pain. No dysphagia, dyspnea or dysphonia. No recent fever, chills, unintentional weight loss.   s/p US Head/Neck 4/30/24 IMPRESSION: Mildly atrophic thyroid gland. Subcentimeter benign-appearing thyroid nodules. There is 8 x 8 x 5 mm heterogeneous partially isoechoic/hypoechoic nodule located immediately inferior to the right thyroid gland which may represent parathyroid adenoma.

## 2024-08-01 NOTE — CONSULT LETTER
[Dear  ___] : Dear  [unfilled], [Consult Letter:] : I had the pleasure of evaluating your patient, [unfilled]. [Please see my note below.] : Please see my note below. [Consult Closing:] : Thank you very much for allowing me to participate in the care of this patient.  If you have any questions, please do not hesitate to contact me. [Sincerely,] : Sincerely, [FreeTextEntry2] : Dr Huong Jacobs [FreeTextEntry3] :  Vijay Frederick MD, FACS  Otolaryngology-Head and Neck Surgery [place] [facility] [employer] [facility]

## 2024-08-01 NOTE — REASON FOR VISIT
[Subsequent Evaluation] : a subsequent evaluation for [Other: _____] : [unfilled] [Patient Declined  Services] : - None: Patient declined  services [Source: ______] : History obtained from [unfilled] [FreeTextEntry2] : parathyroid  [Interpreters_FullName] : Cole  [Interpreters_Relationshiptopatient] : son [FreeTextEntry3] : pt preferred to use family member for interpretation  Patient declined offer of translation service.  Patient preferred to use accompanying family member/friend for translation.  [TWNoteComboBox1] : Tristanian

## 2024-08-01 NOTE — PLAN
[TextEntry] : - Secondary hyperparathyroidism. - PTH of 2000 after medical treatment. US in the office today showed enlarged R superior parathyroid. Was not able to see the other glands. - Discussed findings and recommended considering surgery. - Risks and complications of the procedure discussed with the patient today including need for SICU with IV calcium infusion and strict control with endocrinology.

## 2024-08-27 ENCOUNTER — OUTPATIENT (OUTPATIENT)
Dept: OUTPATIENT SERVICES | Facility: HOSPITAL | Age: 64
LOS: 1 days | End: 2024-08-27

## 2024-08-27 VITALS
HEART RATE: 75 BPM | DIASTOLIC BLOOD PRESSURE: 76 MMHG | SYSTOLIC BLOOD PRESSURE: 152 MMHG | TEMPERATURE: 99 F | WEIGHT: 139.99 LBS | OXYGEN SATURATION: 100 % | HEIGHT: 61 IN | RESPIRATION RATE: 16 BRPM

## 2024-08-27 DIAGNOSIS — Z98.890 OTHER SPECIFIED POSTPROCEDURAL STATES: Chronic | ICD-10-CM

## 2024-08-27 DIAGNOSIS — Z90.49 ACQUIRED ABSENCE OF OTHER SPECIFIED PARTS OF DIGESTIVE TRACT: Chronic | ICD-10-CM

## 2024-08-27 DIAGNOSIS — N25.81 SECONDARY HYPERPARATHYROIDISM OF RENAL ORIGIN: ICD-10-CM

## 2024-08-27 DIAGNOSIS — N18.6 END STAGE RENAL DISEASE: ICD-10-CM

## 2024-08-27 DIAGNOSIS — I10 ESSENTIAL (PRIMARY) HYPERTENSION: ICD-10-CM

## 2024-08-27 DIAGNOSIS — Z98.891 HISTORY OF UTERINE SCAR FROM PREVIOUS SURGERY: Chronic | ICD-10-CM

## 2024-08-27 DIAGNOSIS — E11.9 TYPE 2 DIABETES MELLITUS WITHOUT COMPLICATIONS: ICD-10-CM

## 2024-08-27 LAB
A1C WITH ESTIMATED AVERAGE GLUCOSE RESULT: 5.6 % — SIGNIFICANT CHANGE UP (ref 4–5.6)
ANION GAP SERPL CALC-SCNC: 16 MMOL/L — HIGH (ref 7–14)
BASOPHILS # BLD AUTO: 0.04 K/UL — SIGNIFICANT CHANGE UP (ref 0–0.2)
BASOPHILS NFR BLD AUTO: 0.7 % — SIGNIFICANT CHANGE UP (ref 0–2)
BLD GP AB SCN SERPL QL: NEGATIVE — SIGNIFICANT CHANGE UP
BUN SERPL-MCNC: 46 MG/DL — HIGH (ref 7–23)
CALCIUM SERPL-MCNC: 9.1 MG/DL — SIGNIFICANT CHANGE UP (ref 8.4–10.5)
CHLORIDE SERPL-SCNC: 98 MMOL/L — SIGNIFICANT CHANGE UP (ref 98–107)
CO2 SERPL-SCNC: 26 MMOL/L — SIGNIFICANT CHANGE UP (ref 22–31)
CREAT SERPL-MCNC: 4.25 MG/DL — HIGH (ref 0.5–1.3)
EGFR: 11 ML/MIN/1.73M2 — LOW
EOSINOPHIL # BLD AUTO: 0.18 K/UL — SIGNIFICANT CHANGE UP (ref 0–0.5)
EOSINOPHIL NFR BLD AUTO: 2.9 % — SIGNIFICANT CHANGE UP (ref 0–6)
ESTIMATED AVERAGE GLUCOSE: 114 — SIGNIFICANT CHANGE UP
GLUCOSE SERPL-MCNC: 109 MG/DL — HIGH (ref 70–99)
HCT VFR BLD CALC: 34.1 % — LOW (ref 34.5–45)
HGB BLD-MCNC: 11.4 G/DL — LOW (ref 11.5–15.5)
IMM GRANULOCYTES NFR BLD AUTO: 0.3 % — SIGNIFICANT CHANGE UP (ref 0–0.9)
LYMPHOCYTES # BLD AUTO: 1.86 K/UL — SIGNIFICANT CHANGE UP (ref 1–3.3)
LYMPHOCYTES # BLD AUTO: 30.3 % — SIGNIFICANT CHANGE UP (ref 13–44)
MCHC RBC-ENTMCNC: 31.1 PG — SIGNIFICANT CHANGE UP (ref 27–34)
MCHC RBC-ENTMCNC: 33.4 GM/DL — SIGNIFICANT CHANGE UP (ref 32–36)
MCV RBC AUTO: 93.2 FL — SIGNIFICANT CHANGE UP (ref 80–100)
MONOCYTES # BLD AUTO: 0.56 K/UL — SIGNIFICANT CHANGE UP (ref 0–0.9)
MONOCYTES NFR BLD AUTO: 9.1 % — SIGNIFICANT CHANGE UP (ref 2–14)
NEUTROPHILS # BLD AUTO: 3.47 K/UL — SIGNIFICANT CHANGE UP (ref 1.8–7.4)
NEUTROPHILS NFR BLD AUTO: 56.7 % — SIGNIFICANT CHANGE UP (ref 43–77)
PLATELET # BLD AUTO: 126 K/UL — LOW (ref 150–400)
POTASSIUM SERPL-MCNC: 3.7 MMOL/L — SIGNIFICANT CHANGE UP (ref 3.5–5.3)
POTASSIUM SERPL-SCNC: 3.7 MMOL/L — SIGNIFICANT CHANGE UP (ref 3.5–5.3)
RBC # BLD: 3.66 M/UL — LOW (ref 3.8–5.2)
RBC # FLD: 14.7 % — HIGH (ref 10.3–14.5)
RH IG SCN BLD-IMP: POSITIVE — SIGNIFICANT CHANGE UP
RH IG SCN BLD-IMP: POSITIVE — SIGNIFICANT CHANGE UP
SODIUM SERPL-SCNC: 140 MMOL/L — SIGNIFICANT CHANGE UP (ref 135–145)
WBC # BLD: 6.13 K/UL — SIGNIFICANT CHANGE UP (ref 3.8–10.5)
WBC # FLD AUTO: 6.13 K/UL — SIGNIFICANT CHANGE UP (ref 3.8–10.5)

## 2024-08-27 NOTE — H&P PST ADULT - PROBLEM SELECTOR PLAN 1
Patient tentatively scheduled for parathyroidectomy with parathyroid hormone assay and frozen section, parathyroid autotransplantation for 9/4/24. Pre-op instructions provided via son Alex who interpreted. Pt given verbal and written instructions with teach back on chlorhexidine shampoo. Pt verbalized understanding with return demonstration.    PST CBC T&S/ABO BMP A1C done      Pre-op Delirium Screening Questionnaire:  Patient eligible for cody risk screen age>75? NO

## 2024-08-27 NOTE — H&P PST ADULT - NS PRO FEM  PAP SMEARS 3YRS
Pt presents with concerns of hematuria and frequency.  Pt had surgery recently and had the catheter removed on Friday.  Pt started having blood in the urine yesterday and frequency this evening.    yes

## 2024-08-27 NOTE — H&P PST ADULT - EKG AND INTERPRETATION
6/20/24 HR 74  Normal sinus rhythm Left anterior fascicular block Minimal voltage criteria for LVH, may be normal variant Nonspecific T wave abnormality Prolonged QT (HIE)

## 2024-08-27 NOTE — H&P PST ADULT - NSICDXPASTMEDICALHX_GEN_ALL_CORE_FT
PAST MEDICAL HISTORY:  Anemia of renal disease     CAD (coronary artery disease)     Cholelithiasis     Diabetes     ESRD (end-stage renal disease) due to SLE     Glaucoma     Hypercholesteremia     Hyperparathyroidism     Hypertension     Lupus (systemic lupus erythematosus)     Memory impairment     Other hyperlipidemia     Rib fracture     Stented coronary artery

## 2024-08-27 NOTE — H&P PST ADULT - NSICDXPASTSURGICALHX_GEN_ALL_CORE_FT
PAST SURGICAL HISTORY:  H/O gastric bypass 2016    History of appendectomy     S/P appendectomy     S/P  section times two

## 2024-08-27 NOTE — H&P PST ADULT - ENMT COMMENTS
Date of last visit:  1/21/2022   Date of next visit:  Visit date not found    Requested Prescriptions     Pending Prescriptions Disp Refills    metoprolol succinate (TOPROL XL) 25 MG extended release tablet [Pharmacy Med Name: METOPROLOL SUCC ER 25MG 25 Tablet] 60 tablet 5     Sig: TAKE 2 TABLETS BY MOUTH ONCE DAILY MALLAMPATI II

## 2024-08-27 NOTE — H&P PST ADULT - HISTORY OF PRESENT ILLNESS
64 y/o female PMH HTN HLD T2DM (on lifestyle modification) morbid obesity (lost ~70 lbs after gastric bypass in 2016) SLE ESRD on HD (T/Th/S) since 2017 (left AV fistula) CAD s/p Cardiac stent in 1/2018 and 6/2018 mid memory impairment presents to presurgical testing with diagnosis of secondary hyperparathyroidism of renal origin. Pt is scheduled for a parathyroidectomy with parathyroid hormone assay and frozen section, parathyroid autotransplantation.

## 2024-09-03 ENCOUNTER — TRANSCRIPTION ENCOUNTER (OUTPATIENT)
Age: 64
End: 2024-09-03

## 2024-09-03 NOTE — ASU PATIENT PROFILE, ADULT - FALL HARM RISK - HARM RISK INTERVENTIONS

## 2024-09-04 ENCOUNTER — INPATIENT (INPATIENT)
Facility: HOSPITAL | Age: 64
LOS: 8 days | Discharge: HOME | End: 2024-09-13
Attending: OTOLARYNGOLOGY | Admitting: OTOLARYNGOLOGY
Payer: MEDICAID

## 2024-09-04 ENCOUNTER — RESULT REVIEW (OUTPATIENT)
Age: 64
End: 2024-09-04

## 2024-09-04 ENCOUNTER — APPOINTMENT (OUTPATIENT)
Dept: OTOLARYNGOLOGY | Facility: HOSPITAL | Age: 64
End: 2024-09-04

## 2024-09-04 VITALS
RESPIRATION RATE: 16 BRPM | SYSTOLIC BLOOD PRESSURE: 124 MMHG | DIASTOLIC BLOOD PRESSURE: 65 MMHG | HEIGHT: 61 IN | OXYGEN SATURATION: 100 % | TEMPERATURE: 98 F | HEART RATE: 76 BPM | WEIGHT: 139.99 LBS

## 2024-09-04 DIAGNOSIS — E03.9 HYPOTHYROIDISM, UNSPECIFIED: ICD-10-CM

## 2024-09-04 DIAGNOSIS — Z90.49 ACQUIRED ABSENCE OF OTHER SPECIFIED PARTS OF DIGESTIVE TRACT: Chronic | ICD-10-CM

## 2024-09-04 DIAGNOSIS — Z98.890 OTHER SPECIFIED POSTPROCEDURAL STATES: Chronic | ICD-10-CM

## 2024-09-04 DIAGNOSIS — N25.81 SECONDARY HYPERPARATHYROIDISM OF RENAL ORIGIN: ICD-10-CM

## 2024-09-04 DIAGNOSIS — Z98.890 OTHER SPECIFIED POSTPROCEDURAL STATES: ICD-10-CM

## 2024-09-04 DIAGNOSIS — Z98.891 HISTORY OF UTERINE SCAR FROM PREVIOUS SURGERY: Chronic | ICD-10-CM

## 2024-09-04 LAB
ANION GAP SERPL CALC-SCNC: 15 MMOL/L — HIGH (ref 7–14)
ANION GAP SERPL CALC-SCNC: 16 MMOL/L — HIGH (ref 7–14)
BUN SERPL-MCNC: 52 MG/DL — HIGH (ref 7–23)
BUN SERPL-MCNC: 53 MG/DL — HIGH (ref 7–23)
CA-I BLD-SCNC: 0.95 MMOL/L — LOW (ref 1.15–1.29)
CA-I BLD-SCNC: 1.06 MMOL/L — LOW (ref 1.15–1.29)
CALCIUM SERPL-MCNC: 7.8 MG/DL — LOW (ref 8.4–10.5)
CALCIUM SERPL-MCNC: 8.6 MG/DL — SIGNIFICANT CHANGE UP (ref 8.4–10.5)
CALCIUM SERPL-MCNC: 8.8 MG/DL — SIGNIFICANT CHANGE UP (ref 8.4–10.5)
CHLORIDE SERPL-SCNC: 102 MMOL/L — SIGNIFICANT CHANGE UP (ref 98–107)
CHLORIDE SERPL-SCNC: 102 MMOL/L — SIGNIFICANT CHANGE UP (ref 98–107)
CO2 SERPL-SCNC: 21 MMOL/L — LOW (ref 22–31)
CO2 SERPL-SCNC: 22 MMOL/L — SIGNIFICANT CHANGE UP (ref 22–31)
CREAT SERPL-MCNC: 5.54 MG/DL — HIGH (ref 0.5–1.3)
CREAT SERPL-MCNC: 6.11 MG/DL — HIGH (ref 0.5–1.3)
EGFR: 7 ML/MIN/1.73M2 — LOW
EGFR: 8 ML/MIN/1.73M2 — LOW
GLUCOSE BLDC GLUCOMTR-MCNC: 110 MG/DL — HIGH (ref 70–99)
GLUCOSE BLDC GLUCOMTR-MCNC: 111 MG/DL — HIGH (ref 70–99)
GLUCOSE SERPL-MCNC: 103 MG/DL — HIGH (ref 70–99)
GLUCOSE SERPL-MCNC: 123 MG/DL — HIGH (ref 70–99)
HCT VFR BLD CALC: 29.1 % — LOW (ref 34.5–45)
HGB BLD-MCNC: 10.1 G/DL — LOW (ref 11.5–15.5)
MAGNESIUM SERPL-MCNC: 2.1 MG/DL — SIGNIFICANT CHANGE UP (ref 1.6–2.6)
MAGNESIUM SERPL-MCNC: 2.1 MG/DL — SIGNIFICANT CHANGE UP (ref 1.6–2.6)
MCHC RBC-ENTMCNC: 32.1 PG — SIGNIFICANT CHANGE UP (ref 27–34)
MCHC RBC-ENTMCNC: 34.7 GM/DL — SIGNIFICANT CHANGE UP (ref 32–36)
MCV RBC AUTO: 92.4 FL — SIGNIFICANT CHANGE UP (ref 80–100)
NRBC # BLD: 0 /100 WBCS — SIGNIFICANT CHANGE UP (ref 0–0)
NRBC # FLD: 0 K/UL — SIGNIFICANT CHANGE UP (ref 0–0)
PHOSPHATE SERPL-MCNC: 3.7 MG/DL — SIGNIFICANT CHANGE UP (ref 2.5–4.5)
PHOSPHATE SERPL-MCNC: 3.9 MG/DL — SIGNIFICANT CHANGE UP (ref 2.5–4.5)
PLATELET # BLD AUTO: 107 K/UL — LOW (ref 150–400)
POTASSIUM BLDV-SCNC: 4.4 MMOL/L — SIGNIFICANT CHANGE UP (ref 3.5–5.1)
POTASSIUM SERPL-MCNC: 4.4 MMOL/L — SIGNIFICANT CHANGE UP (ref 3.5–5.3)
POTASSIUM SERPL-MCNC: 4.6 MMOL/L — SIGNIFICANT CHANGE UP (ref 3.5–5.3)
POTASSIUM SERPL-SCNC: 4.4 MMOL/L — SIGNIFICANT CHANGE UP (ref 3.5–5.3)
POTASSIUM SERPL-SCNC: 4.6 MMOL/L — SIGNIFICANT CHANGE UP (ref 3.5–5.3)
RBC # BLD: 3.15 M/UL — LOW (ref 3.8–5.2)
RBC # FLD: 14.4 % — SIGNIFICANT CHANGE UP (ref 10.3–14.5)
SODIUM SERPL-SCNC: 139 MMOL/L — SIGNIFICANT CHANGE UP (ref 135–145)
SODIUM SERPL-SCNC: 139 MMOL/L — SIGNIFICANT CHANGE UP (ref 135–145)
WBC # BLD: 7.42 K/UL — SIGNIFICANT CHANGE UP (ref 3.8–10.5)
WBC # FLD AUTO: 7.42 K/UL — SIGNIFICANT CHANGE UP (ref 3.8–10.5)

## 2024-09-04 PROCEDURE — 60500 EXPLORE PARATHYROID GLANDS: CPT

## 2024-09-04 PROCEDURE — 88305 TISSUE EXAM BY PATHOLOGIST: CPT | Mod: 26

## 2024-09-04 PROCEDURE — 99223 1ST HOSP IP/OBS HIGH 75: CPT

## 2024-09-04 PROCEDURE — 88331 PATH CONSLTJ SURG 1 BLK 1SPC: CPT | Mod: 26

## 2024-09-04 PROCEDURE — 60512 AUTOTRANSPLANT PARATHYROID: CPT

## 2024-09-04 PROCEDURE — 99222 1ST HOSP IP/OBS MODERATE 55: CPT

## 2024-09-04 DEVICE — LIGATING CLIPS WECK HORIZON SMALL-WIDE (RED) 24: Type: IMPLANTABLE DEVICE | Status: FUNCTIONAL

## 2024-09-04 DEVICE — LIGATING CLIPS WECK HORIZON MEDIUM (BLUE) 24: Type: IMPLANTABLE DEVICE | Status: FUNCTIONAL

## 2024-09-04 DEVICE — SURGICEL 2 X 14": Type: IMPLANTABLE DEVICE | Status: FUNCTIONAL

## 2024-09-04 RX ORDER — HYDROMORPHONE HYDROCHLORIDE 2 MG/1
0.5 TABLET ORAL
Refills: 0 | Status: DISCONTINUED | OUTPATIENT
Start: 2024-09-04 | End: 2024-09-05

## 2024-09-04 RX ORDER — ACETAMINOPHEN 325 MG/1
975 TABLET ORAL EVERY 6 HOURS
Refills: 0 | Status: DISCONTINUED | OUTPATIENT
Start: 2024-09-04 | End: 2024-09-08

## 2024-09-04 RX ORDER — ALIROCUMAB 150 MG/ML
75 INJECTION, SOLUTION SUBCUTANEOUS
Refills: 0 | DISCHARGE

## 2024-09-04 RX ORDER — CALCIUM GLUCONATE 61(648) MG
1 TABLET ORAL ONCE
Refills: 0 | Status: COMPLETED | OUTPATIENT
Start: 2024-09-04 | End: 2024-09-04

## 2024-09-04 RX ORDER — LATANOPROST 50 UG/ML
1 SOLUTION OPHTHALMIC
Refills: 0 | DISCHARGE

## 2024-09-04 RX ORDER — HEPARIN SODIUM,BOVINE 1000/ML
5000 VIAL (ML) INJECTION EVERY 8 HOURS
Refills: 0 | Status: DISCONTINUED | OUTPATIENT
Start: 2024-09-04 | End: 2024-09-11

## 2024-09-04 RX ORDER — CALCIUM CARBONATE 500(1250)
1 TABLET ORAL THREE TIMES A DAY
Refills: 0 | Status: DISCONTINUED | OUTPATIENT
Start: 2024-09-04 | End: 2024-09-05

## 2024-09-04 RX ORDER — DORZOLAMIDE HCL/TIMOLOL MALEAT 22.3-6.8/1
1 DROPS OPHTHALMIC (EYE)
Refills: 0 | DISCHARGE

## 2024-09-04 RX ADMIN — Medication 100 GRAM(S): at 20:07

## 2024-09-04 RX ADMIN — ACETAMINOPHEN 975 MILLIGRAM(S): 325 TABLET ORAL at 17:30

## 2024-09-04 RX ADMIN — Medication 100 GRAM(S): at 15:58

## 2024-09-04 RX ADMIN — Medication 5000 UNIT(S): at 15:04

## 2024-09-04 RX ADMIN — Medication 1 TABLET(S): at 21:45

## 2024-09-04 RX ADMIN — Medication 5000 UNIT(S): at 21:40

## 2024-09-04 NOTE — BRIEF OPERATIVE NOTE - OPERATION/FINDINGS
4 parathyroid glands removed, left superior parathyroid gland implanted into right forearm.     Bilateral RLN identified, stimulated appropriately

## 2024-09-04 NOTE — PACU DISCHARGE NOTE - COMMENTS
No apparent anesthesia complications    Dispo to SICU for electrolyte monitoring and c/f hungry bone syndrome post op

## 2024-09-04 NOTE — CONSULT NOTE ADULT - ATTENDING COMMENTS
63F PMH HTN, HLD, T2DM (on lifestyle modification), morbid obesity s/p gastric bypass 2016, SLE c/b ESRD on HD (T/Th/S) since 2017 (left AV fistula), secondary hyperparathyroidism of renal origin, CAD s/p cardiac stent in 1/2018 and 6/2018 mid memory impairment presents for parathyroidectomy. Pt is now s/p 4 parathyroid glands removed WITH left superior parathyroid gland implanted into right forearm. 9/4. Endocrine consulted for further management.    Repeat calcium, albumin post-op and BID and if calcium < 7.5, please replete with calcium gluconate and calcitriol   c/w calcium carbonate 500 mg (200 mg elemental calcium) 1 tab TID   h/o hypothyroid, not on meds, check TSH   DM - LDSS AC/HS, A1c is not reliable in ESRD   rest of the plan as per fellow's note   complex patient requiring complex decision making 63F PMH HTN, HLD, T2DM (on lifestyle modification), morbid obesity s/p gastric bypass 2016, SLE c/b ESRD on HD (T/Th/S) since 2017 (left AV fistula), secondary hyperparathyroidism of renal origin, CAD s/p cardiac stent in 1/2018 and 6/2018 mid memory impairment presents for parathyroidectomy. Pt is now s/p 4 parathyroid glands removed WITH left superior parathyroid gland implanted into right forearm. 9/4. Endocrine consulted for further management.    Repeat calcium, albumin post-op around 8 PM and then BID.  If calcium < 7.5 and/or pt is symptomatic with hypocalcemia, please replete with calcium gluconate and calcitriol as per fellow's note.     c/w calcium carbonate 500 mg (200 mg elemental calcium) 1 tab TID   h/o hypothyroid, not on meds, check TSH   DM - LDSS AC/HS, A1c is not reliable in ESRD   rest of the plan as per fellow's note   complex patient requiring complex decision making

## 2024-09-04 NOTE — CONSULT NOTE ADULT - ASSESSMENT
63F PMH HTN, HLD, T2DM (on lifestyle modification), morbid obesity s/p gastric bypass 2016, SLE c/b ESRD on HD (T/Th/S) since 2017 (left AV fistula), secondary hyperparathyroidism of renal origin, CAD s/p cardiac stent in 1/2018 and 6/2018 mid memory impairment presents for parathyroidectomy. Pt is now s/p parathyroidectomy with parathyroid autotransplantation 9/4. Endocrine consulted for further management.    #Secondary Hyperparathyroidism s/p parathyroidectomy  - OP labs:   -- 2/13/24 corrected Ca 9.1, iPTH 2000  -- 3/12/2024 corrected Ca 8.9, iPTH 3053  -- 3/26/24 corrected Ca 9.7  -- 4/2024 corrected Ca 9.3, iPTH 2380, vitamin D 25OH 20.3, vitamin D 1,25OH 6.5   - ENT in-office US showing enlarged R superior parathyroid  - Intraoperative iCa 1.06 (low), Ca 8.6 (corrected 9.9)  - Currently asx  - Started on calcium carbonate 1 tab (200 mg elemental calcium) tidac post-op   - s/p IV calcium gluconate x1 9/4    Plan:  - please obtain post-op CMP, phos and Mg   - BID CMP, phos, Mg  - c/w Tums 1 tab tid with meals  - start calcitriol 0.25 mcg on HD days  - If Ca <7.5 or patient is symptomatic, give calcium gluconate IVP    #Hypothyroidism  - Not on LT4, per son had been on 25 mcg but TFTs found to be normal so stopped >1 year ago  - Repeat TSH in AM    #T2DM, diet controlled  - Last a1c 5.6% 8/2024, though unreliable i/s/o ESRD on HD  - Please obtain FS tidac and qhs  - If hyperglycemic x2 >180 can start low correctional Admelog at meals and low correctional Admelog at bedtime    #HTN  - Goal BP <130/80  - Management per primary team    D/w primary team      Amrita Arambula MD  Endocrinology Fellow  Can be reached via Microsoft Teams    For follow up questions, discharge recommendations, or new consults, please email [email or web address] ([email or web address]) or [email or web address] ([email or web address]) or call answering service at [phone] (weekdays); [phone] (nights/weekends).  For emergencies please page fellow on call.

## 2024-09-04 NOTE — CONSULT NOTE ADULT - ASSESSMENT
63F PMHx HTN, HLD, T2DM, morbid obesity (lost ~70 lbs after gastric bypass in 2016), SLE, ESRD on HD (T/Th/S) since 2017 (left AV fistula), CAD s/p Cardiac stent in 1/2018 and 6/2018, and mid memory impairment now s/p 4-gland total parathyroidectomy with reimplantation of 1x gland in R forearm on 9/4. SICU consulted for electrolyte monitoring and c/f hungry bone syndrome post op.     PLAN:  Neuro:  - AOx3  - Pain control: Tylenol and dilaudid prn     Respiratory:  - wean post-op NC as tolerated    Cardiovascular:  - Hemodynamically stable off pressors   - plan to resume home anti-hypertensives tomorrow (valsartan and labetalol)    Gastrointestinal:  - Diet: NPO     Genitourinary/Renal:  - ESRD (T/Th/S) > f/u nephro recs    Heme:  - Chemical VTE ppx: SQH    ID:   - Trend WBC on CBC qD  - Monitor fever curve    Endocrine:  - F/u PACU ionized Ca2+ and BMP > replete as needed (Calcium gluconate and calcitriol)  - Monitor for signs and symptoms of hungry bone syn  - f/u endocrine recs  - Trend FS q6/on BMP    Lines/Tubes/Drains:   - PIV    DISPO: SICU

## 2024-09-04 NOTE — CONSULT NOTE ADULT - SUBJECTIVE AND OBJECTIVE BOX
Amrita Arambula MD   |   PGY-4  Endocrinology Fellow  Available on Microsoft Teams    HPI:  63F PMH HTN, HLD, T2DM (on lifestyle modification), morbid obesity s/p gastric bypass , SLE c/b ESRD on HD (//S) since  (left AV fistula), secondary hyperparathyroidism of renal origin, CAD s/p cardiac stent in 2018 and 2018 mid memory impairment presents for parathyroidectomy. Pt is now s/p parathyroidectomy with parathyroid autotransplantation . Endocrine consulted for further management.    Endocrine History:    #Secondary hyperparathyroidism  Patient states she was dx after abnormal findings on HD labs. Established care with ENT in 2024. Labs as below:    24 corrected Ca 9.1, iPTH 2000  3/12/2024 corrected Ca 8.9, iPTH 3053  3/26/24 corrected Ca 9.7  2024 corrected Ca 9.3, iPTH 2380, vitamin D 25OH 20.3, vitamin D 1,25OH 6.5     ENT in-office US showing enlarged R superior parathyroid.     Patient underwent menopause at age 50. It is unclear if patient has had prior DEXA scan. Per patient's son, patient was "weak in terms of bones", and had fractured her ribs after sneezing ~2 months ago. Other than that denies fractures or h/o kidney stones.     #DM2  - Dx >28 years ago, being managed by PCP. States DM is diet-controlled. Reports only being on metformin in past, however OP record notes prior alogliptin use, which patient denies at present. Last a1c 5.6% 2024. Reports neuropathy and glaucoma, though no known retinopathy. Nephropathy i/s/o SLE. Has CAD, no CVA.    #Hypothyroidism  - Son reports patient was previously on 25 mcg LT4 but discontinued as her TFTs were noted to be normal.    PAST MEDICAL & SURGICAL HISTORY:  Diabetes      Hypertension      Hypercholesteremia      Other hyperlipidemia      Glaucoma      ESRD (end-stage renal disease) due to SLE      Lupus (systemic lupus erythematosus)      Hyperparathyroidism      Memory impairment      Anemia of renal disease      CAD (coronary artery disease)      Stented coronary artery      Cholelithiasis      Rib fracture      S/P  section  times two      H/O gastric bypass  2016      S/P appendectomy      History of appendectomy          FAMILY HISTORY:  DM in parents    SOCIAL HISTORY:  Nonsmoker      MEDICATIONS  (STANDING):  acetaminophen     Tablet .. 975 milliGRAM(s) Oral every 6 hours  calcium carbonate    500 mG (Tums) Chewable 1 Tablet(s) Chew three times a day  calcium gluconate IVPB 1 Gram(s) IV Intermittent once  heparin   Injectable 5000 Unit(s) SubCutaneous every 8 hours    MEDICATIONS  (PRN):  HYDROmorphone  Injectable 0.5 milliGRAM(s) IV Push every 10 minutes PRN Moderate Pain (4 - 6)      Allergies    penicillin (Rash)    Intolerances      Review of Systems:  Constitutional: No fever  Eyes: No blurry vision  Neuro: No tremors, numbness/tingling  HEENT: No pain, dysphagia, dysphonia  Cardiovascular: No chest pain, palpitations  Respiratory: No SOB  GI: No nausea, vomiting, abdominal pain  : No dysuria  Skin: no rash  Psych: no depression  Endocrine: no polyuria, polydipsia  Hem/lymph: no swelling  Osteoporosis: no fractures    ===================PHYSICAL EXAM=======================  VITALS: T(C): 36.2 (24 @ 16:00)  T(F): 97.2 (24 @ 16:00), Max: 98.1 (24 @ 06:51)  HR: 73 (24 @ 16:00) (70 - 76)  BP: 124/65 (24 @ 06:51) (124/65 - 124/65)  RR:  (12 - 20)  SpO2:  (99% - 100%)  Wt(kg): --  GENERAL: NAD  EYES: No proptosis, no lid lag, anicteric  THYROID: Normal size, no palpable nodules  RESPIRATORY: Clear to auscultation bilaterally  CARDIOVASCULAR: Regular rate and rhythm  GI: Soft, nontender, non distended  EXT: b/l feet without wounds, 2+ pulses, no edema  PSYCH: Alert and oriented x 3, reactive mood  ======================================================  POCT Blood Glucose.: 111 mg/dL (24 @ 12:27)  POCT Blood Glucose.: 110 mg/dL (24 @ 07:13)                            10.1   7.42  )-----------( 107      ( 04 Sep 2024 13:30 )             29.1           139  |  102  |  53<H>  ----------------------------<  123<H>  4.4   |  22  |  5.54<H>    eGFR: 8<L>    Ca    8.6        Mg     2.10       Phos  3.9             Thyroid Function Tests:      A1C with Estimated Average Glucose Result: 5.6 % (24 @ 14:30)          Radiology:              Amrita Arambula MD   |   PGY-4  Endocrinology Fellow  Available on Microsoft Teams    HPI:  63F PMH HTN, HLD, T2DM (on lifestyle modification), morbid obesity s/p gastric bypass , SLE c/b ESRD on HD (//S) since  (left AV fistula), secondary hyperparathyroidism of renal origin, CAD s/p cardiac stent in 2018 and 2018 mid memory impairment presents for parathyroidectomy. Pt is now s/p parathyroidectomy with parathyroid autotransplantation . Endocrine consulted for further management.    Endocrine History:    #Secondary hyperparathyroidism  Patient states she was dx after abnormal findings on HD labs. Established care with ENT in 2024. Labs as below:    24 corrected Ca 9.1, iPTH 2000  3/12/2024 corrected Ca 8.9, iPTH 3053  3/26/24 corrected Ca 9.7  2024 corrected Ca 9.3, iPTH 2380, vitamin D 25OH 20.3, vitamin D 1,25OH 6.5     ENT in-office US showing enlarged R superior parathyroid.     Patient underwent menopause at age 50. It is unclear if patient has had prior DEXA scan. Per patient's son, patient was "weak in terms of bones", and had fractured her ribs after sneezing ~2 months ago. Other than that denies fractures or h/o kidney stones.     #DM2  - Dx >28 years ago, being managed by PCP. States DM is diet-controlled. Reports only being on metformin in past, however OP record notes prior alogliptin use, which patient denies at present. Last a1c 5.6% 2024. Reports neuropathy and glaucoma, though no known retinopathy. Nephropathy i/s/o SLE. Has CAD, no CVA.    #Hypothyroidism  - Son reports patient was previously on 25 mcg LT4 but discontinued as her TFTs were noted to be normal.    PAST MEDICAL & SURGICAL HISTORY:  Diabetes      Hypertension      Hypercholesteremia      Other hyperlipidemia      Glaucoma      ESRD (end-stage renal disease) due to SLE      Lupus (systemic lupus erythematosus)      Hyperparathyroidism      Memory impairment      Anemia of renal disease      CAD (coronary artery disease)      Stented coronary artery      Cholelithiasis      Rib fracture      S/P  section  times two      H/O gastric bypass  2016      S/P appendectomy      History of appendectomy          FAMILY HISTORY:  DM in parents  No medullary thyroid cancer/MEN2 syndrome    SOCIAL HISTORY:  Nonsmoker      MEDICATIONS  (STANDING):  acetaminophen     Tablet .. 975 milliGRAM(s) Oral every 6 hours  calcium carbonate    500 mG (Tums) Chewable 1 Tablet(s) Chew three times a day  calcium gluconate IVPB 1 Gram(s) IV Intermittent once  heparin   Injectable 5000 Unit(s) SubCutaneous every 8 hours    MEDICATIONS  (PRN):  HYDROmorphone  Injectable 0.5 milliGRAM(s) IV Push every 10 minutes PRN Moderate Pain (4 - 6)      Allergies    penicillin (Rash)    Intolerances      Review of Systems:  Constitutional: No fever  Neuro: No tremors, numbness/tingling  HEENT: No pain, +dysphagia, dysphonia  Cardiovascular: No chest pain, palpitations  Respiratory: No SOB  GI: No nausea, vomiting, abdominal pain  Psych: no depression  Endocrine: no polyuria, polydipsia  Hem/lymph: no swelling  Osteoporosis: +rib fractures    ===================PHYSICAL EXAM=======================  VITALS: T(C): 36.2 (24 @ 16:00)  T(F): 97.2 (24 @ 16:00), Max: 98.1 (24 @ 06:51)  HR: 73 (24 @ 16:00) (70 - 76)  BP: 124/65 (24 @ 06:51) (124/65 - 124/65)  RR:  (12 - 20)  SpO2:  (99% - 100%)  Wt(kg): --  GENERAL: NAD  EYES: No proptosis, no lid lag, anicteric  THYROID: Normal size, no palpable nodules  RESPIRATORY: Clear to auscultation bilaterally  CARDIOVASCULAR: Regular rate and rhythm  GI: Soft, nontender, non distended  EXT: b/l feet without wounds, 2+ pulses, no edema  PSYCH: Alert and oriented x 3, reactive mood  ======================================================  POCT Blood Glucose.: 111 mg/dL (09-04-24 @ 12:27)  POCT Blood Glucose.: 110 mg/dL (24 @ 07:13)                            10.1   7.42  )-----------( 107      ( 04 Sep 2024 13:30 )             29.1           139  |  102  |  53<H>  ----------------------------<  123<H>  4.4   |  22  |  5.54<H>    eGFR: 8<L>    Ca    8.6        Mg     2.10       Phos  3.9             Thyroid Function Tests:      A1C with Estimated Average Glucose Result: 5.6 % (24 @ 14:30)          Radiology:

## 2024-09-04 NOTE — CONSULT NOTE ADULT - SUBJECTIVE AND OBJECTIVE BOX
HISTORY OF PRESENT ILLNESS:  63F PMHx HTN, HLD, T2DM, morbid obesity (lost ~70 lbs after gastric bypass in 2016), SLE, ESRD on HD (//S) since  (left AV fistula), CAD s/p Cardiac stent in 2018 and 2018, and mid memory impairment now s/p 4-gland total parathyroidectomy with reimplantation of 1x gland in R forearm on . SICU consulted for electrolyte monitoring and c/f hungry bone syndrome post op.      --------------------------------------------------------------------------------------  PAST MEDICAL HISTORY:   Diabetes    Hypertension    Hypercholesteremia    No pertinent past medical history    Essential hypertension    Type 2 diabetes mellitus without complication, without long-term current use of insulin    Other hyperlipidemia    Glaucoma    ESRD (end-stage renal disease) due to SLE    Lupus (systemic lupus erythematosus)    Hyperparathyroidism    Memory impairment    Anemia of renal disease    CAD (coronary artery disease)    Stented coronary artery    Cholelithiasis    Rib fracture        PAST SURGICAL HISTORY:   No significant past surgical history    S/P  section    H/O gastric bypass    S/P appendectomy    History of appendectomy        FAMILY HISTORY:   No pertinent family history in first degree relatives    DM (diabetes mellitus)    History of hypertension    DM (diabetes mellitus) (Sibling)    History of hypertension (Sibling)        HOME MEDICATIONS:    ALLERGIES:   penicillin (Rash)      --------------------------------------------------------------------------------------    VITAL SIGNS:  ICU Vital Signs Last 24 Hrs  T(C): 36.7 (04 Sep 2024 06:51), Max: 36.7 (04 Sep 2024 06:51)  T(F): 98.1 (04 Sep 2024 06:51), Max: 98.1 (04 Sep 2024 06:51)  HR: 76 (04 Sep 2024 06:51) (76 - 76)  BP: 124/65 (04 Sep 2024 06:51) (124/65 - 124/65)  BP(mean): --  ABP: --  ABP(mean): --  RR: 16 (04 Sep 2024 06:51) (16 - 16)  SpO2: 100% (04 Sep 2024 06:51) (100% - 100%)        PHYSICAL EXAM:    General Appearance: no acute distress, NTND   Neck: anterior neck incision c/d/i and soft  Chest: airway intact, non-labored breathing  CV: no JVD, palpable pulses b/l  Abdomen: soft, non-tender, non-distended, +BS   Extremities: WWP, R forearm reimplantation site c/d/i       POCT Blood Glucose.: 111 mg/dL (04 Sep 2024 12:27)  POCT Blood Glucose.: 110 mg/dL (04 Sep 2024 07:13)        PATIENT CARE ACCESS DEVICES:  [ ] Peripheral IV  [ ] Central Venous Line	[ ] R	[ ] L	[ ] IJ	[ ] Fem	[ ] SC	Placed:   [ ] Arterial Line		[ ] R	[ ] L	[ ] Fem	[ ] Rad	[ ] Ax	Placed:   [ ] PICC:					[ ] Mediport  [ ] Urinary Catheter, Date Placed:   [x] Necessity of urinary, arterial, and venous catheters discussed      I/Os:  I&O's Summary      --------------------------------------------------------------------------------------  MEDICATIONS:   Neurologic Medications  acetaminophen     Tablet .. 975 milliGRAM(s) Oral every 6 hours  HYDROmorphone  Injectable 0.5 milliGRAM(s) IV Push every 10 minutes PRN Moderate Pain (4 - 6)    Respiratory Medications    Cardiovascular Medications    Gastrointestinal Medications    Genitourinary Medications    Hematologic/Oncologic Medications  heparin   Injectable 5000 Unit(s) SubCutaneous every 8 hours    Antimicrobial/Immunologic Medications    Endocrine/Metabolic Medications    Topical/Other Medications      --------------------------------------------------------------------------------------  LABS:

## 2024-09-05 LAB
ADD ON TEST-SPECIMEN IN LAB: SIGNIFICANT CHANGE UP
ALBUMIN SERPL ELPH-MCNC: 2.9 G/DL — LOW (ref 3.3–5)
ANION GAP SERPL CALC-SCNC: 17 MMOL/L — HIGH (ref 7–14)
ANION GAP SERPL CALC-SCNC: 18 MMOL/L — HIGH (ref 7–14)
ANION GAP SERPL CALC-SCNC: 19 MMOL/L — HIGH (ref 7–14)
BUN SERPL-MCNC: 61 MG/DL — HIGH (ref 7–23)
BUN SERPL-MCNC: 63 MG/DL — HIGH (ref 7–23)
BUN SERPL-MCNC: 65 MG/DL — HIGH (ref 7–23)
CA-I BLD-SCNC: 0.84 MMOL/L — LOW (ref 1.15–1.29)
CA-I BLD-SCNC: 0.87 MMOL/L — LOW (ref 1.15–1.29)
CA-I BLD-SCNC: 0.9 MMOL/L — LOW (ref 1.15–1.29)
CALCIUM SERPL-MCNC: 7 MG/DL — LOW (ref 8.4–10.5)
CALCIUM SERPL-MCNC: 7.1 MG/DL — LOW (ref 8.4–10.5)
CALCIUM SERPL-MCNC: 7.2 MG/DL — LOW (ref 8.4–10.5)
CHLORIDE SERPL-SCNC: 100 MMOL/L — SIGNIFICANT CHANGE UP (ref 98–107)
CHLORIDE SERPL-SCNC: 100 MMOL/L — SIGNIFICANT CHANGE UP (ref 98–107)
CHLORIDE SERPL-SCNC: 101 MMOL/L — SIGNIFICANT CHANGE UP (ref 98–107)
CO2 SERPL-SCNC: 19 MMOL/L — LOW (ref 22–31)
CO2 SERPL-SCNC: 19 MMOL/L — LOW (ref 22–31)
CO2 SERPL-SCNC: 21 MMOL/L — LOW (ref 22–31)
CREAT SERPL-MCNC: 6.37 MG/DL — HIGH (ref 0.5–1.3)
CREAT SERPL-MCNC: 6.77 MG/DL — HIGH (ref 0.5–1.3)
CREAT SERPL-MCNC: 7.13 MG/DL — HIGH (ref 0.5–1.3)
DIALYSIS INSTRUMENT RESULT - HEPATITIS B SURFACE ANTIGEN: NEGATIVE — SIGNIFICANT CHANGE UP
EGFR: 6 ML/MIN/1.73M2 — LOW
EGFR: 6 ML/MIN/1.73M2 — LOW
EGFR: 7 ML/MIN/1.73M2 — LOW
GLUCOSE BLDC GLUCOMTR-MCNC: 138 MG/DL — HIGH (ref 70–99)
GLUCOSE SERPL-MCNC: 136 MG/DL — HIGH (ref 70–99)
GLUCOSE SERPL-MCNC: 80 MG/DL — SIGNIFICANT CHANGE UP (ref 70–99)
GLUCOSE SERPL-MCNC: 91 MG/DL — SIGNIFICANT CHANGE UP (ref 70–99)
HCT VFR BLD CALC: 28.4 % — LOW (ref 34.5–45)
HGB BLD-MCNC: 9.7 G/DL — LOW (ref 11.5–15.5)
MAGNESIUM SERPL-MCNC: 2.3 MG/DL — SIGNIFICANT CHANGE UP (ref 1.6–2.6)
MAGNESIUM SERPL-MCNC: 2.3 MG/DL — SIGNIFICANT CHANGE UP (ref 1.6–2.6)
MAGNESIUM SERPL-MCNC: 2.4 MG/DL — SIGNIFICANT CHANGE UP (ref 1.6–2.6)
MCHC RBC-ENTMCNC: 31.6 PG — SIGNIFICANT CHANGE UP (ref 27–34)
MCHC RBC-ENTMCNC: 34.2 GM/DL — SIGNIFICANT CHANGE UP (ref 32–36)
MCV RBC AUTO: 92.5 FL — SIGNIFICANT CHANGE UP (ref 80–100)
MRSA PCR RESULT.: SIGNIFICANT CHANGE UP
NRBC # BLD: 0 /100 WBCS — SIGNIFICANT CHANGE UP (ref 0–0)
NRBC # FLD: 0 K/UL — SIGNIFICANT CHANGE UP (ref 0–0)
PHOSPHATE SERPL-MCNC: 3.5 MG/DL — SIGNIFICANT CHANGE UP (ref 2.5–4.5)
PHOSPHATE SERPL-MCNC: 3.6 MG/DL — SIGNIFICANT CHANGE UP (ref 2.5–4.5)
PHOSPHATE SERPL-MCNC: 3.7 MG/DL — SIGNIFICANT CHANGE UP (ref 2.5–4.5)
PLATELET # BLD AUTO: 111 K/UL — LOW (ref 150–400)
POTASSIUM SERPL-MCNC: 4.3 MMOL/L — SIGNIFICANT CHANGE UP (ref 3.5–5.3)
POTASSIUM SERPL-MCNC: 4.4 MMOL/L — SIGNIFICANT CHANGE UP (ref 3.5–5.3)
POTASSIUM SERPL-MCNC: 4.6 MMOL/L — SIGNIFICANT CHANGE UP (ref 3.5–5.3)
POTASSIUM SERPL-SCNC: 4.3 MMOL/L — SIGNIFICANT CHANGE UP (ref 3.5–5.3)
POTASSIUM SERPL-SCNC: 4.4 MMOL/L — SIGNIFICANT CHANGE UP (ref 3.5–5.3)
POTASSIUM SERPL-SCNC: 4.6 MMOL/L — SIGNIFICANT CHANGE UP (ref 3.5–5.3)
RBC # BLD: 3.07 M/UL — LOW (ref 3.8–5.2)
RBC # FLD: 14.8 % — HIGH (ref 10.3–14.5)
S AUREUS DNA NOSE QL NAA+PROBE: SIGNIFICANT CHANGE UP
SODIUM SERPL-SCNC: 138 MMOL/L — SIGNIFICANT CHANGE UP (ref 135–145)
TSH SERPL-MCNC: 2.35 UIU/ML — SIGNIFICANT CHANGE UP (ref 0.27–4.2)
TSH SERPL-MCNC: 2.95 UIU/ML — SIGNIFICANT CHANGE UP (ref 0.27–4.2)
WBC # BLD: 8.55 K/UL — SIGNIFICANT CHANGE UP (ref 3.8–10.5)
WBC # FLD AUTO: 8.55 K/UL — SIGNIFICANT CHANGE UP (ref 3.8–10.5)

## 2024-09-05 PROCEDURE — 99223 1ST HOSP IP/OBS HIGH 75: CPT

## 2024-09-05 PROCEDURE — 99291 CRITICAL CARE FIRST HOUR: CPT

## 2024-09-05 PROCEDURE — 99232 SBSQ HOSP IP/OBS MODERATE 35: CPT

## 2024-09-05 RX ORDER — CALCIUM CARBONATE 500(1250)
1 TABLET ORAL THREE TIMES A DAY
Refills: 0 | Status: DISCONTINUED | OUTPATIENT
Start: 2024-09-05 | End: 2024-09-06

## 2024-09-05 RX ORDER — VALSARTAN 40 MG/1
80 TABLET ORAL DAILY
Refills: 0 | Status: DISCONTINUED | OUTPATIENT
Start: 2024-09-05 | End: 2024-09-13

## 2024-09-05 RX ORDER — CALCITRIOL 0.25 UG/1
0.25 CAPSULE ORAL EVERY 12 HOURS
Refills: 0 | Status: DISCONTINUED | OUTPATIENT
Start: 2024-09-05 | End: 2024-09-07

## 2024-09-05 RX ORDER — CHLORHEXIDINE GLUCONATE 40 MG/ML
1 SOLUTION TOPICAL DAILY
Refills: 0 | Status: DISCONTINUED | OUTPATIENT
Start: 2024-09-05 | End: 2024-09-10

## 2024-09-05 RX ORDER — CALCIUM GLUCONATE 61(648) MG
1 TABLET ORAL ONCE
Refills: 0 | Status: COMPLETED | OUTPATIENT
Start: 2024-09-05 | End: 2024-09-05

## 2024-09-05 RX ORDER — HYDROXYCHLOROQUINE SULFATE 200 MG/1
200 TABLET, FILM COATED ORAL DAILY
Refills: 0 | Status: DISCONTINUED | OUTPATIENT
Start: 2024-09-05 | End: 2024-09-11

## 2024-09-05 RX ORDER — CALCITRIOL 0.25 UG/1
0.25 CAPSULE ORAL DAILY
Refills: 0 | Status: DISCONTINUED | OUTPATIENT
Start: 2024-09-05 | End: 2024-09-05

## 2024-09-05 RX ORDER — AMLODIPINE BESYLATE 10 MG/1
5 TABLET ORAL DAILY
Refills: 0 | Status: DISCONTINUED | OUTPATIENT
Start: 2024-09-05 | End: 2024-09-06

## 2024-09-05 RX ORDER — EPOETIN ALFA 3000 [IU]/ML
3000 SOLUTION INTRAVENOUS; SUBCUTANEOUS
Refills: 0 | Status: DISCONTINUED | OUTPATIENT
Start: 2024-09-05 | End: 2024-09-13

## 2024-09-05 RX ORDER — SODIUM CHLORIDE 9 MG/ML
100 INJECTION INTRAMUSCULAR; INTRAVENOUS; SUBCUTANEOUS
Refills: 0 | Status: DISCONTINUED | OUTPATIENT
Start: 2024-09-05 | End: 2024-09-13

## 2024-09-05 RX ADMIN — CALCITRIOL 0.25 MICROGRAM(S): 0.25 CAPSULE ORAL at 17:35

## 2024-09-05 RX ADMIN — Medication 10 MILLIGRAM(S): at 21:05

## 2024-09-05 RX ADMIN — AMLODIPINE BESYLATE 5 MILLIGRAM(S): 10 TABLET ORAL at 21:25

## 2024-09-05 RX ADMIN — EPOETIN ALFA 3000 UNIT(S): 3000 SOLUTION INTRAVENOUS; SUBCUTANEOUS at 21:50

## 2024-09-05 RX ADMIN — Medication 100 GRAM(S): at 07:56

## 2024-09-05 RX ADMIN — ACETAMINOPHEN 975 MILLIGRAM(S): 325 TABLET ORAL at 01:15

## 2024-09-05 RX ADMIN — Medication 1 TABLET(S): at 21:01

## 2024-09-05 RX ADMIN — ACETAMINOPHEN 975 MILLIGRAM(S): 325 TABLET ORAL at 11:58

## 2024-09-05 RX ADMIN — ACETAMINOPHEN 975 MILLIGRAM(S): 325 TABLET ORAL at 23:33

## 2024-09-05 RX ADMIN — Medication 5000 UNIT(S): at 21:01

## 2024-09-05 RX ADMIN — ACETAMINOPHEN 975 MILLIGRAM(S): 325 TABLET ORAL at 06:10

## 2024-09-05 RX ADMIN — CALCITRIOL 0.25 MICROGRAM(S): 0.25 CAPSULE ORAL at 12:00

## 2024-09-05 RX ADMIN — Medication 1 TABLET(S): at 14:59

## 2024-09-05 RX ADMIN — Medication 1 TABLET(S): at 06:10

## 2024-09-05 RX ADMIN — ACETAMINOPHEN 975 MILLIGRAM(S): 325 TABLET ORAL at 00:36

## 2024-09-05 RX ADMIN — ACETAMINOPHEN 975 MILLIGRAM(S): 325 TABLET ORAL at 06:40

## 2024-09-05 RX ADMIN — Medication 100 GRAM(S): at 04:10

## 2024-09-05 RX ADMIN — CHLORHEXIDINE GLUCONATE 1 APPLICATION(S): 40 SOLUTION TOPICAL at 11:58

## 2024-09-05 RX ADMIN — Medication 5000 UNIT(S): at 06:09

## 2024-09-05 RX ADMIN — Medication 100 GRAM(S): at 02:41

## 2024-09-05 NOTE — PROGRESS NOTE ADULT - SUBJECTIVE AND OBJECTIVE BOX
Interval: patient s/p 3.5 gland parathyroidectomy with reimplant in R arm . Patient calciums decreasing given total of 4g IV calcium overnight. QTC prolonged, endo following        PAST MEDICAL & SURGICAL HISTORY:  Diabetes      Hypertension      Hypercholesteremia      Other hyperlipidemia      Glaucoma      ESRD (end-stage renal disease) due to SLE      Lupus (systemic lupus erythematosus)      Hyperparathyroidism      Memory impairment      Anemia of renal disease      CAD (coronary artery disease)      Stented coronary artery      Cholelithiasis      Rib fracture      S/P  section  times two      H/O gastric bypass  2016      S/P appendectomy      History of appendectomy        Allergies    penicillin (Rash)    Intolerances      MEDICATIONS  (STANDING):  acetaminophen     Tablet .. 975 milliGRAM(s) Oral every 6 hours  calcitriol   Capsule 0.25 MICROGram(s) Oral daily  calcium carbonate    500 mG (Tums) Chewable 1 Tablet(s) Chew three times a day  heparin   Injectable 5000 Unit(s) SubCutaneous every 8 hours    MEDICATIONS  (PRN):        Vital Signs Last 24 Hrs  T(C): 36.2 (05 Sep 2024 04:00), Max: 36.7 (04 Sep 2024 06:51)  T(F): 97.2 (05 Sep 2024 04:00), Max: 98.1 (04 Sep 2024 06:51)  HR: 67 (05 Sep 2024 06:00) (66 - 76)  BP: 124/65 (04 Sep 2024 06:51) (124/65 - 124/65)  BP(mean): --  RR: 13 (05 Sep 2024 06:00) (12 - 26)  SpO2: 100% (05 Sep 2024 06:00) (99% - 100%)    Parameters below as of 05 Sep 2024 06:00  Patient On (Oxygen Delivery Method): room air        Physical Exam:  Alert, NAD  Nonlabored Respirations JOHN ANGEL  neck: incision c/d/i       24 @ 07:01  -  24 @ 06:45  --------------------------------------------------------  IN: 200 mL / OUT: 0 mL / NET: 200 mL                              9.7    8.55  )-----------( 111      ( 05 Sep 2024 01:44 )             28.4    09-05    138  |  101  |  61<H>  ----------------------------<  91  4.4   |  19<L>  |  6.37<H>    Ca    7.1<L>      05 Sep 2024 01:44  Phos  3.6     -  Mg     2.30     -           A/P: 63yFemale s/p 3.5 gland parathyroidectomy    -trend calciums  -repletions per endo/SICU       - SCDs  - d/w attending

## 2024-09-05 NOTE — CONSULT NOTE ADULT - SUBJECTIVE AND OBJECTIVE BOX
[facility] NEPHROLOGY- CONSULTATION NOTE    63 year old Female with history of below presents for parathyroidectomy. Nephrology consulted for ESRD status.    Patient gets HD at [facility] under Dr. Jacobs. Last HD on 9/3 as an outpatient. Patient gets HD for 3 hours duration via LUE AVF with UF 1-1.5L as per patient. Patient denies any hypotension, cramping or access issues.    REVIEW OF SYSTEMS:  Gen: no fevers  HEENT: no rhinorrhea  Neck: no sore throat  Cards: no chest pain  Resp: no dyspnea  GI: no nausea or vomiting or diarrhea  : no dysuria or hematuria  Vascular: no LE edema  Derm: no rashes  Neuro: no numbness/tingling    penicillin (Rash)      Home Medications Reviewed  Hospital Medications:   MEDICATIONS  (STANDING):  acetaminophen     Tablet .. 975 milliGRAM(s) Oral every 6 hours  calcitriol   Capsule 0.25 MICROGram(s) Oral daily  calcium carbonate    500 mG (Tums) Chewable 1 Tablet(s) Chew three times a day  calcium gluconate IVPB 1 Gram(s) IV Intermittent once  heparin   Injectable 5000 Unit(s) SubCutaneous every 8 hours      PAST MEDICAL & SURGICAL HISTORY:  Diabetes      Hypertension      Hypercholesteremia      Other hyperlipidemia      Glaucoma      ESRD (end-stage renal disease) due to SLE      Lupus (systemic lupus erythematosus)      Hyperparathyroidism      Memory impairment      Anemia of renal disease      CAD (coronary artery disease)      Stented coronary artery      Cholelithiasis      Rib fracture      S/P  section  times two      H/O gastric bypass  2016      S/P appendectomy      History of appendectomy          FAMILY HISTORY:  DM (diabetes mellitus)  mother and father    History of hypertension  father and mother    DM (diabetes mellitus) (Sibling)  siblings    History of hypertension (Sibling)  sibling        SOCIAL HISTORY:  Denies toxic substance use     VITALS:  T(F): 97.2 (24 @ 04:00), Max: 97.5 (24 @ 17:00)  HR: 67 (24 @ 07:00)  BP: --  RR: 12 (24 @ 07:00)  SpO2: 100% (24 @ 07:00)  Wt(kg): --     07:01  -   07:00  --------------------------------------------------------  IN: 200 mL / OUT: 0 mL / NET: 200 mL          PHYSICAL EXAM:  Gen: NAD, calm  HEENT: MMM  Neck: no JVD  Cards: RRR, +S1/S2, no M/G/R  Resp: CTA B/L  GI: soft, NT/ND, NABS  : no CVA tenderness  Vascular: no LE edema B/L, LUE AVF + bruit/thrill  Derm: no rashes  Neuro: non-focal    LABS:      138  |  100  |  65<H>  ----------------------------<  80  4.3   |  21<L>  |  6.77<H>    Ca    7.2<L>      05 Sep 2024 06:08  Phos  3.7       Mg     2.30         TPro      /  Alb  2.9<L>  /  TBili      /  DBili      /  AST      /  ALT      /  AlkPhos          Creatinine Trend: 6.77 <--, 6.37 <--, 6.11 <--, 5.54 <--                        9.7    8.55  )-----------( 111      ( 05 Sep 2024 01:44 )             28.4     Urine Studies:  Urinalysis Basic - ( 05 Sep 2024 06:08 )    Color:  / Appearance:  / SG:  / pH:   Gluc: 80 mg/dL / Ketone:   / Bili:  / Urobili:    Blood:  / Protein:  / Nitrite:    Leuk Esterase:  / RBC:  / WBC    Sq Epi:  / Non Sq Epi:  / Bacteria:           RADIOLOGY & ADDITIONAL STUDIES:    N/A

## 2024-09-05 NOTE — PATIENT PROFILE ADULT - RELATIONSHIP TO PATIENT
Pt has reached max acetaminophen level within 24 hours. Held scheduled tylenol as well as PRN Norco when asked for this AM. Pt educated on reasoning on having to hold the Norco to which the pt became very angry. Explained to pt that next Norco can be given at 0952 this AM so she does not overdose. Med list gone through with the pt. Still not understanding certain aspects of tylenol dosing. Pt then asked to be discharged. Explained MD still needs to see pt to give discharge orders. Pt agreeable. Will give Valium in mean time. Notified dayshift.   son

## 2024-09-05 NOTE — PROGRESS NOTE ADULT - SUBJECTIVE AND OBJECTIVE BOX
Patient is a 63y old  Female who presents with a chief complaint of "I'm having a parathyroid surgery" (27 Aug 2024 14:21)    Briefly,     Interval events: Patient received calcium gluconate IVP x1. Corrected calcium would be ~8, however. Of note, patient has been on tums but was not given calcitriol yet. Otherwise, denies perioral or extremity numbness or tingling.      PAST MEDICAL & SURGICAL HISTORY:  Diabetes      Hypertension      Hypercholesteremia      Other hyperlipidemia      Glaucoma      ESRD (end-stage renal disease) due to SLE      Lupus (systemic lupus erythematosus)      Hyperparathyroidism      Memory impairment      Anemia of renal disease      CAD (coronary artery disease)      Stented coronary artery      Cholelithiasis      Rib fracture      S/P  section  times two      H/O gastric bypass  2016      S/P appendectomy      History of appendectomy          REVIEW OF SYSTEMS:  CONSTITUTIONAL:  Feels well, good appetite  CARDIOVASCULAR:  Negative for chest pain or palpitations  RESPIRATORY:  Negative for cough, or SOB   GASTROINTESTINAL:  Negative for nausea, vomiting, or abdominal pain  GENITOURINARY:  Negative frequency, urgency or dysuria          penicillin (Rash)    Home Medications:     MEDICATIONS  (STANDING):  acetaminophen     Tablet .. 975 milliGRAM(s) Oral every 6 hours  calcitriol   Capsule 0.25 MICROGram(s) Oral daily  calcium carbonate    500 mG (Tums) Chewable 1 Tablet(s) Chew three times a day  chlorhexidine 2% Cloths 1 Application(s) Topical daily  epoetin adele (EPOGEN) Injectable 3000 Unit(s) IV Push <User Schedule>  heparin   Injectable 5000 Unit(s) SubCutaneous every 8 hours    MEDICATIONS  (PRN):  sodium chloride 0.9% Bolus. 100 milliLiter(s) IV Bolus every 5 minutes PRN SBP LESS THAN or EQUAL to 90 mmHg        OBJECTIVE:     Vital Signs Last 24 Hrs  T(C): 36.2 (05 Sep 2024 04:00), Max: 36.4 (04 Sep 2024 17:00)  T(F): 97.2 (05 Sep 2024 04:00), Max: 97.5 (04 Sep 2024 17:00)  HR: 71 (05 Sep 2024 11:00) (66 - 77)  BP: --  BP(mean): --  RR: 13 (05 Sep 2024 11:00) (12 - 26)  SpO2: 100% (05 Sep 2024 11:00) (99% - 100%)    Parameters below as of 05 Sep 2024 07:00  Patient On (Oxygen Delivery Method): room air        PHYSICAL EXAM:  GENERAL:  laying in bed in NAD  RESPIRATORY: nonlabored breathing, no accessory muscle use  Extremities: Warm, no edema in all 4 exts   NEURO: A&O X3      I&O's Detail    04 Sep 2024 07:01  -  05 Sep 2024 07:00  --------------------------------------------------------  IN:    IV PiggyBack: 200 mL  Total IN: 200 mL    OUT:    Voided (mL): 0 mL  Total OUT: 0 mL    Total NET: 200 mL          LABS:                        9.7    8.55  )-----------( 111      ( 05 Sep 2024 01:44 )             28.4     Hgb Trend: 9.7<--, 10.1<--  09-05    138  |  100  |  65<H>  ----------------------------<  80  4.3   |  21<L>  |  6.77<H>    Ca    7.2<L>      05 Sep 2024 06:08  Phos  3.7     09-05  Mg     2.30     09-05    TPro  x   /  Alb  2.9<L>  /  TBili  x   /  DBili  x   /  AST  x   /  ALT  x   /  AlkPhos  x   09-05    Creatinine Trend: 6.77<--, 6.37<--, 6.11<--, 5.54<--, 4.25<--    Urinalysis Basic - ( 05 Sep 2024 06:08 )    Color: x / Appearance: x / SG: x / pH: x  Gluc: 80 mg/dL / Ketone: x  / Bili: x / Urobili: x   Blood: x / Protein: x / Nitrite: x   Leuk Esterase: x / RBC: x / WBC x   Sq Epi: x / Non Sq Epi: x / Bacteria: x            MICROBIOLOGY:     RADIOLOGY:  [ ] Reviewed and interpreted by me    EKG:

## 2024-09-05 NOTE — PROGRESS NOTE ADULT - ASSESSMENT
63F PMH HTN, HLD, T2DM (on lifestyle modification), morbid obesity s/p gastric bypass 2016, SLE c/b ESRD on HD (T/Th/S) since 2017 (left AV fistula), secondary hyperparathyroidism of renal origin, CAD s/p cardiac stent in 1/2018 and 6/2018 mid memory impairment presents for parathyroidectomy. Pt is now s/p parathyroidectomy with parathyroid autotransplantation 9/4. Endocrine consulted for further management.    #Secondary Hyperparathyroidism s/p parathyroidectomy  - OP labs:   -- 2/13/24 corrected Ca 9.1, iPTH 2000  -- 3/12/2024 corrected Ca 8.9, iPTH 3053  -- 3/26/24 corrected Ca 9.7  -- 4/2024 corrected Ca 9.3, iPTH 2380, vitamin D 25OH 20.3, vitamin D 1,25OH 6.5   - ENT in-office US showing enlarged R superior parathyroid  - Intraoperative iCa 1.06 (low), Ca 8.6 (corrected 9.9)  - Currently asx  - Started on calcium carbonate 1 tab (200 mg elemental calcium) tidac post-op   - s/p calcium gluconate IVP 9/5. Corrected calcium would be ~8, however.    Plan:  - Trend BID CMP, phos, Mg  - c/w Tums 1 tab tid with meals (500mg tums contain 200mg elemental calcium)  - Continue calcitriol 0.25 mcg everyday. If becomes hypocalcemic, may increase to BID dosing.  - If corrected Ca <7.5 or patient is symptomatic, give calcium gluconate IVP  - Goal of calcium would be low-normal range while calcium-phos product <55 to avoid risk of complications  - Advised pt to notify RN if having hypocalcemic s/s including perioral/extremity tingling/numbness    #Hypothyroidism Hx  - Not on LT4, per son had been on 25 mcg but TFTs found to be normal so stopped >1 year ago  - Repeat TSH 2.35, normal    #T2DM, diet controlled  - Last a1c 5.6% 8/2024, though unreliable i/s/o ESRD on HD  - Please obtain FS tidac and qhs  - If hyperglycemic x2 >180 can start low correctional Admelog at meals and low correctional Admelog at bedtime    #HTN  - Goal BP <130/80  - Management per primary team    Contact via pager or [employer] Teams during business hours. For follow up questions, discharge recommendations, or new consults please call answering service at [phone] (weekdays), [phone] (nights/weekends). For nonurgent matters, please email [email or web address] or [email or web address]. Patient can follow up at discharge with [facility] by calling [phone] to make an appointment or [address] [facility] #[phone] or [facility]: [address] [place] [address]; Ph # [phone].    Cyrus Preston MD  Attending Physician   Department of Endocrinology, Diabetes and Metabolism   [employer]

## 2024-09-05 NOTE — CONSULT NOTE ADULT - ASSESSMENT
63 year old Female with history of ESRD on HD presents for parathyroidectomy. Nephrology consulted for ESRD status.    1) ESRD: Last HD on 9/3 as an outpatient. Plan for next maintenance HD today. Monitor electrolytes.    2) HTN with ESRD: BP controlled. Monitor off medications.    3) Anemia of renal disease: Hb borderline with acceptable iron stores. Start Epo 3K with HD. Monitor Hb.    4) Secondary HPT of renal origin: S/P parathyroidectomy with decreasing serum calcium. Continue with calcitriol 0.25 mcg daily and calcium carbonate 1 tab with meals. Can increase calcitriol to 0.25 mcg twice daily as needed. High calcium bath with HD. Check iPTH. Monitor serum calcium and phosphorus.      [facility] NEPHROLOGY  Timmy Acharya M.D.  Amilcar Contreras D.O.  Mindi Geller M.D.  MD Luna Israel, MSN, ANP-C    Telephone: [phone]  Facsimile: [phone] [place] [address], [address]  [place] [address]

## 2024-09-05 NOTE — PROGRESS NOTE ADULT - SUBJECTIVE AND OBJECTIVE BOX
POST ANESTHESIA EVALUATION    63y Female POSTOP DAY 1     MENTAL STATUS: Patient participation [ x ] Awake     [  ] Arousable     [  ] Sedated    AIRWAY PATENCY: [ x ] Satisfactory  [  ] Other:     Vital Signs Last 24 Hrs  T(C): 36.2 (05 Sep 2024 04:00), Max: 36.4 (04 Sep 2024 17:00)  T(F): 97.2 (05 Sep 2024 04:00), Max: 97.5 (04 Sep 2024 17:00)  HR: 71 (05 Sep 2024 11:00) (66 - 77)  BP: --  BP(mean): --  RR: 13 (05 Sep 2024 11:00) (12 - 26)  SpO2: 100% (05 Sep 2024 11:00) (99% - 100%)    Parameters below as of 05 Sep 2024 07:00  Patient On (Oxygen Delivery Method): room air      I&O's Summary    04 Sep 2024 07:01  -  05 Sep 2024 07:00  --------------------------------------------------------  IN: 200 mL / OUT: 0 mL / NET: 200 mL          NAUSEA/ VOMITTING:  [ x ] NONE  [  ] CONTROLLED [  ] OTHER     PAIN: [  x] CONTROLLED WITH CURRENT REGIMEN  [  ] OTHER    [ x ] NO APPARENT ANESTHESIA COMPLICATIONS

## 2024-09-05 NOTE — PROGRESS NOTE ADULT - SUBJECTIVE AND OBJECTIVE BOX
SICU Daily Progress Note  =====================================================  Interval Events:  - 1g calcium gluconate during day shift, 1g in evening  - trending q6 BMPs    Allergies: penicillin (Rash)      MEDICATIONS:   --------------------------------------------------------------------------------------  Neurologic Medications  acetaminophen     Tablet .. 975 milliGRAM(s) Oral every 6 hours  HYDROmorphone  Injectable 0.5 milliGRAM(s) IV Push every 10 minutes PRN Moderate Pain (4 - 6)    Respiratory Medications    Cardiovascular Medications    Gastrointestinal Medications  calcium carbonate    500 mG (Tums) Chewable 1 Tablet(s) Chew three times a day    Genitourinary Medications    Hematologic/Oncologic Medications  heparin   Injectable 5000 Unit(s) SubCutaneous every 8 hours    Antimicrobial/Immunologic Medications    Endocrine/Metabolic Medications    Topical/Other Medications    --------------------------------------------------------------------------------------    VITAL SIGNS, INS/OUTS (last 24 hours):  --------------------------------------------------------------------------------------  T(C): 36.1 (09-04-24 @ 22:00), Max: 36.7 (09-04-24 @ 06:51)  HR: 71 (09-05-24 @ 00:00) (68 - 76)  BP: 124/65 (09-04-24 @ 06:51) (124/65 - 124/65)  BP(mean): --  ABP: 115/41 (09-05-24 @ 00:00) (115/41 - 151/60)  ABP(mean): 67 (09-05-24 @ 00:00) (67 - 183)  RR: 18 (09-05-24 @ 00:00) (12 - 26)  SpO2: 99% (09-05-24 @ 00:00) (99% - 100%)  CVP(mm Hg): --  CI: --  CAPILLARY BLOOD GLUCOSE      POCT Blood Glucose.: 111 mg/dL (04 Sep 2024 12:27)  POCT Blood Glucose.: 110 mg/dL (04 Sep 2024 07:13)   N/A    09-04 @ 07:01  -  09-05 @ 00:03  --------------------------------------------------------  IN:    IV PiggyBack: 100 mL  Total IN: 100 mL    OUT:  Total OUT: 0 mL    Total NET: 100 mL        --------------------------------------------------------------------------------------    EXAM  General Appearance: no acute distress  Neck: anterior neck incision c/d/i and soft  Chest: non-labored breathing  CV: no JVD, palpable pulses b/l  Abdomen: soft, non-tender, non-distended, +BS   Extremities: WWP, R forearm reimplantation site c/d/i     METABOLIC/FLUIDS/ELECTROLYTES      HEMATOLOGIC  [x] VTE Prophylaxis: heparin   Injectable 5000 Unit(s) SubCutaneous every 8 hours    Transfusions:	[] PRBC	[] Platelets		[] FFP	[] Cryoprecipitate    INFECTIOUS DISEASE  Antimicrobials/Immunologic Medications:        LABS  --------------------------------------------------------------------------------------  CBC (09-04 @ 13:30)                              10.1<L>                         7.42    )----------------(  107<L>     --    % Neutrophils, --    % Lymphocytes, ANC: --                                  29.1<L>                BMP (09-04 @ 19:40)             139     |  102     |  52<H> 		Ca++ 0.95<L>  Ca 7.8<L>             ---------------------------------( 103<H>		Mg 2.10               4.6     |  21<L>   |  6.11<H>			Ph 3.7     BMP (09-04 @ 13:47)             139     |  102     |  53<H> 		Ca++ 1.06<L>  Ca 8.6                ---------------------------------( 123<H>		Mg 2.10               4.4     |  22      |  5.54<H>			Ph 3.9                 --------------------------------------------------------------------------------------    OTHER LABORATORY:     IMAGING STUDIES:   CXR:      SICU Daily Progress Note  =====================================================  Interval Events:  - s/p total thyroidectomy   - 1g calcium gluconate during day shift, 2g in evening  - concern for potential development of hungry bone syndrome, monitor calcium q6 per ENT  - QTc elongation to 528, monitor for further elongation  - repeat calcium 5AM      Allergies: penicillin (Rash)      MEDICATIONS:   --------------------------------------------------------------------------------------  Neurologic Medications  acetaminophen     Tablet .. 975 milliGRAM(s) Oral every 6 hours  HYDROmorphone  Injectable 0.5 milliGRAM(s) IV Push every 10 minutes PRN Moderate Pain (4 - 6)    Respiratory Medications    Cardiovascular Medications    Gastrointestinal Medications  calcium carbonate    500 mG (Tums) Chewable 1 Tablet(s) Chew three times a day    Genitourinary Medications    Hematologic/Oncologic Medications  heparin   Injectable 5000 Unit(s) SubCutaneous every 8 hours    Antimicrobial/Immunologic Medications    Endocrine/Metabolic Medications    Topical/Other Medications    --------------------------------------------------------------------------------------    VITAL SIGNS, INS/OUTS (last 24 hours):  --------------------------------------------------------------------------------------  T(C): 36.1 (09-04-24 @ 22:00), Max: 36.7 (09-04-24 @ 06:51)  HR: 71 (09-05-24 @ 00:00) (68 - 76)  BP: 124/65 (09-04-24 @ 06:51) (124/65 - 124/65)  BP(mean): --  ABP: 115/41 (09-05-24 @ 00:00) (115/41 - 151/60)  ABP(mean): 67 (09-05-24 @ 00:00) (67 - 183)  RR: 18 (09-05-24 @ 00:00) (12 - 26)  SpO2: 99% (09-05-24 @ 00:00) (99% - 100%)  CVP(mm Hg): --  CI: --  CAPILLARY BLOOD GLUCOSE      POCT Blood Glucose.: 111 mg/dL (04 Sep 2024 12:27)  POCT Blood Glucose.: 110 mg/dL (04 Sep 2024 07:13)   N/A    09-04 @ 07:01  -  09-05 @ 00:03  --------------------------------------------------------  IN:    IV PiggyBack: 100 mL  Total IN: 100 mL    OUT:  Total OUT: 0 mL    Total NET: 100 mL        --------------------------------------------------------------------------------------    EXAM  General Appearance: no acute distress  Neck: anterior neck incision c/d/i and soft  Chest: non-labored breathing  CV: no JVD, palpable pulses b/l  Abdomen: soft, non-tender, non-distended, +BS   Extremities: WWP, R forearm reimplantation site c/d/i     METABOLIC/FLUIDS/ELECTROLYTES      HEMATOLOGIC  [x] VTE Prophylaxis: heparin   Injectable 5000 Unit(s) SubCutaneous every 8 hours    Transfusions:	[] PRBC	[] Platelets		[] FFP	[] Cryoprecipitate    INFECTIOUS DISEASE  Antimicrobials/Immunologic Medications:        LABS  --------------------------------------------------------------------------------------  CBC (09-04 @ 13:30)                              10.1<L>                         7.42    )----------------(  107<L>     --    % Neutrophils, --    % Lymphocytes, ANC: --                                  29.1<L>                BMP (09-04 @ 19:40)             139     |  102     |  52<H> 		Ca++ 0.95<L>  Ca 7.8<L>             ---------------------------------( 103<H>		Mg 2.10               4.6     |  21<L>   |  6.11<H>			Ph 3.7     BMP (09-04 @ 13:47)             139     |  102     |  53<H> 		Ca++ 1.06<L>  Ca 8.6                ---------------------------------( 123<H>		Mg 2.10               4.4     |  22      |  5.54<H>			Ph 3.9                 --------------------------------------------------------------------------------------    OTHER LABORATORY:     IMAGING STUDIES:   CXR:      SICU Daily Progress Note  =====================================================  Interval/Overnight Events:  - s/p total parathyroidectomy on 9/4  - s/p 5g calcium gluconate as of 9/5  - concern for potential development of hungry bone syndrome, monitor calcium q6 per ENT  - QTc elongation to 532, monitor for further elongation  - Trend Ca with corrected value based on albumin levels  - HD today 9/5      Allergies: penicillin (Rash)      MEDICATIONS:   --------------------------------------------------------------------------------------  Neurologic Medications  acetaminophen     Tablet .. 975 milliGRAM(s) Oral every 6 hours  HYDROmorphone  Injectable 0.5 milliGRAM(s) IV Push every 10 minutes PRN Moderate Pain (4 - 6)    Respiratory Medications    Cardiovascular Medications    Gastrointestinal Medications  calcium carbonate    500 mG (Tums) Chewable 1 Tablet(s) Chew three times a day    Genitourinary Medications    Hematologic/Oncologic Medications  heparin   Injectable 5000 Unit(s) SubCutaneous every 8 hours    Antimicrobial/Immunologic Medications    Endocrine/Metabolic Medications    Topical/Other Medications    --------------------------------------------------------------------------------------    VITAL SIGNS, INS/OUTS (last 24 hours):  --------------------------------------------------------------------------------------  T(C): 36.1 (09-04-24 @ 22:00), Max: 36.7 (09-04-24 @ 06:51)  HR: 71 (09-05-24 @ 00:00) (68 - 76)  BP: 124/65 (09-04-24 @ 06:51) (124/65 - 124/65)  BP(mean): --  ABP: 115/41 (09-05-24 @ 00:00) (115/41 - 151/60)  ABP(mean): 67 (09-05-24 @ 00:00) (67 - 183)  RR: 18 (09-05-24 @ 00:00) (12 - 26)  SpO2: 99% (09-05-24 @ 00:00) (99% - 100%)  CVP(mm Hg): --  CI: --  CAPILLARY BLOOD GLUCOSE      POCT Blood Glucose.: 111 mg/dL (04 Sep 2024 12:27)  POCT Blood Glucose.: 110 mg/dL (04 Sep 2024 07:13)   N/A    09-04 @ 07:01  -  09-05 @ 00:03  --------------------------------------------------------  IN:    IV PiggyBack: 100 mL  Total IN: 100 mL    OUT:  Total OUT: 0 mL    Total NET: 100 mL        --------------------------------------------------------------------------------------    EXAM  General Appearance: no acute distress  Neck: anterior neck incision c/d/i and soft  Chest: non-labored breathing  CV: no JVD, palpable pulses b/l  Abdomen: soft, non-tender, non-distended, +BS   Extremities: WWP, R forearm reimplantation site c/d/i     METABOLIC/FLUIDS/ELECTROLYTES      HEMATOLOGIC  [x] VTE Prophylaxis: heparin   Injectable 5000 Unit(s) SubCutaneous every 8 hours    Transfusions:	[] PRBC	[] Platelets		[] FFP	[] Cryoprecipitate    INFECTIOUS DISEASE  Antimicrobials/Immunologic Medications:        LABS  --------------------------------------------------------------------------------------  CBC (09-04 @ 13:30)                              10.1<L>                         7.42    )----------------(  107<L>     --    % Neutrophils, --    % Lymphocytes, ANC: --                                  29.1<L>                BMP (09-04 @ 19:40)             139     |  102     |  52<H> 		Ca++ 0.95<L>  Ca 7.8<L>             ---------------------------------( 103<H>		Mg 2.10               4.6     |  21<L>   |  6.11<H>			Ph 3.7     BMP (09-04 @ 13:47)             139     |  102     |  53<H> 		Ca++ 1.06<L>  Ca 8.6                ---------------------------------( 123<H>		Mg 2.10               4.4     |  22      |  5.54<H>			Ph 3.9                 --------------------------------------------------------------------------------------    OTHER LABORATORY:     IMAGING STUDIES:   CXR:

## 2024-09-05 NOTE — PROGRESS NOTE ADULT - ASSESSMENT
63F PMHx HTN, HLD, T2DM, morbid obesity (lost ~70 lbs after gastric bypass in 2016), SLE, ESRD on HD (T/Th/S) since 2017 (left AV fistula), CAD s/p Cardiac stent in 1/2018 and 6/2018, and mid memory impairment now s/p 4-gland total parathyroidectomy with reimplantation of 1x gland in R forearm on 9/4. SICU consulted for electrolyte monitoring and c/f hungry bone syndrome post op.     Neuro:  - AOx3  - Pain control: Tylenol and dilaudid prn   - Holding home memantine    Respiratory:  - wean post-op NC as tolerated    Cardiovascular:  - Hemodynamically stable off pressors   - holding home anti-hypertensives (valsartan and labetalol)    Gastrointestinal:  - Diet: NPO     Genitourinary/Renal:  - ESRD (T/Th/S) > f/u nephro recs    Heme:  - Chemical VTE ppx: SQH    ID:   - Trend WBC on CBC qD  - Monitor fever curve    Endocrine:  - replete Ca as needed   - Monitor for signs and symptoms of hungry bone syn  - endocrine recs --> Tums tid, calcitriol on HD days  - Trend FS q6/on BMP    Lines/Tubes/Drains:   - PIV    DISPO: SICU   63F PMHx HTN, HLD, T2DM, morbid obesity (lost ~70 lbs after gastric bypass in 2016), SLE, ESRD on HD (T/Th/S) since 2017 (left AV fistula), CAD s/p Cardiac stent in 1/2018 and 6/2018, and mid memory impairment now s/p 4-gland total parathyroidectomy with reimplantation of 1x gland in R forearm on 9/4. SICU consulted for electrolyte monitoring and c/f hungry bone syndrome post op.       PLAN  Neuro:  - AOx3  - Pain control: Tylenol and dilaudid prn   - Holding home memantine    Respiratory:  - on RA, breathing comfortably    Cardiovascular:  - MAP goal >65  - Hemodynamically stable off pressors   - holding home anti-hypertensives (valsartan and labetalol)    Gastrointestinal:  - Diet: Regular, carb restrictions     Genitourinary/Renal:  - ESRD (T/Th/S) > f/u nephro recs    Heme:  - Chemical VTE ppx: SQH  - EPO with HD    ID:   - Trend WBC on CBC qD  - Monitor fever curve    Endocrine:  - replete Ca as needed, with correction based on albumin  - Monitor for signs and symptoms of hungry bone syn  - endocrine recs --> Tums tid w meals, calcitriol on HD days  - Trend FS q6/on BMP    Lines/Tubes/Drains:   - PIV    DISPO: SICU   63F PMHx HTN, HLD, T2DM, morbid obesity (lost ~70 lbs after gastric bypass in 2016), SLE, ESRD on HD (T/Th/S) since 2017 (left AV fistula), CAD s/p Cardiac stent in 1/2018 and 6/2018, and mid memory impairment now s/p 4-gland total parathyroidectomy with reimplantation of 1x gland in R forearm on 9/4. SICU consulted for electrolyte monitoring and c/f hungry bone syndrome post op.       PLAN  Neuro:  - AOx3  - Pain control: Tylenol and dilaudid prn   - Holding home memantine    Respiratory:  - on RA, breathing comfortably    Cardiovascular:  - MAP goal >65  - Hemodynamically stable off pressors   - holding home anti-hypertensives (valsartan and labetalol)    Gastrointestinal:  - Diet: Regular, carb restrictions     Genitourinary/Renal:  - ESRD (T/Th/S) > f/u nephro recs  - BID CMP. Mag/Phos    Heme:  - Chemical VTE ppx: SQH  - EPO with HD    ID:   - Trend WBC on CBC qD  - Monitor fever curve    Endocrine:  - replete Ca as needed, with correction based on albumin  - Monitor for signs and symptoms of hungry bone syn  - endocrine recs --> Tums tid w meals, calcitriol on HD days  - Trend FS q6/on CMP    Lines/Tubes/Drains:   - PIV    DISPO: SICU

## 2024-09-06 DIAGNOSIS — E83.51 HYPOCALCEMIA: ICD-10-CM

## 2024-09-06 LAB
ADD ON TEST-SPECIMEN IN LAB: SIGNIFICANT CHANGE UP
ALBUMIN SERPL ELPH-MCNC: 3 G/DL — LOW (ref 3.3–5)
ALBUMIN SERPL ELPH-MCNC: 3.1 G/DL — LOW (ref 3.3–5)
ALP SERPL-CCNC: 800 U/L — HIGH (ref 40–120)
ALP SERPL-CCNC: 810 U/L — HIGH (ref 40–120)
ALT FLD-CCNC: 14 U/L — SIGNIFICANT CHANGE UP (ref 4–33)
ALT FLD-CCNC: 15 U/L — SIGNIFICANT CHANGE UP (ref 4–33)
ANION GAP SERPL CALC-SCNC: 15 MMOL/L — HIGH (ref 7–14)
ANION GAP SERPL CALC-SCNC: 17 MMOL/L — HIGH (ref 7–14)
ANION GAP SERPL CALC-SCNC: 18 MMOL/L — HIGH (ref 7–14)
AST SERPL-CCNC: 32 U/L — SIGNIFICANT CHANGE UP (ref 4–32)
AST SERPL-CCNC: 38 U/L — HIGH (ref 4–32)
BILIRUB DIRECT SERPL-MCNC: <0.2 MG/DL — SIGNIFICANT CHANGE UP (ref 0–0.3)
BILIRUB INDIRECT FLD-MCNC: >0.2 MG/DL — SIGNIFICANT CHANGE UP (ref 0–1)
BILIRUB SERPL-MCNC: 0.3 MG/DL — SIGNIFICANT CHANGE UP (ref 0.2–1.2)
BILIRUB SERPL-MCNC: 0.4 MG/DL — SIGNIFICANT CHANGE UP (ref 0.2–1.2)
BLD GP AB SCN SERPL QL: NEGATIVE — SIGNIFICANT CHANGE UP
BUN SERPL-MCNC: 27 MG/DL — HIGH (ref 7–23)
BUN SERPL-MCNC: 28 MG/DL — HIGH (ref 7–23)
BUN SERPL-MCNC: 33 MG/DL — HIGH (ref 7–23)
CA-I BLD-SCNC: 0.73 MMOL/L — LOW (ref 1.15–1.29)
CA-I BLD-SCNC: 0.84 MMOL/L — LOW (ref 1.15–1.29)
CA-I BLD-SCNC: 0.9 MMOL/L — LOW (ref 1.15–1.29)
CALCIUM SERPL-MCNC: 6.3 MG/DL — CRITICAL LOW (ref 8.4–10.5)
CALCIUM SERPL-MCNC: 6.8 MG/DL — LOW (ref 8.4–10.5)
CALCIUM SERPL-MCNC: 7.4 MG/DL — LOW (ref 8.4–10.5)
CHLORIDE SERPL-SCNC: 100 MMOL/L — SIGNIFICANT CHANGE UP (ref 98–107)
CHLORIDE SERPL-SCNC: 101 MMOL/L — SIGNIFICANT CHANGE UP (ref 98–107)
CHLORIDE SERPL-SCNC: 104 MMOL/L — SIGNIFICANT CHANGE UP (ref 98–107)
CO2 SERPL-SCNC: 23 MMOL/L — SIGNIFICANT CHANGE UP (ref 22–31)
CO2 SERPL-SCNC: 23 MMOL/L — SIGNIFICANT CHANGE UP (ref 22–31)
CO2 SERPL-SCNC: 24 MMOL/L — SIGNIFICANT CHANGE UP (ref 22–31)
CREAT SERPL-MCNC: 3.64 MG/DL — HIGH (ref 0.5–1.3)
CREAT SERPL-MCNC: 4.03 MG/DL — HIGH (ref 0.5–1.3)
CREAT SERPL-MCNC: 5.26 MG/DL — HIGH (ref 0.5–1.3)
EGFR: 12 ML/MIN/1.73M2 — LOW
EGFR: 13 ML/MIN/1.73M2 — LOW
EGFR: 9 ML/MIN/1.73M2 — LOW
GLUCOSE BLDC GLUCOMTR-MCNC: 111 MG/DL — HIGH (ref 70–99)
GLUCOSE BLDC GLUCOMTR-MCNC: 113 MG/DL — HIGH (ref 70–99)
GLUCOSE BLDC GLUCOMTR-MCNC: 142 MG/DL — HIGH (ref 70–99)
GLUCOSE BLDC GLUCOMTR-MCNC: 158 MG/DL — HIGH (ref 70–99)
GLUCOSE SERPL-MCNC: 103 MG/DL — HIGH (ref 70–99)
GLUCOSE SERPL-MCNC: 139 MG/DL — HIGH (ref 70–99)
GLUCOSE SERPL-MCNC: 81 MG/DL — SIGNIFICANT CHANGE UP (ref 70–99)
HBV SURFACE AB SER-ACNC: 230 MIU/ML — SIGNIFICANT CHANGE UP
HBV SURFACE AG SER-ACNC: SIGNIFICANT CHANGE UP
HCT VFR BLD CALC: 28.3 % — LOW (ref 34.5–45)
HCV AB S/CO SERPL IA: 0.13 S/CO — SIGNIFICANT CHANGE UP (ref 0–0.99)
HCV AB SERPL-IMP: SIGNIFICANT CHANGE UP
HGB BLD-MCNC: 9.6 G/DL — LOW (ref 11.5–15.5)
MAGNESIUM SERPL-MCNC: 2.1 MG/DL — SIGNIFICANT CHANGE UP (ref 1.6–2.6)
MAGNESIUM SERPL-MCNC: 2.1 MG/DL — SIGNIFICANT CHANGE UP (ref 1.6–2.6)
MAGNESIUM SERPL-MCNC: 2.2 MG/DL — SIGNIFICANT CHANGE UP (ref 1.6–2.6)
MCHC RBC-ENTMCNC: 31 PG — SIGNIFICANT CHANGE UP (ref 27–34)
MCHC RBC-ENTMCNC: 33.9 GM/DL — SIGNIFICANT CHANGE UP (ref 32–36)
MCV RBC AUTO: 91.3 FL — SIGNIFICANT CHANGE UP (ref 80–100)
NRBC # BLD: 0 /100 WBCS — SIGNIFICANT CHANGE UP (ref 0–0)
NRBC # FLD: 0 K/UL — SIGNIFICANT CHANGE UP (ref 0–0)
PHOSPHATE SERPL-MCNC: 2 MG/DL — LOW (ref 2.5–4.5)
PHOSPHATE SERPL-MCNC: 2.3 MG/DL — LOW (ref 2.5–4.5)
PHOSPHATE SERPL-MCNC: 2.4 MG/DL — LOW (ref 2.5–4.5)
PLATELET # BLD AUTO: 130 K/UL — LOW (ref 150–400)
POTASSIUM SERPL-MCNC: 3.2 MMOL/L — LOW (ref 3.5–5.3)
POTASSIUM SERPL-MCNC: 3.3 MMOL/L — LOW (ref 3.5–5.3)
POTASSIUM SERPL-MCNC: 3.3 MMOL/L — LOW (ref 3.5–5.3)
POTASSIUM SERPL-SCNC: 3.2 MMOL/L — LOW (ref 3.5–5.3)
POTASSIUM SERPL-SCNC: 3.3 MMOL/L — LOW (ref 3.5–5.3)
POTASSIUM SERPL-SCNC: 3.3 MMOL/L — LOW (ref 3.5–5.3)
PROT SERPL-MCNC: 5.9 G/DL — LOW (ref 6–8.3)
PROT SERPL-MCNC: 6.2 G/DL — SIGNIFICANT CHANGE UP (ref 6–8.3)
PTH-INTACT FLD-MCNC: 14 PG/ML — LOW (ref 15–65)
RBC # BLD: 3.1 M/UL — LOW (ref 3.8–5.2)
RBC # FLD: 14.9 % — HIGH (ref 10.3–14.5)
RH IG SCN BLD-IMP: POSITIVE — SIGNIFICANT CHANGE UP
SODIUM SERPL-SCNC: 141 MMOL/L — SIGNIFICANT CHANGE UP (ref 135–145)
SODIUM SERPL-SCNC: 141 MMOL/L — SIGNIFICANT CHANGE UP (ref 135–145)
SODIUM SERPL-SCNC: 143 MMOL/L — SIGNIFICANT CHANGE UP (ref 135–145)
WBC # BLD: 7.2 K/UL — SIGNIFICANT CHANGE UP (ref 3.8–10.5)
WBC # FLD AUTO: 7.2 K/UL — SIGNIFICANT CHANGE UP (ref 3.8–10.5)

## 2024-09-06 PROCEDURE — 99223 1ST HOSP IP/OBS HIGH 75: CPT

## 2024-09-06 PROCEDURE — 99233 SBSQ HOSP IP/OBS HIGH 50: CPT | Mod: GC

## 2024-09-06 RX ORDER — CALCIUM GLUCONATE 61(648) MG
1 TABLET ORAL ONCE
Refills: 0 | Status: COMPLETED | OUTPATIENT
Start: 2024-09-06 | End: 2024-09-06

## 2024-09-06 RX ORDER — CALCIUM CARBONATE 500(1250)
1 TABLET ORAL THREE TIMES A DAY
Refills: 0 | Status: DISCONTINUED | OUTPATIENT
Start: 2024-09-06 | End: 2024-09-07

## 2024-09-06 RX ADMIN — Medication 100 MILLIGRAM(S): at 17:41

## 2024-09-06 RX ADMIN — Medication 5000 UNIT(S): at 05:41

## 2024-09-06 RX ADMIN — HYDROXYCHLOROQUINE SULFATE 200 MILLIGRAM(S): 200 TABLET, FILM COATED ORAL at 12:11

## 2024-09-06 RX ADMIN — VALSARTAN 80 MILLIGRAM(S): 40 TABLET ORAL at 12:11

## 2024-09-06 RX ADMIN — Medication 5000 UNIT(S): at 21:15

## 2024-09-06 RX ADMIN — CALCITRIOL 0.25 MICROGRAM(S): 0.25 CAPSULE ORAL at 17:42

## 2024-09-06 RX ADMIN — Medication 1: at 12:12

## 2024-09-06 RX ADMIN — ACETAMINOPHEN 975 MILLIGRAM(S): 325 TABLET ORAL at 12:10

## 2024-09-06 RX ADMIN — ACETAMINOPHEN 975 MILLIGRAM(S): 325 TABLET ORAL at 17:53

## 2024-09-06 RX ADMIN — Medication 100 GRAM(S): at 08:11

## 2024-09-06 RX ADMIN — ACETAMINOPHEN 975 MILLIGRAM(S): 325 TABLET ORAL at 17:40

## 2024-09-06 RX ADMIN — Medication 100 GRAM(S): at 21:15

## 2024-09-06 RX ADMIN — Medication 1 TABLET(S): at 05:40

## 2024-09-06 RX ADMIN — CALCITRIOL 0.25 MICROGRAM(S): 0.25 CAPSULE ORAL at 05:41

## 2024-09-06 RX ADMIN — Medication 1 TABLET(S): at 21:15

## 2024-09-06 RX ADMIN — Medication 5000 UNIT(S): at 14:27

## 2024-09-06 RX ADMIN — ACETAMINOPHEN 975 MILLIGRAM(S): 325 TABLET ORAL at 05:40

## 2024-09-06 RX ADMIN — Medication 1 TABLET(S): at 14:28

## 2024-09-06 NOTE — PROGRESS NOTE ADULT - ASSESSMENT
63F PMHx HTN, HLD, T2DM, morbid obesity (lost ~70 lbs after gastric bypass in 2016), SLE, ESRD on HD (T/Th/S) since 2017 (left AV fistula), CAD s/p Cardiac stent in 1/2018 and 6/2018, and mid memory impairment now s/p 4-gland total parathyroidectomy with reimplantation of 1x gland in R forearm on 9/4. SICU consulted for electrolyte monitoring and c/f hungry bone syndrome post op.     PLAN  Neuro:  - AOx3  - Pain control: Tylenol and dilaudid prn   - Holding home memantine    Respiratory:  - on RA, breathing comfortably    Cardiovascular:  - MAP goal >65  - Hemodynamically stable off pressors   - holding home anti-hypertensives (valsartan and labetalol)    Gastrointestinal:  - Diet: Regular, carb restrictions     Genitourinary/Renal:  - ESRD (T/Th/S) > f/u nephro recs  - BID CMP. Mag/Phos    Heme:  - Chemical VTE ppx: SQH  - EPO with HD    ID:   - Trend WBC on CBC qD  - Monitor fever curve    Endocrine:  - replete Ca as needed, with correction based on albumin  - Monitor for signs and symptoms of hungry bone syn  - endocrine recs --> Tums tid w meals, calcitriol BID  - Trend FS q6/on BMP    Lines/Tubes/Drains:   - PIV    DISPO: SICU   63F PMHx HTN, HLD, T2DM, morbid obesity (lost ~70 lbs after gastric bypass in 2016), SLE, ESRD on HD (T/Th/S) since 2017 (left AV fistula), CAD s/p Cardiac stent in 1/2018 and 6/2018, and mid memory impairment now s/p 4-gland total parathyroidectomy with reimplantation of 1x gland in R forearm on 9/4. SICU consulted for electrolyte monitoring and c/f hungry bone syndrome post op.       PLAN  Neuro:  - AOx3  - Pain control: Tylenol   - Holding home memantine    Respiratory:  - on RA, breathing comfortably    Cardiovascular:  - MAP goal >65  - Hemodynamically stable off pressors   - holding home anti-hypertensives (valsartan and labetalol)      Gastrointestinal:  - Diet: Regular, carb restrictions     Genitourinary/Renal:  - ESRD (T/Th/S) > f/u nephro recs  - BID CMP. Mag/Phos    Heme:  - Chemical VTE ppx: SQH  - EPO with HD  - c/w hydroxychloroquine    ID:   - Trend WBC on CBC qD  - Monitor fever curve    Endocrine:  - replete Ca as needed, with correction based on albumin  - s/p 6g calcium gluconate as of 9/6  - Monitor for signs and symptoms of hungry bone syn  - endocrine recs --> Tums tid w meals, calcitriol BID  - Trend FS q6/ on CMP    Lines/Tubes/Drains:   - PIV  -A-line    DISPO: SICU

## 2024-09-06 NOTE — DIETITIAN INITIAL EVALUATION ADULT - [id number]_GEN_A_CORE
Pressure Injury 1: coccyx, Healed  Pressure Injury 2: none, none  Pressure Injury 3: none, none  Pressure Injury 4: none, none  Pressure Injury 5: none, none  Pressure Injury 6: none, none  Pressure Injury 7: none, none  Pressure Injury 8: none, none  Pressure Injury 9: none, none  Pressure Injury 10: none, none  Pressure Injury 11: none, none Intact w/ no pressure injuries noted per RN flowsheets

## 2024-09-06 NOTE — ADVANCED PRACTICE NURSE CONSULT - RECOMMEDATIONS
**Incomplete  Topical Recommendations Topical Recommendations    Within gluteal cleft, BL buttocks: Cleanse with skin cleanser, pat dry. Apply critic-aide moisture barrier ointment twice a day and PRN with incontinent episodes.     Continue low air loss bed therapy, continue heel elevation, continue to turn & reposition per protocol, soft pillow between bony prominences, continue moisture management with barrier creams & single breathable pad, continue measures to decrease friction/shear/pressure. Continue with nutritional support as per dietary/orders.    Plan of care discussed with patient, all questions answered, and primary RN Trung Cotto.    Please contact Wound Care Service Line if we can be of further assistance (ext [phone]).

## 2024-09-06 NOTE — PROGRESS NOTE ADULT - SUBJECTIVE AND OBJECTIVE BOX
SICU Daily Progress Note  =====================================================  Interval/Overnight Events:   - s/p total parathyroidectomy on 9/4  - s/p 5g calcium gluconate on 9/5  - concern for potential development of hungry bone syndrome, monitor calcium q6 per ENT  - QTc elongation to 532, monitor for further elongation  - Trend Ca with corrected value based on albumin levels  - HD today 9/5  - hypertensive during HD with systolics 190s --> 10mg IV labetalol push and home antihypertensives        MEDICATIONS:   --------------------------------------------------------------------------------------  acetaminophen     Tablet .. 975 milliGRAM(s) Oral every 6 hours  amLODIPine   Tablet 5 milliGRAM(s) Oral daily  calcitriol   Capsule 0.25 MICROGram(s) Oral every 12 hours  calcium carbonate    500 mG (Tums) Chewable 1 Tablet(s) Chew three times a day  chlorhexidine 2% Cloths 1 Application(s) Topical daily  chlorhexidine 2% Cloths 1 Application(s) Topical daily  epoetin adele (EPOGEN) Injectable 3000 Unit(s) IV Push <User Schedule>  heparin   Injectable 5000 Unit(s) SubCutaneous every 8 hours  hydroxychloroquine 200 milliGRAM(s) Oral daily  insulin lispro (ADMELOG) corrective regimen sliding scale   SubCutaneous at bedtime  insulin lispro (ADMELOG) corrective regimen sliding scale   SubCutaneous three times a day before meals  labetalol 100 milliGRAM(s) Oral two times a day  sodium chloride 0.9% Bolus. 100 milliLiter(s) IV Bolus every 5 minutes PRN  valsartan 80 milliGRAM(s) Oral daily    --------------------------------------------------------------------------------------    VITAL SIGNS, INS/OUTS (last 24 hours):  --------------------------------------------------------------------------------------  T(C): 36.3 (09-06-24 @ 00:00), Max: 36.5 (09-05-24 @ 22:30)  HR: 75 (09-06-24 @ 00:00) (66 - 81)  BP: --  RR: 14 (09-06-24 @ 00:00) (11 - 23)  SpO2: 97% (09-06-24 @ 00:00) (97% - 100%)      09-04-24 @ 07:01  -  09-05-24 @ 07:00  --------------------------------------------------------  IN: 200 mL / OUT: 0 mL / NET: 200 mL    09-05-24 @ 07:01  -  09-06-24 @ 00:36  --------------------------------------------------------  IN: 400 mL / OUT: 1900 mL / NET: -1500 mL      --------------------------------------------------------------------------------------    EXAM  General Appearance: no acute distress  Neck: anterior neck incision c/d/i and soft  Chest: non-labored breathing  CV: no JVD, palpable pulses b/l  Abdomen: soft, non-tender, non-distended, +BS   Extremities: WWP, R forearm reimplantation site c/d/i     TUBES/LINES/DRAINS  [x] Peripheral IV  [] Central Venous Line     	[] R	[] L	[] IJ	[] Fem	[] SC	Date Placed:   [] Arterial Line		[] R	[] L	[] Fem	[] Rad	[] Ax	Date Placed:   [] PICC		[] Midline		[] Mediport  [] Urinary Catheter		Date Placed:     LABS  --------------------------------------------------------------------------------------    --------------------------------------------------------------------------------------    IMAGING STUDIES:      SICU Daily Progress Note  =====================================================    Interval/Overnight Events:  - Trend Ca with corrected value based on albumin levels BID  -1g IV calcium gluco 2/2 corrected Ca of 7.6  - Tums TID increased to 1g  - Listed      MEDICATIONS:   --------------------------------------------------------------------------------------  acetaminophen     Tablet .. 975 milliGRAM(s) Oral every 6 hours  amLODIPine   Tablet 5 milliGRAM(s) Oral daily  calcitriol   Capsule 0.25 MICROGram(s) Oral every 12 hours  calcium carbonate    500 mG (Tums) Chewable 1 Tablet(s) Chew three times a day  chlorhexidine 2% Cloths 1 Application(s) Topical daily  chlorhexidine 2% Cloths 1 Application(s) Topical daily  epoetin adele (EPOGEN) Injectable 3000 Unit(s) IV Push <User Schedule>  heparin   Injectable 5000 Unit(s) SubCutaneous every 8 hours  hydroxychloroquine 200 milliGRAM(s) Oral daily  insulin lispro (ADMELOG) corrective regimen sliding scale   SubCutaneous at bedtime  insulin lispro (ADMELOG) corrective regimen sliding scale   SubCutaneous three times a day before meals  labetalol 100 milliGRAM(s) Oral two times a day  sodium chloride 0.9% Bolus. 100 milliLiter(s) IV Bolus every 5 minutes PRN  valsartan 80 milliGRAM(s) Oral daily    --------------------------------------------------------------------------------------    VITAL SIGNS, INS/OUTS (last 24 hours):  --------------------------------------------------------------------------------------  T(C): 36.3 (09-06-24 @ 00:00), Max: 36.5 (09-05-24 @ 22:30)  HR: 75 (09-06-24 @ 00:00) (66 - 81)  BP: --  RR: 14 (09-06-24 @ 00:00) (11 - 23)  SpO2: 97% (09-06-24 @ 00:00) (97% - 100%)      09-04-24 @ 07:01  -  09-05-24 @ 07:00  --------------------------------------------------------  IN: 200 mL / OUT: 0 mL / NET: 200 mL    09-05-24 @ 07:01  -  09-06-24 @ 00:36  --------------------------------------------------------  IN: 400 mL / OUT: 1900 mL / NET: -1500 mL      --------------------------------------------------------------------------------------    EXAM  General Appearance: no acute distress  Neck: anterior neck incision c/d/i and soft  Chest: non-labored breathing  CV: no JVD, palpable pulses b/l  Abdomen: soft, non-tender, non-distended, +BS   Extremities: WWP, R forearm reimplantation site c/d/i     TUBES/LINES/DRAINS  [x] Peripheral IV  [] Central Venous Line     	[] R	[] L	[] IJ	[] Fem	[] SC	Date Placed:   [] Arterial Line		[] R	[] L	[] Fem	[] Rad	[] Ax	Date Placed:   [] PICC		[] Midline		[] Mediport  [] Urinary Catheter		Date Placed:     LABS  --------------------------------------------------------------------------------------    --------------------------------------------------------------------------------------    IMAGING STUDIES:

## 2024-09-06 NOTE — PROGRESS NOTE ADULT - ASSESSMENT
63F PMH HTN, HLD, T2DM (on lifestyle modification), morbid obesity s/p gastric bypass 2016, SLE c/b ESRD on HD (T/Th/S) since 2017 (left AV fistula), secondary hyperparathyroidism of renal origin, CAD s/p cardiac stent in 1/2018 and 6/2018 mid memory impairment presents for parathyroidectomy. Pt is now s/p parathyroidectomy with parathyroid autotransplantation 9/4. Endocrine consulted for further management.    D/W provider    #Secondary Hyperparathyroidism s/p parathyroidectomy  #Hungry bones syndrome and hypocalcemia   - OP labs:   -- 2/13/24 corrected Ca 9.1, iPTH 2000  -- 3/12/2024 corrected Ca 8.9, iPTH 3053  -- 3/26/24 corrected Ca 9.7  -- 4/2024 corrected Ca 9.3, iPTH 2380, vitamin D 25OH 20.3, vitamin D 1,25OH 6.5   - ENT in-office US showing enlarged R superior parathyroid  - Intraoperative iCa 1.06 (low), Ca 8.6 (corrected 9.9)  - Currently asx  - Started on calcium carbonate 1 tab (200 mg elemental calcium) tidac post-op   - s/p calcium gluconate IVP 9/5. Corrected calcium would be ~8, however.    Plan:  - Increase calcitriol to 0.25mcg BID and start calcium carbonate 1250mg  tablets TID (500mg elemental calcium each tablet). STOP TUMS.   - Trend BID CMP, phos, Mg  - Please check post op PTH, 25 OH vitamin D   - If corrected Ca <7.5 or patient is symptomatic, give calcium gluconate IVP  - If PTH low, aim calcium low normal 8-8.5   - Aim normal Mg and high normal PO4  - Advised pt to notify RN if having hypocalcemic s/s including perioral/extremity tingling/numbness    #Hypothyroidism Hx  - Not on LT4, per son had been on 25 mcg but TFTs found to be normal so stopped >1 year ago  - Repeat TSH 2.35, normal    #T2DM, diet controlled  - Last a1c 5.6% 8/2024, though unreliable i/s/o ESRD on HD  - Please obtain FS tidac and qhs  - If hyperglycemic x2 >180 can start low correctional Admelog at meals and low correctional Admelog at bedtime    #HTN  - Goal BP <130/80  - Management per primary team   63F PMH HTN, HLD, T2DM (on lifestyle modification), morbid obesity s/p gastric bypass 2016, SLE c/b ESRD on HD (T/Th/S) since 2017 (left AV fistula), secondary hyperparathyroidism of renal origin, CAD s/p cardiac stent in 1/2018 and 6/2018 mid memory impairment presents for parathyroidectomy. Pt is now s/p parathyroidectomy with parathyroid autotransplantation 9/4. Endocrine consulted for further management.    D/W provider via TEAMs    #Secondary Hyperparathyroidism s/p parathyroidectomy  #Hungry bones syndrome and hypocalcemia   - OP labs:   -- 2/13/24 corrected Ca 9.1, iPTH 2000  -- 3/12/2024 corrected Ca 8.9, iPTH 3053  -- 3/26/24 corrected Ca 9.7  -- 4/2024 corrected Ca 9.3, iPTH 2380, vitamin D 25OH 20.3, vitamin D 1,25OH 6.5   - ENT in-office US showing enlarged R superior parathyroid  - Intraoperative iCa 1.06 (low), Ca 8.6 (corrected 9.9)  - Currently asx  - Started on calcium carbonate 1 tab (200 mg elemental calcium) tidac post-op   - s/p calcium gluconate IVP 9/5. Corrected calcium would be ~8, however.    Plan:  - Increase calcitriol to 0.25mcg BID and start calcium carbonate 1250mg  tablets TID (500mg elemental calcium each tablet). STOP TUMS.   - High calcium bath during dialysis if possible  - Trend BID CMP, phos, Mg - recheck this PM  - Please check post op PTH, 25 OH vitamin D   - If corrected Ca <7.5 or patient is symptomatic, give calcium gluconate IVP  - If PTH low, aim calcium low normal 8-8.5   - Aim normal Mg and high normal PO4  - Advised pt to notify RN if having hypocalcemic s/s including perioral/extremity tingling/numbness    #Hypothyroidism Hx  - Not on LT4, per son had been on 25 mcg but TFTs found to be normal so stopped >1 year ago  - Repeat TSH 2.35, normal    #T2DM, diet controlled  - Last a1c 5.6% 8/2024, though unreliable i/s/o ESRD on HD  - Please obtain FS tidac and qhs  - If hyperglycemic x2 >180 can start low correctional Admelog at meals and low correctional Admelog at bedtime    #HTN  - Goal BP <130/80  - Management per primary team   63F PMH HTN, HLD, T2DM (on lifestyle modification), morbid obesity s/p gastric bypass 2016, SLE c/b ESRD on HD (T/Th/S) since 2017 (left AV fistula), secondary hyperparathyroidism of renal origin, CAD s/p cardiac stent in 1/2018 and 6/2018 mid memory impairment presents for parathyroidectomy. Pt is now s/p parathyroidectomy with parathyroid autotransplantation 9/4. Endocrine consulted for further management.    D/W provider      #Secondary Hyperparathyroidism s/p parathyroidectomy  #Hungry bones syndrome and hypocalcemia   - OP labs:   -- 2/13/24 corrected Ca 9.1, iPTH 2000  -- 3/12/2024 corrected Ca 8.9, iPTH 3053  -- 3/26/24 corrected Ca 9.7  -- 4/2024 corrected Ca 9.3, iPTH 2380, vitamin D 25OH 20.3, vitamin D 1,25OH 6.5   - ENT in-office US showing enlarged R superior parathyroid  - Intraoperative iCa 1.06 (low), Ca 8.6 (corrected 9.9)  - Currently asx  - Started on calcium carbonate 1 tab (200 mg elemental calcium) tidac post-op   - s/p calcium gluconate IVP 9/5. Corrected calcium would be ~8, however.    Plan:  - Increase calcitriol to 0.25mcg BID and start calcium carbonate 1250mg  tablets TID (500mg elemental calcium each tablet). STOP TUMS.   - High calcium bath during dialysis if possible  - Trend BID CMP, phos, Mg - recheck this PM  - Please check post op PTH, 25 OH vitamin D   - If corrected Ca <7.5 or patient is symptomatic, give calcium gluconate IVP  - If PTH low, aim calcium low normal 8-8.5   - Aim normal Mg and high normal PO4  - Advised pt to notify RN if having hypocalcemic s/s including perioral/extremity tingling/numbness  - telemetry whilst calcium remains low     #Hypothyroidism Hx  - Not on LT4, per son had been on 25 mcg but TFTs found to be normal so stopped >1 year ago  - Repeat TSH 2.35, normal    #T2DM, diet controlled  - Last a1c 5.6% 8/2024, though unreliable i/s/o ESRD on HD  - Please obtain FS tidac and qhs  - If hyperglycemic x2 >180 can start low correctional Admelog at meals and low correctional Admelog at bedtime    #HTN  - Goal BP <130/80  - Management per primary team

## 2024-09-06 NOTE — PROGRESS NOTE ADULT - SUBJECTIVE AND OBJECTIVE BOX
NOTE INCOMPLETE/ IN PROGRESS  *Please wait for attending attestation for official recommendations.     Chief Complaint:     History:    MEDICATIONS  (STANDING):  acetaminophen     Tablet .. 975 milliGRAM(s) Oral every 6 hours  calcitriol   Capsule 0.25 MICROGram(s) Oral every 12 hours  calcium carbonate    500 mG (Tums) Chewable 1 Tablet(s) Chew three times a day  chlorhexidine 2% Cloths 1 Application(s) Topical daily  chlorhexidine 2% Cloths 1 Application(s) Topical daily  epoetin adele (EPOGEN) Injectable 3000 Unit(s) IV Push <User Schedule>  heparin   Injectable 5000 Unit(s) SubCutaneous every 8 hours  hydroxychloroquine 200 milliGRAM(s) Oral daily  insulin lispro (ADMELOG) corrective regimen sliding scale   SubCutaneous three times a day before meals  insulin lispro (ADMELOG) corrective regimen sliding scale   SubCutaneous at bedtime  labetalol 100 milliGRAM(s) Oral two times a day  valsartan 80 milliGRAM(s) Oral daily    MEDICATIONS  (PRN):  sodium chloride 0.9% Bolus. 100 milliLiter(s) IV Bolus every 5 minutes PRN SBP LESS THAN or EQUAL to 90 mmHg      Allergies    penicillin (Rash)    Intolerances      Review of Systems:  Constitutional: No fever  Eyes: No blurry vision  Neuro: No tremors  HEENT: No pain  Cardiovascular: No chest pain, palpitations  Respiratory: No SOB, no cough  GI: No nausea, vomiting, abdominal pain  : No dysuria  Skin: no rash  Psych: no depression  Endocrine: no polyuria, polydipsia  Hem/lymph: no swelling  Osteoporosis: no fractures    ALL OTHER SYSTEMS REVIEWED AND NEGATIVE    UNABLE TO OBTAIN    PHYSICAL EXAM:  VITALS: T(C): 36.6 (09-06-24 @ 08:00)  T(F): 97.9 (09-06-24 @ 08:00), Max: 97.9 (09-06-24 @ 08:00)  HR: 74 (09-06-24 @ 11:00) (70 - 81)  BP: 129/55 (09-06-24 @ 11:00) (115/49 - 131/55)  RR:  (11 - 21)  SpO2:  (97% - 100%)  Wt(kg): --  GENERAL: NAD, well-groomed, well-developed  EYES: No proptosis, no lid lag, anicteric  HEENT:  Atraumatic, Normocephalic, moist mucous membranes  THYROID: Normal size, no palpable nodules  RESPIRATORY: Clear to auscultation bilaterally; No rales, rhonchi, wheezing  CARDIOVASCULAR: Regular rate and rhythm; No murmurs; no peripheral edema  GI: Soft, nontender, non distended  SKIN: Dry, intact, No rashes or lesions  MUSCULOSKELETAL: Full range of motion, normal strength  NEURO: extraocular movements intact, no tremor  PSYCH: Alert and oriented x 3, normal affect, normal mood    CAPILLARY BLOOD GLUCOSE      POCT Blood Glucose.: 158 mg/dL (06 Sep 2024 12:07)  POCT Blood Glucose.: 111 mg/dL (06 Sep 2024 08:18)  POCT Blood Glucose.: 138 mg/dL (05 Sep 2024 23:16)      09-06    141  |  101  |  28<H>  ----------------------------<  81  3.3<L>   |  23  |  4.03<H>    eGFR: 12<L>    Ca    6.8<L>      09-06  Mg     2.10     09-06  Phos  2.4     09-06    TPro  6.2  /  Alb  3.0<L>  /  TBili  0.4  /  DBili  <0.2  /  AST  38<H>  /  ALT  14  /  AlkPhos  800<H>  09-06      A1C with Estimated Average Glucose Result: 5.6 % (08-27-24 @ 14:30)      Thyroid Function Tests:  09-05 @ 06:52 TSH 2.35 FreeT4 -- T3 -- Anti TPO -- Anti Thyroglobulin Ab -- TSI --  09-05 @ 01:44 TSH 2.95 FreeT4 -- T3 -- Anti TPO -- Anti Thyroglobulin Ab -- TSI --                       Chief Complaint: Hypocalcemia    History: Pt seen by bedside with son. Denies any new complaints denies any symptoms of hypocalcemia  Calcium decreased to 7.6 corrected today  Received Tums 200mg elemental calcium TID + calcitriol 0.25mg daily yesterday    MEDICATIONS  (STANDING):  acetaminophen     Tablet .. 975 milliGRAM(s) Oral every 6 hours  calcitriol   Capsule 0.25 MICROGram(s) Oral every 12 hours  calcium carbonate    500 mG (Tums) Chewable 1 Tablet(s) Chew three times a day  chlorhexidine 2% Cloths 1 Application(s) Topical daily  chlorhexidine 2% Cloths 1 Application(s) Topical daily  epoetin adele (EPOGEN) Injectable 3000 Unit(s) IV Push <User Schedule>  heparin   Injectable 5000 Unit(s) SubCutaneous every 8 hours  hydroxychloroquine 200 milliGRAM(s) Oral daily  insulin lispro (ADMELOG) corrective regimen sliding scale   SubCutaneous three times a day before meals  insulin lispro (ADMELOG) corrective regimen sliding scale   SubCutaneous at bedtime  labetalol 100 milliGRAM(s) Oral two times a day  valsartan 80 milliGRAM(s) Oral daily    MEDICATIONS  (PRN):  sodium chloride 0.9% Bolus. 100 milliLiter(s) IV Bolus every 5 minutes PRN SBP LESS THAN or EQUAL to 90 mmHg      Allergies    penicillin (Rash)    Intolerances      Review of Systems:  Constitutional: No fever  Eyes: No blurry vision  Neuro: No tremors  HEENT: No pain  Cardiovascular: No chest pain, palpitations  Respiratory: No SOB, no cough  GI: No nausea, vomiting, abdominal pain  : No dysuria  Skin: no rash  Psych: no depression  Endocrine: no polyuria, polydipsia  Hem/lymph: no swelling  Osteoporosis: no fractures    PHYSICAL EXAM:  VITALS: T(C): 36.6 (09-06-24 @ 08:00)  T(F): 97.9 (09-06-24 @ 08:00), Max: 97.9 (09-06-24 @ 08:00)  HR: 74 (09-06-24 @ 11:00) (70 - 81)  BP: 129/55 (09-06-24 @ 11:00) (115/49 - 131/55)  RR:  (11 - 21)  SpO2:  (97% - 100%)  Wt(kg): --  GENERAL: NAD, well-groomed, well-developed  HEENT:  Atraumatic, Normocephalic, moist mucous membranes  RESPIRATORY: Clear to auscultation bilaterally; No rales, rhonchi, wheezing  CARDIOVASCULAR: Regular rate and rhythm; No murmurs; no peripheral edema  GI: Soft, nontender, non distended    CAPILLARY BLOOD GLUCOSE      POCT Blood Glucose.: 158 mg/dL (06 Sep 2024 12:07)  POCT Blood Glucose.: 111 mg/dL (06 Sep 2024 08:18)  POCT Blood Glucose.: 138 mg/dL (05 Sep 2024 23:16)      09-06    141  |  101  |  28<H>  ----------------------------<  81  3.3<L>   |  23  |  4.03<H>    eGFR: 12<L>    Ca    6.8<L>      09-06  Mg     2.10     09-06  Phos  2.4     09-06    TPro  6.2  /  Alb  3.0<L>  /  TBili  0.4  /  DBili  <0.2  /  AST  38<H>  /  ALT  14  /  AlkPhos  800<H>  09-06      A1C with Estimated Average Glucose Result: 5.6 % (08-27-24 @ 14:30)      Thyroid Function Tests:  09-05 @ 06:52 TSH 2.35 FreeT4 -- T3 -- Anti TPO -- Anti Thyroglobulin Ab -- TSI --  09-05 @ 01:44 TSH 2.95 FreeT4 -- T3 -- Anti TPO -- Anti Thyroglobulin Ab -- TSI --

## 2024-09-06 NOTE — DIETITIAN INITIAL EVALUATION ADULT - NS FNS DIET ORDER
Diet, Regular:   Consistent Carbohydrate {Evening Snack} (CSTCHOSN)  For patients receiving Renal Replacement - No Protein Restr, No Conc K, No Conc Phos, Low Sodium (RENAL) (09-05-24 @ 06:45) [Active]

## 2024-09-06 NOTE — PROGRESS NOTE ADULT - SUBJECTIVE AND OBJECTIVE BOX
Interval: patient s/p 3.5 gland parathyroidectomy with reimplant in R arm . Patient calciums decreasing given total of 4g IV calcium overnight. QTC prolonged, endo following. Continuing with current Ca repletions.        PAST MEDICAL & SURGICAL HISTORY:  Diabetes      Hypertension      Hypercholesteremia      Other hyperlipidemia      Glaucoma      ESRD (end-stage renal disease) due to SLE      Lupus (systemic lupus erythematosus)      Hyperparathyroidism      Memory impairment      Anemia of renal disease      CAD (coronary artery disease)      Stented coronary artery      Cholelithiasis      Rib fracture      S/P  section  times two      H/O gastric bypass  2016      S/P appendectomy      History of appendectomy        Allergies    penicillin (Rash)    Intolerances      MEDICATIONS  (STANDING):  acetaminophen     Tablet .. 975 milliGRAM(s) Oral every 6 hours  calcitriol   Capsule 0.25 MICROGram(s) Oral daily  calcium carbonate    500 mG (Tums) Chewable 1 Tablet(s) Chew three times a day  heparin   Injectable 5000 Unit(s) SubCutaneous every 8 hours    MEDICATIONS  (PRN):        Vital Signs Last 24 Hrs  T(C): 36.4 (06 Sep 2024 04:00), Max: 36.5 (05 Sep 2024 22:30)  T(F): 97.6 (06 Sep 2024 04:00), Max: 97.7 (05 Sep 2024 22:30)  HR: 74 (06 Sep 2024 06:00) (67 - 81)  BP: 123/60 (06 Sep 2024 06:00) (117/51 - 123/60)  BP(mean): 78 (06 Sep 2024 06:00) (71 - 78)  RR: 15 (06 Sep 2024 06:00) (11 - 23)  SpO2: 97% (06 Sep 2024 06:00) (97% - 100%)    Parameters below as of 06 Sep 2024 06:00  Patient On (Oxygen Delivery Method): room air            Physical Exam:  Alert, NAD  Nonlabored Respirations JOHN ANGEL  neck: incision c/d/i       24 @ 07:01  -  24 @ 06:45  --------------------------------------------------------  IN: 200 mL / OUT: 0 mL / NET: 200 mL          LABS:                        9.6    7.20  )-----------( 130      ( 06 Sep 2024 00:30 )             28.3         141  |  100  |  27<H>  ----------------------------<  103<H>  3.2<L>   |  23  |  3.64<H>    Ca    7.4<L>      06 Sep 2024 01:59  Phos  2.0       Mg     2.10         TPro  6.2  /  Alb  3.0<L>  /  TBili  0.4  /  DBili  <0.2  /  AST  38<H>  /  ALT  14  /  AlkPhos  800<H>             A/P: 63yFemale s/p 3.5 gland parathyroidectomy    -trend calciums  -repletions per endo/SICU       - SCDs  - d/w attending

## 2024-09-06 NOTE — PROGRESS NOTE ADULT - ASSESSMENT
63 year old Female with history of ESRD on HD presents for parathyroidectomy. Nephrology consulted for ESRD status.    1) ESRD: Last HD on 9/5 tolerated well with 1.5L removed. Plan for next maintenance HD on 9/7. Monitor electrolytes.    2) HTN with ESRD: BP low normal. Can D/C labetalol 100 mg twice daily.    3) Anemia of renal disease: Hb borderline with acceptable iron stores. Continue with Epo 3K with HD. Monitor Hb.    4) Secondary HPT of renal origin: S/P parathyroidectomy with decreasing serum calcium. Continue with calcitriol 0.25 mcg twice daily but increase calcium carbonate to 1250 mg 2 tabs twice daily (first dose now). High calcium bath with HD. Check vitamin D 25-OH level. Monitor serum calcium and phosphorus.      [facility] NEPHROLOGY  Timmy Acharya M.D.  Amilcar Contreras D.O.  Mindi Geller M.D.  MD Luna Israel, MSN, ANP-C    Telephone: [phone]  Facsimile: [phone]    [facility] [place] [address], [address]  [place] [address]

## 2024-09-06 NOTE — DIETITIAN INITIAL EVALUATION ADULT - PERTINENT LABORATORY DATA
09-06    141  |  101  |  28<H>  ----------------------------<  81  3.3<L>   |  23  |  4.03<H>    Ca    6.8<L>      06 Sep 2024 06:13  Phos  2.4     09-06  Mg     2.10     09-06    TPro  6.2  /  Alb  3.0<L>  /  TBili  0.4  /  DBili  <0.2  /  AST  38<H>  /  ALT  14  /  AlkPhos  800<H>  09-06  POCT Blood Glucose.: 158 mg/dL (09-06-24 @ 12:07)  A1C with Estimated Average Glucose Result: 5.6 % (08-27-24 @ 14:30)

## 2024-09-06 NOTE — DIETITIAN INITIAL EVALUATION ADULT - PERTINENT MEDS FT
MEDICATIONS  (STANDING):  acetaminophen     Tablet .. 975 milliGRAM(s) Oral every 6 hours  calcitriol   Capsule 0.25 MICROGram(s) Oral every 12 hours  calcium carbonate   1250 mG (OsCal) 1 Tablet(s) Oral three times a day  chlorhexidine 2% Cloths 1 Application(s) Topical daily  chlorhexidine 2% Cloths 1 Application(s) Topical daily  epoetin adele (EPOGEN) Injectable 3000 Unit(s) IV Push <User Schedule>  heparin   Injectable 5000 Unit(s) SubCutaneous every 8 hours  hydroxychloroquine 200 milliGRAM(s) Oral daily  insulin lispro (ADMELOG) corrective regimen sliding scale   SubCutaneous at bedtime  insulin lispro (ADMELOG) corrective regimen sliding scale   SubCutaneous three times a day before meals  labetalol 100 milliGRAM(s) Oral two times a day  valsartan 80 milliGRAM(s) Oral daily    MEDICATIONS  (PRN):  sodium chloride 0.9% Bolus. 100 milliLiter(s) IV Bolus every 5 minutes PRN SBP LESS THAN or EQUAL to 90 mmHg

## 2024-09-06 NOTE — DIETITIAN INITIAL EVALUATION ADULT - ORAL INTAKE PTA/DIET HISTORY
Pt lives at home with her son and daughter (present at time of interview, and helped with interpreting information to Pt). They cook together at home. No difficulty obtaining groceries. No specific diet followed PTA. No supplements/Vitamins taken PTA.

## 2024-09-06 NOTE — ADVANCED PRACTICE NURSE CONSULT - REASON FOR CONSULT
Patient seen on skin care rounds after wound care referral received for assessment of skin impairment and recommendations of topical management for healing stage 3 on the sacrum. As per h&P, patient is a 64 y/o female PMH HTN HLD T2DM (on lifestyle modification) morbid obesity (lost ~70 lbs after gastric bypass in 2016) SLE ESRD on HD (T/Th/S) since 2017 (left AV fistula) CAD s/p Cardiac stent in 1/2018 and 6/2018 mid memory impairment presents to presurgical testing with diagnosis of secondary hyperparathyroidism of renal origin. Pt is scheduled for a parathyroidectomy with parathyroid hormone assay and frozen section, parathyroid autotransplantation.     Brief Opertive Note 9/4/24: 4 parathyroid glands removed, left superior parathyroid gland implanted into right forearm.     Patient is consulted by SICU for post-op management, endocrinology for s/p parathyroidectomy, nephrology for ESRD,     Chart reviewed: WBC: (N), H&H: (N), INR: (N), A1C: (N), platelets: (N), BMI: (N) Virgilio (N), patient interviewed. Patient H/O of (INSERT HISTORY). Patient admitted with (ADMISSION).  Patient seen on skin care rounds after wound care referral received for assessment of skin impairment and recommendations of topical management for healing stage 3 on the sacrum. As per h&P, patient is a 62 y/o female PMH HTN HLD T2DM (on lifestyle modification) morbid obesity (lost ~70 lbs after gastric bypass in 2016) SLE ESRD on HD (T/Th/S) since 2017 (left AV fistula) CAD s/p Cardiac stent in 1/2018 and 6/2018 mid memory impairment presents to presurgical testing with diagnosis of secondary hyperparathyroidism of renal origin. Pt is scheduled for a parathyroidectomy with parathyroid hormone assay and frozen section, parathyroid autotransplantation.     Brief Opertive Note 9/4/24: 4 parathyroid glands removed, left superior parathyroid gland implanted into right forearm.     Patient is consulted by SICU for post-op management, endocrinology for s/p parathyroidectomy, nephrology for ESRD,     Chart reviewed: WBC: 7.20, H&H: 9.6/28.3, Platelets: 130, A1C: 5.6, BMI: 26.5, Virgilio 19.

## 2024-09-06 NOTE — DIETITIAN INITIAL EVALUATION ADULT - OTHER INFO
Per chart,    Nutrition interview: No reports of any recent episodes of nausea, vomiting, diarrhea or constipation, Last BM noted on ___ per RN flowsheets. Denies any chewing/swallowing difficulties. No food allergies. Stated UBW: ___. Food preferences explored and noted. Intake is (%) per RN flowsheets and per pt. Feeding skills:  Per chart, 62 y/o female PMH HTN, HLD, T2DM (on lifestyle modification), morbid obesity s/p gastric bypass 2016, SLE c/b ESRD on HD (T/Th/S) since 2017 (left AV fistula), secondary hyperparathyroidism of renal origin, CAD s/p cardiac stent in 1/2018 and 6/2018 mid memory impairment presents for parathyroidectomy. Pt is now s/p parathyroidectomy with parathyroid autotransplantation 9/4. Endocrine consulted for further management.    Nutrition interview: No reports of any recent episodes of nausea, vomiting. Pt c/o diarrhea, says it is a chronic issue. Son reports Pt does not eat much fiber. RD educated Pt on importance of consuming fiber (fruits, vegetables and whole grains) to help combat diarrhea. Last BM noted on 9/5 per RN flowsheets. Denies any chewing/swallowing difficulties. No food allergies. Stated UBW: ~140#, in line with current wt in house. Food preferences explored and noted. Intake is fair-good (50-75%) per RN flowsheets and per pt. Feeding skills: independent. No PI per EMR.     Declined nutrition education at present.

## 2024-09-06 NOTE — PROGRESS NOTE ADULT - SUBJECTIVE AND OBJECTIVE BOX
[facility] NEPHROLOGY- PROGRESS NOTE    63 year old Female with history of ESRD on HD presents for parathyroidectomy. Nephrology consulted for ESRD status.    REVIEW OF SYSTEMS:  Gen: no fevers  Cards: no chest pain  Resp: no dyspnea  GI: no nausea or vomiting or diarrhea  Vascular: no LE edema    penicillin (Rash)      Hospital Medications: Medications reviewed    VITALS:  T(F): 97.6 (09-06-24 @ 04:00), Max: 97.7 (09-05-24 @ 22:30)  HR: 75 (09-06-24 @ 07:00)  BP: 115/49 (09-06-24 @ 07:00)  RR: 15 (09-06-24 @ 07:00)  SpO2: 98% (09-06-24 @ 07:00)  Wt(kg): --  Height (cm): 154.9 (09-04 @ 06:51)  Weight (kg): 63.5 (09-04 @ 06:51)  BMI (kg/m2): 26.5 (09-04 @ 06:51)  BSA (m2): 1.62 (09-04 @ 06:51)    09-05 @ 07:01  -  09-06 @ 07:00  --------------------------------------------------------  IN: 400 mL / OUT: 1900 mL / NET: -1500 mL        PHYSICAL EXAM:    Gen: NAD, calm  Cards: RRR, +S1/S2, no M/G/R  Resp: CTA B/L  GI: soft, NT/ND, NABS  Vascular: no LE edema B/L, LUE AVF + bruit/thrill    LABS:  09-06    141  |  101  |  28<H>  ----------------------------<  81  3.3<L>   |  23  |  4.03<H>    Ca    6.8<L>      06 Sep 2024 06:13  Phos  2.4     09-06  Mg     2.10     09-06    TPro  6.2  /  Alb  3.0<L>  /  TBili  0.4  /  DBili  <0.2  /  AST  38<H>  /  ALT  14  /  AlkPhos  800<H>  09-06    Creatinine Trend: 4.03 <--, 3.64 <--, 7.13 <--, 6.77 <--, 6.37 <--, 6.11 <--, 5.54 <--                        9.6    7.20  )-----------( 130      ( 06 Sep 2024 00:30 )             28.3     Urine Studies:  Urinalysis Basic - ( 06 Sep 2024 06:13 )    Color:  / Appearance:  / SG:  / pH:   Gluc: 81 mg/dL / Ketone:   / Bili:  / Urobili:    Blood:  / Protein:  / Nitrite:    Leuk Esterase:  / RBC:  / WBC    Sq Epi:  / Non Sq Epi:  / Bacteria:           RADIOLOGY & ADDITIONAL STUDIES:

## 2024-09-06 NOTE — ASU DISCHARGE PLAN (ADULT/PEDIATRIC). - MEDICATION SUMMARY - MEDICATIONS TO CHANGE
NULL No I will SWITCH the dose or number of times a day I take the medications listed below when I get home from the hospital:  None

## 2024-09-06 NOTE — ADVANCED PRACTICE NURSE CONSULT - ASSESSMENT
**Incomplete  General: A&Ox     , bedbound, incontinent of urine and stool. Skin warm, dry with increased moisture in intertriginous folds, adequate skin turgor, scattered areas of hyperpigmentation and hypopigmentation, scattered areas of eccyhmosis on bilateral upper extremities. Blanchable erythema on bilateral heels.  **Incomplete  General: A&Ox4, Sami speaking, bedbound, anuric of urine 2/2 hemodialysis, and continent of stool. Skin warm, dry with increased moisture in intertriginous folds, scattered areas of hyperpigmentation and hypopigmentation, scattered areas of ecchymosis on bilateral upper extremities with no hematomas. Blanchable erythema on bilateral heels.  General: A&Ox4, Albanian speaking, primary RN fluent and able to translate, able to turn independently, anuric of urine 2/2 hemodialysis, and continent of stool. Skin warm, dry with increased moisture in intertriginous folds, scattered areas of hyperpigmentation and hypopigmentation, scattered areas of ecchymosis on bilateral upper extremities with no hematomas. Blanchable erythema on bilateral heels.     Coccyx: Healed stage 3 pressure injury, as evidenced by [id number] area of hypopigmentation and maceration, located within gluteal cleft not visible and partially overlapping bella prominence in natural anatomical laying position. Periwound intact. Patient denies pain with palpation, but states that sometimes it feels "wet". Mild bogginess noted with palpation, likely 2/2 scar tissue formation with healing of full thickness wound. No drainage, no odor. No induration, no erythema, no crepitus, no fluctuance, no edema, no temperature changes, no overt signs of infection noted.

## 2024-09-07 LAB
24R-OH-CALCIDIOL SERPL-MCNC: 24.2 NG/ML — LOW (ref 30–80)
ALBUMIN SERPL ELPH-MCNC: 2.8 G/DL — LOW (ref 3.3–5)
ALBUMIN SERPL ELPH-MCNC: 3.3 G/DL — SIGNIFICANT CHANGE UP (ref 3.3–5)
ALP SERPL-CCNC: 869 U/L — HIGH (ref 40–120)
ALP SERPL-CCNC: 942 U/L — HIGH (ref 40–120)
ALT FLD-CCNC: 14 U/L — SIGNIFICANT CHANGE UP (ref 4–33)
ALT FLD-CCNC: 17 U/L — SIGNIFICANT CHANGE UP (ref 4–33)
ANION GAP SERPL CALC-SCNC: 13 MMOL/L — SIGNIFICANT CHANGE UP (ref 7–14)
ANION GAP SERPL CALC-SCNC: 17 MMOL/L — HIGH (ref 7–14)
AST SERPL-CCNC: 29 U/L — SIGNIFICANT CHANGE UP (ref 4–32)
AST SERPL-CCNC: 32 U/L — SIGNIFICANT CHANGE UP (ref 4–32)
BILIRUB SERPL-MCNC: 0.3 MG/DL — SIGNIFICANT CHANGE UP (ref 0.2–1.2)
BILIRUB SERPL-MCNC: 0.4 MG/DL — SIGNIFICANT CHANGE UP (ref 0.2–1.2)
BUN SERPL-MCNC: 13 MG/DL — SIGNIFICANT CHANGE UP (ref 7–23)
BUN SERPL-MCNC: 41 MG/DL — HIGH (ref 7–23)
CA-I BLD-SCNC: 0.73 MMOL/L — LOW (ref 1.15–1.29)
CA-I BLD-SCNC: 0.78 MMOL/L — LOW (ref 1.15–1.29)
CA-I BLD-SCNC: 1 MMOL/L — LOW (ref 1.15–1.29)
CALCIUM SERPL-MCNC: 5.8 MG/DL — CRITICAL LOW (ref 8.4–10.5)
CALCIUM SERPL-MCNC: 8.2 MG/DL — LOW (ref 8.4–10.5)
CHLORIDE SERPL-SCNC: 100 MMOL/L — SIGNIFICANT CHANGE UP (ref 98–107)
CHLORIDE SERPL-SCNC: 101 MMOL/L — SIGNIFICANT CHANGE UP (ref 98–107)
CO2 SERPL-SCNC: 23 MMOL/L — SIGNIFICANT CHANGE UP (ref 22–31)
CO2 SERPL-SCNC: 27 MMOL/L — SIGNIFICANT CHANGE UP (ref 22–31)
CREAT SERPL-MCNC: 3.06 MG/DL — HIGH (ref 0.5–1.3)
CREAT SERPL-MCNC: 5.9 MG/DL — HIGH (ref 0.5–1.3)
EGFR: 17 ML/MIN/1.73M2 — LOW
EGFR: 8 ML/MIN/1.73M2 — LOW
GLUCOSE BLDC GLUCOMTR-MCNC: 101 MG/DL — HIGH (ref 70–99)
GLUCOSE BLDC GLUCOMTR-MCNC: 104 MG/DL — HIGH (ref 70–99)
GLUCOSE BLDC GLUCOMTR-MCNC: 174 MG/DL — HIGH (ref 70–99)
GLUCOSE BLDC GLUCOMTR-MCNC: 85 MG/DL — SIGNIFICANT CHANGE UP (ref 70–99)
GLUCOSE SERPL-MCNC: 176 MG/DL — HIGH (ref 70–99)
GLUCOSE SERPL-MCNC: 91 MG/DL — SIGNIFICANT CHANGE UP (ref 70–99)
HCT VFR BLD CALC: 27.7 % — LOW (ref 34.5–45)
HGB BLD-MCNC: 9.4 G/DL — LOW (ref 11.5–15.5)
MAGNESIUM SERPL-MCNC: 2.1 MG/DL — SIGNIFICANT CHANGE UP (ref 1.6–2.6)
MAGNESIUM SERPL-MCNC: 2.2 MG/DL — SIGNIFICANT CHANGE UP (ref 1.6–2.6)
MCHC RBC-ENTMCNC: 31.3 PG — SIGNIFICANT CHANGE UP (ref 27–34)
MCHC RBC-ENTMCNC: 33.9 GM/DL — SIGNIFICANT CHANGE UP (ref 32–36)
MCV RBC AUTO: 92.3 FL — SIGNIFICANT CHANGE UP (ref 80–100)
NRBC # BLD: 0 /100 WBCS — SIGNIFICANT CHANGE UP (ref 0–0)
NRBC # FLD: 0 K/UL — SIGNIFICANT CHANGE UP (ref 0–0)
PHOSPHATE SERPL-MCNC: 1.4 MG/DL — LOW (ref 2.5–4.5)
PHOSPHATE SERPL-MCNC: 2.6 MG/DL — SIGNIFICANT CHANGE UP (ref 2.5–4.5)
PLATELET # BLD AUTO: 143 K/UL — LOW (ref 150–400)
POTASSIUM SERPL-MCNC: 3.3 MMOL/L — LOW (ref 3.5–5.3)
POTASSIUM SERPL-MCNC: 3.8 MMOL/L — SIGNIFICANT CHANGE UP (ref 3.5–5.3)
POTASSIUM SERPL-SCNC: 3.3 MMOL/L — LOW (ref 3.5–5.3)
POTASSIUM SERPL-SCNC: 3.8 MMOL/L — SIGNIFICANT CHANGE UP (ref 3.5–5.3)
PROT SERPL-MCNC: 6 G/DL — SIGNIFICANT CHANGE UP (ref 6–8.3)
PROT SERPL-MCNC: 6.5 G/DL — SIGNIFICANT CHANGE UP (ref 6–8.3)
RBC # BLD: 3 M/UL — LOW (ref 3.8–5.2)
RBC # FLD: 15 % — HIGH (ref 10.3–14.5)
SODIUM SERPL-SCNC: 140 MMOL/L — SIGNIFICANT CHANGE UP (ref 135–145)
SODIUM SERPL-SCNC: 141 MMOL/L — SIGNIFICANT CHANGE UP (ref 135–145)
WBC # BLD: 6.78 K/UL — SIGNIFICANT CHANGE UP (ref 3.8–10.5)
WBC # FLD AUTO: 6.78 K/UL — SIGNIFICANT CHANGE UP (ref 3.8–10.5)

## 2024-09-07 PROCEDURE — 99233 SBSQ HOSP IP/OBS HIGH 50: CPT | Mod: GC

## 2024-09-07 RX ORDER — CALCIUM CARBONATE 500(1250)
2 TABLET ORAL
Refills: 0 | Status: DISCONTINUED | OUTPATIENT
Start: 2024-09-07 | End: 2024-09-07

## 2024-09-07 RX ORDER — CALCIUM CARBONATE 500(1250)
2 TABLET ORAL THREE TIMES A DAY
Refills: 0 | Status: DISCONTINUED | OUTPATIENT
Start: 2024-09-07 | End: 2024-09-08

## 2024-09-07 RX ORDER — CALCITRIOL 0.25 UG/1
1 CAPSULE ORAL EVERY 12 HOURS
Refills: 0 | Status: DISCONTINUED | OUTPATIENT
Start: 2024-09-07 | End: 2024-09-13

## 2024-09-07 RX ORDER — CALCITRIOL 0.25 UG/1
0.5 CAPSULE ORAL DAILY
Refills: 0 | Status: DISCONTINUED | OUTPATIENT
Start: 2024-09-07 | End: 2024-09-07

## 2024-09-07 RX ORDER — CALCITRIOL 0.25 UG/1
0.75 CAPSULE ORAL ONCE
Refills: 0 | Status: COMPLETED | OUTPATIENT
Start: 2024-09-07 | End: 2024-09-07

## 2024-09-07 RX ORDER — FOLIC ACID/MULTIVIT,IRON,MINER 0.4MG-18MG
2000 TABLET,CHEWABLE ORAL DAILY
Refills: 0 | Status: DISCONTINUED | OUTPATIENT
Start: 2024-09-07 | End: 2024-09-13

## 2024-09-07 RX ORDER — CALCIUM CARBONATE 500(1250)
2 TABLET ORAL THREE TIMES A DAY
Refills: 0 | Status: DISCONTINUED | OUTPATIENT
Start: 2024-09-07 | End: 2024-09-07

## 2024-09-07 RX ORDER — CALCIUM GLUCONATE 61(648) MG
2 TABLET ORAL ONCE
Refills: 0 | Status: COMPLETED | OUTPATIENT
Start: 2024-09-07 | End: 2024-09-07

## 2024-09-07 RX ORDER — CALCIUM CARBONATE 500(1250)
1 TABLET ORAL ONCE
Refills: 0 | Status: COMPLETED | OUTPATIENT
Start: 2024-09-07 | End: 2024-09-07

## 2024-09-07 RX ADMIN — EPOETIN ALFA 3000 UNIT(S): 3000 SOLUTION INTRAVENOUS; SUBCUTANEOUS at 10:29

## 2024-09-07 RX ADMIN — Medication 2 TABLET(S): at 16:03

## 2024-09-07 RX ADMIN — Medication 100 MILLIGRAM(S): at 19:32

## 2024-09-07 RX ADMIN — Medication 5000 UNIT(S): at 22:41

## 2024-09-07 RX ADMIN — CALCITRIOL 0.75 MICROGRAM(S): 0.25 CAPSULE ORAL at 15:14

## 2024-09-07 RX ADMIN — Medication 1 TABLET(S): at 09:25

## 2024-09-07 RX ADMIN — Medication 2 TABLET(S): at 12:05

## 2024-09-07 RX ADMIN — Medication 5000 UNIT(S): at 16:02

## 2024-09-07 RX ADMIN — HYDROXYCHLOROQUINE SULFATE 200 MILLIGRAM(S): 200 TABLET, FILM COATED ORAL at 16:25

## 2024-09-07 RX ADMIN — Medication 100 MILLIGRAM(S): at 06:10

## 2024-09-07 RX ADMIN — ACETAMINOPHEN 975 MILLIGRAM(S): 325 TABLET ORAL at 06:11

## 2024-09-07 RX ADMIN — Medication 1: at 16:24

## 2024-09-07 RX ADMIN — Medication 200 GRAM(S): at 09:25

## 2024-09-07 RX ADMIN — CALCITRIOL 0.25 MICROGRAM(S): 0.25 CAPSULE ORAL at 06:10

## 2024-09-07 RX ADMIN — ACETAMINOPHEN 975 MILLIGRAM(S): 325 TABLET ORAL at 00:08

## 2024-09-07 RX ADMIN — CHLORHEXIDINE GLUCONATE 1 APPLICATION(S): 40 SOLUTION TOPICAL at 12:06

## 2024-09-07 RX ADMIN — CALCITRIOL 1 MICROGRAM(S): 0.25 CAPSULE ORAL at 19:43

## 2024-09-07 RX ADMIN — Medication 5000 UNIT(S): at 06:10

## 2024-09-07 RX ADMIN — Medication 1 TABLET(S): at 06:10

## 2024-09-07 RX ADMIN — VALSARTAN 80 MILLIGRAM(S): 40 TABLET ORAL at 06:09

## 2024-09-07 NOTE — PROGRESS NOTE ADULT - ASSESSMENT
63F PMHx HTN, HLD, T2DM, morbid obesity (lost ~70 lbs after gastric bypass in 2016), SLE, ESRD on HD (T/Th/S) since 2017 (left AV fistula), CAD s/p Cardiac stent in 1/2018 and 6/2018, and mid memory impairment now s/p 4-gland total parathyroidectomy with reimplantation of 1x gland in R forearm on 9/4. SICU consulted for electrolyte monitoring and c/f hungry bone syndrome post op.     PLAN  Neuro:  - AOx3  - Pain control: Tylenol and dilaudid prn   - Holding home memantine    Respiratory:  - on RA, breathing comfortably    Cardiovascular:  - MAP goal >65  - Hemodynamically stable off pressors   - holding home anti-hypertensives (valsartan and labetalol)      Gastrointestinal:  - Diet: Regular, carb restrictions     Genitourinary/Renal:  - ESRD (T/Th/S) > f/u nephro recs  - BID CMP. Mag/Phos    Heme:  - Chemical VTE ppx: SQH  - EPO with HD  - c/w hydroxychloroquine    ID:   - Trend WBC on CBC qD  - Monitor fever curve    Endocrine:  - replete Ca as needed, with correction based on albumin  - endocrine recs --> calcium carbonated 1250mg TID, calcitriol BID  - s/p 6g calcium gluconate as of 9/6  - Monitor for signs and symptoms of hungry bone syn  - Trend FS q6/ on CMP    Lines/Tubes/Drains:   - PIV    DISPO: SICU, listed

## 2024-09-07 NOTE — PROGRESS NOTE ADULT - SUBJECTIVE AND OBJECTIVE BOX
Interval: patient s/p 3.5 gland parathyroidectomy with reimplant in R arm . Pt given 1g IV Ca overnight for corrected Ca of 7.1. QTC prolonged, endo following. Continuing with current Ca repletions.        PAST MEDICAL & SURGICAL HISTORY:  Diabetes      Hypertension      Hypercholesteremia      Other hyperlipidemia      Glaucoma      ESRD (end-stage renal disease) due to SLE      Lupus (systemic lupus erythematosus)      Hyperparathyroidism      Memory impairment      Anemia of renal disease      CAD (coronary artery disease)      Stented coronary artery      Cholelithiasis      Rib fracture      S/P  section  times two      H/O gastric bypass  2016      S/P appendectomy      History of appendectomy        Allergies    penicillin (Rash)    Intolerances      MEDICATIONS  (STANDING):  acetaminophen     Tablet .. 975 milliGRAM(s) Oral every 6 hours  calcitriol   Capsule 0.25 MICROGram(s) Oral daily  calcium carbonate    500 mG (Tums) Chewable 1 Tablet(s) Chew three times a day  heparin   Injectable 5000 Unit(s) SubCutaneous every 8 hours    MEDICATIONS  (PRN):        Vital Signs Last 24 Hrs  T(C): 36.1 (07 Sep 2024 04:00), Max: 36.6 (06 Sep 2024 08:00)  T(F): 97 (07 Sep 2024 04:00), Max: 97.9 (06 Sep 2024 08:00)  HR: 69 (07 Sep 2024 04:00) (68 - 77)  BP: 131/55 (07 Sep 2024 04:00) (115/49 - 146/65)  BP(mean): 78 (07 Sep 2024 04:00) (69 - 88)  RR: 12 (07 Sep 2024 04:00) (12 - 18)  SpO2: 99% (07 Sep 2024 04:00) (97% - 100%)    Parameters below as of 07 Sep 2024 04:00  Patient On (Oxygen Delivery Method): room air                Physical Exam:  Alert, NAD  Nonlabored Respirations JOHN ANGEL  neck: incision c/d/i       24 @ 07:01  -  24 @ 06:45  --------------------------------------------------------  IN: 200 mL / OUT: 0 mL / NET: 200 mL            LABS:                        9.6    7.20  )-----------( 130      ( 06 Sep 2024 00:30 )             28.3         143  |  104  |  33<H>  ----------------------------<  139<H>  3.3<L>   |  24  |  5.26<H>    Ca    6.3<LL>      06 Sep 2024 18:03  Phos  2.3       Mg     2.20         TPro  5.9<L>  /  Alb  3.1<L>  /  TBili  0.3  /  DBili  x   /  AST  32  /  ALT  15  /  AlkPhos  810<H>                     A/P: 63yFemale s/p 3.5 gland parathyroidectomy    -trend calciums  -repletions per endo/SICU       - SCDs  - d/w attending

## 2024-09-07 NOTE — PROGRESS NOTE ADULT - SUBJECTIVE AND OBJECTIVE BOX
[facility] NEPHROLOGY- PROGRESS NOTE    63 year old Female with history of ESRD on HD presents for parathyroidectomy. Nephrology consulted for ESRD status.    REVIEW OF SYSTEMS:  Gen: no fevers, numbness or weakness  Cards: no chest pain  Resp: no dyspnea  GI: no nausea or vomiting or diarrhea  Vascular: no LE edema    penicillin (Rash)      Hospital Medications: Medications reviewed    VITALS:  T(F): 97.1 (09-07-24 @ 12:00), Max: 97.8 (09-07-24 @ 08:00)  HR: 70 (09-07-24 @ 12:00)  BP: 144/63 (09-07-24 @ 12:00)  RR: 16 (09-07-24 @ 12:00)  SpO2: 100% (09-07-24 @ 12:00)  Wt(kg): --    09-06 @ 07:01  -  09-07 @ 07:00  --------------------------------------------------------  IN: 1000 mL / OUT: 0 mL / NET: 1000 mL      PHYSICAL EXAM:    Gen: NAD, calm  Cards: RRR, +S1/S2, no M/G/R  Resp: CTA B/L  GI: soft, NT/ND, NABS  Vascular: no LE edema B/L, LUE AVF + bruit/thrill     LABS:  09-07    140  |  100  |  41<H>  ----------------------------<  91  3.3<L>   |  23  |  5.90<H>    Ca    5.8<LL>      07 Sep 2024 06:20  Phos  2.6     09-07  Mg     2.10     09-07    TPro  6.0  /  Alb  2.8<L>  /  TBili  0.3  /  DBili      /  AST  29  /  ALT  14  /  AlkPhos  869<H>  09-07    Creatinine Trend: 5.90 <--, 5.26 <--, 4.03 <--, 3.64 <--, 7.13 <--, 6.77 <--, 6.37 <--, 6.11 <--, 5.54 <--                        9.4    6.78  )-----------( 143      ( 07 Sep 2024 06:20 )             27.7     Urine Studies:  Urinalysis Basic - ( 07 Sep 2024 06:20 )    Color:  / Appearance:  / SG:  / pH:   Gluc: 91 mg/dL / Ketone:   / Bili:  / Urobili:    Blood:  / Protein:  / Nitrite:    Leuk Esterase:  / RBC:  / WBC    Sq Epi:  / Non Sq Epi:  / Bacteria:

## 2024-09-07 NOTE — PROGRESS NOTE ADULT - ASSESSMENT
63F PMH HTN, HLD, T2DM (on lifestyle modification), morbid obesity s/p gastric bypass 2016, SLE c/b ESRD on HD (T/Th/S) since 2017 (left AV fistula), secondary hyperparathyroidism of renal origin, CAD s/p cardiac stent in 1/2018 and 6/2018 mid memory impairment presents for parathyroidectomy. Pt is now s/p parathyroidectomy with parathyroid autotransplantation 9/4. Endocrine consulted for further management.    D/W provider      #Secondary Hyperparathyroidism s/p parathyroidectomy  #Hungry bones syndrome and hypocalcemia   - OP labs:   -- 2/13/24 corrected Ca 9.1, iPTH 2000  -- 3/12/2024 corrected Ca 8.9, iPTH 3053  -- 3/26/24 corrected Ca 9.7  -- 4/2024 corrected Ca 9.3, iPTH 2380, vitamin D 25OH 20.3, vitamin D 1,25OH 6.5   - ENT in-office US showing enlarged R superior parathyroid  - Intraoperative iCa 1.06 (low), Ca 8.6 (corrected 9.9)  - Currently asx  - Started on calcium carbonate 1 tab (200 mg elemental calcium) tidac post-op   - s/p calcium gluconate IVP 9/5. Corrected calcium would be ~8, however.  - Post op PTH 14, vitamin D 24.2  - 9/7/24 corrected calcium 7.8, ionzied calcium is 0.73 very low. s/p calcium gluconate IV this morning.    Plan:  - Discussed with nephrology and SICU. Will increase calcitriol to 1mcg BID and calcium carbonate 1250mg 2 tabs TID with meals, and patient to start high calcium HD bath. Will repeat calcium levels 3 hours after starting HD, if calcium still remains low, then will consider calcium drip at that point.  - Trend BID CMP, phos, Mg  - If corrected Ca <7.5 or patient is symptomatic, give calcium gluconate IVP  - If PTH low, aim calcium low normal 8-8.5   - Aim normal Mg and high normal PO4  - Advised pt to notify RN if having hypocalcemic s/s including perioral/extremity tingling/numbness  - telemetry whilst calcium remains low     #Hypothyroidism Hx  - Not on LT4, per son had been on 25 mcg but TFTs found to be normal so stopped >1 year ago  - Repeat TSH 2.35, normal    #T2DM, diet controlled  - Last a1c 5.6% 8/2024, though unreliable i/s/o ESRD on HD  - Please obtain FS tidac and qhs  - If hyperglycemic x2 >180 can start low correctional Admelog at meals and low correctional Admelog at bedtime    #HTN  - Goal BP <130/80  - Management per primary team   63F PMH HTN, HLD, T2DM (on lifestyle modification), morbid obesity s/p gastric bypass 2016, SLE c/b ESRD on HD (T/Th/S) since 2017 (left AV fistula), secondary hyperparathyroidism of renal origin, CAD s/p cardiac stent in 1/2018 and 6/2018 mid memory impairment presents for parathyroidectomy. Pt is now s/p parathyroidectomy with parathyroid autotransplantation 9/4. Endocrine consulted for further management.    D/W provider      #Secondary Hyperparathyroidism s/p parathyroidectomy  #Hungry bones syndrome and hypocalcemia   - OP labs:   -- 2/13/24 corrected Ca 9.1, iPTH 2000  -- 3/12/2024 corrected Ca 8.9, iPTH 3053  -- 3/26/24 corrected Ca 9.7  -- 4/2024 corrected Ca 9.3, iPTH 2380, vitamin D 25OH 20.3, vitamin D 1,25OH 6.5   - ENT in-office US showing enlarged R superior parathyroid  - Intraoperative iCa 1.06 (low), Ca 8.6 (corrected 9.9)  - Currently asx  - Started on calcium carbonate 1 tab (200 mg elemental calcium) tidac post-op   - s/p calcium gluconate IVP 9/5. Corrected calcium would be ~8, however.  - Post op PTH 14, vitamin D 24.2  - 9/7/24 corrected calcium 7.8, ionzied calcium is 0.73 very low. s/p calcium gluconate IV this morning.    Plan:  - Discussed with nephrology and SICU. Will increase calcitriol to 1mcg BID and calcium carbonate 1250mg 2 tabs TID with meals, and patient to start high calcium HD bath. Will repeat calcium levels 3 hours after HD, if calcium still remains low, then will consider calcium drip at that point.  - Trend BID CMP, phos, Mg  - If corrected Ca <7.5 or patient is symptomatic, give calcium gluconate IVP  - If PTH low, aim calcium low normal 8-8.5   - Aim normal Mg and high normal PO4  - Advised pt to notify RN if having hypocalcemic s/s including perioral/extremity tingling/numbness  - telemetry whilst calcium remains low     #Hypothyroidism Hx  - Not on LT4, per son had been on 25 mcg but TFTs found to be normal so stopped >1 year ago  - Repeat TSH 2.35, normal    #T2DM, diet controlled  - Last a1c 5.6% 8/2024, though unreliable i/s/o ESRD on HD  - Please obtain FS tidac and qhs  - If hyperglycemic x2 >180 can start low correctional Admelog at meals and low correctional Admelog at bedtime    #HTN  - Goal BP <130/80  - Management per primary team

## 2024-09-07 NOTE — PROGRESS NOTE ADULT - SUBJECTIVE AND OBJECTIVE BOX
Admitted for: Hyperparathyroidism due to renal insufficiency        Following for: Hypocalcemia     Subjective:   Patient denies perioral numbness/tingling or in extremities, palpitations, muscle cramps.      MEDICATIONS  (STANDING):  acetaminophen     Tablet .. 975 milliGRAM(s) Oral every 6 hours  calcitriol   Capsule 1 MICROGram(s) Oral every 12 hours  calcitriol   Capsule 0.75 MICROGram(s) Oral once  calcium carbonate   1250 mG (OsCal) 2 Tablet(s) Oral three times a day  chlorhexidine 2% Cloths 1 Application(s) Topical daily  chlorhexidine 2% Cloths 1 Application(s) Topical daily  cholecalciferol 2000 Unit(s) Oral daily  epoetin adele (EPOGEN) Injectable 3000 Unit(s) IV Push <User Schedule>  heparin   Injectable 5000 Unit(s) SubCutaneous every 8 hours  hydroxychloroquine 200 milliGRAM(s) Oral daily  insulin lispro (ADMELOG) corrective regimen sliding scale   SubCutaneous at bedtime  insulin lispro (ADMELOG) corrective regimen sliding scale   SubCutaneous three times a day before meals  labetalol 100 milliGRAM(s) Oral two times a day  valsartan 80 milliGRAM(s) Oral daily    MEDICATIONS  (PRN):  sodium chloride 0.9% Bolus. 100 milliLiter(s) IV Bolus every 5 minutes PRN SBP LESS THAN or EQUAL to 90 mmHg      Allergies    penicillin (Rash)    Intolerances          PHYSICAL EXAM:  VITALS: T(C): 36.3 (09-07-24 @ 13:05)  T(F): 97.3 (09-07-24 @ 13:05), Max: 97.8 (09-07-24 @ 08:00)  HR: 74 (09-07-24 @ 13:05) (67 - 75)  BP: 157/61 (09-07-24 @ 13:05) (115/61 - 157/61)  RR:  (12 - 18)  SpO2:  (99% - 100%)  Wt(kg): --  GENERAL: NAD, elderly female  EYES: No proptosis, no lid lag, anicteric  RESPIRATORY: no respiratory distress  CARDIOVASCULAR: no peripheral edema  GI: non distended  EXT: ANGEL  PSYCH: Alert and oriented, reactive affect      A1C with Estimated Average Glucose Result: 5.6 % (08-27-24 @ 14:30)      POCT Blood Glucose.: 101 mg/dL (09-07-24 @ 11:53)  POCT Blood Glucose.: 104 mg/dL (09-07-24 @ 08:05)  POCT Blood Glucose.: 142 mg/dL (09-06-24 @ 21:14)  POCT Blood Glucose.: 113 mg/dL (09-06-24 @ 17:33)  POCT Blood Glucose.: 158 mg/dL (09-06-24 @ 12:07)  POCT Blood Glucose.: 111 mg/dL (09-06-24 @ 08:18)  POCT Blood Glucose.: 138 mg/dL (09-05-24 @ 23:16)      09-07    140  |  100  |  41<H>  ----------------------------<  91  3.3<L>   |  23  |  5.90<H>    eGFR: 8<L>    Ca    5.8<LL>      09-07  Mg     2.10     09-07  Phos  2.6     09-07    TPro  6.0  /  Alb  2.8<L>  /  TBili  0.3  /  DBili  x   /  AST  29  /  ALT  14  /  AlkPhos  869<H>  09-07      Thyroid Function Tests:  09-05 @ 06:52 TSH 2.35 FreeT4 -- T3 -- Anti TPO -- Anti Thyroglobulin Ab -- TSI --  09-05 @ 01:44 TSH 2.95 FreeT4 -- T3 -- Anti TPO -- Anti Thyroglobulin Ab -- TSI --

## 2024-09-07 NOTE — PROGRESS NOTE ADULT - SUBJECTIVE AND OBJECTIVE BOX
SICU Daily Progress Note  =====================================================  Interval/Overnight Events:   - NAOE  - Trend Ca with corrected value based on albumin levels BID  -1g IV calcium gluco 2/2 corrected Ca of 7.6 on 6AM labs  - Tums TID switched to calcium carbonated 1250mg TID  -1g IV calcium gluconate 2/2 corrected Ca of 7.0 on 6PM labs      MEDICATIONS:   --------------------------------------------------------------------------------------  acetaminophen     Tablet .. 975 milliGRAM(s) Oral every 6 hours  calcitriol   Capsule 0.25 MICROGram(s) Oral every 12 hours  calcium carbonate   1250 mG (OsCal) 1 Tablet(s) Oral three times a day  chlorhexidine 2% Cloths 1 Application(s) Topical daily  chlorhexidine 2% Cloths 1 Application(s) Topical daily  epoetin adele (EPOGEN) Injectable 3000 Unit(s) IV Push <User Schedule>  heparin   Injectable 5000 Unit(s) SubCutaneous every 8 hours  hydroxychloroquine 200 milliGRAM(s) Oral daily  insulin lispro (ADMELOG) corrective regimen sliding scale   SubCutaneous at bedtime  insulin lispro (ADMELOG) corrective regimen sliding scale   SubCutaneous three times a day before meals  labetalol 100 milliGRAM(s) Oral two times a day  sodium chloride 0.9% Bolus. 100 milliLiter(s) IV Bolus every 5 minutes PRN  valsartan 80 milliGRAM(s) Oral daily    --------------------------------------------------------------------------------------    VITAL SIGNS, INS/OUTS (last 24 hours):  --------------------------------------------------------------------------------------  T(C): 36.2 (09-07-24 @ 00:00), Max: 36.6 (09-06-24 @ 08:00)  HR: 68 (09-07-24 @ 00:00) (68 - 77)  BP: 135/59 (09-07-24 @ 00:00) (115/49 - 146/65)  RR: 16 (09-07-24 @ 00:00) (13 - 18)  SpO2: 100% (09-07-24 @ 00:00) (97% - 100%)      09-05-24 @ 07:01  -  09-06-24 @ 07:00  --------------------------------------------------------  IN: 400 mL / OUT: 1900 mL / NET: -1500 mL    09-06-24 @ 07:01  -  09-07-24 @ 00:32  --------------------------------------------------------  IN: 1000 mL / OUT: 0 mL / NET: 1000 mL      --------------------------------------------------------------------------------------    EXAM    General Appearance: no acute distress  Neck: anterior neck incision c/d/i and soft  Chest: non-labored breathing  CV: no JVD, palpable pulses b/l  Abdomen: soft, non-tender, non-distended, +BS   Extremities: WWP, R forearm reimplantation site c/d/i     TUBES/LINES/DRAINS  [x] Peripheral IV  [] Central Venous Line     	[] R	[] L	[] IJ	[] Fem	[] SC	Date Placed:   [] Arterial Line		[] R	[] L	[] Fem	[] Rad	[] Ax	Date Placed:   [] PICC		[] Midline		[] Mediport  [] Urinary Catheter		Date Placed:     LABS  --------------------------------------------------------------------------------------    --------------------------------------------------------------------------------------    IMAGING STUDIES:

## 2024-09-07 NOTE — PROGRESS NOTE ADULT - ASSESSMENT
63 year old Female with history of ESRD on HD presents for parathyroidectomy. Nephrology consulted for ESRD status.    1) ESRD: Pt seen on HD today on high calcium bath, hemodynamically stable, tolerating UF goal of 2L. Plan for next maintenance HD on 9/10. Pt with mild hypokalemia can d/c renal diet. Monitor electrolytes.    2) HTN with ESRD: BP acceptable. c/w current antihypertensive medications. Monitor BP.     3) Anemia of renal disease: Hb low with acceptable iron stores. Continue with Epo 3K with HD. Monitor Hb.    4) Secondary HPT of renal origin: S/P parathyroidectomy with decreasing serum calcium. Ionized Ca 0.73. s/p calcium 2gIV x1. Will use high calcium bath in HD today. Calcitriol increased to 1mcg PO bid and increased calcium carbonate to 1250 mg 2 tabs thrice daily. Vitamin D 25-OH 24.2; will start Vitamin D3 2000 units PO daily. Repeat calcium 3 hrs post HD. Monitor serum calcium and phosphorus.      [facility] NEPHROLOG[facility]  Timmy Acharya M.D.  Amilcar Contreras D.O.  Mindi Geller M.D.  MD Luna Israel, MSN, ANP-C    Telephone: [phone]  Facsimile: [phone] [place] [address], [address]  [place] [address]

## 2024-09-08 LAB
ALBUMIN SERPL ELPH-MCNC: 2.9 G/DL — LOW (ref 3.3–5)
ALBUMIN SERPL ELPH-MCNC: 3 G/DL — LOW (ref 3.3–5)
ALBUMIN SERPL ELPH-MCNC: 3.3 G/DL — SIGNIFICANT CHANGE UP (ref 3.3–5)
ALP SERPL-CCNC: 920 U/L — HIGH (ref 40–120)
ALP SERPL-CCNC: 942 U/L — HIGH (ref 40–120)
ALP SERPL-CCNC: 978 U/L — HIGH (ref 40–120)
ALT FLD-CCNC: 14 U/L — SIGNIFICANT CHANGE UP (ref 4–33)
ALT FLD-CCNC: 16 U/L — SIGNIFICANT CHANGE UP (ref 4–33)
ALT FLD-CCNC: 18 U/L — SIGNIFICANT CHANGE UP (ref 4–33)
ANION GAP SERPL CALC-SCNC: 14 MMOL/L — SIGNIFICANT CHANGE UP (ref 7–14)
ANION GAP SERPL CALC-SCNC: 16 MMOL/L — HIGH (ref 7–14)
ANION GAP SERPL CALC-SCNC: 16 MMOL/L — HIGH (ref 7–14)
AST SERPL-CCNC: 27 U/L — SIGNIFICANT CHANGE UP (ref 4–32)
AST SERPL-CCNC: 32 U/L — SIGNIFICANT CHANGE UP (ref 4–32)
AST SERPL-CCNC: 48 U/L — HIGH (ref 4–32)
BILIRUB SERPL-MCNC: 0.3 MG/DL — SIGNIFICANT CHANGE UP (ref 0.2–1.2)
BUN SERPL-MCNC: 17 MG/DL — SIGNIFICANT CHANGE UP (ref 7–23)
BUN SERPL-MCNC: 18 MG/DL — SIGNIFICANT CHANGE UP (ref 7–23)
BUN SERPL-MCNC: 23 MG/DL — SIGNIFICANT CHANGE UP (ref 7–23)
CA-I BLD-SCNC: 0.79 MMOL/L — LOW (ref 1.15–1.29)
CA-I BLD-SCNC: 0.98 MMOL/L — LOW (ref 1.15–1.29)
CALCIUM SERPL-MCNC: 7.1 MG/DL — LOW (ref 8.4–10.5)
CALCIUM SERPL-MCNC: 7.1 MG/DL — LOW (ref 8.4–10.5)
CALCIUM SERPL-MCNC: 7.7 MG/DL — LOW (ref 8.4–10.5)
CHLORIDE SERPL-SCNC: 101 MMOL/L — SIGNIFICANT CHANGE UP (ref 98–107)
CHLORIDE SERPL-SCNC: 103 MMOL/L — SIGNIFICANT CHANGE UP (ref 98–107)
CHLORIDE SERPL-SCNC: 99 MMOL/L — SIGNIFICANT CHANGE UP (ref 98–107)
CO2 SERPL-SCNC: 23 MMOL/L — SIGNIFICANT CHANGE UP (ref 22–31)
CO2 SERPL-SCNC: 24 MMOL/L — SIGNIFICANT CHANGE UP (ref 22–31)
CO2 SERPL-SCNC: 25 MMOL/L — SIGNIFICANT CHANGE UP (ref 22–31)
CREAT SERPL-MCNC: 3.49 MG/DL — HIGH (ref 0.5–1.3)
CREAT SERPL-MCNC: 4.31 MG/DL — HIGH (ref 0.5–1.3)
CREAT SERPL-MCNC: 4.91 MG/DL — HIGH (ref 0.5–1.3)
EGFR: 11 ML/MIN/1.73M2 — LOW
EGFR: 14 ML/MIN/1.73M2 — LOW
EGFR: 9 ML/MIN/1.73M2 — LOW
GLUCOSE BLDC GLUCOMTR-MCNC: 109 MG/DL — HIGH (ref 70–99)
GLUCOSE BLDC GLUCOMTR-MCNC: 139 MG/DL — HIGH (ref 70–99)
GLUCOSE BLDC GLUCOMTR-MCNC: 141 MG/DL — HIGH (ref 70–99)
GLUCOSE BLDC GLUCOMTR-MCNC: 142 MG/DL — HIGH (ref 70–99)
GLUCOSE SERPL-MCNC: 128 MG/DL — HIGH (ref 70–99)
GLUCOSE SERPL-MCNC: 85 MG/DL — SIGNIFICANT CHANGE UP (ref 70–99)
GLUCOSE SERPL-MCNC: 89 MG/DL — SIGNIFICANT CHANGE UP (ref 70–99)
HCT VFR BLD CALC: 30.7 % — LOW (ref 34.5–45)
HGB BLD-MCNC: 10.5 G/DL — LOW (ref 11.5–15.5)
MAGNESIUM SERPL-MCNC: 2.2 MG/DL — SIGNIFICANT CHANGE UP (ref 1.6–2.6)
MCHC RBC-ENTMCNC: 32.4 PG — SIGNIFICANT CHANGE UP (ref 27–34)
MCHC RBC-ENTMCNC: 34.2 GM/DL — SIGNIFICANT CHANGE UP (ref 32–36)
MCV RBC AUTO: 94.8 FL — SIGNIFICANT CHANGE UP (ref 80–100)
NRBC # BLD: 0 /100 WBCS — SIGNIFICANT CHANGE UP (ref 0–0)
NRBC # FLD: 0 K/UL — SIGNIFICANT CHANGE UP (ref 0–0)
PHOSPHATE SERPL-MCNC: 1.7 MG/DL — LOW (ref 2.5–4.5)
PHOSPHATE SERPL-MCNC: 1.8 MG/DL — LOW (ref 2.5–4.5)
PHOSPHATE SERPL-MCNC: 3.6 MG/DL — SIGNIFICANT CHANGE UP (ref 2.5–4.5)
PLATELET # BLD AUTO: 174 K/UL — SIGNIFICANT CHANGE UP (ref 150–400)
POTASSIUM SERPL-MCNC: 3.5 MMOL/L — SIGNIFICANT CHANGE UP (ref 3.5–5.3)
POTASSIUM SERPL-MCNC: 3.7 MMOL/L — SIGNIFICANT CHANGE UP (ref 3.5–5.3)
POTASSIUM SERPL-MCNC: 5.3 MMOL/L — SIGNIFICANT CHANGE UP (ref 3.5–5.3)
POTASSIUM SERPL-SCNC: 3.5 MMOL/L — SIGNIFICANT CHANGE UP (ref 3.5–5.3)
POTASSIUM SERPL-SCNC: 3.7 MMOL/L — SIGNIFICANT CHANGE UP (ref 3.5–5.3)
POTASSIUM SERPL-SCNC: 5.3 MMOL/L — SIGNIFICANT CHANGE UP (ref 3.5–5.3)
PROT SERPL-MCNC: 5.5 G/DL — LOW (ref 6–8.3)
PROT SERPL-MCNC: 5.9 G/DL — LOW (ref 6–8.3)
PROT SERPL-MCNC: 6 G/DL — SIGNIFICANT CHANGE UP (ref 6–8.3)
RBC # BLD: 3.24 M/UL — LOW (ref 3.8–5.2)
RBC # FLD: 14.9 % — HIGH (ref 10.3–14.5)
SODIUM SERPL-SCNC: 138 MMOL/L — SIGNIFICANT CHANGE UP (ref 135–145)
SODIUM SERPL-SCNC: 141 MMOL/L — SIGNIFICANT CHANGE UP (ref 135–145)
SODIUM SERPL-SCNC: 142 MMOL/L — SIGNIFICANT CHANGE UP (ref 135–145)
WBC # BLD: 7.2 K/UL — SIGNIFICANT CHANGE UP (ref 3.8–10.5)
WBC # FLD AUTO: 7.2 K/UL — SIGNIFICANT CHANGE UP (ref 3.8–10.5)

## 2024-09-08 PROCEDURE — 99233 SBSQ HOSP IP/OBS HIGH 50: CPT | Mod: GC

## 2024-09-08 RX ORDER — SODIUM PHOSPHATE, MONOBASIC, MONOHYDRATE AND SODIUM PHOSPHATE, DIBASIC ANHYDROUS 276; 142 MG/ML; MG/ML
15 INJECTION, SOLUTION INTRAVENOUS ONCE
Refills: 0 | Status: COMPLETED | OUTPATIENT
Start: 2024-09-08 | End: 2024-09-08

## 2024-09-08 RX ORDER — SODIUM PHOSPHATE, DIBASIC, ANHYDROUS, POTASSIUM PHOSPHATE, MONOBASIC, AND SODIUM PHOSPHATE, MONOBASIC, MONOHYDRATE 852; 155; 130 MG/1; MG/1; MG/1
2 TABLET, COATED ORAL EVERY 6 HOURS
Refills: 0 | Status: DISCONTINUED | OUTPATIENT
Start: 2024-09-08 | End: 2024-09-08

## 2024-09-08 RX ORDER — SODIUM PHOSPHATE, DIBASIC, ANHYDROUS, POTASSIUM PHOSPHATE, MONOBASIC, AND SODIUM PHOSPHATE, MONOBASIC, MONOHYDRATE 852; 155; 130 MG/1; MG/1; MG/1
2 TABLET, COATED ORAL EVERY 12 HOURS
Refills: 0 | Status: DISCONTINUED | OUTPATIENT
Start: 2024-09-08 | End: 2024-09-08

## 2024-09-08 RX ORDER — CALCIUM CARBONATE 500(1250)
3 TABLET ORAL ONCE
Refills: 0 | Status: COMPLETED | OUTPATIENT
Start: 2024-09-08 | End: 2024-09-08

## 2024-09-08 RX ORDER — ACETAMINOPHEN 325 MG/1
650 TABLET ORAL EVERY 6 HOURS
Refills: 0 | Status: DISCONTINUED | OUTPATIENT
Start: 2024-09-08 | End: 2024-09-13

## 2024-09-08 RX ORDER — CALCIUM CARBONATE 500(1250)
3 TABLET ORAL THREE TIMES A DAY
Refills: 0 | Status: DISCONTINUED | OUTPATIENT
Start: 2024-09-08 | End: 2024-09-08

## 2024-09-08 RX ORDER — SODIUM PHOSPHATE, MONOBASIC, MONOHYDRATE AND SODIUM PHOSPHATE, DIBASIC ANHYDROUS 276; 142 MG/ML; MG/ML
15 INJECTION, SOLUTION INTRAVENOUS ONCE
Refills: 0 | Status: DISCONTINUED | OUTPATIENT
Start: 2024-09-08 | End: 2024-09-08

## 2024-09-08 RX ORDER — CALCIUM CARBONATE 500(1250)
4 TABLET ORAL THREE TIMES A DAY
Refills: 0 | Status: DISCONTINUED | OUTPATIENT
Start: 2024-09-08 | End: 2024-09-10

## 2024-09-08 RX ORDER — CALCIUM GLUCONATE 61(648) MG
2 TABLET ORAL ONCE
Refills: 0 | Status: COMPLETED | OUTPATIENT
Start: 2024-09-08 | End: 2024-09-08

## 2024-09-08 RX ORDER — CALCIUM CARBONATE 500(1250)
1 TABLET ORAL ONCE
Refills: 0 | Status: COMPLETED | OUTPATIENT
Start: 2024-09-08 | End: 2024-09-08

## 2024-09-08 RX ADMIN — CHLORHEXIDINE GLUCONATE 1 APPLICATION(S): 40 SOLUTION TOPICAL at 11:41

## 2024-09-08 RX ADMIN — Medication 5000 UNIT(S): at 21:25

## 2024-09-08 RX ADMIN — CALCITRIOL 1 MICROGRAM(S): 0.25 CAPSULE ORAL at 07:53

## 2024-09-08 RX ADMIN — Medication 2000 UNIT(S): at 11:41

## 2024-09-08 RX ADMIN — Medication 3 TABLET(S): at 10:43

## 2024-09-08 RX ADMIN — Medication 100 MILLIGRAM(S): at 17:09

## 2024-09-08 RX ADMIN — Medication 100 MILLIGRAM(S): at 06:27

## 2024-09-08 RX ADMIN — Medication 3 TABLET(S): at 14:09

## 2024-09-08 RX ADMIN — Medication 5000 UNIT(S): at 06:27

## 2024-09-08 RX ADMIN — Medication 200 GRAM(S): at 10:43

## 2024-09-08 RX ADMIN — HYDROXYCHLOROQUINE SULFATE 200 MILLIGRAM(S): 200 TABLET, FILM COATED ORAL at 11:41

## 2024-09-08 RX ADMIN — Medication 4 TABLET(S): at 21:25

## 2024-09-08 RX ADMIN — SODIUM PHOSPHATE, MONOBASIC, MONOHYDRATE AND SODIUM PHOSPHATE, DIBASIC ANHYDROUS 63.75 MILLIMOLE(S): 276; 142 INJECTION, SOLUTION INTRAVENOUS at 12:30

## 2024-09-08 RX ADMIN — CALCITRIOL 1 MICROGRAM(S): 0.25 CAPSULE ORAL at 17:09

## 2024-09-08 RX ADMIN — Medication 1 TABLET(S): at 21:25

## 2024-09-08 RX ADMIN — Medication 5000 UNIT(S): at 14:10

## 2024-09-08 RX ADMIN — VALSARTAN 80 MILLIGRAM(S): 40 TABLET ORAL at 11:41

## 2024-09-08 NOTE — PROGRESS NOTE ADULT - ASSESSMENT
63F PMH HTN, HLD, T2DM (on lifestyle modification), morbid obesity s/p gastric bypass 2016, SLE c/b ESRD on HD (T/Th/S) since 2017 (left AV fistula), secondary hyperparathyroidism of renal origin, CAD s/p cardiac stent in 1/2018 and 6/2018 mid memory impairment presents for parathyroidectomy. Pt is now s/p parathyroidectomy with parathyroid autotransplantation 9/4. Endocrine consulted for further management.    D/W provider      #Secondary Hyperparathyroidism s/p parathyroidectomy  #Hungry bones syndrome and hypocalcemia   - OP labs:   -- 2/13/24 corrected Ca 9.1, iPTH 2000  -- 3/12/2024 corrected Ca 8.9, iPTH 3053  -- 3/26/24 corrected Ca 9.7  -- 4/2024 corrected Ca 9.3, iPTH 2380, vitamin D 25OH 20.3, vitamin D 1,25OH 6.5   - ENT in-office US showing enlarged R superior parathyroid  - Intraoperative iCa 1.06 (low), Ca 8.6 (corrected 9.9)  - Currently asx  - Started on calcium carbonate 1 tab (200 mg elemental calcium) tidac post-op   - s/p calcium gluconate IVP 9/5. Corrected calcium would be ~8, however.  - Post op PTH 14, vitamin D 24.2  - 9/7/24 corrected calcium 7.8, ionzied calcium is 0.73 very low. s/p calcium gluconate IV this morning.  - 9/7/24 after high-calcium bath HD, corrected ca was 8.8 and ionized calcium 1.00  - 9/8/24 corrected calcium is 7.9, ionized calcium is 0.79    Plan:  - continue calcitriol 1mcg BID  - calcium carbonate 1250mg was increased to 3 tabs TID with meals, can continue  - Trend BID CMP, phos, Mg  - If corrected Ca <7.5 or patient is symptomatic, give calcium gluconate IVP  - If PTH low, aim calcium low normal 8-8.5   - Aim normal Mg and high normal PO4  - Advised pt to notify RN if having hypocalcemic s/s including perioral/extremity tingling/numbness  - telemetry whilst calcium remains low  - Marked elevated ALP is likely due to hungry bone syndrome, can check GGT to rule out other causes    #Hypothyroidism Hx  - Not on LT4, per son had been on 25 mcg but TFTs found to be normal so stopped >1 year ago  - Repeat TSH 2.35, normal    #T2DM, diet controlled  - Last a1c 5.6% 8/2024, though unreliable i/s/o ESRD on HD  - Please obtain FS tidac and qhs  - low correctional Admelog at meals and low correctional Admelog at bedtime    #HTN  - Goal BP <130/80  - Management per primary team   63F PMH HTN, HLD, T2DM (on lifestyle modification), morbid obesity s/p gastric bypass 2016, SLE c/b ESRD on HD (T/Th/S) since 2017 (left AV fistula), secondary hyperparathyroidism of renal origin, CAD s/p cardiac stent in 1/2018 and 6/2018 mid memory impairment presents for parathyroidectomy. Pt is now s/p parathyroidectomy with parathyroid autotransplantation 9/4. Endocrine consulted for further management.      #Secondary Hyperparathyroidism s/p parathyroidectomy  #Hungry bones syndrome and hypocalcemia   - OP labs:   -- 2/13/24 corrected Ca 9.1, iPTH 2000  -- 3/12/2024 corrected Ca 8.9, iPTH 3053  -- 3/26/24 corrected Ca 9.7  -- 4/2024 corrected Ca 9.3, iPTH 2380, vitamin D 25OH 20.3, vitamin D 1,25OH 6.5   - ENT in-office US showing enlarged R superior parathyroid  - Intraoperative iCa 1.06 (low), Ca 8.6 (corrected 9.9)  - Currently asx  - Started on calcium carbonate 1 tab (200 mg elemental calcium) tidac post-op   - s/p calcium gluconate IVP 9/5. Corrected calcium would be ~8, however.  - Post op PTH 14, vitamin D 24.2  - 9/7/24 corrected calcium 7.8, ionzied calcium is 0.73 very low. s/p calcium gluconate IV this morning.  - 9/7/24 after high-calcium bath HD, corrected ca was 8.8 and ionized calcium 1.00  - 9/8/24 corrected calcium is 7.9, ionized calcium is 0.79    Plan:  - continue calcitriol 1mcg BID  - calcium carbonate 1250mg was increased to 3 tabs TID with meals, can continue  - Trend BID CMP, phos, Mg  - If corrected Ca <7.5 or patient is symptomatic, give calcium gluconate IVP  - If PTH low, aim calcium low normal 8-8.5   - Aim normal Mg and high normal PO4  - Advised pt to notify RN if having hypocalcemic s/s including perioral/extremity tingling/numbness  - telemetry whilst calcium remains low  - Marked elevated ALP is likely due to hungry bone syndrome, can check GGT to rule out other causes    #Hypothyroidism Hx  - Not on LT4, per son had been on 25 mcg but TFTs found to be normal so stopped >1 year ago  - Repeat TSH 2.35, normal    #T2DM, diet controlled  - Last a1c 5.6% 8/2024, though unreliable i/s/o ESRD on HD  - Please obtain FS tidac and qhs  - low correctional Admelog at meals and low correctional Admelog at bedtime    #HTN  - Goal BP <130/80  - Management per primary team

## 2024-09-08 NOTE — PROGRESS NOTE ADULT - SUBJECTIVE AND OBJECTIVE BOX
Interval/Overnight Events:  - Trend Ca with corrected value based on albumin levels BID  - Calcitriol to 0.5mg; Calcium Carbonate 2500 BID -> 1mg BID and 2500 TID with meals  - 2g IV Ca Gluconate AM 9/7  - q6 BMP  - Sp scheduled HD  - ALK Phos uptrending to 942    SUBJECTIVE  The patient was seen and examined.     OBJECTIVE  ___________________________________________________  VITAL SIGNS / I&O's   Vital Signs Last 24 Hrs  T(C): 36.6 (08 Sep 2024 00:00), Max: 36.6 (07 Sep 2024 08:00)  T(F): 97.8 (08 Sep 2024 00:00), Max: 97.8 (07 Sep 2024 08:00)  HR: 68 (08 Sep 2024 00:00) (67 - 74)  BP: 116/54 (08 Sep 2024 00:00) (115/61 - 157/61)  BP(mean): 73 (08 Sep 2024 00:00) (73 - 88)  RR: 14 (08 Sep 2024 00:00) (12 - 16)  SpO2: 100% (08 Sep 2024 00:00) (98% - 100%)    Parameters below as of 08 Sep 2024 00:00  Patient On (Oxygen Delivery Method): room air          06 Sep 2024 07:01  -  07 Sep 2024 07:00  --------------------------------------------------------  IN:    Oral Fluid: 1000 mL  Total IN: 1000 mL    OUT:  Total OUT: 0 mL    Total NET: 1000 mL      07 Sep 2024 07:01  -  08 Sep 2024 00:23  --------------------------------------------------------  IN:    Oral Fluid: 680 mL    Other (mL): 400 mL  Total IN: 1080 mL    OUT:    Other (mL): 2300 mL  Total OUT: 2300 mL    Total NET: -1220 mL        ___________________________________________________  PHYSICAL EXAM    General Appearance: no acute distress  Neck: anterior neck incision c/d/i and soft  Chest: non-labored breathing  CV: no JVD, palpable pulses b/l  Abdomen: soft, non-tender, non-distended, +BS   Extremities: WWP, R forearm reimplantation site c/d/i     ___________________________________________________  LABS                        9.4    6.78  )-----------( 143      ( 07 Sep 2024 06:20 )             27.7     07 Sep 2024 23:20    142    |  101    |  17     ----------------------------<  89     3.7     |  25     |  3.49     Ca    7.7        07 Sep 2024 23:20  Phos  1.7       07 Sep 2024 23:20  Mg     2.20      07 Sep 2024 23:20    TPro  5.5    /  Alb  3.3    /  TBili  0.3    /  DBili  x      /  AST  32     /  ALT  16     /  AlkPhos  978    07 Sep 2024 23:20      CAPILLARY BLOOD GLUCOSE      POCT Blood Glucose.: 85 mg/dL (07 Sep 2024 22:41)  POCT Blood Glucose.: 174 mg/dL (07 Sep 2024 16:21)  POCT Blood Glucose.: 101 mg/dL (07 Sep 2024 11:53)  POCT Blood Glucose.: 104 mg/dL (07 Sep 2024 08:05)        Urinalysis Basic - ( 07 Sep 2024 23:20 )    Color: x / Appearance: x / SG: x / pH: x  Gluc: 89 mg/dL / Ketone: x  / Bili: x / Urobili: x   Blood: x / Protein: x / Nitrite: x   Leuk Esterase: x / RBC: x / WBC x   Sq Epi: x / Non Sq Epi: x / Bacteria: x      ___________________________________________________  MICRO  Recent Cultures:    ___________________________________________________  MEDICATIONS  (STANDING):  acetaminophen     Tablet .. 975 milliGRAM(s) Oral every 6 hours  calcitriol   Capsule 1 MICROGram(s) Oral every 12 hours  calcium carbonate   1250 mG (OsCal) 2 Tablet(s) Oral three times a day  chlorhexidine 2% Cloths 1 Application(s) Topical daily  chlorhexidine 2% Cloths 1 Application(s) Topical daily  cholecalciferol 2000 Unit(s) Oral daily  epoetin adele (EPOGEN) Injectable 3000 Unit(s) IV Push <User Schedule>  heparin   Injectable 5000 Unit(s) SubCutaneous every 8 hours  hydroxychloroquine 200 milliGRAM(s) Oral daily  insulin lispro (ADMELOG) corrective regimen sliding scale   SubCutaneous at bedtime  insulin lispro (ADMELOG) corrective regimen sliding scale   SubCutaneous three times a day before meals  labetalol 100 milliGRAM(s) Oral two times a day  valsartan 80 milliGRAM(s) Oral daily    MEDICATIONS  (PRN):  sodium chloride 0.9% Bolus. 100 milliLiter(s) IV Bolus every 5 minutes PRN SBP LESS THAN or EQUAL to 90 mmHg

## 2024-09-08 NOTE — PROGRESS NOTE ADULT - SUBJECTIVE AND OBJECTIVE BOX
Admitted for: Hyperparathyroidism due to renal insufficiency        Following for: Hypocalcemia     Subjective:   Patient denies perioral numbness/tingling or in extremities, palpitations, muscle cramps.      MEDICATIONS  (STANDING):  calcitriol   Capsule 1 MICROGram(s) Oral every 12 hours  calcium carbonate   1250 mG (OsCal) 3 Tablet(s) Oral three times a day  chlorhexidine 2% Cloths 1 Application(s) Topical daily  chlorhexidine 2% Cloths 1 Application(s) Topical daily  cholecalciferol 2000 Unit(s) Oral daily  epoetin adele (EPOGEN) Injectable 3000 Unit(s) IV Push <User Schedule>  heparin   Injectable 5000 Unit(s) SubCutaneous every 8 hours  hydroxychloroquine 200 milliGRAM(s) Oral daily  insulin lispro (ADMELOG) corrective regimen sliding scale   SubCutaneous at bedtime  insulin lispro (ADMELOG) corrective regimen sliding scale   SubCutaneous three times a day before meals  labetalol 100 milliGRAM(s) Oral two times a day  valsartan 80 milliGRAM(s) Oral daily    MEDICATIONS  (PRN):  acetaminophen     Tablet .. 650 milliGRAM(s) Oral every 6 hours PRN Mild Pain (1 - 3)  sodium chloride 0.9% Bolus. 100 milliLiter(s) IV Bolus every 5 minutes PRN SBP LESS THAN or EQUAL to 90 mmHg      Allergies    penicillin (Rash)    Intolerances          PHYSICAL EXAM:  VITALS: T(C): 36.3 (09-08-24 @ 12:00)  T(F): 97.3 (09-08-24 @ 12:00), Max: 98.1 (09-08-24 @ 04:00)  HR: 69 (09-08-24 @ 12:00) (68 - 71)  BP: 137/66 (09-08-24 @ 12:00) (112/50 - 151/67)  RR:  (10 - 16)  SpO2:  (93% - 100%)  Wt(kg): --  GENERAL: NAD, elderly female  EYES: No proptosis, no lid lag, anicteric  RESPIRATORY: no respiratory distress  CARDIOVASCULAR: no peripheral edema  GI: non distended  EXT: ANGEL  PSYCH: Alert and oriented, reactive affect      A1C with Estimated Average Glucose Result: 5.6 % (08-27-24 @ 14:30)      POCT Blood Glucose.: 141 mg/dL (09-08-24 @ 11:14)  POCT Blood Glucose.: 109 mg/dL (09-08-24 @ 07:41)  POCT Blood Glucose.: 85 mg/dL (09-07-24 @ 22:41)  POCT Blood Glucose.: 174 mg/dL (09-07-24 @ 16:21)  POCT Blood Glucose.: 101 mg/dL (09-07-24 @ 11:53)  POCT Blood Glucose.: 104 mg/dL (09-07-24 @ 08:05)  POCT Blood Glucose.: 142 mg/dL (09-06-24 @ 21:14)  POCT Blood Glucose.: 113 mg/dL (09-06-24 @ 17:33)  POCT Blood Glucose.: 158 mg/dL (09-06-24 @ 12:07)  POCT Blood Glucose.: 111 mg/dL (09-06-24 @ 08:18)  POCT Blood Glucose.: 138 mg/dL (09-05-24 @ 23:16)      09-08    141  |  103  |  18  ----------------------------<  85  3.5   |  24  |  4.31<H>    eGFR: 11<L>    Ca    7.1<L>      09-08  Mg     2.20     09-08  Phos  1.8     09-08    TPro  5.9<L>  /  Alb  3.0<L>  /  TBili  0.3  /  DBili  x   /  AST  27  /  ALT  14  /  AlkPhos  920<H>  09-08      Thyroid Function Tests:  09-05 @ 06:52 TSH 2.35 FreeT4 -- T3 -- Anti TPO -- Anti Thyroglobulin Ab -- TSI --  09-05 @ 01:44 TSH 2.95 FreeT4 -- T3 -- Anti TPO -- Anti Thyroglobulin Ab -- TSI --

## 2024-09-08 NOTE — PROGRESS NOTE ADULT - SUBJECTIVE AND OBJECTIVE BOX
Interval: patient s/p 3.5 gland parathyroidectomy with reimplant in R arm . received 2g IV ca  at 9am. Received HD with Ca bath. corrected calciums improving 8.8>8.3        PAST MEDICAL & SURGICAL HISTORY:  Diabetes      Hypertension      Hypercholesteremia      Other hyperlipidemia      Glaucoma      ESRD (end-stage renal disease) due to SLE      Lupus (systemic lupus erythematosus)      Hyperparathyroidism      Memory impairment      Anemia of renal disease      CAD (coronary artery disease)      Stented coronary artery      Cholelithiasis      Rib fracture      S/P  section  times two      H/O gastric bypass  2016      S/P appendectomy      History of appendectomy        Allergies    penicillin (Rash)    Intolerances    MEDICATIONS  (STANDING):  acetaminophen     Tablet .. 975 milliGRAM(s) Oral every 6 hours  calcitriol   Capsule 1 MICROGram(s) Oral every 12 hours  calcium carbonate   1250 mG (OsCal) 2 Tablet(s) Oral three times a day  chlorhexidine 2% Cloths 1 Application(s) Topical daily  chlorhexidine 2% Cloths 1 Application(s) Topical daily  cholecalciferol 2000 Unit(s) Oral daily  epoetin adele (EPOGEN) Injectable 3000 Unit(s) IV Push <User Schedule>  heparin   Injectable 5000 Unit(s) SubCutaneous every 8 hours  hydroxychloroquine 200 milliGRAM(s) Oral daily  insulin lispro (ADMELOG) corrective regimen sliding scale   SubCutaneous at bedtime  insulin lispro (ADMELOG) corrective regimen sliding scale   SubCutaneous three times a day before meals  labetalol 100 milliGRAM(s) Oral two times a day  valsartan 80 milliGRAM(s) Oral daily    MEDICATIONS  (PRN):  sodium chloride 0.9% Bolus. 100 milliLiter(s) IV Bolus every 5 minutes PRN SBP LESS THAN or EQUAL to 90 mmHg    ICU Vital Signs Last 24 Hrs  T(C): 36.6 (08 Sep 2024 00:00), Max: 36.6 (07 Sep 2024 08:00)  T(F): 97.8 (08 Sep 2024 00:00), Max: 97.8 (07 Sep 2024 08:00)  HR: 70 (08 Sep 2024 04:00) (67 - 74)  BP: 121/52 (08 Sep 2024 04:00) (115/53 - 157/61)  BP(mean): 72 (08 Sep 2024 04:00) (71 - 88)  ABP: --  ABP(mean): --  RR: 10 (08 Sep 2024 04:00) (10 - 16)  SpO2: 93% (08 Sep 2024 04:00) (93% - 100%)    O2 Parameters below as of 08 Sep 2024 00:00  Patient On (Oxygen Delivery Method): room air    Physical Exam:  Alert, NAD  Nonlabored Respirations RA  ANGEL  neck: incision c/d/i       I&O's Summary    06 Sep 2024 07:01  -  07 Sep 2024 07:00  --------------------------------------------------------  IN: 1000 mL / OUT: 0 mL / NET: 1000 mL    07 Sep 2024 07:01  -  08 Sep 2024 05:32  --------------------------------------------------------  IN: 1080 mL / OUT: 2300 mL / NET: -1220 mL                            10.5   7.20  )-----------( 174      ( 08 Sep 2024 00:30 )             30.7   07    142  |  101  |  17  ----------------------------<  89  3.7   |  25  |  3.49<H>    Ca    7.7<L>      07 Sep 2024 23:20  Phos  1.7       Mg     2.20         TPro  5.5<L>  /  Alb  3.3  /  TBili  0.3  /  DBili  x   /  AST  32  /  ALT  16  /  AlkPhos  978<H>  07                   A/P: 63yFemale s/p 3.5 gland parathyroidectomy    -trend calciums  -repletions per endo/SICU   -HD per nephro      - SCDs  - d/w attending

## 2024-09-08 NOTE — PROGRESS NOTE ADULT - ASSESSMENT
63F PMHx HTN, HLD, T2DM, morbid obesity (lost ~70 lbs after gastric bypass in 2016), SLE, ESRD on HD (T/Th/S) since 2017 (left AV fistula), CAD s/p Cardiac stent in 1/2018 and 6/2018, and mid memory impairment now s/p 4-gland total parathyroidectomy with reimplantation of 1x gland in R forearm on 9/4. SICU consulted for electrolyte monitoring and c/f hungry bone syndrome post op.         PLAN  Neuro:  - AOx3  - Pain control: Tylenol   - Holding home memantine    Respiratory:  - on RA, breathing comfortably    Cardiovascular:  - MAP goal >65  - Hemodynamically stable off pressors   - holding home anti-hypertensives (valsartan and labetalol)    Gastrointestinal:  - Diet: Regular, carb restrictions     Genitourinary/Renal:  - ESRD (T/Th/S) > f/u nephro recs  - BID CMP. Mag/Phos  - SP HD 9/8    Heme:  - Chemical VTE ppx: SQH  - EPO with HD  - c/w hydroxychloroquine    ID:   - Trend WBC on CBC qD  - Monitor fever curve    Endocrine:  - replete Ca as needed, with correction based on albumin  - endocrine recs --> calcium carbonated 2500 mg TID with meals, calcitriol 1g BID  - Monitor for signs and symptoms of hungry bone syn  - Trend FS q6/ on CMP  - ALP uptrending to 942    Lines/Tubes/Drains:   - PIV    DISPO: SICU, listed

## 2024-09-08 NOTE — PROGRESS NOTE ADULT - ASSESSMENT
63 year old Female with history of ESRD on HD presents for parathyroidectomy. Nephrology consulted for ESRD status.    1) ESRD: Last HD 9/7 with high calcium bath, with net 1.9L removed. Plan for next maintenance HD on 9/10. Potassium and phos liberalized in diet. Low phos; will give Kphos IV x1 Monitor electrolytes.    2) HTN with ESRD: BP acceptable. c/w current antihypertensive medications. Monitor BP.     3) Anemia of renal disease: Hb acceptable with adequate iron stores. Continue with Epo 3K with HD. Monitor Hb.    4) Secondary HPT of renal origin: S/P parathyroidectomy with decreasing serum calcium. Ionized Ca 0.79; mild improvement. Will give calcium 2gIV x1. Continue to use high calcium bath with HD. c/w calcitriol 1mcg PO bid, titrate as needed and increased calcium carbonate to 1250 mg 3 tabs thrice daily. Vitamin D 25-OH 24.2; for which pt was started on Vitamin D3 2000 units PO daily.  Monitor serum calcium and phosphorus.      [facility] NEPHROLOGY  Timmy Acharya M.D.  Amilcar Contreras D.O.  Mindi Geller M.D.  MD Luna Israel, MSN, ANP-C    Telephone: [phone]  Facsimile: [phone] [facility], [address]  [place] [address]

## 2024-09-08 NOTE — PROGRESS NOTE ADULT - SUBJECTIVE AND OBJECTIVE BOX
[facility] NEPHROLOGY- PROGRESS NOTE    63 year old Female with history of ESRD on HD presents for parathyroidectomy. Nephrology consulted for ESRD status.    REVIEW OF SYSTEMS:  Gen: no fevers, numbness or weakness  Cards: no chest pain  Resp: no dyspnea  GI: no nausea or vomiting or diarrhea  Vascular: no LE edema    penicillin (Rash)      Hospital Medications: Medications reviewed    VITALS:  T(F): 97.6 (09-08-24 @ 08:00), Max: 98.1 (09-08-24 @ 04:00)  HR: 69 (09-08-24 @ 08:00)  BP: 112/50 (09-08-24 @ 08:00)  RR: 15 (09-08-24 @ 08:00)  SpO2: 100% (09-08-24 @ 08:00)  Wt(kg): --    09-07 @ 07:01  -  09-08 @ 07:00  --------------------------------------------------------  IN: 1130 mL / OUT: 2300 mL / NET: -1170 mL      PHYSICAL EXAM:    Gen: NAD, calm  Cards: RRR, +S1/S2, no M/G/R  Resp: CTA B/L  GI: soft, NT/ND, NABS  Vascular: no LE edema B/L, LUE AVF + bruit/thrill    LABS:  09-08    141  |  103  |  18  ----------------------------<  85  3.5   |  24  |  4.31<H>    Ca    7.1<L>      08 Sep 2024 06:40  Phos  1.8     09-08  Mg     2.20     09-08    TPro  5.9<L>  /  Alb  3.0<L>  /  TBili  0.3  /  DBili      /  AST  27  /  ALT  14  /  AlkPhos  920<H>  09-08    Creatinine Trend: 4.31 <--, 3.49 <--, 3.06 <--, 5.90 <--, 5.26 <--, 4.03 <--, 3.64 <--, 7.13 <--, 6.77 <--, 6.37 <--, 6.11 <--, 5.54 <--                        10.5   7.20  )-----------( 174      ( 08 Sep 2024 00:30 )             30.7     Urine Studies:  Urinalysis Basic - ( 08 Sep 2024 06:40 )    Color:  / Appearance:  / SG:  / pH:   Gluc: 85 mg/dL / Ketone:   / Bili:  / Urobili:    Blood:  / Protein:  / Nitrite:    Leuk Esterase:  / RBC:  / WBC    Sq Epi:  / Non Sq Epi:  / Bacteria:

## 2024-09-09 LAB
ADD ON TEST-SPECIMEN IN LAB: SIGNIFICANT CHANGE UP
ALBUMIN SERPL ELPH-MCNC: 2.8 G/DL — LOW (ref 3.3–5)
ALBUMIN SERPL ELPH-MCNC: 2.9 G/DL — LOW (ref 3.3–5)
ALP SERPL-CCNC: 955 U/L — HIGH (ref 40–120)
ALT FLD-CCNC: 14 U/L — SIGNIFICANT CHANGE UP (ref 4–33)
ANION GAP SERPL CALC-SCNC: 15 MMOL/L — HIGH (ref 7–14)
ANION GAP SERPL CALC-SCNC: 15 MMOL/L — HIGH (ref 7–14)
AST SERPL-CCNC: 23 U/L — SIGNIFICANT CHANGE UP (ref 4–32)
BILIRUB SERPL-MCNC: 0.3 MG/DL — SIGNIFICANT CHANGE UP (ref 0.2–1.2)
BUN SERPL-MCNC: 30 MG/DL — HIGH (ref 7–23)
BUN SERPL-MCNC: 32 MG/DL — HIGH (ref 7–23)
CA-I BLD-SCNC: 0.85 MMOL/L — LOW (ref 1.15–1.29)
CA-I BLD-SCNC: 0.85 MMOL/L — LOW (ref 1.15–1.29)
CA-I BLD-SCNC: 0.86 MMOL/L — LOW (ref 1.15–1.29)
CA-I BLD-SCNC: 0.88 MMOL/L — LOW (ref 1.15–1.29)
CALCIUM SERPL-MCNC: 6.8 MG/DL — LOW (ref 8.4–10.5)
CALCIUM SERPL-MCNC: 7 MG/DL — LOW (ref 8.4–10.5)
CHLORIDE SERPL-SCNC: 100 MMOL/L — SIGNIFICANT CHANGE UP (ref 98–107)
CHLORIDE SERPL-SCNC: 99 MMOL/L — SIGNIFICANT CHANGE UP (ref 98–107)
CO2 SERPL-SCNC: 24 MMOL/L — SIGNIFICANT CHANGE UP (ref 22–31)
CO2 SERPL-SCNC: 24 MMOL/L — SIGNIFICANT CHANGE UP (ref 22–31)
CREAT SERPL-MCNC: 5.4 MG/DL — HIGH (ref 0.5–1.3)
CREAT SERPL-MCNC: 5.83 MG/DL — HIGH (ref 0.5–1.3)
EGFR: 8 ML/MIN/1.73M2 — LOW
EGFR: 8 ML/MIN/1.73M2 — LOW
GLUCOSE BLDC GLUCOMTR-MCNC: 98 MG/DL — SIGNIFICANT CHANGE UP (ref 70–99)
GLUCOSE SERPL-MCNC: 126 MG/DL — HIGH (ref 70–99)
GLUCOSE SERPL-MCNC: 86 MG/DL — SIGNIFICANT CHANGE UP (ref 70–99)
HCT VFR BLD CALC: 26.5 % — LOW (ref 34.5–45)
HGB BLD-MCNC: 9 G/DL — LOW (ref 11.5–15.5)
MAGNESIUM SERPL-MCNC: 2.2 MG/DL — SIGNIFICANT CHANGE UP (ref 1.6–2.6)
MAGNESIUM SERPL-MCNC: 2.2 MG/DL — SIGNIFICANT CHANGE UP (ref 1.6–2.6)
MCHC RBC-ENTMCNC: 31.7 PG — SIGNIFICANT CHANGE UP (ref 27–34)
MCHC RBC-ENTMCNC: 34 GM/DL — SIGNIFICANT CHANGE UP (ref 32–36)
MCV RBC AUTO: 93.3 FL — SIGNIFICANT CHANGE UP (ref 80–100)
NRBC # BLD: 0 /100 WBCS — SIGNIFICANT CHANGE UP (ref 0–0)
NRBC # FLD: 0 K/UL — SIGNIFICANT CHANGE UP (ref 0–0)
PHOSPHATE SERPL-MCNC: 2.1 MG/DL — LOW (ref 2.5–4.5)
PHOSPHATE SERPL-MCNC: 2.2 MG/DL — LOW (ref 2.5–4.5)
PLATELET # BLD AUTO: 158 K/UL — SIGNIFICANT CHANGE UP (ref 150–400)
POTASSIUM SERPL-MCNC: 3.5 MMOL/L — SIGNIFICANT CHANGE UP (ref 3.5–5.3)
POTASSIUM SERPL-MCNC: 3.8 MMOL/L — SIGNIFICANT CHANGE UP (ref 3.5–5.3)
POTASSIUM SERPL-SCNC: 3.5 MMOL/L — SIGNIFICANT CHANGE UP (ref 3.5–5.3)
POTASSIUM SERPL-SCNC: 3.8 MMOL/L — SIGNIFICANT CHANGE UP (ref 3.5–5.3)
PROT SERPL-MCNC: 5.7 G/DL — LOW (ref 6–8.3)
RBC # BLD: 2.84 M/UL — LOW (ref 3.8–5.2)
RBC # FLD: 15.5 % — HIGH (ref 10.3–14.5)
SODIUM SERPL-SCNC: 138 MMOL/L — SIGNIFICANT CHANGE UP (ref 135–145)
SODIUM SERPL-SCNC: 139 MMOL/L — SIGNIFICANT CHANGE UP (ref 135–145)
WBC # BLD: 6.82 K/UL — SIGNIFICANT CHANGE UP (ref 3.8–10.5)
WBC # FLD AUTO: 6.82 K/UL — SIGNIFICANT CHANGE UP (ref 3.8–10.5)

## 2024-09-09 PROCEDURE — 99233 SBSQ HOSP IP/OBS HIGH 50: CPT

## 2024-09-09 RX ORDER — CALCIUM GLUCONATE 61(648) MG
1 TABLET ORAL ONCE
Refills: 0 | Status: COMPLETED | OUTPATIENT
Start: 2024-09-09 | End: 2024-09-09

## 2024-09-09 RX ADMIN — CHLORHEXIDINE GLUCONATE 1 APPLICATION(S): 40 SOLUTION TOPICAL at 12:25

## 2024-09-09 RX ADMIN — VALSARTAN 80 MILLIGRAM(S): 40 TABLET ORAL at 12:21

## 2024-09-09 RX ADMIN — Medication 100 MILLIGRAM(S): at 06:49

## 2024-09-09 RX ADMIN — Medication 5000 UNIT(S): at 14:50

## 2024-09-09 RX ADMIN — Medication 5000 UNIT(S): at 06:47

## 2024-09-09 RX ADMIN — HYDROXYCHLOROQUINE SULFATE 200 MILLIGRAM(S): 200 TABLET, FILM COATED ORAL at 12:21

## 2024-09-09 RX ADMIN — CALCITRIOL 1 MICROGRAM(S): 0.25 CAPSULE ORAL at 06:49

## 2024-09-09 RX ADMIN — Medication 4 TABLET(S): at 21:23

## 2024-09-09 RX ADMIN — Medication 2000 UNIT(S): at 12:22

## 2024-09-09 RX ADMIN — Medication 4 TABLET(S): at 09:49

## 2024-09-09 RX ADMIN — Medication 5000 UNIT(S): at 21:23

## 2024-09-09 RX ADMIN — Medication 4 TABLET(S): at 14:51

## 2024-09-09 RX ADMIN — Medication 100 MILLIGRAM(S): at 17:24

## 2024-09-09 RX ADMIN — Medication 100 GRAM(S): at 17:23

## 2024-09-09 RX ADMIN — CALCITRIOL 1 MICROGRAM(S): 0.25 CAPSULE ORAL at 17:24

## 2024-09-09 NOTE — PROGRESS NOTE ADULT - SUBJECTIVE AND OBJECTIVE BOX
Interval: patient s/p 3.5 gland parathyroidectomy with reimplant in R arm . received 2g IV ca  at 9am. Pt did not require IV Ca yesterday.      PAST MEDICAL & SURGICAL HISTORY:  Diabetes      Hypertension      Hypercholesteremia      Other hyperlipidemia      Glaucoma      ESRD (end-stage renal disease) due to SLE      Lupus (systemic lupus erythematosus)      Hyperparathyroidism      Memory impairment      Anemia of renal disease      CAD (coronary artery disease)      Stented coronary artery      Cholelithiasis      Rib fracture      S/P  section  times two      H/O gastric bypass  2016      S/P appendectomy      History of appendectomy        Allergies    penicillin (Rash)    Intolerances    MEDICATIONS  (STANDING):  acetaminophen     Tablet .. 975 milliGRAM(s) Oral every 6 hours  calcitriol   Capsule 1 MICROGram(s) Oral every 12 hours  calcium carbonate   1250 mG (OsCal) 2 Tablet(s) Oral three times a day  chlorhexidine 2% Cloths 1 Application(s) Topical daily  chlorhexidine 2% Cloths 1 Application(s) Topical daily  cholecalciferol 2000 Unit(s) Oral daily  epoetin adele (EPOGEN) Injectable 3000 Unit(s) IV Push <User Schedule>  heparin   Injectable 5000 Unit(s) SubCutaneous every 8 hours  hydroxychloroquine 200 milliGRAM(s) Oral daily  insulin lispro (ADMELOG) corrective regimen sliding scale   SubCutaneous at bedtime  insulin lispro (ADMELOG) corrective regimen sliding scale   SubCutaneous three times a day before meals  labetalol 100 milliGRAM(s) Oral two times a day  valsartan 80 milliGRAM(s) Oral daily    MEDICATIONS  (PRN):  sodium chloride 0.9% Bolus. 100 milliLiter(s) IV Bolus every 5 minutes PRN SBP LESS THAN or EQUAL to 90 mmHg    Vital Signs Last 24 Hrs  T(C): 36.2 (09 Sep 2024 00:00), Max: 36.4 (08 Sep 2024 08:00)  T(F): 97.1 (09 Sep 2024 00:00), Max: 97.6 (08 Sep 2024 08:00)  HR: 75 (09 Sep 2024 00:00) (67 - 75)  BP: 117/47 (09 Sep 2024 00:00) (112/50 - 143/56)  BP(mean): 69 (09 Sep 2024 00:00) (69 - 82)  RR: 24 (09 Sep 2024 00:00) (12 - 24)  SpO2: 100% (09 Sep 2024 00:00) (99% - 100%)    Parameters below as of 09 Sep 2024 00:00  Patient On (Oxygen Delivery Method): room air        O2 Parameters below as of 08 Sep 2024 00:00  Patient On (Oxygen Delivery Method): room air    Physical Exam:  Alert, NAD  Nonlabored Respirations RA  ANGEL  neck: incision c/d/i       I&O's Summary    06 Sep 2024 07:01  -  07 Sep 2024 07:00  --------------------------------------------------------  IN: 1000 mL / OUT: 0 mL / NET: 1000 mL    07 Sep 2024 07:01  -  08 Sep 2024 05:32  --------------------------------------------------------  IN: 1080 mL / OUT: 2300 mL / NET: -1220 mL                   LABS:                        9.0    6.82  )-----------( 158      ( 09 Sep 2024 00:51 )             26.5         139  |  100  |  30<H>  ----------------------------<  126<H>  3.8   |  24  |  5.40<H>    Ca    7.0<L>      09 Sep 2024 00:51  Phos  2.1       Mg     2.20         TPro  x   /  Alb  2.9<L>  /  TBili  x   /  DBili  x   /  AST  x   /  ALT  x   /  AlkPhos  x                              A/P: 63yFemale s/p 3.5 gland parathyroidectomy    -trend calciums  -repletions per endo/SICU   -HD per nephro      - SCDs  - d/w attending

## 2024-09-09 NOTE — PROGRESS NOTE ADULT - ASSESSMENT
63F PMHx HTN, HLD, T2DM, morbid obesity (lost ~70 lbs after gastric bypass in 2016), SLE, ESRD on HD (T/Th/S) since 2017 (left AV fistula), CAD s/p Cardiac stent in 1/2018 and 6/2018, and mid memory impairment now s/p 4-gland total parathyroidectomy with reimplantation of 1x gland in R forearm on 9/4. SICU consulted for electrolyte monitoring and c/f hungry bone syndrome post op.     PLAN  Neuro:  - AOx3  - Pain control: Tylenol   - Holding home memantine    Respiratory:  - on RA, breathing comfortably    Cardiovascular:  - MAP goal >65  - home anti-hypertensives: valsartan 80 QD and labetalol 100 BID    Gastrointestinal:  - Diet: Reg CCD    Genitourinary/Renal:  - ESRD (T/Th/S) > f/u nephro recs  - BID CMP. Mag/Phos    Heme:  - Chemical VTE ppx: SQH  - EPO with HD  - c/w hydroxychloroquine    ID:   - Trend WBC on CBC qD    Endocrine:  - replete corrected Calcium if <7.5, with correction based on albumin (q6h BMP)  - endocrine recs --> calcium carbonated 1250mg x3 QID, calcitriol 1 mcg BID  - Monitor for signs and symptoms of hungry bone syn  - Trend FS q6/ on CMP    Lines/Tubes/Drains:   - PIV    DISPO: SICU, listed   63F PMHx HTN, HLD, T2DM, morbid obesity (lost ~70 lbs after gastric bypass in 2016), SLE, ESRD on HD (T/Th/S) since 2017 (left AV fistula), CAD s/p Cardiac stent in 1/2018 and 6/2018, and mid memory impairment now s/p 4-gland total parathyroidectomy with reimplantation of 1x gland in R forearm on 9/4. SICU consulted for electrolyte monitoring and c/f hungry bone syndrome post op.       PLAN  Neuro:  - AOx3  - Pain control: Tylenol   - Holding home memantine    Respiratory:  - on RA, breathing comfortably    Cardiovascular:  - MAP goal >65  - home anti-hypertensives: valsartan 80 QD and labetalol 100 BID      Gastrointestinal:  - Diet: Reg CCD    Genitourinary/Renal:  - ESRD (T/Th/S) > f/u nephro recs  - q6 CMP. Mag/Phos  - Phos low, per nephro don't replete (changed renal diet to reg + give Ca Carbonate w/ meals)       Heme:  - Chemical VTE ppx: SQH  - EPO with HD  - c/w hydroxychloroquine    ID:   - Plaquenil for lupus  - Trend WBC on CBC qD    Endocrine:  - replete corrected Calcium if <7.5, with correction based on albumin (q6h BMP)  - endocrine recs --> calcium carbonated 1250mg x4 (5000) TID, calcitriol 1 mcg BID  - Monitor for signs and symptoms of hungry bone syn- none at this time  - Trend FS q6/ on CMP  - Corrected calcium 8 > 7.9 9/9 AM  - reach out to endocrine re recent ca labs      Lines/Tubes/Drains:   - PIV    DISPO: SICU, listed- afternoon to floors

## 2024-09-09 NOTE — PROGRESS NOTE ADULT - ASSESSMENT
63 year old Female with history of ESRD on HD presents for parathyroidectomy. Nephrology consulted for ESRD status.    1) ESRD: Last HD 9/7 tolerated well with 1.9L removed. Plan for next maintenance HD on 9/10. Monitor electrolytes.    2) HTN with ESRD: BP low normal. Can discontinue labetalol. Monitor BP.     3) Anemia of renal disease: Hb low with adequate iron stores. Continue with Epo 3K with HD. Monitor Hb.    4) Secondary HPT of renal origin: S/P parathyroidectomy with decreasing serum calcium. Continue with calcitriol 1 mcg twice daily and increase calcium carbonate to 4 tabs Q6 hours. Continue with cholecalciferol 2000 IU daily and will use high calcium bath with HD. Monitor serum calcium and phosphorus.      [facility] NEPHROLOGY  Timmy Acharya M.D.  Amilcar Contreras D.O.  Mindi Geller M.D.  MD Luna Israel, MSN, ANP-C    Telephone: [phone]  Facsimile: [phone] [place] [address], [address]  [place] [address]

## 2024-09-09 NOTE — PROGRESS NOTE ADULT - SUBJECTIVE AND OBJECTIVE BOX
SICU Daily Progress Note  =====================================================  Interval/Overnight Events:  - Calcitriol to 1mcg BID; Calcium Carbonate 2500 BID -> 1mcg BID and 2500 x 3 QID with meals  - q6 BMP    ALLERGIES:  penicillin (Rash)      --------------------------------------------------------------------------------------    MEDICATIONS:    Neurologic Medications  acetaminophen     Tablet .. 650 milliGRAM(s) Oral every 6 hours PRN Mild Pain (1 - 3)    Respiratory Medications    Cardiovascular Medications  labetalol 100 milliGRAM(s) Oral two times a day  valsartan 80 milliGRAM(s) Oral daily    Gastrointestinal Medications  calcitriol   Capsule 1 MICROGram(s) Oral every 12 hours  calcium carbonate   1250 mG (OsCal) 4 Tablet(s) Oral three times a day  cholecalciferol 2000 Unit(s) Oral daily  sodium chloride 0.9% Bolus. 100 milliLiter(s) IV Bolus every 5 minutes PRN SBP LESS THAN or EQUAL to 90 mmHg    Genitourinary Medications    Hematologic/Oncologic Medications  epoetin adele (EPOGEN) Injectable 3000 Unit(s) IV Push <User Schedule>  heparin   Injectable 5000 Unit(s) SubCutaneous every 8 hours    Antimicrobial/Immunologic Medications  hydroxychloroquine 200 milliGRAM(s) Oral daily    Endocrine/Metabolic Medications  insulin lispro (ADMELOG) corrective regimen sliding scale   SubCutaneous at bedtime  insulin lispro (ADMELOG) corrective regimen sliding scale   SubCutaneous three times a day before meals    Topical/Other Medications  chlorhexidine 2% Cloths 1 Application(s) Topical daily  chlorhexidine 2% Cloths 1 Application(s) Topical daily    --------------------------------------------------------------------------------------    VITAL SIGNS:  ICU Vital Signs Last 24 Hrs  T(C): 36.1 (08 Sep 2024 20:00), Max: 36.7 (08 Sep 2024 04:00)  T(F): 97 (08 Sep 2024 20:00), Max: 98.1 (08 Sep 2024 04:00)  HR: 71 (08 Sep 2024 20:00) (67 - 71)  BP: 126/52 (08 Sep 2024 20:00) (112/50 - 143/56)  BP(mean): 76 (08 Sep 2024 20:00) (70 - 82)  ABP: --  ABP(mean): --  RR: 20 (08 Sep 2024 20:00) (10 - 20)  SpO2: 100% (08 Sep 2024 20:00) (93% - 100%)    O2 Parameters below as of 08 Sep 2024 20:00  Patient On (Oxygen Delivery Method): room air  --------------------------------------------------------------------------------------    INS AND OUTS:  I&O's Detail    07 Sep 2024 07:01  -  08 Sep 2024 07:00  --------------------------------------------------------  IN:    Oral Fluid: 730 mL    Other (mL): 400 mL  Total IN: 1130 mL    OUT:    Other (mL): 2300 mL  Total OUT: 2300 mL    Total NET: -1170 mL      08 Sep 2024 07:01  -  09 Sep 2024 00:31  --------------------------------------------------------  IN:    IV PiggyBack: 250 mL    Oral Fluid: 100 mL  Total IN: 350 mL    OUT:  Total OUT: 0 mL    Total NET: 350 mL    --------------------------------------------------------------------------------------    PHYSICAL EXAM    General Appearance: no acute distress  Neck: anterior neck incision c/d/i and soft  Chest: non-labored breathing  CV: no JVD, palpable pulses b/l  Abdomen: soft, non-tender, non-distended, +BS   Extremities: WWP, R forearm reimplantation site c/d/i     METABOLIC / FLUIDS / ELECTROLYTES  calcitriol   Capsule 1 MICROGram(s) Oral every 12 hours  calcium carbonate   1250 mG (OsCal) 4 Tablet(s) Oral three times a day  cholecalciferol 2000 Unit(s) Oral daily      HEMATOLOGIC  [x] VTE Prophylaxis: heparin   Injectable 5000 Unit(s) SubCutaneous every 8 hours    Transfusions:	[] PRBC	[] Platelets		[] FFP	[] Cryoprecipitate    INFECTIOUS DISEASE  Antimicrobials/Immunologic Medications:  epoetin adele (EPOGEN) Injectable 3000 Unit(s) IV Push <User Schedule>  hydroxychloroquine 200 milliGRAM(s) Oral daily    Day #      of     ***    TUBES / LINES / DRAINS  ***  [x] Peripheral IV  [] Central Venous Line     	[] R	[] L	[] IJ	[] Fem	[] SC	Date Placed:   [] Arterial Line		[] R	[] L	[] Fem	[] Rad	[] Ax	Date Placed:   [] PICC		[] Midline		[] Mediport  [] Urinary Catheter		Date Placed:   [x] Necessity of urinary, arterial, and venous catheters discussed    --------------------------------------------------------------------------------------    LABS                          10.5   7.20  )-----------( 174      ( 08 Sep 2024 00:30 )             30.7   09-08    138  |  99  |  23  ----------------------------<  128<H>  5.3   |  23  |  4.91<H>    Ca    7.1<L>      08 Sep 2024 18:36  Phos  3.6     09-08  Mg     2.20     09-08    TPro  6.0  /  Alb  2.9<L>  /  TBili  0.3  /  DBili  x   /  AST  48<H>  /  ALT  18  /  AlkPhos  942<H>  09-08    --------------------------------------------------------------------------------------    OTHER LABORATORY:     IMAGING STUDIES:   CXR:        SICU Daily Progress Note  =====================================================  Interval/Overnight Events:  - Calcitriol to 1mcg BID; Calcium Carbonate 2500 BID -> 1mcg BID and 2500 x 3 QID with meals  - q6 BMP    ALLERGIES:  penicillin (Rash)      --------------------------------------------------------------------------------------    MEDICATIONS:    Neurologic Medications  acetaminophen     Tablet .. 650 milliGRAM(s) Oral every 6 hours PRN Mild Pain (1 - 3)    Respiratory Medications    Cardiovascular Medications  labetalol 100 milliGRAM(s) Oral two times a day  valsartan 80 milliGRAM(s) Oral daily    Gastrointestinal Medications  calcitriol   Capsule 1 MICROGram(s) Oral every 12 hours  calcium carbonate   1250 mG (OsCal) 4 Tablet(s) Oral three times a day  cholecalciferol 2000 Unit(s) Oral daily  sodium chloride 0.9% Bolus. 100 milliLiter(s) IV Bolus every 5 minutes PRN SBP LESS THAN or EQUAL to 90 mmHg    Genitourinary Medications    Hematologic/Oncologic Medications  epoetin adele (EPOGEN) Injectable 3000 Unit(s) IV Push <User Schedule>  heparin   Injectable 5000 Unit(s) SubCutaneous every 8 hours    Antimicrobial/Immunologic Medications  hydroxychloroquine 200 milliGRAM(s) Oral daily    Endocrine/Metabolic Medications  insulin lispro (ADMELOG) corrective regimen sliding scale   SubCutaneous at bedtime  insulin lispro (ADMELOG) corrective regimen sliding scale   SubCutaneous three times a day before meals    Topical/Other Medications  chlorhexidine 2% Cloths 1 Application(s) Topical daily  chlorhexidine 2% Cloths 1 Application(s) Topical daily    --------------------------------------------------------------------------------------    VITAL SIGNS:  ICU Vital Signs Last 24 Hrs  T(C): 36.1 (08 Sep 2024 20:00), Max: 36.7 (08 Sep 2024 04:00)  T(F): 97 (08 Sep 2024 20:00), Max: 98.1 (08 Sep 2024 04:00)  HR: 71 (08 Sep 2024 20:00) (67 - 71)  BP: 126/52 (08 Sep 2024 20:00) (112/50 - 143/56)  BP(mean): 76 (08 Sep 2024 20:00) (70 - 82)  ABP: --  ABP(mean): --  RR: 20 (08 Sep 2024 20:00) (10 - 20)  SpO2: 100% (08 Sep 2024 20:00) (93% - 100%)    O2 Parameters below as of 08 Sep 2024 20:00  Patient On (Oxygen Delivery Method): room air  --------------------------------------------------------------------------------------    INS AND OUTS:  I&O's Detail    07 Sep 2024 07:01  -  08 Sep 2024 07:00  --------------------------------------------------------  IN:    Oral Fluid: 730 mL    Other (mL): 400 mL  Total IN: 1130 mL    OUT:    Other (mL): 2300 mL  Total OUT: 2300 mL    Total NET: -1170 mL      08 Sep 2024 07:01  -  09 Sep 2024 00:31  --------------------------------------------------------  IN:    IV PiggyBack: 250 mL    Oral Fluid: 100 mL  Total IN: 350 mL    OUT:  Total OUT: 0 mL    Total NET: 350 mL    --------------------------------------------------------------------------------------    PHYSICAL EXAM    General Appearance: no acute distress  Neck: anterior neck incision c/d/i and soft  Chest: non-labored breathing  CV: no JVD, palpable pulses b/l, no LE edema  Abdomen: soft, non-tender, non-distended, +BS   Extremities: WWP, R forearm reimplantation site c/d/i     METABOLIC / FLUIDS / ELECTROLYTES  calcitriol   Capsule 1 MICROGram(s) Oral every 12 hours  calcium carbonate   1250 mG (OsCal) 4 Tablet(s) Oral three times a day  cholecalciferol 2000 Unit(s) Oral daily      HEMATOLOGIC  [x] VTE Prophylaxis: heparin   Injectable 5000 Unit(s) SubCutaneous every 8 hours    Transfusions:	[] PRBC	[] Platelets		[] FFP	[] Cryoprecipitate    INFECTIOUS DISEASE  Antimicrobials/Immunologic Medications:  epoetin adele (EPOGEN) Injectable 3000 Unit(s) IV Push <User Schedule>  hydroxychloroquine 200 milliGRAM(s) Oral daily    Day #      of     ***    TUBES / LINES / DRAINS  ***  [x] Peripheral IV  [] Central Venous Line     	[] R	[] L	[] IJ	[] Fem	[] SC	Date Placed:   [] Arterial Line		[] R	[] L	[] Fem	[] Rad	[] Ax	Date Placed:   [] PICC		[] Midline		[] Mediport  [] Urinary Catheter		Date Placed:   [x] Necessity of urinary, arterial, and venous catheters discussed    --------------------------------------------------------------------------------------    LABS                          10.5   7.20  )-----------( 174      ( 08 Sep 2024 00:30 )             30.7   09-08    138  |  99  |  23  ----------------------------<  128<H>  5.3   |  23  |  4.91<H>    Ca    7.1<L>      08 Sep 2024 18:36  Phos  3.6     09-08  Mg     2.20     09-08    TPro  6.0  /  Alb  2.9<L>  /  TBili  0.3  /  DBili  x   /  AST  48<H>  /  ALT  18  /  AlkPhos  942<H>  09-08    --------------------------------------------------------------------------------------    OTHER LABORATORY:     IMAGING STUDIES:   CXR:        SICU Daily Progress Note  =====================================================  Interval/Overnight Events:  - Calcitriol to 1mcg BID; Calcium Carbonate 2500 BID -> 1mcg BID calcitrol and 2500 x 3 QID with meals  - q6 BMP    ALLERGIES:  penicillin (Rash)      --------------------------------------------------------------------------------------    MEDICATIONS:    Neurologic Medications  acetaminophen     Tablet .. 650 milliGRAM(s) Oral every 6 hours PRN Mild Pain (1 - 3)    Respiratory Medications    Cardiovascular Medications  labetalol 100 milliGRAM(s) Oral two times a day  valsartan 80 milliGRAM(s) Oral daily    Gastrointestinal Medications  calcitriol   Capsule 1 MICROGram(s) Oral every 12 hours  calcium carbonate   1250 mG (OsCal) 4 Tablet(s) Oral three times a day  cholecalciferol 2000 Unit(s) Oral daily  sodium chloride 0.9% Bolus. 100 milliLiter(s) IV Bolus every 5 minutes PRN SBP LESS THAN or EQUAL to 90 mmHg    Genitourinary Medications    Hematologic/Oncologic Medications  epoetin adele (EPOGEN) Injectable 3000 Unit(s) IV Push <User Schedule>  heparin   Injectable 5000 Unit(s) SubCutaneous every 8 hours    Antimicrobial/Immunologic Medications  hydroxychloroquine 200 milliGRAM(s) Oral daily    Endocrine/Metabolic Medications  insulin lispro (ADMELOG) corrective regimen sliding scale   SubCutaneous at bedtime  insulin lispro (ADMELOG) corrective regimen sliding scale   SubCutaneous three times a day before meals    Topical/Other Medications  chlorhexidine 2% Cloths 1 Application(s) Topical daily  chlorhexidine 2% Cloths 1 Application(s) Topical daily    --------------------------------------------------------------------------------------    VITAL SIGNS:  ICU Vital Signs Last 24 Hrs  T(C): 36.1 (08 Sep 2024 20:00), Max: 36.7 (08 Sep 2024 04:00)  T(F): 97 (08 Sep 2024 20:00), Max: 98.1 (08 Sep 2024 04:00)  HR: 71 (08 Sep 2024 20:00) (67 - 71)  BP: 126/52 (08 Sep 2024 20:00) (112/50 - 143/56)  BP(mean): 76 (08 Sep 2024 20:00) (70 - 82)  ABP: --  ABP(mean): --  RR: 20 (08 Sep 2024 20:00) (10 - 20)  SpO2: 100% (08 Sep 2024 20:00) (93% - 100%)    O2 Parameters below as of 08 Sep 2024 20:00  Patient On (Oxygen Delivery Method): room air  --------------------------------------------------------------------------------------    INS AND OUTS:  I&O's Detail    07 Sep 2024 07:01  -  08 Sep 2024 07:00  --------------------------------------------------------  IN:    Oral Fluid: 730 mL    Other (mL): 400 mL  Total IN: 1130 mL    OUT:    Other (mL): 2300 mL  Total OUT: 2300 mL    Total NET: -1170 mL      08 Sep 2024 07:01  -  09 Sep 2024 00:31  --------------------------------------------------------  IN:    IV PiggyBack: 250 mL    Oral Fluid: 100 mL  Total IN: 350 mL    OUT:  Total OUT: 0 mL    Total NET: 350 mL    --------------------------------------------------------------------------------------    PHYSICAL EXAM    General Appearance: no acute distress  Neck: anterior neck incision c/d/i and soft  Chest: non-labored breathing  CV: no JVD, palpable pulses b/l, no LE edema  Abdomen: soft, non-tender, non-distended, +BS   Extremities: WWP, R forearm reimplantation site c/d/i     METABOLIC / FLUIDS / ELECTROLYTES  calcitriol   Capsule 1 MICROGram(s) Oral every 12 hours  calcium carbonate   1250 mG (OsCal) 4 Tablet(s) Oral three times a day  cholecalciferol 2000 Unit(s) Oral daily      HEMATOLOGIC  [x] VTE Prophylaxis: heparin   Injectable 5000 Unit(s) SubCutaneous every 8 hours    Transfusions:	[] PRBC	[] Platelets		[] FFP	[] Cryoprecipitate    INFECTIOUS DISEASE  Antimicrobials/Immunologic Medications:  epoetin adele (EPOGEN) Injectable 3000 Unit(s) IV Push <User Schedule>  hydroxychloroquine 200 milliGRAM(s) Oral daily    Day #      of     ***    TUBES / LINES / DRAINS  ***  [x] Peripheral IV  [] Central Venous Line     	[] R	[] L	[] IJ	[] Fem	[] SC	Date Placed:   [] Arterial Line		[] R	[] L	[] Fem	[] Rad	[] Ax	Date Placed:   [] PICC		[] Midline		[] Mediport  [] Urinary Catheter		Date Placed:   [x] Necessity of urinary, arterial, and venous catheters discussed    --------------------------------------------------------------------------------------    LABS                          10.5   7.20  )-----------( 174      ( 08 Sep 2024 00:30 )             30.7   09-08    138  |  99  |  23  ----------------------------<  128<H>  5.3   |  23  |  4.91<H>    Ca    7.1<L>      08 Sep 2024 18:36  Phos  3.6     09-08  Mg     2.20     09-08    TPro  6.0  /  Alb  2.9<L>  /  TBili  0.3  /  DBili  x   /  AST  48<H>  /  ALT  18  /  AlkPhos  942<H>  09-08    --------------------------------------------------------------------------------------    OTHER LABORATORY:     IMAGING STUDIES:   CXR:

## 2024-09-09 NOTE — PROGRESS NOTE ADULT - ATTENDING COMMENTS
I agree with the history, physical, and plan, which I have reviewed and edited where appropriate.  I agree with notes/assessment of health care providers on my service.  I have personally examined the patient.  I was physically present for the key portions of the evaluation and management (E/M) service provided.  I reviewed data and laboratory tests/x-rays and all pertinent electronic images.  The patient is a critical care patient with life threatening hemodynamic and metabolic instability in SICU.  Risk benefit analyses discussed.    The patient is in SICU with diagnosis mentioned in the note.    The plan is specified below.    63F PMHx HTN, HLD, T2DM, morbid obesity (lost ~70 lbs after gastric bypass in 2016), SLE, ESRD on HD (T/Th/S) since 2017 (left AV fistula), CAD s/p Cardiac stent in 1/2018 and 6/2018, and mid memory impairment now s/p 4-gland total parathyroidectomy with reimplantation of 1x gland in R forearm on 9/4. SICU consulted for electrolyte monitoring and c/f hungry bone syndrome post op.       PLAN  Neuro: postoperative pain  - Pain control: Tylenol   - Holding home memantine    Respiratory:  - on RA, breathing comfortably    Cardiovascular: h/o htn  - Cont home valsartan and labetalol    Gastrointestinal:  - Diet: Regular, carb restrictions     Genitourinary/Renal: ESRD   - appreciate nephro input   - HD T/Th/S  - Q6h BMP. Mag/Phos    Heme: chronic anemia   - Chemical VTE ppx: SQH  - EPO with HD  - c/w hydroxychloroquine    ID:   - observe off abx     Endocrine: hyperparathyroidism s/p parathyroidectomy x 4 with reimplantation   - BMP with ical Q6h   - increase calcium carbonate 1250 4 tabs tid w meals, calcitriol 1mcg BID  - Trend FS q6/ on CMP  - Low insulin sliding scale .
I agree with the history, physical, and plan, which I have reviewed and edited where appropriate.  I agree with notes/assessment of health care providers on my service.  I have personally examined the patient.  I was physically present for the key portions of the evaluation and management (E/M) service provided.  I reviewed data and laboratory tests/x-rays and all pertinent electronic images.  The patient is a critical care patient with life threatening hemodynamic and metabolic instability in SICU.  Risk benefit analyses discussed.    The patient is in SICU with diagnosis mentioned in the note.    The plan is specified below.    63F PMHx HTN, HLD, T2DM, morbid obesity (lost ~70 lbs after gastric bypass in 2016), SLE, ESRD on HD (T/Th/S) since 2017 (left AV fistula), CAD s/p Cardiac stent in 1/2018 and 6/2018, and mid memory impairment now s/p 4-gland total parathyroidectomy with reimplantation of 1x gland in R forearm on 9/4. SICU consulted for electrolyte monitoring and c/f hungry bone syndrome post op.       PLAN  Neuro: postoperative pain  - Pain control: Tylenol   - Holding home memantine    Respiratory:  - on RA, breathing comfortably    Cardiovascular: h/o htn  - Cont home valsartan and labetalol    Gastrointestinal:  - Diet: Regular, carb restrictions     Genitourinary/Renal: ESRD   - appreciate nephro input   - HD T/Th/S  - Q6h BMP. Mag/Phos    Heme: chronic anemia   - Chemical VTE ppx: SQH  - EPO with HD  - c/w hydroxychloroquine    ID:   - observe off abx     Endocrine: hyperparathyroidism s/p parathyroidectomy x 4 with reimplantation   - BMP with ical Q6h   - increase calcium carbonate 2500 tid w meals, calcitriol 1mcg BID  - Trend FS q6/ on CMP  - Low insulin sliding scale .
I agree with the history, physical, and plan, which I have reviewed and edited where appropriate.  I agree with notes/assessment of health care providers on my service.  I have personally examined the patient.  I was physically present for the key portions of the evaluation and management (E/M) service provided.  I reviewed data and laboratory tests/x-rays and all pertinent electronic images.  The patient is a critical care patient with life threatening hemodynamic and metabolic instability in SICU.  Risk benefit analyses discussed.    The patient is in SICU with diagnosis mentioned in the note.    The plan is specified below.    63F PMHx HTN, HLD, T2DM, morbid obesity (lost ~70 lbs after gastric bypass in 2016), SLE, ESRD on HD (T/Th/S) since 2017 (left AV fistula), CAD s/p Cardiac stent in 1/2018 and 6/2018, and mid memory impairment now s/p 4-gland total parathyroidectomy with reimplantation of 1x gland in R forearm on 9/4. SICU consulted for electrolyte monitoring and c/f hungry bone syndrome post op.       PLAN  Neuro: postoperative pain  - Pain control: Tylenol   - Holding home memantine    Respiratory:  - on RA, breathing comfortably    Cardiovascular: h/o htn  - resume home valsartan and labetalol    Gastrointestinal:  - Diet: Regular, carb restrictions     Genitourinary/Renal: ESRD   - appreciate nephro input   - HD T/Th/S  - BID CMP. Mag/Phos    Heme: chronic anemia   - Chemical VTE ppx: SQH  - EPO with HD  - c/w hydroxychloroquine    ID:   - observe off abx     Endocrine: hyperparathyroidism s/p parathyroidectomy x 4 with reimplantation   - replete Ca as needed, with correction based on albumin  - endocrine recs --> calcium carbonate 1250 tid w meals, calcitriol BID  - Trend FS q6/ on CMP  - Low insulin sliding scale
I agree with the detailed interval history, physical, and plan, which I have reviewed and edited where appropriate'; also agree with notes/assessment with my team on service.  I have personally examined the patient.  I was physically present for the key portions of the evaluation and management (E/M) service provided.  I reviewed all the pertinent data.  The patient is a critical care patient with life threatening hemodynamic and metabolic instability in SICU.  The SICU team has a constant risk benefit analyzes discussion and coordinating care with the primary team and all consultants.   The patient is in SICU with the chief complaint and diagnosis mentioned in the note.   The plan will be specified in the note.  63F H/O SLE, ESRD on HD, s/p Cardiac stent; s/p 4-gland total parathyroidectomy with reimplantation of 1x gland in R forearm in SICU  for electrolyte monitoring and FU Ca.   PHYSICAL EXAM:  General Appearance: no acute distress  Neck: anterior neck incision c/d/i and soft  Chest: clear  CV: RR  Abdomen: soft, non-tender,   Extremities:  R forearm reimplantation site c/d/i     PLAN:  Neuro:  -Tylenol  Respiratory:  - RA  Cardiovascular:  - MAP>65  Gastrointestinal:  - Diet: NPO   Genitourinary/Renal:  - HD  Heme:  -SQH  ID:   - Trend WBC   Endocrine:  -Trend Glucose   -replete Ca   - Replete corrected Calcium   -Calcium carbonated  - calcitriol 1 mcg BID  -Tums   -calcitriol      DISPO: SICU
I agree with the detailed interval history, physical, and plan, which I have reviewed and edited where appropriate'; also agree with notes/assessment with my team on service.  I have personally examined the patient.  I was physically present for the key portions of the evaluation and management (E/M) service provided.  I reviewed all the pertinent data.  The patient is a critical care patient with life threatening hemodynamic and metabolic instability in SICU.  The SICU team has a constant risk benefit analyzes discussion and coordinating care with the primary team and all consultants.   The patient is in SICU with the chief complaint and diagnosis mentioned in the note.   The plan will be specified in the note.  63F H/O SLE, ESRD on HD, s/p Cardiac stent; s/p 4-gland total parathyroidectomy with reimplantation of 1x gland in R forearm.. SICU consul for electrolyte monitoring and hungry bone syndrome post op.   PHYSICAL EXAM:  General Appearance: no acute distress  Neck: anterior neck incision c/d/i and soft  Chest: clear  CV: RR  Abdomen: soft, non-tender,   Extremities:  R forearm reimplantation site c/d/i     PLAN:  Neuro:  -Tylenol  Respiratory:  - RA  Cardiovascular:  - MAP>65  Gastrointestinal:  - Diet: NPO   Genitourinary/Renal:  - HD  Heme:  -SQH  ID:   - Trend WBC   Endocrine:  -Trend Glucose   -replete Ca   -Tums tid w meals  -calcitriol on HD     DISPO: SICU

## 2024-09-09 NOTE — PROGRESS NOTE ADULT - SUBJECTIVE AND OBJECTIVE BOX
Chief Complaint/Follow-up on:   hypocalcemia     Subjective:  corrected calcium 7.8    pt without symptoms   language line interpreters   [id number]  she does not report perioral numbness tingling        MEDICATIONS  (STANDING):  calcitriol   Capsule 1 MICROGram(s) Oral every 12 hours  calcium carbonate   1250 mG (OsCal) 4 Tablet(s) Oral three times a day  chlorhexidine 2% Cloths 1 Application(s) Topical daily  chlorhexidine 2% Cloths 1 Application(s) Topical daily  cholecalciferol 2000 Unit(s) Oral daily  epoetin adele (EPOGEN) Injectable 3000 Unit(s) IV Push <User Schedule>  heparin   Injectable 5000 Unit(s) SubCutaneous every 8 hours  hydroxychloroquine 200 milliGRAM(s) Oral daily  insulin lispro (ADMELOG) corrective regimen sliding scale   SubCutaneous at bedtime  insulin lispro (ADMELOG) corrective regimen sliding scale   SubCutaneous three times a day before meals  labetalol 100 milliGRAM(s) Oral two times a day  valsartan 80 milliGRAM(s) Oral daily    MEDICATIONS  (PRN):  acetaminophen     Tablet .. 650 milliGRAM(s) Oral every 6 hours PRN Mild Pain (1 - 3)  sodium chloride 0.9% Bolus. 100 milliLiter(s) IV Bolus every 5 minutes PRN SBP LESS THAN or EQUAL to 90 mmHg      PHYSICAL EXAM:  VITALS: T(C): 36.4 (09-09-24 @ 12:00)  T(F): 97.6 (09-09-24 @ 12:00), Max: 97.6 (09-09-24 @ 12:00)  HR: 66 (09-09-24 @ 12:00) (63 - 75)  BP: 121/69 (09-09-24 @ 12:00) (117/47 - 143/56)  RR:  (13 - 24)  SpO2:  (99% - 100%)  Wt(kg): --  GENERAL: NAD, well-groomed, well-developed  EYES: No proptosis, no injection  HEENT:  Atraumatic, Normocephalic, moist mucous membranes  neck: with dressing  RESPIRATORY: no resp distress  CARDIOVASCULAR: Regular rate and rhythm; No murmurs; no peripheral edema  GI: Soft, nontender, non distended, normal bowel sounds  psych: alert and oriented X3    POCT Blood Glucose.: 98 mg/dL (09-09-24 @ 09:17)  POCT Blood Glucose.: 142 mg/dL (09-08-24 @ 21:22)  POCT Blood Glucose.: 139 mg/dL (09-08-24 @ 16:14)  POCT Blood Glucose.: 141 mg/dL (09-08-24 @ 11:14)  POCT Blood Glucose.: 109 mg/dL (09-08-24 @ 07:41)  POCT Blood Glucose.: 85 mg/dL (09-07-24 @ 22:41)  POCT Blood Glucose.: 174 mg/dL (09-07-24 @ 16:21)  POCT Blood Glucose.: 101 mg/dL (09-07-24 @ 11:53)  POCT Blood Glucose.: 104 mg/dL (09-07-24 @ 08:05)  POCT Blood Glucose.: 142 mg/dL (09-06-24 @ 21:14)  POCT Blood Glucose.: 113 mg/dL (09-06-24 @ 17:33)    09-09    138  |  99  |  32<H>  ----------------------------<  86  3.5   |  24  |  5.83<H>    eGFR: 8<L>    Ca    6.8<L>      09-09  Mg     2.20     09-09  Phos  2.2     09-09    TPro  5.7<L>  /  Alb  2.8<L>  /  TBili  0.3  /  DBili  x   /  AST  23  /  ALT  14  /  AlkPhos  955<H>  09-09          Thyroid Function Tests:  09-05 @ 06:52 TSH 2.35 FreeT4 -- T3 -- Anti TPO -- Anti Thyroglobulin Ab -- TSI --  09-05 @ 01:44 TSH 2.95 FreeT4 -- T3 -- Anti TPO -- Anti Thyroglobulin Ab -- TSI --

## 2024-09-09 NOTE — PROGRESS NOTE ADULT - SUBJECTIVE AND OBJECTIVE BOX
[facility] NEPHROLOGY- PROGRESS NOTE    63 year old Female with history of ESRD on HD presents for parathyroidectomy. Nephrology consulted for ESRD status.    REVIEW OF SYSTEMS:  Gen: no fevers, numbness or weakness  Cards: no chest pain  Resp: no dyspnea  GI: no nausea or vomiting or diarrhea  Vascular: no LE edema    penicillin (Rash)      Hospital Medications: Medications reviewed      VITALS:  T(F): 97.6 (09-09-24 @ 12:00), Max: 97.6 (09-09-24 @ 12:00)  HR: 66 (09-09-24 @ 12:00)  BP: 121/69 (09-09-24 @ 12:00)  RR: 15 (09-09-24 @ 12:00)  SpO2: 100% (09-09-24 @ 12:00)  Wt(kg): --    09-08 @ 07:01  -  09-09 @ 07:00  --------------------------------------------------------  IN: 350 mL / OUT: 0 mL / NET: 350 mL    09-09 @ 07:01  -  09-09 @ 13:03  --------------------------------------------------------  IN: 300 mL / OUT: 0 mL / NET: 300 mL        PHYSICAL EXAM:    Gen: NAD, calm  Cards: RRR, +S1/S2, no M/G/R  Resp: CTA B/L  GI: soft, NT/ND, NABS  Vascular: no LE edema B/L, LUE AVF + bruit/thrill        LABS:  09-09    138  |  99  |  32<H>  ----------------------------<  86  3.5   |  24  |  5.83<H>    Ca    6.8<L>      09 Sep 2024 06:43  Phos  2.2     09-09  Mg     2.20     09-09    TPro  5.7<L>  /  Alb  2.8<L>  /  TBili  0.3  /  DBili      /  AST  23  /  ALT  14  /  AlkPhos  955<H>  09-09    Creatinine Trend: 5.83 <--, 5.40 <--, 4.91 <--, 4.31 <--, 3.49 <--, 3.06 <--, 5.90 <--, 5.26 <--, 4.03 <--, 3.64 <--, 7.13 <--, 6.77 <--, 6.37 <--, 6.11 <--, 5.54 <--                        9.0    6.82  )-----------( 158      ( 09 Sep 2024 00:51 )             26.5     Urine Studies:  Urinalysis Basic - ( 09 Sep 2024 06:43 )    Color:  / Appearance:  / SG:  / pH:   Gluc: 86 mg/dL / Ketone:   / Bili:  / Urobili:    Blood:  / Protein:  / Nitrite:    Leuk Esterase:  / RBC:  / WBC    Sq Epi:  / Non Sq Epi:  / Bacteria:

## 2024-09-09 NOTE — PROGRESS NOTE ADULT - ASSESSMENT
63F PMH HTN, HLD, T2DM (on lifestyle modification), morbid obesity s/p gastric bypass 2016, SLE c/b ESRD on HD (T/Th/S) since 2017 (left AV fistula), secondary hyperparathyroidism of renal origin, CAD s/p cardiac stent in 1/2018 and 6/2018 mid memory impairment presents for parathyroidectomy. Pt is now s/p parathyroidectomy with parathyroid autotransplantation 9/4. Endocrine consulted for further management.      #Secondary Hyperparathyroidism s/p parathyroidectomy  #Hungry bones syndrome and hypocalcemia   - OP labs:   -- 2/13/24 corrected Ca 9.1, iPTH 2000  -- 3/12/2024 corrected Ca 8.9, iPTH 3053  -- 3/26/24 corrected Ca 9.7  -- 4/2024 corrected Ca 9.3, iPTH 2380, vitamin D 25OH 20.3, vitamin D 1,25OH 6.5   - ENT in-office US showing enlarged R superior parathyroid  - Intraoperative iCa 1.06 (low), Ca 8.6 (corrected 9.9)  - Currently asx  - Post op PTH 14, vitamin D 24.2  - 9/7/24 corrected calcium 7.8, ionzied calcium is 0.73 very low. s/p calcium gluconate IV this morning.  - 9/7/24 after high-calcium bath HD, corrected ca was 8.8 and ionized calcium 1.00  - 9/8/24 corrected calcium is 7.9, ionized calcium is 0.79  9/9 corrected calcium 7.8    Plan:  - continue calcitriol 1mcg BID  - calcium carbonate 1250mg was increased to 4 tabs TID with meals, this started last night. will see this affect after 3 doses. monitor calcium and correct for albumin this evening. Please note, for any oral calcium only a certain perscentage is absorbed. if her corrected calcium remains low than can give additional dose at bedtime.  - Trend BID CMP, phos, Mg  - If corrected Ca <7.5 or patient is symptomatic, give calcium gluconate IVP  - PTH low, aim calcium low normal 8-8.5   - Aim normal Mg and high normal PO4  - Advised pt to notify RN if having hypocalcemic s/s including perioral/extremity tingling/numbness  - telemetry whilst calcium remains low      #Hypothyroidism Hx  - Not on LT4, per son had been on 25 mcg but TFTs found to be normal so stopped >1 year ago  - Repeat TSH 2.35, normal    #T2DM, diet controlled  - Last a1c 5.6% 8/2024, though unreliable i/s/o ESRD on HD  - Please obtain FS tidac and qhs  - low correctional Admelog at meals   -dc bedtime scale  if FSBG stable for 48 hrs without need of insulin can dc FSBG and insulin     #HTN  - Goal BP <130/80  - Management per primary team        d/w ICU team at bedside         Claudia Jones MD  Attending Physician   Department of Endocrinology, Diabetes and Metabolism     weekdays: 9am to 5pm: email [facility] or [facility] and or TEAMS     Nights and weekends: [phone]  Please note that this patient may be followed by a different provider tomorrow.   If no answer or after hours, please contact [phone].  For final dc reccomendations, please call [phone] or page the endocrine fellow on call.        complex pt, high level decision making  Spent over 51  minutes which included assessing pt/labs/meds and discussing plan of care with pt/team   review of tests and other providers' notes, counseling and educating the patient/family/caregiver, ordering medications communicating with other health care professionals, documenting clinical information in the EMR, independently interpreting results and communicating results to the patient/family/caregiven, care coordination.

## 2024-09-10 LAB
ADD ON TEST-SPECIMEN IN LAB: SIGNIFICANT CHANGE UP
ALBUMIN SERPL ELPH-MCNC: 2.9 G/DL — LOW (ref 3.3–5)
ALBUMIN SERPL ELPH-MCNC: 3.1 G/DL — LOW (ref 3.3–5)
ALBUMIN SERPL ELPH-MCNC: 3.2 G/DL — LOW (ref 3.3–5)
ALP SERPL-CCNC: 1045 U/L — HIGH (ref 40–120)
ALP SERPL-CCNC: 1046 U/L — HIGH (ref 40–120)
ALP SERPL-CCNC: 1219 U/L — HIGH (ref 40–120)
ALT FLD-CCNC: 14 U/L — SIGNIFICANT CHANGE UP (ref 4–33)
ALT FLD-CCNC: 14 U/L — SIGNIFICANT CHANGE UP (ref 4–33)
ALT FLD-CCNC: 9 U/L — SIGNIFICANT CHANGE UP (ref 4–33)
ANION GAP SERPL CALC-SCNC: 10 MMOL/L — SIGNIFICANT CHANGE UP (ref 7–14)
ANION GAP SERPL CALC-SCNC: 17 MMOL/L — HIGH (ref 7–14)
ANION GAP SERPL CALC-SCNC: 18 MMOL/L — HIGH (ref 7–14)
AST SERPL-CCNC: 21 U/L — SIGNIFICANT CHANGE UP (ref 4–32)
AST SERPL-CCNC: 21 U/L — SIGNIFICANT CHANGE UP (ref 4–32)
AST SERPL-CCNC: 24 U/L — SIGNIFICANT CHANGE UP (ref 4–32)
BILIRUB SERPL-MCNC: 0.3 MG/DL — SIGNIFICANT CHANGE UP (ref 0.2–1.2)
BILIRUB SERPL-MCNC: 0.3 MG/DL — SIGNIFICANT CHANGE UP (ref 0.2–1.2)
BILIRUB SERPL-MCNC: 0.4 MG/DL — SIGNIFICANT CHANGE UP (ref 0.2–1.2)
BUN SERPL-MCNC: 20 MG/DL — SIGNIFICANT CHANGE UP (ref 7–23)
BUN SERPL-MCNC: 38 MG/DL — HIGH (ref 7–23)
BUN SERPL-MCNC: 39 MG/DL — HIGH (ref 7–23)
CA-I BLD-SCNC: 0.95 MMOL/L — LOW (ref 1.15–1.29)
CALCIUM SERPL-MCNC: 6.8 MG/DL — LOW (ref 8.4–10.5)
CALCIUM SERPL-MCNC: 7 MG/DL — LOW (ref 8.4–10.5)
CALCIUM SERPL-MCNC: 7.9 MG/DL — LOW (ref 8.4–10.5)
CHLORIDE SERPL-SCNC: 100 MMOL/L — SIGNIFICANT CHANGE UP (ref 98–107)
CHLORIDE SERPL-SCNC: 100 MMOL/L — SIGNIFICANT CHANGE UP (ref 98–107)
CHLORIDE SERPL-SCNC: 99 MMOL/L — SIGNIFICANT CHANGE UP (ref 98–107)
CK MB BLD-MCNC: 2.1 % — SIGNIFICANT CHANGE UP (ref 0–2.5)
CK MB CFR SERPL CALC: 1.6 NG/ML — SIGNIFICANT CHANGE UP
CK SERPL-CCNC: 78 U/L — SIGNIFICANT CHANGE UP (ref 25–170)
CO2 SERPL-SCNC: 23 MMOL/L — SIGNIFICANT CHANGE UP (ref 22–31)
CO2 SERPL-SCNC: 24 MMOL/L — SIGNIFICANT CHANGE UP (ref 22–31)
CO2 SERPL-SCNC: 28 MMOL/L — SIGNIFICANT CHANGE UP (ref 22–31)
CREAT SERPL-MCNC: 4.03 MG/DL — HIGH (ref 0.5–1.3)
CREAT SERPL-MCNC: 6.7 MG/DL — HIGH (ref 0.5–1.3)
CREAT SERPL-MCNC: 6.99 MG/DL — HIGH (ref 0.5–1.3)
EGFR: 12 ML/MIN/1.73M2 — LOW
EGFR: 6 ML/MIN/1.73M2 — LOW
EGFR: 6 ML/MIN/1.73M2 — LOW
GLUCOSE SERPL-MCNC: 119 MG/DL — HIGH (ref 70–99)
GLUCOSE SERPL-MCNC: 145 MG/DL — HIGH (ref 70–99)
GLUCOSE SERPL-MCNC: 84 MG/DL — SIGNIFICANT CHANGE UP (ref 70–99)
HCT VFR BLD CALC: 27.7 % — LOW (ref 34.5–45)
HGB BLD-MCNC: 9.4 G/DL — LOW (ref 11.5–15.5)
MAGNESIUM SERPL-MCNC: 2.2 MG/DL — SIGNIFICANT CHANGE UP (ref 1.6–2.6)
MAGNESIUM SERPL-MCNC: 2.3 MG/DL — SIGNIFICANT CHANGE UP (ref 1.6–2.6)
MAGNESIUM SERPL-MCNC: 2.3 MG/DL — SIGNIFICANT CHANGE UP (ref 1.6–2.6)
MCHC RBC-ENTMCNC: 31.4 PG — SIGNIFICANT CHANGE UP (ref 27–34)
MCHC RBC-ENTMCNC: 33.9 GM/DL — SIGNIFICANT CHANGE UP (ref 32–36)
MCV RBC AUTO: 92.6 FL — SIGNIFICANT CHANGE UP (ref 80–100)
NRBC # BLD: 0 /100 WBCS — SIGNIFICANT CHANGE UP (ref 0–0)
NRBC # FLD: 0 K/UL — SIGNIFICANT CHANGE UP (ref 0–0)
PHOSPHATE SERPL-MCNC: 2 MG/DL — LOW (ref 2.5–4.5)
PHOSPHATE SERPL-MCNC: 2.2 MG/DL — LOW (ref 2.5–4.5)
PHOSPHATE SERPL-MCNC: 2.2 MG/DL — LOW (ref 2.5–4.5)
PLATELET # BLD AUTO: 144 K/UL — LOW (ref 150–400)
POTASSIUM SERPL-MCNC: 3.8 MMOL/L — SIGNIFICANT CHANGE UP (ref 3.5–5.3)
POTASSIUM SERPL-MCNC: 4 MMOL/L — SIGNIFICANT CHANGE UP (ref 3.5–5.3)
POTASSIUM SERPL-MCNC: 4 MMOL/L — SIGNIFICANT CHANGE UP (ref 3.5–5.3)
POTASSIUM SERPL-SCNC: 3.8 MMOL/L — SIGNIFICANT CHANGE UP (ref 3.5–5.3)
POTASSIUM SERPL-SCNC: 4 MMOL/L — SIGNIFICANT CHANGE UP (ref 3.5–5.3)
POTASSIUM SERPL-SCNC: 4 MMOL/L — SIGNIFICANT CHANGE UP (ref 3.5–5.3)
PROT SERPL-MCNC: 5.4 G/DL — LOW (ref 6–8.3)
PROT SERPL-MCNC: 5.6 G/DL — LOW (ref 6–8.3)
PROT SERPL-MCNC: 6.3 G/DL — SIGNIFICANT CHANGE UP (ref 6–8.3)
RBC # BLD: 2.99 M/UL — LOW (ref 3.8–5.2)
RBC # FLD: 16.9 % — HIGH (ref 10.3–14.5)
SODIUM SERPL-SCNC: 138 MMOL/L — SIGNIFICANT CHANGE UP (ref 135–145)
SODIUM SERPL-SCNC: 140 MMOL/L — SIGNIFICANT CHANGE UP (ref 135–145)
SODIUM SERPL-SCNC: 141 MMOL/L — SIGNIFICANT CHANGE UP (ref 135–145)
SURGICAL PATHOLOGY STUDY: SIGNIFICANT CHANGE UP
TROPONIN T, HIGH SENSITIVITY RESULT: 102 NG/L — CRITICAL HIGH
WBC # BLD: 6.25 K/UL — SIGNIFICANT CHANGE UP (ref 3.8–10.5)
WBC # FLD AUTO: 6.25 K/UL — SIGNIFICANT CHANGE UP (ref 3.8–10.5)

## 2024-09-10 PROCEDURE — 99232 SBSQ HOSP IP/OBS MODERATE 35: CPT

## 2024-09-10 PROCEDURE — 99223 1ST HOSP IP/OBS HIGH 75: CPT

## 2024-09-10 RX ORDER — CALCIUM CARBONATE 500(1250)
4 TABLET ORAL EVERY 6 HOURS
Refills: 0 | Status: DISCONTINUED | OUTPATIENT
Start: 2024-09-10 | End: 2024-09-12

## 2024-09-10 RX ORDER — CALCIUM GLUCONATE 61(648) MG
2 TABLET ORAL ONCE
Refills: 0 | Status: DISCONTINUED | OUTPATIENT
Start: 2024-09-10 | End: 2024-09-11

## 2024-09-10 RX ORDER — SODIUM PHOSPHATE, DIBASIC, ANHYDROUS, POTASSIUM PHOSPHATE, MONOBASIC, AND SODIUM PHOSPHATE, MONOBASIC, MONOHYDRATE 852; 155; 130 MG/1; MG/1; MG/1
1 TABLET, COATED ORAL ONCE
Refills: 0 | Status: COMPLETED | OUTPATIENT
Start: 2024-09-10 | End: 2024-09-10

## 2024-09-10 RX ADMIN — Medication 4 TABLET(S): at 17:16

## 2024-09-10 RX ADMIN — HYDROXYCHLOROQUINE SULFATE 200 MILLIGRAM(S): 200 TABLET, FILM COATED ORAL at 12:09

## 2024-09-10 RX ADMIN — EPOETIN ALFA 3000 UNIT(S): 3000 SOLUTION INTRAVENOUS; SUBCUTANEOUS at 07:32

## 2024-09-10 RX ADMIN — CALCITRIOL 1 MICROGRAM(S): 0.25 CAPSULE ORAL at 05:05

## 2024-09-10 RX ADMIN — VALSARTAN 80 MILLIGRAM(S): 40 TABLET ORAL at 12:08

## 2024-09-10 RX ADMIN — Medication 100 MILLIGRAM(S): at 17:16

## 2024-09-10 RX ADMIN — CHLORHEXIDINE GLUCONATE 1 APPLICATION(S): 40 SOLUTION TOPICAL at 12:14

## 2024-09-10 RX ADMIN — Medication 5000 UNIT(S): at 05:04

## 2024-09-10 RX ADMIN — Medication 4 TABLET(S): at 10:17

## 2024-09-10 RX ADMIN — CALCITRIOL 1 MICROGRAM(S): 0.25 CAPSULE ORAL at 17:16

## 2024-09-10 RX ADMIN — Medication 5000 UNIT(S): at 13:09

## 2024-09-10 RX ADMIN — Medication 2000 UNIT(S): at 12:09

## 2024-09-10 RX ADMIN — Medication 100 MILLIGRAM(S): at 10:18

## 2024-09-10 RX ADMIN — SODIUM PHOSPHATE, DIBASIC, ANHYDROUS, POTASSIUM PHOSPHATE, MONOBASIC, AND SODIUM PHOSPHATE, MONOBASIC, MONOHYDRATE 1 PACKET(S): 852; 155; 130 TABLET, COATED ORAL at 10:12

## 2024-09-10 NOTE — CONSULT NOTE ADULT - ASSESSMENT
#Hypothyroidism Hx  - Endocrine following   - Not on LT4, per son had been on 25 mcg but TFTs found to be normal so stopped >1 year ago  - Repeat TSH 2.35, normal    #T2DM, diet controlled  - Last a1c 5.6% 8/2024, though unreliable i/s/o ESRD on HD  -  FS tidac and qhs and low correctional Admelog at meals   - endocrine following, : if FSBG stable for 48 hrs without need of insulin can dc FSBG and insulin       #ESRD  - nephrology following   - continue calcitriol   #HTN  - Goal BP <130/80  - continue labetolol and valsartan  63F PMHx HTN, HLD, T2DM, morbid obesity (lost ~70 lbs after gastric bypass in 2016), SLE, ESRD on HD (T/Th/S) since 2017 (left AV fistula), CAD s/p Cardiac stent in 1/2018 and 6/2018, and mid memory impairment now s/p 4-gland total parathyroidectomy with reimplantation of 1x gland in R forearm on 9/4. Patient had SICU consulted for electrolyte monitoring and c/f hungry bone syndrome post op. Now transferred to general medicine floor. medicine consulted for co-management     #Hypothyroidism Hx  - Endocrine following   - Not on LT4, per son had been on 25 mcg but TFTs found to be normal so stopped >1 year ago  - Repeat TSH 2.35, normal    #T2DM, diet controlled  - Last a1c 5.6% 8/2024, though unreliable i/s/o ESRD on HD  -  FS tidac and qhs and low correctional Admelog at meals   - endocrine following, : if FSBG stable for 48 hrs without need of insulin can dc FSBG and insulin       #ESRD  - nephrology following     #HTN  - Goal BP <130/80  - patient on  labetolol and valsartan  63F PMHx HTN, HLD, T2DM, morbid obesity (lost ~70 lbs after gastric bypass in 2016), SLE, ESRD on HD (T/Th/S) since 2017 (left AV fistula), CAD s/p Cardiac stent in 1/2018 and 6/2018, and mid memory impairment now s/p 4-gland total parathyroidectomy with reimplantation of 1x gland in R forearm on 9/4. Patient had SICU consulted for electrolyte monitoring and c/f hungry bone syndrome post op. Now transferred to general medicine floor. medicine consulted for co-management     #Hypothyroidism Hx  - Endocrine following   - Not on LT4, per son had been on 25 mcg but TFTs found to be normal so stopped >1 year ago  - Repeat TSH 2.35, normal    #T2DM, diet controlled  - Last a1c 5.6% 8/2024, though unreliable i/s/o ESRD on HD  -  FS tidac and qhs and low correctional Admelog at meals   - endocrine following, : if FSBG stable for 48 hrs without need of insulin can dc FSBG and insulin       #ESRD  - nephrology following     #HTN  - Goal BP <130/80  - patient on  labetolol and valsartan at home   - Blood pressure at goal    #SLE - continue Plaquenil  63F PMHx HTN, HLD, T2DM, morbid obesity (lost ~70 lbs after gastric bypass in 2016), SLE, ESRD on HD (T/Th/S) since 2017 (left AV fistula), CAD s/p Cardiac stent in 1/2018 and 6/2018, and mid memory impairment now s/p 4-gland total parathyroidectomy with reimplantation of 1x gland in R forearm on 9/4. Patient had SICU consulted for electrolyte monitoring and c/f hungry bone syndrome post op. Now transferred to general medicine floor. medicine consulted for co-management     #Hypothyroidism Hx  - Endocrine following   - Not on LT4, per son had been on 25 mcg but TFTs found to be normal so stopped >1 year ago  - Repeat TSH 2.35, normal    #T2DM, diet controlled  - Last a1c 5.6% 8/2024, though unreliable i/s/o ESRD on HD  -  FS tidac and qhs and low correctional Admelog at meals   - endocrine following, : if FSBG stable for 48 hrs without need of insulin can dc FSBG and insulin     #s/p  4-gland total parathyroidectomy with reimplantation of 1x gland in R forearm on 9/4  - now with hypocalcemia, endocrine and nephrology following     #ESRD (T/Th/S) since 2017 (left AV fistula),  - nephrology following       #HTN  - Goal BP <130/80  - patient on  labetolol and valsartan at home   - Blood pressure at goal    #SLE - continue Plaquenil

## 2024-09-10 NOTE — PROGRESS NOTE ADULT - SUBJECTIVE AND OBJECTIVE BOX
[facility] NEPHROLOGY- PROGRESS NOTE    63 year old Female with history of ESRD on HD presents for parathyroidectomy. Nephrology consulted for ESRD status.    REVIEW OF SYSTEMS:  Gen: no fevers, numbness or weakness  Cards: no chest pain  Resp: no dyspnea  GI: no nausea or vomiting or diarrhea  Vascular: no LE edema    penicillin (Rash)      Hospital Medications: Medications reviewed      VITALS:  T(F): 97.2 (09-10-24 @ 12:07), Max: 98.3 (09-10-24 @ 09:30)  HR: 75 (09-10-24 @ 12:07)  BP: 117/45 (09-10-24 @ 12:07)  RR: 18 (09-10-24 @ 12:07)  SpO2: 100% (09-10-24 @ 12:07)  Wt(kg): --    09-09 @ 07:01  -  09-10 @ 07:00  --------------------------------------------------------  IN: 520 mL / OUT: 0 mL / NET: 520 mL    09-10 @ 07:01  -  09-10 @ 13:12  --------------------------------------------------------  IN: 636 mL / OUT: 2100 mL / NET: -1464 mL        PHYSICAL EXAM:    Gen: NAD, calm  Cards: RRR, +S1/S2, no M/G/R  Resp: CTA B/L  GI: soft, NT/ND, NABS  Vascular: no LE edema B/L, LUE AVF + bruit/thrill        LABS:  09-10    141  |  100  |  39<H>  ----------------------------<  84  3.8   |  23  |  6.99<H>    Ca    6.8<L>      10 Sep 2024 03:21  Phos  2.2     09-10  Mg     2.30     09-10    TPro  5.6<L>  /  Alb  2.9<L>  /  TBili  0.3  /  DBili      /  AST  21  /  ALT  9   /  AlkPhos  1045<H>  09-10    Creatinine Trend: 6.99 <--, 6.70 <--, 5.83 <--, 5.40 <--, 4.91 <--, 4.31 <--, 3.49 <--, 3.06 <--, 5.90 <--, 5.26 <--, 4.03 <--, 3.64 <--, 7.13 <--, 6.77 <--, 6.37 <--, 6.11 <--, 5.54 <--                        9.4    6.25  )-----------( 144      ( 10 Sep 2024 03:21 )             27.7     Urine Studies:  Urinalysis Basic - ( 10 Sep 2024 03:21 )    Color:  / Appearance:  / SG:  / pH:   Gluc: 84 mg/dL / Ketone:   / Bili:  / Urobili:    Blood:  / Protein:  / Nitrite:    Leuk Esterase:  / RBC:  / WBC    Sq Epi:  / Non Sq Epi:  / Bacteria:

## 2024-09-10 NOTE — PROGRESS NOTE ADULT - ASSESSMENT
63 year old Female with history of ESRD on HD presents for parathyroidectomy. Nephrology consulted for ESRD status.    1) ESRD: Last HD earlier this morning with mild intradialytic hypotension for which UF goal reduced to 1.7L. Plan for next maintenance HD on 9/12. Monitor electrolytes.    2) HTN with ESRD: BP controlled. Continue with current medications. Monitor BP.     3) Anemia of renal disease: Hb low with adequate iron stores. Continue with Epo 3K with HD. Monitor Hb.    4) Secondary HPT of renal origin: S/P parathyroidectomy with decreasing serum calcium. Continue with calcitriol 1 mcg twice daily and increase calcium carbonate to 4 tabs Q6 hours (discussed with SICU on 9/9 however no change made). Continue with cholecalciferol 2000 IU daily and will use high calcium bath with HD. Monitor serum calcium and phosphorus.      [facility] NEPHROLOGY  Timmy Acharya M.D.  Amilcar Contreras D.O.  Mindi Geller M.D.  MD Luna Israel, MSN, ANP-C    Telephone: [phone]  Facsimile: [phone] [place] [address], [address]  [place] [address]

## 2024-09-10 NOTE — PROGRESS NOTE ADULT - ASSESSMENT
63F PMH HTN, HLD, T2DM (on lifestyle modification), morbid obesity s/p gastric bypass 2016, SLE c/b ESRD on HD (T/Th/S) since 2017 (left AV fistula), secondary hyperparathyroidism of renal origin, CAD s/p cardiac stent in 1/2018 and 6/2018 mid memory impairment presents for parathyroidectomy. Pt is now s/p parathyroidectomy with parathyroid autotransplantation 9/4. Endocrine consulted for further management.  calcium continues to remain low         #Secondary Hyperparathyroidism s/p parathyroidectomy  #Hungry bones syndrome and hypocalcemia   - OP labs:   -- 2/13/24 corrected Ca 9.1, iPTH 2000  -- 3/12/2024 corrected Ca 8.9, iPTH 3053  -- 3/26/24 corrected Ca 9.7  -- 4/2024 corrected Ca 9.3, iPTH 2380, vitamin D 25OH 20.3, vitamin D 1,25OH 6.5   - ENT in-office US showing enlarged R superior parathyroid  - Intraoperative iCa 1.06 (low), Ca 8.6 (corrected 9.9)  - Currently asx  - Post op PTH 14, vitamin D 24.2  - 9/7/24 corrected calcium 7.8, ionzied calcium is 0.73 very low. s/p calcium gluconate IV this morning.  - 9/7/24 after high-calcium bath HD, corrected ca was 8.8 and ionized calcium 1.00  - 9/8/24 corrected calcium is 7.9, ionized calcium is 0.79  9/9 corrected calcium 7.8  9/10 corrected calcium 7.7    Plan:  - continue calcitriol 1mcg BID  - calcium carbonate 1250mg was increased to 4 tabs TID to QID today,   -monitor calcium and correct for albumin this evening. Please note, for any oral calcium only a certain percentage is absorbed.   -if corrected calcium is below 8 tonight than please give additional dose of calcitriol 0.25mcg tonight (phos is low end, would be okay to increase calcitriol)  - Trend BID CMP, phos, Mg  - If corrected Ca <7.5 or patient is symptomatic, give calcium gluconate IVP  - PTH low, aim calcium low normal 8-8.5   - Aim normal Mg and high normal PO4  - Advised pt to notify RN if having hypocalcemic s/s including perioral/extremity tingling/numbness  - telemetry whilst calcium remains low      #Hypothyroidism Hx  - Not on LT4, per son had been on 25 mcg but TFTs found to be normal so stopped >1 year ago  - Repeat TSH 2.35, normal    #T2DM, diet controlled  - Last a1c 5.6% 8/2024, though unreliable i/s/o ESRD on HD  - Please obtain FS tidac and qhs  - low correctional Admelog at meals   -dc bedtime scale  if FSBG stable for 48 hrs without need of insulin can dc FSBG and insulin     #HTN  - Goal BP <130/80  - Management per primary team        d/w team        Claudia Jones MD  Attending Physician   Department of Endocrinology, Diabetes and Metabolism     weekdays: 9am to 5pm: [facility] or [facility] and or TEAMS     Nights and weekends: [phone]  Please note that this patient may be followed by a different provider tomorrow.   If no answer or after hours, please contact [phone].  For final dc reccomendations, please call [phone] or page the endocrine fellow on call.

## 2024-09-10 NOTE — PROGRESS NOTE ADULT - SUBJECTIVE AND OBJECTIVE BOX
Interval: patient s/p 3.5 gland parathyroidectomy with reimplant in R arm . received 2g IV ca  at 9am. Pt did not require IV Ca yesterday. Pt stepped down. Pt in HD this morning.      PAST MEDICAL & SURGICAL HISTORY:  Diabetes      Hypertension      Hypercholesteremia      Other hyperlipidemia      Glaucoma      ESRD (end-stage renal disease) due to SLE      Lupus (systemic lupus erythematosus)      Hyperparathyroidism      Memory impairment      Anemia of renal disease      CAD (coronary artery disease)      Stented coronary artery      Cholelithiasis      Rib fracture      S/P  section  times two      H/O gastric bypass  2016      S/P appendectomy      History of appendectomy        Allergies    penicillin (Rash)    Intolerances    MEDICATIONS  (STANDING):  acetaminophen     Tablet .. 975 milliGRAM(s) Oral every 6 hours  calcitriol   Capsule 1 MICROGram(s) Oral every 12 hours  calcium carbonate   1250 mG (OsCal) 2 Tablet(s) Oral three times a day  chlorhexidine 2% Cloths 1 Application(s) Topical daily  chlorhexidine 2% Cloths 1 Application(s) Topical daily  cholecalciferol 2000 Unit(s) Oral daily  epoetin adele (EPOGEN) Injectable 3000 Unit(s) IV Push <User Schedule>  heparin   Injectable 5000 Unit(s) SubCutaneous every 8 hours  hydroxychloroquine 200 milliGRAM(s) Oral daily  insulin lispro (ADMELOG) corrective regimen sliding scale   SubCutaneous at bedtime  insulin lispro (ADMELOG) corrective regimen sliding scale   SubCutaneous three times a day before meals  labetalol 100 milliGRAM(s) Oral two times a day  valsartan 80 milliGRAM(s) Oral daily    MEDICATIONS  (PRN):  sodium chloride 0.9% Bolus. 100 milliLiter(s) IV Bolus every 5 minutes PRN SBP LESS THAN or EQUAL to 90 mmHg    Vital Signs Last 24 Hrs  T(C): 36.7 (10 Sep 2024 06:18), Max: 36.7 (10 Sep 2024 06:18)  T(F): 98.1 (10 Sep 2024 06:18), Max: 98.1 (10 Sep 2024 06:18)  HR: 66 (10 Sep 2024 06:18) (63 - 67)  BP: 148/70 (10 Sep 2024 06:18) (121/69 - 157/66)  BP(mean): 84 (09 Sep 2024 20:00) (81 - 85)  RR: 16 (10 Sep 2024 06:18) (15 - 19)  SpO2: 100% (10 Sep 2024 05:00) (99% - 100%)    Parameters below as of 10 Sep 2024 06:18  Patient On (Oxygen Delivery Method): room air            O2 Parameters below as of 08 Sep 2024 00:00  Patient On (Oxygen Delivery Method): room air    Physical Exam:  Alert, NAD  Nonlabored Respirations RA  STANLEY  neck: incision c/d/i       I&O's Summary    06 Sep 2024 07:01  -  07 Sep 2024 07:00  --------------------------------------------------------  IN: 1000 mL / OUT: 0 mL / NET: 1000 mL    07 Sep 2024 07:01  -  08 Sep 2024 05:32  --------------------------------------------------------  IN: 1080 mL / OUT: 2300 mL / NET: -1220 mL                   LABS:                        9.0    6.82  )-----------( 158      ( 09 Sep 2024 00:51 )             26.5     09    139  |  100  |  30<H>  ----------------------------<  126<H>  3.8   |  24  |  5.40<H>    Ca    7.0<L>      09 Sep 2024 00:51  Phos  2.1     -  Mg     2.20     -    TPro  x   /  Alb  2.9<L>  /  TBili  x   /  DBili  x   /  AST  x   /  ALT  x   /  AlkPhos  x                              A/P: 63yFemale s/p 3.5 gland parathyroidectomy    -trend calciums  -repletions per endo/SICU   -HD per nephro      - SCDs  - d/w attending

## 2024-09-10 NOTE — CONSULT NOTE ADULT - SUBJECTIVE AND OBJECTIVE BOX
Patient seen and evaluated at bedside    Chief Complaint: s/p parathyroidectomy     HPI:  63F with HTN, HLD, T2DM, ESRD from SLE on , and CAD s/p PCI in 2018 here for elective parathyroidectomy and parathyroid autotransplantation. POD course uncomplicated. On telemetry, noted to have 7 beats of NSVT, asymptomatic. Cardiology consulted for NSVT.     PMHx:   Diabetes    Hypertension    Hypercholesteremia    No pertinent past medical history    Essential hypertension    Type 2 diabetes mellitus without complication, without long-term current use of insulin    Other hyperlipidemia    Glaucoma    ESRD (end-stage renal disease) due to SLE    Lupus (systemic lupus erythematosus)    Hyperparathyroidism    Memory impairment    Anemia of renal disease    CAD (coronary artery disease)    Stented coronary artery    Cholelithiasis    Rib fracture        PSHx:   No significant past surgical history    S/P  section    H/O gastric bypass    S/P appendectomy    History of appendectomy        Allergies:  penicillin (Rash)      Home Meds:    Current Medications:   acetaminophen     Tablet .. 650 milliGRAM(s) Oral every 6 hours PRN  calcitriol   Capsule 1 MICROGram(s) Oral every 12 hours  calcium carbonate   1250 mG (OsCal) 4 Tablet(s) Oral every 6 hours  calcium gluconate IVPB 2 Gram(s) IV Intermittent once  cholecalciferol 2000 Unit(s) Oral daily  epoetin adele (EPOGEN) Injectable 3000 Unit(s) IV Push <User Schedule>  heparin   Injectable 5000 Unit(s) SubCutaneous every 8 hours  hydroxychloroquine 200 milliGRAM(s) Oral daily  labetalol 100 milliGRAM(s) Oral two times a day  magnesium sulfate  IVPB 2 Gram(s) IV Intermittent once  sodium chloride 0.9% Bolus. 100 milliLiter(s) IV Bolus every 5 minutes PRN  valsartan 80 milliGRAM(s) Oral daily      FAMILY HISTORY:  DM (diabetes mellitus)  mother and father    History of hypertension  father and mother    DM (diabetes mellitus) (Sibling)  siblings    History of hypertension (Sibling)  sibling        Social History: Personally reviewed   No tobacco, EtOH or IVDU     REVIEW OF SYSTEMS:  Constitutional:     [x ] negative [ ] fevers [ ] chills [ ] weight loss [ ] weight gain  HEENT:                  [x ] negative [ ] dry eyes [ ] eye irritation [ ] postnasal drip [ ] nasal congestion  CV:                         [ x] negative  [ ] chest pain [ ] orthopnea [ ] palpitations [ ] murmur  Resp:                     [x ] negative [ ] cough [ ] shortness of breath [ ] dyspnea [ ] wheezing [ ] sputum [ ]hemoptysis  GI:                          [ x] negative [ ] nausea [ ] vomiting [ ] diarrhea [ ] constipation [ ] abd pain [ ] dysphagia   :                        [ x] negative [ ] dysuria [ ] nocturia [ ] hematuria [ ] increased urinary frequency  Musculoskeletal: [x ] negative [ ] back pain [ ] myalgias [ ] arthralgias [ ] fracture  Skin:                       [ x] negative [ ] rash [ ] itch  Neurological:        [ x] negative [ ] headache [ ] dizziness [ ] syncope [ ] weakness [ ] numbness  Psychiatric:           [ x] negative [ ] anxiety [ ] depression  Endocrine:            [ x] negative [ ] diabetes [ ] thyroid problem  Heme/Lymph:      [ x] negative [ ] anemia [ ] bleeding problem  Allergic/Immune: [ x] negative [ ] itchy eyes [ ] nasal discharge [ ] hives [ ] angioedema    [ x] All other systems negative  [ ] Unable to assess ROS due to      Physical Exam:  T(F): 98.4 (09-10), Max: 98.4 (09-10)  HR: 73 (09-10) (64 - 79)  BP: 163/64 (09-10) (117/45 - 163/64)  RR: 18 (09-10)  SpO2: 100% (09-10)  GENERAL: No acute distress, well-developed  HEAD:  Atraumatic, Normocephalic  ENT: no JVD, + neck incision site c/d/i   CHEST/LUNG: Clear to auscultation bilaterally; No wheeze, equal breath sounds bilaterally   HEART: Regular rate and rhythm; No murmurs, rubs, or gallops  ABDOMEN: Soft, Nontender, Nondistended; Bowel sounds present  EXTREMITIES:  No clubbing, cyanosis, or edema  PSYCH: Nl behavior, nl affect  NEUROLOGY: AAOx3, non-focal, cranial nerves intact  SKIN: Normal color, No rashes or lesions    Cardiovascular Diagnostic Testing:  TTE   Conclusions:  1. Normal mitral valve. Minimal mitral regurgitation.  2. Aortic valve not well visualized; appears trileaflet  with normal opening. No aortic valve regurgitation seen.  3. Normal left ventricular systolic function. No segmental  wall motion abnormalities.  4. Normal right ventricular size and function.      Imaging:      Labs: Personally reviewed                        9.4    6.25  )-----------( 144      ( 10 Sep 2024 03:21 )             27.7     09-10    138  |  100  |  20  ----------------------------<  145<H>  4.0   |  28  |  4.03<H>    Ca    7.9<L>      10 Sep 2024 18:40  Phos  2.0     09-10  Mg     2.20     09-10    TPro  6.3  /  Alb  3.2<L>  /  TBili  0.4  /  DBili  x   /  AST  21  /  ALT  14  /  AlkPhos  x   09-10            Thyroid Stimulating Hormone, Serum: 2.35 uIU/mL ( @ 06:52)  Thyroid Stimulating Hormone, Serum: 2.95 uIU/mL ( @ 01:44)

## 2024-09-10 NOTE — CONSULT NOTE ADULT - TIME BILLING
medical complexity, reviewing relevant images, hospital record, obtaining a history/physical independently, and coordinating care.
Review of laboratory data, radiology results, consultants' recommendations, documentation in [place], discussion with patient/house staff/ACP and interdisciplinary staff (such as , social workers, etc). Interventions were performed as documented above.

## 2024-09-10 NOTE — PATIENT PROFILE ADULT. - [facility]
Return Office Visit     CHIEF COMPLAINT:    DM  Dyslipidemia   Vitamin D deficiency    HISTORY OF PRESENT ILLNESS:  Stanley Mosher is a 71 year old male who presents for follow up for DM.      DM HISTORY  He states he had \" borderline DM\" as a teenager.  He was on diabinese for a few years after he stopped.    The pills were resumed a few years later.    HISTORY OF DIABETES COMPLICATIONS: :  History of Retinopathy: No,  Eye exam: August 2023 has an apt coming up  History of Neuropathy: No  History of Nephropathy: Yes    ASSOCIATED COMPLICATIONS:    HTN: Yes  Hyperlipidemia: Yes  Coronary Artery Disease:  Yes  Cerebrovascular Disease: No  Has PVD      HOME GLUCOSE READINGS:   Has been using the [facility]   Did not get his phone   Fasting and pre dinner  He states that when he eats well, sugars are in the 120-130 range  When he does not eat well, these go up in the 200s    Low Bg under 80:     CURRENT DIABETIC MEDICATIONS INCLUDE:    Insulin 70/30 60 am and 60 pm with meals    T/f trulicity, bydureon and Victoza due to GI SE    Does not want to try ozempic      MEALS:  Moderate compliance      CURRENT MEDICATION:    Current Outpatient Medications   Medication Sig Dispense Refill    rosuvastatin 40 MG Oral Tab rosuvastatin 40 mg tablet, [RxNorm: 486398]      losartan 50 MG Oral Tab TAKE 1 AND 1/2 TABLET BY MOUTH DAILY 135 tablet 1    FREESTYLE ELIZABETH 3 SENSOR Does not apply Misc 1 EACH EVERY 14 (FOURTEEN) DAYS. 6 each 4    Insulin NPH & Regular (HUMULIN 70/30 KWIKPEN) (70-30) 100 UNIT/ML Subcutaneous Suspension Pen-injector INJECT 60 UNITS INTO THE SKIN DAILY WITH BREAKFAST AND 60 UNITS DAILY WITH DINNER 111 mL 1    Insulin Pen Needle (BD PEN NEEDLE BETHEL U/F) 32G X 4 MM Does not apply Misc INJECT TWICE A  each 1    metoprolol succinate  MG Oral Tablet 24 Hr Take 1.5 tablets (150 mg total) by mouth daily. 135 tablet 1    ergocalciferol 1.25 MG (54850 UT) Oral Cap Take 1 capsule (50,000 Units total) by mouth once a  week. 12 capsule 11    amLODIPine 10 MG Oral Tab Take 1 tablet (10 mg total) by mouth daily. 90 tablet 4    clopidogrel 75 MG Oral Tab Take 1 tablet (75 mg total) by mouth daily. 90 tablet 3    Ascorbic Acid (VITAMIN C) 1000 MG Oral Tab Take 0.5 tablets (500 mg total) by mouth daily.      aspirin 81 MG Oral Tab EC Take 1 tablet (81 mg total) by mouth daily.      calcium carbonate antacid 500 MG Oral Chew Tab Chew 2 tablets (1,000 mg total) by mouth 3 (three) times daily. (Patient taking differently: Chew 1 tablet (500 mg total) by mouth daily.) 90 tablet 0    multivitamin Oral Tab Take 1 tablet by mouth daily. 30 tablet 0         ALLERGY:  Allergies   Allergen Reactions    Victoza NAUSEA AND VOMITING    Trulicity [Dulaglutide] DIARRHEA and NAUSEA ONLY       PAST MEDICAL, SOCIAL AND FAMILY HISTORY:  See past medical history marked as reviewed.  See past surgical history marked as reviewed.  See past family history marked as reviewed.  See past social history marked as reviewed.    ASSESSMENTS:     REVIEW OF SYSTEMS:  Constitutional: Negative for:  fever, fatigue, cold/heat intolerance, weight change  Eyes: Negative for:  Visual changes, proptosis, blurring  ENT: Negative for:  dysphagia, neck swelling, dysphonia  Respiratory: Negative for:  dyspnea, cough  Cardiovascular: Negative for:  chest pain, palpitations, orthopnea  GI: Negative for:  abdominal pain, nausea, vomiting, diarrhea, constipation, bleeding  Neurology: Negative for: headache, numbness, weakness  Genito-Urinary: Negative for: dysuria, frequency  Psychiatric: Negative for:  depression, anxiety  Hematology/Lymphatics: Negative for: bruising, lower extremity edema  Endocrine: Negative for: polyuria, polydypsia  Skin: Negative for: rash, blister, cellulitis,       PHYSICAL EXAM:   Vitals:    09/10/24 1519   BP: 152/73   Pulse: 58   Weight: 209 lb (94.8 kg)   Height: 5' 10\" (1.778 m)     BMI: Body mass index is 29.99 kg/m².         General Appearance:   alert, well developed, in no acute distress  Head: Atraumatic  Eyes:  normal conjunctivae, sclera., normal sclera and normal pupils  Throat/Neck: normal sound to voice. Normal hearing, normal speech  Respiratory:  Speaking in full sentences, non-labored. no increased work of breathing, no audible wheezing    Neuro: motor grossly intact, moving all extremities without difficulty  Psychiatric:  oriented to time, self, and place  Extremities: 9/2024  Bilateral barefoot  skin diabetic exam is normal, visualized feet and the appearance is normal.  Bilateral monofilament/sensation of both feet is normal.  Pulsation pedal pulse exam of both lower legs/feet is normal as well.        DATA:       A1c is 8.6 % ( 9/2024)    ASSESSMENT AND PLAN:    1. Type 2 DM: CKD, with hyperglycemia    Plan:  Discussed the pathogenesis, natural course of diabetes. Patient understands the importance of glycemic control and the implications of uncontrolled diabetes including Diabetic ketoacidosis and various micro vascular and macrovascular complications.      a). Medications:     A1c is elevated   CPM  Work on lifestyle changes    Insulin 70/30   60  --> 64 units with breakfast and  60--> 64  units with dinner      Continue to monitor BG  Call as discussed   Always call if Bg is under 80      b). Has Nephropathy. Discussed importance of good BG control.   c). Discussed importance of annual eye exams  d). Discussed daily foot exam   e). BG log maintainence explained in great detail, to get log and glucometer on next visit.  f). Life style changes discussed  g). Hypoglycemia recognition and management discussed    2. Patient’s BP is normal today.    3. Dyslipidemia  A) Discussed lifestyle modifications including reductions in dietary total and saturated fat, weight loss, aerobic exercise, and eating a diet rich in fruits and vegetables.  B) on rosuvastatin to  40 mg daily.  C) Also his TG cholesterol was elevated over 500  He declined,  fenofibrate   Repeat TG is in the 200s  Good BG control  Fasting lipid panel  ordered  4. H/o hyperparathyroidism s/p sx in 12/2021  He was on calcium 500 mg daily now, will recheck    He is also on vit D 50,000 q week , vitamin D normal  DXA shows normal BMD  Recheck labs in 3 months  RTC in three months  Call with BG as discussed                          Orders Placed This Encounter   Procedures    POC HemoCue Glucose 201 (Finger stick glucose)    POC Glycohemoglobin [63794]    Lipid Panel [E]    Lipid Panel [E]                              unsure

## 2024-09-10 NOTE — CONSULT NOTE ADULT - SUBJECTIVE AND OBJECTIVE BOX
Chief Complaint:    HPI:  62 y/o female PMH HTN HLD T2DM (on lifestyle modification) morbid obesity (lost ~70 lbs after gastric bypass in 2016) SLE ESRD on HD (//S) since  (left AV fistula) CAD s/p Cardiac stent in 2018 and 2018 mid memory impairment presents to presurgical testing with diagnosis of secondary hyperparathyroidism of renal origin. Pt is scheduled for a parathyroidectomy with parathyroid hormone assay and frozen section, parathyroid autotransplantation.  (27 Aug 2024 14:21)      PAST MEDICAL & SURGICAL HISTORY:  Diabetes      Hypertension      Hypercholesteremia      Other hyperlipidemia      Glaucoma      ESRD (end-stage renal disease) due to SLE      Lupus (systemic lupus erythematosus)      Hyperparathyroidism      Memory impairment      Anemia of renal disease      CAD (coronary artery disease)      Stented coronary artery      Cholelithiasis      Rib fracture      S/P  section  times two      H/O gastric bypass  2016      S/P appendectomy      History of appendectomy          Review of Systems:   CONSTITUTIONAL: No fever.  EYES: No eye pain or discharge.  ENMT:  No sinus or throat pain  NECK: No pain or stiffness  RESPIRATORY: No cough, wheezing, chills or hemoptysis; No shortness of breath  CARDIOVASCULAR: No chest pain, palpitations, dizziness, or leg swelling  GASTROINTESTINAL: No abdominal or epigastric pain. No nausea, vomiting, or hematemesis; No diarrhea or constipation. No melena or hematochezia.  GENITOURINARY: No dysuria or incontinence  NEUROLOGICAL: No headaches, memory loss, loss of strength, numbness, or tremors  SKIN: No rashes.  LYMPH NODES: No enlarged glands  ENDOCRINE: No heat or cold intolerance; No hair loss  MUSCULOSKELETAL: No joint pain or swelling; No muscle, back, or extremity pain  PSYCHIATRIC: No depression, anxiety, mood swings, or difficulty sleeping  HEME/LYMPH: No easy bruising, or bleeding gums  ALLERY AND IMMUNOLOGIC: No hives or eczema    Allergies    penicillin (Rash)    Intolerances        Social History:     FAMILY HISTORY:  DM (diabetes mellitus)  mother and father    History of hypertension  father and mother    DM (diabetes mellitus) (Sibling)  siblings    History of hypertension (Sibling)  sibling        Home Medications:  Alogliptin 6.25 mg oral tablet: 1 tab(s) orally once a day (04 Sep 2024 07:53)  Auryxia 210 mg oral tablet: 420 milligram(s) orally 3 times a day (with meals) (04 Sep 2024 07:53)  calcium acetate 667 mg oral tablet: 1 tab(s) orally 3 times a day (with meals) (04 Sep 2024 07:53)  cinacalcet 60 mg oral tablet: 1 tab(s) orally once a day (04 Sep 2024 07:53)  Cosopt 2%-0.5% ophthalmic solution: 1 drop(s) in each eye 2 times a day (04 Sep 2024 07:53)  hydroxychloroquine 200 mg oral tablet: 1 tab(s) orally once a day (04 Sep 2024 07:53)  labetalol 100 mg oral tablet: 1 tab(s) orally 2 times a day (04 Sep 2024 07:53)  latanoprost 0.005% ophthalmic emulsion: 1 drop(s) in each affected eye once a day (at bedtime) (04 Sep 2024 07:53)  memantine 10 mg oral tablet: 1 tab(s) orally 2 times a day (04 Sep 2024 07:53)  Praluent Pen 75 mg/mL subcutaneous solution: 75 milligram(s) subcutaneously every 2 weeks (04 Sep 2024 07:53)  valsartan 80 mg oral tablet: 1 tab(s) orally once a day (04 Sep 2024 07:53)  vitamin D 1000IU orally once a day:  (04 Sep 2024 07:53)      MEDICATIONS  (STANDING):  calcitriol   Capsule 1 MICROGram(s) Oral every 12 hours  calcium carbonate   1250 mG (OsCal) 4 Tablet(s) Oral three times a day  chlorhexidine 2% Cloths 1 Application(s) Topical daily  chlorhexidine 2% Cloths 1 Application(s) Topical daily  cholecalciferol 2000 Unit(s) Oral daily  epoetin adele (EPOGEN) Injectable 3000 Unit(s) IV Push <User Schedule>  heparin   Injectable 5000 Unit(s) SubCutaneous every 8 hours  hydroxychloroquine 200 milliGRAM(s) Oral daily  labetalol 100 milliGRAM(s) Oral two times a day  valsartan 80 milliGRAM(s) Oral daily    MEDICATIONS  (PRN):  acetaminophen     Tablet .. 650 milliGRAM(s) Oral every 6 hours PRN Mild Pain (1 - 3)  sodium chloride 0.9% Bolus. 100 milliLiter(s) IV Bolus every 5 minutes PRN SBP LESS THAN or EQUAL to 90 mmHg      CAPILLARY BLOOD GLUCOSE        I&O's Summary    09 Sep 2024 07:01  -  10 Sep 2024 07:00  --------------------------------------------------------  IN: 520 mL / OUT: 0 mL / NET: 520 mL    10 Sep 2024 07:01  -  10 Sep 2024 10:31  --------------------------------------------------------  IN: 400 mL / OUT: 2100 mL / NET: -1700 mL        PHYSICAL EXAM:  Vital Signs Last 24 Hrs  T(C): 36.7 (10 Sep 2024 09:58), Max: 36.7 (10 Sep 2024 06:18)  T(F): 98.1 (10 Sep 2024 09:58), Max: 98.1 (10 Sep 2024 06:18)  HR: 79 (10 Sep 2024 09:58) (63 - 79)  BP: 132/56 (10 Sep 2024 09:58) (121/69 - 157/66)  BP(mean): 84 (09 Sep 2024 20:00) (84 - 85)  RR: 18 (10 Sep 2024 09:58) (15 - 18)  SpO2: 99% (10 Sep 2024 09:58) (99% - 100%)    Parameters below as of 10 Sep 2024 09:58  Patient On (Oxygen Delivery Method): room air      GENERAL: NAD, well-developed  HEAD:  Atraumatic, Normocephalic  EYES: EOMI, PERRLA, conjunctiva and sclera clear  NECK: Supple, No JVD  CHEST/LUNG: Clear to auscultation bilaterally; No wheeze  HEART: Regular rate and rhythm; No murmurs, rubs, or gallops  ABDOMEN: Soft, Nontender, Nondistended; Bowel sounds present  EXTREMITIES:  2+ Peripheral Pulses, No clubbing, cyanosis, or edema  PSYCH: AAOx3  NEUROLOGY: non-focal  SKIN: No rashes or lesions    LABS:                        9.4    6.25  )-----------( 144      ( 10 Sep 2024 03:21 )             27.7     -10    141  |  100  |  39<H>  ----------------------------<  84  3.8   |  23  |  6.99<H>    Ca    6.8<L>      10 Sep 2024 03:21  Phos  2.2     09-10  Mg     2.30     09-10    TPro  5.6<L>  /  Alb  2.9<L>  /  TBili  0.3  /  DBili  x   /  AST  21  /  ALT  9   /  AlkPhos  1045<H>  09-10          Urinalysis Basic - ( 10 Sep 2024 03:21 )    Color: x / Appearance: x / SG: x / pH: x  Gluc: 84 mg/dL / Ketone: x  / Bili: x / Urobili: x   Blood: x / Protein: x / Nitrite: x   Leuk Esterase: x / RBC: x / WBC x   Sq Epi: x / Non Sq Epi: x / Bacteria: x        RADIOLOGY & ADDITIONAL TESTS:    Imaging Personally Reviewed:    EKG Personally Reviewed:    Consultant(s) Notes Reviewed:      Care Discussed with Consultants/Other Providers:   Chief Complaint:    HPI:  62 y/o female PMH HTN HLD T2DM (on lifestyle modification) morbid obesity (lost ~70 lbs after gastric bypass in 2016) SLE ESRD on HD (//S) since  (left AV fistula) CAD s/p Cardiac stent in 2018 and 2018 mid memory impairment presents to presurgical testing with diagnosis of secondary hyperparathyroidism of renal origin. Pt is scheduled for a parathyroidectomy with parathyroid hormone assay and frozen section, parathyroid autotransplantation.  (27 Aug 2024 14:21)  Patient examined at bedside with . Denies pain, SOB, numbness, tingling.     PAST MEDICAL & SURGICAL HISTORY:  Diabetes      Hypertension      Hypercholesteremia      Other hyperlipidemia      Glaucoma      ESRD (end-stage renal disease) due to SLE      Lupus (systemic lupus erythematosus)      Hyperparathyroidism      Memory impairment      Anemia of renal disease      CAD (coronary artery disease)      Stented coronary artery      Cholelithiasis      Rib fracture      S/P  section  times two      H/O gastric bypass  2016      S/P appendectomy      History of appendectomy          Review of Systems:   CONSTITUTIONAL: No fever.  EYES: No eye pain or discharge.  ENMT:  No sinus or throat pain  NECK: No pain or stiffness  RESPIRATORY: No cough, wheezing, chills or hemoptysis; No shortness of breath  CARDIOVASCULAR: No chest pain, palpitations, dizziness, or leg swelling  GASTROINTESTINAL: No abdominal or epigastric pain. No nausea, vomiting, or hematemesis; No diarrhea or constipation. No melena or hematochezia.  GENITOURINARY: No dysuria or incontinence  NEUROLOGICAL: No headaches, memory loss, loss of strength, numbness, or tremors  SKIN: No rashes.  LYMPH NODES: No enlarged glands  ENDOCRINE: No heat or cold intolerance; No hair loss  MUSCULOSKELETAL: No joint pain or swelling; No muscle, back, or extremity pain  PSYCHIATRIC: No depression, anxiety, mood swings, or difficulty sleeping  HEME/LYMPH: No easy bruising, or bleeding gums  ALLERY AND IMMUNOLOGIC: No hives or eczema    Allergies    penicillin (Rash)    Intolerances        Social History:   Denies toxic habits - no ETOH, recreational drug use. or tobacco use   FAMILY HISTORY:  DM (diabetes mellitus)  mother and father    History of hypertension  father and mother    DM (diabetes mellitus) (Sibling)  siblings    History of hypertension (Sibling)  sibling        Home Medications:  Alogliptin 6.25 mg oral tablet: 1 tab(s) orally once a day (04 Sep 2024 07:53)  Auryxia 210 mg oral tablet: 420 milligram(s) orally 3 times a day (with meals) (04 Sep 2024 07:53)  calcium acetate 667 mg oral tablet: 1 tab(s) orally 3 times a day (with meals) (04 Sep 2024 07:53)  cinacalcet 60 mg oral tablet: 1 tab(s) orally once a day (04 Sep 2024 07:53)  Cosopt 2%-0.5% ophthalmic solution: 1 drop(s) in each eye 2 times a day (04 Sep 2024 07:53)  hydroxychloroquine 200 mg oral tablet: 1 tab(s) orally once a day (04 Sep 2024 07:53)  labetalol 100 mg oral tablet: 1 tab(s) orally 2 times a day (04 Sep 2024 07:53)  latanoprost 0.005% ophthalmic emulsion: 1 drop(s) in each affected eye once a day (at bedtime) (04 Sep 2024 07:53)  memantine 10 mg oral tablet: 1 tab(s) orally 2 times a day (04 Sep 2024 07:53)  Praluent Pen 75 mg/mL subcutaneous solution: 75 milligram(s) subcutaneously every 2 weeks (04 Sep 2024 07:53)  valsartan 80 mg oral tablet: 1 tab(s) orally once a day (04 Sep 2024 07:53)  vitamin D 1000IU orally once a day:  (04 Sep 2024 07:53)      MEDICATIONS  (STANDING):  calcitriol   Capsule 1 MICROGram(s) Oral every 12 hours  calcium carbonate   1250 mG (OsCal) 4 Tablet(s) Oral three times a day  chlorhexidine 2% Cloths 1 Application(s) Topical daily  chlorhexidine 2% Cloths 1 Application(s) Topical daily  cholecalciferol 2000 Unit(s) Oral daily  epoetin adele (EPOGEN) Injectable 3000 Unit(s) IV Push <User Schedule>  heparin   Injectable 5000 Unit(s) SubCutaneous every 8 hours  hydroxychloroquine 200 milliGRAM(s) Oral daily  labetalol 100 milliGRAM(s) Oral two times a day  valsartan 80 milliGRAM(s) Oral daily    MEDICATIONS  (PRN):  acetaminophen     Tablet .. 650 milliGRAM(s) Oral every 6 hours PRN Mild Pain (1 - 3)  sodium chloride 0.9% Bolus. 100 milliLiter(s) IV Bolus every 5 minutes PRN SBP LESS THAN or EQUAL to 90 mmHg      CAPILLARY BLOOD GLUCOSE        I&O's Summary    09 Sep 2024 07:01  -  10 Sep 2024 07:00  --------------------------------------------------------  IN: 520 mL / OUT: 0 mL / NET: 520 mL    10 Sep 2024 07:01  -  10 Sep 2024 10:31  --------------------------------------------------------  IN: 400 mL / OUT: 2100 mL / NET: -1700 mL        PHYSICAL EXAM:  Vital Signs Last 24 Hrs  T(C): 36.7 (10 Sep 2024 09:58), Max: 36.7 (10 Sep 2024 06:18)  T(F): 98.1 (10 Sep 2024 09:58), Max: 98.1 (10 Sep 2024 06:18)  HR: 79 (10 Sep 2024 09:58) (63 - 79)  BP: 132/56 (10 Sep 2024 09:58) (121/69 - 157/66)  BP(mean): 84 (09 Sep 2024 20:00) (84 - 85)  RR: 18 (10 Sep 2024 09:58) (15 - 18)  SpO2: 99% (10 Sep 2024 09:58) (99% - 100%)    Parameters below as of 10 Sep 2024 09:58  Patient On (Oxygen Delivery Method): room air      GENERAL: Middle age woman in NAD, well-developed  HEAD:  Atraumatic, Normocephalic  EYES: EOMI, PERRLA, conjunctiva and sclera clear  NECK: Supple, No JVD; bandage on neck, incision C/D/I   CHEST/LUNG: Clear to auscultation bilaterally; No wheeze  HEART: Regular rate and rhythm; No murmurs, rubs, or gallops  ABDOMEN: Soft, Nontender, Nondistended; Bowel sounds present  EXTREMITIES:  2+ Peripheral Pulses, No clubbing, cyanosis, or edema  PSYCH: AAOx3  NEUROLOGY: non-focal  SKIN: No rashes or lesions    LABS:                        9.4    6.25  )-----------( 144      ( 10 Sep 2024 03:21 )             27.7     09-10    141  |  100  |  39<H>  ----------------------------<  84  3.8   |  23  |  6.99<H>    Ca    6.8<L>      10 Sep 2024 03:21  Phos  2.2     09-10  Mg     2.30     09-10    TPro  5.6<L>  /  Alb  2.9<L>  /  TBili  0.3  /  DBili  x   /  AST  21  /  ALT  9   /  AlkPhos  1045<H>  09-10          Urinalysis Basic - ( 10 Sep 2024 03:21 )    Color: x / Appearance: x / SG: x / pH: x  Gluc: 84 mg/dL / Ketone: x  / Bili: x / Urobili: x   Blood: x / Protein: x / Nitrite: x   Leuk Esterase: x / RBC: x / WBC x   Sq Epi: x / Non Sq Epi: x / Bacteria: x        RADIOLOGY & ADDITIONAL TESTS:    Imaging Personally Reviewed:    EKG Personally Reviewed:    Consultant(s) Notes Reviewed:      Care Discussed with Consultants/Other Providers:   Chief Complaint:    HPI:  62 y/o female PMH HTN HLD T2DM (on lifestyle modification) morbid obesity (lost ~70 lbs after gastric bypass in 2016) SLE ESRD on HD () since  (left AV fistula) CAD s/p Cardiac stent in 2018 and 2018 mid memory impairment presents to presurgical testing with diagnosis of secondary hyperparathyroidism of renal origin. Pt is scheduled for a parathyroidectomy with parathyroid hormone assay and frozen section, parathyroid autotransplantation.  (27 Aug 2024 14:21)  Patient examined at bedside with . Denies pain, SOB, numbness, tingling. Confirmed medical history with patient as above, patient with history fo lupus, DM, and HTN. No medical concerns at this time     PAST MEDICAL & SURGICAL HISTORY:  Diabetes      Hypertension      Hypercholesteremia      Other hyperlipidemia      Glaucoma      ESRD (end-stage renal disease) due to SLE      Lupus (systemic lupus erythematosus)      Hyperparathyroidism      Memory impairment      Anemia of renal disease      CAD (coronary artery disease)      Stented coronary artery      Cholelithiasis      Rib fracture      S/P  section  times two      H/O gastric bypass  2016      S/P appendectomy      History of appendectomy          Review of Systems:   CONSTITUTIONAL: No fever.  EYES: No eye pain or discharge.  ENMT:  No sinus or throat pain  NECK: No pain or stiffness  RESPIRATORY: No cough, wheezing, chills or hemoptysis; No shortness of breath  CARDIOVASCULAR: No chest pain, palpitations, dizziness, or leg swelling  GASTROINTESTINAL: No abdominal or epigastric pain. No nausea, vomiting, or hematemesis; No diarrhea or constipation. No melena or hematochezia.  GENITOURINARY: No dysuria or incontinence  NEUROLOGICAL: No headaches, memory loss, loss of strength, numbness, or tremors  SKIN: No rashes.  LYMPH NODES: No enlarged glands  ENDOCRINE: No heat or cold intolerance; No hair loss  MUSCULOSKELETAL: No joint pain or swelling; No muscle, back, or extremity pain  PSYCHIATRIC: No depression, anxiety, mood swings, or difficulty sleeping  HEME/LYMPH: No easy bruising, or bleeding gums  ALLERY AND IMMUNOLOGIC: No hives or eczema    Allergies    penicillin (Rash)    Intolerances        Social History:   Denies toxic habits - no ETOH, recreational drug use. or tobacco use   FAMILY HISTORY:  DM (diabetes mellitus)  mother and father    History of hypertension  father and mother    DM (diabetes mellitus) (Sibling)  siblings    History of hypertension (Sibling)  sibling        Home Medications:  Alogliptin 6.25 mg oral tablet: 1 tab(s) orally once a day (04 Sep 2024 07:53)  Auryxia 210 mg oral tablet: 420 milligram(s) orally 3 times a day (with meals) (04 Sep 2024 07:53)  calcium acetate 667 mg oral tablet: 1 tab(s) orally 3 times a day (with meals) (04 Sep 2024 07:53)  cinacalcet 60 mg oral tablet: 1 tab(s) orally once a day (04 Sep 2024 07:53)  Cosopt 2%-0.5% ophthalmic solution: 1 drop(s) in each eye 2 times a day (04 Sep 2024 07:53)  hydroxychloroquine 200 mg oral tablet: 1 tab(s) orally once a day (04 Sep 2024 07:53)  labetalol 100 mg oral tablet: 1 tab(s) orally 2 times a day (04 Sep 2024 07:53)  latanoprost 0.005% ophthalmic emulsion: 1 drop(s) in each affected eye once a day (at bedtime) (04 Sep 2024 07:53)  memantine 10 mg oral tablet: 1 tab(s) orally 2 times a day (04 Sep 2024 07:53)  Praluent Pen 75 mg/mL subcutaneous solution: 75 milligram(s) subcutaneously every 2 weeks (04 Sep 2024 07:53)  valsartan 80 mg oral tablet: 1 tab(s) orally once a day (04 Sep 2024 07:53)  vitamin D 1000IU orally once a day:  (04 Sep 2024 07:53)      MEDICATIONS  (STANDING):  calcitriol   Capsule 1 MICROGram(s) Oral every 12 hours  calcium carbonate   1250 mG (OsCal) 4 Tablet(s) Oral three times a day  chlorhexidine 2% Cloths 1 Application(s) Topical daily  chlorhexidine 2% Cloths 1 Application(s) Topical daily  cholecalciferol 2000 Unit(s) Oral daily  epoetin adele (EPOGEN) Injectable 3000 Unit(s) IV Push <User Schedule>  heparin   Injectable 5000 Unit(s) SubCutaneous every 8 hours  hydroxychloroquine 200 milliGRAM(s) Oral daily  labetalol 100 milliGRAM(s) Oral two times a day  valsartan 80 milliGRAM(s) Oral daily    MEDICATIONS  (PRN):  acetaminophen     Tablet .. 650 milliGRAM(s) Oral every 6 hours PRN Mild Pain (1 - 3)  sodium chloride 0.9% Bolus. 100 milliLiter(s) IV Bolus every 5 minutes PRN SBP LESS THAN or EQUAL to 90 mmHg      CAPILLARY BLOOD GLUCOSE        I&O's Summary    09 Sep 2024 07:01  -  10 Sep 2024 07:00  --------------------------------------------------------  IN: 520 mL / OUT: 0 mL / NET: 520 mL    10 Sep 2024 07:01  -  10 Sep 2024 10:31  --------------------------------------------------------  IN: 400 mL / OUT: 2100 mL / NET: -1700 mL        PHYSICAL EXAM:  Vital Signs Last 24 Hrs  T(C): 36.7 (10 Sep 2024 09:58), Max: 36.7 (10 Sep 2024 06:18)  T(F): 98.1 (10 Sep 2024 09:58), Max: 98.1 (10 Sep 2024 06:18)  HR: 79 (10 Sep 2024 09:58) (63 - 79)  BP: 132/56 (10 Sep 2024 09:58) (121/69 - 157/66)  BP(mean): 84 (09 Sep 2024 20:00) (84 - 85)  RR: 18 (10 Sep 2024 09:58) (15 - 18)  SpO2: 99% (10 Sep 2024 09:58) (99% - 100%)    Parameters below as of 10 Sep 2024 09:58  Patient On (Oxygen Delivery Method): room air      GENERAL: Middle age woman in NAD, well-developed  HEAD:  Atraumatic, Normocephalic  EYES: EOMI, PERRLA, conjunctiva and sclera clear  NECK: Supple, No JVD; bandage on neck, incision C/D/I   CHEST/LUNG: Clear to auscultation bilaterally; No wheeze  HEART: Regular rate and rhythm; No murmurs, rubs, or gallops  ABDOMEN: Soft, Nontender, Nondistended; Bowel sounds present  EXTREMITIES:  2+ Peripheral Pulses, No clubbing, cyanosis, or edema  PSYCH: AAOx3  NEUROLOGY: non-focal  SKIN: No rashes or lesions    LABS:                        9.4    6.25  )-----------( 144      ( 10 Sep 2024 03:21 )             27.7     -10    141  |  100  |  39<H>  ----------------------------<  84  3.8   |  23  |  6.99<H>    Ca    6.8<L>      10 Sep 2024 03:21  Phos  2.2     09-10  Mg     2.30     09-10    TPro  5.6<L>  /  Alb  2.9<L>  /  TBili  0.3  /  DBili  x   /  AST  21  /  ALT  9   /  AlkPhos  1045<H>  09-10          Urinalysis Basic - ( 10 Sep 2024 03:21 )    Color: x / Appearance: x / SG: x / pH: x  Gluc: 84 mg/dL / Ketone: x  / Bili: x / Urobili: x   Blood: x / Protein: x / Nitrite: x   Leuk Esterase: x / RBC: x / WBC x   Sq Epi: x / Non Sq Epi: x / Bacteria: x        RADIOLOGY & ADDITIONAL TESTS:    Imaging Personally Reviewed:    EKG Personally Reviewed:    Consultant(s) Notes Reviewed:      Care Discussed with Consultants/Other Providers:

## 2024-09-10 NOTE — PROGRESS NOTE ADULT - SUBJECTIVE AND OBJECTIVE BOX
Chief Complaint/Follow-up on: HYPOCALCEMIA    Subjective:    corrected calcium 7.7  pt answering questions in  English  pt feels okay   does not report any perioral numbness tingling    MEDICATIONS  (STANDING):  calcitriol   Capsule 1 MICROGram(s) Oral every 12 hours  calcium carbonate   1250 mG (OsCal) 4 Tablet(s) Oral every 6 hours  chlorhexidine 2% Cloths 1 Application(s) Topical daily  chlorhexidine 2% Cloths 1 Application(s) Topical daily  cholecalciferol 2000 Unit(s) Oral daily  epoetin adele (EPOGEN) Injectable 3000 Unit(s) IV Push <User Schedule>  heparin   Injectable 5000 Unit(s) SubCutaneous every 8 hours  hydroxychloroquine 200 milliGRAM(s) Oral daily  labetalol 100 milliGRAM(s) Oral two times a day  valsartan 80 milliGRAM(s) Oral daily    MEDICATIONS  (PRN):  acetaminophen     Tablet .. 650 milliGRAM(s) Oral every 6 hours PRN Mild Pain (1 - 3)  sodium chloride 0.9% Bolus. 100 milliLiter(s) IV Bolus every 5 minutes PRN SBP LESS THAN or EQUAL to 90 mmHg      PHYSICAL EXAM:  VITALS: T(C): 36.2 (09-10-24 @ 12:07)  T(F): 97.2 (09-10-24 @ 12:07), Max: 98.3 (09-10-24 @ 09:30)  HR: 75 (09-10-24 @ 12:07) (64 - 79)  BP: 117/45 (09-10-24 @ 12:07) (117/45 - 157/66)  RR:  (16 - 18)  SpO2:  (99% - 100%)  Wt(kg): --  GENERAL: NAD, well-groomed, well-developed  EYES: No proptosis, no injection  HEENT:  Atraumatic, Normocephalic, moist mucous membranes  RESPIRATORY: Clear to auscultation bilaterally; No rales, rhonchi, wheezing, or rubs  CARDIOVASCULAR: Regular rate and rhythm; No murmurs; no peripheral edema  GI: Soft, nontender, non distended, normal bowel sounds  psych: alert and oriented X3    POCT Blood Glucose.: 98 mg/dL (09-09-24 @ 09:17)  POCT Blood Glucose.: 142 mg/dL (09-08-24 @ 21:22)  POCT Blood Glucose.: 139 mg/dL (09-08-24 @ 16:14)  POCT Blood Glucose.: 141 mg/dL (09-08-24 @ 11:14)  POCT Blood Glucose.: 109 mg/dL (09-08-24 @ 07:41)  POCT Blood Glucose.: 85 mg/dL (09-07-24 @ 22:41)    09-10    141  |  100  |  39<H>  ----------------------------<  84  3.8   |  23  |  6.99<H>    eGFR: 6<L>    Ca    6.8<L>      09-10  Mg     2.30     09-10  Phos  2.2     09-10    TPro  5.6<L>  /  Alb  2.9<L>  /  TBili  0.3  /  DBili  x   /  AST  21  /  ALT  9   /  AlkPhos  1045<H>  09-10          Thyroid Function Tests:  09-05 @ 06:52 TSH 2.35 FreeT4 -- T3 -- Anti TPO -- Anti Thyroglobulin Ab -- TSI --  09-05 @ 01:44 TSH 2.95 FreeT4 -- T3 -- Anti TPO -- Anti Thyroglobulin Ab -- TSI --

## 2024-09-10 NOTE — CONSULT NOTE ADULT - ASSESSMENT
63F with HTN, HLD, T2DM, ESRD from SLE on T/Th/S, and CAD s/p PCI in 2018 here for elective parathyroidectomy and parathyroid autotransplantation. POD course uncomplicated. On telemetry, noted to have 7 beats of NSVT, asymptomatic. Cardiology consulted for NSVT.     1. NSVT-? electrolyte shift s/p parathyroidectomy   2. CAD s/p PCI in 2018     -episode does not appear related to ischemia. recommend to trend troponin and check CKMB/CK   -check TTE while inpt   -increase labetalol to 200 mg BID   -cont valsartan 80 mg QD   -monitor on tele   -K>4 and Mg>2     Thank you for allowing us to participate in the care of your patient. If you have any questions or concerns please do not hesitate to contact me 24/7.     Hany Ramos MD   Interventional Cardiology and General Cardiology Attending, [email or web address]  [employer] Team  Cell: [phone]     Ambulatory Clinic   [address] [place] [address]   For Appointment: [phone]

## 2024-09-11 LAB
ALBUMIN SERPL ELPH-MCNC: 3 G/DL — LOW (ref 3.3–5)
ALBUMIN SERPL ELPH-MCNC: 3 G/DL — LOW (ref 3.3–5)
ALBUMIN SERPL ELPH-MCNC: 3.2 G/DL — LOW (ref 3.3–5)
ALP SERPL-CCNC: 1082 U/L — HIGH (ref 40–120)
ALP SERPL-CCNC: 1099 U/L — HIGH (ref 40–120)
ALP SERPL-CCNC: 1234 U/L — HIGH (ref 40–120)
ALT FLD-CCNC: 13 U/L — SIGNIFICANT CHANGE UP (ref 4–33)
ALT FLD-CCNC: 13 U/L — SIGNIFICANT CHANGE UP (ref 4–33)
ALT FLD-CCNC: 9 U/L — SIGNIFICANT CHANGE UP (ref 4–33)
ANION GAP SERPL CALC-SCNC: 12 MMOL/L — SIGNIFICANT CHANGE UP (ref 7–14)
ANION GAP SERPL CALC-SCNC: 13 MMOL/L — SIGNIFICANT CHANGE UP (ref 7–14)
ANION GAP SERPL CALC-SCNC: 14 MMOL/L — SIGNIFICANT CHANGE UP (ref 7–14)
AST SERPL-CCNC: 18 U/L — SIGNIFICANT CHANGE UP (ref 4–32)
AST SERPL-CCNC: 19 U/L — SIGNIFICANT CHANGE UP (ref 4–32)
AST SERPL-CCNC: 20 U/L — SIGNIFICANT CHANGE UP (ref 4–32)
BILIRUB SERPL-MCNC: 0.4 MG/DL — SIGNIFICANT CHANGE UP (ref 0.2–1.2)
BUN SERPL-MCNC: 28 MG/DL — HIGH (ref 7–23)
BUN SERPL-MCNC: 29 MG/DL — HIGH (ref 7–23)
BUN SERPL-MCNC: 32 MG/DL — HIGH (ref 7–23)
CA-I BLD-SCNC: 1.09 MMOL/L — LOW (ref 1.15–1.29)
CALCIUM SERPL-MCNC: 8.3 MG/DL — LOW (ref 8.4–10.5)
CALCIUM SERPL-MCNC: 8.4 MG/DL — SIGNIFICANT CHANGE UP (ref 8.4–10.5)
CALCIUM SERPL-MCNC: 8.6 MG/DL — SIGNIFICANT CHANGE UP (ref 8.4–10.5)
CHLORIDE SERPL-SCNC: 100 MMOL/L — SIGNIFICANT CHANGE UP (ref 98–107)
CHLORIDE SERPL-SCNC: 100 MMOL/L — SIGNIFICANT CHANGE UP (ref 98–107)
CHLORIDE SERPL-SCNC: 101 MMOL/L — SIGNIFICANT CHANGE UP (ref 98–107)
CK MB BLD-MCNC: 2 % — SIGNIFICANT CHANGE UP (ref 0–2.5)
CK MB CFR SERPL CALC: 1.1 NG/ML — SIGNIFICANT CHANGE UP
CK SERPL-CCNC: 56 U/L — SIGNIFICANT CHANGE UP (ref 25–170)
CO2 SERPL-SCNC: 25 MMOL/L — SIGNIFICANT CHANGE UP (ref 22–31)
CO2 SERPL-SCNC: 26 MMOL/L — SIGNIFICANT CHANGE UP (ref 22–31)
CO2 SERPL-SCNC: 28 MMOL/L — SIGNIFICANT CHANGE UP (ref 22–31)
CREAT SERPL-MCNC: 4.49 MG/DL — HIGH (ref 0.5–1.3)
CREAT SERPL-MCNC: 4.83 MG/DL — HIGH (ref 0.5–1.3)
CREAT SERPL-MCNC: 6.04 MG/DL — HIGH (ref 0.5–1.3)
EGFR: 10 ML/MIN/1.73M2 — LOW
EGFR: 10 ML/MIN/1.73M2 — LOW
EGFR: 7 ML/MIN/1.73M2 — LOW
GLUCOSE SERPL-MCNC: 100 MG/DL — HIGH (ref 70–99)
GLUCOSE SERPL-MCNC: 158 MG/DL — HIGH (ref 70–99)
GLUCOSE SERPL-MCNC: 84 MG/DL — SIGNIFICANT CHANGE UP (ref 70–99)
MAGNESIUM SERPL-MCNC: 2.2 MG/DL — SIGNIFICANT CHANGE UP (ref 1.6–2.6)
PHOSPHATE SERPL-MCNC: 1.9 MG/DL — LOW (ref 2.5–4.5)
POTASSIUM SERPL-MCNC: 3.9 MMOL/L — SIGNIFICANT CHANGE UP (ref 3.5–5.3)
POTASSIUM SERPL-MCNC: 4 MMOL/L — SIGNIFICANT CHANGE UP (ref 3.5–5.3)
POTASSIUM SERPL-MCNC: 4.4 MMOL/L — SIGNIFICANT CHANGE UP (ref 3.5–5.3)
POTASSIUM SERPL-SCNC: 3.9 MMOL/L — SIGNIFICANT CHANGE UP (ref 3.5–5.3)
POTASSIUM SERPL-SCNC: 4 MMOL/L — SIGNIFICANT CHANGE UP (ref 3.5–5.3)
POTASSIUM SERPL-SCNC: 4.4 MMOL/L — SIGNIFICANT CHANGE UP (ref 3.5–5.3)
PROT SERPL-MCNC: 6 G/DL — SIGNIFICANT CHANGE UP (ref 6–8.3)
PROT SERPL-MCNC: 6 G/DL — SIGNIFICANT CHANGE UP (ref 6–8.3)
PROT SERPL-MCNC: 6.4 G/DL — SIGNIFICANT CHANGE UP (ref 6–8.3)
SODIUM SERPL-SCNC: 139 MMOL/L — SIGNIFICANT CHANGE UP (ref 135–145)
SODIUM SERPL-SCNC: 140 MMOL/L — SIGNIFICANT CHANGE UP (ref 135–145)
SODIUM SERPL-SCNC: 140 MMOL/L — SIGNIFICANT CHANGE UP (ref 135–145)
TROPONIN T, HIGH SENSITIVITY RESULT: 100 NG/L — CRITICAL HIGH
TROPONIN T, HIGH SENSITIVITY RESULT: 113 NG/L — CRITICAL HIGH
TROPONIN T, HIGH SENSITIVITY RESULT: 113 NG/L — CRITICAL HIGH

## 2024-09-11 PROCEDURE — 99232 SBSQ HOSP IP/OBS MODERATE 35: CPT

## 2024-09-11 PROCEDURE — 99233 SBSQ HOSP IP/OBS HIGH 50: CPT

## 2024-09-11 RX ORDER — HEPARIN SODIUM,BOVINE 1000/ML
5000 VIAL (ML) INJECTION EVERY 8 HOURS
Refills: 0 | Status: DISCONTINUED | OUTPATIENT
Start: 2024-09-11 | End: 2024-09-13

## 2024-09-11 RX ORDER — HYDROXYCHLOROQUINE SULFATE 200 MG/1
200 TABLET, FILM COATED ORAL DAILY
Refills: 0 | Status: DISCONTINUED | OUTPATIENT
Start: 2024-09-11 | End: 2024-09-13

## 2024-09-11 RX ADMIN — HYDROXYCHLOROQUINE SULFATE 200 MILLIGRAM(S): 200 TABLET, FILM COATED ORAL at 12:08

## 2024-09-11 RX ADMIN — CALCITRIOL 1 MICROGRAM(S): 0.25 CAPSULE ORAL at 18:19

## 2024-09-11 RX ADMIN — Medication 5000 UNIT(S): at 21:30

## 2024-09-11 RX ADMIN — Medication 4 TABLET(S): at 18:19

## 2024-09-11 RX ADMIN — Medication 100 MILLIGRAM(S): at 05:36

## 2024-09-11 RX ADMIN — Medication 5000 UNIT(S): at 13:15

## 2024-09-11 RX ADMIN — Medication 5000 UNIT(S): at 00:31

## 2024-09-11 RX ADMIN — Medication 2000 UNIT(S): at 12:08

## 2024-09-11 RX ADMIN — Medication 4 TABLET(S): at 00:30

## 2024-09-11 RX ADMIN — Medication 4 TABLET(S): at 12:08

## 2024-09-11 RX ADMIN — Medication 200 MILLIGRAM(S): at 18:19

## 2024-09-11 RX ADMIN — CALCITRIOL 1 MICROGRAM(S): 0.25 CAPSULE ORAL at 05:36

## 2024-09-11 RX ADMIN — Medication 4 TABLET(S): at 23:37

## 2024-09-11 RX ADMIN — Medication 5000 UNIT(S): at 05:36

## 2024-09-11 RX ADMIN — VALSARTAN 80 MILLIGRAM(S): 40 TABLET ORAL at 12:08

## 2024-09-11 RX ADMIN — Medication 4 TABLET(S): at 05:35

## 2024-09-11 NOTE — PHYSICAL THERAPY INITIAL EVALUATION ADULT - ADDITIONAL COMMENTS
Pt lives with her children in an apartment with elevator access. Pt uses a rolling walker and was independent with ADLs. Pt reports no falls in the past 6 months.  Pt left sitting in bedside chair in NAD, all lines intact, call srinivasan in reach and CORNELIA Owen made aware.

## 2024-09-11 NOTE — PROGRESS NOTE ADULT - ASSESSMENT
63 year old Female with history of ESRD on HD presents for parathyroidectomy. Nephrology consulted for ESRD status.    1) ESRD: Last HD on 9/10 with mild intradialytic hypotension for which UF goal reduced to 1.7L. Plan for next maintenance HD on 9/12. Monitor electrolytes.    2) HTN with ESRD: BP controlled. Continue with current medications. Monitor BP.     3) Anemia of renal disease: Hb low with adequate iron stores. Continue with Epo 3K with HD. Monitor Hb.    4) Secondary HPT of renal origin: S/P parathyroidectomy. Corrected calcium acceptable with low ionized calcium? Continue with calcitriol 1 mcg twice daily and calcium carbonate 4 tabs Q6 hours. Can increase calcitriol as needed as per Endo. Continue with cholecalciferol 2000 IU daily and will use high calcium bath with HD. Monitor serum calcium and phosphorus.      [facility] NEPHROLOGY  Timmy Acharya M.D.  Amilcar Contreras D.O.  Mindi Geller M.D.  MD Luna Israel, MSN, ANP-C    Telephone: [phone]  Facsimile: [phone] [place] [address], [address]  [place] [address]

## 2024-09-11 NOTE — PHYSICAL THERAPY INITIAL EVALUATION ADULT - GENERAL OBSERVATIONS, REHAB EVAL
Chart reviewed and cleared for PT by CORNELIA Owen. Pt received sitting in bedside chair in NAD, all lines intact + telemetry, HR 78.

## 2024-09-11 NOTE — PHYSICAL THERAPY INITIAL EVALUATION ADULT - PERTINENT HX OF CURRENT PROBLEM, REHAB EVAL
Pt is a 63 year old Female s/p 3.5 gland parathyroidectomy, left superior parathyroid gland implanted into right forearm.

## 2024-09-11 NOTE — PROGRESS NOTE ADULT - ASSESSMENT
63F PMH HTN, HLD, T2DM (on lifestyle modification), morbid obesity s/p gastric bypass 2016, SLE c/b ESRD on HD (T/Th/S) since 2017 (left AV fistula), secondary hyperparathyroidism of renal origin, CAD s/p cardiac stent in 1/2018 and 6/2018 mid memory impairment presents for parathyroidectomy. Pt is now s/p parathyroidectomy with parathyroid autotransplantation 9/4. Endocrine consulted for further management.          #Secondary Hyperparathyroidism s/p parathyroidectomy  #Hungry bones syndrome and hypocalcemia   - OP labs:   -- 2/13/24 corrected Ca 9.1, iPTH 2000  -- 3/12/2024 corrected Ca 8.9, iPTH 3053  -- 3/26/24 corrected Ca 9.7  -- 4/2024 corrected Ca 9.3, iPTH 2380, vitamin D 25OH 20.3, vitamin D 1,25OH 6.5   - ENT in-office US showing enlarged R superior parathyroid  - Intraoperative iCa 1.06 (low), Ca 8.6 (corrected 9.9)  - Currently asx  - Post op PTH 14, vitamin D 24.2  - 9/7/24 corrected calcium 7.8, ionzied calcium is 0.73 very low. s/p calcium gluconate IV this morning.  - 9/7/24 after high-calcium bath HD, corrected ca was 8.8 and ionized calcium 1.00  - 9/8/24 corrected calcium is 7.9, ionized calcium is 0.79  9/9 corrected calcium 7.8  9/10 corrected calcium 7.7  9/11->9/4 (please note this was with TID dosing of calcium carbonate and not the QID dosing as was ordered for 9/10, appears missed a dose when she was at HD as per d/w nurse)      Plan:  - continue calcitriol 1mcg BID  - calcium carbonate 1250mg was increased to 4 tabs to QID today see trend tomorrow am (please note she she did not get QID dosing yesterday and recieved TID dosing)  -monitor calcium and correct for albumin this evening  -if corrected calcium is below 8 tonight than please give additional dose of calcitriol 0.25mcg tonight (phos is low end, would be okay to increase calcitriol, this is OKAY with renal, d/w Dr. Contreras 9/10)  - Trend BID CMP, phos, Mg  - If corrected Ca <7.5 or patient is symptomatic, give calcium gluconate IVP  - PTH low, aim calcium low normal 8-8.5   - Aim normal Mg and high normal PO4  - Advised pt to notify RN if having hypocalcemic s/s including perioral/extremity tingling/numbness  - telemetry whilst calcium remains low      #Hypothyroidism Hx  - Not on LT4, per son had been on 25 mcg but TFTs found to be normal so stopped >1 year ago  - Repeat TSH 2.35, normal    #T2DM, diet controlled  - Last a1c 5.6% 8/2024, diet controlled per the pt, not on medicine   monitor glucose on BMP    #HTN  - Goal BP <130/80  - Management per primary team        Claudia Jones MD  Attending Physician   Department of Endocrinology, Diabetes and Metabolism     weekdays: 9am to 5pm: email [facility] or [facility] and or TEAMS     Nights and weekends: [phone]  Please note that this patient may be followed by a different provider tomorrow.   If no answer or after hours, please contact [phone].  For final dc reccomendations, please call [phone] or page the endocrine fellow on call.

## 2024-09-11 NOTE — PROGRESS NOTE ADULT - SUBJECTIVE AND OBJECTIVE BOX
Interval: patient s/p 3.5 gland parathyroidectomy with reimplant in R arm . received 2g IV ca  at 9am. Pt did not require IV Ca yesterday. Pt had episode of v tach yesterday that self resolved. Pt was asymptomatic. 2g IV Ca and 2g IV Mg last night per Cardiology.      PAST MEDICAL & SURGICAL HISTORY:  Diabetes      Hypertension      Hypercholesteremia      Other hyperlipidemia      Glaucoma      ESRD (end-stage renal disease) due to SLE      Lupus (systemic lupus erythematosus)      Hyperparathyroidism      Memory impairment      Anemia of renal disease      CAD (coronary artery disease)      Stented coronary artery      Cholelithiasis      Rib fracture      S/P  section  times two      H/O gastric bypass  2016      S/P appendectomy      History of appendectomy        Allergies    penicillin (Rash)    Intolerances    MEDICATIONS  (STANDING):  acetaminophen     Tablet .. 975 milliGRAM(s) Oral every 6 hours  calcitriol   Capsule 1 MICROGram(s) Oral every 12 hours  calcium carbonate   1250 mG (OsCal) 2 Tablet(s) Oral three times a day  chlorhexidine 2% Cloths 1 Application(s) Topical daily  chlorhexidine 2% Cloths 1 Application(s) Topical daily  cholecalciferol 2000 Unit(s) Oral daily  epoetin adele (EPOGEN) Injectable 3000 Unit(s) IV Push <User Schedule>  heparin   Injectable 5000 Unit(s) SubCutaneous every 8 hours  hydroxychloroquine 200 milliGRAM(s) Oral daily  insulin lispro (ADMELOG) corrective regimen sliding scale   SubCutaneous at bedtime  insulin lispro (ADMELOG) corrective regimen sliding scale   SubCutaneous three times a day before meals  labetalol 100 milliGRAM(s) Oral two times a day  valsartan 80 milliGRAM(s) Oral daily    MEDICATIONS  (PRN):  sodium chloride 0.9% Bolus. 100 milliLiter(s) IV Bolus every 5 minutes PRN SBP LESS THAN or EQUAL to 90 mmHg    Vital Signs Last 24 Hrs  T(C): 36.9 (11 Sep 2024 05:24), Max: 36.9 (10 Sep 2024 17:10)  T(F): 98.5 (11 Sep 2024 05:24), Max: 98.5 (11 Sep 2024 05:24)  HR: 75 (11 Sep 2024 05:24) (73 - 79)  BP: 134/57 (11 Sep 2024 05:24) (117/45 - 163/64)  BP(mean): --  RR: 18 (11 Sep 2024 05:24) (16 - 18)  SpO2: 100% (11 Sep 2024 05:24) (99% - 100%)    Parameters below as of 11 Sep 2024 05:24  Patient On (Oxygen Delivery Method): room air                O2 Parameters below as of 08 Sep 2024 00:00  Patient On (Oxygen Delivery Method): room air    Physical Exam:  Alert, NAD  Nonlabored Respirations RA  ANGEL  neck: incision c/d/i       I&O's Summary    06 Sep 2024 07:01  -  07 Sep 2024 07:00  --------------------------------------------------------  IN: 1000 mL / OUT: 0 mL / NET: 1000 mL    07 Sep 2024 07:01  -  08 Sep 2024 05:32  --------------------------------------------------------  IN: 1080 mL / OUT: 2300 mL / NET: -1220 mL                     LABS:                        9.4    6.25  )-----------( 144      ( 10 Sep 2024 03:21 )             27.7     09-11    140  |  100  |  28<H>  ----------------------------<  100<H>  3.9   |  28  |  4.49<H>    Ca    8.3<L>      11 Sep 2024 01:01  Phos  2.0     09-10  Mg     2.20     09-10    TPro  6.0  /  Alb  3.0<L>  /  TBili  0.4  /  DBili  x   /  AST  18  /  ALT  13  /  AlkPhos  1082<H>  09-11                 A/P: 63yFemale s/p 3.5 gland parathyroidectomy    -cards to see today  -trend calciums  -repletions per endo/SICU   -HD per nephro      - SCDs  - d/w attending

## 2024-09-11 NOTE — PROGRESS NOTE ADULT - ASSESSMENT
63F with HTN, HLD, T2DM, ESRD from SLE on T/Th/S, and CAD s/p PCI in 2018 here for elective parathyroidectomy and parathyroid autotransplantation. POD course uncomplicated. On telemetry, noted to have 7 beats of NSVT, asymptomatic. Cardiology consulted for NSVT.     1. NSVT-? electrolyte shift s/p parathyroidectomy   2. CAD s/p PCI in 2018     -troponin flat, neg CKMB and CK. would not trend it further   -episode does not appear related to ischemia.  -cont labetalol to 200 mg BID   -cont valsartan 80 mg QD   -monitor on tele   -K>4 and Mg>2     Thank you for allowing us to participate in the care of your patient. If you have any questions or concerns please do not hesitate to contact me 24/7.

## 2024-09-11 NOTE — PROGRESS NOTE ADULT - SUBJECTIVE AND OBJECTIVE BOX
Patient seen and examined at bedside.    Overnight Events:     tele reviewed   no further NSVTs   no cp   trop 100s     REVIEW OF SYSTEMS:  Constitutional:     [x ] negative [ ] fevers [ ] chills [ ] weight loss [ ] weight gain  HEENT:                  [x ] negative [ ] dry eyes [ ] eye irritation [ ] postnasal drip [ ] nasal congestion  CV:                         [ x] negative  [ ] chest pain [ ] orthopnea [ ] palpitations [ ] murmur  Resp:                     [ x] negative [ ] cough [ ] shortness of breath [ ] dyspnea [ ] wheezing [ ] sputum [ ]hemoptysis  GI:                          [ x] negative [ ] nausea [ ] vomiting [ ] diarrhea [ ] constipation [ ] abd pain [ ] dysphagia   :                        [ x] negative [ ] dysuria [ ] nocturia [ ] hematuria [ ] increased urinary frequency  Musculoskeletal: [ x] negative [ ] back pain [ ] myalgias [ ] arthralgias [ ] fracture  Skin:                       [ x] negative [ ] rash [ ] itch  Neurological:        [x ] negative [ ] headache [ ] dizziness [ ] syncope [ ] weakness [ ] numbness  Psychiatric:           [ x] negative [ ] anxiety [ ] depression  Endocrine:            [ x] negative [ ] diabetes [ ] thyroid problem  Heme/Lymph:      [ x] negative [ ] anemia [ ] bleeding problem  Allergic/Immune: [ x] negative [ ] itchy eyes [ ] nasal discharge [ ] hives [ ] angioedema    [ x] All other systems negative  [ ] Unable to assess ROS due to    Current Meds:  acetaminophen     Tablet .. 650 milliGRAM(s) Oral every 6 hours PRN  calcitriol   Capsule 1 MICROGram(s) Oral every 12 hours  calcium carbonate   1250 mG (OsCal) 4 Tablet(s) Oral every 6 hours  cholecalciferol 2000 Unit(s) Oral daily  epoetin adele (EPOGEN) Injectable 3000 Unit(s) IV Push <User Schedule>  heparin   Injectable 5000 Unit(s) SubCutaneous every 8 hours  hydroxychloroquine 200 milliGRAM(s) Oral daily  labetalol 200 milliGRAM(s) Oral two times a day  sodium chloride 0.9% Bolus. 100 milliLiter(s) IV Bolus every 5 minutes PRN  valsartan 80 milliGRAM(s) Oral daily      PAST MEDICAL & SURGICAL HISTORY:  Diabetes      Hypertension      Hypercholesteremia      Other hyperlipidemia      Glaucoma      ESRD (end-stage renal disease) due to SLE      Lupus (systemic lupus erythematosus)      Hyperparathyroidism      Memory impairment      Anemia of renal disease      CAD (coronary artery disease)      Stented coronary artery      Cholelithiasis      Rib fracture      S/P  section  times two      H/O gastric bypass  2016      S/P appendectomy      History of appendectomy          Vitals:  T(F): 98.1 (), Max: 98.5 ()  HR: 72 () (71 - 78)  BP: 143/56 () (133/55 - 163/64)  RR: 18 ()  SpO2: 99% ()  I&O's Summary    10 Sep 2024 07:  -  11 Sep 2024 07:00  --------------------------------------------------------  IN: 1162 mL / OUT: 2100 mL / NET: -938 mL    11 Sep 2024 07:  -  11 Sep 2024 14:41  --------------------------------------------------------  IN: 118 mL / OUT: 0 mL / NET: 118 mL        Physical Exam:  Appearance: No acute distress  HENT: No JVD, neck incsion c/d/i   Cardiovascular: RRR, S1/S2, no murmurs  Respiratory: CTABL  Gastrointestinal: soft, NT ND, +BS  Musculoskeletal: No clubbing, no edema   Neurologic: Non-focal  Skin: No rashes, ecchymoses, or cyanosis                          9.4    6.25  )-----------( 144      ( 10 Sep 2024 03:21 )             27.7         140  |  101  |  29<H>  ----------------------------<  84  4.0   |  25  |  4.83<H>    Ca    8.6      11 Sep 2024 05:12  Phos  1.9       Mg     2.20         TPro  6.0  /  Alb  3.0<L>  /  TBili  0.4  /  DBili  x   /  AST  20  /  ALT  13  /  AlkPhos  1099<H>  09-11      CARDIAC MARKERS ( 11 Sep 2024 05:12 )  x     / x     / x     / x     / 1.1 ng/mL  CARDIAC MARKERS ( 10 Sep 2024 18:40 )  x     / x     / x     / x     / 1.6 ng/mL              Cardiovascular Testings:       Interpretation of Telemetry: SR no more NSVTs

## 2024-09-11 NOTE — PROGRESS NOTE ADULT - SUBJECTIVE AND OBJECTIVE BOX
Chief Complaint/Follow-up on:   Hypocalcemia      Subjective:  language line   Eve   [id number]    pt feels well corrected calcium now improved   we spoke in detail regarding low PTH levels, calcium management and need for monitoring       MEDICATIONS  (STANDING):  calcitriol   Capsule 1 MICROGram(s) Oral every 12 hours  calcium carbonate   1250 mG (OsCal) 4 Tablet(s) Oral every 6 hours  cholecalciferol 2000 Unit(s) Oral daily  epoetin adele (EPOGEN) Injectable 3000 Unit(s) IV Push <User Schedule>  heparin   Injectable 5000 Unit(s) SubCutaneous every 8 hours  hydroxychloroquine 200 milliGRAM(s) Oral daily  labetalol 200 milliGRAM(s) Oral two times a day  valsartan 80 milliGRAM(s) Oral daily    MEDICATIONS  (PRN):  acetaminophen     Tablet .. 650 milliGRAM(s) Oral every 6 hours PRN Mild Pain (1 - 3)  sodium chloride 0.9% Bolus. 100 milliLiter(s) IV Bolus every 5 minutes PRN SBP LESS THAN or EQUAL to 90 mmHg      PHYSICAL EXAM:  VITALS: T(C): 36.7 (09-11-24 @ 11:43)  T(F): 98.1 (09-11-24 @ 11:43), Max: 98.5 (09-11-24 @ 05:24)  HR: 72 (09-11-24 @ 11:43) (71 - 78)  BP: 143/56 (09-11-24 @ 11:43) (133/55 - 163/64)  RR:  (18 - 18)  SpO2:  (99% - 100%)  Wt(kg): --  GENERAL: NAD, well-groomed, well-developed  EYES: No proptosis, no injection  HEENT:  Atraumatic, Normocephalic, moist mucous membranes  RESPIRATORY: Clear to auscultation bilaterally; No rales, rhonchi, wheezing, or rubs  CARDIOVASCULAR: Regular rate and rhythm; No murmurs; no peripheral edema  GI: Soft, nontender, non distended, normal bowel sounds  psych: alert and oriented X3    POCT Blood Glucose.: 98 mg/dL (09-09-24 @ 09:17)  POCT Blood Glucose.: 142 mg/dL (09-08-24 @ 21:22)    09-11    140  |  101  |  29<H>  ----------------------------<  84  4.0   |  25  |  4.83<H>    eGFR: 10<L>    Ca    8.6      09-11  Mg     2.20     09-11  Phos  1.9     09-11    TPro  6.0  /  Alb  3.0<L>  /  TBili  0.4  /  DBili  x   /  AST  20  /  ALT  13  /  AlkPhos  1099<H>  09-11          Thyroid Function Tests:  09-05 @ 06:52 TSH 2.35 FreeT4 -- T3 -- Anti TPO -- Anti Thyroglobulin Ab -- TSI --  09-05 @ 01:44 TSH 2.95 FreeT4 -- T3 -- Anti TPO -- Anti Thyroglobulin Ab -- TSI --

## 2024-09-11 NOTE — PROGRESS NOTE ADULT - ASSESSMENT
63F PMHx HTN, HLD, T2DM, morbid obesity (lost ~70 lbs after gastric bypass in 2016), SLE, ESRD on HD (T/Th/S) since 2017 (left AV fistula), CAD s/p Cardiac stent in 1/2018 and 6/2018, and mid memory impairment now s/p 4-gland total parathyroidectomy with reimplantation of 1x gland in R forearm on 9/4. Patient had SICU consulted for electrolyte monitoring and c/f hungry bone syndrome post op. Now transferred to general medicine floor. medicine consulted for co-management     #Hypothyroidism Hx  - Endocrine following   - Not on LT4, per son had been on 25 mcg but TFTs found to be normal so stopped >1 year ago  - Repeat TSH 2.35, normal    #T2DM, diet controlled  - Last a1c 5.6% 8/2024, though unreliable i/s/o ESRD on HD  -  FS tidac and qhs and low correctional Admelog at meals   - endocrine following, : if FSBG stable for 48 hrs without need of insulin can dc FSBG and insulin     #s/p  4-gland total parathyroidectomy with reimplantation of 1x gland in R forearm on 9/4  - now with hypocalcemia, endocrine and nephrology following     #ESRD (T/Th/S) since 2017 (left AV fistula),  - nephrology following       #HTN  - Goal BP <130/80  - patient on  labetolol and valsartan at home   - Labetolol increased to 200mg BID given NSVT per cardiology   - Blood pressure at goal    #SLE - continue Plaquenil   #NSVT  - - Labetolol increased to 200mg BID given NSVT per cardiology   - Cardiology following, recommends TTE

## 2024-09-11 NOTE — PROGRESS NOTE ADULT - SUBJECTIVE AND OBJECTIVE BOX
[facility] NEPHROLOGY- PROGRESS NOTE    63 year old Female with history of ESRD on HD presents for parathyroidectomy. Nephrology consulted for ESRD status.    REVIEW OF SYSTEMS:  Gen: no fevers, numbness or weakness  Cards: no chest pain  Resp: no dyspnea  GI: no nausea or vomiting or diarrhea  Vascular: no LE edema    penicillin (Rash)      Hospital Medications: Medications reviewed      VITALS:  T(F): 98.1 (09-11-24 @ 11:43), Max: 98.5 (09-11-24 @ 05:24)  HR: 72 (09-11-24 @ 11:43)  BP: 143/56 (09-11-24 @ 11:43)  RR: 18 (09-11-24 @ 11:43)  SpO2: 99% (09-11-24 @ 11:43)  Wt(kg): --    09-10 @ 07:01  -  09-11 @ 07:00  --------------------------------------------------------  IN: 1162 mL / OUT: 2100 mL / NET: -938 mL    09-11 @ 07:01  -  09-11 @ 12:40  --------------------------------------------------------  IN: 118 mL / OUT: 0 mL / NET: 118 mL        PHYSICAL EXAM:    Gen: NAD, calm  Cards: RRR, +S1/S2, no M/G/R  Resp: CTA B/L  GI: soft, NT/ND, NABS  Vascular: no LE edema B/L, LUE AVF + bruit/thrill        LABS:  09-11    140  |  101  |  29<H>  ----------------------------<  84  4.0   |  25  |  4.83<H>    Ca    8.6      11 Sep 2024 05:12  Phos  1.9     09-11  Mg     2.20     09-11    TPro  6.0  /  Alb  3.0<L>  /  TBili  0.4  /  DBili      /  AST  20  /  ALT  13  /  AlkPhos  1099<H>  09-11    Creatinine Trend: 4.83 <--, 4.49 <--, 4.03 <--, 6.99 <--, 6.70 <--, 5.83 <--, 5.40 <--, 4.91 <--, 4.31 <--, 3.49 <--, 3.06 <--, 5.90 <--, 5.26 <--, 4.03 <--, 3.64 <--, 7.13 <--, 6.77 <--, 6.37 <--, 6.11 <--, 5.54 <--                        9.4    6.25  )-----------( 144      ( 10 Sep 2024 03:21 )             27.7     Urine Studies:  Urinalysis Basic - ( 11 Sep 2024 05:12 )    Color:  / Appearance:  / SG:  / pH:   Gluc: 84 mg/dL / Ketone:   / Bili:  / Urobili:    Blood:  / Protein:  / Nitrite:    Leuk Esterase:  / RBC:  / WBC    Sq Epi:  / Non Sq Epi:  / Bacteria:

## 2024-09-12 LAB
ALBUMIN SERPL ELPH-MCNC: 2.9 G/DL — LOW (ref 3.3–5)
ALBUMIN SERPL ELPH-MCNC: 3.3 G/DL — SIGNIFICANT CHANGE UP (ref 3.3–5)
ALP SERPL-CCNC: 1134 U/L — HIGH (ref 40–120)
ALP SERPL-CCNC: 1412 U/L — HIGH (ref 40–120)
ALT FLD-CCNC: 10 U/L — SIGNIFICANT CHANGE UP (ref 4–33)
ALT FLD-CCNC: 15 U/L — SIGNIFICANT CHANGE UP (ref 4–33)
ANION GAP SERPL CALC-SCNC: 15 MMOL/L — HIGH (ref 7–14)
ANION GAP SERPL CALC-SCNC: 15 MMOL/L — HIGH (ref 7–14)
AST SERPL-CCNC: 20 U/L — SIGNIFICANT CHANGE UP (ref 4–32)
AST SERPL-CCNC: 34 U/L — HIGH (ref 4–32)
BILIRUB SERPL-MCNC: 0.4 MG/DL — SIGNIFICANT CHANGE UP (ref 0.2–1.2)
BILIRUB SERPL-MCNC: 0.4 MG/DL — SIGNIFICANT CHANGE UP (ref 0.2–1.2)
BUN SERPL-MCNC: 15 MG/DL — SIGNIFICANT CHANGE UP (ref 7–23)
BUN SERPL-MCNC: 38 MG/DL — HIGH (ref 7–23)
CALCIUM SERPL-MCNC: 8.7 MG/DL — SIGNIFICANT CHANGE UP (ref 8.4–10.5)
CALCIUM SERPL-MCNC: 8.7 MG/DL — SIGNIFICANT CHANGE UP (ref 8.4–10.5)
CHLORIDE SERPL-SCNC: 100 MMOL/L — SIGNIFICANT CHANGE UP (ref 98–107)
CHLORIDE SERPL-SCNC: 99 MMOL/L — SIGNIFICANT CHANGE UP (ref 98–107)
CO2 SERPL-SCNC: 25 MMOL/L — SIGNIFICANT CHANGE UP (ref 22–31)
CO2 SERPL-SCNC: 26 MMOL/L — SIGNIFICANT CHANGE UP (ref 22–31)
CREAT SERPL-MCNC: 3.73 MG/DL — HIGH (ref 0.5–1.3)
CREAT SERPL-MCNC: 6.64 MG/DL — HIGH (ref 0.5–1.3)
EGFR: 13 ML/MIN/1.73M2 — LOW
EGFR: 7 ML/MIN/1.73M2 — LOW
GLUCOSE SERPL-MCNC: 124 MG/DL — HIGH (ref 70–99)
GLUCOSE SERPL-MCNC: 130 MG/DL — HIGH (ref 70–99)
HCT VFR BLD CALC: 26.7 % — LOW (ref 34.5–45)
HGB BLD-MCNC: 9.1 G/DL — LOW (ref 11.5–15.5)
MAGNESIUM SERPL-MCNC: 2.2 MG/DL — SIGNIFICANT CHANGE UP (ref 1.6–2.6)
MAGNESIUM SERPL-MCNC: 2.5 MG/DL — SIGNIFICANT CHANGE UP (ref 1.6–2.6)
MCHC RBC-ENTMCNC: 32.2 PG — SIGNIFICANT CHANGE UP (ref 27–34)
MCHC RBC-ENTMCNC: 34.1 GM/DL — SIGNIFICANT CHANGE UP (ref 32–36)
MCV RBC AUTO: 94.3 FL — SIGNIFICANT CHANGE UP (ref 80–100)
NRBC # BLD: 0 /100 WBCS — SIGNIFICANT CHANGE UP (ref 0–0)
NRBC # FLD: 0 K/UL — SIGNIFICANT CHANGE UP (ref 0–0)
PHOSPHATE SERPL-MCNC: 1.7 MG/DL — LOW (ref 2.5–4.5)
PHOSPHATE SERPL-MCNC: 2.6 MG/DL — SIGNIFICANT CHANGE UP (ref 2.5–4.5)
PLATELET # BLD AUTO: 123 K/UL — LOW (ref 150–400)
POTASSIUM SERPL-MCNC: 3.9 MMOL/L — SIGNIFICANT CHANGE UP (ref 3.5–5.3)
POTASSIUM SERPL-MCNC: 4.5 MMOL/L — SIGNIFICANT CHANGE UP (ref 3.5–5.3)
POTASSIUM SERPL-SCNC: 3.9 MMOL/L — SIGNIFICANT CHANGE UP (ref 3.5–5.3)
POTASSIUM SERPL-SCNC: 4.5 MMOL/L — SIGNIFICANT CHANGE UP (ref 3.5–5.3)
PROT SERPL-MCNC: 6 G/DL — SIGNIFICANT CHANGE UP (ref 6–8.3)
PROT SERPL-MCNC: 6.7 G/DL — SIGNIFICANT CHANGE UP (ref 6–8.3)
RBC # BLD: 2.83 M/UL — LOW (ref 3.8–5.2)
RBC # FLD: 17.1 % — HIGH (ref 10.3–14.5)
SODIUM SERPL-SCNC: 140 MMOL/L — SIGNIFICANT CHANGE UP (ref 135–145)
SODIUM SERPL-SCNC: 140 MMOL/L — SIGNIFICANT CHANGE UP (ref 135–145)
WBC # BLD: 5.54 K/UL — SIGNIFICANT CHANGE UP (ref 3.8–10.5)
WBC # FLD AUTO: 5.54 K/UL — SIGNIFICANT CHANGE UP (ref 3.8–10.5)

## 2024-09-12 PROCEDURE — 99233 SBSQ HOSP IP/OBS HIGH 50: CPT

## 2024-09-12 PROCEDURE — 99232 SBSQ HOSP IP/OBS MODERATE 35: CPT

## 2024-09-12 RX ORDER — CALCIUM CARBONATE 500(1250)
4 TABLET ORAL THREE TIMES A DAY
Refills: 0 | Status: DISCONTINUED | OUTPATIENT
Start: 2024-09-12 | End: 2024-09-12

## 2024-09-12 RX ORDER — CALCIUM CARBONATE 500(1250)
4 TABLET ORAL THREE TIMES A DAY
Refills: 0 | Status: DISCONTINUED | OUTPATIENT
Start: 2024-09-12 | End: 2024-09-13

## 2024-09-12 RX ORDER — SODIUM PHOSPHATE, MONOBASIC, MONOHYDRATE AND SODIUM PHOSPHATE, DIBASIC ANHYDROUS 276; 142 MG/ML; MG/ML
30 INJECTION, SOLUTION INTRAVENOUS ONCE
Refills: 0 | Status: COMPLETED | OUTPATIENT
Start: 2024-09-12 | End: 2024-09-12

## 2024-09-12 RX ORDER — SODIUM PHOSPHATE, DIBASIC, ANHYDROUS, POTASSIUM PHOSPHATE, MONOBASIC, AND SODIUM PHOSPHATE, MONOBASIC, MONOHYDRATE 852; 155; 130 MG/1; MG/1; MG/1
1 TABLET, COATED ORAL EVERY 6 HOURS
Refills: 0 | Status: DISCONTINUED | OUTPATIENT
Start: 2024-09-12 | End: 2024-09-12

## 2024-09-12 RX ADMIN — Medication 200 MILLIGRAM(S): at 17:41

## 2024-09-12 RX ADMIN — HYDROXYCHLOROQUINE SULFATE 200 MILLIGRAM(S): 200 TABLET, FILM COATED ORAL at 11:34

## 2024-09-12 RX ADMIN — EPOETIN ALFA 3000 UNIT(S): 3000 SOLUTION INTRAVENOUS; SUBCUTANEOUS at 07:33

## 2024-09-12 RX ADMIN — Medication 4 TABLET(S): at 17:41

## 2024-09-12 RX ADMIN — Medication 4 TABLET(S): at 21:38

## 2024-09-12 RX ADMIN — Medication 5000 UNIT(S): at 21:38

## 2024-09-12 RX ADMIN — CALCITRIOL 1 MICROGRAM(S): 0.25 CAPSULE ORAL at 17:42

## 2024-09-12 RX ADMIN — Medication 5000 UNIT(S): at 13:41

## 2024-09-12 RX ADMIN — Medication 2000 UNIT(S): at 11:34

## 2024-09-12 RX ADMIN — Medication 4 TABLET(S): at 11:24

## 2024-09-12 RX ADMIN — Medication 5000 UNIT(S): at 05:09

## 2024-09-12 RX ADMIN — SODIUM PHOSPHATE, MONOBASIC, MONOHYDRATE AND SODIUM PHOSPHATE, DIBASIC ANHYDROUS 85 MILLIMOLE(S): 276; 142 INJECTION, SOLUTION INTRAVENOUS at 11:24

## 2024-09-12 NOTE — DISCHARGE NOTE PROVIDER - NSDCQMPCI_CARD_ALL_CORE
Would like an ultra sound per my visit at Urgent Care.  This message was received per email to the PSR pool.   No

## 2024-09-12 NOTE — DISCHARGE NOTE PROVIDER - NSDCMRMEDTOKEN_GEN_ALL_CORE_FT
Alogliptin 6.25 mg oral tablet: 1 tab(s) orally once a day  Auryxia 210 mg oral tablet: 420 milligram(s) orally 3 times a day (with meals)  calcium acetate 667 mg oral tablet: 1 tab(s) orally 3 times a day (with meals)  cinacalcet 60 mg oral tablet: 1 tab(s) orally once a day  Cosopt 2%-0.5% ophthalmic solution: 1 drop(s) in each eye 2 times a day  hydroxychloroquine 200 mg oral tablet: 1 tab(s) orally once a day  labetalol 100 mg oral tablet: 1 tab(s) orally 2 times a day  latanoprost 0.005% ophthalmic emulsion: 1 drop(s) in each affected eye once a day (at bedtime)  memantine 10 mg oral tablet: 1 tab(s) orally 2 times a day  Praluent Pen 75 mg/mL subcutaneous solution: 75 milligram(s) subcutaneously every 2 weeks  valsartan 80 mg oral tablet: 1 tab(s) orally once a day  vitamin D 1000IU orally once a day:

## 2024-09-12 NOTE — PROGRESS NOTE ADULT - SUBJECTIVE AND OBJECTIVE BOX
[facility] NEPHROLOGY- PROGRESS NOTE    63 year old Female with history of ESRD on HD presents for parathyroidectomy. Nephrology consulted for ESRD status.    REVIEW OF SYSTEMS:  Gen: no fevers, numbness or weakness  Cards: no chest pain  Resp: no dyspnea  GI: no nausea or vomiting or diarrhea  Vascular: no LE edema    penicillin (Rash)      Hospital Medications: Medications reviewed      VITALS:  T(F): 97.8 (09-12-24 @ 09:38), Max: 98.8 (09-11-24 @ 22:07)  HR: 65 (09-12-24 @ 09:38)  BP: 134/60 (09-12-24 @ 09:38)  RR: 18 (09-12-24 @ 09:38)  SpO2: 100% (09-12-24 @ 05:11)  Wt(kg): --    09-11 @ 07:01  -  09-12 @ 07:00  --------------------------------------------------------  IN: 618 mL / OUT: 0 mL / NET: 618 mL    09-12 @ 07:01  -  09-12 @ 09:52  --------------------------------------------------------  IN: 400 mL / OUT: 2400 mL / NET: -2000 mL        PHYSICAL EXAM:    Gen: NAD, calm  Cards: RRR, +S1/S2, no M/G/R  Resp: CTA B/L  GI: soft, NT/ND, NABS  Vascular: no LE edema B/L, LUE AVF + bruit/thrill        LABS:  09-12    140  |  100  |  38<H>  ----------------------------<  130<H>  4.5   |  25  |  6.64<H>    Ca    8.7      12 Sep 2024 06:20  Phos  1.7     09-12  Mg     2.50     09-12    TPro  6.0  /  Alb  2.9<L>  /  TBili  0.4  /  DBili      /  AST  20  /  ALT  10  /  AlkPhos  1134<H>  09-12    Creatinine Trend: 6.64 <--, 6.04 <--, 4.83 <--, 4.49 <--, 4.03 <--, 6.99 <--, 6.70 <--, 5.83 <--, 5.40 <--, 4.91 <--, 4.31 <--, 3.49 <--, 3.06 <--, 5.90 <--, 5.26 <--, 4.03 <--, 3.64 <--, 7.13 <--                        9.1    5.54  )-----------( 123      ( 12 Sep 2024 06:20 )             26.7     Urine Studies:  Urinalysis Basic - ( 12 Sep 2024 06:20 )    Color:  / Appearance:  / SG:  / pH:   Gluc: 130 mg/dL / Ketone:   / Bili:  / Urobili:    Blood:  / Protein:  / Nitrite:    Leuk Esterase:  / RBC:  / WBC    Sq Epi:  / Non Sq Epi:  / Bacteria:

## 2024-09-12 NOTE — PROGRESS NOTE ADULT - ASSESSMENT
63F PMHx HTN, HLD, T2DM, morbid obesity (lost ~70 lbs after gastric bypass in 2016), SLE, ESRD on HD (T/Th/S) since 2017 (left AV fistula), CAD s/p Cardiac stent in 1/2018 and 6/2018, and mid memory impairment now s/p 4-gland total parathyroidectomy with reimplantation of 1x gland in R forearm on 9/4. Patient had SICU consulted for electrolyte monitoring and c/f hungry bone syndrome post op. Now transferred to general medicine floor. medicine consulted for co-management     #Hypothyroidism Hx  - Endocrine following   - Not on LT4, per son had been on 25 mcg but TFTs found to be normal so stopped >1 year ago  - Repeat TSH 2.35, normal    #T2DM, diet controlled  - Last a1c 5.6% 8/2024, though unreliable i/s/o ESRD on HD  -  FS tidac and qhs and low correctional Admelog at meals   - endocrine following, : if FSBG stable for 48 hrs without need of insulin can dc FSBG and insulin     #s/p  4-gland total parathyroidectomy with reimplantation of 1x gland in R forearm on 9/4  - now with hypocalcemia, endocrine and nephrology following     #ESRD (T/Th/S) since 2017 (left AV fistula),  - nephrology following       #HTN  - Goal BP <130/80  - patient on  labetolol and valsartan at home   - Labetolol increased to 200mg BID given NSVT per cardiology   - Blood pressure at goal  - Patient did not receive Labetolol and Valsartan this am on 9/12.   - Continue to monitor BP. On discharge, patient may be able to be discharged on one BP agent     #SLE - continue Plaquenil   #NSVT  - - Labetolol increased to 200mg BID given NSVT per cardiology   - Cardiology following,

## 2024-09-12 NOTE — PROGRESS NOTE ADULT - ASSESSMENT
63F PMH HTN, HLD, T2DM (on lifestyle modification), morbid obesity s/p gastric bypass 2016, SLE c/b ESRD on HD (T/Th/S) since 2017 (left AV fistula), secondary hyperparathyroidism of renal origin, CAD s/p cardiac stent in 1/2018 and 6/2018 mid memory impairment presents for parathyroidectomy. Pt is now s/p parathyroidectomy with parathyroid autotransplantation 9/4. Endocrine consulted for further management.          #Secondary Hyperparathyroidism s/p parathyroidectomy  #Hungry bones syndrome and hypocalcemia   - OP labs:   -- 2/13/24 corrected Ca 9.1, iPTH 2000  -- 3/12/2024 corrected Ca 8.9, iPTH 3053  -- 3/26/24 corrected Ca 9.7  -- 4/2024 corrected Ca 9.3, iPTH 2380, vitamin D 25OH 20.3, vitamin D 1,25OH 6.5   - ENT in-office US showing enlarged R superior parathyroid  - Intraoperative iCa 1.06 (low), Ca 8.6 (corrected 9.9)  - Currently asx  - Post op PTH 14, vitamin D 24.2  - 9/7/24 corrected calcium 7.8, ionzied calcium is 0.73 very low. s/p calcium gluconate IV this morning.  - 9/7/24 after high-calcium bath HD, corrected ca was 8.8 and ionized calcium 1.00  - 9/8/24 corrected calcium is 7.9, ionized calcium is 0.79  9/9 corrected calcium 7.8  9/10 corrected calcium 7.7  9/11->9/4 (please note this was with TID dosing of calcium carbonate and not the QID dosing as was ordered for 9/10, appears missed a dose when she was at HD as per d/w nurse)  9/12-->corrected calcium is now 9.6, phos remains low    Plan:  - continue calcitriol 1mcg BID  - calcium carbonate 1250mg was increased to 4 tabs to QID 9/11   -reduce this back to TID dosing as calcium is now on higher end for hypopara goals   -can monitor calcium mag, phos and albumin daily now that we have a stable calcium   - If corrected Ca <7.5 or patient is symptomatic, give calcium gluconate IVP  - PTH low, aim calcium low normal 8-8.5   - Aim normal Mg and high normal PO4, the phos is low post surgery. low pth will lead high phos, interestingly her phos is low? d/w renal if in the setting of ESRD on HD, if the pt should start additional supplementation, though if this is started phos can act like a calcium binder and lead to lower calcium levels.      #Hypothyroidism Hx  - Not on LT4, per son had been on 25 mcg but TFTs found to be normal so stopped >1 year ago  - Repeat TSH 2.35, normal    #T2DM, diet controlled  - Last a1c 5.6% 8/2024, diet controlled per the pt, not on medicine   monitor glucose on BMP    #HTN  - Goal BP <130/80  - Management per nephrology as the pt is on HD         Claudia Jones MD  Attending Physician   Department of Endocrinology, Diabetes and Metabolism     weekdays: 9am to 5pm: email [facility] or [facility] and or TEAMS     Nights and weekends: [phone]  Please note that this patient may be followed by a different provider tomorrow.   If no answer or after hours, please contact [phone].  For final dc reccomendations, please call [phone] or page the endocrine fellow on call.

## 2024-09-12 NOTE — DISCHARGE NOTE PROVIDER - CARE PROVIDER_API CALL
Vijay Frederick [facility]  Otolaryngology  [address] [place] [id number]  Phone: [phone]  Fax: [phone]  Follow Up Time:

## 2024-09-12 NOTE — DISCHARGE NOTE PROVIDER - NSDCFUADDAPPT_GEN_ALL_CORE_FT
Endocrine  - Please call [phone] for an appointment time    Renal  - Please follow up with Dr. Diane esquivel

## 2024-09-12 NOTE — PROGRESS NOTE ADULT - ASSESSMENT
63 year old Female with history of ESRD on HD presents for parathyroidectomy. Nephrology consulted for ESRD status.    1) ESRD: Last HD earlier this morning tolerated well with 2L removed. Plan for next maintenance HD on 9/14. Monitor electrolytes.    2) HTN with ESRD: BP controlled. Continue with current medications. Monitor BP.     3) Anemia of renal disease: Hb low with adequate iron stores. Continue with Epo 3K with HD. Monitor Hb.    4) Secondary HPT of renal origin: S/P parathyroidectomy. Corrected calcium increasing. Continue with calcitriol 1 mcg twice daily and decrease calcium carbonate 4 tabs Q8 hours. F/U Endo. Continue with cholecalciferol 2000 IU daily and will use high calcium bath with HD. Monitor serum calcium and phosphorus.      [facility] NEPHROLOGY  Timmy Acharya M.D.  Amilcar Contreras D.O.  Mindi Geller M.D.  MD Luna Israel, MSN, ANP-C    Telephone: [phone]  Facsimile: [phone] [place] [address], [address]  [place] [address]   no

## 2024-09-12 NOTE — PROGRESS NOTE ADULT - SUBJECTIVE AND OBJECTIVE BOX
Chief Complaint/Follow-up on: hypocalcemia     Subjective:  spoke with the pt with language line    Ray   [id number]  pt feels well, does not report any numbness or tingling     MEDICATIONS  (STANDING):  calcitriol   Capsule 1 MICROGram(s) Oral every 12 hours  calcium carbonate   1250 mG (OsCal) 4 Tablet(s) Oral three times a day  cholecalciferol 2000 Unit(s) Oral daily  epoetin adele (EPOGEN) Injectable 3000 Unit(s) IV Push <User Schedule>  heparin   Injectable 5000 Unit(s) SubCutaneous every 8 hours  hydroxychloroquine 200 milliGRAM(s) Oral daily  labetalol 200 milliGRAM(s) Oral two times a day  valsartan 80 milliGRAM(s) Oral daily    MEDICATIONS  (PRN):  acetaminophen     Tablet .. 650 milliGRAM(s) Oral every 6 hours PRN Mild Pain (1 - 3)  sodium chloride 0.9% Bolus. 100 milliLiter(s) IV Bolus every 5 minutes PRN SBP LESS THAN or EQUAL to 90 mmHg      PHYSICAL EXAM:  VITALS: T(C): 36.8 (09-12-24 @ 11:50)  T(F): 98.3 (09-12-24 @ 11:50), Max: 98.8 (09-11-24 @ 22:07)  HR: 73 (09-12-24 @ 11:50) (53 - 73)  BP: 107/48 (09-12-24 @ 11:50) (107/48 - 138/61)  RR:  (16 - 18)  SpO2:  (98% - 100%)  Wt(kg): --  GENERAL: NAD, well-groomed, well-developed  EYES: No proptosis, no injection  HEENT:  Atraumatic, Normocephalic, moist mucous membranes  RESPIRATORY: Clear to auscultation bilaterally; No rales, rhonchi, wheezing, or rubs  CARDIOVASCULAR: Regular rate and rhythm; No murmurs; no peripheral edema  GI: Soft, nontender, non distended, normal bowel sounds  psych: alert and oriented X3      09-12    140  |  100  |  38<H>  ----------------------------<  130<H>  4.5   |  25  |  6.64<H>    eGFR: 7<L>    Ca    8.7      09-12  Mg     2.50     09-12  Phos  1.7     09-12    TPro  6.0  /  Alb  2.9<L>  /  TBili  0.4  /  DBili  x   /  AST  20  /  ALT  10  /  AlkPhos  1134<H>  09-12          Thyroid Function Tests:  09-05 @ 06:52 TSH 2.35 FreeT4 -- T3 -- Anti TPO -- Anti Thyroglobulin Ab -- TSI --  09-05 @ 01:44 TSH 2.95 FreeT4 -- T3 -- Anti TPO -- Anti Thyroglobulin Ab -- TSI --

## 2024-09-12 NOTE — DISCHARGE NOTE PROVIDER - NSDCCPCAREPLAN_GEN_ALL_CORE_FT
PRINCIPAL DISCHARGE DIAGNOSIS  Diagnosis: Secondary hyperparathyroidism, renal  Assessment and Plan of Treatment: - Please follow up with Dr. Frederick outpatient

## 2024-09-12 NOTE — DISCHARGE NOTE PROVIDER - DISCHARGE DIET
Regular Diet - No restrictions/Consistent Carbohydrate Diabetic Diets Regular Diet - No restrictions/Consistent Carbohydrate Diabetic Diets/Renal Diets (for dialysis)

## 2024-09-12 NOTE — DISCHARGE NOTE PROVIDER - HOSPITAL COURSE
63 year old female with ESRD on HD (T, Th, Sat) and hyperparathyroidism S/P 3.5 gland parathyroidectomy with reimplant in right arm on 9/4/2024. Hospital course prolonged due to development of hungry bone syndrome required prolonged ICU care, required multiple IV calcium. Endocrinology and nephrology following. PT evaluated patient and cleared for discharge with home PT. Plastic surgery cleared for discharge. Patient was cleared for discharge home by Dr. Frederick on 9/13/2024. All prescriptions were sent to a pharmacy that was agreed on with the patient.

## 2024-09-12 NOTE — PROGRESS NOTE ADULT - SUBJECTIVE AND OBJECTIVE BOX
Interval: patient s/p 3.5 gland parathyroidectomy with reimplant in R arm . received 2g IV ca  at 9am. Pt did not require IV Ca yesterday. Pt had episode of v tach yesterday that self resolved. Pt was asymptomatic. Pt doing well this am no issues overnight.    PAST MEDICAL & SURGICAL HISTORY:  Diabetes      Hypertension      Hypercholesteremia      Other hyperlipidemia      Glaucoma      ESRD (end-stage renal disease) due to SLE      Lupus (systemic lupus erythematosus)      Hyperparathyroidism      Memory impairment      Anemia of renal disease      CAD (coronary artery disease)      Stented coronary artery      Cholelithiasis      Rib fracture      S/P  section  times two      H/O gastric bypass  2016      S/P appendectomy      History of appendectomy        Allergies    penicillin (Rash)    Intolerances    MEDICATIONS  (STANDING):  acetaminophen     Tablet .. 975 milliGRAM(s) Oral every 6 hours  calcitriol   Capsule 1 MICROGram(s) Oral every 12 hours  calcium carbonate   1250 mG (OsCal) 2 Tablet(s) Oral three times a day  chlorhexidine 2% Cloths 1 Application(s) Topical daily  chlorhexidine 2% Cloths 1 Application(s) Topical daily  cholecalciferol 2000 Unit(s) Oral daily  epoetin adele (EPOGEN) Injectable 3000 Unit(s) IV Push <User Schedule>  heparin   Injectable 5000 Unit(s) SubCutaneous every 8 hours  hydroxychloroquine 200 milliGRAM(s) Oral daily  insulin lispro (ADMELOG) corrective regimen sliding scale   SubCutaneous at bedtime  insulin lispro (ADMELOG) corrective regimen sliding scale   SubCutaneous three times a day before meals  labetalol 100 milliGRAM(s) Oral two times a day  valsartan 80 milliGRAM(s) Oral daily    MEDICATIONS  (PRN):  sodium chloride 0.9% Bolus. 100 milliLiter(s) IV Bolus every 5 minutes PRN SBP LESS THAN or EQUAL to 90 mmHg    Vital Signs Last 24 Hrs  T(C): 36.6 (12 Sep 2024 06:20), Max: 37.1 (11 Sep 2024 22:07)  T(F): 97.8 (12 Sep 2024 06:20), Max: 98.8 (11 Sep 2024 22:07)  HR: 53 (12 Sep 2024 06:20) (53 - 72)  BP: 127/64 (12 Sep 2024 06:20) (116/54 - 143/56)  BP(mean): --  RR: 18 (12 Sep 2024 06:20) (16 - 18)  SpO2: 100% (12 Sep 2024 05:11) (98% - 100%)    Parameters below as of 12 Sep 2024 06:20  Patient On (Oxygen Delivery Method): room air                    O2 Parameters below as of 08 Sep 2024 00:00  Patient On (Oxygen Delivery Method): room air    Physical Exam:  Alert, NAD  Nonlabored Respirations RA  ANGEL  neck: incision c/d/i       I&O's Summary    06 Sep 2024 07:01  -  07 Sep 2024 07:00  --------------------------------------------------------  IN: 1000 mL / OUT: 0 mL / NET: 1000 mL    07 Sep 2024 07:01  -  08 Sep 2024 05:32  --------------------------------------------------------  IN: 1080 mL / OUT: 2300 mL / NET: -1220 mL                       LABS:        139  |  100  |  32<H>  ----------------------------<  158<H>  4.4   |  26  |  6.04<H>    Ca    8.4      11 Sep 2024 20:23  Phos  1.9       Mg     2.20         TPro  6.4  /  Alb  3.2<L>  /  TBili  0.4  /  DBili  x   /  AST  19  /  ALT  9   /  AlkPhos  1234<H>                           A/P: 63yFemale s/p 3.5 gland parathyroidectomy    -cards to see today  -trend calciums  -repletions per endo/SICU   -HD per nephro      - SCDs  - d/w attending

## 2024-09-12 NOTE — PROGRESS NOTE ADULT - SUBJECTIVE AND OBJECTIVE BOX
CC/Reason for Consult:     SUBJECTIVE / OVERNIGHT EVENTS: Patient seen and examined at bedside with Bahamian speaking . BP low today. Did not receive BP meds based on hold parameters. No pain or SOB     MEDICATIONS  (STANDING):  calcitriol   Capsule 1 MICROGram(s) Oral every 12 hours  calcium carbonate   1250 mG (OsCal) 4 Tablet(s) Oral three times a day  cholecalciferol 2000 Unit(s) Oral daily  epoetin adele (EPOGEN) Injectable 3000 Unit(s) IV Push <User Schedule>  heparin   Injectable 5000 Unit(s) SubCutaneous every 8 hours  hydroxychloroquine 200 milliGRAM(s) Oral daily  labetalol 200 milliGRAM(s) Oral two times a day  valsartan 80 milliGRAM(s) Oral daily    MEDICATIONS  (PRN):  acetaminophen     Tablet .. 650 milliGRAM(s) Oral every 6 hours PRN Mild Pain (1 - 3)  sodium chloride 0.9% Bolus. 100 milliLiter(s) IV Bolus every 5 minutes PRN SBP LESS THAN or EQUAL to 90 mmHg      Vital Signs Last 24 Hrs  T(C): 36.8 (12 Sep 2024 11:50), Max: 37.1 (11 Sep 2024 22:07)  T(F): 98.3 (12 Sep 2024 11:50), Max: 98.8 (11 Sep 2024 22:07)  HR: 73 (12 Sep 2024 11:50) (53 - 73)  BP: 107/48 (12 Sep 2024 11:50) (107/48 - 138/61)  BP(mean): --  RR: 18 (12 Sep 2024 11:50) (16 - 18)  SpO2: 99% (12 Sep 2024 11:50) (98% - 100%)    Parameters below as of 12 Sep 2024 11:50  Patient On (Oxygen Delivery Method): room air      CAPILLARY BLOOD GLUCOSE            PHYSICAL EXAM:  GENERAL: Middle age woman in NAD, well-developed  HEAD:  Atraumatic, Normocephalic  EYES: EOMI, PERRLA, conjunctiva and sclera clear  NECK: Supple, No JVD; bandage on neck, incision C/D/I   CHEST/LUNG: Clear to auscultation bilaterally; No wheeze  HEART: Regular rate and rhythm; No murmurs, rubs, or gallops  ABDOMEN: Soft, Nontender, Nondistended; Bowel sounds present  EXTREMITIES:  2+ Peripheral Pulses, No clubbing, cyanosis, or edema  PSYCH: AAOx3  NEUROLOGY: non-focal  SKIN: No rashes or lesions    LABS:                        9.1    5.54  )-----------( 123      ( 12 Sep 2024 06:20 )             26.7     09-12    140  |  100  |  38<H>  ----------------------------<  130<H>  4.5   |  25  |  6.64<H>    Ca    8.7      12 Sep 2024 06:20  Phos  1.7     09-12  Mg     2.50     09-12    TPro  6.0  /  Alb  2.9<L>  /  TBili  0.4  /  DBili  x   /  AST  20  /  ALT  10  /  AlkPhos  1134<H>  09-12      CARDIAC MARKERS ( 11 Sep 2024 05:12 )  x     / x     / x     / x     / 1.1 ng/mL  CARDIAC MARKERS ( 10 Sep 2024 18:40 )  x     / x     / x     / x     / 1.6 ng/mL      Urinalysis Basic - ( 12 Sep 2024 06:20 )    Color: x / Appearance: x / SG: x / pH: x  Gluc: 130 mg/dL / Ketone: x  / Bili: x / Urobili: x   Blood: x / Protein: x / Nitrite: x   Leuk Esterase: x / RBC: x / WBC x   Sq Epi: x / Non Sq Epi: x / Bacteria: x        RADIOLOGY & ADDITIONAL TESTS:    Imaging Personally Reviewed:    Consultant(s) Notes Reviewed:      Care Discussed with Consultants/Other Providers:

## 2024-09-12 NOTE — DISCHARGE NOTE PROVIDER - NSDCFUSCHEDAPPT_GEN_ALL_CORE_FT
Samantha Torres  [facility]  ENDOCRIN [address] [facility]  Scheduled Appointment: 09/24/2024    Vijay Frederick  [facility]  OTOLARYNG [address] [facility]  Scheduled Appointment: 09/26/2024    Salvador Diaz  [facility]  VASCULAR [address] [facility]  Scheduled Appointment: 11/01/2024    [facility]  VASCULAR [address] [facility]  Scheduled Appointment: 11/01/2024

## 2024-09-12 NOTE — PROGRESS NOTE ADULT - ASSESSMENT
63F with HTN, HLD, T2DM, ESRD from SLE on T/Th/S, and CAD s/p PCI in 2018 here for elective parathyroidectomy and parathyroid autotransplantation. POD course uncomplicated. On telemetry, noted to have 7 beats of NSVT, asymptomatic. Cardiology consulted for NSVT.     1. NSVT-? electrolyte shift s/p parathyroidectomy   2. CAD s/p PCI in 2018     -troponin flat, neg CKMB and CK. would not trend it further   -episode does not appear related to ischemia.  -bp runs low, would decrease labetalol back 100 mg BID   -cont valsartan 80 mg QD   -monitor on tele   -K>4 and Mg>2     Thank you for allowing us to participate in the care of your patient. If you have any questions or concerns please do not hesitate to contact me 24/7     Thank you for allowing us to participate in the care of your patient. If you have any questions or concerns please do not hesitate to contact me 24/7.     Hany Ramos MD   Interventional Cardiology and General Cardiology Attending, [place]  [facility] Team  Cell: [phone]     Ambulatory Clinic   [address] [place] [address]   For Appointment: [phone]

## 2024-09-12 NOTE — PROGRESS NOTE ADULT - SUBJECTIVE AND OBJECTIVE BOX
Patient seen and examined at bedside.    Overnight Events:   no events   bp diastolic low    asymptomatic     REVIEW OF SYSTEMS:  Constitutional:     [x ] negative [ ] fevers [ ] chills [ ] weight loss [ ] weight gain  HEENT:                  [x ] negative [ ] dry eyes [ ] eye irritation [ ] postnasal drip [ ] nasal congestion  CV:                         [ x] negative  [ ] chest pain [ ] orthopnea [ ] palpitations [ ] murmur  Resp:                     [ x] negative [ ] cough [ ] shortness of breath [ ] dyspnea [ ] wheezing [ ] sputum [ ]hemoptysis  GI:                          [ x] negative [ ] nausea [ ] vomiting [ ] diarrhea [ ] constipation [ ] abd pain [ ] dysphagia   :                        [ x] negative [ ] dysuria [ ] nocturia [ ] hematuria [ ] increased urinary frequency  Musculoskeletal: [ x] negative [ ] back pain [ ] myalgias [ ] arthralgias [ ] fracture  Skin:                       [ x] negative [ ] rash [ ] itch  Neurological:        [x ] negative [ ] headache [ ] dizziness [ ] syncope [ ] weakness [ ] numbness  Psychiatric:           [ x] negative [ ] anxiety [ ] depression  Endocrine:            [ x] negative [ ] diabetes [ ] thyroid problem  Heme/Lymph:      [ x] negative [ ] anemia [ ] bleeding problem  Allergic/Immune: [ x] negative [ ] itchy eyes [ ] nasal discharge [ ] hives [ ] angioedema    [ x] All other systems negative  [ ] Unable to assess ROS due to    Current Meds:  acetaminophen     Tablet .. 650 milliGRAM(s) Oral every 6 hours PRN  calcitriol   Capsule 1 MICROGram(s) Oral every 12 hours  calcium carbonate   1250 mG (OsCal) 4 Tablet(s) Oral three times a day  cholecalciferol 2000 Unit(s) Oral daily  epoetin adele (EPOGEN) Injectable 3000 Unit(s) IV Push <User Schedule>  heparin   Injectable 5000 Unit(s) SubCutaneous every 8 hours  hydroxychloroquine 200 milliGRAM(s) Oral daily  labetalol 200 milliGRAM(s) Oral two times a day  sodium chloride 0.9% Bolus. 100 milliLiter(s) IV Bolus every 5 minutes PRN  valsartan 80 milliGRAM(s) Oral daily      PAST MEDICAL & SURGICAL HISTORY:  Diabetes      Hypertension      Hypercholesteremia      Other hyperlipidemia      Glaucoma      ESRD (end-stage renal disease) due to SLE      Lupus (systemic lupus erythematosus)      Hyperparathyroidism      Memory impairment      Anemia of renal disease      CAD (coronary artery disease)      Stented coronary artery      Cholelithiasis      Rib fracture      S/P  section  times two      H/O gastric bypass  2016      S/P appendectomy      History of appendectomy          Vitals:  T(F): 98.3 (), Max: 98.8 ()  HR: 75 () (53 - 75)  BP: 124/53 () (107/48 - 138/61)  RR: 18 ()  SpO2: 100% ()  I&O's Summary    11 Sep 2024 07:01  -  12 Sep 2024 07:00  --------------------------------------------------------  IN: 618 mL / OUT: 0 mL / NET: 618 mL    12 Sep 2024 07:01  -  12 Sep 2024 17:10  --------------------------------------------------------  IN: 400 mL / OUT: 2400 mL / NET: -2000 mL        Physical Exam:  Appearance: No acute distress  HENT: No JVD + neck incision   Cardiovascular: RRR, S1/S2, no murmurs  Respiratory: CTABL  Gastrointestinal: soft, NT ND, +BS  Musculoskeletal: No clubbing, no edema   Neurologic: Non-focal  Skin: No rashes, ecchymoses, or cyanosis                          9.1    5.54  )-----------( 123      ( 12 Sep 2024 06:20 )             26.7         140  |  100  |  38<H>  ----------------------------<  130<H>  4.5   |  25  |  6.64<H>    Ca    8.7      12 Sep 2024 06:20  Phos  1.7       Mg     2.50         TPro  6.0  /  Alb  2.9<L>  /  TBili  0.4  /  DBili  x   /  AST  20  /  ALT  10  /  AlkPhos  1134<H>  09-12      CARDIAC MARKERS ( 11 Sep 2024 05:12 )  x     / x     / x     / x     / 1.1 ng/mL  CARDIAC MARKERS ( 10 Sep 2024 18:40 )  x     / x     / x     / x     / 1.6 ng/mL              Cardiovascular Testings:       Interpretation of Telemetry: NSR

## 2024-09-13 ENCOUNTER — RESULT REVIEW (OUTPATIENT)
Age: 64
End: 2024-09-13

## 2024-09-13 ENCOUNTER — TRANSCRIPTION ENCOUNTER (OUTPATIENT)
Age: 64
End: 2024-09-13

## 2024-09-13 VITALS
SYSTOLIC BLOOD PRESSURE: 127 MMHG | HEART RATE: 69 BPM | RESPIRATION RATE: 18 BRPM | OXYGEN SATURATION: 100 % | DIASTOLIC BLOOD PRESSURE: 47 MMHG | TEMPERATURE: 98 F

## 2024-09-13 LAB
ALBUMIN SERPL ELPH-MCNC: 2.9 G/DL — LOW (ref 3.3–5)
ALP SERPL-CCNC: 1182 U/L — HIGH (ref 40–120)
ALT FLD-CCNC: 15 U/L — SIGNIFICANT CHANGE UP (ref 4–33)
ANION GAP SERPL CALC-SCNC: 14 MMOL/L — SIGNIFICANT CHANGE UP (ref 7–14)
AST SERPL-CCNC: 26 U/L — SIGNIFICANT CHANGE UP (ref 4–32)
BILIRUB SERPL-MCNC: 0.5 MG/DL — SIGNIFICANT CHANGE UP (ref 0.2–1.2)
BUN SERPL-MCNC: 21 MG/DL — SIGNIFICANT CHANGE UP (ref 7–23)
CALCIUM SERPL-MCNC: 7.7 MG/DL — LOW (ref 8.4–10.5)
CHLORIDE SERPL-SCNC: 97 MMOL/L — LOW (ref 98–107)
CO2 SERPL-SCNC: 25 MMOL/L — SIGNIFICANT CHANGE UP (ref 22–31)
CREAT SERPL-MCNC: 4.56 MG/DL — HIGH (ref 0.5–1.3)
EGFR: 10 ML/MIN/1.73M2 — LOW
GLUCOSE SERPL-MCNC: 79 MG/DL — SIGNIFICANT CHANGE UP (ref 70–99)
HCT VFR BLD CALC: 29.8 % — LOW (ref 34.5–45)
HGB BLD-MCNC: 9.8 G/DL — LOW (ref 11.5–15.5)
MAGNESIUM SERPL-MCNC: 2.2 MG/DL — SIGNIFICANT CHANGE UP (ref 1.6–2.6)
MCHC RBC-ENTMCNC: 31.5 PG — SIGNIFICANT CHANGE UP (ref 27–34)
MCHC RBC-ENTMCNC: 32.9 GM/DL — SIGNIFICANT CHANGE UP (ref 32–36)
MCV RBC AUTO: 95.8 FL — SIGNIFICANT CHANGE UP (ref 80–100)
NRBC # BLD: 0 /100 WBCS — SIGNIFICANT CHANGE UP (ref 0–0)
NRBC # FLD: 0 K/UL — SIGNIFICANT CHANGE UP (ref 0–0)
PHOSPHATE SERPL-MCNC: 2.4 MG/DL — LOW (ref 2.5–4.5)
PLATELET # BLD AUTO: 121 K/UL — LOW (ref 150–400)
POTASSIUM SERPL-MCNC: 4 MMOL/L — SIGNIFICANT CHANGE UP (ref 3.5–5.3)
POTASSIUM SERPL-SCNC: 4 MMOL/L — SIGNIFICANT CHANGE UP (ref 3.5–5.3)
PROT SERPL-MCNC: 5.8 G/DL — LOW (ref 6–8.3)
RBC # BLD: 3.11 M/UL — LOW (ref 3.8–5.2)
RBC # FLD: 17.2 % — HIGH (ref 10.3–14.5)
SODIUM SERPL-SCNC: 136 MMOL/L — SIGNIFICANT CHANGE UP (ref 135–145)
WBC # BLD: 6.6 K/UL — SIGNIFICANT CHANGE UP (ref 3.8–10.5)
WBC # FLD AUTO: 6.6 K/UL — SIGNIFICANT CHANGE UP (ref 3.8–10.5)

## 2024-09-13 PROCEDURE — 99232 SBSQ HOSP IP/OBS MODERATE 35: CPT

## 2024-09-13 PROCEDURE — 99232 SBSQ HOSP IP/OBS MODERATE 35: CPT | Mod: 25

## 2024-09-13 PROCEDURE — 93306 TTE W/DOPPLER COMPLETE: CPT | Mod: 26

## 2024-09-13 PROCEDURE — 99233 SBSQ HOSP IP/OBS HIGH 50: CPT

## 2024-09-13 RX ORDER — CALCIUM CARBONATE 500(1250)
4 TABLET ORAL
Qty: 360 | Refills: 0
Start: 2024-09-13 | End: 2024-10-12

## 2024-09-13 RX ORDER — FOLIC ACID/MULTIVIT,IRON,MINER 0.4MG-18MG
1 TABLET,CHEWABLE ORAL
Qty: 30 | Refills: 0
Start: 2024-09-13 | End: 2024-10-12

## 2024-09-13 RX ORDER — CALCITRIOL 0.25 UG/1
2 CAPSULE ORAL
Qty: 120 | Refills: 0
Start: 2024-09-13 | End: 2024-10-12

## 2024-09-13 RX ORDER — SODIUM PHOSPHATE, DIBASIC, ANHYDROUS, POTASSIUM PHOSPHATE, MONOBASIC, AND SODIUM PHOSPHATE, MONOBASIC, MONOHYDRATE 852; 155; 130 MG/1; MG/1; MG/1
1 TABLET, COATED ORAL ONCE
Refills: 0 | Status: COMPLETED | OUTPATIENT
Start: 2024-09-13 | End: 2024-09-13

## 2024-09-13 RX ADMIN — SODIUM PHOSPHATE, DIBASIC, ANHYDROUS, POTASSIUM PHOSPHATE, MONOBASIC, AND SODIUM PHOSPHATE, MONOBASIC, MONOHYDRATE 1 PACKET(S): 852; 155; 130 TABLET, COATED ORAL at 11:16

## 2024-09-13 RX ADMIN — HYDROXYCHLOROQUINE SULFATE 200 MILLIGRAM(S): 200 TABLET, FILM COATED ORAL at 11:23

## 2024-09-13 RX ADMIN — Medication 5000 UNIT(S): at 05:18

## 2024-09-13 RX ADMIN — Medication 200 MILLIGRAM(S): at 05:19

## 2024-09-13 RX ADMIN — VALSARTAN 80 MILLIGRAM(S): 40 TABLET ORAL at 11:18

## 2024-09-13 RX ADMIN — Medication 4 TABLET(S): at 14:47

## 2024-09-13 RX ADMIN — Medication 4 TABLET(S): at 05:19

## 2024-09-13 RX ADMIN — CALCITRIOL 1 MICROGRAM(S): 0.25 CAPSULE ORAL at 05:18

## 2024-09-13 RX ADMIN — Medication 2000 UNIT(S): at 11:23

## 2024-09-13 NOTE — PROGRESS NOTE ADULT - PROBLEM SELECTOR PROBLEM 5
90 day courtesy refill given. Patient has upcoming lab and appointment with provider.    
Hypocalcemia

## 2024-09-13 NOTE — PROGRESS NOTE ADULT - PROVIDER SPECIALTY LIST ADULT
Anesthesia
ENT
Endocrinology
Hospitalist
Nephrology
SICU
Cardiology
ENT
Endocrinology
Hospitalist
Nephrology
SICU
SICU
Cardiology
Cardiology
Endocrinology
Endocrinology
Hospitalist
Endocrinology

## 2024-09-13 NOTE — DISCHARGE NOTE NURSING/CASE MANAGEMENT/SOCIAL WORK - NSDCFUADDAPPT_GEN_ALL_CORE_FT
Endocrine  - Please call [phone] for an appointment time    Renal  - Please follow up with Dr. Diane esquivel

## 2024-09-13 NOTE — PROGRESS NOTE ADULT - TIME BILLING
medical complexity, reviewing relevant images, hospital record, obtaining a history/physical independently, and coordinating care. More than 50% of the visit was spent counseling and/or coordinating care by the attending physician.
Review of laboratory data, radiology results, consultants' recommendations, documentation in [place], discussion with patient/house staff/ACP and interdisciplinary staff (such as , social workers, etc). Interventions were performed as documented above.
Review of laboratory data, radiology results, consultants' recommendations, documentation in [place], discussion with patient/house staff/ACP and interdisciplinary staff (such as , social workers, etc). Interventions were performed as documented above.
medical complexity, reviewing relevant images, hospital record, obtaining a history/physical independently, and coordinating care. More than 50% of the visit was spent counseling and/or coordinating care by the attending physician.
medical complexity, reviewing relevant images, hospital record, obtaining a history/physical independently, and coordinating care. More than 50% of the visit was spent counseling and/or coordinating care by the attending physician.
Review of laboratory data, radiology results, consultants' recommendations, documentation in [place], discussion with patient/house staff/ACP and interdisciplinary staff (such as , social workers, etc). Interventions were performed as documented above.

## 2024-09-13 NOTE — DISCHARGE NOTE NURSING/CASE MANAGEMENT/SOCIAL WORK - NSDCPEFALRISK_GEN_ALL_CORE
For information on Fall & Injury Prevention, visit: [email or web address] OR  [email or web address] OR  https://www.cdc.gov/steadi/patient.html

## 2024-09-13 NOTE — PROGRESS NOTE ADULT - SUBJECTIVE AND OBJECTIVE BOX
Patient seen and examined at bedside.    Overnight Events:     no events     REVIEW OF SYSTEMS:  Constitutional:     [x ] negative [ ] fevers [ ] chills [ ] weight loss [ ] weight gain  HEENT:                  [x ] negative [ ] dry eyes [ ] eye irritation [ ] postnasal drip [ ] nasal congestion  CV:                         [ x] negative  [ ] chest pain [ ] orthopnea [ ] palpitations [ ] murmur  Resp:                     [ x] negative [ ] cough [ ] shortness of breath [ ] dyspnea [ ] wheezing [ ] sputum [ ]hemoptysis  GI:                          [ x] negative [ ] nausea [ ] vomiting [ ] diarrhea [ ] constipation [ ] abd pain [ ] dysphagia   :                        [ x] negative [ ] dysuria [ ] nocturia [ ] hematuria [ ] increased urinary frequency  Musculoskeletal: [ x] negative [ ] back pain [ ] myalgias [ ] arthralgias [ ] fracture  Skin:                       [ x] negative [ ] rash [ ] itch  Neurological:        [x ] negative [ ] headache [ ] dizziness [ ] syncope [ ] weakness [ ] numbness  Psychiatric:           [ x] negative [ ] anxiety [ ] depression  Endocrine:            [ x] negative [ ] diabetes [ ] thyroid problem  Heme/Lymph:      [ x] negative [ ] anemia [ ] bleeding problem  Allergic/Immune: [ x] negative [ ] itchy eyes [ ] nasal discharge [ ] hives [ ] angioedema    [ x] All other systems negative  [ ] Unable to assess ROS due to    Current Meds:  acetaminophen     Tablet .. 650 milliGRAM(s) Oral every 6 hours PRN  calcitriol   Capsule 1 MICROGram(s) Oral every 12 hours  calcium carbonate   1250 mG (OsCal) 4 Tablet(s) Oral three times a day  cholecalciferol 2000 Unit(s) Oral daily  epoetin adele (EPOGEN) Injectable 3000 Unit(s) IV Push <User Schedule>  heparin   Injectable 5000 Unit(s) SubCutaneous every 8 hours  hydroxychloroquine 200 milliGRAM(s) Oral daily  labetalol 200 milliGRAM(s) Oral two times a day  sodium chloride 0.9% Bolus. 100 milliLiter(s) IV Bolus every 5 minutes PRN  valsartan 80 milliGRAM(s) Oral daily      PAST MEDICAL & SURGICAL HISTORY:  Diabetes      Hypertension      Hypercholesteremia      Other hyperlipidemia      Glaucoma      ESRD (end-stage renal disease) due to SLE      Lupus (systemic lupus erythematosus)      Hyperparathyroidism      Memory impairment      Anemia of renal disease      CAD (coronary artery disease)      Stented coronary artery      Cholelithiasis      Rib fracture      S/P  section  times two      H/O gastric bypass  2016      S/P appendectomy      History of appendectomy          Vitals:  T(F): 98.2 (), Max: 98.2 ()  HR: 69 () (68 - 70)  BP: 127/47 () (114/48 - 127/47)  RR: 18 ()  SpO2: 100% ()  I&O's Summary    12 Sep 2024 07:01  -  13 Sep 2024 07:00  --------------------------------------------------------  IN: 400 mL / OUT: 2400 mL / NET: -2000 mL        Physical Exam:  Appearance: No acute distress  HENT: No JVD + neck incision   Cardiovascular: RRR, S1/S2, no murmurs  Respiratory: CTABL  Gastrointestinal: soft, NT ND, +BS  Musculoskeletal: No clubbing, no edema   Neurologic: Non-focal  Skin: No rashes, ecchymoses, or cyanosis                        9.8    6.60  )-----------( 121      ( 13 Sep 2024 05:37 )             29.8         136  |  97<L>  |  21  ----------------------------<  79  4.0   |  25  |  4.56<H>    Ca    7.7<L>      13 Sep 2024 05:37  Phos  2.4       Mg     2.20         TPro  5.8<L>  /  Alb  2.9<L>  /  TBili  0.5  /  DBili  x   /  AST  26  /  ALT  15  /  AlkPhos  1182<H>                    Cardiovascular Testings:       Interpretation of Telemetry:

## 2024-09-13 NOTE — PROGRESS NOTE ADULT - PROBLEM SELECTOR PROBLEM 4
Hypothyroidism
S/P parathyroidectomy

## 2024-09-13 NOTE — PROGRESS NOTE ADULT - PROBLEM SELECTOR PROBLEM 2
HTN (hypertension)
DM2 (diabetes mellitus, type 2)
HTN (hypertension)

## 2024-09-13 NOTE — PROGRESS NOTE ADULT - ASSESSMENT
63 year old Female with history of ESRD on HD presents for parathyroidectomy. Nephrology consulted for ESRD status.    1) ESRD: Last HD on 9/12 tolerated well with 2L removed. Plan for next maintenance HD on 9/14 as an outpatient given plans for discharge today. Monitor electrolytes.    2) HTN with ESRD: BP low normal. Continue with current medications and can decrease labetalol to 100 mg twice daily if BP decreases. Monitor BP.     3) Anemia of renal disease: Hb low with adequate iron stores. Continue with Epo 3K with HD. Monitor Hb.    4) Secondary HPT of renal origin: S/P parathyroidectomy. Serum calcium decreasing appropriately. Continue with calcitriol 1 mcg twice daily and calcium carbonate 4 tabs 3X/day. Continue with cholecalciferol 2000 IU daily given vitamin D deficiency. Regarding hypophosphatemia, patient advised to take calcium carbonate in between meals to avoid binder effect and increase dietary phosphorus intake. Monitor serum calcium and phosphorus.    No renal objection to discharge. Message left with patient's primary outpatient nephrologist regarding careful monitoring of serum calcium weekly at dialysis.      [facility] NEPHROLOGY  Timmy Acharya M.D.  Amilcar Contreras D.O.  Mindi Geller M.D.  MD Luna Israel, MSN, ANP-C    Telephone: [phone]  Facsimile: [phone] [place] [address], [address]  [place] [address]

## 2024-09-13 NOTE — PROGRESS NOTE ADULT - ASSESSMENT
63F PMH HTN, HLD, T2DM (on lifestyle modification), morbid obesity s/p gastric bypass 2016, SLE c/b ESRD on HD (T/Th/S) since 2017 (left AV fistula), secondary hyperparathyroidism of renal origin, CAD s/p cardiac stent in 1/2018 and 6/2018 mid memory impairment presents for parathyroidectomy. Pt is now s/p parathyroidectomy with parathyroid autotransplantation 9/4. Endocrine consulted for further management.    D/W provider     #Secondary Hyperparathyroidism s/p parathyroidectomy  #Hungry bones syndrome and hypocalcemia   - OP labs:   -- 2/13/24 corrected Ca 9.1, iPTH 2000  -- 3/12/2024 corrected Ca 8.9, iPTH 3053  -- 3/26/24 corrected Ca 9.7  -- 4/2024 corrected Ca 9.3, iPTH 2380, vitamin D 25OH 20.3, vitamin D 1,25OH 6.5   - ENT in-office US showing enlarged R superior parathyroid  - Intraoperative iCa 1.06 (low), Ca 8.6 (corrected 9.9)  - Currently asx  - Post op PTH 14, vitamin D 24.2  - 9/7/24 corrected calcium 7.8, ionzied calcium is 0.73 very low. s/p calcium gluconate IV this morning.  - 9/7/24 after high-calcium bath HD, corrected ca was 8.8 and ionized calcium 1.00  - 9/8/24 corrected calcium is 7.9, ionized calcium is 0.79  9/9 corrected calcium 7.8  9/10 corrected calcium 7.7  9/11->9/4 (please note this was with TID dosing of calcium carbonate and not the QID dosing as was ordered for 9/10, appears missed a dose when she was at HD as per d/w nurse)    Plan:  - grayson calcium this AM 8.6 (was 9.3 corrected yesterday)  - continue calcitriol 1mcg BID  - c/w calcium carbonate 1250mg 4 tabs TID  - Trend BID CMP, phos, Mg  - If corrected Ca <7.5 or patient is symptomatic, give calcium gluconate IVP  - PTH low, aim calcium low normal 8-8.5   - Aim normal Mg and high normal PO4  - Advised pt to notify RN if having hypocalcemic s/s including perioral/extremity tingling/numbness    D/C reccs  - Requires close follow up on discharge with nephrology - recommend labs with next dialysis session (saturday if dc today) and titrate medications as necessary.  -Will need to repeat PTH as outpatient, to see if any recovery given auto transplantation (calcium replacement will need to be adjusted and potentially ceased if this occurs)  - Endocrine follow up [address] [facility] [place] [address]. I will contact our  to get the pt scheduled in the next few weeks      #Hypothyroidism Hx  - Not on LT4, per son had been on 25 mcg but TFTs found to be normal so stopped >1 year ago  - Repeat TSH 2.35, normal    #T2DM, diet controlled  - Last a1c 5.6% 8/2024, diet controlled per the pt, not on medicine   monitor glucose on BMP    #HTN  - Goal BP <130/80  - Management per primary team     63F PMH HTN, HLD, T2DM (on lifestyle modification), morbid obesity s/p gastric bypass 2016, SLE c/b ESRD on HD (T/Th/S) since 2017 (left AV fistula), secondary hyperparathyroidism of renal origin, CAD s/p cardiac stent in 1/2018 and 6/2018 mid memory impairment presents for parathyroidectomy. Pt is now s/p parathyroidectomy with parathyroid autotransplantation 9/4. Endocrine consulted for further management.    D/W provider     #Secondary Hyperparathyroidism s/p parathyroidectomy  #Hungry bones syndrome and hypocalcemia   - OP labs:   -- 2/13/24 corrected Ca 9.1, iPTH 2000  -- 3/12/2024 corrected Ca 8.9, iPTH 3053  -- 3/26/24 corrected Ca 9.7  -- 4/2024 corrected Ca 9.3, iPTH 2380, vitamin D 25OH 20.3, vitamin D 1,25OH 6.5   - ENT in-office US showing enlarged R superior parathyroid  - Intraoperative iCa 1.06 (low), Ca 8.6 (corrected 9.9)  - Currently asx  - Post op PTH 14, vitamin D 24.2  - 9/7/24 corrected calcium 7.8, ionzied calcium is 0.73 very low. s/p calcium gluconate IV this morning.  - 9/7/24 after high-calcium bath HD, corrected ca was 8.8 and ionized calcium 1.00  - 9/8/24 corrected calcium is 7.9, ionized calcium is 0.79  9/9 corrected calcium 7.8  9/10 corrected calcium 7.7  9/11->9/4 (please note this was with TID dosing of calcium carbonate and not the QID dosing as was ordered for 9/10, appears missed a dose when she was at HD as per d/w nurse)    Plan:  - grayson calcium this AM 8.6 (was 9.3 corrected yesterday)  - continue calcitriol 1mcg BID  - c/w calcium carbonate 1250mg 4 tabs TID  - Trend BID CMP, phos, Mg  - If corrected Ca <7.5 or patient is symptomatic, give calcium gluconate IVP  - PTH low, aim calcium low normal 8-8.5   - Aim normal Mg and high normal PO4  - Advised pt to notify RN if having hypocalcemic s/s including perioral/extremity tingling/numbness    D/C reccs  - Requires close follow up on discharge with nephrology - recommend labs with next dialysis session (saturday if dc today) and titrate medications as necessary.  -Will need to repeat PTH as outpatient, to see if any recovery given auto transplantation (calcium replacement will need to be adjusted and potentially ceased if this occurs)  - Endocrine follow up [address] [facility] [place] [address]. Appointment sc for 9/24 with Dr Torres    #Hypothyroidism Hx  - Not on LT4, per son had been on 25 mcg but TFTs found to be normal so stopped >1 year ago  - Repeat TSH 2.35, normal    #T2DM, diet controlled  - Last a1c 5.6% 8/2024, diet controlled per the pt, not on medicine   monitor glucose on BMP    #HTN  - Goal BP <130/80  - Management per primary team

## 2024-09-13 NOTE — PROGRESS NOTE ADULT - SUBJECTIVE AND OBJECTIVE BOX
NOTE INCOMPLETE/ IN PROGRESS  *Please wait for attending attestation for official recommendations.     Chief Complaint:     History:    MEDICATIONS  (STANDING):  calcitriol   Capsule 1 MICROGram(s) Oral every 12 hours  calcium carbonate   1250 mG (OsCal) 4 Tablet(s) Oral three times a day  cholecalciferol 2000 Unit(s) Oral daily  epoetin adele (EPOGEN) Injectable 3000 Unit(s) IV Push <User Schedule>  heparin   Injectable 5000 Unit(s) SubCutaneous every 8 hours  hydroxychloroquine 200 milliGRAM(s) Oral daily  labetalol 200 milliGRAM(s) Oral two times a day  valsartan 80 milliGRAM(s) Oral daily    MEDICATIONS  (PRN):  acetaminophen     Tablet .. 650 milliGRAM(s) Oral every 6 hours PRN Mild Pain (1 - 3)  sodium chloride 0.9% Bolus. 100 milliLiter(s) IV Bolus every 5 minutes PRN SBP LESS THAN or EQUAL to 90 mmHg      Allergies    penicillin (Rash)    Intolerances      Review of Systems:  Constitutional: No fever  Eyes: No blurry vision  Neuro: No tremors  HEENT: No pain  Cardiovascular: No chest pain, palpitations  Respiratory: No SOB, no cough  GI: No nausea, vomiting, abdominal pain  : No dysuria  Skin: no rash  Psych: no depression  Endocrine: no polyuria, polydipsia  Hem/lymph: no swelling  Osteoporosis: no fractures    ALL OTHER SYSTEMS REVIEWED AND NEGATIVE    UNABLE TO OBTAIN    PHYSICAL EXAM:  VITALS: T(C): 36.8 (09-13-24 @ 04:00)  T(F): 98.2 (09-13-24 @ 04:00), Max: 98.3 (09-12-24 @ 11:50)  HR: 68 (09-13-24 @ 04:00) (65 - 75)  BP: 120/53 (09-13-24 @ 04:00) (107/48 - 134/60)  RR:  (16 - 18)  SpO2:  (99% - 100%)  Wt(kg): --  GENERAL: NAD, well-groomed, well-developed  EYES: No proptosis, no lid lag, anicteric  HEENT:  Atraumatic, Normocephalic, moist mucous membranes  THYROID: Normal size, no palpable nodules  RESPIRATORY: Clear to auscultation bilaterally; No rales, rhonchi, wheezing  CARDIOVASCULAR: Regular rate and rhythm; No murmurs; no peripheral edema  GI: Soft, nontender, non distended  SKIN: Dry, intact, No rashes or lesions  MUSCULOSKELETAL: Full range of motion, normal strength  NEURO: extraocular movements intact, no tremor  PSYCH: Alert and oriented x 3, normal affect, normal mood    CAPILLARY BLOOD GLUCOSE          09-13    136  |  97<L>  |  21  ----------------------------<  79  4.0   |  25  |  4.56<H>    eGFR: 10<L>    Ca    7.7<L>      09-13  Mg     2.20     09-13  Phos  2.4     09-13    TPro  5.8<L>  /  Alb  2.9<L>  /  TBili  0.5  /  DBili  x   /  AST  26  /  ALT  15  /  AlkPhos  1182<H>  09-13      A1C with Estimated Average Glucose Result: 5.6 % (08-27-24 @ 14:30)      Thyroid Function Tests:  09-05 @ 06:52 TSH 2.35 FreeT4 -- T3 -- Anti TPO -- Anti Thyroglobulin Ab -- TSI --  09-05 @ 01:44 TSH 2.95 FreeT4 -- T3 -- Anti TPO -- Anti Thyroglobulin Ab -- TSI --                       Chief Complaint: Hypocalcemia s/p parathyroidectomy    History: Pt seen by the beside. Son present and assisting with translation.   Pt feels well denies any symptoms of hypocalcemia  For dc today  Has close follow up with nephrology and endo (appointments already scheduled)     MEDICATIONS  (STANDING):  calcitriol   Capsule 1 MICROGram(s) Oral every 12 hours  calcium carbonate   1250 mG (OsCal) 4 Tablet(s) Oral three times a day  cholecalciferol 2000 Unit(s) Oral daily  epoetin adele (EPOGEN) Injectable 3000 Unit(s) IV Push <User Schedule>  heparin   Injectable 5000 Unit(s) SubCutaneous every 8 hours  hydroxychloroquine 200 milliGRAM(s) Oral daily  labetalol 200 milliGRAM(s) Oral two times a day  valsartan 80 milliGRAM(s) Oral daily    MEDICATIONS  (PRN):  acetaminophen     Tablet .. 650 milliGRAM(s) Oral every 6 hours PRN Mild Pain (1 - 3)  sodium chloride 0.9% Bolus. 100 milliLiter(s) IV Bolus every 5 minutes PRN SBP LESS THAN or EQUAL to 90 mmHg      Allergies    penicillin (Rash)    Intolerances      Review of Systems:  Constitutional: No fever  Eyes: No blurry vision  Neuro: No tremors  HEENT: No pain  Cardiovascular: No chest pain, palpitations  Respiratory: No SOB, no cough  GI: No nausea, vomiting, abdominal pain  : No dysuria  Skin: no rash  Psych: no depression  Endocrine: no polyuria, polydipsia  Hem/lymph: no swelling  Osteoporosis: no fractures    ALL OTHER SYSTEMS REVIEWED AND NEGATIVE    UNABLE TO OBTAIN    PHYSICAL EXAM:  VITALS: T(C): 36.8 (09-13-24 @ 04:00)  T(F): 98.2 (09-13-24 @ 04:00), Max: 98.3 (09-12-24 @ 11:50)  HR: 68 (09-13-24 @ 04:00) (65 - 75)  BP: 120/53 (09-13-24 @ 04:00) (107/48 - 134/60)  RR:  (16 - 18)  SpO2:  (99% - 100%)  Wt(kg): --  GENERAL: NAD, well-groomed, well-developed  EYES: No proptosis, no lid lag, anicteric  HEENT:  Atraumatic, Normocephalic, moist mucous membranes  THYROID: Normal size, no palpable nodules  RESPIRATORY: Clear to auscultation bilaterally; No rales, rhonchi, wheezing  CARDIOVASCULAR: Regular rate and rhythm; No murmurs; no peripheral edema  GI: Soft, nontender, non distended  SKIN: Dry, intact, No rashes or lesions  MUSCULOSKELETAL: Full range of motion, normal strength  NEURO: extraocular movements intact, no tremor  PSYCH: Alert and oriented x 3, normal affect, normal mood    CAPILLARY BLOOD GLUCOSE          09-13    136  |  97<L>  |  21  ----------------------------<  79  4.0   |  25  |  4.56<H>    eGFR: 10<L>    Ca    7.7<L>      09-13  Mg     2.20     09-13  Phos  2.4     09-13    TPro  5.8<L>  /  Alb  2.9<L>  /  TBili  0.5  /  DBili  x   /  AST  26  /  ALT  15  /  AlkPhos  1182<H>  09-13      A1C with Estimated Average Glucose Result: 5.6 % (08-27-24 @ 14:30)      Thyroid Function Tests:  09-05 @ 06:52 TSH 2.35 FreeT4 -- T3 -- Anti TPO -- Anti Thyroglobulin Ab -- TSI --  09-05 @ 01:44 TSH 2.95 FreeT4 -- T3 -- Anti TPO -- Anti Thyroglobulin Ab -- TSI --

## 2024-09-13 NOTE — PROGRESS NOTE ADULT - PROBLEM SELECTOR PROBLEM 1
DM2 (diabetes mellitus, type 2)
Secondary hyperparathyroidism of renal origin
DM2 (diabetes mellitus, type 2)

## 2024-09-13 NOTE — PROGRESS NOTE ADULT - PROBLEM SELECTOR PROBLEM 3
S/P parathyroidectomy
S/P parathyroidectomy
HTN (hypertension)
S/P parathyroidectomy

## 2024-09-13 NOTE — CHART NOTE - NSCHARTNOTEFT_GEN_A_CORE
Patient received bed to 861. All questions answered to primary team regarding plan
The patient will require a rolling walker to be able to perform their MRADLs within their home
Called by primary team for reported episode of VT on tele, patient reportedly asymptomatic and HDS. On tele eval, 8 beats of NSVT vs artifact.    Lab review with mild hypoCa, K 3.8, Mg 2.3.    No urgent intervention, repeat BMP and iCa, can replete iCa to normal and K>4, Mg>2, monitor on tele.    Patient follows with private cardiologist Salvador Mckeon, Dr Hany Ramos covering his patients at [facility] and will formally see the patient as a consult.    Dariel Falk MD  Cardiology Fellow
Dr. Veras

## 2024-09-13 NOTE — PROGRESS NOTE ADULT - ASSESSMENT
63F with HTN, HLD, T2DM, ESRD from SLE on T/Th/S, and CAD s/p PCI in 2018 here for elective parathyroidectomy and parathyroid autotransplantation. POD course uncomplicated. On telemetry, noted to have 7 beats of NSVT, asymptomatic. Cardiology consulted for NSVT.     1. NSVT-? electrolyte shift s/p parathyroidectomy   2. CAD s/p PCI in 2018     -troponin flat, episode not ischemia driven  -bp runs low, would decrease labetalol back 100 mg BID   -cont valsartan 80 mg QD   -monitor on tele   -K>4 and Mg>2     Thank you for allowing us to participate in the care of your patient. If you have any questions or concerns please do not hesitate to contact me 24/7     Hany Ramos MD   Interventional Cardiology and General Cardiology Attending, [facility]  [employer] Team  Cell: [phone]     Ambulatory Clinic   [address] [place] [address]   For Appointment: [phone]

## 2024-09-13 NOTE — DISCHARGE NOTE NURSING/CASE MANAGEMENT/SOCIAL WORK - PATIENT PORTAL LINK FT
You can access the FollowMyHealth Patient Portal offered by [facility] by registering at the following website: [email or web address]. By joining [facility]’s FollowMyHealth portal, you will also be able to view your health information using other applications (apps) compatible with our system.

## 2024-09-13 NOTE — PROGRESS NOTE ADULT - SUBJECTIVE AND OBJECTIVE BOX
[facility] NEPHROLOGY- PROGRESS NOTE    63 year old Female with history of ESRD on HD presents for parathyroidectomy. Nephrology consulted for ESRD status.    REVIEW OF SYSTEMS:  Gen: no fevers, numbness or weakness  Cards: no chest pain  Resp: no dyspnea  GI: no nausea or vomiting or diarrhea  Vascular: no LE edema    penicillin (Rash)      Hospital Medications: Medications reviewed      VITALS:  T(F): 98.2 (09-13-24 @ 07:45), Max: 98.3 (09-12-24 @ 11:50)  HR: 69 (09-13-24 @ 07:45)  BP: 114/48 (09-13-24 @ 07:45)  RR: 16 (09-13-24 @ 07:45)  SpO2: 100% (09-13-24 @ 07:45)  Wt(kg): --    09-12 @ 07:01  -  09-13 @ 07:00  --------------------------------------------------------  IN: 400 mL / OUT: 2400 mL / NET: -2000 mL        PHYSICAL EXAM:    Gen: NAD, calm  Cards: RRR, +S1/S2, no M/G/R  Resp: CTA B/L  GI: soft, NT/ND, NABS  Vascular: no LE edema B/L, LUE AVF + bruit/thrill        LABS:  09-13    136  |  97<L>  |  21  ----------------------------<  79  4.0   |  25  |  4.56<H>    Ca    7.7<L>      13 Sep 2024 05:37  Phos  2.4     09-13  Mg     2.20     09-13    TPro  5.8<L>  /  Alb  2.9<L>  /  TBili  0.5  /  DBili      /  AST  26  /  ALT  15  /  AlkPhos  1182<H>  09-13    Creatinine Trend: 4.56 <--, 3.73 <--, 6.64 <--, 6.04 <--, 4.83 <--, 4.49 <--, 4.03 <--, 6.99 <--, 6.70 <--, 5.83 <--, 5.40 <--, 4.91 <--, 4.31 <--, 3.49 <--, 3.06 <--, 5.90 <--, 5.26 <--                        9.8    6.60  )-----------( 121      ( 13 Sep 2024 05:37 )             29.8     Urine Studies:  Urinalysis Basic - ( 13 Sep 2024 05:37 )    Color:  / Appearance:  / SG:  / pH:   Gluc: 79 mg/dL / Ketone:   / Bili:  / Urobili:    Blood:  / Protein:  / Nitrite:    Leuk Esterase:  / RBC:  / WBC    Sq Epi:  / Non Sq Epi:  / Bacteria:

## 2024-09-13 NOTE — PROGRESS NOTE ADULT - SUBJECTIVE AND OBJECTIVE BOX
CC/Reason for Consult:     SUBJECTIVE / OVERNIGHT EVENTS: Patient examined at bedside     MEDICATIONS  (STANDING):  calcitriol   Capsule 1 MICROGram(s) Oral every 12 hours  calcium carbonate   1250 mG (OsCal) 4 Tablet(s) Oral three times a day  cholecalciferol 2000 Unit(s) Oral daily  epoetin adele (EPOGEN) Injectable 3000 Unit(s) IV Push <User Schedule>  heparin   Injectable 5000 Unit(s) SubCutaneous every 8 hours  hydroxychloroquine 200 milliGRAM(s) Oral daily  labetalol 200 milliGRAM(s) Oral two times a day  valsartan 80 milliGRAM(s) Oral daily    MEDICATIONS  (PRN):  acetaminophen     Tablet .. 650 milliGRAM(s) Oral every 6 hours PRN Mild Pain (1 - 3)  sodium chloride 0.9% Bolus. 100 milliLiter(s) IV Bolus every 5 minutes PRN SBP LESS THAN or EQUAL to 90 mmHg      Vital Signs Last 24 Hrs  T(C): 36.8 (13 Sep 2024 11:56), Max: 36.8 (12 Sep 2024 16:41)  T(F): 98.2 (13 Sep 2024 11:56), Max: 98.3 (12 Sep 2024 16:41)  HR: 69 (13 Sep 2024 11:56) (68 - 75)  BP: 127/47 (13 Sep 2024 11:56) (114/48 - 127/47)  BP(mean): --  RR: 18 (13 Sep 2024 11:56) (16 - 18)  SpO2: 100% (13 Sep 2024 11:56) (99% - 100%)    Parameters below as of 13 Sep 2024 11:56  Patient On (Oxygen Delivery Method): room air      CAPILLARY BLOOD GLUCOSE            PHYSICAL EXAM:  GENERAL: NAD, well-developed  HEAD:  Atraumatic, Normocephalic  EYES: EOMI, conjunctiva and sclera clear  NECK: Supple, No JVD  CHEST/LUNG: Clear to auscultation bilaterally; No wheeze  HEART: Regular rate and rhythm; No murmurs, rubs, or gallops  ABDOMEN: Soft, Nontender, Nondistended; Bowel sounds present  EXTREMITIES:  2+ Peripheral Pulses, No clubbing, cyanosis, or edema  PSYCH: AAOx3  NEUROLOGY: non-focal  SKIN: No rashes or lesions    LABS:                        9.8    6.60  )-----------( 121      ( 13 Sep 2024 05:37 )             29.8     09-13    136  |  97<L>  |  21  ----------------------------<  79  4.0   |  25  |  4.56<H>    Ca    7.7<L>      13 Sep 2024 05:37  Phos  2.4     09-13  Mg     2.20     09-13    TPro  5.8<L>  /  Alb  2.9<L>  /  TBili  0.5  /  DBili  x   /  AST  26  /  ALT  15  /  AlkPhos  1182<H>  09-13          Urinalysis Basic - ( 13 Sep 2024 05:37 )    Color: x / Appearance: x / SG: x / pH: x  Gluc: 79 mg/dL / Ketone: x  / Bili: x / Urobili: x   Blood: x / Protein: x / Nitrite: x   Leuk Esterase: x / RBC: x / WBC x   Sq Epi: x / Non Sq Epi: x / Bacteria: x        RADIOLOGY & ADDITIONAL TESTS:    Imaging Personally Reviewed:    Consultant(s) Notes Reviewed:      Care Discussed with Consultants/Other Providers:   CC/Reason for Consult:     SUBJECTIVE / OVERNIGHT EVENTS: Patient examined at bedside with Portuguese speaking . No medical concerns. No lightheadness and dizziness. Plan for discharge today.     MEDICATIONS  (STANDING):  calcitriol   Capsule 1 MICROGram(s) Oral every 12 hours  calcium carbonate   1250 mG (OsCal) 4 Tablet(s) Oral three times a day  cholecalciferol 2000 Unit(s) Oral daily  epoetin adele (EPOGEN) Injectable 3000 Unit(s) IV Push <User Schedule>  heparin   Injectable 5000 Unit(s) SubCutaneous every 8 hours  hydroxychloroquine 200 milliGRAM(s) Oral daily  labetalol 200 milliGRAM(s) Oral two times a day  valsartan 80 milliGRAM(s) Oral daily    MEDICATIONS  (PRN):  acetaminophen     Tablet .. 650 milliGRAM(s) Oral every 6 hours PRN Mild Pain (1 - 3)  sodium chloride 0.9% Bolus. 100 milliLiter(s) IV Bolus every 5 minutes PRN SBP LESS THAN or EQUAL to 90 mmHg      Vital Signs Last 24 Hrs  T(C): 36.8 (13 Sep 2024 11:56), Max: 36.8 (12 Sep 2024 16:41)  T(F): 98.2 (13 Sep 2024 11:56), Max: 98.3 (12 Sep 2024 16:41)  HR: 69 (13 Sep 2024 11:56) (68 - 75)  BP: 127/47 (13 Sep 2024 11:56) (114/48 - 127/47)  BP(mean): --  RR: 18 (13 Sep 2024 11:56) (16 - 18)  SpO2: 100% (13 Sep 2024 11:56) (99% - 100%)    Parameters below as of 13 Sep 2024 11:56  Patient On (Oxygen Delivery Method): room air      CAPILLARY BLOOD GLUCOSE            PHYSICAL EXAM:  GENERAL: Middle age woman in NAD, well-developed  HEAD:  Atraumatic, Normocephalic  EYES: EOMI, PERRLA, conjunctiva and sclera clear  NECK: Supple, No JVD; bandage on neck, incision C/D/I   CHEST/LUNG: Clear to auscultation bilaterally; No wheeze  HEART: Regular rate and rhythm; No murmurs, rubs, or gallops  ABDOMEN: Soft, Nontender, Nondistended; Bowel sounds present  EXTREMITIES:  2+ Peripheral Pulses, No clubbing, cyanosis, or edema  PSYCH: AAOx3  NEUROLOGY: non-focal  SKIN: No rashes or lesions    LABS:                        9.8    6.60  )-----------( 121      ( 13 Sep 2024 05:37 )             29.8     09-13    136  |  97<L>  |  21  ----------------------------<  79  4.0   |  25  |  4.56<H>    Ca    7.7<L>      13 Sep 2024 05:37  Phos  2.4     09-13  Mg     2.20     09-13    TPro  5.8<L>  /  Alb  2.9<L>  /  TBili  0.5  /  DBili  x   /  AST  26  /  ALT  15  /  AlkPhos  1182<H>  09-13          Urinalysis Basic - ( 13 Sep 2024 05:37 )    Color: x / Appearance: x / SG: x / pH: x  Gluc: 79 mg/dL / Ketone: x  / Bili: x / Urobili: x   Blood: x / Protein: x / Nitrite: x   Leuk Esterase: x / RBC: x / WBC x   Sq Epi: x / Non Sq Epi: x / Bacteria: x        RADIOLOGY & ADDITIONAL TESTS:    Imaging Personally Reviewed:    Consultant(s) Notes Reviewed:      Care Discussed with Consultants/Other Providers:

## 2024-09-13 NOTE — PROGRESS NOTE ADULT - ASSESSMENT
63F PMHx HTN, HLD, T2DM, morbid obesity (lost ~70 lbs after gastric bypass in 2016), SLE, ESRD on HD (T/Th/S) since 2017 (left AV fistula), CAD s/p Cardiac stent in 1/2018 and 6/2018, and mid memory impairment now s/p 4-gland total parathyroidectomy with reimplantation of 1x gland in R forearm on 9/4. Patient had SICU consulted for electrolyte monitoring and c/f hungry bone syndrome post op. Now transferred to general medicine floor. medicine consulted for co-management     #Hypothyroidism Hx  - Endocrine following   - Not on LT4, per son had been on 25 mcg but TFTs found to be normal so stopped >1 year ago  - Repeat TSH 2.35, normal    #T2DM, diet controlled  - Last a1c 5.6% 8/2024, though unreliable i/s/o ESRD on HD  -  FS tidac and qhs and low correctional Admelog at meals   - endocrine following, : if FSBG stable for 48 hrs without need of insulin can dc FSBG and insulin     #s/p  4-gland total parathyroidectomy with reimplantation of 1x gland in R forearm on 9/4  - now with hypocalcemia, endocrine and nephrology following     #ESRD (T/Th/S) since 2017 (left AV fistula),  - nephrology following       #HTN  - Goal BP <130/80  - patient on  labetolol and valsartan at home   - Labetolol increased to 200mg BID given NSVT per cardiology   - Blood pressure at goal  - Patient did not receive Labetolol and Valsartan this am on 9/12.   - Continue to monitor BP. On discharge, patient may be able to be discharged on one BP agent     #SLE - continue Plaquenil   #NSVT  - - Labetolol increased to 200mg BID given NSVT per cardiology   - Cardiology following,  63F PMHx HTN, HLD, T2DM, morbid obesity (lost ~70 lbs after gastric bypass in 2016), SLE, ESRD on HD (T/Th/S) since 2017 (left AV fistula), CAD s/p Cardiac stent in 1/2018 and 6/2018, and mid memory impairment now s/p 4-gland total parathyroidectomy with reimplantation of 1x gland in R forearm on 9/4. Patient had SICU consulted for electrolyte monitoring and c/f hungry bone syndrome post op. Now transferred to general medicine floor. medicine consulted for co-management     #Hypothyroidism Hx  - Endocrine following   - Not on LT4, per son had been on 25 mcg but TFTs found to be normal so stopped >1 year ago  - Repeat TSH 2.35, normal    #T2DM, diet controlled  - Last a1c 5.6% 8/2024, though unreliable i/s/o ESRD on HD  -  FS tidac and qhs and low correctional Admelog at meals   - endocrine following, : if FSBG stable for 48 hrs without need of insulin can dc FSBG and insulin     #s/p  4-gland total parathyroidectomy with reimplantation of 1x gland in R forearm on 9/4  - now with hypocalcemia, endocrine and nephrology following     #ESRD (T/Th/S) since 2017 (left AV fistula),  - nephrology following       #HTN  - Goal BP <130/80  - patient on  labetolol and valsartan at home   - Labetolol increased to 200mg BID given NSVT per cardiology. However, patient with soft blood pressures, can resume home labetolol dose of 100mg BID   - Blood pressure at goal  - Patient did not receive Labetolol and Valsartan this am on 9/12. Can consider holding valsartan on HD days if resumes low   - Continue to monitor BP.    #SLE - continue Plaquenil   #NSVT  - - Labetolol increased to 200mg BID given NSVT per cardiology. However, patient with soft blood pressures, can resume home labetolol dose of 100mg BID   - Cardiology following,

## 2024-09-13 NOTE — PROGRESS NOTE ADULT - SUBJECTIVE AND OBJECTIVE BOX
Interval: patient s/p 3.5 gland parathyroidectomy with reimplant in R arm . received 2g IV ca  at 9am. Pt did not require IV Ca yesterday. Pt doing well this am no issues overnight.    PAST MEDICAL & SURGICAL HISTORY:  Diabetes      Hypertension      Hypercholesteremia      Other hyperlipidemia      Glaucoma      ESRD (end-stage renal disease) due to SLE      Lupus (systemic lupus erythematosus)      Hyperparathyroidism      Memory impairment      Anemia of renal disease      CAD (coronary artery disease)      Stented coronary artery      Cholelithiasis      Rib fracture      S/P  section  times two      H/O gastric bypass  2016      S/P appendectomy      History of appendectomy        Allergies    penicillin (Rash)    Intolerances    MEDICATIONS  (STANDING):  acetaminophen     Tablet .. 975 milliGRAM(s) Oral every 6 hours  calcitriol   Capsule 1 MICROGram(s) Oral every 12 hours  calcium carbonate   1250 mG (OsCal) 2 Tablet(s) Oral three times a day  chlorhexidine 2% Cloths 1 Application(s) Topical daily  chlorhexidine 2% Cloths 1 Application(s) Topical daily  cholecalciferol 2000 Unit(s) Oral daily  epoetin adele (EPOGEN) Injectable 3000 Unit(s) IV Push <User Schedule>  heparin   Injectable 5000 Unit(s) SubCutaneous every 8 hours  hydroxychloroquine 200 milliGRAM(s) Oral daily  insulin lispro (ADMELOG) corrective regimen sliding scale   SubCutaneous at bedtime  insulin lispro (ADMELOG) corrective regimen sliding scale   SubCutaneous three times a day before meals  labetalol 100 milliGRAM(s) Oral two times a day  valsartan 80 milliGRAM(s) Oral daily    MEDICATIONS  (PRN):  sodium chloride 0.9% Bolus. 100 milliLiter(s) IV Bolus every 5 minutes PRN SBP LESS THAN or EQUAL to 90 mmHg    Vital Signs Last 24 Hrs  T(C): 36.8 (13 Sep 2024 00:36), Max: 36.8 (12 Sep 2024 11:50)  T(F): 98.2 (13 Sep 2024 00:36), Max: 98.3 (12 Sep 2024 11:50)  HR: 70 (13 Sep 2024 00:36) (53 - 75)  BP: 121/51 (13 Sep 2024 00:36) (107/48 - 134/60)  BP(mean): --  RR: 18 (13 Sep 2024 00:36) (16 - 18)  SpO2: 100% (13 Sep 2024 00:36) (99% - 100%)    Parameters below as of 13 Sep 2024 00:36  Patient On (Oxygen Delivery Method): room air                        O2 Parameters below as of 08 Sep 2024 00:00  Patient On (Oxygen Delivery Method): room air    Physical Exam:  Alert, NAD  Nonlabored Respirations RA  ANGEL  neck: incision c/d/i       I&O's Summary    06 Sep 2024 07:01  -  07 Sep 2024 07:00  --------------------------------------------------------  IN: 1000 mL / OUT: 0 mL / NET: 1000 mL    07 Sep 2024 07:01  -  08 Sep 2024 05:32  --------------------------------------------------------  IN: 1080 mL / OUT: 2300 mL / NET: -1220 mL                       LABS:                        9.1    5.54  )-----------( 123      ( 12 Sep 2024 06:20 )             26.7     12    140  |  99  |  15  ----------------------------<  124<H>  3.9   |  26  |  3.73<H>    Ca    8.7      12 Sep 2024 18:54  Phos  2.6       Mg     2.20         TPro  6.7  /  Alb  3.3  /  TBili  0.4  /  DBili  x   /  AST  34<H>  /  ALT  15  /  AlkPhos  1412<H>                                   A/P: 63yFemale s/p 3.5 gland parathyroidectomy. Calciums stable.    -trend calciums  -repletions per endo  -HD per nephro  - likely dc home today      - SCDs  - d/w attending

## 2024-09-26 ENCOUNTER — APPOINTMENT (OUTPATIENT)
Dept: OTOLARYNGOLOGY | Facility: CLINIC | Age: 64
End: 2024-09-26
Payer: MEDICAID

## 2024-09-26 VITALS
HEIGHT: 61 IN | DIASTOLIC BLOOD PRESSURE: 69 MMHG | SYSTOLIC BLOOD PRESSURE: 137 MMHG | WEIGHT: 136 LBS | BODY MASS INDEX: 25.68 KG/M2

## 2024-09-26 DIAGNOSIS — N25.81 SECONDARY HYPERPARATHYROIDISM OF RENAL ORIGIN: ICD-10-CM

## 2024-09-26 PROCEDURE — 99024 POSTOP FOLLOW-UP VISIT: CPT

## 2024-09-26 RX ORDER — MEGESTROL ACETATE 40 MG
TABLET ORAL
Refills: 0 | Status: ACTIVE | COMMUNITY

## 2024-09-26 NOTE — REASON FOR VISIT
[Post-Operative Visit] : a post-operative visit [Other: _____] : [unfilled] [Patient Declined  Services] : - None: Patient declined  services [Source: ______] : History obtained from [unfilled] [FreeTextEntry2] : s/p  Bilateral parathyroid exploration with removal of 4 parathyroid glands and reimplantation of portion of the right inferior parathyroid into the right forearm. [Interpreters_FullName] : Cole  [Interpreters_Relationshiptopatient] : son [FreeTextEntry3] : pt preferred to use family member for interpretation  Patient declined offer of translation service.  Patient preferred to use accompanying family member/friend for translation.  [TWNoteComboBox1] : [place]

## 2024-09-26 NOTE — PLAN
[TextEntry] : - Secondary hyperparathyroidism. - PTH of 2000 after medical treatment. US in the office today showed enlarged R superior parathyroid. Was not able to see the other glands. - Discussed findings and recommended considering surgery. - S/P 4 gland resection and reimplantation on the arm on 9/4/24. PTH from 9/6/24 was 14. - Patient on Calcium TID and Rocaltrol BID. - Continue follow up with endocrinology and nephrology. - Return in 3 months.

## 2024-09-26 NOTE — HISTORY OF PRESENT ILLNESS
[de-identified] : 63 yro pt with ESRD on hemodialysis 3x/week referred by nephrologist Dr. Huong Jacobs for eval of secondary hyperparathyroidism. April 2024: calcium 8.6 (corrected 9.3) and iPTH 2380 3/12/24 : calcium 8.3 (corrected 8.9) and iPTH 3053 3/26/24: calcium 9.1 (corrected 9.7)  2/13/24 calcium 8.5 (corrected 9.1) and iPTH 2000  Here today for post op visit. s/p Bilateral parathyroid exploration with removal of 4 parathyroid glands and reimplantation of portion of the right inferior parathyroid into the right forearm 09/04/24 States feeling well since surgery.  States incisions are healing well. No bleeding, pain, swelling, redness.  Following Endo, has appointment 10/23/24 Continues to with HD Tues, Thurs and Sat Denies h/o kidney stones, abdominal pain.  No dysphagia, dyspnea or dysphonia. No recent fever, chills, unintentional weight loss.   Specimen(s) Submitted 09/04/24 1- Rule out right superior parathyroid 2- Right inferior parathyroid 3- Right inferior parathyroid part two 4- Left superior parathyroid 5- Left inferior parathyroid 6- Left inferior parathyroid 7- Left superior parathyroid Final Diagnosis 1. Neck, "rule out right superior parathyroid", excision - Hypercellular parathyroid tissue and adjacent thyroid tissue 2. Parathyroid, right inferior, excision - Hypercellular parathyroid tissue - Tiny focus of thymic tissue on permanent section 3. Parathyroid, right inferior part two, excision - Hypercellular parathyroid tissue 4. Parathyroid, left superior, biopsy - Mildly hypercellular parathyroid tissue 5. Parathyroid, left inferior, biopsy - Mildly hypercellular parathyroid tissue 6. Parathyroid, left inferior, excision - Hypercellular parathyroid tissue 7. Parathyorid, left superior, excision - Hypercellular parathyroid tissue with adjacent thyroid tissue   s/p US Head/Neck 4/30/24 IMPRESSION: Mildly atrophic thyroid gland. Subcentimeter benign-appearing thyroid nodules. There is 8 x 8 x 5 mm heterogeneous partially isoechoic/hypoechoic nodule located immediately inferior to the right thyroid gland which may represent parathyroid adenoma.

## 2024-09-27 PROBLEM — I25.10 ATHEROSCLEROTIC HEART DISEASE OF NATIVE CORONARY ARTERY WITHOUT ANGINA PECTORIS: Chronic | Status: ACTIVE | Noted: 2024-08-27

## 2024-09-27 PROBLEM — S22.39XA FRACTURE OF ONE RIB, UNSPECIFIED SIDE, INITIAL ENCOUNTER FOR CLOSED FRACTURE: Chronic | Status: ACTIVE | Noted: 2024-08-27

## 2024-09-27 PROBLEM — R41.3 OTHER AMNESIA: Chronic | Status: ACTIVE | Noted: 2024-08-27

## 2024-09-27 PROBLEM — Z95.5 PRESENCE OF CORONARY ANGIOPLASTY IMPLANT AND GRAFT: Chronic | Status: ACTIVE | Noted: 2024-08-27

## 2024-09-27 PROBLEM — N18.9 CHRONIC KIDNEY DISEASE, UNSPECIFIED: Chronic | Status: ACTIVE | Noted: 2024-08-27

## 2024-09-27 PROBLEM — K80.20 CALCULUS OF GALLBLADDER WITHOUT CHOLECYSTITIS WITHOUT OBSTRUCTION: Chronic | Status: ACTIVE | Noted: 2024-08-27

## 2024-10-15 NOTE — ASU PREOP CHECKLIST - DENTURES
C/w levothyroxine 75mcg daily.
no

## 2024-10-23 ENCOUNTER — APPOINTMENT (OUTPATIENT)
Dept: ENDOCRINOLOGY | Facility: CLINIC | Age: 64
End: 2024-10-23

## 2024-11-01 ENCOUNTER — APPOINTMENT (OUTPATIENT)
Dept: VASCULAR SURGERY | Facility: CLINIC | Age: 64
End: 2024-11-01

## 2024-11-01 NOTE — PROVIDER CONTACT NOTE (CRITICAL VALUE NOTIFICATION) - ACTION/TREATMENT ORDERED:
con't LR @100ml/hr
New order for STAT CBC and Type & Screen to be drawn.  Will draw labs and continue to closely monitor.
Opt out

## 2025-01-15 NOTE — CONSULT LETTER
Labs reviewed by NP, no changes     Message left to inform patient    [Dear  ___] : Dear  [unfilled], [Consult Letter:] : I had the pleasure of evaluating your patient, [unfilled]. [Please see my note below.] : Please see my note below. [Consult Closing:] : Thank you very much for allowing me to participate in the care of this patient.  If you have any questions, please do not hesitate to contact me. [Sincerely,] : Sincerely, [FreeTextEntry2] : Dr Huong Jacobs [FreeTextEntry3] :  Vijay Frederick MD, FACS  Otolaryngology-Head and Neck Surgery [place] [employer] [employer] [facility]

## 2025-01-30 ENCOUNTER — APPOINTMENT (OUTPATIENT)
Dept: OTOLARYNGOLOGY | Facility: CLINIC | Age: 65
End: 2025-01-30

## 2025-01-30 NOTE — PLAN
[FreeTextEntry1] : pap obtained self breast exams, safe sex, diet and exercise discussed mammograms annually RTO prn and annually for exams 
No

## 2025-05-02 NOTE — H&P ADULT. - HEMATOLOGY/LYMPHATICS
well developed, well nourished , in no acute distress , ambulating without difficulty , normal communication ability negative

## 2025-07-03 ENCOUNTER — APPOINTMENT (OUTPATIENT)
Dept: VASCULAR SURGERY | Facility: CLINIC | Age: 65
End: 2025-07-03
Payer: MEDICAID

## 2025-07-03 PROCEDURE — 93990 DOPPLER FLOW TESTING: CPT

## 2025-07-03 PROCEDURE — 99214 OFFICE O/P EST MOD 30 MIN: CPT

## 2025-07-11 ENCOUNTER — APPOINTMENT (OUTPATIENT)
Dept: ENDOVASCULAR SURGERY | Facility: CLINIC | Age: 65
End: 2025-07-11

## 2025-08-01 ENCOUNTER — RESULT REVIEW (OUTPATIENT)
Age: 65
End: 2025-08-01

## 2025-08-01 ENCOUNTER — APPOINTMENT (OUTPATIENT)
Dept: ENDOVASCULAR SURGERY | Facility: CLINIC | Age: 65
End: 2025-08-01
Payer: MEDICAID

## 2025-08-01 VITALS
WEIGHT: 136 LBS | RESPIRATION RATE: 16 BRPM | HEIGHT: 61 IN | BODY MASS INDEX: 25.68 KG/M2 | OXYGEN SATURATION: 100 % | TEMPERATURE: 98 F | SYSTOLIC BLOOD PRESSURE: 125 MMHG | DIASTOLIC BLOOD PRESSURE: 74 MMHG | HEART RATE: 84 BPM

## 2025-08-01 PROCEDURE — 36907Z: CUSTOM | Mod: 59

## 2025-08-01 PROCEDURE — 36902Z: CUSTOM

## 2025-08-01 RX ORDER — LEVETIRACETAM 500 MG/1
500 TABLET, FILM COATED ORAL
Refills: 0 | Status: ACTIVE | COMMUNITY

## 2025-08-01 RX ORDER — ANASTROZOLE TABLETS 1 MG/1
1 TABLET ORAL
Refills: 0 | Status: ACTIVE | COMMUNITY

## 2025-08-01 RX ORDER — LEVOCARNITINE 330 MG/1
TABLET ORAL
Refills: 0 | Status: ACTIVE | COMMUNITY

## 2025-08-01 RX ORDER — LACOSAMIDE 50 MG/1
50 TABLET, FILM COATED ORAL
Refills: 0 | Status: ACTIVE | COMMUNITY

## 2025-08-01 RX ORDER — CLOBAZAM 20 MG/1
TABLET ORAL
Refills: 0 | Status: ACTIVE | COMMUNITY

## (undated) DEVICE — LABELS BLANK W PEN

## (undated) DEVICE — CANISTER DISPOSABLE THIN WALL 3000CC

## (undated) DEVICE — VENODYNE/SCD SLEEVE CALF MEDIUM

## (undated) DEVICE — PACK HEAD & NECK

## (undated) DEVICE — ELCTR GROUNDING PAD ADULT COVIDIEN

## (undated) DEVICE — WARMING BLANKET LOWER ADULT

## (undated) DEVICE — SUT SILK 2-0 30" SH

## (undated) DEVICE — STAPLER SKIN VISI-STAT 35 WIDE

## (undated) DEVICE — DRAPE 3/4 SHEET 52X76"

## (undated) DEVICE — DRAPE TOWEL BLUE 17" X 24"

## (undated) DEVICE — DRAIN BLAKE 10FR ROUND

## (undated) DEVICE — DRAPE C ARM BAND BAG

## (undated) DEVICE — SOL IRR POUR NS 0.9% 500ML

## (undated) DEVICE — DRAPE SPLIT SHEET 77" X 120"

## (undated) DEVICE — SOL IRR POUR H2O 1500ML

## (undated) DEVICE — SUT MONOCRYL 4-0 27" PS-2 UNDYED

## (undated) DEVICE — SYR LUER LOK 5CC